# Patient Record
Sex: FEMALE | Race: WHITE | Employment: FULL TIME
[De-identification: names, ages, dates, MRNs, and addresses within clinical notes are randomized per-mention and may not be internally consistent; named-entity substitution may affect disease eponyms.]

---

## 2017-01-03 RX ORDER — VENLAFAXINE 75 MG/1
TABLET ORAL
Qty: 60 TABLET | Refills: 2 | Status: SHIPPED | OUTPATIENT
Start: 2017-01-03 | End: 2017-02-16 | Stop reason: SDUPTHER

## 2017-01-03 RX ORDER — OMEPRAZOLE 20 MG/1
CAPSULE, DELAYED RELEASE ORAL
Qty: 30 CAPSULE | Refills: 2 | Status: SHIPPED | OUTPATIENT
Start: 2017-01-03 | End: 2017-04-02 | Stop reason: SDUPTHER

## 2017-01-03 RX ORDER — PRAVASTATIN SODIUM 40 MG
TABLET ORAL
Qty: 30 TABLET | Refills: 2 | Status: SHIPPED | OUTPATIENT
Start: 2017-01-03 | End: 2017-04-02 | Stop reason: SDUPTHER

## 2017-01-30 RX ORDER — RANITIDINE 150 MG/1
TABLET ORAL
Qty: 60 TABLET | Refills: 2 | Status: SHIPPED | OUTPATIENT
Start: 2017-01-30 | End: 2017-01-30 | Stop reason: SDUPTHER

## 2017-02-01 RX ORDER — RANITIDINE 150 MG/1
TABLET ORAL
Qty: 60 TABLET | Refills: 2 | Status: SHIPPED | OUTPATIENT
Start: 2017-02-01 | End: 2017-07-23 | Stop reason: SDUPTHER

## 2017-02-16 ENCOUNTER — OFFICE VISIT (OUTPATIENT)
Dept: FAMILY MEDICINE CLINIC | Facility: CLINIC | Age: 57
End: 2017-02-16

## 2017-02-16 VITALS
WEIGHT: 162.2 LBS | DIASTOLIC BLOOD PRESSURE: 74 MMHG | BODY MASS INDEX: 29.85 KG/M2 | SYSTOLIC BLOOD PRESSURE: 118 MMHG | OXYGEN SATURATION: 98 % | HEIGHT: 62 IN | HEART RATE: 92 BPM

## 2017-02-16 DIAGNOSIS — M51.36 DEGENERATION, INTERVERTEBRAL DISC, LUMBAR: Primary | ICD-10-CM

## 2017-02-16 DIAGNOSIS — F41.9 ANXIETY: ICD-10-CM

## 2017-02-16 PROCEDURE — 99214 OFFICE O/P EST MOD 30 MIN: CPT | Performed by: FAMILY MEDICINE

## 2017-02-16 RX ORDER — TRAMADOL HYDROCHLORIDE 50 MG/1
TABLET ORAL PRN
COMMUNITY
Start: 2016-12-15 | End: 2017-02-20 | Stop reason: SDUPTHER

## 2017-02-16 RX ORDER — BUSPIRONE HYDROCHLORIDE 15 MG/1
15 TABLET ORAL 3 TIMES DAILY
Qty: 90 TABLET | Refills: 3 | Status: SHIPPED | OUTPATIENT
Start: 2017-02-16 | End: 2017-03-03 | Stop reason: SDUPTHER

## 2017-02-16 RX ORDER — BETAMETHASONE DIPROPIONATE 0.5 MG/G
CREAM TOPICAL
Qty: 45 G | Refills: 2 | Status: SHIPPED | OUTPATIENT
Start: 2017-02-16 | End: 2018-04-05 | Stop reason: SDUPTHER

## 2017-02-16 RX ORDER — VENLAFAXINE 75 MG/1
75 TABLET ORAL 2 TIMES DAILY
Qty: 60 TABLET | Refills: 3 | Status: SHIPPED | OUTPATIENT
Start: 2017-02-16 | End: 2017-02-22 | Stop reason: SDUPTHER

## 2017-02-16 RX ORDER — BACLOFEN 10 MG/1
10 TABLET ORAL 3 TIMES DAILY
Qty: 90 TABLET | Refills: 0 | Status: SHIPPED | OUTPATIENT
Start: 2017-02-16 | End: 2017-03-16 | Stop reason: SDUPTHER

## 2017-02-16 RX ORDER — MELOXICAM 15 MG/1
15 TABLET ORAL DAILY
Qty: 30 TABLET | Refills: 3 | Status: SHIPPED | OUTPATIENT
Start: 2017-02-16 | End: 2017-06-26 | Stop reason: SDUPTHER

## 2017-02-20 DIAGNOSIS — R87.810 ASCUS WITH POSITIVE HIGH RISK HPV CERVICAL: ICD-10-CM

## 2017-02-20 DIAGNOSIS — N81.10 VAGINAL PROLAPSE: ICD-10-CM

## 2017-02-20 DIAGNOSIS — R87.610 ASCUS WITH POSITIVE HIGH RISK HPV CERVICAL: ICD-10-CM

## 2017-02-20 DIAGNOSIS — N95.9 POST MENOPAUSAL PROBLEMS: ICD-10-CM

## 2017-02-22 ENCOUNTER — TELEPHONE (OUTPATIENT)
Dept: FAMILY MEDICINE CLINIC | Age: 57
End: 2017-02-22

## 2017-02-22 RX ORDER — TRAMADOL HYDROCHLORIDE 50 MG/1
TABLET ORAL
Qty: 15 TABLET | Refills: 0 | Status: SHIPPED | OUTPATIENT
Start: 2017-02-22 | End: 2017-03-04 | Stop reason: SDUPTHER

## 2017-02-22 RX ORDER — VENLAFAXINE 75 MG/1
75 TABLET ORAL 3 TIMES DAILY
Qty: 90 TABLET | Refills: 3 | Status: SHIPPED | OUTPATIENT
Start: 2017-02-22 | End: 2017-06-25 | Stop reason: SDUPTHER

## 2017-02-22 RX ORDER — CONJUGATED ESTROGENS 0.62 MG/G
CREAM VAGINAL
Qty: 30 G | Refills: 3 | Status: SHIPPED | OUTPATIENT
Start: 2017-02-22 | End: 2017-06-26 | Stop reason: SDUPTHER

## 2017-03-03 DIAGNOSIS — M25.549 ARTHRALGIA OF HAND: ICD-10-CM

## 2017-03-03 DIAGNOSIS — F41.9 ANXIETY: ICD-10-CM

## 2017-03-03 RX ORDER — BUSPIRONE HYDROCHLORIDE 15 MG/1
TABLET ORAL
Qty: 60 TABLET | Refills: 2 | Status: SHIPPED | OUTPATIENT
Start: 2017-03-03 | End: 2017-06-14 | Stop reason: SDUPTHER

## 2017-03-03 RX ORDER — DULOXETIN HYDROCHLORIDE 20 MG/1
CAPSULE, DELAYED RELEASE ORAL
Qty: 30 CAPSULE | Refills: 2 | Status: SHIPPED | OUTPATIENT
Start: 2017-03-03 | End: 2017-06-05 | Stop reason: SDUPTHER

## 2017-03-08 DIAGNOSIS — R93.7 ABNORMAL MRI, LUMBAR SPINE: Primary | ICD-10-CM

## 2017-03-15 ENCOUNTER — HOSPITAL ENCOUNTER (EMERGENCY)
Age: 57
Discharge: HOME | End: 2017-03-15
Attending: EMERGENCY MEDICINE
Payer: COMMERCIAL

## 2017-03-15 VITALS
SYSTOLIC BLOOD PRESSURE: 134 MMHG | HEIGHT: 66 IN | OXYGEN SATURATION: 100 % | TEMPERATURE: 97.3 F | WEIGHT: 160 LBS | DIASTOLIC BLOOD PRESSURE: 73 MMHG | BODY MASS INDEX: 25.71 KG/M2 | HEART RATE: 73 BPM | RESPIRATION RATE: 15 BRPM

## 2017-03-15 DIAGNOSIS — M54.5 MIDLINE LOW BACK PAIN, UNSPECIFIED CHRONICITY, WITH SCIATICA PRESENCE UNSPECIFIED: Primary | ICD-10-CM

## 2017-03-15 LAB
-: ABNORMAL
AMORPHOUS: ABNORMAL
BACTERIA: ABNORMAL
BILIRUBIN URINE: NEGATIVE
CASTS UA: ABNORMAL /LPF (ref 0–8)
COLOR: YELLOW
CRYSTALS, UA: ABNORMAL /HPF
EPITHELIAL CELLS UA: ABNORMAL /HPF (ref 0–5)
GLUCOSE URINE: NEGATIVE
KETONES, URINE: NEGATIVE
LEUKOCYTE ESTERASE, URINE: NEGATIVE
MUCUS: ABNORMAL
NITRITE, URINE: NEGATIVE
OTHER OBSERVATIONS UA: ABNORMAL
PH UA: 6 (ref 5–8)
PROTEIN UA: NEGATIVE
RBC UA: ABNORMAL /HPF (ref 0–4)
RENAL EPITHELIAL, UA: ABNORMAL /HPF
SPECIFIC GRAVITY UA: 1.02 (ref 1–1.03)
TRICHOMONAS: ABNORMAL
TURBIDITY: CLEAR
URINE HGB: NEGATIVE
UROBILINOGEN, URINE: NORMAL
WBC UA: ABNORMAL /HPF (ref 0–5)
YEAST: ABNORMAL

## 2017-03-15 PROCEDURE — 99284 EMERGENCY DEPT VISIT MOD MDM: CPT

## 2017-03-15 PROCEDURE — 81001 URINALYSIS AUTO W/SCOPE: CPT

## 2017-03-15 PROCEDURE — 6370000000 HC RX 637 (ALT 250 FOR IP): Performed by: EMERGENCY MEDICINE

## 2017-03-15 RX ORDER — OXYCODONE HYDROCHLORIDE AND ACETAMINOPHEN 5; 325 MG/1; MG/1
1 TABLET ORAL ONCE
Status: COMPLETED | OUTPATIENT
Start: 2017-03-15 | End: 2017-03-15

## 2017-03-15 RX ORDER — OXYCODONE HYDROCHLORIDE AND ACETAMINOPHEN 5; 325 MG/1; MG/1
1 TABLET ORAL EVERY 4 HOURS PRN
Qty: 10 TABLET | Refills: 0 | Status: SHIPPED | OUTPATIENT
Start: 2017-03-15 | End: 2017-03-22

## 2017-03-15 RX ADMIN — OXYCODONE HYDROCHLORIDE AND ACETAMINOPHEN 1 TABLET: 5; 325 TABLET ORAL at 17:22

## 2017-03-15 ASSESSMENT — ENCOUNTER SYMPTOMS
NAUSEA: 0
BACK PAIN: 1
VOMITING: 0
ABDOMINAL PAIN: 0
CONSTIPATION: 0
CHEST TIGHTNESS: 0
SHORTNESS OF BREATH: 0
DIARRHEA: 0
COUGH: 0
PHOTOPHOBIA: 0

## 2017-03-15 ASSESSMENT — PAIN DESCRIPTION - PAIN TYPE: TYPE: ACUTE PAIN

## 2017-03-15 ASSESSMENT — PAIN DESCRIPTION - ORIENTATION: ORIENTATION: LOWER

## 2017-03-15 ASSESSMENT — PAIN SCALES - GENERAL
PAINLEVEL_OUTOF10: 10
PAINLEVEL_OUTOF10: 10

## 2017-03-15 ASSESSMENT — PAIN DESCRIPTION - LOCATION: LOCATION: BACK

## 2017-03-16 DIAGNOSIS — M51.36 DEGENERATION, INTERVERTEBRAL DISC, LUMBAR: ICD-10-CM

## 2017-03-17 RX ORDER — BACLOFEN 10 MG/1
TABLET ORAL
Qty: 90 TABLET | Refills: 0 | Status: SHIPPED | OUTPATIENT
Start: 2017-03-17 | End: 2017-04-16 | Stop reason: SDUPTHER

## 2017-03-21 ENCOUNTER — INITIAL CONSULT (OUTPATIENT)
Dept: NEUROSURGERY | Age: 57
End: 2017-03-21
Payer: COMMERCIAL

## 2017-03-21 VITALS
HEIGHT: 61 IN | BODY MASS INDEX: 30.78 KG/M2 | HEART RATE: 96 BPM | WEIGHT: 163 LBS | DIASTOLIC BLOOD PRESSURE: 77 MMHG | SYSTOLIC BLOOD PRESSURE: 122 MMHG

## 2017-03-21 DIAGNOSIS — M51.9 LUMBAR DISC DISEASE: ICD-10-CM

## 2017-03-21 DIAGNOSIS — G89.29 CHRONIC LEFT-SIDED LOW BACK PAIN WITHOUT SCIATICA: Primary | ICD-10-CM

## 2017-03-21 DIAGNOSIS — M54.50 CHRONIC LEFT-SIDED LOW BACK PAIN WITHOUT SCIATICA: Primary | ICD-10-CM

## 2017-03-21 PROCEDURE — 99243 OFF/OP CNSLTJ NEW/EST LOW 30: CPT | Performed by: NEUROLOGICAL SURGERY

## 2017-03-27 ENCOUNTER — HOSPITAL ENCOUNTER (OUTPATIENT)
Dept: PHYSICAL THERAPY | Age: 57
Setting detail: THERAPIES SERIES
Discharge: HOME | End: 2017-03-27
Payer: COMMERCIAL

## 2017-03-28 RX ORDER — TRAMADOL HYDROCHLORIDE 50 MG/1
TABLET ORAL
Qty: 15 TABLET | Refills: 0 | Status: SHIPPED | OUTPATIENT
Start: 2017-03-28 | End: 2017-04-09 | Stop reason: SDUPTHER

## 2017-03-29 ENCOUNTER — HOSPITAL ENCOUNTER (OUTPATIENT)
Dept: PAIN MANAGEMENT | Age: 57
Discharge: HOME | End: 2017-03-29
Payer: COMMERCIAL

## 2017-03-29 ENCOUNTER — HOSPITAL ENCOUNTER (OUTPATIENT)
Dept: PHYSICAL THERAPY | Age: 57
Setting detail: THERAPIES SERIES
Discharge: HOME | End: 2017-03-29
Payer: COMMERCIAL

## 2017-03-29 VITALS
DIASTOLIC BLOOD PRESSURE: 60 MMHG | HEART RATE: 58 BPM | SYSTOLIC BLOOD PRESSURE: 121 MMHG | HEIGHT: 61 IN | TEMPERATURE: 98.2 F | RESPIRATION RATE: 12 BRPM | BODY MASS INDEX: 30.21 KG/M2 | WEIGHT: 160 LBS

## 2017-03-29 DIAGNOSIS — M51.26 DISPLACEMENT OF LUMBAR INTERVERTEBRAL DISC WITHOUT MYELOPATHY: ICD-10-CM

## 2017-03-29 PROCEDURE — 97162 PT EVAL MOD COMPLEX 30 MIN: CPT

## 2017-03-29 PROCEDURE — G0463 HOSPITAL OUTPT CLINIC VISIT: HCPCS

## 2017-03-29 ASSESSMENT — PAIN SCALES - GENERAL: PAINLEVEL_OUTOF10: 8

## 2017-03-29 ASSESSMENT — PAIN DESCRIPTION - ORIENTATION
ORIENTATION: LEFT
ORIENTATION: LEFT;RIGHT

## 2017-03-29 ASSESSMENT — PAIN DESCRIPTION - PROGRESSION: CLINICAL_PROGRESSION: GRADUALLY WORSENING

## 2017-03-29 ASSESSMENT — PAIN DESCRIPTION - PAIN TYPE: TYPE: CHRONIC PAIN

## 2017-03-29 ASSESSMENT — PAIN DESCRIPTION - DESCRIPTORS: DESCRIPTORS: CRAMPING;DULL;ACHING

## 2017-03-29 ASSESSMENT — PAIN DESCRIPTION - LOCATION
LOCATION: BACK;LEG;HIP
LOCATION: BACK

## 2017-03-29 ASSESSMENT — PAIN DESCRIPTION - FREQUENCY: FREQUENCY: INTERMITTENT

## 2017-04-03 ENCOUNTER — HOSPITAL ENCOUNTER (OUTPATIENT)
Dept: PHYSICAL THERAPY | Age: 57
Setting detail: THERAPIES SERIES
Discharge: HOME | End: 2017-04-03
Payer: COMMERCIAL

## 2017-04-03 PROCEDURE — 97113 AQUATIC THERAPY/EXERCISES: CPT

## 2017-04-03 ASSESSMENT — PAIN DESCRIPTION - ORIENTATION: ORIENTATION: LOWER

## 2017-04-03 ASSESSMENT — PAIN SCALES - GENERAL: PAINLEVEL_OUTOF10: 8

## 2017-04-03 ASSESSMENT — PAIN DESCRIPTION - PAIN TYPE: TYPE: CHRONIC PAIN

## 2017-04-03 ASSESSMENT — PAIN DESCRIPTION - LOCATION: LOCATION: BACK

## 2017-04-05 ENCOUNTER — HOSPITAL ENCOUNTER (OUTPATIENT)
Dept: PHYSICAL THERAPY | Age: 57
Setting detail: THERAPIES SERIES
Discharge: HOME | End: 2017-04-05
Payer: COMMERCIAL

## 2017-04-05 PROCEDURE — 97113 AQUATIC THERAPY/EXERCISES: CPT

## 2017-04-05 ASSESSMENT — PAIN SCALES - GENERAL: PAINLEVEL_OUTOF10: 3

## 2017-04-05 ASSESSMENT — PAIN DESCRIPTION - PROGRESSION: CLINICAL_PROGRESSION: NOT CHANGED

## 2017-04-05 ASSESSMENT — PAIN DESCRIPTION - LOCATION: LOCATION: BACK

## 2017-04-05 ASSESSMENT — PAIN DESCRIPTION - ORIENTATION: ORIENTATION: LOWER

## 2017-04-05 ASSESSMENT — PAIN DESCRIPTION - PAIN TYPE: TYPE: CHRONIC PAIN

## 2017-04-11 RX ORDER — TRAMADOL HYDROCHLORIDE 50 MG/1
TABLET ORAL
Qty: 15 TABLET | Refills: 0 | Status: SHIPPED | OUTPATIENT
Start: 2017-04-11 | End: 2017-04-30 | Stop reason: SDUPTHER

## 2017-04-12 ENCOUNTER — HOSPITAL ENCOUNTER (OUTPATIENT)
Dept: PHYSICAL THERAPY | Age: 57
Setting detail: THERAPIES SERIES
Discharge: HOME | End: 2017-04-12
Payer: COMMERCIAL

## 2017-04-12 PROCEDURE — 97113 AQUATIC THERAPY/EXERCISES: CPT

## 2017-04-12 ASSESSMENT — PAIN SCALES - GENERAL: PAINLEVEL_OUTOF10: 8

## 2017-04-12 ASSESSMENT — PAIN DESCRIPTION - PAIN TYPE: TYPE: CHRONIC PAIN

## 2017-04-12 ASSESSMENT — PAIN DESCRIPTION - LOCATION: LOCATION: BACK

## 2017-04-12 ASSESSMENT — PAIN DESCRIPTION - ORIENTATION: ORIENTATION: LOWER

## 2017-04-16 DIAGNOSIS — M51.36 DEGENERATION, INTERVERTEBRAL DISC, LUMBAR: ICD-10-CM

## 2017-04-17 ENCOUNTER — HOSPITAL ENCOUNTER (OUTPATIENT)
Dept: PHYSICAL THERAPY | Age: 57
Setting detail: THERAPIES SERIES
Discharge: HOME | End: 2017-04-17
Payer: COMMERCIAL

## 2017-04-17 PROCEDURE — 97113 AQUATIC THERAPY/EXERCISES: CPT

## 2017-04-17 RX ORDER — BACLOFEN 10 MG/1
TABLET ORAL
Qty: 90 TABLET | Refills: 0 | Status: SHIPPED | OUTPATIENT
Start: 2017-04-17 | End: 2017-05-15 | Stop reason: SDUPTHER

## 2017-04-17 ASSESSMENT — PAIN SCALES - GENERAL: PAINLEVEL_OUTOF10: 6

## 2017-04-17 ASSESSMENT — PAIN DESCRIPTION - ORIENTATION: ORIENTATION: LOWER

## 2017-04-17 ASSESSMENT — PAIN DESCRIPTION - PAIN TYPE: TYPE: CHRONIC PAIN

## 2017-04-17 ASSESSMENT — PAIN DESCRIPTION - LOCATION: LOCATION: BACK

## 2017-04-19 ENCOUNTER — APPOINTMENT (OUTPATIENT)
Dept: MRI IMAGING | Age: 57
End: 2017-04-19
Payer: COMMERCIAL

## 2017-04-19 ENCOUNTER — APPOINTMENT (OUTPATIENT)
Dept: CT IMAGING | Age: 57
End: 2017-04-19
Payer: COMMERCIAL

## 2017-04-19 ENCOUNTER — HOSPITAL ENCOUNTER (EMERGENCY)
Age: 57
Discharge: HOME | End: 2017-04-19
Attending: EMERGENCY MEDICINE
Payer: COMMERCIAL

## 2017-04-19 VITALS
BODY MASS INDEX: 30.21 KG/M2 | HEART RATE: 103 BPM | TEMPERATURE: 97.9 F | HEIGHT: 61 IN | OXYGEN SATURATION: 100 % | DIASTOLIC BLOOD PRESSURE: 71 MMHG | SYSTOLIC BLOOD PRESSURE: 126 MMHG | RESPIRATION RATE: 16 BRPM | WEIGHT: 160 LBS

## 2017-04-19 DIAGNOSIS — M51.26 LUMBAR DISC HERNIATION: Primary | ICD-10-CM

## 2017-04-19 PROCEDURE — 72148 MRI LUMBAR SPINE W/O DYE: CPT

## 2017-04-19 PROCEDURE — 6360000002 HC RX W HCPCS: Performed by: EMERGENCY MEDICINE

## 2017-04-19 PROCEDURE — 99283 EMERGENCY DEPT VISIT LOW MDM: CPT

## 2017-04-19 PROCEDURE — 96372 THER/PROPH/DIAG INJ SC/IM: CPT

## 2017-04-19 RX ORDER — KETOROLAC TROMETHAMINE 10 MG/1
10 TABLET, FILM COATED ORAL EVERY 6 HOURS PRN
Qty: 20 TABLET | Refills: 0 | Status: SHIPPED | OUTPATIENT
Start: 2017-04-19 | End: 2017-07-07 | Stop reason: ALTCHOICE

## 2017-04-19 RX ORDER — CYCLOBENZAPRINE HCL 10 MG
10 TABLET ORAL 3 TIMES DAILY PRN
Qty: 20 TABLET | Refills: 0 | Status: SHIPPED | OUTPATIENT
Start: 2017-04-19 | End: 2017-04-29

## 2017-04-19 RX ORDER — KETOROLAC TROMETHAMINE 30 MG/ML
30 INJECTION, SOLUTION INTRAMUSCULAR; INTRAVENOUS ONCE
Status: COMPLETED | OUTPATIENT
Start: 2017-04-19 | End: 2017-04-19

## 2017-04-19 RX ADMIN — KETOROLAC TROMETHAMINE 30 MG: 30 INJECTION, SOLUTION INTRAMUSCULAR; INTRAVENOUS at 13:01

## 2017-04-19 ASSESSMENT — ENCOUNTER SYMPTOMS
RESPIRATORY NEGATIVE: 1
BACK PAIN: 1
GASTROINTESTINAL NEGATIVE: 1
ALLERGIC/IMMUNOLOGIC NEGATIVE: 1
EYES NEGATIVE: 1

## 2017-04-19 ASSESSMENT — PAIN SCALES - GENERAL
PAINLEVEL_OUTOF10: 10
PAINLEVEL_OUTOF10: 9

## 2017-04-19 ASSESSMENT — PAIN DESCRIPTION - FREQUENCY: FREQUENCY: CONTINUOUS

## 2017-04-19 ASSESSMENT — PAIN DESCRIPTION - LOCATION: LOCATION: BACK

## 2017-04-19 ASSESSMENT — PAIN DESCRIPTION - DESCRIPTORS: DESCRIPTORS: ACHING

## 2017-04-19 ASSESSMENT — PAIN DESCRIPTION - ORIENTATION: ORIENTATION: LEFT;RIGHT;LOWER

## 2017-04-19 ASSESSMENT — PAIN DESCRIPTION - PAIN TYPE: TYPE: ACUTE PAIN

## 2017-05-15 DIAGNOSIS — M51.36 DEGENERATION, INTERVERTEBRAL DISC, LUMBAR: ICD-10-CM

## 2017-05-16 RX ORDER — BACLOFEN 10 MG/1
TABLET ORAL
Qty: 90 TABLET | Refills: 0 | Status: SHIPPED | OUTPATIENT
Start: 2017-05-16 | End: 2017-06-14 | Stop reason: SDUPTHER

## 2017-05-17 ENCOUNTER — HOSPITAL ENCOUNTER (OUTPATIENT)
Dept: PHYSICAL THERAPY | Age: 57
Setting detail: THERAPIES SERIES
Discharge: HOME | End: 2017-05-17
Payer: COMMERCIAL

## 2017-05-18 ASSESSMENT — PAIN DESCRIPTION - LOCATION: LOCATION: BACK

## 2017-05-18 ASSESSMENT — PAIN DESCRIPTION - PROGRESSION: CLINICAL_PROGRESSION: NOT CHANGED

## 2017-05-18 ASSESSMENT — PAIN DESCRIPTION - ORIENTATION: ORIENTATION: LOWER

## 2017-05-18 ASSESSMENT — PAIN DESCRIPTION - PAIN TYPE: TYPE: CHRONIC PAIN

## 2017-05-18 ASSESSMENT — PAIN SCALES - GENERAL: PAINLEVEL_OUTOF10: 6

## 2017-05-22 ENCOUNTER — HOSPITAL ENCOUNTER (OUTPATIENT)
Dept: PHYSICAL THERAPY | Age: 57
Setting detail: THERAPIES SERIES
Discharge: HOME | End: 2017-05-22
Payer: COMMERCIAL

## 2017-05-22 PROCEDURE — 97113 AQUATIC THERAPY/EXERCISES: CPT

## 2017-05-22 ASSESSMENT — PAIN DESCRIPTION - ORIENTATION: ORIENTATION: LOWER

## 2017-05-22 ASSESSMENT — PAIN SCALES - GENERAL: PAINLEVEL_OUTOF10: 8

## 2017-05-22 ASSESSMENT — PAIN DESCRIPTION - LOCATION: LOCATION: BACK

## 2017-05-22 ASSESSMENT — PAIN DESCRIPTION - PAIN TYPE: TYPE: CHRONIC PAIN

## 2017-05-31 ENCOUNTER — HOSPITAL ENCOUNTER (OUTPATIENT)
Dept: PHYSICAL THERAPY | Age: 57
Setting detail: THERAPIES SERIES
Discharge: HOME | End: 2017-05-31
Payer: COMMERCIAL

## 2017-05-31 PROCEDURE — 97113 AQUATIC THERAPY/EXERCISES: CPT

## 2017-05-31 ASSESSMENT — PAIN DESCRIPTION - LOCATION: LOCATION: BACK

## 2017-05-31 ASSESSMENT — PAIN SCALES - GENERAL: PAINLEVEL_OUTOF10: 7

## 2017-05-31 ASSESSMENT — PAIN DESCRIPTION - PAIN TYPE: TYPE: CHRONIC PAIN

## 2017-05-31 ASSESSMENT — PAIN DESCRIPTION - ORIENTATION: ORIENTATION: LOWER

## 2017-06-01 ENCOUNTER — HOSPITAL ENCOUNTER (OUTPATIENT)
Dept: PHYSICAL THERAPY | Age: 57
Setting detail: THERAPIES SERIES
Discharge: HOME | End: 2017-06-01
Payer: COMMERCIAL

## 2017-06-01 ENCOUNTER — OFFICE VISIT (OUTPATIENT)
Dept: FAMILY MEDICINE CLINIC | Age: 57
End: 2017-06-01
Payer: COMMERCIAL

## 2017-06-01 VITALS
SYSTOLIC BLOOD PRESSURE: 125 MMHG | WEIGHT: 161.6 LBS | BODY MASS INDEX: 30.51 KG/M2 | HEIGHT: 61 IN | RESPIRATION RATE: 12 BRPM | DIASTOLIC BLOOD PRESSURE: 73 MMHG | OXYGEN SATURATION: 99 % | HEART RATE: 89 BPM

## 2017-06-01 DIAGNOSIS — M76.892 TENDINITIS OF LEFT KNEE: Primary | ICD-10-CM

## 2017-06-01 PROCEDURE — 96160 PT-FOCUSED HLTH RISK ASSMT: CPT | Performed by: FAMILY MEDICINE

## 2017-06-01 PROCEDURE — 97113 AQUATIC THERAPY/EXERCISES: CPT

## 2017-06-01 PROCEDURE — 99214 OFFICE O/P EST MOD 30 MIN: CPT | Performed by: FAMILY MEDICINE

## 2017-06-01 RX ORDER — SULFAMETHOXAZOLE AND TRIMETHOPRIM 800; 160 MG/1; MG/1
TABLET ORAL
COMMUNITY
Start: 2017-05-09 | End: 2017-06-01 | Stop reason: ALTCHOICE

## 2017-06-01 ASSESSMENT — PATIENT HEALTH QUESTIONNAIRE - PHQ9
9. THOUGHTS THAT YOU WOULD BE BETTER OFF DEAD, OR OF HURTING YOURSELF: 0
3. TROUBLE FALLING OR STAYING ASLEEP: 2
1. LITTLE INTEREST OR PLEASURE IN DOING THINGS: 2
SUM OF ALL RESPONSES TO PHQ9 QUESTIONS 1 & 2: 5
SUM OF ALL RESPONSES TO PHQ QUESTIONS 1-9: 18
10. IF YOU CHECKED OFF ANY PROBLEMS, HOW DIFFICULT HAVE THESE PROBLEMS MADE IT FOR YOU TO DO YOUR WORK, TAKE CARE OF THINGS AT HOME, OR GET ALONG WITH OTHER PEOPLE: 3
6. FEELING BAD ABOUT YOURSELF - OR THAT YOU ARE A FAILURE OR HAVE LET YOURSELF OR YOUR FAMILY DOWN: 1
4. FEELING TIRED OR HAVING LITTLE ENERGY: 3
5. POOR APPETITE OR OVEREATING: 3
7. TROUBLE CONCENTRATING ON THINGS, SUCH AS READING THE NEWSPAPER OR WATCHING TELEVISION: 2
8. MOVING OR SPEAKING SO SLOWLY THAT OTHER PEOPLE COULD HAVE NOTICED. OR THE OPPOSITE, BEING SO FIGETY OR RESTLESS THAT YOU HAVE BEEN MOVING AROUND A LOT MORE THAN USUAL: 2
2. FEELING DOWN, DEPRESSED OR HOPELESS: 3

## 2017-06-01 ASSESSMENT — PAIN DESCRIPTION - PAIN TYPE: TYPE: CHRONIC PAIN

## 2017-06-01 ASSESSMENT — PAIN SCALES - GENERAL: PAINLEVEL_OUTOF10: 8

## 2017-06-01 ASSESSMENT — PAIN DESCRIPTION - LOCATION: LOCATION: BACK

## 2017-06-01 ASSESSMENT — PAIN DESCRIPTION - ORIENTATION: ORIENTATION: LOWER

## 2017-06-05 ENCOUNTER — HOSPITAL ENCOUNTER (OUTPATIENT)
Dept: PHYSICAL THERAPY | Age: 57
Setting detail: THERAPIES SERIES
Discharge: HOME | End: 2017-06-05
Payer: COMMERCIAL

## 2017-06-05 DIAGNOSIS — M25.549 ARTHRALGIA OF HAND: ICD-10-CM

## 2017-06-05 PROCEDURE — 97113 AQUATIC THERAPY/EXERCISES: CPT

## 2017-06-05 RX ORDER — DULOXETIN HYDROCHLORIDE 20 MG/1
CAPSULE, DELAYED RELEASE ORAL
Qty: 30 CAPSULE | Refills: 1 | Status: SHIPPED | OUTPATIENT
Start: 2017-06-05 | End: 2017-08-02 | Stop reason: SDUPTHER

## 2017-06-05 ASSESSMENT — PAIN DESCRIPTION - PAIN TYPE: TYPE: CHRONIC PAIN

## 2017-06-05 ASSESSMENT — PAIN DESCRIPTION - ORIENTATION: ORIENTATION: LOWER

## 2017-06-05 ASSESSMENT — PAIN SCALES - GENERAL: PAINLEVEL_OUTOF10: 7

## 2017-06-05 ASSESSMENT — PAIN DESCRIPTION - LOCATION: LOCATION: BACK

## 2017-06-06 RX ORDER — TRAMADOL HYDROCHLORIDE 50 MG/1
TABLET ORAL
Qty: 30 TABLET | Refills: 0 | Status: SHIPPED | OUTPATIENT
Start: 2017-06-06 | End: 2017-07-03 | Stop reason: SDUPTHER

## 2017-06-07 ENCOUNTER — HOSPITAL ENCOUNTER (OUTPATIENT)
Dept: PHYSICAL THERAPY | Age: 57
Setting detail: THERAPIES SERIES
Discharge: HOME | End: 2017-06-07
Payer: COMMERCIAL

## 2017-06-07 PROCEDURE — 97113 AQUATIC THERAPY/EXERCISES: CPT

## 2017-06-07 ASSESSMENT — PAIN DESCRIPTION - ORIENTATION: ORIENTATION: LOWER

## 2017-06-07 ASSESSMENT — PAIN DESCRIPTION - LOCATION: LOCATION: BACK

## 2017-06-07 ASSESSMENT — PAIN DESCRIPTION - PAIN TYPE: TYPE: CHRONIC PAIN

## 2017-06-07 ASSESSMENT — PAIN SCALES - GENERAL: PAINLEVEL_OUTOF10: 7

## 2017-06-08 ASSESSMENT — PAIN DESCRIPTION - LOCATION: LOCATION: BACK

## 2017-06-08 ASSESSMENT — PAIN DESCRIPTION - PROGRESSION: CLINICAL_PROGRESSION: NOT CHANGED

## 2017-06-08 ASSESSMENT — PAIN DESCRIPTION - PAIN TYPE: TYPE: CHRONIC PAIN

## 2017-06-08 ASSESSMENT — PAIN SCALES - GENERAL: PAINLEVEL_OUTOF10: 6

## 2017-06-12 ENCOUNTER — HOSPITAL ENCOUNTER (OUTPATIENT)
Dept: PHYSICAL THERAPY | Age: 57
Setting detail: THERAPIES SERIES
Discharge: HOME | End: 2017-06-12
Payer: COMMERCIAL

## 2017-06-12 PROCEDURE — 97113 AQUATIC THERAPY/EXERCISES: CPT

## 2017-06-12 ASSESSMENT — PAIN DESCRIPTION - PAIN TYPE: TYPE: CHRONIC PAIN

## 2017-06-12 ASSESSMENT — PAIN DESCRIPTION - ORIENTATION: ORIENTATION: LOWER

## 2017-06-12 ASSESSMENT — PAIN DESCRIPTION - DIRECTION: RADIATING_TOWARDS: DENIES

## 2017-06-12 ASSESSMENT — PAIN DESCRIPTION - LOCATION: LOCATION: BACK

## 2017-06-12 ASSESSMENT — PAIN SCALES - GENERAL: PAINLEVEL_OUTOF10: 7

## 2017-06-14 ENCOUNTER — HOSPITAL ENCOUNTER (OUTPATIENT)
Dept: PHYSICAL THERAPY | Age: 57
Setting detail: THERAPIES SERIES
Discharge: HOME | End: 2017-06-14
Payer: COMMERCIAL

## 2017-06-14 DIAGNOSIS — F41.9 ANXIETY: ICD-10-CM

## 2017-06-14 DIAGNOSIS — M51.36 DEGENERATION, INTERVERTEBRAL DISC, LUMBAR: ICD-10-CM

## 2017-06-14 PROCEDURE — 97113 AQUATIC THERAPY/EXERCISES: CPT

## 2017-06-14 RX ORDER — BACLOFEN 10 MG/1
TABLET ORAL
Qty: 90 TABLET | Refills: 0 | Status: SHIPPED | OUTPATIENT
Start: 2017-06-14 | End: 2017-07-17 | Stop reason: SDUPTHER

## 2017-06-14 RX ORDER — BUSPIRONE HYDROCHLORIDE 15 MG/1
TABLET ORAL
Qty: 60 TABLET | Refills: 1 | Status: SHIPPED | OUTPATIENT
Start: 2017-06-14 | End: 2017-08-16 | Stop reason: SDUPTHER

## 2017-06-14 ASSESSMENT — PAIN DESCRIPTION - PAIN TYPE: TYPE: CHRONIC PAIN

## 2017-06-14 ASSESSMENT — PAIN DESCRIPTION - LOCATION: LOCATION: BACK

## 2017-06-14 ASSESSMENT — PAIN SCALES - GENERAL: PAINLEVEL_OUTOF10: 10

## 2017-06-14 ASSESSMENT — PAIN DESCRIPTION - ORIENTATION: ORIENTATION: LOWER;LEFT

## 2017-06-15 ENCOUNTER — HOSPITAL ENCOUNTER (EMERGENCY)
Age: 57
Discharge: HOME | End: 2017-06-15
Attending: EMERGENCY MEDICINE
Payer: COMMERCIAL

## 2017-06-15 VITALS
HEIGHT: 61 IN | HEART RATE: 86 BPM | OXYGEN SATURATION: 96 % | RESPIRATION RATE: 18 BRPM | SYSTOLIC BLOOD PRESSURE: 115 MMHG | BODY MASS INDEX: 30.21 KG/M2 | DIASTOLIC BLOOD PRESSURE: 75 MMHG | TEMPERATURE: 97.9 F | WEIGHT: 160 LBS

## 2017-06-15 DIAGNOSIS — M54.5 CHRONIC MIDLINE LOW BACK PAIN, WITH SCIATICA PRESENCE UNSPECIFIED: Primary | ICD-10-CM

## 2017-06-15 DIAGNOSIS — G89.29 CHRONIC MIDLINE LOW BACK PAIN, WITH SCIATICA PRESENCE UNSPECIFIED: Primary | ICD-10-CM

## 2017-06-15 PROCEDURE — 99283 EMERGENCY DEPT VISIT LOW MDM: CPT

## 2017-06-15 PROCEDURE — 96372 THER/PROPH/DIAG INJ SC/IM: CPT

## 2017-06-15 PROCEDURE — 6360000002 HC RX W HCPCS: Performed by: EMERGENCY MEDICINE

## 2017-06-15 RX ORDER — KETOROLAC TROMETHAMINE 30 MG/ML
30 INJECTION, SOLUTION INTRAMUSCULAR; INTRAVENOUS ONCE
Status: COMPLETED | OUTPATIENT
Start: 2017-06-15 | End: 2017-06-15

## 2017-06-15 RX ORDER — DIAZEPAM 2 MG/1
2 TABLET ORAL EVERY 8 HOURS PRN
Qty: 10 TABLET | Refills: 0 | Status: SHIPPED | OUTPATIENT
Start: 2017-06-15 | End: 2017-06-25

## 2017-06-15 RX ADMIN — KETOROLAC TROMETHAMINE 30 MG: 30 INJECTION, SOLUTION INTRAMUSCULAR; INTRAVENOUS at 07:11

## 2017-06-15 ASSESSMENT — PAIN DESCRIPTION - FREQUENCY: FREQUENCY: CONTINUOUS

## 2017-06-15 ASSESSMENT — ENCOUNTER SYMPTOMS
EYES NEGATIVE: 1
BACK PAIN: 1
RESPIRATORY NEGATIVE: 1
GASTROINTESTINAL NEGATIVE: 1
ALLERGIC/IMMUNOLOGIC NEGATIVE: 1

## 2017-06-15 ASSESSMENT — PAIN DESCRIPTION - DESCRIPTORS: DESCRIPTORS: ACHING

## 2017-06-15 ASSESSMENT — PAIN SCALES - GENERAL
PAINLEVEL_OUTOF10: 9
PAINLEVEL_OUTOF10: 10

## 2017-06-15 ASSESSMENT — PAIN DESCRIPTION - LOCATION: LOCATION: BACK

## 2017-06-19 ENCOUNTER — HOSPITAL ENCOUNTER (OUTPATIENT)
Dept: PHYSICAL THERAPY | Age: 57
Setting detail: THERAPIES SERIES
Discharge: HOME | End: 2017-06-19
Payer: COMMERCIAL

## 2017-06-19 PROCEDURE — 97113 AQUATIC THERAPY/EXERCISES: CPT

## 2017-06-19 ASSESSMENT — PAIN DESCRIPTION - LOCATION: LOCATION: BACK

## 2017-06-19 ASSESSMENT — PAIN SCALES - GENERAL: PAINLEVEL_OUTOF10: 6

## 2017-06-19 ASSESSMENT — PAIN DESCRIPTION - PAIN TYPE: TYPE: CHRONIC PAIN

## 2017-06-19 ASSESSMENT — PAIN DESCRIPTION - ORIENTATION: ORIENTATION: LOWER

## 2017-06-21 ENCOUNTER — HOSPITAL ENCOUNTER (OUTPATIENT)
Dept: PHYSICAL THERAPY | Age: 57
Setting detail: THERAPIES SERIES
Discharge: HOME | End: 2017-06-21
Payer: COMMERCIAL

## 2017-06-21 PROCEDURE — 97113 AQUATIC THERAPY/EXERCISES: CPT

## 2017-06-21 ASSESSMENT — PAIN DESCRIPTION - ORIENTATION: ORIENTATION: LOWER

## 2017-06-21 ASSESSMENT — PAIN SCALES - GENERAL: PAINLEVEL_OUTOF10: 8

## 2017-06-21 ASSESSMENT — PAIN DESCRIPTION - LOCATION: LOCATION: BACK

## 2017-06-21 ASSESSMENT — PAIN DESCRIPTION - PAIN TYPE: TYPE: CHRONIC PAIN

## 2017-06-26 DIAGNOSIS — N81.10 VAGINAL PROLAPSE: ICD-10-CM

## 2017-06-26 DIAGNOSIS — M51.36 DEGENERATION, INTERVERTEBRAL DISC, LUMBAR: ICD-10-CM

## 2017-06-26 DIAGNOSIS — R87.810 ASCUS WITH POSITIVE HIGH RISK HPV CERVICAL: ICD-10-CM

## 2017-06-26 DIAGNOSIS — N95.9 POST MENOPAUSAL PROBLEMS: ICD-10-CM

## 2017-06-26 DIAGNOSIS — R87.610 ASCUS WITH POSITIVE HIGH RISK HPV CERVICAL: ICD-10-CM

## 2017-06-26 RX ORDER — VENLAFAXINE 75 MG/1
TABLET ORAL
Qty: 90 TABLET | Refills: 2 | Status: SHIPPED | OUTPATIENT
Start: 2017-06-26 | End: 2017-09-21 | Stop reason: SDUPTHER

## 2017-06-26 RX ORDER — CONJUGATED ESTROGENS 0.62 MG/G
CREAM VAGINAL
Qty: 30 TUBE | Refills: 2 | Status: SHIPPED | OUTPATIENT
Start: 2017-06-26 | End: 2017-09-21 | Stop reason: SDUPTHER

## 2017-06-26 RX ORDER — MELOXICAM 15 MG/1
TABLET ORAL
Qty: 30 TABLET | Refills: 2 | Status: SHIPPED | OUTPATIENT
Start: 2017-06-26 | End: 2017-09-21 | Stop reason: SDUPTHER

## 2017-06-29 ENCOUNTER — HOSPITAL ENCOUNTER (OUTPATIENT)
Dept: PHYSICAL THERAPY | Age: 57
Setting detail: THERAPIES SERIES
Discharge: HOME | End: 2017-06-29
Payer: COMMERCIAL

## 2017-06-29 PROCEDURE — 97113 AQUATIC THERAPY/EXERCISES: CPT

## 2017-06-29 ASSESSMENT — PAIN SCALES - GENERAL: PAINLEVEL_OUTOF10: 8

## 2017-06-29 ASSESSMENT — PAIN DESCRIPTION - LOCATION: LOCATION: BACK

## 2017-06-29 ASSESSMENT — PAIN DESCRIPTION - ORIENTATION: ORIENTATION: LOWER

## 2017-06-29 ASSESSMENT — PAIN DESCRIPTION - PAIN TYPE: TYPE: CHRONIC PAIN

## 2017-07-03 ENCOUNTER — APPOINTMENT (OUTPATIENT)
Dept: PHYSICAL THERAPY | Age: 57
End: 2017-07-03
Payer: COMMERCIAL

## 2017-07-05 RX ORDER — TRAMADOL HYDROCHLORIDE 50 MG/1
TABLET ORAL
Qty: 30 TABLET | Refills: 0 | Status: SHIPPED | OUTPATIENT
Start: 2017-07-05 | End: 2017-08-01 | Stop reason: SDUPTHER

## 2017-07-06 ENCOUNTER — HOSPITAL ENCOUNTER (OUTPATIENT)
Dept: PHYSICAL THERAPY | Age: 57
Setting detail: THERAPIES SERIES
Discharge: HOME | End: 2017-07-06
Payer: COMMERCIAL

## 2017-07-06 ENCOUNTER — OFFICE VISIT (OUTPATIENT)
Dept: FAMILY MEDICINE CLINIC | Age: 57
End: 2017-07-06
Payer: COMMERCIAL

## 2017-07-06 ENCOUNTER — HOSPITAL ENCOUNTER (OUTPATIENT)
Age: 57
Setting detail: SPECIMEN
Discharge: HOME | End: 2017-07-06
Payer: COMMERCIAL

## 2017-07-06 VITALS
HEIGHT: 61 IN | DIASTOLIC BLOOD PRESSURE: 61 MMHG | OXYGEN SATURATION: 100 % | SYSTOLIC BLOOD PRESSURE: 116 MMHG | HEART RATE: 90 BPM | RESPIRATION RATE: 14 BRPM | BODY MASS INDEX: 30.89 KG/M2 | WEIGHT: 163.6 LBS

## 2017-07-06 DIAGNOSIS — Z20.2 POSSIBLE EXPOSURE TO STD: ICD-10-CM

## 2017-07-06 DIAGNOSIS — N89.8 VAGINAL DISCHARGE: ICD-10-CM

## 2017-07-06 DIAGNOSIS — N89.8 VAGINAL DISCHARGE: Primary | ICD-10-CM

## 2017-07-06 LAB
DIRECT EXAM: ABNORMAL
Lab: ABNORMAL
SPECIMEN DESCRIPTION: ABNORMAL
STATUS: ABNORMAL

## 2017-07-06 PROCEDURE — 97113 AQUATIC THERAPY/EXERCISES: CPT

## 2017-07-06 PROCEDURE — 99213 OFFICE O/P EST LOW 20 MIN: CPT | Performed by: FAMILY MEDICINE

## 2017-07-06 ASSESSMENT — PAIN SCALES - GENERAL: PAINLEVEL_OUTOF10: 5

## 2017-07-06 ASSESSMENT — PAIN DESCRIPTION - PAIN TYPE: TYPE: CHRONIC PAIN

## 2017-07-06 ASSESSMENT — PAIN DESCRIPTION - ORIENTATION: ORIENTATION: LOWER

## 2017-07-06 ASSESSMENT — PAIN DESCRIPTION - LOCATION: LOCATION: BACK

## 2017-07-07 ENCOUNTER — HOSPITAL ENCOUNTER (OUTPATIENT)
Dept: PAIN MANAGEMENT | Age: 57
Discharge: HOME | End: 2017-07-07
Payer: COMMERCIAL

## 2017-07-07 VITALS
HEART RATE: 88 BPM | HEIGHT: 61 IN | WEIGHT: 163 LBS | TEMPERATURE: 98.2 F | RESPIRATION RATE: 16 BRPM | SYSTOLIC BLOOD PRESSURE: 140 MMHG | BODY MASS INDEX: 30.78 KG/M2 | DIASTOLIC BLOOD PRESSURE: 67 MMHG

## 2017-07-07 LAB
C TRACH DNA GENITAL QL NAA+PROBE: NEGATIVE
N. GONORRHOEAE DNA: NEGATIVE

## 2017-07-07 PROCEDURE — 99213 OFFICE O/P EST LOW 20 MIN: CPT

## 2017-07-07 RX ORDER — METRONIDAZOLE 500 MG/1
500 TABLET ORAL 2 TIMES DAILY
Qty: 14 TABLET | Refills: 0 | Status: SHIPPED | OUTPATIENT
Start: 2017-07-07 | End: 2017-07-14

## 2017-07-07 ASSESSMENT — PAIN DESCRIPTION - LOCATION: LOCATION: BACK;BUTTOCKS;LEG

## 2017-07-07 ASSESSMENT — PAIN DESCRIPTION - PROGRESSION: CLINICAL_PROGRESSION: GRADUALLY WORSENING

## 2017-07-07 ASSESSMENT — PAIN DESCRIPTION - PAIN TYPE: TYPE: CHRONIC PAIN

## 2017-07-07 ASSESSMENT — PAIN DESCRIPTION - ORIENTATION: ORIENTATION: LEFT;LOWER;RIGHT

## 2017-07-07 ASSESSMENT — PAIN SCALES - GENERAL: PAINLEVEL_OUTOF10: 10

## 2017-07-07 ASSESSMENT — PAIN DESCRIPTION - FREQUENCY: FREQUENCY: CONTINUOUS

## 2017-07-08 LAB
CULTURE: ABNORMAL
Lab: ABNORMAL
SPECIMEN DESCRIPTION: ABNORMAL
STATUS: ABNORMAL

## 2017-07-10 ENCOUNTER — TELEPHONE (OUTPATIENT)
Dept: FAMILY MEDICINE CLINIC | Age: 57
End: 2017-07-10

## 2017-07-10 RX ORDER — CEFACLOR 500 MG
500 CAPSULE ORAL 3 TIMES DAILY
Qty: 30 CAPSULE | Refills: 0 | Status: SHIPPED | OUTPATIENT
Start: 2017-07-10 | End: 2017-07-20

## 2017-07-12 ENCOUNTER — HOSPITAL ENCOUNTER (OUTPATIENT)
Dept: PHYSICAL THERAPY | Age: 57
Setting detail: THERAPIES SERIES
Discharge: HOME | End: 2017-07-12
Payer: COMMERCIAL

## 2017-07-12 PROCEDURE — 97113 AQUATIC THERAPY/EXERCISES: CPT

## 2017-07-12 ASSESSMENT — PAIN DESCRIPTION - ORIENTATION: ORIENTATION: LOWER

## 2017-07-12 ASSESSMENT — PAIN DESCRIPTION - LOCATION: LOCATION: BACK

## 2017-07-12 ASSESSMENT — PAIN SCALES - GENERAL: PAINLEVEL_OUTOF10: 10

## 2017-07-12 ASSESSMENT — PAIN DESCRIPTION - PAIN TYPE: TYPE: CHRONIC PAIN

## 2017-07-13 ENCOUNTER — PATIENT MESSAGE (OUTPATIENT)
Dept: FAMILY MEDICINE CLINIC | Age: 57
End: 2017-07-13

## 2017-07-13 ASSESSMENT — PAIN DESCRIPTION - ORIENTATION: ORIENTATION: LOWER

## 2017-07-13 ASSESSMENT — PAIN DESCRIPTION - LOCATION: LOCATION: BACK

## 2017-07-13 ASSESSMENT — PAIN SCALES - GENERAL: PAINLEVEL_OUTOF10: 10

## 2017-07-17 DIAGNOSIS — M51.36 DEGENERATION, INTERVERTEBRAL DISC, LUMBAR: ICD-10-CM

## 2017-07-17 RX ORDER — BACLOFEN 10 MG/1
TABLET ORAL
Qty: 90 TABLET | Refills: 0 | Status: SHIPPED | OUTPATIENT
Start: 2017-07-17 | End: 2017-08-16 | Stop reason: SDUPTHER

## 2017-07-28 ENCOUNTER — HOSPITAL ENCOUNTER (OUTPATIENT)
Dept: PAIN MANAGEMENT | Age: 57
Discharge: HOME | End: 2017-07-28
Payer: COMMERCIAL

## 2017-07-28 VITALS
BODY MASS INDEX: 30.21 KG/M2 | OXYGEN SATURATION: 98 % | RESPIRATION RATE: 16 BRPM | SYSTOLIC BLOOD PRESSURE: 126 MMHG | HEART RATE: 88 BPM | TEMPERATURE: 97.9 F | DIASTOLIC BLOOD PRESSURE: 67 MMHG | WEIGHT: 160 LBS | HEIGHT: 61 IN

## 2017-07-28 DIAGNOSIS — M54.9 BACKACHE, UNSPECIFIED: ICD-10-CM

## 2017-07-28 PROCEDURE — 2580000003 HC RX 258: Performed by: PAIN MEDICINE

## 2017-07-28 PROCEDURE — 62323 NJX INTERLAMINAR LMBR/SAC: CPT

## 2017-07-28 PROCEDURE — 6360000002 HC RX W HCPCS: Performed by: PAIN MEDICINE

## 2017-07-28 RX ORDER — MIDAZOLAM HYDROCHLORIDE 1 MG/ML
2 INJECTION INTRAMUSCULAR; INTRAVENOUS ONCE
Status: COMPLETED | OUTPATIENT
Start: 2017-07-28 | End: 2017-07-28

## 2017-07-28 RX ORDER — SODIUM CHLORIDE, SODIUM LACTATE, POTASSIUM CHLORIDE, CALCIUM CHLORIDE 600; 310; 30; 20 MG/100ML; MG/100ML; MG/100ML; MG/100ML
INJECTION, SOLUTION INTRAVENOUS CONTINUOUS
Status: DISCONTINUED | OUTPATIENT
Start: 2017-07-28 | End: 2017-07-29 | Stop reason: HOSPADM

## 2017-07-28 RX ORDER — FENTANYL CITRATE 50 UG/ML
100 INJECTION, SOLUTION INTRAMUSCULAR; INTRAVENOUS ONCE
Status: DISCONTINUED | OUTPATIENT
Start: 2017-07-28 | End: 2017-07-29 | Stop reason: HOSPADM

## 2017-07-28 RX ADMIN — MIDAZOLAM HYDROCHLORIDE 1 MG: 1 INJECTION, SOLUTION INTRAMUSCULAR; INTRAVENOUS at 07:30

## 2017-07-28 RX ADMIN — SODIUM CHLORIDE, POTASSIUM CHLORIDE, SODIUM LACTATE AND CALCIUM CHLORIDE: 600; 310; 30; 20 INJECTION, SOLUTION INTRAVENOUS at 07:22

## 2017-07-28 ASSESSMENT — PAIN DESCRIPTION - DESCRIPTORS: DESCRIPTORS: CONSTANT;ACHING;BURNING

## 2017-07-28 ASSESSMENT — PAIN - FUNCTIONAL ASSESSMENT: PAIN_FUNCTIONAL_ASSESSMENT: 0-10

## 2017-08-03 ENCOUNTER — HOSPITAL ENCOUNTER (OUTPATIENT)
Dept: PAIN MANAGEMENT | Age: 57
Discharge: HOME | End: 2017-08-03
Payer: COMMERCIAL

## 2017-08-03 VITALS
SYSTOLIC BLOOD PRESSURE: 114 MMHG | HEART RATE: 94 BPM | RESPIRATION RATE: 18 BRPM | DIASTOLIC BLOOD PRESSURE: 57 MMHG | TEMPERATURE: 98.2 F

## 2017-08-03 PROCEDURE — 99214 OFFICE O/P EST MOD 30 MIN: CPT

## 2017-08-03 ASSESSMENT — ENCOUNTER SYMPTOMS
UNUSUAL HAIR DISTRIBUTION: 0
BLURRED VISION: 0
BACK PAIN: 1
SUSPICIOUS LESIONS: 0
WHEEZING: 0
EYE DISCHARGE: 0
HEMOPTYSIS: 0

## 2017-08-04 RX ORDER — TRAMADOL HYDROCHLORIDE 50 MG/1
TABLET ORAL
Qty: 30 TABLET | Refills: 0 | Status: ON HOLD | OUTPATIENT
Start: 2017-08-04 | End: 2019-01-30

## 2017-08-11 ENCOUNTER — HOSPITAL ENCOUNTER (EMERGENCY)
Age: 57
Discharge: HOME | End: 2017-08-11
Attending: EMERGENCY MEDICINE
Payer: COMMERCIAL

## 2017-08-11 ENCOUNTER — APPOINTMENT (OUTPATIENT)
Dept: GENERAL RADIOLOGY | Age: 57
End: 2017-08-11
Payer: COMMERCIAL

## 2017-08-11 VITALS
DIASTOLIC BLOOD PRESSURE: 64 MMHG | HEART RATE: 84 BPM | SYSTOLIC BLOOD PRESSURE: 117 MMHG | RESPIRATION RATE: 16 BRPM | TEMPERATURE: 97.5 F | OXYGEN SATURATION: 99 %

## 2017-08-11 DIAGNOSIS — M25.562 ACUTE PAIN OF LEFT KNEE: ICD-10-CM

## 2017-08-11 DIAGNOSIS — M25.531 WRIST PAIN, RIGHT: Primary | ICD-10-CM

## 2017-08-11 PROCEDURE — 73110 X-RAY EXAM OF WRIST: CPT

## 2017-08-11 PROCEDURE — 6370000000 HC RX 637 (ALT 250 FOR IP): Performed by: EMERGENCY MEDICINE

## 2017-08-11 PROCEDURE — 99283 EMERGENCY DEPT VISIT LOW MDM: CPT

## 2017-08-11 PROCEDURE — 73562 X-RAY EXAM OF KNEE 3: CPT

## 2017-08-11 RX ORDER — IBUPROFEN 800 MG/1
800 TABLET ORAL EVERY 6 HOURS PRN
Qty: 60 TABLET | Refills: 0 | Status: SHIPPED | OUTPATIENT
Start: 2017-08-11 | End: 2017-09-07 | Stop reason: ALTCHOICE

## 2017-08-11 RX ORDER — IBUPROFEN 800 MG/1
800 TABLET ORAL ONCE
Status: COMPLETED | OUTPATIENT
Start: 2017-08-11 | End: 2017-08-11

## 2017-08-11 RX ADMIN — IBUPROFEN 800 MG: 800 TABLET, FILM COATED ORAL at 08:24

## 2017-08-11 ASSESSMENT — PAIN SCALES - GENERAL
PAINLEVEL_OUTOF10: 6
PAINLEVEL_OUTOF10: 6

## 2017-08-11 ASSESSMENT — ENCOUNTER SYMPTOMS: COLOR CHANGE: 0

## 2017-09-06 RX ORDER — TRAMADOL HYDROCHLORIDE 50 MG/1
TABLET ORAL
Qty: 30 TABLET | Refills: 0 | OUTPATIENT
Start: 2017-09-06

## 2017-09-07 ENCOUNTER — OFFICE VISIT (OUTPATIENT)
Dept: FAMILY MEDICINE CLINIC | Age: 57
End: 2017-09-07
Payer: COMMERCIAL

## 2017-09-07 VITALS
SYSTOLIC BLOOD PRESSURE: 125 MMHG | HEART RATE: 88 BPM | BODY MASS INDEX: 30.78 KG/M2 | WEIGHT: 163 LBS | HEIGHT: 61 IN | DIASTOLIC BLOOD PRESSURE: 71 MMHG

## 2017-09-07 DIAGNOSIS — L08.9 TOE INFECTION: Primary | ICD-10-CM

## 2017-09-07 PROCEDURE — 99213 OFFICE O/P EST LOW 20 MIN: CPT | Performed by: FAMILY MEDICINE

## 2017-09-08 ENCOUNTER — HOSPITAL ENCOUNTER (OUTPATIENT)
Dept: PAIN MANAGEMENT | Age: 57
Discharge: HOME | End: 2017-09-08
Payer: COMMERCIAL

## 2017-09-08 VITALS
SYSTOLIC BLOOD PRESSURE: 132 MMHG | RESPIRATION RATE: 16 BRPM | HEIGHT: 61 IN | TEMPERATURE: 98.2 F | BODY MASS INDEX: 30.78 KG/M2 | HEART RATE: 79 BPM | DIASTOLIC BLOOD PRESSURE: 72 MMHG | OXYGEN SATURATION: 98 % | WEIGHT: 163 LBS

## 2017-09-08 DIAGNOSIS — M54.5 LOW BACK PAIN, UNSPECIFIED BACK PAIN LATERALITY, UNSPECIFIED CHRONICITY, WITH SCIATICA PRESENCE UNSPECIFIED: ICD-10-CM

## 2017-09-08 PROCEDURE — 99211 OFF/OP EST MAY X REQ PHY/QHP: CPT

## 2017-09-08 PROCEDURE — 99214 OFFICE O/P EST MOD 30 MIN: CPT

## 2017-09-08 ASSESSMENT — PAIN DESCRIPTION - DESCRIPTORS: DESCRIPTORS: CONSTANT;ACHING

## 2017-09-08 ASSESSMENT — PAIN - FUNCTIONAL ASSESSMENT: PAIN_FUNCTIONAL_ASSESSMENT: 0-10

## 2017-09-11 ENCOUNTER — OFFICE VISIT (OUTPATIENT)
Dept: PODIATRY | Age: 57
End: 2017-09-11
Payer: COMMERCIAL

## 2017-09-11 VITALS
DIASTOLIC BLOOD PRESSURE: 80 MMHG | BODY MASS INDEX: 30.62 KG/M2 | SYSTOLIC BLOOD PRESSURE: 127 MMHG | HEIGHT: 61 IN | HEART RATE: 90 BPM | WEIGHT: 162.2 LBS

## 2017-09-11 DIAGNOSIS — R23.4 FISSURE IN SKIN: ICD-10-CM

## 2017-09-11 DIAGNOSIS — L84 FOOT CALLUS: ICD-10-CM

## 2017-09-11 DIAGNOSIS — M79.674 TOE PAIN, RIGHT: Primary | ICD-10-CM

## 2017-09-11 PROCEDURE — 99203 OFFICE O/P NEW LOW 30 MIN: CPT | Performed by: PODIATRIST

## 2017-09-11 PROCEDURE — 11055 PARING/CUTG B9 HYPRKER LES 1: CPT | Performed by: PODIATRIST

## 2017-09-13 ENCOUNTER — HOSPITAL ENCOUNTER (OUTPATIENT)
Age: 57
Discharge: HOME | End: 2017-09-13
Payer: COMMERCIAL

## 2017-09-13 ENCOUNTER — HOSPITAL ENCOUNTER (OUTPATIENT)
Dept: GENERAL RADIOLOGY | Age: 57
Discharge: HOME | End: 2017-09-13
Payer: COMMERCIAL

## 2017-09-13 DIAGNOSIS — M79.674 TOE PAIN, RIGHT: ICD-10-CM

## 2017-09-13 PROCEDURE — 73630 X-RAY EXAM OF FOOT: CPT

## 2017-09-21 DIAGNOSIS — M51.36 DEGENERATION, INTERVERTEBRAL DISC, LUMBAR: ICD-10-CM

## 2017-09-21 DIAGNOSIS — R87.810 ASCUS WITH POSITIVE HIGH RISK HPV CERVICAL: ICD-10-CM

## 2017-09-21 DIAGNOSIS — N81.10 VAGINAL PROLAPSE: ICD-10-CM

## 2017-09-21 DIAGNOSIS — R87.610 ASCUS WITH POSITIVE HIGH RISK HPV CERVICAL: ICD-10-CM

## 2017-09-21 DIAGNOSIS — N95.9 POST MENOPAUSAL PROBLEMS: ICD-10-CM

## 2017-09-21 RX ORDER — VENLAFAXINE 75 MG/1
TABLET ORAL
Qty: 90 TABLET | Refills: 1 | Status: SHIPPED | OUTPATIENT
Start: 2017-09-21 | End: 2017-11-28 | Stop reason: SDUPTHER

## 2017-09-21 RX ORDER — MELOXICAM 15 MG/1
TABLET ORAL
Qty: 30 TABLET | Refills: 1 | Status: SHIPPED | OUTPATIENT
Start: 2017-09-21 | End: 2017-11-28 | Stop reason: SDUPTHER

## 2017-09-21 RX ORDER — CONJUGATED ESTROGENS 0.62 MG/G
CREAM VAGINAL
Qty: 30 G | Refills: 1 | Status: SHIPPED | OUTPATIENT
Start: 2017-09-21 | End: 2022-10-21

## 2017-09-22 ENCOUNTER — HOSPITAL ENCOUNTER (OUTPATIENT)
Dept: PAIN MANAGEMENT | Age: 57
Discharge: HOME | End: 2017-09-22
Payer: COMMERCIAL

## 2017-09-22 VITALS
SYSTOLIC BLOOD PRESSURE: 119 MMHG | HEIGHT: 61 IN | WEIGHT: 160 LBS | HEART RATE: 92 BPM | TEMPERATURE: 98.1 F | DIASTOLIC BLOOD PRESSURE: 84 MMHG | OXYGEN SATURATION: 95 % | RESPIRATION RATE: 18 BRPM | BODY MASS INDEX: 30.21 KG/M2

## 2017-09-22 DIAGNOSIS — G89.29 CHRONIC BACK PAIN, UNSPECIFIED BACK LOCATION, UNSPECIFIED BACK PAIN LATERALITY: ICD-10-CM

## 2017-09-22 DIAGNOSIS — M54.9 CHRONIC BACK PAIN, UNSPECIFIED BACK LOCATION, UNSPECIFIED BACK PAIN LATERALITY: ICD-10-CM

## 2017-09-22 PROCEDURE — 2580000003 HC RX 258: Performed by: PAIN MEDICINE

## 2017-09-22 PROCEDURE — 62323 NJX INTERLAMINAR LMBR/SAC: CPT | Performed by: PAIN MEDICINE

## 2017-09-22 PROCEDURE — 6360000002 HC RX W HCPCS: Performed by: PAIN MEDICINE

## 2017-09-22 PROCEDURE — 62323 NJX INTERLAMINAR LMBR/SAC: CPT

## 2017-09-22 RX ORDER — FENTANYL CITRATE 50 UG/ML
100 INJECTION, SOLUTION INTRAMUSCULAR; INTRAVENOUS ONCE
Status: DISCONTINUED | OUTPATIENT
Start: 2017-09-22 | End: 2017-09-23 | Stop reason: HOSPADM

## 2017-09-22 RX ORDER — SODIUM CHLORIDE, SODIUM LACTATE, POTASSIUM CHLORIDE, CALCIUM CHLORIDE 600; 310; 30; 20 MG/100ML; MG/100ML; MG/100ML; MG/100ML
INJECTION, SOLUTION INTRAVENOUS CONTINUOUS
Status: DISCONTINUED | OUTPATIENT
Start: 2017-09-22 | End: 2017-09-23 | Stop reason: HOSPADM

## 2017-09-22 RX ORDER — MIDAZOLAM HYDROCHLORIDE 1 MG/ML
2 INJECTION INTRAMUSCULAR; INTRAVENOUS ONCE
Status: COMPLETED | OUTPATIENT
Start: 2017-09-22 | End: 2017-09-22

## 2017-09-22 RX ADMIN — MIDAZOLAM HYDROCHLORIDE 1 MG: 1 INJECTION, SOLUTION INTRAMUSCULAR; INTRAVENOUS at 10:33

## 2017-09-22 RX ADMIN — SODIUM CHLORIDE, POTASSIUM CHLORIDE, SODIUM LACTATE AND CALCIUM CHLORIDE 500 ML: 600; 310; 30; 20 INJECTION, SOLUTION INTRAVENOUS at 10:24

## 2017-09-22 ASSESSMENT — PAIN DESCRIPTION - DESCRIPTORS: DESCRIPTORS: CONSTANT;ACHING;BURNING;THROBBING

## 2017-09-22 ASSESSMENT — PAIN - FUNCTIONAL ASSESSMENT
PAIN_FUNCTIONAL_ASSESSMENT: 0-10
PAIN_FUNCTIONAL_ASSESSMENT: 0-10

## 2017-10-16 ENCOUNTER — OFFICE VISIT (OUTPATIENT)
Dept: PODIATRY | Age: 57
End: 2017-10-16
Payer: COMMERCIAL

## 2017-10-16 VITALS
SYSTOLIC BLOOD PRESSURE: 125 MMHG | WEIGHT: 170 LBS | TEMPERATURE: 98 F | DIASTOLIC BLOOD PRESSURE: 69 MMHG | BODY MASS INDEX: 32.1 KG/M2 | HEIGHT: 61 IN | HEART RATE: 95 BPM

## 2017-10-16 DIAGNOSIS — M79.674 TOE PAIN, RIGHT: Primary | ICD-10-CM

## 2017-10-16 DIAGNOSIS — L60.0 INGROWING TOENAIL OF RIGHT FOOT: ICD-10-CM

## 2017-10-16 PROCEDURE — 99213 OFFICE O/P EST LOW 20 MIN: CPT | Performed by: STUDENT IN AN ORGANIZED HEALTH CARE EDUCATION/TRAINING PROGRAM

## 2017-10-16 RX ORDER — LIDOCAINE HYDROCHLORIDE 10 MG/ML
5 INJECTION, SOLUTION INFILTRATION; PERINEURAL ONCE
Status: DISCONTINUED | OUTPATIENT
Start: 2017-10-16 | End: 2017-10-16

## 2017-10-16 RX ORDER — LIDOCAINE HYDROCHLORIDE 10 MG/ML
3 INJECTION, SOLUTION INFILTRATION; PERINEURAL ONCE
Status: COMPLETED | OUTPATIENT
Start: 2017-10-16 | End: 2017-10-16

## 2017-10-16 RX ADMIN — LIDOCAINE HYDROCHLORIDE 3 ML: 10 INJECTION, SOLUTION INFILTRATION; PERINEURAL at 15:47

## 2017-10-16 NOTE — PROGRESS NOTES
[facility] Patient     Zoya Delgado is a 65 y.o. female who presents to clinic for followup of right 5th toe pain. She was seen about 1 month ago for wound that was draining pus from her right 5th toe. She was put on oral antibotics which she finished and xrays were negative. Admits wound has healed for last two weeks. She denies any pus, purulent drainage, redness, swelling to the 5th toe. She does admit to pain in the right 5th toenail. Currently denies F/C/N/V. Past Medical History:   Diagnosis Date    ASCUS with positive high risk HPV cervical 3/22/16    Asthma     Depression     Diverticulitis     GERD (gastroesophageal reflux disease)     Hyperlipidemia     MRSA (methicillin resistant staph aureus) culture positive 4/18/2016    lip    Rectocele        Past Surgical History:   Procedure Laterality Date    CYSTOSCOPY  2/25/2015    uro    HYSTERECTOMY  1996    RYAN, BSO performed at [facility] by Dr. William Maxwell, Also had some type of bladder lift at this time    KNEE SURGERY Left     #1 - lateral release, #2 - arthroscopy with muscle alignment    NERVE BLOCK  07/28/2017    caudal #1  decadron 10mg    NERVE BLOCK  09/22/2017    cadal # 2, decadron 10 mg    NERVE BLOCK  09/22/2017    caudal epidural steroid block #2 decadron 10 mg       Prior to Admission medications    Medication Sig Start Date End Date Taking?  Authorizing Provider   VENTOLIN  (90 Base) MCG/ACT inhaler INHALE TWO PUFFS BY MOUTH EVERY 6 HOURS AS NEEDED FOR WHEEZING 10/2/17  Yes Santiago De Jesus MD   venlafaxine (EFFEXOR) 75 MG tablet TAKE ONE TABLET BY MOUTH THREE TIMES A DAY 9/21/17  Yes Santiago De Jesus MD   PREMARIN 0.625 MG/GM vaginal cream PLACE 2 GRAMS VAGINALLY TWICE A WEEK FOR 12 DOSES 9/21/17  Yes Santiago De Jesus MD   meloxicam (MOBIC) 15 MG tablet TAKE ONE TABLET BY MOUTH DAILY 9/21/17  Yes Santiago De Jesus MD   AEROSPAN 80 MCG/ACT AERS inhaler INHALE ONE TO TWO PUFFS BY MOUTH TWICE A DAY 9/6/17  Yes Bert Modi Adrian Tucker MD   busPIRone (BUSPAR) 15 MG tablet TAKE ONE TABLET BY MOUTH TWICE A DAY 8/17/17  Yes Marietta Monk MD   baclofen (LIORESAL) 10 MG tablet TAKE ONE TABLET BY MOUTH THREE TIMES A DAY 8/17/17  Yes Marietta Monk MD   traMADol (ULTRAM) 50 MG tablet TAKE ONE TABLET BY MOUTH DAILY 8/4/17  Yes Marietta Monk MD   omeprazole (PRILOSEC) 20 MG delayed release capsule TAKE ONE CAPSULE BY MOUTH DAILY 8/2/17  Yes Marietta Monk MD   pravastatin (PRAVACHOL) 40 MG tablet TAKE ONE TABLET BY MOUTH DAILY 8/2/17  Yes Marietta Monk MD   DULoxetine (CYMBALTA) 20 MG extended release capsule TAKE ONE CAPSULE BY MOUTH DAILY 8/2/17  Yes Marietta Monk MD   ranitidine (ZANTAC) 150 MG tablet TAKE ONE TABLET BY MOUTH TWICE A DAY 7/24/17  Yes Marietta Monk MD   betamethasone dipropionate (DIPROLENE) 0.05 % cream Apply topically 2 times daily. 2/16/17  Yes Marietta Monk MD   permethrin (ELIMITE) 5 % cream Apply topically as directed 11/18/16  Yes Marietta Monk MD   fluticasone (FLONASE) 50 MCG/ACT nasal spray 2 sprays by Nasal route daily 4/19/16  Yes Marietta Monk MD   oxybutynin (DITROPAN) 5 MG tablet Take 5 mg by mouth 2 times daily. Yes Historical Provider, MD   Misc. Devices MISC 1 each by Does not apply route once for 1 dose 6/1/17 6/1/17  Marietta Monk MD     Scheduled Meds:  Continuous Infusions:  PRN Meds:    Allergies   Allergen Reactions    Shrimp (Diagnostic) Shortness Of Breath    Famotidine Other (See Comments)     Headaches    Gabapentin Nausea Only     Mood swings, nausea. Family History   Problem Relation Age of Onset    High Blood Pressure Father        Social History   Substance Use Topics    Smoking status: Former Smoker    Smokeless tobacco: Never Used    Alcohol use 0.0 oz/week      Comment: social       Review of Systems: All 12 systems reviewed and pertinent positives noted above.     Lower Extremity Physical Examination:   Vitals:   Vitals:    10/16/17 1442   BP:

## 2017-10-19 ENCOUNTER — HOSPITAL ENCOUNTER (OUTPATIENT)
Dept: PAIN MANAGEMENT | Age: 57
Discharge: HOME | End: 2017-10-19
Payer: COMMERCIAL

## 2017-10-19 ENCOUNTER — APPOINTMENT (OUTPATIENT)
Dept: GENERAL RADIOLOGY | Age: 57
End: 2017-10-19
Payer: COMMERCIAL

## 2017-10-19 ENCOUNTER — HOSPITAL ENCOUNTER (OUTPATIENT)
Age: 57
Setting detail: OBSERVATION
Discharge: HOME | End: 2017-10-20
Attending: EMERGENCY MEDICINE | Admitting: EMERGENCY MEDICINE
Payer: COMMERCIAL

## 2017-10-19 VITALS
RESPIRATION RATE: 20 BRPM | BODY MASS INDEX: 32.1 KG/M2 | WEIGHT: 170 LBS | SYSTOLIC BLOOD PRESSURE: 136 MMHG | HEART RATE: 94 BPM | HEIGHT: 61 IN | TEMPERATURE: 97.8 F | DIASTOLIC BLOOD PRESSURE: 66 MMHG

## 2017-10-19 DIAGNOSIS — R07.9 CHEST PAIN, UNSPECIFIED TYPE: Primary | ICD-10-CM

## 2017-10-19 LAB
ABSOLUTE EOS #: 0.3 K/UL (ref 0–0.4)
ABSOLUTE IMMATURE GRANULOCYTE: NORMAL K/UL (ref 0–0.3)
ABSOLUTE LYMPH #: 1.8 K/UL (ref 1–4.8)
ABSOLUTE MONO #: 0.4 K/UL (ref 0.1–1.2)
ANION GAP SERPL CALCULATED.3IONS-SCNC: 14 MMOL/L (ref 9–17)
BASOPHILS # BLD: 1 %
BASOPHILS ABSOLUTE: 0 K/UL (ref 0–0.2)
BUN BLDV-MCNC: 11 MG/DL (ref 6–20)
BUN/CREAT BLD: NORMAL (ref 9–20)
CALCIUM SERPL-MCNC: 9.6 MG/DL (ref 8.6–10.4)
CHLORIDE BLD-SCNC: 103 MMOL/L (ref 98–107)
CO2: 27 MMOL/L (ref 20–31)
CREAT SERPL-MCNC: 0.68 MG/DL (ref 0.5–0.9)
DIFFERENTIAL TYPE: NORMAL
EOSINOPHILS RELATIVE PERCENT: 4 %
GFR AFRICAN AMERICAN: >60 ML/MIN
GFR NON-AFRICAN AMERICAN: >60 ML/MIN
GFR SERPL CREATININE-BSD FRML MDRD: NORMAL ML/MIN/{1.73_M2}
GFR SERPL CREATININE-BSD FRML MDRD: NORMAL ML/MIN/{1.73_M2}
GLUCOSE BLD-MCNC: 88 MG/DL (ref 70–99)
HCT VFR BLD CALC: 42 % (ref 36–46)
HEMOGLOBIN: 14.3 G/DL (ref 12–16)
IMMATURE GRANULOCYTES: NORMAL %
LYMPHOCYTES # BLD: 24 %
MCH RBC QN AUTO: 29.3 PG (ref 26–34)
MCHC RBC AUTO-ENTMCNC: 33.9 G/DL (ref 31–37)
MCV RBC AUTO: 86.3 FL (ref 80–100)
MONOCYTES # BLD: 6 %
PDW BLD-RTO: 13.7 % (ref 12.5–15.4)
PLATELET # BLD: 268 K/UL (ref 140–450)
PLATELET ESTIMATE: NORMAL
PMV BLD AUTO: 11 FL (ref 6–12)
POC TROPONIN I: 0 NG/ML (ref 0–0.1)
POC TROPONIN I: 0 NG/ML (ref 0–0.1)
POC TROPONIN INTERP: NORMAL
POC TROPONIN INTERP: NORMAL
POTASSIUM SERPL-SCNC: 4.6 MMOL/L (ref 3.7–5.3)
RBC # BLD: 4.87 M/UL (ref 4–5.2)
RBC # BLD: NORMAL 10*6/UL
SEG NEUTROPHILS: 65 %
SEGMENTED NEUTROPHILS ABSOLUTE COUNT: 5 K/UL (ref 1.8–7.7)
SODIUM BLD-SCNC: 144 MMOL/L (ref 135–144)
WBC # BLD: 7.6 K/UL (ref 3.5–11)
WBC # BLD: NORMAL 10*3/UL

## 2017-10-19 PROCEDURE — 6360000002 HC RX W HCPCS: Performed by: EMERGENCY MEDICINE

## 2017-10-19 PROCEDURE — 6370000000 HC RX 637 (ALT 250 FOR IP): Performed by: EMERGENCY MEDICINE

## 2017-10-19 PROCEDURE — 71020 XR CHEST STANDARD TWO VW: CPT

## 2017-10-19 PROCEDURE — 84484 ASSAY OF TROPONIN QUANT: CPT

## 2017-10-19 PROCEDURE — 99285 EMERGENCY DEPT VISIT HI MDM: CPT

## 2017-10-19 PROCEDURE — G0378 HOSPITAL OBSERVATION PER HR: HCPCS

## 2017-10-19 PROCEDURE — 96374 THER/PROPH/DIAG INJ IV PUSH: CPT

## 2017-10-19 PROCEDURE — 99214 OFFICE O/P EST MOD 30 MIN: CPT

## 2017-10-19 PROCEDURE — 85025 COMPLETE CBC W/AUTO DIFF WBC: CPT

## 2017-10-19 PROCEDURE — 93005 ELECTROCARDIOGRAM TRACING: CPT

## 2017-10-19 PROCEDURE — 99214 OFFICE O/P EST MOD 30 MIN: CPT | Performed by: PAIN MEDICINE

## 2017-10-19 PROCEDURE — 2580000003 HC RX 258: Performed by: EMERGENCY MEDICINE

## 2017-10-19 PROCEDURE — 80048 BASIC METABOLIC PNL TOTAL CA: CPT

## 2017-10-19 RX ORDER — NITROGLYCERIN 0.4 MG/1
0.4 TABLET SUBLINGUAL EVERY 5 MIN PRN
Status: DISCONTINUED | OUTPATIENT
Start: 2017-10-19 | End: 2017-10-19

## 2017-10-19 RX ORDER — DULOXETIN HYDROCHLORIDE 20 MG/1
20 CAPSULE, DELAYED RELEASE ORAL DAILY
Status: DISCONTINUED | OUTPATIENT
Start: 2017-10-20 | End: 2017-10-20 | Stop reason: HOSPADM

## 2017-10-19 RX ORDER — AMINOPHYLLINE DIHYDRATE 25 MG/ML
100 INJECTION, SOLUTION INTRAVENOUS
Status: DISCONTINUED | OUTPATIENT
Start: 2017-10-19 | End: 2017-10-19

## 2017-10-19 RX ORDER — METOPROLOL TARTRATE 5 MG/5ML
2.5 INJECTION INTRAVENOUS PRN
Status: DISCONTINUED | OUTPATIENT
Start: 2017-10-19 | End: 2017-10-19

## 2017-10-19 RX ORDER — ONDANSETRON 2 MG/ML
4 INJECTION INTRAMUSCULAR; INTRAVENOUS ONCE
Status: COMPLETED | OUTPATIENT
Start: 2017-10-19 | End: 2017-10-19

## 2017-10-19 RX ORDER — ASPIRIN 81 MG/1
324 TABLET, CHEWABLE ORAL ONCE
Status: COMPLETED | OUTPATIENT
Start: 2017-10-19 | End: 2017-10-19

## 2017-10-19 RX ORDER — MELOXICAM 7.5 MG/1
15 TABLET ORAL DAILY
Status: DISCONTINUED | OUTPATIENT
Start: 2017-10-20 | End: 2017-10-20 | Stop reason: HOSPADM

## 2017-10-19 RX ORDER — BUSPIRONE HYDROCHLORIDE 15 MG/1
15 TABLET ORAL 3 TIMES DAILY
Status: DISCONTINUED | OUTPATIENT
Start: 2017-10-19 | End: 2017-10-20 | Stop reason: HOSPADM

## 2017-10-19 RX ORDER — MORPHINE SULFATE 2 MG/ML
4 INJECTION, SOLUTION INTRAMUSCULAR; INTRAVENOUS ONCE
Status: DISCONTINUED | OUTPATIENT
Start: 2017-10-19 | End: 2017-10-19

## 2017-10-19 RX ORDER — SODIUM CHLORIDE 0.9 % (FLUSH) 0.9 %
10 SYRINGE (ML) INJECTION PRN
Status: DISCONTINUED | OUTPATIENT
Start: 2017-10-19 | End: 2017-10-19

## 2017-10-19 RX ORDER — PRAVASTATIN SODIUM 20 MG
40 TABLET ORAL NIGHTLY
Status: DISCONTINUED | OUTPATIENT
Start: 2017-10-19 | End: 2017-10-20 | Stop reason: HOSPADM

## 2017-10-19 RX ORDER — DULOXETIN HYDROCHLORIDE 20 MG/1
20 CAPSULE, DELAYED RELEASE ORAL DAILY
COMMUNITY
End: 2017-12-09 | Stop reason: SDUPTHER

## 2017-10-19 RX ORDER — VENLAFAXINE 75 MG/1
75 TABLET ORAL
Status: DISCONTINUED | OUTPATIENT
Start: 2017-10-20 | End: 2017-10-20 | Stop reason: HOSPADM

## 2017-10-19 RX ORDER — TRAMADOL HYDROCHLORIDE 50 MG/1
50 TABLET ORAL
Status: COMPLETED | OUTPATIENT
Start: 2017-10-19 | End: 2017-10-19

## 2017-10-19 RX ORDER — SODIUM CHLORIDE 0.9 % (FLUSH) 0.9 %
10 SYRINGE (ML) INJECTION PRN
Status: DISCONTINUED | OUTPATIENT
Start: 2017-10-19 | End: 2017-10-20 | Stop reason: HOSPADM

## 2017-10-19 RX ORDER — ACETAMINOPHEN 325 MG/1
650 TABLET ORAL EVERY 4 HOURS PRN
Status: DISCONTINUED | OUTPATIENT
Start: 2017-10-19 | End: 2017-10-20 | Stop reason: HOSPADM

## 2017-10-19 RX ORDER — SODIUM CHLORIDE 0.9 % (FLUSH) 0.9 %
10 SYRINGE (ML) INJECTION EVERY 12 HOURS SCHEDULED
Status: DISCONTINUED | OUTPATIENT
Start: 2017-10-19 | End: 2017-10-20 | Stop reason: HOSPADM

## 2017-10-19 RX ORDER — SODIUM CHLORIDE 9 MG/ML
INJECTION, SOLUTION INTRAVENOUS ONCE
Status: DISCONTINUED | OUTPATIENT
Start: 2017-10-19 | End: 2017-10-19

## 2017-10-19 RX ADMIN — BUSPIRONE HYDROCHLORIDE 15 MG: 15 TABLET ORAL at 23:31

## 2017-10-19 RX ADMIN — PRAVASTATIN SODIUM 40 MG: 20 TABLET ORAL at 23:31

## 2017-10-19 RX ADMIN — TRAMADOL HYDROCHLORIDE 50 MG: 50 TABLET, FILM COATED ORAL at 23:31

## 2017-10-19 RX ADMIN — ASPIRIN 324 MG: 81 TABLET ORAL at 20:40

## 2017-10-19 RX ADMIN — ONDANSETRON 4 MG: 2 INJECTION INTRAMUSCULAR; INTRAVENOUS at 20:41

## 2017-10-19 RX ADMIN — Medication 10 ML: at 23:32

## 2017-10-19 ASSESSMENT — ENCOUNTER SYMPTOMS
SUSPICIOUS LESIONS: 0
UNUSUAL HAIR DISTRIBUTION: 0
HEMOPTYSIS: 0
EYE DISCHARGE: 0
SHORTNESS OF BREATH: 0
BACK PAIN: 1
BLURRED VISION: 0
ABDOMINAL PAIN: 0
NAUSEA: 1
WHEEZING: 0
VOMITING: 0
SORE THROAT: 0

## 2017-10-19 ASSESSMENT — PAIN DESCRIPTION - ORIENTATION
ORIENTATION: MID
ORIENTATION: MID

## 2017-10-19 ASSESSMENT — PAIN DESCRIPTION - PAIN TYPE
TYPE: ACUTE PAIN
TYPE: ACUTE PAIN

## 2017-10-19 ASSESSMENT — PAIN SCALES - GENERAL
PAINLEVEL_OUTOF10: 5
PAINLEVEL_OUTOF10: 5
PAINLEVEL_OUTOF10: 7

## 2017-10-19 ASSESSMENT — HEART SCORE: ECG: 1

## 2017-10-19 ASSESSMENT — PAIN DESCRIPTION - LOCATION
LOCATION: CHEST
LOCATION: CHEST

## 2017-10-20 ENCOUNTER — APPOINTMENT (OUTPATIENT)
Dept: NUCLEAR MEDICINE | Age: 57
End: 2017-10-20
Payer: COMMERCIAL

## 2017-10-20 VITALS
TEMPERATURE: 97.9 F | BODY MASS INDEX: 32.12 KG/M2 | OXYGEN SATURATION: 95 % | WEIGHT: 170 LBS | HEART RATE: 82 BPM | SYSTOLIC BLOOD PRESSURE: 108 MMHG | DIASTOLIC BLOOD PRESSURE: 61 MMHG | RESPIRATION RATE: 15 BRPM

## 2017-10-20 LAB
EKG ATRIAL RATE: 100 BPM
EKG ATRIAL RATE: 84 BPM
EKG ATRIAL RATE: 90 BPM
EKG P AXIS: 63 DEGREES
EKG P AXIS: 66 DEGREES
EKG P AXIS: 69 DEGREES
EKG P-R INTERVAL: 124 MS
EKG P-R INTERVAL: 126 MS
EKG P-R INTERVAL: 132 MS
EKG Q-T INTERVAL: 358 MS
EKG Q-T INTERVAL: 368 MS
EKG Q-T INTERVAL: 390 MS
EKG QRS DURATION: 72 MS
EKG QTC CALCULATION (BAZETT): 450 MS
EKG QTC CALCULATION (BAZETT): 460 MS
EKG QTC CALCULATION (BAZETT): 461 MS
EKG R AXIS: 16 DEGREES
EKG R AXIS: 18 DEGREES
EKG R AXIS: 36 DEGREES
EKG T AXIS: 59 DEGREES
EKG T AXIS: 60 DEGREES
EKG T AXIS: 62 DEGREES
EKG VENTRICULAR RATE: 100 BPM
EKG VENTRICULAR RATE: 84 BPM
EKG VENTRICULAR RATE: 90 BPM
LV EF: 62 %
LVEF MODALITY: NORMAL

## 2017-10-20 PROCEDURE — 6370000000 HC RX 637 (ALT 250 FOR IP): Performed by: STUDENT IN AN ORGANIZED HEALTH CARE EDUCATION/TRAINING PROGRAM

## 2017-10-20 PROCEDURE — 93017 CV STRESS TEST TRACING ONLY: CPT | Performed by: NURSE PRACTITIONER

## 2017-10-20 PROCEDURE — G0378 HOSPITAL OBSERVATION PER HR: HCPCS

## 2017-10-20 PROCEDURE — 2580000003 HC RX 258: Performed by: EMERGENCY MEDICINE

## 2017-10-20 PROCEDURE — 6370000000 HC RX 637 (ALT 250 FOR IP): Performed by: EMERGENCY MEDICINE

## 2017-10-20 PROCEDURE — 6360000002 HC RX W HCPCS: Performed by: EMERGENCY MEDICINE

## 2017-10-20 PROCEDURE — 3430000000 HC RX DIAGNOSTIC RADIOPHARMACEUTICAL: Performed by: EMERGENCY MEDICINE

## 2017-10-20 PROCEDURE — 93005 ELECTROCARDIOGRAM TRACING: CPT

## 2017-10-20 PROCEDURE — A9500 TC99M SESTAMIBI: HCPCS | Performed by: EMERGENCY MEDICINE

## 2017-10-20 PROCEDURE — 78452 HT MUSCLE IMAGE SPECT MULT: CPT

## 2017-10-20 RX ORDER — METOPROLOL TARTRATE 5 MG/5ML
2.5 INJECTION INTRAVENOUS PRN
Status: DISCONTINUED | OUTPATIENT
Start: 2017-10-20 | End: 2017-10-20

## 2017-10-20 RX ORDER — SODIUM CHLORIDE 0.9 % (FLUSH) 0.9 %
10 SYRINGE (ML) INJECTION PRN
Status: DISCONTINUED | OUTPATIENT
Start: 2017-10-20 | End: 2017-10-20 | Stop reason: HOSPADM

## 2017-10-20 RX ORDER — AMINOPHYLLINE DIHYDRATE 25 MG/ML
100 INJECTION, SOLUTION INTRAVENOUS
Status: COMPLETED | OUTPATIENT
Start: 2017-10-20 | End: 2017-10-20

## 2017-10-20 RX ORDER — SODIUM CHLORIDE 9 MG/ML
INJECTION, SOLUTION INTRAVENOUS ONCE
Status: COMPLETED | OUTPATIENT
Start: 2017-10-20 | End: 2017-10-20

## 2017-10-20 RX ORDER — BACITRACIN, NEOMYCIN, POLYMYXIN B 400; 3.5; 5 [USP'U]/G; MG/G; [USP'U]/G
OINTMENT TOPICAL 2 TIMES DAILY
Status: DISCONTINUED | OUTPATIENT
Start: 2017-10-20 | End: 2017-10-20 | Stop reason: HOSPADM

## 2017-10-20 RX ORDER — NITROGLYCERIN 0.4 MG/1
0.4 TABLET SUBLINGUAL EVERY 5 MIN PRN
Status: DISCONTINUED | OUTPATIENT
Start: 2017-10-20 | End: 2017-10-20

## 2017-10-20 RX ORDER — SODIUM CHLORIDE 0.9 % (FLUSH) 0.9 %
10 SYRINGE (ML) INJECTION PRN
Status: DISCONTINUED | OUTPATIENT
Start: 2017-10-20 | End: 2017-10-20

## 2017-10-20 RX ADMIN — BUSPIRONE HYDROCHLORIDE 15 MG: 15 TABLET ORAL at 08:39

## 2017-10-20 RX ADMIN — TETRAKIS(2-METHOXYISOBUTYLISOCYANIDE)COPPER(I) TETRAFLUOROBORATE 15.8 MILLICURIE: 1 INJECTION, POWDER, LYOPHILIZED, FOR SOLUTION INTRAVENOUS at 08:05

## 2017-10-20 RX ADMIN — BUSPIRONE HYDROCHLORIDE 15 MG: 15 TABLET ORAL at 13:53

## 2017-10-20 RX ADMIN — TETRAKIS(2-METHOXYISOBUTYLISOCYANIDE)COPPER(I) TETRAFLUOROBORATE 41 MILLICURIE: 1 INJECTION, POWDER, LYOPHILIZED, FOR SOLUTION INTRAVENOUS at 10:47

## 2017-10-20 RX ADMIN — Medication 10 ML: at 13:08

## 2017-10-20 RX ADMIN — VENLAFAXINE 75 MG: 75 TABLET ORAL at 08:43

## 2017-10-20 RX ADMIN — REGADENOSON 0.4 MG: 0.08 INJECTION, SOLUTION INTRAVENOUS at 10:47

## 2017-10-20 RX ADMIN — DULOXETINE 20 MG: 20 CAPSULE, DELAYED RELEASE ORAL at 13:08

## 2017-10-20 RX ADMIN — Medication 10 ML: at 10:15

## 2017-10-20 RX ADMIN — MELOXICAM 15 MG: 7.5 TABLET ORAL at 08:39

## 2017-10-20 RX ADMIN — SODIUM CHLORIDE: 9 INJECTION, SOLUTION INTRAVENOUS at 10:19

## 2017-10-20 RX ADMIN — AMINOPHYLLINE 100 MG: 25 INJECTION, SOLUTION INTRAVENOUS at 10:49

## 2017-10-20 RX ADMIN — POLYMYXIN B SULFATE, BACITRACIN ZINC, NEOMYCIN SULFATE: 5000; 3.5; 4 OINTMENT TOPICAL at 13:09

## 2017-10-20 RX ADMIN — VENLAFAXINE 75 MG: 75 TABLET ORAL at 13:07

## 2017-10-20 ASSESSMENT — PAIN DESCRIPTION - PROGRESSION
CLINICAL_PROGRESSION: NOT CHANGED

## 2017-10-20 ASSESSMENT — PAIN DESCRIPTION - FREQUENCY: FREQUENCY: CONTINUOUS

## 2017-10-20 ASSESSMENT — PAIN DESCRIPTION - LOCATION: LOCATION: BACK

## 2017-10-20 ASSESSMENT — PAIN SCALES - GENERAL
PAINLEVEL_OUTOF10: 7
PAINLEVEL_OUTOF10: 7

## 2017-10-20 ASSESSMENT — PAIN DESCRIPTION - ORIENTATION: ORIENTATION: LOWER

## 2017-10-20 ASSESSMENT — PAIN DESCRIPTION - ONSET: ONSET: ON-GOING

## 2017-10-20 ASSESSMENT — PAIN DESCRIPTION - PAIN TYPE: TYPE: CHRONIC PAIN

## 2017-10-20 NOTE — PROGRESS NOTES
[address] [facility]  CDU / OBSERVATION eNCOUnter  Attending NOte       I performed a history and physical examination of the patient and discussed management with the resident. I reviewed the residents note and agree with the documented findings and plan of care. Any areas of disagreement are noted on the chart. I was personally present for the key portions of any procedures. I have documented in the chart those procedures where I was not present during the key portions. I have reviewed the nurses notes. I agree with the chief complaint, past medical history, past surgical history, allergies, medications, social and family history as documented unless otherwise noted below. The Family history, social history, and ROS are effectively unchanged since admission unless noted elsewhere in the chart. Patient has a history of anxiety per her. Patient states that she got her volume of the family members. Patient states that she has had symptoms similar to this previously which have resulted in stress testing. Patient is not has stress test and number of years. Given concerns that this may be angina as opposed to anxiety patient is seen during stress testing. We'll await results prior to disposition. Discussed with patient need for follow-up.     Jamie Nguyen MD  Attending Emergency  Physician

## 2017-10-20 NOTE — ED PROVIDER NOTES
[facility] [address] [facility]     Emergency Department     Faculty Attestation        I performed a history and physical examination of the patient and discussed management with the resident. I reviewed the residents note and agree with the documented findings and plan of care. Any areas of disagreement are noted on the chart. I was personally present for the key portions of any procedures. I have documented in the chart those procedures where I was not present during the key portions. I have reviewed the emergency nurses triage note. I agree with the chief complaint, past medical history, past surgical history, allergies, medications, social and family history as documented unless otherwise noted below. For mid-level providers such as nurse practitioners as well as physicians assistants:    I have personally seen and evaluated the patient. I find the patient's history and physical exam are consistent with NP/PA documentation. I agree with the care provided, treatment rendered, disposition, & follow-up plan. Additional findings are as noted. Vital Signs: /78   Pulse 106   Temp 98.2 °F (36.8 °C) (Oral)   Resp 20   Wt 170 lb (77.1 kg)   SpO2 97%   BMI 32.12 kg/m²   PCP:  Lorraine Davis MD    Pertinent Comments:     Intermittent left-sided chest pressure. Patient denies fevers or chills. There is no cough, sputum production or shortness of breath. We'll obtain CBC, BMP, EKG, troponin, chest x-ray, probable admission. Critical Care  None         Note, if the patient's blood pressure was elevated, and they have no history of hypertension, they were informed of the following: The patient may have Pre-hypertension/Hypertension: The patient has been informed that they may have pre-hypertension or Hypertension based on a blood pressure reading in the emergency department.  I recommend that the patient call the primary care provider listed on their

## 2017-10-20 NOTE — PROCEDURES
[address] [facility]                             CARDIAC STRESS TEST    PATIENT NAME: Amando Green                     :             1960  MED REC NO:   [id number]                              ROOM:            [id number]  ACCOUNT NO:   [de-identified]                            ADMISSION DATE:  10/19/2017  PROVIDER:     Janie Jerry    DATE OF STUDY:  10/20/2017    LEXISCAN STRESS STUDY:  [facility] AND CONSENT SIGNED. MEDICATIONS: 0.4 MG LEXISCAN, 100 MG AMINOPHYLLINE  RESTING HEART RATE: 89  MAX HEART RATE ACHEIVED: 134  RESTING BLOOD PRESSURE : 125/86  PEAK BLOOD PRESSURE: 156/81  RESTING ECG: NORMAL  STRESS HEART RESPONSE: NORMAL  STRESS BLOOD PRESSURE: APPROPRIATE  STRESS ECG: NORMAL  CHEST PAIN: 8/10 DURING STRESS  ISCHEMIC ECG CHANGES: NONE    IMPRESSION: ELECTROCARDIOGRAPHICALLY NEGATIVE STRESS STUDY. RADIO-ISOTOPE STUDY TO FOLLOW FROM THE NUCLEAR MEDICINE DEPARTMENT.   Tyrone De Jesus    D: 10/20/2017 13:38:33       T: 10/20/2017 13:39:05     SELENA

## 2017-10-20 NOTE — ED PROVIDER NOTES
tablet TAKE ONE TABLET BY MOUTH TWICE A DAY 7/24/17   Rudi Castro MD   Misc. Devices MISC 1 each by Does not apply route once for 1 dose 6/1/17 6/1/17  Rudi Castro MD   betamethasone dipropionate (DIPROLENE) 0.05 % cream Apply topically 2 times daily. 2/16/17   Rudi Castro MD   permethrin (ELIMITE) 5 % cream Apply topically as directed 11/18/16   Rudi Castro MD   fluticasone (FLONASE) 50 MCG/ACT nasal spray 2 sprays by Nasal route daily 4/19/16   Rudi Castro MD   oxybutynin (DITROPAN) 5 MG tablet Take 5 mg by mouth 2 times daily. Historical Provider, MD       REVIEW OF SYSTEMS    (2-9 systems for level 4, 10 or more for level 5)      Review of Systems   Constitutional: Negative for chills and fever. HENT: Negative for sore throat. Eyes: Negative for visual disturbance. Respiratory: Negative for shortness of breath. Cardiovascular: Positive for chest pain. Gastrointestinal: Positive for nausea. Negative for abdominal pain and vomiting. Genitourinary: Negative for difficulty urinating. Musculoskeletal: Negative for arthralgias and myalgias. Skin: Negative for wound. Neurological: Positive for light-headedness. Negative for weakness, numbness and headaches. Psychiatric/Behavioral: Negative for behavioral problems. PHYSICAL EXAM   (up to 7 for level 4, 8 or more for level 5)      INITIAL VITALS:   /81   Pulse 88   Temp 97.9 °F (36.6 °C) (Oral)   Resp 16   Wt 170 lb (77.1 kg)   SpO2 97%   BMI 32.12 kg/m²     Physical Exam   Constitutional: She is oriented to person, place, and time. She appears well-developed and well-nourished. No distress. HENT:   Head: Normocephalic and atraumatic. Mouth/Throat: Oropharynx is clear and moist.   Eyes: EOM are normal. Pupils are equal, round, and reactive to light. Neck: Normal range of motion. Cardiovascular: Normal rate and regular rhythm. Pulmonary/Chest: Effort normal. She has no wheezes.  She has no EMERGENCY DEPARTMENT COURSE / MDM     LABS:  Results for orders placed or performed during the hospital encounter of 10/19/17   CBC Auto Differential   Result Value Ref Range    WBC 7.6 3.5 - 11.0 k/uL    RBC 4.87 4.0 - 5.2 m/uL    Hemoglobin 14.3 12.0 - 16.0 g/dL    Hematocrit 42.0 36 - 46 %    MCV 86.3 80 - 100 fL    MCH 29.3 26 - 34 pg    MCHC 33.9 31 - 37 g/dL    RDW 13.7 12.5 - 15.4 %    Platelets 195 405 - 206 k/uL    MPV 11.0 6.0 - 12.0 fL    Differential Type NOT REPORTED     Seg Neutrophils 65 %    Lymphocytes 24 %    Monocytes 6 %    Eosinophils % 4 %    Basophils 1 %    Immature Granulocytes NOT REPORTED 0 %    Segs Absolute 5.00 1.8 - 7.7 k/uL    Absolute Lymph # 1.80 1.0 - 4.8 k/uL    Absolute Mono # 0.40 0.1 - 1.2 k/uL    Absolute Eos # 0.30 0.0 - 0.4 k/uL    Basophils # 0.00 0.0 - 0.2 k/uL    Absolute Immature Granulocyte NOT REPORTED 0.00 - 0.30 k/uL    WBC Morphology NOT REPORTED     RBC Morphology NOT REPORTED     Platelet Estimate NOT REPORTED    Basic Metabolic Panel   Result Value Ref Range    Glucose 88 70 - 99 mg/dL    BUN 11 6 - 20 mg/dL    CREATININE 0.68 0.50 - 0.90 mg/dL    Bun/Cre Ratio NOT REPORTED 9 - 20    Calcium 9.6 8.6 - 10.4 mg/dL    Sodium 144 135 - 144 mmol/L    Potassium 4.6 3.7 - 5.3 mmol/L    Chloride 103 98 - 107 mmol/L    CO2 27 20 - 31 mmol/L    Anion Gap 14 9 - 17 mmol/L    GFR Non-African American >60 >60 mL/min    GFR African American >60 >60 mL/min    GFR Comment          GFR Staging NOT REPORTED    POCT troponin   Result Value Ref Range    POC Troponin I 0.00 0.00 - 0.10 ng/mL    POC Troponin Interp       The Troponin-I (POC) results cannot be compared to the Troponin-T results. POCT troponin   Result Value Ref Range    POC Troponin I 0.00 0.00 - 0.10 ng/mL    POC Troponin Interp       The Troponin-I (POC) results cannot be compared to the Troponin-T results. IMPRESSION: Patient presents with chest pain concerning for ACS.   Low suspicion for PE, pneumothorax, aortic dissection or cardiac tamponade. Pain is exertional, and described as a pressure-like sensation. She has a heart score of 4. On exam, she is afebrile and hemodynamically stable. She is healthy and nontoxic in appearance. No abnormal heart sounds heard, lungs are clear auscultation, abdominal exam is benign. Given her presentation, we'll give her a dose of aspirin, Zofran, and perform a cardiac workup including an EKG, chest x-ray, troponins ×2, CBC and BMP. Anticipate admission to the ETU for stress test.    RADIOLOGY:  Xr Chest Standard (2 Vw)    Result Date: 10/19/2017  EXAMINATION: TWO VIEWS OF THE CHEST 10/19/2017 8:59 pm COMPARISON: Chest x-ray dated 03/16/2015 HISTORY: ORDERING SYSTEM PROVIDED HISTORY: chest pain TECHNOLOGIST PROVIDED HISTORY: Reason for exam:->chest pain Acuity: Unknown Type of Exam: Unknown FINDINGS: Cardiomediastinal silhouette and pulmonary vasculature are within normal limits. No focal airspace consolidation, pneumothorax, or pleural effusion. No free air beneath the diaphragm. No acute osseous abnormality. No acute intrathoracic process. Fluoro For Surgical Procedures    Result Date: 9/26/2017  Radiology exam is complete. No Radiologist dictation. Please follow up with ordering provider. EKG  Normal sinus rhythm with a ventricular rate of 100. Axis is normal. Intervals are within normal limits. No acute ST segment changes, there is T-wave flattening in aVL and V1. Impression: Nonspecific EKG. All EKG's are interpreted by the Emergency Department Physician who either signs or Co-signs this chart in the absence of a cardiologist.    EMERGENCY DEPARTMENT COURSE:  Patient was updated on lab results and imaging studies. Discussed that given her age and risk factors, we would like to have her admitted for a stress test.  Patient was agreeable with the plan. Patient was hemodynamically stable throughout her ED course.   Currently awaiting transfer to floor.    PROCEDURES:  None    CONSULTS:  None    CRITICAL CARE:  None    FINAL IMPRESSION      1.  Chest pain, unspecified type          DISPOSITION / PLAN     DISPOSITION     PATIENT REFERRED TO:  Willow Freeman MD  [address] [place] [address] Drive, [address] [facility]. [facility] [phone]            DISCHARGE MEDICATIONS:  Current Discharge Medication List          Brianna Metcalf MD  Emergency Medicine Resident    (Please note that portions of this note were completed with a voice recognition program.  Efforts were made to edit the dictations but occasionally words are mis-transcribed.)       Brianna Metcalf MD  Resident  10/19/17 2468
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
## 2017-10-20 NOTE — DISCHARGE SUMMARY
31 mmol/L    Anion Gap 14 9 - 17 mmol/L    GFR Non-African American >60 >60 mL/min    GFR African American >60 >60 mL/min    GFR Comment          GFR Staging NOT REPORTED    POCT troponin   Result Value Ref Range    POC Troponin I 0.00 0.00 - 0.10 ng/mL    POC Troponin Interp       The Troponin-I (POC) results cannot be compared to the Troponin-T results. POCT troponin   Result Value Ref Range    POC Troponin I 0.00 0.00 - 0.10 ng/mL    POC Troponin Interp       The Troponin-I (POC) results cannot be compared to the Troponin-T results. EKG 12 Lead   Result Value Ref Range    Ventricular Rate 100 BPM    Atrial Rate 100 BPM    P-R Interval 124 ms    QRS Duration 72 ms    Q-T Interval 358 ms    QTc Calculation (Bazett) 461 ms    P Axis 69 degrees    R Axis 16 degrees    T Axis 60 degrees   EKG 12 Lead   Result Value Ref Range    Ventricular Rate 84 BPM    Atrial Rate 84 BPM    P-R Interval 132 ms    QRS Duration 72 ms    Q-T Interval 390 ms    QTc Calculation (Bazett) 460 ms    P Axis 63 degrees    R Axis 18 degrees    T Axis 59 degrees   EKG 12 Lead   Result Value Ref Range    Ventricular Rate 90 BPM    Atrial Rate 90 BPM    P-R Interval 126 ms    QRS Duration 72 ms    Q-T Interval 368 ms    QTc Calculation (Bazett) 450 ms    P Axis 66 degrees    R Axis 36 degrees    T Axis 62 degrees     Xr Chest Standard (2 Vw)    Result Date: 10/19/2017  EXAMINATION: TWO VIEWS OF THE CHEST 10/19/2017 8:59 pm COMPARISON: Chest x-ray dated 03/16/2015 HISTORY: ORDERING SYSTEM PROVIDED HISTORY: chest pain TECHNOLOGIST PROVIDED HISTORY: Reason for exam:->chest pain Acuity: Unknown Type of Exam: Unknown FINDINGS: Cardiomediastinal silhouette and pulmonary vasculature are within normal limits. No focal airspace consolidation, pneumothorax, or pleural effusion. No free air beneath the diaphragm. No acute osseous abnormality. No acute intrathoracic process.      Nm Myocardial Spect Rest Exercise Or Rx    Result Date: 10/20/2017  EXAMINATION: MYOCARDIAL PERFUSION IMAGING 10/20/2017 12:23 pm TECHNIQUE: Rest dose:  15.8 mCi Tc-99m sestamibi intravenously Stress dose:  41  mCi Tc-99m sestamibi intravenously Under cardiology supervision, 0.4 mg Lexiscan was infused intravenously prior to injection of the stress dose. SPECT imaging was acquired following injection of the sestamibi. ECG gating was obtained following the stress acquisition. COMPARISON: None Available. HISTORY: ORDERING SYSTEM PROVIDED HISTORY: SUBSTERNAL CHEST PAIN RELIEVED BY REST OR NITROGLYCERIN TECHNOLOGIST PROVIDED HISTORY: Ordering Physician Provided Reason for Exam: substernal chest pain relieved by rest or nitroglycerin, numbness in arms, lightheaded, asthma, high cholesterol. short of breath, Additional signs and symptoms: has a past medical history of ASCUS with positive high risk HPV cervical; Asthma; Depression; Diverticulitis; GERD (gastroesophageal reflux disease); Hyperlipidemia; MRSA (methicillin resistant staph aureus) culture positive; and Rectocele. 67-year-old female with chest pain, jaw, back, or arm pain, numbness, shortness of breath, lightheadedness/dizziness, underlying high cholesterol with a family history of heart disease and sudden death FINDINGS: The patient achieved a maximum heart rate of 134 beats per minute, 81 % of the maximum age predicted heart rate of 164 beats per minute. Perfusion: There is no scintigraphic evidence for a reversible or fixed perfusion defect to suggest reversible ischemia or infarct. Function: The gated SPECT data demonstrates left ventricular size and normal wall motion. Left ventricular ejection fraction:  62% TID score:  0.98  (Threshold value of 1.39 is used for Lexiscan stress with Tc-99m). There is no stress-induced cavitary dilatation to suggest compensated triple vessel disease. End diastolic volume:  03MB Scores are visually adjusted to account for potential artifact.  Summed stress score:  0 Summed rest score:  0 Summed reversibility score:  0     1. No definitive scintigraphic evidence for reversible ischemia or infarct. 2. Left ventricular ejection fraction of 62%. 3.  Please see report for EKG portion of the examination which will be performed separately by physician from cardiology. Risk stratification:  Low risk Note:  Risk stratification incorporates both clinical history and test results. Final risk determination is the responsibility of the ordering provider as history and other test results may increase or decrease the risk stratification reported for this examination. Risk stratification criteria are adapted from \"Noninvasive Risk Stratification\" criteria from Tati Ventura. Al, ACC/AATS/AHA/ASE/ASNC/SCAI/SCCT/STS 2017 Appropriate Use Criteria For Coronary Revascularization in Patients With Stable Ischemic Heart Disease [facility] Volume 69, Issue 17, May 2017 High risk (>3% annual death or MI) 1. Severe resting LV dysfunction (LVEF >35%) not readily explained by non coronary causes 2. Resting perfusion abnormalities greater than 10% of the myocardium in patients without prior history or evidence of MI 3. Stress-induced perfusion abnormalities encumbering greater than or equal to 10% myocardium or stress segmental scores indicating multiple vascular territories with abnormalities 4. Stress-induced LV dilatation (TID ratio greater than 1.19 for exercise and greater than 1.39 for regadenoson) Intermediate risk (1% to 3% annual death or MI) 1. Mild/moderate resting LV dysfunction (LVEF 35% to 49%) not readily explained by non coronary causes. 2.  Resting perfusion abnormalities in 5%-9.9% of the myocardium in patients without a history or prior evidence of MI 3. Stress-induced perfusion abnormality encumbering 5%-9.9% of the myocardium or stress segmental scores indicating 1 vascular territory with abnormalities but without LV dilation 4.   Small wall motion abnormality involving 1-2 segments and only 1 coronary bed. Low Risk (Less than 1% annual death or MI) 1. Normal or small myocardial perfusion defect at rest or with stress encumbering less than 5% of the myocardium. This examination was read in conjunction with the cardiology fellow, Dr. Kera Augustin           Physical Exam:    General appearance - NAD, AOx 3   Lungs -CTAB, no R/R/R  Heart - RRR, no M/R/G  Abdomen - Soft, NT/ND  Neurological:  MAEx4, No focal motor deficit, sensory loss  Extremities - Cap refil <2 sec in all ext., no edema  Skin -warm, dry      Hospital Course:  Clinical course has improved, labs and imaging reviewed. Yue Magaña originally presented to the hospital on 10/19/2017  8:02 PM with substernal chest pain. At that time it was determined that She required further observation and rule out of ACS. She got stress test that was negative for both both electrical and radiographic evidence of reversible ischemia. Patient says that her chest pain is worse with stress, says her son-in-law has been obtained for her. Social work was consulted to further evaluate life stresses, and potential interventions. Labs and imaging were followed daily. Imaging results as above. She is medically stable to be discharged. Disposition: Home    Patient stated that they will not drive themselves home from the hospital if they have gotten pain killers/ narcotics earlier that day and that they will arrange for transportation on their own or work with the  for a ride. Patient counseled NOT to drive while under the influence of narcotics/ pain killers. Condition: Good    Patient stable and ready for discharge home. I have discussed plan of care with patient and they are in understanding. They were instructed to read discharge paperwork. All of their questions and concerns were addressed.      Time Spent: 0 day      --  Peewee Funk MD  Emergency Medicine Resident Physician    This dictation was generated by voice recognition computer software. Although all attempts are made to edit the dictation for accuracy, there may be errors in the transcription that are not intended.

## 2017-10-23 ENCOUNTER — OFFICE VISIT (OUTPATIENT)
Dept: PODIATRY | Age: 57
End: 2017-10-23
Payer: COMMERCIAL

## 2017-10-23 VITALS
WEIGHT: 172 LBS | SYSTOLIC BLOOD PRESSURE: 121 MMHG | HEIGHT: 61 IN | BODY MASS INDEX: 32.47 KG/M2 | HEART RATE: 95 BPM | DIASTOLIC BLOOD PRESSURE: 73 MMHG

## 2017-10-23 DIAGNOSIS — M72.2 PLANTAR FASCIITIS, BILATERAL: Primary | ICD-10-CM

## 2017-10-23 PROCEDURE — 3014F SCREEN MAMMO DOC REV: CPT | Performed by: PODIATRIST

## 2017-10-23 PROCEDURE — L3020 FOOT LONGITUD/METATARSAL SUP: HCPCS | Performed by: PODIATRIST

## 2017-10-23 PROCEDURE — G8484 FLU IMMUNIZE NO ADMIN: HCPCS | Performed by: PODIATRIST

## 2017-10-23 PROCEDURE — 1036F TOBACCO NON-USER: CPT | Performed by: PODIATRIST

## 2017-10-23 PROCEDURE — G8427 DOCREV CUR MEDS BY ELIG CLIN: HCPCS | Performed by: PODIATRIST

## 2017-10-23 PROCEDURE — 99213 OFFICE O/P EST LOW 20 MIN: CPT | Performed by: PODIATRIST

## 2017-10-23 PROCEDURE — G8417 CALC BMI ABV UP PARAM F/U: HCPCS | Performed by: PODIATRIST

## 2017-10-23 PROCEDURE — 3017F COLORECTAL CA SCREEN DOC REV: CPT | Performed by: PODIATRIST

## 2017-10-23 RX ORDER — ESOMEPRAZOLE MAGNESIUM 40 MG/1
CAPSULE, DELAYED RELEASE ORAL
COMMUNITY
Start: 2014-01-31 | End: 2018-11-29

## 2017-10-23 NOTE — PROGRESS NOTES
[facility] Patient     Corina Aleman is a 65 y.o. female who presents to clinic for followup of right 5th toenail temporary avulsion. She has been applying antibiotic ointment and band-aid daily. Denies redness, swelling, pain or pus. Complains of plantar fasciitis pain. States she buys OTC inserts but they wear out frequently. She has not been performing regular stretching. Currently denies F/C/N/V. Past Medical History:   Diagnosis Date    ASCUS with positive high risk HPV cervical 3/22/16    Asthma     Depression     Diverticulitis     GERD (gastroesophageal reflux disease)     Hyperlipidemia     MRSA (methicillin resistant staph aureus) culture positive 4/18/2016    lip    Rectocele        Past Surgical History:   Procedure Laterality Date    CYSTOSCOPY  2/25/2015    uro    HYSTERECTOMY  1996    RYAN, BSO performed at [facility] by Dr. Fanny Calderon, Also had some type of bladder lift at this time    KNEE SURGERY Left     #1 - lateral release, #2 - arthroscopy with muscle alignment    NERVE BLOCK  07/28/2017    caudal #1  decadron 10mg    NERVE BLOCK  09/22/2017    cadal # 2, decadron 10 mg    NERVE BLOCK  09/22/2017    caudal epidural steroid block #2 decadron 10 mg       Prior to Admission medications    Medication Sig Start Date End Date Taking?  Authorizing Provider   esomeprazole (NEXIUM) 40 MG delayed release capsule NexIUM 40 MG Oral Capsule Delayed Release  1 Every Day   Quantity: 90;  Refills: 1       Jewel Henriquez;  Started 31-Jan-2014  Active 1/31/14  Yes Historical Provider, MD   DULoxetine (CYMBALTA) 20 MG extended release capsule Take 20 mg by mouth daily   Yes Historical Provider, MD   VENTOLIN  (90 Base) MCG/ACT inhaler INHALE TWO PUFFS BY MOUTH EVERY 6 HOURS AS NEEDED FOR WHEEZING 10/2/17  Yes Ceferino Villanueva MD   venlafaxine (EFFEXOR) 75 MG tablet TAKE ONE TABLET BY MOUTH THREE TIMES A DAY 9/21/17  Yes Ceferino Villanueva MD   PREMARIN 0.625 MG/GM vaginal cream PLACE 2 GRAMS VAGINALLY TWICE A WEEK FOR 12 DOSES 9/21/17  Yes Elder García MD   meloxicam (MOBIC) 15 MG tablet TAKE ONE TABLET BY MOUTH DAILY 9/21/17  Yes Elder García MD   AEROSPAN 80 MCG/ACT AERS inhaler INHALE ONE TO TWO PUFFS BY MOUTH TWICE A DAY 9/6/17  Yes Elder García MD   busPIRone (BUSPAR) 15 MG tablet TAKE ONE TABLET BY MOUTH TWICE A DAY  Patient taking differently: TAKE ONE TABLET BY MOUTH THREE TIMES A DAY 8/17/17  Yes Elder García MD   baclofen (LIORESAL) 10 MG tablet TAKE ONE TABLET BY MOUTH THREE TIMES A DAY 8/17/17  Yes Elder García MD   traMADol (ULTRAM) 50 MG tablet TAKE ONE TABLET BY MOUTH DAILY 8/4/17  Yes Elder García MD   omeprazole (PRILOSEC) 20 MG delayed release capsule TAKE ONE CAPSULE BY MOUTH DAILY 8/2/17  Yes Elder García MD   pravastatin (PRAVACHOL) 40 MG tablet TAKE ONE TABLET BY MOUTH DAILY 8/2/17  Yes Elder García MD   ranitidine (ZANTAC) 150 MG tablet TAKE ONE TABLET BY MOUTH TWICE A DAY 7/24/17  Yes Elder García MD   betamethasone dipropionate (DIPROLENE) 0.05 % cream Apply topically 2 times daily. 2/16/17  Yes Elder García MD   permethrin (ELIMITE) 5 % cream Apply topically as directed 11/18/16  Yes Elder García MD   fluticasone (FLONASE) 50 MCG/ACT nasal spray 2 sprays by Nasal route daily 4/19/16  Yes Elder García MD   oxybutynin (DITROPAN) 5 MG tablet Take 5 mg by mouth 2 times daily. Yes Historical Provider, MD   Misc. Devices MISC 1 each by Does not apply route once for 1 dose 6/1/17 6/1/17  Elder García MD     Scheduled Meds:  Continuous Infusions:  PRN Meds:    Allergies   Allergen Reactions    Other Shortness Of Breath    Shrimp (Diagnostic) Shortness Of Breath    Famotidine Other (See Comments)     Headaches  Headaches    Gabapentin Nausea Only     Mood swings, nausea. Mood swings, nausea.         Family History   Problem Relation Age of Onset    High Blood Pressure Father        Social History   Substance Use Topics    Smoking status: Former Smoker    Smokeless tobacco: Never Used    Alcohol use 0.0 oz/week      Comment: social       Review of Systems: All 12 systems reviewed and pertinent positives noted above. Lower Extremity Physical Examination:   Vitals:   Vitals:    10/23/17 1526   BP: 121/73   Pulse: 95     General: AAO x 3 in NAD. Vascular: DP and PT pulses palpable 2/4, Bilateral.  CFT <3 seconds, Bilateral.  Hair growth absent to the level of the digits, Bilateral.  Edema, erythema and varicosities absent, Bilateral.      Neurological: Sensation intact to light touch to level of digits, Bilateral.  Protective sensation intact 10/10 sites via 5.07/10g [place] Monofilament, Bilateral.      Musculoskeletal: Muscle strength 5/5, Bilateral.  Pain present upon palpation of Right 5th digit. Medial longitudinal arch within normal limits, Bilateral.  Ankle ROM decreased, Bilateral.  1st MPJ ROM decreased, Bilateral.  Dorsally contracted digits present 2-5, Bilateral.     Integument: Warm, dry, supple, Bilateral.  S/p Right 5th total nail avulsion. Nail bed intact without periungual rubor, edema or drainage. Open lesion absent, Bilateral. Interdigital maceration absent to web spaces 1-4, Bilateral.         Asessment: Patient is a 65 y.o. female with:   1. Plantar fasciitis, bilateral  [place] FOOT LONGITUD/METATARSAL SUP       Plan:   Patient examined and evaluated. Advised patient to discontinue bacitracin and DSD. Leave Right 5th digit open to air and wear clean socks. Obtained box molds for custom orthotics from [employer]. Reviewed plantar fasciitis stretching with patient. Clinic to schedule patient for f/u when orthotics arrive. Discussed with Dr. Rena Hargrove. Electronically signed by John Kaufman DPM on 10/23/2017 at 3:31 PM   I performed a history and physical examination of the patient and discussed management with the resident.  I reviewed the residents note and agree with the documented findings and plan of care. Any areas of disagreement are noted on the chart. I was personally present for the key portions of any procedures. I have documented in the chart those procedures where I was not present during the key portions. I have reviewed the Podiatry Resident progress note. I agree with the chief complaint, past medical history, past surgical history, allergies, medications, social and family history as documented unless otherwise noted below. Documentation of the HPI, Physical Exam and Medical Decision Making performed by medical students or scribes is based on my personal performance of the HPI, PE and MDM. I have personally evaluated this patient and have completed at least one if not all key elements of the E/M (history, physical exam, and MDM). Additional findings are as noted. Jacob Manzanares D.P.M.

## 2017-11-10 ENCOUNTER — HOSPITAL ENCOUNTER (OUTPATIENT)
Dept: PAIN MANAGEMENT | Age: 57
Discharge: HOME | End: 2017-11-10
Payer: COMMERCIAL

## 2017-11-10 VITALS
HEART RATE: 85 BPM | BODY MASS INDEX: 32.47 KG/M2 | HEIGHT: 61 IN | WEIGHT: 172 LBS | SYSTOLIC BLOOD PRESSURE: 140 MMHG | TEMPERATURE: 98.2 F | RESPIRATION RATE: 14 BRPM | OXYGEN SATURATION: 99 % | DIASTOLIC BLOOD PRESSURE: 60 MMHG

## 2017-11-10 DIAGNOSIS — M54.9 CHRONIC BACK PAIN, UNSPECIFIED BACK LOCATION, UNSPECIFIED BACK PAIN LATERALITY: ICD-10-CM

## 2017-11-10 DIAGNOSIS — G89.29 CHRONIC BACK PAIN, UNSPECIFIED BACK LOCATION, UNSPECIFIED BACK PAIN LATERALITY: ICD-10-CM

## 2017-11-10 PROCEDURE — 6360000004 HC RX CONTRAST MEDICATION

## 2017-11-10 PROCEDURE — 62323 NJX INTERLAMINAR LMBR/SAC: CPT | Performed by: PAIN MEDICINE

## 2017-11-10 PROCEDURE — 62323 NJX INTERLAMINAR LMBR/SAC: CPT

## 2017-11-10 PROCEDURE — 6360000002 HC RX W HCPCS

## 2017-11-10 PROCEDURE — 6360000002 HC RX W HCPCS: Performed by: PAIN MEDICINE

## 2017-11-10 RX ORDER — MIDAZOLAM HYDROCHLORIDE 1 MG/ML
2 INJECTION INTRAMUSCULAR; INTRAVENOUS ONCE
Status: COMPLETED | OUTPATIENT
Start: 2017-11-10 | End: 2017-11-10

## 2017-11-10 RX ORDER — SODIUM CHLORIDE, SODIUM LACTATE, POTASSIUM CHLORIDE, CALCIUM CHLORIDE 600; 310; 30; 20 MG/100ML; MG/100ML; MG/100ML; MG/100ML
INJECTION, SOLUTION INTRAVENOUS CONTINUOUS
Status: DISCONTINUED | OUTPATIENT
Start: 2017-11-10 | End: 2017-11-11 | Stop reason: HOSPADM

## 2017-11-10 RX ORDER — FENTANYL CITRATE 50 UG/ML
100 INJECTION, SOLUTION INTRAMUSCULAR; INTRAVENOUS ONCE
Status: COMPLETED | OUTPATIENT
Start: 2017-11-10 | End: 2017-11-10

## 2017-11-10 RX ADMIN — FENTANYL CITRATE 50 MCG: 50 INJECTION INTRAMUSCULAR; INTRAVENOUS at 10:02

## 2017-11-10 RX ADMIN — MIDAZOLAM HYDROCHLORIDE 1 MG: 1 INJECTION, SOLUTION INTRAMUSCULAR; INTRAVENOUS at 10:02

## 2017-11-10 ASSESSMENT — PAIN - FUNCTIONAL ASSESSMENT
PAIN_FUNCTIONAL_ASSESSMENT: 0-10

## 2017-11-10 ASSESSMENT — PAIN DESCRIPTION - DESCRIPTORS: DESCRIPTORS: CRAMPING;ACHING;BURNING

## 2017-11-10 NOTE — OP NOTE
Lumbar Epidural Steroid Injection:  SURGEON: Taurus Boyce     PRE-OP DIAGNOSIS: M54.17 (lumbosacral neuritis), M54.5 (low back pain)    POST-OP DIAGNOSIS: Same. PROCEDURE PERFORMED: Midline L4/5 Lumbar Epidural Steroid Injection. ASA: 2                        Mallampati: 2    Pain rated as moderate    Physician confirmed and marked the surgical site. CONSENT: Patient has undergone the educational process with this procedure, is aware and fully understands the risks involved: potential damage to any and all body organs including possible bleeding, infection, and nerve injury, allergic reaction and headache. Patient also understands that the procedure will be undertaken in a safe, controlled and monitored setting. patient recognizes that the benefits include relief from pain and reduction in the oral use of medications. Patient agreed to proceed. PREP: The patient was prepped with chloroprep and draped sterilely. 5ml of 0.5% lidocaine was used to anesthetize the skin and subcutaneous tissue. PROCEDURE NOTE: A 20 gauge 3.5 inch Tuohy needle was then advanced to the midline lumbar epidural space at L4/5 under fluroscopic guidance using a loss of resistance technique with a plastic syringe and air. Aspiration was negative for blood, CSF and producing pain. Contrast Medium: 1 ml of omnipaque contrast was then injected and spread along the epidural space. 80mg Depomedrol  mixed with 5ml of 0.5% lidocaine was then injected into the lumbar epidural space. The needle was withdrawn by the physician and the nurse applied a sterile dressing. The patient tolerated the procedure well. No complications occured. Patient transferred to the recovery room in satisfatory condition. Appropriate written discharge instructions given to the patient.       [address] [place] [address]

## 2017-11-24 NOTE — ADDENDUM NOTE
Encounter addended by: Lexy Garner on: 11/24/2017  9:25 AM<BR>    Actions taken: Letter status changed

## 2017-11-28 DIAGNOSIS — M51.36 DEGENERATION, INTERVERTEBRAL DISC, LUMBAR: ICD-10-CM

## 2017-11-28 RX ORDER — VENLAFAXINE 75 MG/1
TABLET ORAL
Qty: 90 TABLET | Refills: 0 | Status: SHIPPED | OUTPATIENT
Start: 2017-11-28 | End: 2017-12-30 | Stop reason: SDUPTHER

## 2017-11-28 RX ORDER — RANITIDINE 150 MG/1
TABLET ORAL
Qty: 60 TABLET | Refills: 0 | Status: SHIPPED | OUTPATIENT
Start: 2017-11-28 | End: 2017-12-30 | Stop reason: SDUPTHER

## 2017-11-28 RX ORDER — MELOXICAM 15 MG/1
TABLET ORAL
Qty: 30 TABLET | Refills: 0 | Status: SHIPPED | OUTPATIENT
Start: 2017-11-28 | End: 2017-12-30 | Stop reason: SDUPTHER

## 2018-01-29 ENCOUNTER — OFFICE VISIT (OUTPATIENT)
Dept: PODIATRY | Age: 58
End: 2018-01-29
Payer: COMMERCIAL

## 2018-01-29 VITALS
DIASTOLIC BLOOD PRESSURE: 70 MMHG | HEART RATE: 97 BPM | WEIGHT: 176.6 LBS | HEIGHT: 61 IN | BODY MASS INDEX: 33.34 KG/M2 | SYSTOLIC BLOOD PRESSURE: 115 MMHG

## 2018-01-29 DIAGNOSIS — M72.2 PLANTAR FASCIITIS, BILATERAL: Primary | ICD-10-CM

## 2018-01-29 DIAGNOSIS — M79.672 FOOT PAIN, BILATERAL: ICD-10-CM

## 2018-01-29 DIAGNOSIS — M79.671 FOOT PAIN, BILATERAL: ICD-10-CM

## 2018-01-29 PROCEDURE — 99213 OFFICE O/P EST LOW 20 MIN: CPT | Performed by: PODIATRIST

## 2018-01-29 NOTE — PROGRESS NOTES
[facility] Patient     Veronica Shirley is a 65 y.o. female who presents to clinic for orthotic pick-up. She states her right 5th toenail has healed uneventfully. Admits to not performing much plantar fasciitis stretching. Currently denies F/C/N/V. Past Medical History:   Diagnosis Date    ASCUS with positive high risk HPV cervical 3/22/16    Asthma     Depression     Diverticulitis     GERD (gastroesophageal reflux disease)     Hyperlipidemia     MRSA (methicillin resistant staph aureus) culture positive 4/18/2016    lip    Rectocele        Past Surgical History:   Procedure Laterality Date    CYSTOSCOPY  2/25/2015    uro    HYSTERECTOMY  1996    RYAN, BSO performed at [facility] by Dr. Carson Moreno, Also had some type of bladder lift at this time    KNEE SURGERY Left     #1 - lateral release, #2 - arthroscopy with muscle alignment    NERVE BLOCK  07/28/2017    caudal #1  decadron 10mg    NERVE BLOCK  09/22/2017    cadal # 2, decadron 10 mg    NERVE BLOCK  09/22/2017    caudal epidural steroid block #2 decadron 10 mg    NERVE BLOCK  11/10/2017    lumbar L4-5 epidural; depomedrol 80mg       Prior to Admission medications    Medication Sig Start Date End Date Taking?  Authorizing Provider   busPIRone (BUSPAR) 15 MG tablet TAKE ONE TABLET BY MOUTH TWICE A DAY 1/2/18  Yes Yary Jaime MD   baclofen (LIORESAL) 10 MG tablet TAKE ONE TABLET BY MOUTH THREE TIMES A DAY 1/2/18  Yes Yary Jaime MD   meloxicam (MOBIC) 15 MG tablet TAKE ONE TABLET BY MOUTH DAILY **MUST CALL MD FOR APPOINTMENT** 1/2/18  Yes Yary Jaime MD   venlafaxine (EFFEXOR) 75 MG tablet TAKE ONE TABLET BY MOUTH THREE TIMES A DAY **MUST CALL MD FOR APPOINTMENT** 1/2/18  Yes Yary Jaime MD   ranitidine (ZANTAC) 150 MG tablet TAKE ONE TABLET BY MOUTH TWICE A DAY **MUST CALL MD FOR APPOINTMENT** 1/2/18  Yes Yary Jaime MD   omeprazole (PRILOSEC) 20 MG delayed release capsule TAKE ONE CAPSULE BY MOUTH DAILY 12/12/17  Yes Halley Benz MD   pravastatin (PRAVACHOL) 40 MG tablet TAKE ONE TABLET BY MOUTH DAILY 12/12/17  Yes Halley Benz MD   DULoxetine (CYMBALTA) 20 MG extended release capsule TAKE ONE CAPSULE BY MOUTH DAILY 12/12/17  Yes Halley Benz MD   AEROSPAN 80 MCG/ACT AERS inhaler INHALE ONE TO TWO PUFFS BY MOUTH TWICE A DAY 12/12/17  Yes Halley Benz MD   VENTOLIN  (90 Base) MCG/ACT inhaler INHALE TWO PUFFS BY MOUTH EVERY 6 HOURS AS NEEDED FOR WHEEZING 11/24/17  Yes Halley Benz MD   esomeprazole ([address]) 40 MG delayed release capsule NexIUM 40 MG Oral Capsule Delayed Release  1 Every Day   Quantity: 90;  Refills: 1       Silva Deshpande.;  Started 31-Jan-2014  Active 1/31/14  Yes Historical Provider, MD   PREMARIN 0.625 MG/GM vaginal cream PLACE 2 GRAMS VAGINALLY TWICE A WEEK FOR 12 DOSES 9/21/17  Yes Halley Benz MD   traMADol (ULTRAM) 50 MG tablet TAKE ONE TABLET BY MOUTH DAILY 8/4/17  Yes Halley Benz MD   betamethasone dipropionate (DIPROLENE) 0.05 % cream Apply topically 2 times daily. 2/16/17  Yes Halley Benz MD   permethrin (ELIMITE) 5 % cream Apply topically as directed 11/18/16  Yes Halley Benz MD   fluticasone (FLONASE) 50 MCG/ACT nasal spray 2 sprays by Nasal route daily 4/19/16  Yes Halley Benz MD   oxybutynin (DITROPAN) 5 MG tablet Take 5 mg by mouth 2 times daily. Yes Historical Provider, MD   Misc. Devices MISC 1 each by Does not apply route once for 1 dose 6/1/17 6/1/17  Halley Benz MD     Scheduled Meds:  Continuous Infusions:  PRN Meds:    Allergies   Allergen Reactions    Other Shortness Of Breath    Shrimp (Diagnostic) Shortness Of Breath    Famotidine Other (See Comments)     Headaches  Headaches    Gabapentin Nausea Only     Mood swings, nausea. Mood swings, nausea.         Family History   Problem Relation Age of Onset    High Blood Pressure Father        Social History   Substance Use Topics    Smoking status: Former Smoker    Smokeless

## 2018-01-29 NOTE — PROGRESS NOTES
Patient instructed to remove shoes and socks, instructed to sit in exam chair. Current PCP name is Lia Srivastava and date of last visit 10/17. Do you have a follow up visit scheduled?   no

## 2018-02-05 DIAGNOSIS — M51.36 DEGENERATION, INTERVERTEBRAL DISC, LUMBAR: ICD-10-CM

## 2018-02-06 RX ORDER — MELOXICAM 15 MG/1
TABLET ORAL
Qty: 30 TABLET | Refills: 0 | Status: SHIPPED | OUTPATIENT
Start: 2018-02-06 | End: 2018-03-18 | Stop reason: SDUPTHER

## 2018-02-06 RX ORDER — RANITIDINE 150 MG/1
TABLET ORAL
Qty: 60 TABLET | Refills: 0 | Status: SHIPPED | OUTPATIENT
Start: 2018-02-06 | End: 2018-04-05 | Stop reason: SDUPTHER

## 2018-02-06 RX ORDER — VENLAFAXINE 75 MG/1
TABLET ORAL
Qty: 90 TABLET | Refills: 0 | Status: SHIPPED | OUTPATIENT
Start: 2018-02-06 | End: 2018-04-05 | Stop reason: SDUPTHER

## 2018-03-14 ENCOUNTER — OFFICE VISIT (OUTPATIENT)
Dept: FAMILY MEDICINE CLINIC | Age: 58
End: 2018-03-14
Payer: COMMERCIAL

## 2018-03-14 VITALS
BODY MASS INDEX: 33.59 KG/M2 | WEIGHT: 177.9 LBS | SYSTOLIC BLOOD PRESSURE: 125 MMHG | HEART RATE: 83 BPM | HEIGHT: 61 IN | DIASTOLIC BLOOD PRESSURE: 78 MMHG

## 2018-03-14 DIAGNOSIS — K59.04 CHRONIC IDIOPATHIC CONSTIPATION: ICD-10-CM

## 2018-03-14 DIAGNOSIS — R10.32 LEFT LOWER QUADRANT PAIN: Primary | ICD-10-CM

## 2018-03-14 PROCEDURE — 3014F SCREEN MAMMO DOC REV: CPT | Performed by: FAMILY MEDICINE

## 2018-03-14 PROCEDURE — 1036F TOBACCO NON-USER: CPT | Performed by: FAMILY MEDICINE

## 2018-03-14 PROCEDURE — G8484 FLU IMMUNIZE NO ADMIN: HCPCS | Performed by: FAMILY MEDICINE

## 2018-03-14 PROCEDURE — G8417 CALC BMI ABV UP PARAM F/U: HCPCS | Performed by: FAMILY MEDICINE

## 2018-03-14 PROCEDURE — 99213 OFFICE O/P EST LOW 20 MIN: CPT | Performed by: FAMILY MEDICINE

## 2018-03-14 PROCEDURE — G8427 DOCREV CUR MEDS BY ELIG CLIN: HCPCS | Performed by: FAMILY MEDICINE

## 2018-03-14 PROCEDURE — 3017F COLORECTAL CA SCREEN DOC REV: CPT | Performed by: FAMILY MEDICINE

## 2018-03-14 RX ORDER — PSYLLIUM HUSK/CALCIUM CARB 1 G-60 MG
1 CAPSULE ORAL DAILY
Qty: 90 CAPSULE | Refills: 1 | Status: SHIPPED | OUTPATIENT
Start: 2018-03-14 | End: 2018-11-29

## 2018-03-14 NOTE — PROGRESS NOTES
swings, nausea. Social History   Substance Use Topics    Smoking status: Former Smoker    Smokeless tobacco: Never Used    Alcohol use 0.0 oz/week      Comment: social      Body mass index is 33.61 kg/m². /78 (Site: Right Arm, Position: Sitting, Cuff Size: Large Adult)   Pulse 83   Ht 5' 1\" (1.549 m)   Wt 177 lb 14.4 oz (80.7 kg)   BMI 33.61 kg/m²     Subjective:      HPI  63-year-old female is concerned that she had a flareup of \"diverticulitis \". The patient did not have a colonoscopy done and I don't know why she thinks she has diverticulitis. Abdominal Pain  Patient complains of abdominal pain. The pain is described as cramping and double her over, and is 10/10 in intensity. The patient is experiencing LLQ pain without radiation. Onset was several days ago. Stopped after 1.5 days. Symptoms have been completely resolved. Aggravating factors: ??.  Alleviating factors: diet changes, no pop. Associated symptoms: constipation and nausea. The patient denies belching, chills, diarrhea, fever and flatus. Patient only has a bowel movement every 2 weeks. She denies any cramping in abdomen. Denies any blood in stool. Denies any loose stools. Denies any fever, nausea. 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 Review of Systems   Constitutional: Negative for fever and unexpected weight change. Respiratory: Negative for cough and shortness of breath. Cardiovascular: Negative for chest pain and leg swelling. Gastrointestinal: Negative for diarrhea, constipation and blood in stool. positive for constipation. Positive for left lower quadrant pain without radiation at times. Musculoskeletal: Negative for back pain and gait problem. Skin: Negative for color change and rash. Objective:   Physical Exam  Constitutional: VS (see above). General appearance: normal development, habitus and attention, no deformities.   Eyes: normal conjunctiva and

## 2018-03-18 DIAGNOSIS — M51.36 DEGENERATION, INTERVERTEBRAL DISC, LUMBAR: ICD-10-CM

## 2018-03-19 RX ORDER — MELOXICAM 15 MG/1
15 TABLET ORAL DAILY
Qty: 30 TABLET | Refills: 0 | Status: SHIPPED | OUTPATIENT
Start: 2018-03-19 | End: 2018-04-23 | Stop reason: SDUPTHER

## 2018-04-11 ENCOUNTER — HOSPITAL ENCOUNTER (EMERGENCY)
Age: 58
Discharge: HOME | End: 2018-04-11
Attending: EMERGENCY MEDICINE
Payer: COMMERCIAL

## 2018-04-11 ENCOUNTER — APPOINTMENT (OUTPATIENT)
Dept: GENERAL RADIOLOGY | Age: 58
End: 2018-04-11
Payer: COMMERCIAL

## 2018-04-11 VITALS
HEART RATE: 93 BPM | SYSTOLIC BLOOD PRESSURE: 115 MMHG | DIASTOLIC BLOOD PRESSURE: 46 MMHG | RESPIRATION RATE: 16 BRPM | TEMPERATURE: 97.9 F | OXYGEN SATURATION: 97 %

## 2018-04-11 DIAGNOSIS — R20.2 PARESTHESIA: ICD-10-CM

## 2018-04-11 DIAGNOSIS — M79.601 RIGHT ARM PAIN: Primary | ICD-10-CM

## 2018-04-11 LAB
ABSOLUTE EOS #: 0.23 K/UL (ref 0–0.44)
ABSOLUTE IMMATURE GRANULOCYTE: <0.03 K/UL (ref 0–0.3)
ABSOLUTE LYMPH #: 1.38 K/UL (ref 1.1–3.7)
ABSOLUTE MONO #: 0.37 K/UL (ref 0.1–1.2)
ANION GAP SERPL CALCULATED.3IONS-SCNC: 11 MMOL/L (ref 9–17)
BASOPHILS # BLD: 1 % (ref 0–2)
BASOPHILS ABSOLUTE: 0.05 K/UL (ref 0–0.2)
BUN BLDV-MCNC: 18 MG/DL (ref 6–20)
BUN/CREAT BLD: ABNORMAL (ref 9–20)
CALCIUM SERPL-MCNC: 9.3 MG/DL (ref 8.6–10.4)
CHLORIDE BLD-SCNC: 101 MMOL/L (ref 98–107)
CO2: 26 MMOL/L (ref 20–31)
CREAT SERPL-MCNC: 0.65 MG/DL (ref 0.5–0.9)
DIFFERENTIAL TYPE: NORMAL
EKG ATRIAL RATE: 80 BPM
EKG P AXIS: 67 DEGREES
EKG P-R INTERVAL: 124 MS
EKG Q-T INTERVAL: 380 MS
EKG QRS DURATION: 80 MS
EKG QTC CALCULATION (BAZETT): 438 MS
EKG R AXIS: 29 DEGREES
EKG T AXIS: 54 DEGREES
EKG VENTRICULAR RATE: 80 BPM
EOSINOPHILS RELATIVE PERCENT: 4 % (ref 1–4)
GFR AFRICAN AMERICAN: >60 ML/MIN
GFR NON-AFRICAN AMERICAN: >60 ML/MIN
GFR SERPL CREATININE-BSD FRML MDRD: ABNORMAL ML/MIN/{1.73_M2}
GFR SERPL CREATININE-BSD FRML MDRD: ABNORMAL ML/MIN/{1.73_M2}
GLUCOSE BLD-MCNC: 128 MG/DL (ref 70–99)
HCT VFR BLD CALC: 37.3 % (ref 36.3–47.1)
HEMOGLOBIN: 12.2 G/DL (ref 11.9–15.1)
IMMATURE GRANULOCYTES: 0 %
LYMPHOCYTES # BLD: 26 % (ref 24–43)
MCH RBC QN AUTO: 29.2 PG (ref 25.2–33.5)
MCHC RBC AUTO-ENTMCNC: 32.7 G/DL (ref 28.4–34.8)
MCV RBC AUTO: 89.2 FL (ref 82.6–102.9)
MONOCYTES # BLD: 7 % (ref 3–12)
NRBC AUTOMATED: 0 PER 100 WBC
PDW BLD-RTO: 12.4 % (ref 11.8–14.4)
PLATELET # BLD: 209 K/UL (ref 138–453)
PLATELET ESTIMATE: NORMAL
PMV BLD AUTO: 11.8 FL (ref 8.1–13.5)
POC TROPONIN I: 0 NG/ML (ref 0–0.1)
POC TROPONIN I: 0 NG/ML (ref 0–0.1)
POC TROPONIN INTERP: NORMAL
POC TROPONIN INTERP: NORMAL
POTASSIUM SERPL-SCNC: 3.8 MMOL/L (ref 3.7–5.3)
RBC # BLD: 4.18 M/UL (ref 3.95–5.11)
RBC # BLD: NORMAL 10*6/UL
SEG NEUTROPHILS: 62 % (ref 36–65)
SEGMENTED NEUTROPHILS ABSOLUTE COUNT: 3.27 K/UL (ref 1.5–8.1)
SODIUM BLD-SCNC: 138 MMOL/L (ref 135–144)
WBC # BLD: 5.3 K/UL (ref 3.5–11.3)
WBC # BLD: NORMAL 10*3/UL

## 2018-04-11 PROCEDURE — 99284 EMERGENCY DEPT VISIT MOD MDM: CPT

## 2018-04-11 PROCEDURE — 85025 COMPLETE CBC W/AUTO DIFF WBC: CPT

## 2018-04-11 PROCEDURE — 6370000000 HC RX 637 (ALT 250 FOR IP): Performed by: EMERGENCY MEDICINE

## 2018-04-11 PROCEDURE — 84484 ASSAY OF TROPONIN QUANT: CPT

## 2018-04-11 PROCEDURE — 80048 BASIC METABOLIC PNL TOTAL CA: CPT

## 2018-04-11 PROCEDURE — 93005 ELECTROCARDIOGRAM TRACING: CPT

## 2018-04-11 PROCEDURE — 71046 X-RAY EXAM CHEST 2 VIEWS: CPT

## 2018-04-11 PROCEDURE — 73030 X-RAY EXAM OF SHOULDER: CPT

## 2018-04-11 RX ORDER — ONDANSETRON 4 MG/1
4 TABLET, ORALLY DISINTEGRATING ORAL ONCE
Status: DISCONTINUED | OUTPATIENT
Start: 2018-04-11 | End: 2018-04-11 | Stop reason: HOSPADM

## 2018-04-11 RX ORDER — CYCLOBENZAPRINE HCL 10 MG
10 TABLET ORAL ONCE
Status: COMPLETED | OUTPATIENT
Start: 2018-04-11 | End: 2018-04-11

## 2018-04-11 RX ADMIN — CYCLOBENZAPRINE HYDROCHLORIDE 10 MG: 10 TABLET, FILM COATED ORAL at 05:49

## 2018-04-11 ASSESSMENT — PAIN DESCRIPTION - ORIENTATION: ORIENTATION: RIGHT

## 2018-04-11 ASSESSMENT — PAIN DESCRIPTION - ONSET: ONSET: SUDDEN

## 2018-04-11 ASSESSMENT — PAIN DESCRIPTION - DESCRIPTORS: DESCRIPTORS: ACHING

## 2018-04-11 ASSESSMENT — ENCOUNTER SYMPTOMS
SHORTNESS OF BREATH: 0
WHEEZING: 0
COLOR CHANGE: 0
ABDOMINAL PAIN: 0
NAUSEA: 1
VOMITING: 0

## 2018-04-11 ASSESSMENT — PAIN DESCRIPTION - PAIN TYPE: TYPE: ACUTE PAIN

## 2018-04-11 ASSESSMENT — PAIN DESCRIPTION - FREQUENCY: FREQUENCY: CONTINUOUS

## 2018-04-11 ASSESSMENT — PAIN DESCRIPTION - LOCATION: LOCATION: ARM

## 2018-04-11 ASSESSMENT — PAIN SCALES - GENERAL: PAINLEVEL_OUTOF10: 8

## 2018-04-19 ENCOUNTER — OFFICE VISIT (OUTPATIENT)
Dept: FAMILY MEDICINE CLINIC | Age: 58
End: 2018-04-19
Payer: COMMERCIAL

## 2018-04-19 VITALS
WEIGHT: 174.6 LBS | SYSTOLIC BLOOD PRESSURE: 124 MMHG | BODY MASS INDEX: 32.99 KG/M2 | DIASTOLIC BLOOD PRESSURE: 72 MMHG | RESPIRATION RATE: 16 BRPM | HEART RATE: 100 BPM | OXYGEN SATURATION: 96 %

## 2018-04-19 DIAGNOSIS — R53.83 FATIGUE, UNSPECIFIED TYPE: Primary | ICD-10-CM

## 2018-04-19 PROCEDURE — G8427 DOCREV CUR MEDS BY ELIG CLIN: HCPCS | Performed by: FAMILY MEDICINE

## 2018-04-19 PROCEDURE — 99213 OFFICE O/P EST LOW 20 MIN: CPT | Performed by: FAMILY MEDICINE

## 2018-04-19 PROCEDURE — 3014F SCREEN MAMMO DOC REV: CPT | Performed by: FAMILY MEDICINE

## 2018-04-19 PROCEDURE — 1036F TOBACCO NON-USER: CPT | Performed by: FAMILY MEDICINE

## 2018-04-19 PROCEDURE — 3017F COLORECTAL CA SCREEN DOC REV: CPT | Performed by: FAMILY MEDICINE

## 2018-04-19 PROCEDURE — G8417 CALC BMI ABV UP PARAM F/U: HCPCS | Performed by: FAMILY MEDICINE

## 2018-04-22 ENCOUNTER — HOSPITAL ENCOUNTER (EMERGENCY)
Age: 58
Discharge: HOME | End: 2018-04-22
Attending: EMERGENCY MEDICINE
Payer: COMMERCIAL

## 2018-04-22 ENCOUNTER — APPOINTMENT (OUTPATIENT)
Dept: CT IMAGING | Age: 58
End: 2018-04-22
Payer: COMMERCIAL

## 2018-04-22 VITALS
OXYGEN SATURATION: 98 % | RESPIRATION RATE: 18 BRPM | SYSTOLIC BLOOD PRESSURE: 109 MMHG | TEMPERATURE: 97.5 F | HEART RATE: 74 BPM | DIASTOLIC BLOOD PRESSURE: 69 MMHG

## 2018-04-22 DIAGNOSIS — K57.32 DIVERTICULITIS OF COLON: Primary | ICD-10-CM

## 2018-04-22 LAB
-: ABNORMAL
ABSOLUTE EOS #: 0.25 K/UL (ref 0–0.44)
ABSOLUTE IMMATURE GRANULOCYTE: 0.05 K/UL (ref 0–0.3)
ABSOLUTE LYMPH #: 1.49 K/UL (ref 1.1–3.7)
ABSOLUTE MONO #: 0.81 K/UL (ref 0.1–1.2)
ALBUMIN SERPL-MCNC: 3.9 G/DL (ref 3.5–5.2)
ALBUMIN/GLOBULIN RATIO: 1.5 (ref 1–2.5)
ALP BLD-CCNC: 85 U/L (ref 35–104)
ALT SERPL-CCNC: 16 U/L (ref 5–33)
AMORPHOUS: ABNORMAL
ANION GAP SERPL CALCULATED.3IONS-SCNC: 13 MMOL/L (ref 9–17)
AST SERPL-CCNC: 18 U/L
BACTERIA: ABNORMAL
BASOPHILS # BLD: 1 % (ref 0–2)
BASOPHILS ABSOLUTE: 0.06 K/UL (ref 0–0.2)
BILIRUB SERPL-MCNC: 0.38 MG/DL (ref 0.3–1.2)
BILIRUBIN URINE: NEGATIVE
BUN BLDV-MCNC: 23 MG/DL (ref 6–20)
BUN/CREAT BLD: ABNORMAL (ref 9–20)
CALCIUM SERPL-MCNC: 8.9 MG/DL (ref 8.6–10.4)
CASTS UA: ABNORMAL /LPF (ref 0–8)
CHLORIDE BLD-SCNC: 106 MMOL/L (ref 98–107)
CO2: 25 MMOL/L (ref 20–31)
COLOR: YELLOW
COMMENT UA: ABNORMAL
CREAT SERPL-MCNC: 0.98 MG/DL (ref 0.5–0.9)
CRYSTALS, UA: ABNORMAL /HPF
DIFFERENTIAL TYPE: ABNORMAL
EOSINOPHILS RELATIVE PERCENT: 2 % (ref 1–4)
EPITHELIAL CELLS UA: ABNORMAL /HPF (ref 0–5)
GFR AFRICAN AMERICAN: >60 ML/MIN
GFR NON-AFRICAN AMERICAN: 59 ML/MIN
GFR SERPL CREATININE-BSD FRML MDRD: ABNORMAL ML/MIN/{1.73_M2}
GFR SERPL CREATININE-BSD FRML MDRD: ABNORMAL ML/MIN/{1.73_M2}
GLUCOSE BLD-MCNC: 122 MG/DL (ref 70–99)
GLUCOSE URINE: NEGATIVE
HCT VFR BLD CALC: 37.4 % (ref 36.3–47.1)
HEMOGLOBIN: 11.9 G/DL (ref 11.9–15.1)
IMMATURE GRANULOCYTES: 1 %
KETONES, URINE: ABNORMAL
LEUKOCYTE ESTERASE, URINE: ABNORMAL
LIPASE: 29 U/L (ref 13–60)
LYMPHOCYTES # BLD: 15 % (ref 24–43)
MCH RBC QN AUTO: 28.1 PG (ref 25.2–33.5)
MCHC RBC AUTO-ENTMCNC: 31.8 G/DL (ref 28.4–34.8)
MCV RBC AUTO: 88.2 FL (ref 82.6–102.9)
MONOCYTES # BLD: 8 % (ref 3–12)
MUCUS: ABNORMAL
NITRITE, URINE: NEGATIVE
NRBC AUTOMATED: 0 PER 100 WBC
OTHER OBSERVATIONS UA: ABNORMAL
PDW BLD-RTO: 12.6 % (ref 11.8–14.4)
PH UA: 5 (ref 5–8)
PLATELET # BLD: 211 K/UL (ref 138–453)
PLATELET ESTIMATE: ABNORMAL
PMV BLD AUTO: 11.7 FL (ref 8.1–13.5)
POTASSIUM SERPL-SCNC: 3.8 MMOL/L (ref 3.7–5.3)
PROTEIN UA: NEGATIVE
RBC # BLD: 4.24 M/UL (ref 3.95–5.11)
RBC # BLD: ABNORMAL 10*6/UL
RBC UA: ABNORMAL /HPF (ref 0–4)
RENAL EPITHELIAL, UA: ABNORMAL /HPF
SEG NEUTROPHILS: 73 % (ref 36–65)
SEGMENTED NEUTROPHILS ABSOLUTE COUNT: 7.61 K/UL (ref 1.5–8.1)
SODIUM BLD-SCNC: 144 MMOL/L (ref 135–144)
SPECIFIC GRAVITY UA: 1.03 (ref 1–1.03)
TOTAL PROTEIN: 6.5 G/DL (ref 6.4–8.3)
TRICHOMONAS: ABNORMAL
TSH SERPL DL<=0.05 MIU/L-ACNC: 2.02 MIU/L (ref 0.3–5)
TURBIDITY: ABNORMAL
URINE HGB: NEGATIVE
UROBILINOGEN, URINE: NORMAL
WBC # BLD: 10.3 K/UL (ref 3.5–11.3)
WBC # BLD: ABNORMAL 10*3/UL
WBC UA: ABNORMAL /HPF (ref 0–5)
YEAST: ABNORMAL

## 2018-04-22 PROCEDURE — 6360000004 HC RX CONTRAST MEDICATION: Performed by: EMERGENCY MEDICINE

## 2018-04-22 PROCEDURE — 74177 CT ABD & PELVIS W/CONTRAST: CPT

## 2018-04-22 PROCEDURE — 84443 ASSAY THYROID STIM HORMONE: CPT

## 2018-04-22 PROCEDURE — G0383 LEV 4 HOSP TYPE B ED VISIT: HCPCS

## 2018-04-22 PROCEDURE — 80053 COMPREHEN METABOLIC PANEL: CPT

## 2018-04-22 PROCEDURE — 81001 URINALYSIS AUTO W/SCOPE: CPT

## 2018-04-22 PROCEDURE — 83690 ASSAY OF LIPASE: CPT

## 2018-04-22 PROCEDURE — 85025 COMPLETE CBC W/AUTO DIFF WBC: CPT

## 2018-04-22 RX ORDER — METRONIDAZOLE 500 MG/1
500 TABLET ORAL 3 TIMES DAILY
Qty: 30 TABLET | Refills: 0 | Status: SHIPPED | OUTPATIENT
Start: 2018-04-22 | End: 2018-05-02

## 2018-04-22 RX ORDER — ONDANSETRON 4 MG/1
4 TABLET, ORALLY DISINTEGRATING ORAL EVERY 8 HOURS PRN
Qty: 20 TABLET | Refills: 0 | Status: SHIPPED | OUTPATIENT
Start: 2018-04-22 | End: 2019-03-03

## 2018-04-22 RX ORDER — CIPROFLOXACIN 500 MG/1
500 TABLET, FILM COATED ORAL 2 TIMES DAILY
Qty: 20 TABLET | Refills: 0 | Status: SHIPPED | OUTPATIENT
Start: 2018-04-22 | End: 2018-05-02

## 2018-04-22 RX ADMIN — IOPAMIDOL 75 ML: 755 INJECTION, SOLUTION INTRAVENOUS at 10:40

## 2018-04-22 ASSESSMENT — ENCOUNTER SYMPTOMS
ABDOMINAL PAIN: 1
RHINORRHEA: 0
STRIDOR: 0
CONSTIPATION: 0
SINUS PAIN: 0
COUGH: 0
WHEEZING: 0
SHORTNESS OF BREATH: 0
NAUSEA: 1
DIARRHEA: 0
SINUS PRESSURE: 0
VOMITING: 0
SORE THROAT: 0

## 2018-04-22 ASSESSMENT — PAIN SCALES - GENERAL: PAINLEVEL_OUTOF10: 7

## 2018-04-26 ENCOUNTER — NURSE ONLY (OUTPATIENT)
Dept: FAMILY MEDICINE CLINIC | Age: 58
End: 2018-04-26
Payer: COMMERCIAL

## 2018-04-26 DIAGNOSIS — Z12.11 ENCOUNTER FOR FIT (FECAL IMMUNOCHEMICAL TEST) SCREENING: Primary | ICD-10-CM

## 2018-04-26 DIAGNOSIS — R53.83 FATIGUE, UNSPECIFIED TYPE: ICD-10-CM

## 2018-04-26 LAB
CONTROL: PRESENT
HEMOCCULT STL QL: POSITIVE

## 2018-04-26 PROCEDURE — 82274 ASSAY TEST FOR BLOOD FECAL: CPT | Performed by: FAMILY MEDICINE

## 2018-05-02 ENCOUNTER — OFFICE VISIT (OUTPATIENT)
Dept: SURGERY | Age: 58
End: 2018-05-02
Payer: COMMERCIAL

## 2018-05-02 VITALS
HEIGHT: 61 IN | HEART RATE: 92 BPM | TEMPERATURE: 96.8 F | DIASTOLIC BLOOD PRESSURE: 79 MMHG | SYSTOLIC BLOOD PRESSURE: 129 MMHG | WEIGHT: 176.6 LBS | BODY MASS INDEX: 33.34 KG/M2

## 2018-05-02 DIAGNOSIS — K59.00 CONSTIPATION, UNSPECIFIED CONSTIPATION TYPE: ICD-10-CM

## 2018-05-02 DIAGNOSIS — Z80.0 FAMILY HISTORY OF COLON CANCER IN MOTHER: ICD-10-CM

## 2018-05-02 DIAGNOSIS — K57.32 DIVERTICULITIS OF LARGE INTESTINE WITHOUT PERFORATION OR ABSCESS WITHOUT BLEEDING: Primary | ICD-10-CM

## 2018-05-02 PROCEDURE — 1036F TOBACCO NON-USER: CPT | Performed by: SURGERY

## 2018-05-02 PROCEDURE — G8417 CALC BMI ABV UP PARAM F/U: HCPCS | Performed by: SURGERY

## 2018-05-02 PROCEDURE — G8428 CUR MEDS NOT DOCUMENT: HCPCS | Performed by: SURGERY

## 2018-05-02 PROCEDURE — 3017F COLORECTAL CA SCREEN DOC REV: CPT | Performed by: SURGERY

## 2018-05-02 PROCEDURE — 99204 OFFICE O/P NEW MOD 45 MIN: CPT | Performed by: SURGERY

## 2018-06-02 ENCOUNTER — APPOINTMENT (OUTPATIENT)
Dept: GENERAL RADIOLOGY | Age: 58
End: 2018-06-02
Payer: COMMERCIAL

## 2018-06-02 ENCOUNTER — HOSPITAL ENCOUNTER (OUTPATIENT)
Age: 58
Setting detail: OBSERVATION
Discharge: HOME | End: 2018-06-02
Attending: EMERGENCY MEDICINE | Admitting: EMERGENCY MEDICINE
Payer: COMMERCIAL

## 2018-06-02 VITALS
HEART RATE: 70 BPM | RESPIRATION RATE: 16 BRPM | OXYGEN SATURATION: 100 % | WEIGHT: 174.5 LBS | BODY MASS INDEX: 32.94 KG/M2 | SYSTOLIC BLOOD PRESSURE: 103 MMHG | HEIGHT: 61 IN | DIASTOLIC BLOOD PRESSURE: 57 MMHG | TEMPERATURE: 97.7 F

## 2018-06-02 DIAGNOSIS — R07.9 CHEST PAIN, UNSPECIFIED TYPE: Primary | ICD-10-CM

## 2018-06-02 DIAGNOSIS — N17.9 AKI (ACUTE KIDNEY INJURY) (HCC): ICD-10-CM

## 2018-06-02 LAB
ANION GAP SERPL CALCULATED.3IONS-SCNC: 12 MMOL/L (ref 9–17)
BNP INTERPRETATION: NORMAL
BUN BLDV-MCNC: 21 MG/DL (ref 6–20)
BUN/CREAT BLD: ABNORMAL (ref 9–20)
CALCIUM SERPL-MCNC: 8.9 MG/DL (ref 8.6–10.4)
CHLORIDE BLD-SCNC: 103 MMOL/L (ref 98–107)
CO2: 24 MMOL/L (ref 20–31)
CREAT SERPL-MCNC: 1.67 MG/DL (ref 0.5–0.9)
EKG ATRIAL RATE: 86 BPM
EKG P AXIS: 69 DEGREES
EKG P-R INTERVAL: 126 MS
EKG Q-T INTERVAL: 374 MS
EKG QRS DURATION: 80 MS
EKG QTC CALCULATION (BAZETT): 447 MS
EKG R AXIS: 24 DEGREES
EKG T AXIS: 66 DEGREES
EKG VENTRICULAR RATE: 86 BPM
GFR AFRICAN AMERICAN: 38 ML/MIN
GFR NON-AFRICAN AMERICAN: 32 ML/MIN
GFR SERPL CREATININE-BSD FRML MDRD: ABNORMAL ML/MIN/{1.73_M2}
GFR SERPL CREATININE-BSD FRML MDRD: ABNORMAL ML/MIN/{1.73_M2}
GLUCOSE BLD-MCNC: 98 MG/DL (ref 70–99)
HCT VFR BLD CALC: 40.5 % (ref 36.3–47.1)
HEMOGLOBIN: 13 G/DL (ref 11.9–15.1)
LV EF: 55 %
LVEF MODALITY: NORMAL
MCH RBC QN AUTO: 28.4 PG (ref 25.2–33.5)
MCHC RBC AUTO-ENTMCNC: 32.1 G/DL (ref 28.4–34.8)
MCV RBC AUTO: 88.6 FL (ref 82.6–102.9)
NRBC AUTOMATED: 0 PER 100 WBC
PDW BLD-RTO: 12.9 % (ref 11.8–14.4)
PLATELET # BLD: 308 K/UL (ref 138–453)
PMV BLD AUTO: 11.3 FL (ref 8.1–13.5)
POC TROPONIN I: 0 NG/ML (ref 0–0.1)
POC TROPONIN I: 0 NG/ML (ref 0–0.1)
POC TROPONIN INTERP: NORMAL
POC TROPONIN INTERP: NORMAL
POTASSIUM SERPL-SCNC: 3.7 MMOL/L (ref 3.7–5.3)
PRO-BNP: 120 PG/ML
RBC # BLD: 4.57 M/UL (ref 3.95–5.11)
SODIUM BLD-SCNC: 139 MMOL/L (ref 135–144)
TROPONIN INTERP: NORMAL
TROPONIN T: <0.03 NG/ML
WBC # BLD: 8.7 K/UL (ref 3.5–11.3)

## 2018-06-02 PROCEDURE — G0378 HOSPITAL OBSERVATION PER HR: HCPCS

## 2018-06-02 PROCEDURE — 2580000003 HC RX 258: Performed by: EMERGENCY MEDICINE

## 2018-06-02 PROCEDURE — 84484 ASSAY OF TROPONIN QUANT: CPT

## 2018-06-02 PROCEDURE — 93306 TTE W/DOPPLER COMPLETE: CPT

## 2018-06-02 PROCEDURE — 36415 COLL VENOUS BLD VENIPUNCTURE: CPT

## 2018-06-02 PROCEDURE — 94640 AIRWAY INHALATION TREATMENT: CPT

## 2018-06-02 PROCEDURE — 6370000000 HC RX 637 (ALT 250 FOR IP): Performed by: EMERGENCY MEDICINE

## 2018-06-02 PROCEDURE — 85027 COMPLETE CBC AUTOMATED: CPT

## 2018-06-02 PROCEDURE — 93005 ELECTROCARDIOGRAM TRACING: CPT

## 2018-06-02 PROCEDURE — 99285 EMERGENCY DEPT VISIT HI MDM: CPT

## 2018-06-02 PROCEDURE — 80048 BASIC METABOLIC PNL TOTAL CA: CPT

## 2018-06-02 PROCEDURE — 71046 X-RAY EXAM CHEST 2 VIEWS: CPT

## 2018-06-02 PROCEDURE — 94762 N-INVAS EAR/PLS OXIMTRY CONT: CPT

## 2018-06-02 PROCEDURE — 6370000000 HC RX 637 (ALT 250 FOR IP): Performed by: INTERNAL MEDICINE

## 2018-06-02 PROCEDURE — 83880 ASSAY OF NATRIURETIC PEPTIDE: CPT

## 2018-06-02 RX ORDER — MELOXICAM 7.5 MG/1
15 TABLET ORAL DAILY
Status: DISCONTINUED | OUTPATIENT
Start: 2018-06-02 | End: 2018-06-02 | Stop reason: HOSPADM

## 2018-06-02 RX ORDER — VENLAFAXINE 75 MG/1
75 TABLET ORAL
Status: DISCONTINUED | OUTPATIENT
Start: 2018-06-02 | End: 2018-06-02 | Stop reason: HOSPADM

## 2018-06-02 RX ORDER — SODIUM CHLORIDE 0.9 % (FLUSH) 0.9 %
10 SYRINGE (ML) INJECTION EVERY 12 HOURS SCHEDULED
Status: DISCONTINUED | OUTPATIENT
Start: 2018-06-02 | End: 2018-06-02 | Stop reason: HOSPADM

## 2018-06-02 RX ORDER — ALBUTEROL SULFATE 90 UG/1
2 AEROSOL, METERED RESPIRATORY (INHALATION) EVERY 4 HOURS PRN
Status: DISCONTINUED | OUTPATIENT
Start: 2018-06-02 | End: 2018-06-02 | Stop reason: HOSPADM

## 2018-06-02 RX ORDER — BUSPIRONE HYDROCHLORIDE 15 MG/1
15 TABLET ORAL 2 TIMES DAILY
Status: DISCONTINUED | OUTPATIENT
Start: 2018-06-02 | End: 2018-06-02 | Stop reason: HOSPADM

## 2018-06-02 RX ORDER — BACLOFEN 10 MG/1
10 TABLET ORAL 3 TIMES DAILY
Status: DISCONTINUED | OUTPATIENT
Start: 2018-06-02 | End: 2018-06-02 | Stop reason: HOSPADM

## 2018-06-02 RX ORDER — FLUTICASONE PROPIONATE 50 MCG
2 SPRAY, SUSPENSION (ML) NASAL DAILY
Status: DISCONTINUED | OUTPATIENT
Start: 2018-06-02 | End: 2018-06-02 | Stop reason: HOSPADM

## 2018-06-02 RX ORDER — OXYBUTYNIN CHLORIDE 5 MG/1
5 TABLET ORAL 2 TIMES DAILY
Status: DISCONTINUED | OUTPATIENT
Start: 2018-06-02 | End: 2018-06-02 | Stop reason: HOSPADM

## 2018-06-02 RX ORDER — PANTOPRAZOLE SODIUM 40 MG/1
40 TABLET, DELAYED RELEASE ORAL
Status: DISCONTINUED | OUTPATIENT
Start: 2018-06-03 | End: 2018-06-02 | Stop reason: HOSPADM

## 2018-06-02 RX ORDER — ACETAMINOPHEN 325 MG/1
650 TABLET ORAL EVERY 4 HOURS PRN
Status: DISCONTINUED | OUTPATIENT
Start: 2018-06-02 | End: 2018-06-02 | Stop reason: HOSPADM

## 2018-06-02 RX ORDER — DULOXETIN HYDROCHLORIDE 20 MG/1
20 CAPSULE, DELAYED RELEASE ORAL DAILY
Status: DISCONTINUED | OUTPATIENT
Start: 2018-06-02 | End: 2018-06-02 | Stop reason: HOSPADM

## 2018-06-02 RX ORDER — 0.9 % SODIUM CHLORIDE 0.9 %
1000 INTRAVENOUS SOLUTION INTRAVENOUS ONCE
Status: COMPLETED | OUTPATIENT
Start: 2018-06-02 | End: 2018-06-02

## 2018-06-02 RX ORDER — SODIUM CHLORIDE 9 MG/ML
INJECTION, SOLUTION INTRAVENOUS CONTINUOUS
Status: DISCONTINUED | OUTPATIENT
Start: 2018-06-02 | End: 2018-06-02 | Stop reason: HOSPADM

## 2018-06-02 RX ORDER — PRAVASTATIN SODIUM 20 MG
40 TABLET ORAL NIGHTLY
Status: DISCONTINUED | OUTPATIENT
Start: 2018-06-02 | End: 2018-06-02 | Stop reason: HOSPADM

## 2018-06-02 RX ORDER — SODIUM CHLORIDE 0.9 % (FLUSH) 0.9 %
10 SYRINGE (ML) INJECTION PRN
Status: DISCONTINUED | OUTPATIENT
Start: 2018-06-02 | End: 2018-06-02 | Stop reason: HOSPADM

## 2018-06-02 RX ADMIN — VENLAFAXINE 75 MG: 75 TABLET ORAL at 12:43

## 2018-06-02 RX ADMIN — DULOXETINE 20 MG: 20 CAPSULE, DELAYED RELEASE ORAL at 12:43

## 2018-06-02 RX ADMIN — BUSPIRONE HYDROCHLORIDE 15 MG: 15 TABLET ORAL at 12:43

## 2018-06-02 RX ADMIN — BACLOFEN 10 MG: 10 TABLET ORAL at 16:17

## 2018-06-02 RX ADMIN — BECLOMETHASONE DIPROPIONATE 1 PUFF: 40 AEROSOL, METERED RESPIRATORY (INHALATION) at 11:50

## 2018-06-02 RX ADMIN — FLUTICASONE PROPIONATE 2 SPRAY: 50 SPRAY, METERED NASAL at 12:44

## 2018-06-02 RX ADMIN — SODIUM CHLORIDE: 9 INJECTION, SOLUTION INTRAVENOUS at 12:46

## 2018-06-02 RX ADMIN — SODIUM CHLORIDE 1000 ML: 9 INJECTION, SOLUTION INTRAVENOUS at 09:19

## 2018-06-02 RX ADMIN — MELOXICAM 15 MG: 7.5 TABLET ORAL at 12:43

## 2018-06-02 RX ADMIN — PSYLLIUM HUSK 1 PACKET: 3.4 POWDER ORAL at 12:44

## 2018-06-02 ASSESSMENT — PAIN DESCRIPTION - DESCRIPTORS
DESCRIPTORS: CONSTANT
DESCRIPTORS: PRESSURE

## 2018-06-02 ASSESSMENT — ENCOUNTER SYMPTOMS
VOMITING: 0
NAUSEA: 0
BACK PAIN: 0
ABDOMINAL PAIN: 0
SHORTNESS OF BREATH: 1

## 2018-06-02 ASSESSMENT — PAIN SCALES - GENERAL
PAINLEVEL_OUTOF10: 7
PAINLEVEL_OUTOF10: 3
PAINLEVEL_OUTOF10: 6

## 2018-06-02 ASSESSMENT — PAIN DESCRIPTION - ONSET: ONSET: ON-GOING

## 2018-06-02 ASSESSMENT — PAIN DESCRIPTION - FREQUENCY: FREQUENCY: CONTINUOUS

## 2018-06-02 ASSESSMENT — PAIN DESCRIPTION - ORIENTATION
ORIENTATION: MID
ORIENTATION: MID

## 2018-06-02 ASSESSMENT — PAIN DESCRIPTION - LOCATION
LOCATION: CHEST
LOCATION: CHEST

## 2018-06-02 ASSESSMENT — PAIN DESCRIPTION - PAIN TYPE
TYPE: ACUTE PAIN
TYPE: ACUTE PAIN

## 2018-06-02 ASSESSMENT — PAIN DESCRIPTION - PROGRESSION: CLINICAL_PROGRESSION: NOT CHANGED

## 2018-06-04 DIAGNOSIS — M51.36 DEGENERATION, INTERVERTEBRAL DISC, LUMBAR: ICD-10-CM

## 2018-06-04 RX ORDER — MELOXICAM 15 MG/1
TABLET ORAL
Qty: 30 TABLET | Refills: 0 | OUTPATIENT
Start: 2018-06-04

## 2018-06-14 ENCOUNTER — HOSPITAL ENCOUNTER (OUTPATIENT)
Age: 58
Setting detail: SPECIMEN
Discharge: HOME | End: 2018-06-14
Payer: COMMERCIAL

## 2018-06-14 ENCOUNTER — OFFICE VISIT (OUTPATIENT)
Dept: FAMILY MEDICINE CLINIC | Age: 58
End: 2018-06-14
Payer: COMMERCIAL

## 2018-06-14 VITALS
BODY MASS INDEX: 31.93 KG/M2 | OXYGEN SATURATION: 97 % | SYSTOLIC BLOOD PRESSURE: 127 MMHG | HEART RATE: 111 BPM | RESPIRATION RATE: 16 BRPM | WEIGHT: 169 LBS | DIASTOLIC BLOOD PRESSURE: 79 MMHG

## 2018-06-14 DIAGNOSIS — M51.36 DEGENERATION, INTERVERTEBRAL DISC, LUMBAR: ICD-10-CM

## 2018-06-14 DIAGNOSIS — J21.9 ACUTE BRONCHIOLITIS DUE TO UNSPECIFIED ORGANISM: Primary | ICD-10-CM

## 2018-06-14 DIAGNOSIS — N28.9 ACUTE RENAL INSUFFICIENCY: ICD-10-CM

## 2018-06-14 LAB
ANION GAP SERPL CALCULATED.3IONS-SCNC: 14 MMOL/L (ref 9–17)
BUN BLDV-MCNC: 10 MG/DL (ref 6–20)
BUN/CREAT BLD: ABNORMAL (ref 9–20)
CALCIUM SERPL-MCNC: 9.4 MG/DL (ref 8.6–10.4)
CHLORIDE BLD-SCNC: 103 MMOL/L (ref 98–107)
CO2: 26 MMOL/L (ref 20–31)
CREAT SERPL-MCNC: 0.58 MG/DL (ref 0.5–0.9)
GFR AFRICAN AMERICAN: >60 ML/MIN
GFR NON-AFRICAN AMERICAN: >60 ML/MIN
GFR SERPL CREATININE-BSD FRML MDRD: ABNORMAL ML/MIN/{1.73_M2}
GFR SERPL CREATININE-BSD FRML MDRD: ABNORMAL ML/MIN/{1.73_M2}
GLUCOSE BLD-MCNC: 126 MG/DL (ref 70–99)
POTASSIUM SERPL-SCNC: 4 MMOL/L (ref 3.7–5.3)
SODIUM BLD-SCNC: 143 MMOL/L (ref 135–144)

## 2018-06-14 PROCEDURE — 99214 OFFICE O/P EST MOD 30 MIN: CPT | Performed by: FAMILY MEDICINE

## 2018-06-14 PROCEDURE — 3017F COLORECTAL CA SCREEN DOC REV: CPT | Performed by: FAMILY MEDICINE

## 2018-06-14 PROCEDURE — G8427 DOCREV CUR MEDS BY ELIG CLIN: HCPCS | Performed by: FAMILY MEDICINE

## 2018-06-14 PROCEDURE — G8417 CALC BMI ABV UP PARAM F/U: HCPCS | Performed by: FAMILY MEDICINE

## 2018-06-14 PROCEDURE — 1036F TOBACCO NON-USER: CPT | Performed by: FAMILY MEDICINE

## 2018-06-14 RX ORDER — PREDNISONE 20 MG/1
40 TABLET ORAL DAILY
Qty: 6 TABLET | Refills: 0 | Status: SHIPPED | OUTPATIENT
Start: 2018-06-14 | End: 2018-06-17

## 2018-06-14 RX ORDER — BENZONATATE 100 MG/1
100 CAPSULE ORAL 3 TIMES DAILY PRN
Qty: 21 CAPSULE | Refills: 1 | Status: SHIPPED | OUTPATIENT
Start: 2018-06-14 | End: 2018-06-21

## 2018-06-14 RX ORDER — AZITHROMYCIN 250 MG/1
TABLET, FILM COATED ORAL
Qty: 6 TABLET | Refills: 0 | Status: SHIPPED | OUTPATIENT
Start: 2018-06-14 | End: 2018-06-24

## 2018-06-14 RX ORDER — GUAIFENESIN 600 MG/1
600 TABLET, EXTENDED RELEASE ORAL 2 TIMES DAILY
Qty: 20 TABLET | Refills: 1 | Status: SHIPPED | OUTPATIENT
Start: 2018-06-14 | End: 2018-11-29

## 2018-06-14 ASSESSMENT — PATIENT HEALTH QUESTIONNAIRE - PHQ9
2. FEELING DOWN, DEPRESSED OR HOPELESS: 0
SUM OF ALL RESPONSES TO PHQ QUESTIONS 1-9: 0
1. LITTLE INTEREST OR PLEASURE IN DOING THINGS: 0
SUM OF ALL RESPONSES TO PHQ9 QUESTIONS 1 & 2: 0

## 2018-06-18 RX ORDER — MELOXICAM 15 MG/1
TABLET ORAL
Qty: 30 TABLET | Refills: 0 | Status: SHIPPED | OUTPATIENT
Start: 2018-06-18 | End: 2018-07-16 | Stop reason: SDUPTHER

## 2018-06-20 ENCOUNTER — OFFICE VISIT (OUTPATIENT)
Dept: FAMILY MEDICINE CLINIC | Age: 58
End: 2018-06-20
Payer: COMMERCIAL

## 2018-06-20 VITALS
HEART RATE: 93 BPM | BODY MASS INDEX: 32.74 KG/M2 | WEIGHT: 173.4 LBS | OXYGEN SATURATION: 97 % | SYSTOLIC BLOOD PRESSURE: 117 MMHG | DIASTOLIC BLOOD PRESSURE: 71 MMHG | HEIGHT: 61 IN | TEMPERATURE: 97.7 F

## 2018-06-20 DIAGNOSIS — G47.00 INSOMNIA, UNSPECIFIED TYPE: Primary | ICD-10-CM

## 2018-06-20 PROCEDURE — 99213 OFFICE O/P EST LOW 20 MIN: CPT | Performed by: FAMILY MEDICINE

## 2018-06-20 PROCEDURE — G8417 CALC BMI ABV UP PARAM F/U: HCPCS | Performed by: FAMILY MEDICINE

## 2018-06-20 PROCEDURE — G8427 DOCREV CUR MEDS BY ELIG CLIN: HCPCS | Performed by: FAMILY MEDICINE

## 2018-06-20 PROCEDURE — 3017F COLORECTAL CA SCREEN DOC REV: CPT | Performed by: FAMILY MEDICINE

## 2018-06-20 PROCEDURE — 1036F TOBACCO NON-USER: CPT | Performed by: FAMILY MEDICINE

## 2018-06-22 DIAGNOSIS — M51.36 DEGENERATION, INTERVERTEBRAL DISC, LUMBAR: ICD-10-CM

## 2018-06-22 RX ORDER — PRAVASTATIN SODIUM 40 MG
TABLET ORAL
Qty: 30 TABLET | Refills: 0 | Status: SHIPPED | OUTPATIENT
Start: 2018-06-22 | End: 2018-08-04 | Stop reason: SDUPTHER

## 2018-06-22 RX ORDER — BACLOFEN 10 MG/1
TABLET ORAL
Qty: 90 TABLET | Refills: 0 | Status: SHIPPED | OUTPATIENT
Start: 2018-06-22 | End: 2018-07-16 | Stop reason: SDUPTHER

## 2018-06-27 ENCOUNTER — OFFICE VISIT (OUTPATIENT)
Dept: SURGERY | Age: 58
End: 2018-06-27
Payer: COMMERCIAL

## 2018-06-27 VITALS
SYSTOLIC BLOOD PRESSURE: 120 MMHG | HEART RATE: 88 BPM | WEIGHT: 173.2 LBS | TEMPERATURE: 98.3 F | HEIGHT: 61 IN | DIASTOLIC BLOOD PRESSURE: 88 MMHG | BODY MASS INDEX: 32.7 KG/M2

## 2018-06-27 DIAGNOSIS — K57.32 DIVERTICULITIS OF LARGE INTESTINE WITHOUT PERFORATION OR ABSCESS WITHOUT BLEEDING: Primary | ICD-10-CM

## 2018-06-27 PROCEDURE — 99212 OFFICE O/P EST SF 10 MIN: CPT | Performed by: STUDENT IN AN ORGANIZED HEALTH CARE EDUCATION/TRAINING PROGRAM

## 2018-06-27 PROCEDURE — G8417 CALC BMI ABV UP PARAM F/U: HCPCS | Performed by: STUDENT IN AN ORGANIZED HEALTH CARE EDUCATION/TRAINING PROGRAM

## 2018-06-27 PROCEDURE — 3017F COLORECTAL CA SCREEN DOC REV: CPT | Performed by: STUDENT IN AN ORGANIZED HEALTH CARE EDUCATION/TRAINING PROGRAM

## 2018-06-27 PROCEDURE — G8427 DOCREV CUR MEDS BY ELIG CLIN: HCPCS | Performed by: STUDENT IN AN ORGANIZED HEALTH CARE EDUCATION/TRAINING PROGRAM

## 2018-06-27 PROCEDURE — 1036F TOBACCO NON-USER: CPT | Performed by: STUDENT IN AN ORGANIZED HEALTH CARE EDUCATION/TRAINING PROGRAM

## 2018-06-27 RX ORDER — AMOXICILLIN 250 MG
2 CAPSULE ORAL DAILY PRN
Qty: 4 TABLET | Refills: 0 | Status: SHIPPED | OUTPATIENT
Start: 2018-06-27 | End: 2019-03-03

## 2018-06-27 RX ORDER — POLYETHYLENE GLYCOL 3350 17 G/17G
POWDER, FOR SOLUTION ORAL
Qty: 250 G | Refills: 0 | Status: SHIPPED | OUTPATIENT
Start: 2018-06-27 | End: 2018-06-27 | Stop reason: SDUPTHER

## 2018-06-27 RX ORDER — POLYETHYLENE GLYCOL 3350 17 G/17G
POWDER, FOR SOLUTION ORAL
Qty: 250 G | Refills: 0 | Status: SHIPPED | OUTPATIENT
Start: 2018-06-27 | End: 2019-05-07 | Stop reason: ALTCHOICE

## 2018-06-27 NOTE — PROGRESS NOTES
[facility]    Patient's Name: Esthela Garcia   YOB: 1960 (63 y.o.)    CC: Hx od diverticulitis     HPI: Esthela Garcia is a 63 y.o. female that presents to the [facility] as a consult for a colonoscopy. Pt has hx of diverticulitis and chronic constipation presenting for follow up of diverticulitis. She presented to [facility] ED on 4/22 with LLQ abdominal pain and nausea. CT abdomen showed diverticulitis. She was started on Ciprofloxacin and Flagyl and is on day 7/10 of these medications. Additionally she saw her PCP after this episode and was found to have heme positive stool. Patient reports LLQ abdominal pain and nausea is still present. She denies fever/chills, vomiting, diarrhea, change in color of stool, or blood in stool. She has a history of chronic constipation and states she has a bowel movement every 2 weeks.     She has a history of diverticulitis,  initially diagnosed 12 years ago. She has had one other flare since then 4-5 years ago and was treated with outpatient antibiotics. She had a colonoscopy 12 years ago which was positive for diverticulosis. Patient also admits to a family history of rectal cancer in her mother, diagnosed at age 61. The patient did not have a screening colonoscopy at age 49 due to inability to complete the high volume prep.          Allergies   Allergen Reactions    Other Shortness Of Breath    Shrimp (Diagnostic) Shortness Of Breath    Famotidine Other (See Comments)     Headaches  Headaches    Gabapentin Nausea Only     Mood swings, nausea. Mood swings, nausea.         Current Outpatient Prescriptions on File Prior to Visit   Medication Sig Dispense Refill    pravastatin (PRAVACHOL) 40 MG tablet TAKE ONE TABLET BY MOUTH DAILY 30 tablet 0    baclofen (LIORESAL) 10 MG tablet TAKE ONE TABLET BY MOUTH THREE TIMES A DAY 90 tablet 0    meloxicam (MOBIC) 15 MG tablet TAKE ONE TABLET BY MOUTH DAILY 30 tablet 0    guaiFENesin (MUCINEX) age.  HEENT: PERRLA, EOMI  NECK: trachea midline  HEART: Regular rate and rhythm, no murmurs noted. LUNGS: symmetrical chest rise bilaterally. Clear to auscultation bilaterally. ABDOMEN: soft, NT, ND, BS present, no rebound, no guarding   EXT: no cyanosis, clubbing or edema present    NEURO: no focal deficits, gross motor intact. SKIN: No rashes or nodules noted. ASSESSMENT/ PLAN:  Miguelangel Aldana is a 63 y.o. female that presents to the [facility] for colonoscopy given Hx of multiple flairs of diverticulitis. We recommend that this patient undergo a colonoscopy at the earliest convenient date. The colon preparation was discussed in detail with patient, allowing for questions. All questions were answered in the office. 2 day prep discussed and detailed instructions provided. Pt also given Sen[place]ot to take day prior. The risks, benefits and alternatives, of the procedure were explained in detail. All questions were thoroughly answered. Patient understood and agreed to proceed with the procedure. A signed informed consent was obtained for the procedure in the office. A date will be forthcoming and we will follow up with Miguelangel Aldana as needed at the [facility]. Plan discussed with Dr. Miguelangel Aldana  6/28/2018     I have reviewed the resident's note and I either performed the key elements of the medical history and physical exam or was present with the resident when the key elements of the medical history and physical exam were performed. I have discussed the findings, established the care plan and recommendations with Resident.     Electronically signed by Tin Hernandez IV, DO on 7/11/18 at 8:59 AM

## 2018-06-27 NOTE — PATIENT INSTRUCTIONS
Thank you letting us take care of you today. We hope that all your questions were addressed. If a question was overlooked or something else comes to mind after you return home, please call our office at [phone]. If you need to cancel or change an appointment, surgery or procedure, please contact the office at [phone].

## 2018-07-16 DIAGNOSIS — M51.36 DEGENERATION, INTERVERTEBRAL DISC, LUMBAR: ICD-10-CM

## 2018-07-16 RX ORDER — PANTOPRAZOLE SODIUM 40 MG/1
40 TABLET, DELAYED RELEASE ORAL DAILY
Qty: 30 TABLET | Refills: 3 | Status: ON HOLD | OUTPATIENT
Start: 2018-07-16 | End: 2018-07-19

## 2018-07-16 RX ORDER — BACLOFEN 10 MG/1
10 TABLET ORAL 3 TIMES DAILY
Qty: 90 TABLET | Refills: 0 | Status: SHIPPED | OUTPATIENT
Start: 2018-07-16 | End: 2018-07-19 | Stop reason: SDUPTHER

## 2018-07-16 RX ORDER — MELOXICAM 15 MG/1
15 TABLET ORAL DAILY
Qty: 30 TABLET | Refills: 0 | Status: SHIPPED | OUTPATIENT
Start: 2018-07-16 | End: 2018-07-19 | Stop reason: SDUPTHER

## 2018-07-18 ENCOUNTER — ANESTHESIA EVENT (OUTPATIENT)
Dept: OPERATING ROOM | Age: 58
End: 2018-07-18
Payer: COMMERCIAL

## 2018-07-19 ENCOUNTER — ANESTHESIA (OUTPATIENT)
Dept: OPERATING ROOM | Age: 58
End: 2018-07-19
Payer: COMMERCIAL

## 2018-07-19 ENCOUNTER — HOSPITAL ENCOUNTER (OUTPATIENT)
Age: 58
Setting detail: OUTPATIENT SURGERY
Discharge: HOME | End: 2018-07-19
Attending: SURGERY | Admitting: SURGERY
Payer: COMMERCIAL

## 2018-07-19 VITALS
WEIGHT: 170 LBS | RESPIRATION RATE: 18 BRPM | HEART RATE: 73 BPM | TEMPERATURE: 97.2 F | OXYGEN SATURATION: 99 % | DIASTOLIC BLOOD PRESSURE: 67 MMHG | HEIGHT: 61 IN | SYSTOLIC BLOOD PRESSURE: 85 MMHG | BODY MASS INDEX: 32.1 KG/M2

## 2018-07-19 VITALS — DIASTOLIC BLOOD PRESSURE: 50 MMHG | SYSTOLIC BLOOD PRESSURE: 94 MMHG | OXYGEN SATURATION: 100 %

## 2018-07-19 DIAGNOSIS — K57.92 DIVERTICULITIS OF INTESTINE WITHOUT PERFORATION OR ABSCESS WITHOUT BLEEDING, UNSPECIFIED PART OF INTESTINAL TRACT: Primary | ICD-10-CM

## 2018-07-19 PROCEDURE — 3700000001 HC ADD 15 MINUTES (ANESTHESIA): Performed by: SURGERY

## 2018-07-19 PROCEDURE — 3700000000 HC ANESTHESIA ATTENDED CARE: Performed by: SURGERY

## 2018-07-19 PROCEDURE — 2580000003 HC RX 258: Performed by: ANESTHESIOLOGY

## 2018-07-19 PROCEDURE — 2709999900 HC NON-CHARGEABLE SUPPLY: Performed by: SURGERY

## 2018-07-19 PROCEDURE — 2500000003 HC RX 250 WO HCPCS: Performed by: NURSE ANESTHETIST, CERTIFIED REGISTERED

## 2018-07-19 PROCEDURE — 7100000041 HC SPAR PHASE II RECOVERY - ADDTL 15 MIN: Performed by: SURGERY

## 2018-07-19 PROCEDURE — 7100000040 HC SPAR PHASE II RECOVERY - FIRST 15 MIN: Performed by: SURGERY

## 2018-07-19 PROCEDURE — 3609027000 HC COLONOSCOPY: Performed by: SURGERY

## 2018-07-19 PROCEDURE — 6360000002 HC RX W HCPCS: Performed by: NURSE ANESTHETIST, CERTIFIED REGISTERED

## 2018-07-19 RX ORDER — BENZONATATE 100 MG/1
100 CAPSULE ORAL 3 TIMES DAILY PRN
COMMUNITY
End: 2018-11-29

## 2018-07-19 RX ORDER — MIDAZOLAM HYDROCHLORIDE 1 MG/ML
INJECTION INTRAMUSCULAR; INTRAVENOUS PRN
Status: DISCONTINUED | OUTPATIENT
Start: 2018-07-19 | End: 2018-07-19 | Stop reason: SDUPTHER

## 2018-07-19 RX ORDER — SODIUM CHLORIDE, SODIUM LACTATE, POTASSIUM CHLORIDE, CALCIUM CHLORIDE 600; 310; 30; 20 MG/100ML; MG/100ML; MG/100ML; MG/100ML
INJECTION, SOLUTION INTRAVENOUS CONTINUOUS
Status: DISCONTINUED | OUTPATIENT
Start: 2018-07-19 | End: 2018-07-19 | Stop reason: HOSPADM

## 2018-07-19 RX ORDER — PROPOFOL 10 MG/ML
INJECTION, EMULSION INTRAVENOUS CONTINUOUS PRN
Status: DISCONTINUED | OUTPATIENT
Start: 2018-07-19 | End: 2018-07-19 | Stop reason: SDUPTHER

## 2018-07-19 RX ORDER — LIDOCAINE HYDROCHLORIDE 10 MG/ML
1 INJECTION, SOLUTION EPIDURAL; INFILTRATION; INTRACAUDAL; PERINEURAL
Status: DISCONTINUED | OUTPATIENT
Start: 2018-07-19 | End: 2018-07-19 | Stop reason: HOSPADM

## 2018-07-19 RX ORDER — KETOTIFEN FUMARATE 0.35 MG/ML
1 SOLUTION/ DROPS OPHTHALMIC 2 TIMES DAILY
COMMUNITY
End: 2019-04-24 | Stop reason: SDUPTHER

## 2018-07-19 RX ORDER — PROPOFOL 10 MG/ML
INJECTION, EMULSION INTRAVENOUS PRN
Status: DISCONTINUED | OUTPATIENT
Start: 2018-07-19 | End: 2018-07-19 | Stop reason: SDUPTHER

## 2018-07-19 RX ORDER — LIDOCAINE HYDROCHLORIDE 10 MG/ML
INJECTION, SOLUTION EPIDURAL; INFILTRATION; INTRACAUDAL; PERINEURAL PRN
Status: DISCONTINUED | OUTPATIENT
Start: 2018-07-19 | End: 2018-07-19 | Stop reason: SDUPTHER

## 2018-07-19 RX ORDER — METOPROLOL SUCCINATE 25 MG/1
25 TABLET, EXTENDED RELEASE ORAL DAILY
COMMUNITY

## 2018-07-19 RX ADMIN — PROPOFOL 30 MG: 10 INJECTION, EMULSION INTRAVENOUS at 14:12

## 2018-07-19 RX ADMIN — PROPOFOL 100 MCG/KG/MIN: 10 INJECTION, EMULSION INTRAVENOUS at 14:11

## 2018-07-19 RX ADMIN — MIDAZOLAM HYDROCHLORIDE 2 MG: 1 INJECTION, SOLUTION INTRAMUSCULAR; INTRAVENOUS at 14:09

## 2018-07-19 RX ADMIN — LIDOCAINE HYDROCHLORIDE 50 MG: 10 INJECTION, SOLUTION EPIDURAL; INFILTRATION; INTRACAUDAL at 14:09

## 2018-07-19 RX ADMIN — PROPOFOL 20 MG: 10 INJECTION, EMULSION INTRAVENOUS at 14:23

## 2018-07-19 RX ADMIN — SODIUM CHLORIDE, POTASSIUM CHLORIDE, SODIUM LACTATE AND CALCIUM CHLORIDE: 600; 310; 30; 20 INJECTION, SOLUTION INTRAVENOUS at 14:00

## 2018-07-19 ASSESSMENT — PAIN SCALES - GENERAL
PAINLEVEL_OUTOF10: 0

## 2018-07-19 ASSESSMENT — PULMONARY FUNCTION TESTS
PIF_VALUE: 0

## 2018-07-19 ASSESSMENT — LIFESTYLE VARIABLES: SMOKING_STATUS: 0

## 2018-07-19 ASSESSMENT — PAIN - FUNCTIONAL ASSESSMENT: PAIN_FUNCTIONAL_ASSESSMENT: 0-10

## 2018-07-19 NOTE — ANESTHESIA PRE PROCEDURE
Department of Anesthesiology  Preprocedure Note       Name:  Elijah Yepez   Age:  63 y.o.  :  1960                                          MRN:  [id number]         Date:  2018      Surgeon: Mili Fofana):  Cristine  David TOLENTINO,     Procedure: Procedure(s):  COLONOSCOPY WITH POSSIBLE BIOPSY, POSSIBLE POLYPECTOMY - GI SCHEDULED    Medications prior to admission:   Prior to Admission medications    Medication Sig Start Date End Date Taking?  Authorizing Provider   metoprolol succinate (TOPROL XL) 25 MG extended release tablet Take 25 mg by mouth daily   Yes Historical Provider, MD   benzonatate (TESSALON) 100 MG capsule Take 100 mg by mouth 3 times daily as needed for Cough   Yes Historical Provider, MD   ketotifen (ZADITOR) 0.025 % ophthalmic solution Place 1 drop into both eyes 2 times daily   Yes Historical Provider, MD   baclofen (LIORESAL) 10 MG tablet Take 1 tablet by mouth 3 times daily 18  Yes Sonny Barajas MD   senna-docusate (PERICOLACE) 8.6-50 MG per tablet Take 2 tablets by mouth daily as needed for Constipation 18  Yes Antionette Huertas DO   polyethylene glycol [facility]) powder Take as written on colonoscopy directions, pt will do a two day prep, mix 1/2 bottle of 250g with 32oz of clear liquid 18  Yes Antionette Huertas DO   pravastatin (PRAVACHOL) 40 MG tablet TAKE ONE TABLET BY MOUTH DAILY 18  Yes Sonny Barajas MD   omeprazole (PRILOSEC) 20 MG delayed release capsule TAKE ONE CAPSULE BY MOUTH DAILY 5/10/18  Yes Sonny Barajas MD   ondansetron (ZOFRAN ODT) 4 MG disintegrating tablet Take 1 tablet by mouth every 8 hours as needed for Nausea 18  Yes Gwendolyn Parnell MD   venlafaxine (EFFEXOR) 75 MG tablet TAKE ONE TABLET BY MOUTH THREE TIMES A DAY **MUST CALL MD FOR APPOINTMENT** 18  Yes Sonny Barajas MD   AEROSPAN 80 MCG/ACT AERS inhaler INHALE ONE TO TWO PUFFS BY MOUTH TWICE A DAY 17  Yes Sonny Barajas MD   VENTOLIN  (90 Base) MCG/ACT inhaler Gabapentin Nausea Only     Mood swings, nausea. Mood swings, nausea. Problem List:    Patient Active Problem List   Diagnosis Code    GERD (gastroesophageal reflux disease) K21.9    Major depression F32.9    Rotator cuff disorder M67.919    Hyperlipidemia E78.5    Incontinence in female R32    Rectocele N81.6    Diverticulitis K57.92    Asthma J45.909    Depression F32.9    Hypokalemia E87.6    Cellulitis, face - left side, failed outpatient therapy L03. 211    Hyperglycemia R73.9    Mild intermittent asthma without complication N49.54    Gastroesophageal reflux disease without esophagitis K21.9    Displacement of lumbar intervertebral disc without myelopathy M51.26    Chest pain R07.9       Past Medical History:        Diagnosis Date    ASCUS with positive high risk HPV cervical 3/22/16    Asthma     Depression     Diverticulitis     GERD (gastroesophageal reflux disease)     Hyperlipidemia     MRSA (methicillin resistant staph aureus) culture positive 4/18/2016    lip    Rectocele        Past Surgical History:        Procedure Laterality Date    CYSTOSCOPY  2/25/2015    uro    HYSTERECTOMY  1996    RYAN, BSO performed at [facility] by Dr. Fiordaliza Holliday, Also had some type of bladder lift at this time    KNEE SURGERY Left     #1 - lateral release, #2 - arthroscopy with muscle alignment    NERVE BLOCK  07/28/2017    caudal #1  decadron 10mg    NERVE BLOCK  09/22/2017    cadal # 2, decadron 10 mg    NERVE BLOCK  09/22/2017    caudal epidural steroid block #2 decadron 10 mg    NERVE BLOCK  11/10/2017    lumbar L4-5 epidural; depomedrol 80mg       Social History:    Social History   Substance Use Topics    Smoking status: Former Smoker     Packs/day: 2.00     Years: 0.50     Types: Cigarettes     Quit date: 1995    Smokeless tobacco: Never Used    Alcohol use 0.0 oz/week      Comment: social                                Counseling given: Not Answered      Vital Signs (Current):   Vitals: exam  breath sounds clear to auscultation  (+) asthma: seasonal asthma,     (-) COPD and not a current smoker                           Cardiovascular:  Exercise tolerance: good (>4 METS),       (-) valvular problems/murmurs, past MI, CAD, CABG/stent and dysrhythmias      Rhythm: regular  Rate: normal                    Neuro/Psych:      (-) seizures, TIA and CVA           GI/Hepatic/Renal:   (+) GERD: well controlled,           Endo/Other: Negative Endo/Other ROS       (-) diabetes mellitus, hypothyroidism, hyperthyroidism               Abdominal:           Vascular: negative vascular ROS. - PVD, DVT and PE. Anesthesia Plan      MAC     ASA 2       Induction: intravenous. Anesthetic plan and risks discussed with patient. Plan discussed with CRNA.                   Nuzhat Hartman MD   7/19/2018

## 2018-07-20 NOTE — ANESTHESIA POSTPROCEDURE EVALUATION
Department of Anesthesiology  Postprocedure Note    Patient: Amelie Heck  MRN: [id number]  YOB: 1960  Date of evaluation: 7/20/2018  Time:  6:50 AM     Procedure Summary     Date:  07/19/18 Room / Location:  [facility] OR [address] [place] [address] OR    Anesthesia Start:  1408 Anesthesia Stop:  3095    Procedure:  COLONOSCOPY - GI SCHEDULED (N/A ) Diagnosis:  (DIVERTICULITIS, HISTORY OF DIVERTICULITIS)    Surgeon:  Rajni Christie DO Responsible Provider:  Ravi Henderson MD    Anesthesia Type:  MAC ASA Status:  2          Anesthesia Type: MAC    Dane Phase I:      Dane Phase II: Dane Score: 8    Last vitals: Reviewed and per EMR flowsheets.        Anesthesia Post Evaluation    Patient location during evaluation: PACU  Patient participation: complete - patient participated  Level of consciousness: awake  Pain score: 1  Airway patency: patent  Nausea & Vomiting: no nausea and no vomiting  Complications: no  Cardiovascular status: blood pressure returned to baseline and hemodynamically stable  Respiratory status: acceptable  Hydration status: euvolemic

## 2018-07-30 ENCOUNTER — APPOINTMENT (OUTPATIENT)
Dept: GENERAL RADIOLOGY | Age: 58
End: 2018-07-30
Payer: COMMERCIAL

## 2018-07-30 ENCOUNTER — HOSPITAL ENCOUNTER (EMERGENCY)
Age: 58
Discharge: HOME | End: 2018-07-30
Attending: EMERGENCY MEDICINE
Payer: COMMERCIAL

## 2018-07-30 VITALS
HEART RATE: 66 BPM | WEIGHT: 170 LBS | BODY MASS INDEX: 32.12 KG/M2 | SYSTOLIC BLOOD PRESSURE: 139 MMHG | RESPIRATION RATE: 14 BRPM | OXYGEN SATURATION: 97 % | DIASTOLIC BLOOD PRESSURE: 77 MMHG | TEMPERATURE: 98.2 F

## 2018-07-30 DIAGNOSIS — R60.0 BILATERAL LOWER EXTREMITY EDEMA: Primary | ICD-10-CM

## 2018-07-30 LAB
ABSOLUTE EOS #: 0.24 K/UL (ref 0–0.44)
ABSOLUTE IMMATURE GRANULOCYTE: <0.03 K/UL (ref 0–0.3)
ABSOLUTE LYMPH #: 1.48 K/UL (ref 1.1–3.7)
ABSOLUTE MONO #: 0.37 K/UL (ref 0.1–1.2)
ANION GAP SERPL CALCULATED.3IONS-SCNC: 7 MMOL/L (ref 9–17)
BASOPHILS # BLD: 1 % (ref 0–2)
BASOPHILS ABSOLUTE: 0.06 K/UL (ref 0–0.2)
BNP INTERPRETATION: NORMAL
BUN BLDV-MCNC: 14 MG/DL (ref 6–20)
BUN/CREAT BLD: ABNORMAL (ref 9–20)
CALCIUM SERPL-MCNC: 8.7 MG/DL (ref 8.6–10.4)
CHLORIDE BLD-SCNC: 105 MMOL/L (ref 98–107)
CO2: 29 MMOL/L (ref 20–31)
CREAT SERPL-MCNC: 0.56 MG/DL (ref 0.5–0.9)
D-DIMER QUANTITATIVE: 0.32 MG/L FEU
DIFFERENTIAL TYPE: ABNORMAL
EKG ATRIAL RATE: 75 BPM
EKG P AXIS: 56 DEGREES
EKG P-R INTERVAL: 142 MS
EKG Q-T INTERVAL: 384 MS
EKG QRS DURATION: 74 MS
EKG QTC CALCULATION (BAZETT): 428 MS
EKG R AXIS: 38 DEGREES
EKG T AXIS: 46 DEGREES
EKG VENTRICULAR RATE: 75 BPM
EOSINOPHILS RELATIVE PERCENT: 4 % (ref 1–4)
GFR AFRICAN AMERICAN: >60 ML/MIN
GFR NON-AFRICAN AMERICAN: >60 ML/MIN
GFR SERPL CREATININE-BSD FRML MDRD: ABNORMAL ML/MIN/{1.73_M2}
GFR SERPL CREATININE-BSD FRML MDRD: ABNORMAL ML/MIN/{1.73_M2}
GLUCOSE BLD-MCNC: 108 MG/DL (ref 70–99)
HCT VFR BLD CALC: 36.2 % (ref 36.3–47.1)
HEMOGLOBIN: 11.5 G/DL (ref 11.9–15.1)
IMMATURE GRANULOCYTES: 0 %
LYMPHOCYTES # BLD: 27 % (ref 24–43)
MAGNESIUM: 2 MG/DL (ref 1.6–2.6)
MCH RBC QN AUTO: 28.3 PG (ref 25.2–33.5)
MCHC RBC AUTO-ENTMCNC: 31.8 G/DL (ref 28.4–34.8)
MCV RBC AUTO: 88.9 FL (ref 82.6–102.9)
MONOCYTES # BLD: 7 % (ref 3–12)
NRBC AUTOMATED: 0 PER 100 WBC
PDW BLD-RTO: 13.8 % (ref 11.8–14.4)
PLATELET # BLD: 199 K/UL (ref 138–453)
PLATELET ESTIMATE: ABNORMAL
PMV BLD AUTO: 12.1 FL (ref 8.1–13.5)
POC TROPONIN I: 0 NG/ML (ref 0–0.1)
POC TROPONIN I: 0.01 NG/ML (ref 0–0.1)
POC TROPONIN INTERP: NORMAL
POC TROPONIN INTERP: NORMAL
POTASSIUM SERPL-SCNC: 3.8 MMOL/L (ref 3.7–5.3)
PRO-BNP: 194 PG/ML
RBC # BLD: 4.07 M/UL (ref 3.95–5.11)
RBC # BLD: ABNORMAL 10*6/UL
SEG NEUTROPHILS: 61 % (ref 36–65)
SEGMENTED NEUTROPHILS ABSOLUTE COUNT: 3.26 K/UL (ref 1.5–8.1)
SODIUM BLD-SCNC: 141 MMOL/L (ref 135–144)
TSH SERPL DL<=0.05 MIU/L-ACNC: 1.98 MIU/L (ref 0.3–5)
WBC # BLD: 5.4 K/UL (ref 3.5–11.3)
WBC # BLD: ABNORMAL 10*3/UL

## 2018-07-30 PROCEDURE — 83880 ASSAY OF NATRIURETIC PEPTIDE: CPT

## 2018-07-30 PROCEDURE — 99285 EMERGENCY DEPT VISIT HI MDM: CPT

## 2018-07-30 PROCEDURE — 84484 ASSAY OF TROPONIN QUANT: CPT

## 2018-07-30 PROCEDURE — 85379 FIBRIN DEGRADATION QUANT: CPT

## 2018-07-30 PROCEDURE — 71046 X-RAY EXAM CHEST 2 VIEWS: CPT

## 2018-07-30 PROCEDURE — 80048 BASIC METABOLIC PNL TOTAL CA: CPT

## 2018-07-30 PROCEDURE — 83735 ASSAY OF MAGNESIUM: CPT

## 2018-07-30 PROCEDURE — 85025 COMPLETE CBC W/AUTO DIFF WBC: CPT

## 2018-07-30 PROCEDURE — 93005 ELECTROCARDIOGRAM TRACING: CPT

## 2018-07-30 PROCEDURE — 84443 ASSAY THYROID STIM HORMONE: CPT

## 2018-07-30 RX ORDER — ACETAMINOPHEN 500 MG
1000 TABLET ORAL ONCE
Status: DISCONTINUED | OUTPATIENT
Start: 2018-07-30 | End: 2018-07-30 | Stop reason: HOSPADM

## 2018-07-30 ASSESSMENT — ENCOUNTER SYMPTOMS
EYE DISCHARGE: 0
NAUSEA: 1
COLOR CHANGE: 0
SHORTNESS OF BREATH: 1
EYE REDNESS: 0
ABDOMINAL PAIN: 0
CHEST TIGHTNESS: 0
VOMITING: 0

## 2018-07-30 ASSESSMENT — HEART SCORE: ECG: 0

## 2018-07-30 NOTE — ED PROVIDER NOTES
Final Result   No acute findings. LABS:  Labs Reviewed   BASIC METABOLIC PANEL - Abnormal; Notable for the following:        Result Value    Glucose 108 (*)     Anion Gap 7 (*)     All other components within normal limits   CBC WITH AUTO DIFFERENTIAL - Abnormal; Notable for the following:     Hemoglobin 11.5 (*)     Hematocrit 36.2 (*)     All other components within normal limits   BRAIN NATRIURETIC PEPTIDE   D-DIMER, QUANTITATIVE   MAGNESIUM   TSH WITH REFLEX   POCT TROPONIN   POCT TROPONIN   POCT TROPONIN   POCT TROPONIN         EMERGENCY DEPARTMENT COURSE:   Vitals:    Vitals:    07/30/18 0221 07/30/18 0501 07/30/18 0502   BP: 136/83 139/77    Pulse: 77  66   Resp: 14     Temp: 98.2 °F (36.8 °C)     TempSrc: Oral     SpO2: 98% 97%    Weight: 170 lb (77.1 kg)         ED Course as of Jul 30 0718 Mon Jul 30, 2018   0215 Discussed with patient the results of her laboratory testing which has been negative so far. She states she has received some pain relief from the Tylenol for headache. She states she feels relieved with the results of the testing so far. [MS]      ED Course User Index  [MS] Anita Alvarado DO       Heart Score    Heart Score for chest pain patients  History: Slightly Suspicious  ECG: Normal  Patient Age: > 45 and < 65 years  Risk Factors: No risk factors known  Troponin: < 1X normal limit  Heart Score Total: 1    Score 0 - 3 = 2.5%  MACE over next 6 wks = Discharge home  Score 4 - 6 = 20.3%  MACE over next 6 wks = Obs admit  Score 7 - 10 = 72.7%  MACE over next 6 wks = Early invasive Rx    Wells Criteria PE    1. Symptoms of DVT (3pts): 3  2. No alternative diagnosis better explains illness (3pts):  3  3. Tachycardia >100 (1.5pts): 0  4. Immobilization >= 3 days or surgery in last 4 weeks (1.5pts): 1.5  5. Hemoptysis (1.5pts): 0  6.  Malignancy (1.5pts): 0      >6 pts High Probability  2 to <6 pts Moderate Probability  <2 pts Low Probability    This is a 71-year-old female

## 2018-07-30 NOTE — ED NOTES
Pt sleeping, vitals stable, lights down.  RR even and non labored     Nohemy Pulling, [facility]  07/30/18 9828

## 2018-08-06 RX ORDER — PRAVASTATIN SODIUM 40 MG
TABLET ORAL
Qty: 30 TABLET | Refills: 3 | Status: SHIPPED | OUTPATIENT
Start: 2018-08-06 | End: 2018-11-20 | Stop reason: SDUPTHER

## 2018-09-11 ENCOUNTER — HOSPITAL ENCOUNTER (OUTPATIENT)
Age: 58
Setting detail: OBSERVATION
Discharge: HOME | End: 2018-09-11
Attending: EMERGENCY MEDICINE | Admitting: EMERGENCY MEDICINE
Payer: COMMERCIAL

## 2018-09-11 ENCOUNTER — APPOINTMENT (OUTPATIENT)
Dept: GENERAL RADIOLOGY | Age: 58
End: 2018-09-11
Payer: COMMERCIAL

## 2018-09-11 VITALS
WEIGHT: 170 LBS | DIASTOLIC BLOOD PRESSURE: 58 MMHG | TEMPERATURE: 98 F | OXYGEN SATURATION: 98 % | SYSTOLIC BLOOD PRESSURE: 90 MMHG | HEIGHT: 61 IN | HEART RATE: 74 BPM | RESPIRATION RATE: 16 BRPM | BODY MASS INDEX: 32.1 KG/M2

## 2018-09-11 DIAGNOSIS — R07.9 CHEST PAIN, UNSPECIFIED TYPE: Primary | ICD-10-CM

## 2018-09-11 LAB
ABSOLUTE EOS #: 0.16 K/UL (ref 0–0.44)
ABSOLUTE IMMATURE GRANULOCYTE: <0.03 K/UL (ref 0–0.3)
ABSOLUTE LYMPH #: 1.59 K/UL (ref 1.1–3.7)
ABSOLUTE MONO #: 0.29 K/UL (ref 0.1–1.2)
ANION GAP SERPL CALCULATED.3IONS-SCNC: 10 MMOL/L (ref 9–17)
BASOPHILS # BLD: 1 % (ref 0–2)
BASOPHILS ABSOLUTE: 0.06 K/UL (ref 0–0.2)
BUN BLDV-MCNC: 17 MG/DL (ref 6–20)
BUN/CREAT BLD: ABNORMAL (ref 9–20)
CALCIUM SERPL-MCNC: 9 MG/DL (ref 8.6–10.4)
CHLORIDE BLD-SCNC: 107 MMOL/L (ref 98–107)
CO2: 25 MMOL/L (ref 20–31)
CREAT SERPL-MCNC: 0.68 MG/DL (ref 0.5–0.9)
D-DIMER QUANTITATIVE: 0.48 MG/L FEU
DIFFERENTIAL TYPE: NORMAL
EKG ATRIAL RATE: 74 BPM
EKG P AXIS: 62 DEGREES
EKG P-R INTERVAL: 132 MS
EKG Q-T INTERVAL: 386 MS
EKG QRS DURATION: 80 MS
EKG QTC CALCULATION (BAZETT): 428 MS
EKG R AXIS: 14 DEGREES
EKG T AXIS: 36 DEGREES
EKG VENTRICULAR RATE: 74 BPM
EOSINOPHILS RELATIVE PERCENT: 3 % (ref 1–4)
GFR AFRICAN AMERICAN: >60 ML/MIN
GFR NON-AFRICAN AMERICAN: >60 ML/MIN
GFR SERPL CREATININE-BSD FRML MDRD: ABNORMAL ML/MIN/{1.73_M2}
GFR SERPL CREATININE-BSD FRML MDRD: ABNORMAL ML/MIN/{1.73_M2}
GLUCOSE BLD-MCNC: 90 MG/DL (ref 70–99)
HCT VFR BLD CALC: 37.4 % (ref 36.3–47.1)
HEMOGLOBIN: 12.2 G/DL (ref 11.9–15.1)
IMMATURE GRANULOCYTES: 0 %
LYMPHOCYTES # BLD: 32 % (ref 24–43)
MCH RBC QN AUTO: 29.1 PG (ref 25.2–33.5)
MCHC RBC AUTO-ENTMCNC: 32.6 G/DL (ref 28.4–34.8)
MCV RBC AUTO: 89.3 FL (ref 82.6–102.9)
MONOCYTES # BLD: 6 % (ref 3–12)
NRBC AUTOMATED: 0 PER 100 WBC
PDW BLD-RTO: 13.2 % (ref 11.8–14.4)
PLATELET # BLD: 248 K/UL (ref 138–453)
PLATELET ESTIMATE: NORMAL
PMV BLD AUTO: 11.9 FL (ref 8.1–13.5)
POC TROPONIN I: 0.01 NG/ML (ref 0–0.1)
POC TROPONIN I: 0.01 NG/ML (ref 0–0.1)
POC TROPONIN INTERP: NORMAL
POC TROPONIN INTERP: NORMAL
POTASSIUM SERPL-SCNC: 3.5 MMOL/L (ref 3.7–5.3)
RBC # BLD: 4.19 M/UL (ref 3.95–5.11)
RBC # BLD: NORMAL 10*6/UL
SEG NEUTROPHILS: 58 % (ref 36–65)
SEGMENTED NEUTROPHILS ABSOLUTE COUNT: 2.95 K/UL (ref 1.5–8.1)
SODIUM BLD-SCNC: 142 MMOL/L (ref 135–144)
WBC # BLD: 5.1 K/UL (ref 3.5–11.3)
WBC # BLD: NORMAL 10*3/UL

## 2018-09-11 PROCEDURE — 94640 AIRWAY INHALATION TREATMENT: CPT

## 2018-09-11 PROCEDURE — 2580000003 HC RX 258: Performed by: EMERGENCY MEDICINE

## 2018-09-11 PROCEDURE — 71046 X-RAY EXAM CHEST 2 VIEWS: CPT

## 2018-09-11 PROCEDURE — G0378 HOSPITAL OBSERVATION PER HR: HCPCS

## 2018-09-11 PROCEDURE — 85025 COMPLETE CBC W/AUTO DIFF WBC: CPT

## 2018-09-11 PROCEDURE — 84484 ASSAY OF TROPONIN QUANT: CPT

## 2018-09-11 PROCEDURE — 6370000000 HC RX 637 (ALT 250 FOR IP): Performed by: STUDENT IN AN ORGANIZED HEALTH CARE EDUCATION/TRAINING PROGRAM

## 2018-09-11 PROCEDURE — 80048 BASIC METABOLIC PNL TOTAL CA: CPT

## 2018-09-11 PROCEDURE — 6370000000 HC RX 637 (ALT 250 FOR IP): Performed by: EMERGENCY MEDICINE

## 2018-09-11 PROCEDURE — 93005 ELECTROCARDIOGRAM TRACING: CPT

## 2018-09-11 PROCEDURE — 85379 FIBRIN DEGRADATION QUANT: CPT

## 2018-09-11 PROCEDURE — 99285 EMERGENCY DEPT VISIT HI MDM: CPT

## 2018-09-11 RX ORDER — DULOXETIN HYDROCHLORIDE 20 MG/1
20 CAPSULE, DELAYED RELEASE ORAL DAILY
Status: DISCONTINUED | OUTPATIENT
Start: 2018-09-11 | End: 2018-09-11 | Stop reason: HOSPADM

## 2018-09-11 RX ORDER — VENLAFAXINE 75 MG/1
75 TABLET ORAL 3 TIMES DAILY
Status: DISCONTINUED | OUTPATIENT
Start: 2018-09-11 | End: 2018-09-11 | Stop reason: HOSPADM

## 2018-09-11 RX ORDER — SODIUM CHLORIDE 0.9 % (FLUSH) 0.9 %
10 SYRINGE (ML) INJECTION PRN
Status: DISCONTINUED | OUTPATIENT
Start: 2018-09-11 | End: 2018-09-11 | Stop reason: HOSPADM

## 2018-09-11 RX ORDER — METOPROLOL SUCCINATE 25 MG/1
25 TABLET, EXTENDED RELEASE ORAL DAILY
Status: DISCONTINUED | OUTPATIENT
Start: 2018-09-11 | End: 2018-09-11 | Stop reason: HOSPADM

## 2018-09-11 RX ORDER — ALBUTEROL SULFATE 90 UG/1
1 AEROSOL, METERED RESPIRATORY (INHALATION) EVERY 4 HOURS PRN
Status: DISCONTINUED | OUTPATIENT
Start: 2018-09-11 | End: 2018-09-11 | Stop reason: HOSPADM

## 2018-09-11 RX ORDER — BETAMETHASONE DIPROPIONATE 0.05 %
OINTMENT (GRAM) TOPICAL 2 TIMES DAILY
Status: DISCONTINUED | OUTPATIENT
Start: 2018-09-11 | End: 2018-09-11 | Stop reason: HOSPADM

## 2018-09-11 RX ORDER — ASPIRIN 81 MG/1
324 TABLET, CHEWABLE ORAL ONCE
Status: DISCONTINUED | OUTPATIENT
Start: 2018-09-11 | End: 2018-09-11

## 2018-09-11 RX ORDER — SODIUM CHLORIDE 9 MG/ML
INJECTION, SOLUTION INTRAVENOUS CONTINUOUS
Status: DISCONTINUED | OUTPATIENT
Start: 2018-09-11 | End: 2018-09-11 | Stop reason: HOSPADM

## 2018-09-11 RX ORDER — ONDANSETRON 4 MG/1
4 TABLET, ORALLY DISINTEGRATING ORAL EVERY 8 HOURS PRN
Status: DISCONTINUED | OUTPATIENT
Start: 2018-09-11 | End: 2018-09-11 | Stop reason: HOSPADM

## 2018-09-11 RX ORDER — FLUTICASONE PROPIONATE 50 MCG
2 SPRAY, SUSPENSION (ML) NASAL DAILY
Status: DISCONTINUED | OUTPATIENT
Start: 2018-09-11 | End: 2018-09-11 | Stop reason: HOSPADM

## 2018-09-11 RX ORDER — SENNA AND DOCUSATE SODIUM 50; 8.6 MG/1; MG/1
2 TABLET, FILM COATED ORAL NIGHTLY
Status: DISCONTINUED | OUTPATIENT
Start: 2018-09-11 | End: 2018-09-11 | Stop reason: HOSPADM

## 2018-09-11 RX ORDER — BENZONATATE 100 MG/1
100 CAPSULE ORAL 3 TIMES DAILY PRN
Status: DISCONTINUED | OUTPATIENT
Start: 2018-09-11 | End: 2018-09-11 | Stop reason: HOSPADM

## 2018-09-11 RX ORDER — MELOXICAM 7.5 MG/1
15 TABLET ORAL DAILY
Status: DISCONTINUED | OUTPATIENT
Start: 2018-09-11 | End: 2018-09-11 | Stop reason: HOSPADM

## 2018-09-11 RX ORDER — RANITIDINE 150 MG/1
1 TABLET ORAL 2 TIMES DAILY
Status: DISCONTINUED | OUTPATIENT
Start: 2018-09-11 | End: 2018-09-11 | Stop reason: CLARIF

## 2018-09-11 RX ORDER — SODIUM CHLORIDE 0.9 % (FLUSH) 0.9 %
10 SYRINGE (ML) INJECTION EVERY 12 HOURS SCHEDULED
Status: DISCONTINUED | OUTPATIENT
Start: 2018-09-11 | End: 2018-09-11 | Stop reason: HOSPADM

## 2018-09-11 RX ORDER — GUAIFENESIN 600 MG/1
600 TABLET, EXTENDED RELEASE ORAL 2 TIMES DAILY
Status: DISCONTINUED | OUTPATIENT
Start: 2018-09-11 | End: 2018-09-11 | Stop reason: HOSPADM

## 2018-09-11 RX ORDER — FAMOTIDINE 20 MG/1
20 TABLET, FILM COATED ORAL 2 TIMES DAILY
Status: DISCONTINUED | OUTPATIENT
Start: 2018-09-11 | End: 2018-09-11 | Stop reason: HOSPADM

## 2018-09-11 RX ORDER — PANTOPRAZOLE SODIUM 40 MG/1
40 TABLET, DELAYED RELEASE ORAL
Status: DISCONTINUED | OUTPATIENT
Start: 2018-09-12 | End: 2018-09-11 | Stop reason: HOSPADM

## 2018-09-11 RX ORDER — CYCLOBENZAPRINE HCL 10 MG
10 TABLET ORAL 3 TIMES DAILY PRN
Qty: 30 TABLET | Refills: 0 | Status: SHIPPED | OUTPATIENT
Start: 2018-09-11 | End: 2018-09-21

## 2018-09-11 RX ORDER — PRAVASTATIN SODIUM 20 MG
40 TABLET ORAL DAILY
Status: DISCONTINUED | OUTPATIENT
Start: 2018-09-11 | End: 2018-09-11 | Stop reason: HOSPADM

## 2018-09-11 RX ORDER — ACETAMINOPHEN 325 MG/1
650 TABLET ORAL EVERY 4 HOURS PRN
Status: DISCONTINUED | OUTPATIENT
Start: 2018-09-11 | End: 2018-09-11 | Stop reason: HOSPADM

## 2018-09-11 RX ORDER — KETOTIFEN FUMARATE 0.35 MG/ML
1 SOLUTION/ DROPS OPHTHALMIC 2 TIMES DAILY
Status: DISCONTINUED | OUTPATIENT
Start: 2018-09-11 | End: 2018-09-11 | Stop reason: HOSPADM

## 2018-09-11 RX ORDER — BUSPIRONE HYDROCHLORIDE 15 MG/1
15 TABLET ORAL 2 TIMES DAILY
Status: DISCONTINUED | OUTPATIENT
Start: 2018-09-11 | End: 2018-09-11 | Stop reason: HOSPADM

## 2018-09-11 RX ORDER — BACLOFEN 10 MG/1
10 TABLET ORAL 2 TIMES DAILY
Status: DISCONTINUED | OUTPATIENT
Start: 2018-09-11 | End: 2018-09-11 | Stop reason: HOSPADM

## 2018-09-11 RX ORDER — BETAMETHASONE DIPROPIONATE 0.5 MG/G
CREAM TOPICAL 2 TIMES DAILY
Status: DISCONTINUED | OUTPATIENT
Start: 2018-09-11 | End: 2018-09-11 | Stop reason: CLARIF

## 2018-09-11 RX ORDER — IBUPROFEN 800 MG/1
800 TABLET ORAL EVERY 8 HOURS PRN
Qty: 30 TABLET | Refills: 0 | Status: SHIPPED | OUTPATIENT
Start: 2018-09-11 | End: 2018-09-11 | Stop reason: HOSPADM

## 2018-09-11 RX ORDER — TRAMADOL HYDROCHLORIDE 50 MG/1
50 TABLET ORAL DAILY
Status: DISCONTINUED | OUTPATIENT
Start: 2018-09-11 | End: 2018-09-11 | Stop reason: HOSPADM

## 2018-09-11 RX ADMIN — BECLOMETHASONE DIPROPIONATE 1 PUFF: 40 AEROSOL, METERED RESPIRATORY (INHALATION) at 12:22

## 2018-09-11 RX ADMIN — VENLAFAXINE 75 MG: 75 TABLET ORAL at 12:37

## 2018-09-11 RX ADMIN — TRAMADOL HYDROCHLORIDE 50 MG: 50 TABLET, FILM COATED ORAL at 12:39

## 2018-09-11 RX ADMIN — BACLOFEN 10 MG: 10 TABLET ORAL at 12:36

## 2018-09-11 RX ADMIN — FAMOTIDINE 20 MG: 20 TABLET, FILM COATED ORAL at 12:36

## 2018-09-11 RX ADMIN — BUSPIRONE HYDROCHLORIDE 15 MG: 15 TABLET ORAL at 12:38

## 2018-09-11 RX ADMIN — BETAMETHASONE DIPROPIONATE: 0.5 OINTMENT TOPICAL at 12:40

## 2018-09-11 RX ADMIN — SODIUM CHLORIDE: 9 INJECTION, SOLUTION INTRAVENOUS at 09:51

## 2018-09-11 ASSESSMENT — PAIN DESCRIPTION - DESCRIPTORS: DESCRIPTORS: PENETRATING

## 2018-09-11 ASSESSMENT — PAIN DESCRIPTION - PAIN TYPE: TYPE: ACUTE PAIN

## 2018-09-11 ASSESSMENT — ENCOUNTER SYMPTOMS
VOMITING: 0
SHORTNESS OF BREATH: 1
EYES NEGATIVE: 1
RHINORRHEA: 0
ABDOMINAL PAIN: 0
SHORTNESS OF BREATH: 0
SORE THROAT: 0
NAUSEA: 1
ALLERGIC/IMMUNOLOGIC NEGATIVE: 1
COUGH: 0

## 2018-09-11 ASSESSMENT — PAIN SCALES - GENERAL
PAINLEVEL_OUTOF10: 4
PAINLEVEL_OUTOF10: 4

## 2018-09-11 ASSESSMENT — HEART SCORE: ECG: 0

## 2018-09-11 ASSESSMENT — PAIN DESCRIPTION - ORIENTATION: ORIENTATION: MID

## 2018-09-11 ASSESSMENT — PAIN DESCRIPTION - LOCATION: LOCATION: CHEST

## 2018-09-11 NOTE — PROGRESS NOTES
[facility]  CDU / OBSERVATION eNCOUnter  Attending NOte       I performed a history and physical examination of the patient and discussed management with the resident. I reviewed the residents note and agree with the documented findings and plan of care. Any areas of disagreement are noted on the chart. I was personally present for the key portions of any procedures. I have documented in the chart those procedures where I was not present during the key portions. I have reviewed the nurses notes. I agree with the chief complaint, past medical history, past surgical history, allergies, medications, social and family history as documented unless otherwise noted below. The Family history, social history, and ROS are effectively unchanged since admission unless noted elsewhere in the chart. Patient with history of total valve prolapse. Patient with complaints of chest pain now. Patient arrived up in ED as cardiology was on the floor. Patient known to cardiology service. Seen by cardiology. Echo ordered.   Will follow specialists recommendations    Thuan Preciado MD  Attending Emergency  Physician

## 2018-09-11 NOTE — CONSULTS
09/22/2017    cadal # 2, decadron 10 mg    NERVE BLOCK  09/22/2017    caudal epidural steroid block #2 decadron 10 mg    NERVE BLOCK  11/10/2017    lumbar L4-5 epidural; depomedrol 80mg     Home Medications:  Prior to Admission medications    Medication Sig Start Date End Date Taking?  Authorizing Provider   pravastatin (PRAVACHOL) 40 MG tablet TAKE 1 TABLET BY MOUTH DAILY 8/6/18   Ramona rBagg MD   baclofen (LIORESAL) 10 MG tablet TAKE 1 TABLET BY MOUTH THREE TIMES DAILY 7/20/18   Ramona Bragg MD   venlafaxine (EFFEXOR) 75 MG tablet Take 1 tablet by mouth 3 times daily 7/20/18   Ramona Bragg MD   ranitidine (ZANTAC) 150 MG tablet TAKE 1 TABLET BY MOUTH TWICE DAILY 7/20/18   Ramona Bragg MD   meloxicam (MOBIC) 15 MG tablet TAKE 1 TABLET BY MOUTH DAILY 7/20/18   Ramona Bragg MD   metoprolol succinate (TOPROL XL) 25 MG extended release tablet Take 25 mg by mouth daily    Historical Provider, MD   benzonatate (TESSALON) 100 MG capsule Take 100 mg by mouth 3 times daily as needed for Cough    Historical Provider, MD   ketotifen (ZADITOR) 0.025 % ophthalmic solution Place 1 drop into both eyes 2 times daily    Historical Provider, MD   senna-docusate (PERICOLACE) 8.6-50 MG per tablet Take 2 tablets by mouth daily as needed for Constipation 6/27/18   Zaida Duggan DO   polyethylene glycol [facility]) powder Take as written on colonoscopy directions, pt will do a two day prep, mix 1/2 bottle of 250g with 32oz of clear liquid 6/27/18   Zaida Duggan DO   guaiFENesin (MUCINEX) 600 MG extended release tablet Take 1 tablet by mouth 2 times daily 6/14/18   Ramona Bragg MD   omeprazole (PRILOSEC) 20 MG delayed release capsule TAKE ONE CAPSULE BY MOUTH DAILY 5/10/18   Ramona Bragg MD   DULoxetine (CYMBALTA) 20 MG extended release capsule TAKE ONE CAPSULE BY MOUTH DAILY 5/10/18   Ramona Bragg MD   busPIRone (BUSPAR) 15 MG tablet TAKE ONE TABLET BY MOUTH TWICE A DAY 4/23/18   Ramona Bragg MD ondansetron (ZOFRAN ODT) 4 MG disintegrating tablet Take 1 tablet by mouth every 8 hours as needed for Nausea 4/22/18   Jewel Doyle MD   augmented betamethasone dipropionate (DIPROLENE-AF) 0.05 % cream APPLY TO AFFECTED AREA(S) TWO TIMES A DAY 4/5/18   Sumaya Chavez MD   Psyllium-Calcium (METAMUCIL PLUS CALCIUM) CAPS Take 1 capsule by mouth daily Take with 8 oz water. 3/14/18   Sumaya Chavez MD   AEROSPAN 80 MCG/ACT AERS inhaler INHALE ONE TO TWO PUFFS BY MOUTH TWICE A DAY 12/12/17   Sumaya Chavez MD   VENTOLIN  (90 Base) MCG/ACT inhaler INHALE TWO PUFFS BY MOUTH EVERY 6 HOURS AS NEEDED FOR WHEEZING 11/24/17   Sumaya Chavez MD   esomeprazole (NEXIUM) 40 MG delayed release capsule NexIUM 40 MG Oral Capsule Delayed Release  1 Every Day   Quantity: 90;  Refills: 1       Bo Ellington;  Started 31-Jan-2014  Active 1/31/14   Cam Jenkins MD   PREMARIN 0.625 MG/GM vaginal cream PLACE 2 GRAMS VAGINALLY TWICE A WEEK FOR 12 DOSES 9/21/17   Sumaya Chavez MD   traMADol (ULTRAM) 50 MG tablet TAKE ONE TABLET BY MOUTH DAILY 8/4/17   Sumaya Chavez MD   permethrin (ELIMITE) 5 % cream Apply topically as directed 11/18/16   Sumaya Chavez MD   fluticasone [facility]el[facility]) 50 MCG/ACT nasal spray 2 sprays by Nasal route daily 4/19/16   Sumaya Chavez MD     Current Inpatient Medications: Allergies: Other; Shrimp (diagnostic); Famotidine; and Gabapentin  Social History:    Social History     Social History    Marital status:      Spouse name: N/A    Number of children: N/A    Years of education: N/A     Occupational History    Not on file.      Social History Main Topics    Smoking status: Former Smoker     Packs/day: 2.00     Years: 0.50     Types: Cigarettes     Quit date: 1995    Smokeless tobacco: Never Used    Alcohol use 0.0 oz/week      Comment: social    Drug use: No    Sexual activity: Not on file     Other Topics Concern    Not on file     Social History Narrative  No narrative on file     Family History:   Family History   Problem Relation Age of Onset    High Blood Pressure Father        REVIEW OF SYSTEMS:   CONSTITUTIONAL:  chills and sweats  HEENT:  negative  RESPIRATORY:  negative  CARDIOVASCULAR:  positive for  chest pain  GASTROINTESTINAL:  negative  GENITOURINARY:  negative  INTEGUMENT/BREAST:  negative  ALLERGIC/IMMUNOLOGIC:  negative  MUSCULOSKELETAL:  negative  NEUROLOGICAL:  negative    PHYSICAL EXAM:    VITALS:  /64   Pulse 77   Temp 98.7 °F (37.1 °C) (Oral)   Resp 14   Ht 5' 1\" (1.549 m)   Wt 170 lb (77.1 kg)   SpO2 98%   BMI 32.12 kg/m²   CONSTITUTIONAL:awake, alert, cooperative, no apparent distress, and appears stated age  HEENT:pupils equal, round, sclera clear, conjunctiva normal.   Supple neck. NO THYROMEGALY  LUNGS:  No increased work of breathing, auscultation: lung sounds clear b/l no rales/rhonchi heard   CARDIOVASCULAR:  Normal apical impulse, regular rate and rhythm, mild systolic murmur  JVP: none   Carotids:NL  ABDOMEN:  No scars, normal bowel sounds, soft, non-distended, non-tender, no masses palpated, no hepatosplenomegally  MUSCULOSKELETAL:no redness, warmth, or swelling of the joints  NEUROLOGIC:  Awake, alert, oriented to name, place and time.    SKIN:  no bruising or bleeding, normal skin color, texture, turgor  LE: trace edema noted    DATA:   ECG:  Normal sinus rhythm  Normal ECG  When compared with ECG of 02-JUN-2018 08:02,  No significant change was found      Lab:   Lab Results   Component Value Date     09/11/2018    K 3.5 09/11/2018     09/11/2018    CO2 25 09/11/2018    BUN 17 09/11/2018    CREATININE 0.68 09/11/2018    GFRAA >60 09/11/2018    LABGLOM >60 09/11/2018    GLUCOSE 90 09/11/2018    PROT 6.5 04/22/2018    LABALBU 3.9 04/22/2018    CALCIUM 9.0 09/11/2018    BILITOT 0.38 04/22/2018    ALKPHOS 85 04/22/2018    AST 18 04/22/2018    ALT 16 04/22/2018     Magnesium:    Lab Results   Component Value Date    MG 2.0 07/30/2018     Warfarin PT/INR:  No results found for: PROTIME, INR, WARFARIN  TSH:    Lab Results   Component Value Date    TSH 1.98 07/30/2018     BMP:    Lab Results   Component Value Date     09/11/2018    K 3.5 09/11/2018     09/11/2018    CO2 25 09/11/2018    BUN 17 09/11/2018    LABALBU 3.9 04/22/2018    CREATININE 0.68 09/11/2018    CALCIUM 9.0 09/11/2018    GFRAA >60 09/11/2018    LABGLOM >60 09/11/2018    GLUCOSE 90 09/11/2018     FLP:    Lab Results   Component Value Date    CHOL 153 02/19/2015    TRIG 118 02/19/2015    HDL 50 02/19/2015    LDLCHOLESTEROL 79 02/19/2015       IMPRESSION:  Patient Active Problem List   Diagnosis    GERD (gastroesophageal reflux disease)    Major depression    Rotator cuff disorder    Hyperlipidemia    Incontinence in female    Rectocele    Diverticulitis    Asthma    Depression    Hypokalemia    Cellulitis, face - left side, failed outpatient therapy    Hyperglycemia    Mild intermittent asthma without complication    Gastroesophageal reflux disease without esophagitis    Displacement of lumbar intervertebral disc without myelopathy    Chest pain         ASSESSMENT:     Chest pain, atypical, substernal chest pain, reproducible, normal EKG, normal POCT troponin, recent ECHO and stress test within last year show no ischemia. PLAN  Advise muscle relaxant to chest pain, can follow up outpatient  Advised to return if chest pain returns or becomes persistent   Cardiology will sign off, follow up one month outpatient. Korina Lowry M.D. Attending Physician Statement  I have discussed the care of Mishel Benz, including pertinent history and exam findings,  with the cardiology fellow/resident. I have seen and examined the patient and the key elements of all parts of the encounter have been performed by me.   I agree with the assessment, plan and orders as documented by the resident with additional recommendations as below:

## 2018-09-11 NOTE — ED NOTES
file.     Social History Main Topics    Smoking status: Former Smoker     Packs/day: 2.00     Years: 0.50     Types: Cigarettes     Quit date: 1995    Smokeless tobacco: Never Used    Alcohol use 0.0 oz/week      Comment: social    Drug use: No    Sexual activity: Not on file     Other Topics Concern    Not on file     Social History Narrative    No narrative on file       Family History   Problem Relation Age of Onset    High Blood Pressure Father        Allergies: Other; Shrimp (diagnostic); Famotidine; and Gabapentin    Home Medications:  Prior to Admission medications    Medication Sig Start Date End Date Taking?  Authorizing Provider   pravastatin (PRAVACHOL) 40 MG tablet TAKE 1 TABLET BY MOUTH DAILY 8/6/18   Bernabe Anna MD   baclofen (LIORESAL) 10 MG tablet TAKE 1 TABLET BY MOUTH THREE TIMES DAILY 7/20/18   Bernabe Anna MD   venlafaxine (EFFEXOR) 75 MG tablet Take 1 tablet by mouth 3 times daily 7/20/18   Bernabe Anna MD   ranitidine (ZANTAC) 150 MG tablet TAKE 1 TABLET BY MOUTH TWICE DAILY 7/20/18   Bernabe Anna MD   meloxicam (MOBIC) 15 MG tablet TAKE 1 TABLET BY MOUTH DAILY 7/20/18   Bernabe Anna MD   metoprolol succinate (TOPROL XL) 25 MG extended release tablet Take 25 mg by mouth daily    Historical Provider, MD   benzonatate (TESSALON) 100 MG capsule Take 100 mg by mouth 3 times daily as needed for Cough    Historical Provider, MD   ketotifen (ZADITOR) 0.025 % ophthalmic solution Place 1 drop into both eyes 2 times daily    Historical Provider, MD   senna-docusate (PERICOLACE) 8.6-50 MG per tablet Take 2 tablets by mouth daily as needed for Constipation 6/27/18   Noe Coles DO   polyethylene glycol [facility]) powder Take as written on colonoscopy directions, pt will do a two day prep, mix 1/2 bottle of 250g with 32oz of clear liquid 6/27/18   Noe Coles DO   guaiFENesin (MUCINEX) 600 MG extended release tablet Take 1 tablet by mouth 2 times daily 6/14/18   [facility] [place] Shameka Mora MD   omeprazole (PRILOSEC) 20 MG delayed release capsule TAKE ONE CAPSULE BY MOUTH DAILY 5/10/18   Amelia Fair MD   DULoxetine (CYMBALTA) 20 MG extended release capsule TAKE ONE CAPSULE BY MOUTH DAILY 5/10/18   Amelia Fair MD   busPIRone (BUSPAR) 15 MG tablet TAKE ONE TABLET BY MOUTH TWICE A DAY 4/23/18   Amelia Fair MD   ondansetron (ZOFRAN ODT) 4 MG disintegrating tablet Take 1 tablet by mouth every 8 hours as needed for Nausea 4/22/18   Marston Hammans, MD   augmented betamethasone dipropionate (DIPROLENE-AF) 0.05 % cream APPLY TO AFFECTED AREA(S) TWO TIMES A DAY 4/5/18   Amelia Fair MD   Psyllium-Calcium (METAMUCIL PLUS CALCIUM) CAPS Take 1 capsule by mouth daily Take with 8 oz water. 3/14/18   Amelia Fair MD   AEROSPAN 80 MCG/ACT AERS inhaler INHALE ONE TO TWO PUFFS BY MOUTH TWICE A DAY 12/12/17   Amelia Fair MD   VENTOLIN  (90 Base) MCG/ACT inhaler INHALE TWO PUFFS BY MOUTH EVERY 6 HOURS AS NEEDED FOR WHEEZING 11/24/17   Amelia Fair MD   esomeprazole (NEXIUM) 40 MG delayed release capsule NexIUM 40 MG Oral Capsule Delayed Release  1 Every Day   Quantity: 90;  Refills: 1       Chandrika Mcclellan;  Started 31-Jan-2014  Active 1/31/14   Historical Provider, MD   PREMARIN 0.625 MG/GM vaginal cream PLACE 2 GRAMS VAGINALLY TWICE A WEEK FOR 12 DOSES 9/21/17   Amelia Fair MD   traMADol (ULTRAM) 50 MG tablet TAKE ONE TABLET BY MOUTH DAILY 8/4/17   Amelia Fair MD   permethrin (ELIMITE) 5 % cream Apply topically as directed 11/18/16   Amelia Fair MD   fluticasone (FLONASE) 50 MCG/ACT nasal spray 2 sprays by Nasal route daily 4/19/16   Amelia Fair MD       REVIEW OF SYSTEMS    (2-9 systems for level 4, 10 or more for level 5)      Review of Systems   Constitutional: Positive for diaphoresis. Negative for chills and fever. HENT: Negative for rhinorrhea and sore throat. Respiratory: Negative for cough and shortness of breath.     Cardiovascular:

## 2018-09-11 NOTE — CARE COORDINATION
Case Management Initial Discharge Plan  Jevon Hope,         Readmission Risk              Risk of Unplanned Readmission:        0               Met with:patient to discuss discharge plans. Information verified: address, contacts, phone number, , insurance Yes  PCP: Efren Bassett MD  Date of last visit: 4 months ago    Insurance Provider: Gael    Discharge Planning    Living Arrangements:  [place]:  Family Members, [id number] has 1 stories  2 stairs to climb to get into front door, na stairs to climb to reach second floor  Location of bedroom/bathroom in home first floor     Patient able to perform ADL's:Independent    Current Services (outpatient & in home) none   DME equipment: none   DME provider: none     Pharmacy: Family pharmacy   Potential Assistance Purchasing Medications:  No  Does patient want to participate in local refill/ meds to beds program?  No    Potential Assistance Needed:  N/A    Patient agreeable to home care: No  [place] of choice provided:  n/a    Prior SNF/Rehab Placement and Facility: no  Agreeable to SNF/Rehab: No  [place] of choice provided: n/a   Evaluation: no    Expected Discharge date:     Patient expects to be discharged to:      Follow Up Appointment: Best Day/ Time:      Transportation provider: Family   Transportation arrangements needed for discharge: No    Discharge Plan: Home independently         Electronically signed by Keena Dominguez RN on 18 at 8:03 AM

## 2018-09-11 NOTE — ED PROVIDER NOTES
[address] [facility]     Emergency Department     Faculty Attestation    I performed a history and physical examination of the patient and discussed management with the resident. I reviewed the residents note and agree with the documented findings and plan of care. Any areas of disagreement are noted on the chart. I was personally present for the key portions of any procedures. I have documented in the chart those procedures where I was not present during the key portions. I have reviewed the emergency nurses triage note. I agree with the chief complaint, past medical history, past surgical history, allergies, medications, social and family history as documented unless otherwise noted below. Documentation of the HPI, Physical Exam and Medical Decision Making performed by medical students or scribes is based on my personal performance of the HPI, PE and MDM. For Physician Assistant/ Nurse Practitioner cases/documentation I have personally evaluated this patient and have completed at least one if not all key elements of the E/M (history, physical exam, and MDM). Additional findings are as noted.     Vital signs:   Vitals:    09/11/18 0625   BP: 119/64   Pulse:    Resp:    Temp:    SpO2:       EKG Interpretation    Interpreted by me    Rhythm: normal sinus   Rate: normal  Axis: normal  Ectopy: none  Conduction: normal  ST Segments: no acute change  T Waves: no acute change  Q Waves: none    Clinical Impression: no acute changes and normal EKG            Cassie Luong M.D,  Attending Emergency  Physician          Jamila Glasgow MD  09/11/18 7746

## 2018-09-11 NOTE — H&P
[facility]  CDU / OBSERVATION eNCOUnter  Resident Note     Pt Name: Iram Landa  MRN: [id number]  [place] 1960  Date of evaluation: 9/11/18  Patient's PCP is :  Kiah Guerrero MD    CHIEF COMPLAINT       Chief Complaint   Patient presents with    Chest Pain    Shortness of Breath         HISTORY OF PRESENT ILLNESS    Iram Landa is a 63 y.o. female who presents acute substernal chest pain lasting 1 week. Pain began while moping floor w/o radiation. Pt also complains of SOB, diaphoresis, nausea, and right calf pain. Denies syncope, cough, fever, change in bowel or bladder. Recent - stress dec 2017. Echo 3 months prior normal with  Unchanged MVP. Location/Symptom: substernal chest pain/SOB  Timing/Onset: 1 week   Provocation: nothing  Quality: sharp  Radiation: none  Severity: moderate   Timing/Duration: intermitent  Modifying Factors: none    The pt is admitted to observation for cardiology consult. REVIEW OF SYSTEMS       General ROS - No fevers, No malaise   Ophthalmic ROS - No discharge, No changes in vision  ENT ROS -  No sore throat, No rhinorrhea,   Respiratory ROS - no shortness of breath, no cough, no  wheezing  Cardiovascular ROS - positive chest pain, positive dyspnea on exertion  Gastrointestinal ROS - No abdominal pain, no nausea or vomiting, no change in bowel habits, no black or bloody stools  Genito-Urinary ROS - No dysuria, trouble voiding, or hematuria  Musculoskeletal ROS - No myalgias, No arthalgias  Neurological ROS - No headache, no dizziness/lightheadedness, No focal weakness, no loss of sensation  Dermatological ROS - No lesions, No rash     (PQRS) Advance directives on face sheet per hospital policy. No change unless specifically mentioned in chart    PAST MEDICAL HISTORY    has a past medical history of ASCUS with positive high risk HPV cervical; Asthma; Depression; Diverticulitis; GERD (gastroesophageal reflux disease);  Hyperlipidemia; MRSA unremarkable  · Pt SOB, increased dyspnea on exertion, orthopnea, increased pedal swelling  · Pt has hx of MVP, followed by [facility]  · Cards will see for MVP, will order echo  · Continue home medications (PO)  · Monitor vitals, labs, and imaging  · DISPO: pending consults and clinical improvement    CONSULTS:    IP CONSULT TO CARDIOLOGY    PROCEDURES:  Not indicated       PATIENT REFERRED TO:    Charity Luevano MD  [address] [place] [address],  [place] [id number]  [address] [place] [address],    Emergency Medicine Resident     This dictation was generated by voice recognition computer software. Although all attempts are made to edit the dictation for accuracy, there may be errors in the transcription that are not intended.

## 2018-09-11 NOTE — DISCHARGE SUMMARY
narcotics/ pain killers. Condition: Good    Patient stable and ready for discharge home. I have discussed plan of care with patient and they are in understanding. They were instructed to read discharge paperwork. All of their questions and concerns were addressed. Time Spent: 0 day      --  Jhonathan Tamez,   Emergency Medicine Resident Physician    This dictation was generated by voice recognition computer software. Although all attempts are made to edit the dictation for accuracy, there may be errors in the transcription that are not intended.

## 2018-09-11 NOTE — PROGRESS NOTES
Cardiac Testing     TTE 6/02/18: EF>55%. MVP w/ Mild MR.      STRESS 10/20/17: No ischemia or infarct. EF 62%.      1. Hyperlipidemia. 2. Asthma. 3. History of UTI. 4. Gastroesophageal reflux disease. 5. Former smoker. 10. Strong family history of coronary artery disease, with mother passing away because of a massive heart attack, as well as a brother who is 39years old, having myocardial infarction. Quadrants Reporting?: 0 Mastoid Interpolation Flap Text: A decision was made to reconstruct the defect utilizing an interpolation axial flap and a staged reconstruction.  A telfa template was made of the defect.  This telfa template was then used to outline the mastoid interpolation flap.  The donor area for the pedicle flap was then injected with anesthesia.  The flap was excised through the skin and subcutaneous tissue down to the layer of the underlying musculature.  The pedicle flap was carefully excised within this deep plane to maintain its blood supply.  The edges of the donor site were undermined.   The donor site was closed in a primary fashion.  The pedicle was then rotated into position and sutured.  Once the tube was sutured into place, adequate blood supply was confirmed with blanching and refill.  The pedicle was then wrapped with xeroform gauze and dressed appropriately with a telfa and gauze bandage to ensure continued blood supply and protect the attached pedicle. Consent (Temporal Branch)/Introductory Paragraph: The rationale for Mohs was explained to the patient and consent was obtained. The risks, benefits and alternatives to therapy were discussed in detail. Specifically, the risks of damage to the temporal branch of the facial nerve, infection, scarring, bleeding, prolonged wound healing, incomplete removal, allergy to anesthesia, and recurrence were addressed. Prior to the procedure, the treatment site was clearly identified and confirmed by the patient. All components of Universal Protocol/PAUSE Rule completed. Inflammation Suggestive Of Cancer Camouflage Histology Text: There was a dense lymphocytic infiltrate which prevented adequate histologic evaluation of adjacent structures. Quadrant Reporting?: no X Size Of Lesion In Cm (Optional): 1.1 Show Special Stain Variables In The Stage Tabs: Yes Consent (Nose)/Introductory Paragraph: The rationale for Mohs was explained to the patient and consent was obtained. The risks, benefits and alternatives to therapy were discussed in detail. Specifically, the risks of nasal deformity, changes in the flow of air through the nose, infection, scarring, bleeding, prolonged wound healing, incomplete removal, allergy to anesthesia, nerve injury and recurrence were addressed. Prior to the procedure, the treatment site was clearly identified and confirmed by the patient. All components of Universal Protocol/PAUSE Rule completed. Xenograft Text: The defect edges were debeveled with a #15 scalpel blade.  Given the location of the defect, shape of the defect and the proximity to free margins a xenograft was deemed most appropriate.  The graft was then trimmed to fit the size of the defect.  The graft was then placed in the primary defect and oriented appropriately. Closure 2 Information: This tab is for additional flaps and grafts, including complex repair and grafts and complex repair and flaps. You can also specify a different location for the additional defect, if the location is the same you do not need to select a new one. We will insert the automated text for the repair you select below just as we do for solitary flaps and grafts. Please note that at this time if you select a location with a different insurance zone you will need to override the ICD10 and CPT if appropriate. Bcc Histology Text: There were numerous aggregates of basaloid cells. O-T Advancement Flap Text: The defect edges were debeveled with a #15 scalpel blade.  Given the location of the defect, shape of the defect and the proximity to free margins an O-T advancement flap was deemed most appropriate.  Using a sterile surgical marker, an appropriate advancement flap was drawn incorporating the defect and placing the expected incisions within the relaxed skin tension lines where possible.    The area thus outlined was incised deep to adipose tissue with a #15 scalpel blade.  The skin margins were undermined to an appropriate distance in all directions utilizing iris scissors. Stage 3: Additional Anesthesia Type: 1% lidocaine with 1:100,000 epinephrine and a 1:10 solution of 8.4% sodium bicarbonate Surgeon/Pathologist Verbiage (Will Incorporate Name Of Surgeon From Intro If Not Blank): operated in two distinct and integrated capacities as the surgeon and pathologist. Stage 13: Additional Anesthesia Type: 1% lidocaine with epinephrine Detail Level: Detailed Same Histology In Subsequent Stages Text: The pattern and morphology of the tumor is as described in the first stage. Unna Boot Text: An Unna boot was placed to help immobilize the limb and facilitate more rapid healing. Hatchet Flap Text: The defect edges were debeveled with a #15 scalpel blade.  Given the location of the defect, shape of the defect and the proximity to free margins a hatchet flap was deemed most appropriate.  Using a sterile surgical marker, an appropriate hatchet flap was drawn incorporating the defect and placing the expected incisions within the relaxed skin tension lines where possible.    The area thus outlined was incised deep to adipose tissue with a #15 scalpel blade.  The skin margins were undermined to an appropriate distance in all directions utilizing iris scissors. Rhombic Flap Text: The defect edges were debeveled with a #15 scalpel blade.  Given the location of the defect and the proximity to free margins a rhombic flap was deemed most appropriate.  Using a sterile surgical marker, an appropriate rhombic flap was drawn incorporating the defect.    The area thus outlined was incised deep to adipose tissue with a #15 scalpel blade.  The skin margins were undermined to an appropriate distance in all directions utilizing iris scissors. Scc Histology Text: Hyperkeratosis and parakeratosis overlying a squamous proliferation with invasion of the reticular dermis with abnormal keratinization, mitoses, and dysmaturation. Composite Graft Text: The defect edges were debeveled with a #15 scalpel blade.  Given the location of the defect, shape of the defect, the proximity to free margins and the fact the defect was full thickness a composite graft was deemed most appropriate.  The defect was outline and then transferred to the donor site.  A full thickness graft was then excised from the donor site. The graft was then placed in the primary defect, oriented appropriately and then sutured into place.  The secondary defect was then repaired using a primary closure. Cheek-To-Nose Interpolation Flap Text: A decision was made to reconstruct the defect utilizing an interpolation axial flap and a staged reconstruction.  A telfa template was made of the defect.  This telfa template was then used to outline the Cheek-To-Nose Interpolation flap.  The donor area for the pedicle flap was then injected with anesthesia.  The flap was excised through the skin and subcutaneous tissue down to the layer of the underlying musculature.  The interpolation flap was carefully excised within this deep plane to maintain its blood supply.  The edges of the donor site were undermined.   The donor site was closed in a primary fashion.  The pedicle was then rotated into position and sutured.  Once the tube was sutured into place, adequate blood supply was confirmed with blanching and refill.  The pedicle was then wrapped with xeroform gauze and dressed appropriately with a telfa and gauze bandage to ensure continued blood supply and protect the attached pedicle. Consent 2/Introductory Paragraph: Mohs surgery was explained to the patient and consent was obtained. The risks, benefits and alternatives to therapy were discussed in detail. Specifically, the risks of infection, scarring, bleeding, prolonged wound healing, incomplete removal, allergy to anesthesia, nerve injury and recurrence were addressed. Prior to the procedure, the treatment site was clearly identified and confirmed by the patient. All components of Universal Protocol/PAUSE Rule completed. Deep Sutures: 4-0 Monocryl Island Pedicle Flap Text: The defect edges were debeveled with a #15 scalpel blade.  Given the location of the defect and the proximity to free margins a bilobe flap was deemed most appropriate.  Using a sterile surgical marker, an appropriate bilobe flap drawn around the defect.    The area thus outlined was incised deep to adipose tissue with a #15 scalpel blade.  The skin margins were undermined to an appropriate distance in all directions utilizing iris scissors. Dermal Autograft Text: The defect edges were debeveled with a #15 scalpel blade.  Given the location of the defect, shape of the defect and the proximity to free margins a dermal autograft was deemed most appropriate.  Using a sterile surgical marker, the primary defect shape was transferred to the donor site. The area thus outlined was incised deep to adipose tissue with a #15 scalpel blade.  The harvested graft was then trimmed of adipose and epidermal tissue until only dermis was left.  The skin graft was then placed in the primary defect and oriented appropriately. Epidermal Sutures: 5-0 Prolene V-Y Plasty Text: The defect edges were debeveled with a #15 scalpel blade.  Given the location of the defect, shape of the defect and the proximity to free margins an V-Y advancement flap was deemed most appropriate.  Using a sterile surgical marker, an appropriate advancement flap was drawn incorporating the defect and placing the expected incisions within the relaxed skin tension lines where possible.    The area thus outlined was incised deep to adipose tissue with a #15 scalpel blade.  The skin margins were undermined to an appropriate distance in all directions utilizing iris scissors. Dressing: pressure dressing with telfa Partial Purse String (Intermediate) Text: Given the location of the defect and the characteristics of the surrounding skin an intermediate purse string closure was deemed most appropriate.  Undermining was performed circumfirentially around the surgical defect.  A purse string suture was then placed and tightened. Wound tension only allowed a partial closure of the circular defect. Consent (Near Eyelid Margin)/Introductory Paragraph: The rationale for Mohs was explained to the patient and consent was obtained. The risks, benefits and alternatives to therapy were discussed in detail. Specifically, the risks of ectropion or eyelid deformity, infection, scarring, bleeding, prolonged wound healing, incomplete removal, allergy to anesthesia, nerve injury and recurrence were addressed. Prior to the procedure, the treatment site was clearly identified and confirmed by the patient. All components of Universal Protocol/PAUSE Rule completed. S Plasty Text: Given the location and shape of the defect, and the orientation of relaxed skin tension lines, an S-plasty was deemed most appropriate for repair.  Using a sterile surgical marker, the appropriate outline of the S-plasty was drawn, incorporating the defect and placing the expected incisions within the relaxed skin tension lines where possible.  The area thus outlined was incised deep to adipose tissue with a #15 scalpel blade.  The skin margins were undermined to an appropriate distance in all directions utilizing iris scissors. The skin flaps were advanced over the defect.  The opposing margins were then approximated with interrupted buried subcutaneous sutures. W Plasty Text: The lesion was extirpated to the level of the fat with a #15 scalpel blade.  Given the location of the defect, shape of the defect and the proximity to free margins a W-plasty was deemed most appropriate for repair.  Using a sterile surgical marker, the appropriate transposition arms of the W-plasty were drawn incorporating the defect and placing the expected incisions within the relaxed skin tension lines where possible.    The area thus outlined was incised deep to adipose tissue with a #15 scalpel blade.  The skin margins were undermined to an appropriate distance in all directions utilizing iris scissors.  The opposing transposition arms were then transposed into place in opposite direction and anchored with interrupted buried subcutaneous sutures. Surgeon Performing Repair (Optional): Nader James MD Alternatives Discussed Intro (Do Not Add Period): I discussed alternative treatments to Mohs surgery and specifically discussed the risks and benefits of radiation, topical chemotherapy, and excision without using the Mohs techniques Alar Island Pedicle Flap Text: The defect edges were debeveled with a #15 scalpel blade.  Given the location of the defect, shape of the defect and the proximity to the alar rim an island pedicle advancement flap was deemed most appropriate.  Using a sterile surgical marker, an appropriate advancement flap was drawn incorporating the defect, outlining the appropriate donor tissue and placing the expected incisions within the nasal ala running parallel to the alar rim. The area thus outlined was incised with a #15 scalpel blade.  The skin margins were undermined minimally to an appropriate distance in all directions around the primary defect and laterally outward around the island pedicle utilizing iris scissors.  There was minimal undermining beneath the pedicle flap. Eye Protection Verbiage: Before proceeding with the stage, a plastic scleral shield was inserted. The globe was anesthetized with a few drops of 1% lidocaine with 1:100,000 epinephrine. Then, an appropriate sized scleral shield was chosen and coated with lacrilube ointment. The shield was gently inserted and left in place for the duration of the procedure. After the procedure was completed, the shield was gently removed. Muscle Hinge Flap Text: The defect edges were debeveled with a #15 scalpel blade.  Given the size, depth and location of the defect and the proximity to free margins a muscle hinge flap was deemed most appropriate.  Using a sterile surgical marker, an appropriate hinge flap was drawn incorporating the defect. The area thus outlined was incised with a #15 scalpel blade.  The skin margins were undermined to an appropriate distance in all directions utilizing iris scissors. Graft Donor Site Bandage (Optional-Leave Blank If You Don't Want In Note): A pressure bandage was applied to the donor site. Island Pedicle Flap-Requiring Vessel Identification Text: The defect edges were debeveled with a #15 scalpel blade.  Given the location of the defect, shape of the defect and the proximity to free margins an island pedicle advancement flap was deemed most appropriate.  Using a sterile surgical marker, an appropriate advancement flap was drawn, based on the axial vessel mentioned above, incorporating the defect, outlining the appropriate donor tissue and placing the expected incisions within the relaxed skin tension lines where possible.    The area thus outlined was incised deep to adipose tissue with a #15 scalpel blade.  The skin margins were undermined to an appropriate distance in all directions around the primary defect and laterally outward around the island pedicle utilizing iris scissors.  There was minimal undermining beneath the pedicle flap. Area H Indication Text: Tumors in this location are included in Area H (eyelids, eyebrows, nose, lips, chin, ear, pre-auricular, post-auricular, temple, genitalia, hands, feet, ankles and areola).  Tissue conservation is critical in these anatomic locations. Tarsorrhaphy Text: A tarsorrhaphy was performed using Frost sutures. Mohs Case Number:  Bilateral Helical Rim Advancement Flap Text: The defect edges were debeveled with a #15 blade scalpel.  Given the location of the defect and the proximity to free margins (helical rim) a bilateral helical rim advancement flap was deemed most appropriate.  Using a sterile surgical marker, the appropriate advancement flaps were drawn incorporating the defect and placing the expected incisions between the helical rim and antihelix where possible.  The area thus outlined was incised through and through with a #15 scalpel blade.  With a skin hook and iris scissors, the flaps were gently and sharply undermined and freed up. Mohs Rapid Report Verbiage: The area of clinically evident tumor was marked and appropriately hatched.  The initial incision was made following the Mohs approach through the skin.  The specimen was taken to the lab, divided into the necessary number of pieces, chromacoded and processed according to the Mohs protocol.  In accordance with the Mohs technique, this procedure enabled the maximum amount of normal tissue to be preserved while achieving the highest cure rate for cutaneous malignancy. This was repeated in successive stages until a tumor free defect was achieved. At each surgical stage, the patient was prepped, the proposed excision outlined on the skin, and the area was reanesthetized as needed. Hemostasis: Electrocautery Simple / Intermediate / Complex Repair - Final Wound Length In Cm: 4.3 Skin Substitute Text: The defect edges were debeveled with a #15 scalpel blade.  Given the location of the defect, shape of the defect and the proximity to free margins a skin substitute graft was deemed most appropriate.  The graft material was trimmed to fit the size of the defect. The graft was then placed in the primary defect and oriented appropriately. Epidermal Closure: simple interrupted Cheek Interpolation Flap Text: A decision was made to reconstruct the defect utilizing an interpolation axial flap and a staged reconstruction.  A telfa template was made of the defect.  This telfa template was then used to outline the Cheek Interpolation flap.  The donor area for the pedicle flap was then injected with anesthesia.  The flap was excised through the skin and subcutaneous tissue down to the layer of the underlying musculature.  The interpolation flap was carefully excised within this deep plane to maintain its blood supply.  The edges of the donor site were undermined.   The donor site was closed in a primary fashion.  The pedicle was then rotated into position and sutured.  Once the tube was sutured into place, adequate blood supply was confirmed with blanching and refill.  The pedicle was then wrapped with xeroform gauze and dressed appropriately with a telfa and gauze bandage to ensure continued blood supply and protect the attached pedicle. Split-Thickness Skin Graft Text: The defect edges were debeveled with a #15 scalpel blade.  Given the location of the defect, shape of the defect and the proximity to free margins a split thickness skin graft was deemed most appropriate.  Using a sterile surgical marker, the primary defect shape was transferred to the donor site. The split thickness graft was then harvested.  The skin graft was then placed in the primary defect and oriented appropriately. Bilobed Transposition Flap Text: The defect edges were debeveled with a #15 scalpel blade.  Given the location of the defect and the proximity to free margins a bilobed transposition flap was deemed most appropriate.  Using a sterile surgical marker, an appropriate bilobe flap drawn around the defect.    The area thus outlined was incised deep to adipose tissue with a #15 scalpel blade.  The skin margins were undermined to an appropriate distance in all directions utilizing iris scissors. Consent (Lip)/Introductory Paragraph: The rationale for Mohs was explained to the patient and consent was obtained. The risks, benefits and alternatives to therapy were discussed in detail. Specifically, the risks of lip deformity, changes in the oral aperture, infection, scarring, bleeding, prolonged wound healing, incomplete removal, allergy to anesthesia, nerve injury and recurrence were addressed. Prior to the procedure, the treatment site was clearly identified and confirmed by the patient. All components of Universal Protocol/PAUSE Rule completed. Spiral Flap Text: The defect edges were debeveled with a #15 scalpel blade.  Given the location of the defect, shape of the defect and the proximity to free margins a spiral flap was deemed most appropriate.  Using a sterile surgical marker, an appropriate rotation flap was drawn incorporating the defect and placing the expected incisions within the relaxed skin tension lines where possible. The area thus outlined was incised deep to adipose tissue with a #15 scalpel blade.  The skin margins were undermined to an appropriate distance in all directions utilizing iris scissors. Anesthesia Volume In Cc: 2 Bcc Infiltrative Histology Text: There were numerous aggregates of basaloid cells demonstrating an infiltrative pattern. Previous Accession (Optional): [id number] Manual Repair Warning Statement: We plan on removing the manually selected variable below in favor of our much easier automatic structured text blocks found in the previous tab. We decided to do this to help make the flow better and give you the full power of structured data. Manual selection is never going to be ideal in our platform and I would encourage you to avoid using manual selection from this point on, especially since I will be sunsetting this feature. It is important that you do one of two things with the customized text below. First, you can save all of the text in a word file so you can have it for future reference. Second, transfer the text to the appropriate area in the Library tab. Lastly, if there is a flap or graft type which we do not have you need to let us know right away so I can add it in before the variable is hidden. No need to panic, we plan to give you roughly 6 months to make the change. Burow's Advancement Flap Text: The defect edges were debeveled with a #15 scalpel blade.  Given the location of the defect and the proximity to free margins a Burow's advancement flap was deemed most appropriate.  Using a sterile surgical marker, the appropriate advancement flap was drawn incorporating the defect and placing the expected incisions within the relaxed skin tension lines where possible.    The area thus outlined was incised deep to adipose tissue with a #15 scalpel blade.  The skin margins were undermined to an appropriate distance in all directions utilizing iris scissors. Advancement Flap (Single) Text: The defect edges were debeveled with a #15 scalpel blade.  Given the location of the defect and the proximity to free margins a single advancement flap was deemed most appropriate.  Using a sterile surgical marker, an appropriate advancement flap was drawn incorporating the defect and placing the expected incisions within the relaxed skin tension lines where possible.    The area thus outlined was incised deep to adipose tissue with a #15 scalpel blade.  The skin margins were undermined to an appropriate distance in all directions utilizing iris scissors. O-T Plasty Text: The defect edges were debeveled with a #15 scalpel blade.  Given the location of the defect, shape of the defect and the proximity to free margins an O-T plasty was deemed most appropriate.  Using a sterile surgical marker, an appropriate O-T plasty was drawn incorporating the defect and placing the expected incisions within the relaxed skin tension lines where possible.    The area thus outlined was incised deep to adipose tissue with a #15 scalpel blade.  The skin margins were undermined to an appropriate distance in all directions utilizing iris scissors. Primary Defect Width In Cm (Final Defect Size - Required For Flaps/Grafts): 1.3 Tumor Debulked?: curette Ftsg Text: The defect edges were debeveled with a #15 scalpel blade.  Given the location of the defect, shape of the defect and the proximity to free margins a full thickness skin graft was deemed most appropriate.  Using a sterile surgical marker, the primary defect shape was transferred to the donor site. The area thus outlined was incised deep to adipose tissue with a #15 scalpel blade.  The harvested graft was then trimmed of adipose tissue until only dermis and epidermis was left.  The skin margins of the secondary defect were undermined to an appropriate distance in all directions utilizing iris scissors.  The secondary defect was closed with interrupted buried subcutaneous sutures.  The skin edges were then re-apposed with interrupted sutures.  The skin graft was then placed in the primary defect and oriented appropriately. The graft was sewn in place using simple interrupted sutures. Purse String (Simple) Text: A pursestring with guiding sutures was selected to achieve optimal aesthetic result, restore normal anatomic appearance and avoid distortion of normal anatomy and expedite and facilitate wound healing. The patient was prepped, and draped in the usual manner.  A buried pursestring suture was placed circumferentially around the defect.  The suture was secured and tied resulting in peripheral tissue advancement, with a reduction in the size of the surgical defect.  The remaining central defect will be allowed to heal by secondary intention.  The wound was dressed with a sterile pressure dressing.  The patient tolerated the procedure well and will return for follow up in 2 to 3 weeks. Mucosal Advancement Flap Text: Given the location of the defect, shape of the defect and the proximity to free margins a mucosal advancement flap was deemed most appropriate. Incisions were made with a 15 blade scalpel in the appropriate fashion along the cutaneous vermilion border and the mucosal lip. The remaining actinically damaged mucosal tissue was excised.  The mucosal advancement flap was then elevated to the gingival sulcus with care taken to preserve the neurovascular structures and advanced into the primary defect. Care was taken to ensure that precise realignment of the vermilion border was achieved. No Residual Tumor Seen Histology Text: There were no malignant cells seen in the sections examined. Partial Purse String (Simple) Text: Given the location of the defect and the characteristics of the surrounding skin a simple purse string closure was deemed most appropriate.  Undermining was performed circumfirentially around the surgical defect.  A purse string suture was then placed and tightened. Wound tension only allowed a partial closure of the circular defect. Complex Repair Preamble Text (Leave Blank If You Do Not Want): Burow's triangles were removed to repair standing cone deformities. These defects were repaired with the same technique and sutured.\\nThe wound was partially closed with extensive undermining. There was not enough tissue laxity to allow for complete closure. Consent 3/Introductory Paragraph: I gave the patient a chance to ask questions they had about the procedure.  Following this I explained the Mohs procedure and consent was obtained. The risks, benefits and alternatives to therapy were discussed in detail. Specifically, the risks of infection, scarring, bleeding, prolonged wound healing, incomplete removal, allergy to anesthesia, nerve injury and recurrence were addressed. Prior to the procedure, the treatment site was clearly identified and confirmed by the patient. All components of Universal Protocol/PAUSE Rule completed. Home Suture Removal Text: Patient was provided instructions on removing sutures and will remove their sutures at home.  If they have any questions or difficulties they will call the office. Subsequent Stages Histo Method Verbiage: Using a similar technique to that described above, a thin layer of tissue was removed from all areas where tumor was visible on the previous stage.  The tissue was again oriented, mapped, dyed, and processed as above. Star Wedge Flap Text: The defect edges were debeveled with a #15 scalpel blade.  Given the location of the defect, shape of the defect and the proximity to free margins a star wedge flap was deemed most appropriate.  Using a sterile surgical marker, an appropriate rotation flap was drawn incorporating the defect and placing the expected incisions within the relaxed skin tension lines where possible. The area thus outlined was incised deep to adipose tissue with a #15 scalpel blade.  The skin margins were undermined to an appropriate distance in all directions utilizing iris scissors. Consent (Ear)/Introductory Paragraph: The rationale for Mohs was explained to the patient and consent was obtained. The risks, benefits and alternatives to therapy were discussed in detail. Specifically, the risks of ear deformity, infection, scarring, bleeding, prolonged wound healing, incomplete removal, allergy to anesthesia, nerve injury and recurrence were addressed. Prior to the procedure, the treatment site was clearly identified and confirmed by the patient. All components of Universal Protocol/PAUSE Rule completed. A-T Advancement Flap Text: The defect edges were debeveled with a #15 scalpel blade.  Given the location of the defect, shape of the defect and the proximity to free margins an A-T advancement flap was deemed most appropriate.  Using a sterile surgical marker, an appropriate advancement flap was drawn incorporating the defect and placing the expected incisions within the relaxed skin tension lines where possible.    The area thus outlined was incised deep to adipose tissue with a #15 scalpel blade.  The skin margins were undermined to an appropriate distance in all directions utilizing iris scissors. Suture Removal: 7 days Epidermal Autograft Text: The defect edges were debeveled with a #15 scalpel blade.  Given the location of the defect, shape of the defect and the proximity to free margins an epidermal autograft was deemed most appropriate.  Using a sterile surgical marker, the primary defect shape was transferred to the donor site. The epidermal graft was then harvested.  The skin graft was then placed in the primary defect and oriented appropriately. Melolabial Interpolation Flap Text: A decision was made to reconstruct the defect utilizing an interpolation axial flap and a staged reconstruction.  A telfa template was made of the defect.  This telfa template was then used to outline the melolabial interpolation flap.  The donor area for the pedicle flap was then injected with anesthesia.  The flap was excised through the skin and subcutaneous tissue down to the layer of the underlying musculature.  The pedicle flap was carefully excised within this deep plane to maintain its blood supply.  The edges of the donor site were undermined.   The donor site was closed in a primary fashion.  The pedicle was then rotated into position and sutured.  Once the tube was sutured into place, adequate blood supply was confirmed with blanching and refill.  The pedicle was then wrapped with xeroform gauze and dressed appropriately with a telfa and gauze bandage to ensure continued blood supply and protect the attached pedicle. Referred To Plastics For Closure Text (Leave Blank If You Do Not Want): After obtaining clear surgical margins the patient was sent to Stan Canchola M.D. for surgical repair. H Plasty Text: Given the location of the defect, shape of the defect and the proximity to free margins a H-plasty was deemed most appropriate for repair.  Using a sterile surgical marker, the appropriate advancement arms of the H-plasty were drawn incorporating the defect and placing the expected incisions within the relaxed skin tension lines where possible. The area thus outlined was incised deep to adipose tissue with a #15 scalpel blade. The skin margins were undermined to an appropriate distance in all directions utilizing iris scissors.  The opposing advancement arms were then advanced into place in opposite direction and anchored with interrupted buried subcutaneous sutures. Consent (Marginal Mandibular)/Introductory Paragraph: The rationale for Mohs was explained to the patient and consent was obtained. The risks, benefits and alternatives to therapy were discussed in detail. Specifically, the risks of damage to the marginal mandibular branch of the facial nerve, infection, scarring, bleeding, prolonged wound healing, incomplete removal, allergy to anesthesia, and recurrence were addressed. Prior to the procedure, the treatment site was clearly identified and confirmed by the patient. All components of Universal Protocol/PAUSE Rule completed. Post-Care Instructions: I reviewed with the patient in detail post-care instructions. Patient is not to engage in any heavy lifting, exercise, or swimming for the next 14 days. Should the patient develop any fevers, chills, bleeding, severe pain patient will contact the office immediately. Full Thickness Lip Wedge Repair (Flap) Text: Given the location of the defect and the proximity to free margins a full thickness wedge repair was deemed most appropriate.  Using a sterile surgical marker, the appropriate repair was drawn incorporating the defect and placing the expected incisions perpendicular to the vermilion border.  The vermilion border was also meticulously outlined to ensure appropriate reapproximation during the repair.  The area thus outlined was incised through and through with a #15 scalpel blade.  The muscularis and dermis were reaproximated with deep sutures following hemostasis. Care was taken to realign the vermilion border before proceeding with the superficial closure.  Once the vermilion was realigned the superfical and mucosal closure was finished. Closure 4 Information: This tab is for additional flaps and grafts above and beyond our usual structured repairs.  Please note if you enter information here it will not currently bill and you will need to add the billing information manually. Complex Repair And Graft Additional Text (Will Appearing After The Standard Complex Repair Text): The complex repair was not sufficient to completely close the primary defect. The remaining additional defect was repaired with the graft mentioned below. Area L Indication Text: Tumors in this location are included in Area L (trunk and extremities).  Mohs surgery is indicated for larger tumors, 2 cm or larger, in these anatomic locations. Number Of Stages: 1 Bi-Rhombic Flap Text: The defect edges were debeveled with a #15 scalpel blade.  Given the location of the defect and the proximity to free margins a bi-rhombic flap was deemed most appropriate.  Using a sterile surgical marker, an appropriate rhombic flap was drawn incorporating the defect. The area thus outlined was incised deep to adipose tissue with a #15 scalpel blade.  The skin margins were undermined to an appropriate distance in all directions utilizing iris scissors. Repair Anesthesia Method: local infiltration Modified Advancement Flap Text: The defect edges were debeveled with a #15 scalpel blade.  Given the location of the defect, shape of the defect and the proximity to free margins a modified advancement flap was deemed most appropriate.  Using a sterile surgical marker, an appropriate advancement flap was drawn incorporating the defect and placing the expected incisions within the relaxed skin tension lines where possible.    The area thus outlined was incised deep to adipose tissue with a #15 scalpel blade.  The skin margins were undermined to an appropriate distance in all directions utilizing iris scissors. Postop Diagnosis: same Referred To Mid-Level For Closure Text (Leave Blank If You Do Not Want): After obtaining clear surgical margins the patient was sent to a mid-level provider for surgical repair.  The patient understands they will receive post-surgical care and follow-up from the mid-level provider. Dorsal Nasal Flap Text: The defect edges were debeveled with a #15 scalpel blade.  Given the location of the defect and the proximity to free margins a dorsal nasal flap was deemed most appropriate.  Using a sterile surgical marker, an appropriate dorsal nasal flap was drawn around the defect.    The area thus outlined was incised deep to adipose tissue with a #15 scalpel blade.  The skin margins were undermined to an appropriate distance in all directions utilizing iris scissors. Consent (Spinal Accessory)/Introductory Paragraph: The rationale for Mohs was explained to the patient and consent was obtained. The risks, benefits and alternatives to therapy were discussed in detail. Specifically, the risks of damage to the spinal accessory nerve, infection, scarring, bleeding, prolonged wound healing, incomplete removal, allergy to anesthesia, and recurrence were addressed. Prior to the procedure, the treatment site was clearly identified and confirmed by the patient. All components of Universal Protocol/PAUSE Rule completed. Estimated Blood Loss (Cc): minimal Repair Performed By Another Provider Text (Leave Blank If You Do Not Want): After obtaining clear surgical margins the defect was repaired by another provider. Cartilage Graft Text: The defect edges were debeveled with a #15 scalpel blade.  Given the location of the defect, shape of the defect, the fact the defect involved a full thickness cartilage defect a cartilage graft was deemed most appropriate.  An appropriate donor site was identified, cleansed, and anesthetized. The cartilage graft was then harvested and transferred to the recipient site, oriented appropriately and then sutured into place.  The secondary defect was then repaired using a primary closure. Crescentic Advancement Flap Text: The defect edges were debeveled with a #15 scalpel blade.  Given the location of the defect and the proximity to free margins a crescentic advancement flap was deemed most appropriate.  Using a sterile surgical marker, the appropriate advancement flap was drawn incorporating the defect and placing the expected incisions within the relaxed skin tension lines where possible.    The area thus outlined was incised deep to adipose tissue with a #15 scalpel blade.  The skin margins were undermined to an appropriate distance in all directions utilizing iris scissors. Mauc Instructions: By selecting yes to the question below the MAUC number will be added into the note.  This will be calculated automatically based on the diagnosis chosen, the size entered, the body zone selected (H,M,L) and the specific indications you chose. You will also have the option to override the Mohs AUC if you disagree with the automatically calculated number and this option is found in the Case Summary tab. Complex Repair And Flap Additional Text (Will Appearing After The Standard Complex Repair Text): The complex repair was not sufficient to completely close the primary defect. The remaining additional defect was repaired with the flap mentioned below. Wound Care: Vaseline Rotation Flap Text: The defect edges were debeveled with a #15 scalpel blade.  Given the location of the defect, shape of the defect and the proximity to free margins a rotation flap was deemed most appropriate.  Using a sterile surgical marker, an appropriate rotation flap was drawn incorporating the defect and placing the expected incisions within the relaxed skin tension lines where possible.    The area thus outlined was incised deep to adipose tissue with a #15 scalpel blade.  The skin margins were undermined to an appropriate distance in all directions utilizing iris scissors. Z Plasty Text: The lesion was extirpated to the level of the fat with a #15 scalpel blade.  Given the location of the defect, shape of the defect and the proximity to free margins a Z-plasty was deemed most appropriate for repair.  Using a sterile surgical marker, the appropriate transposition arms of the Z-plasty were drawn incorporating the defect and placing the expected incisions within the relaxed skin tension lines where possible.    The area thus outlined was incised deep to adipose tissue with a #15 scalpel blade.  The skin margins were undermined to an appropriate distance in all directions utilizing iris scissors.  The opposing transposition arms were then transposed into place in opposite direction and anchored with interrupted buried subcutaneous sutures. Primary Defect Length In Cm (Final Defect Size - Required For Flaps/Grafts): 2.3 Double Island Pedicle Flap Text: The defect edges were debeveled with a #15 scalpel blade.  Given the location of the defect, shape of the defect and the proximity to free margins a double island pedicle advancement flap was deemed most appropriate.  Using a sterile surgical marker, an appropriate advancement flap was drawn incorporating the defect, outlining the appropriate donor tissue and placing the expected incisions within the relaxed skin tension lines where possible.    The area thus outlined was incised deep to adipose tissue with a #15 scalpel blade.  The skin margins were undermined to an appropriate distance in all directions around the primary defect and laterally outward around the island pedicle utilizing iris scissors.  There was minimal undermining beneath the pedicle flap. Medical Necessity Statement: Based on my medical judgement, Mohs surgery is the most appropriate treatment for this cancer compared to other treatments. Ear Star Wedge Flap Text: The defect edges were debeveled with a #15 blade scalpel.  Given the location of the defect and the proximity to free margins (helical rim) an ear star wedge flap was deemed most appropriate.  Using a sterile surgical marker, the appropriate flap was drawn incorporating the defect and placing the expected incisions between the helical rim and antihelix where possible.  The area thus outlined was incised through and through with a #15 scalpel blade. O-Z Plasty Text: The defect edges were debeveled with a #15 scalpel blade.  Given the location of the defect, shape of the defect and the proximity to free margins an O-Z plasty (double transposition flap) was deemed most appropriate.  Using a sterile surgical marker, the appropriate transposition flaps were drawn incorporating the defect and placing the expected incisions within the relaxed skin tension lines where possible.    The area thus outlined was incised deep to adipose tissue with a #15 scalpel blade.  The skin margins were undermined to an appropriate distance in all directions utilizing iris scissors.  Hemostasis was achieved with electrocautery.  The flaps were then transposed into place, one clockwise and the other counterclockwise, and anchored with interrupted buried subcutaneous sutures. Paramedian Forehead Flap Text: A decision was made to reconstruct the defect utilizing an interpolation axial flap and a staged reconstruction.  A telfa template was made of the defect.  This telfa template was then used to outline the paramedian forehead pedicle flap.  The donor area for the pedicle flap was then injected with anesthesia.  The flap was excised through the skin and subcutaneous tissue down to the layer of the underlying musculature.  The pedicle flap was carefully excised within this deep plane to maintain its blood supply.  The edges of the donor site were undermined.   The donor site was closed in a primary fashion.  The pedicle was then rotated into position and sutured.  Once the tube was sutured into place, adequate blood supply was confirmed with blanching and refill.  The pedicle was then wrapped with xeroform gauze and dressed appropriately with a telfa and gauze bandage to ensure continued blood supply and protect the attached pedicle. Advancement Flap (Double) Text: The defect edges were debeveled with a #15 scalpel blade.  Given the location of the defect and the proximity to free margins a double advancement flap was deemed most appropriate.  Using a sterile surgical marker, the appropriate advancement flaps were drawn incorporating the defect and placing the expected incisions within the relaxed skin tension lines where possible.    The area thus outlined was incised deep to adipose tissue with a #15 scalpel blade.  The skin margins were undermined to an appropriate distance in all directions utilizing iris scissors. Tissue Cultured Epidermal Autograft Text: The defect edges were debeveled with a #15 scalpel blade.  Given the location of the defect, shape of the defect and the proximity to free margins a tissue cultured epidermal autograft was deemed most appropriate.  The graft was then trimmed to fit the size of the defect.  The graft was then placed in the primary defect and oriented appropriately. Repair Type: Complex Repair Graft Donor Site Dermal Sutures (Optional): 4-0 Vicryl Consent (Scalp)/Introductory Paragraph: The rationale for Mohs was explained to the patient and consent was obtained. The risks, benefits and alternatives to therapy were discussed in detail. Specifically, the risks of changes in hair growth pattern secondary to repair, infection, scarring, bleeding, prolonged wound healing, incomplete removal, allergy to anesthesia, nerve injury and recurrence were addressed. Prior to the procedure, the treatment site was clearly identified and confirmed by the patient. All components of Universal Protocol/PAUSE Rule completed. Anesthesia Volume In Cc: 5 Anesthesia Type: 1% lidocaine with epinephrine and a 1:10 solution of 8.4% sodium bicarbonate Island Pedicle Flap With Canthal Suspension Text: The defect edges were debeveled with a #15 scalpel blade.  Given the location of the defect, shape of the defect and the proximity to free margins an island pedicle advancement flap was deemed most appropriate.  Using a sterile surgical marker, an appropriate advancement flap was drawn incorporating the defect, outlining the appropriate donor tissue and placing the expected incisions within the relaxed skin tension lines where possible. The area thus outlined was incised deep to adipose tissue with a #15 scalpel blade.  The skin margins were undermined to an appropriate distance in all directions around the primary defect and laterally outward around the island pedicle utilizing iris scissors.  There was minimal undermining beneath the pedicle flap. A suspension suture was placed in the canthal tendon to prevent tension and prevent ectropion. Keystone Flap Text: The defect edges were debeveled with a #15 scalpel blade.  Given the location of the defect, shape of the defect a keystone flap was deemed most appropriate.  Using a sterile surgical marker, an appropriate keystone flap was drawn incorporating the defect, outlining the appropriate donor tissue and placing the expected incisions within the relaxed skin tension lines where possible. The area thus outlined was incised deep to adipose tissue with a #15 scalpel blade.  The skin margins were undermined to an appropriate distance in all directions around the primary defect and laterally outward around the flap utilizing iris scissors. Secondary Intention Text (Leave Blank If You Do Not Want): The defect will heal with secondary intention. Area M Indication Text: Tumors in this location are included in Area M (cheek, forehead, scalp, neck, jawline and pretibial skin).  Mohs surgery is indicated for tumors 1 cm or larger in these anatomic locations. Location Indication Override (Is Already Calculated Based On Selected Body Location): Area H Transposition Flap Text: The defect edges were debeveled with a #15 scalpel blade.  Given the location of the defect and the proximity to free margins a transposition flap was deemed most appropriate.  Using a sterile surgical marker, an appropriate transposition flap was drawn incorporating the defect.    The area thus outlined was incised deep to adipose tissue with a #15 scalpel blade.  The skin margins were undermined to an appropriate distance in all directions utilizing iris scissors. Helical Rim Advancement Flap Text: The defect edges were debeveled with a #15 blade scalpel.  Given the location of the defect and the proximity to free margins (helical rim) a double helical rim advancement flap was deemed most appropriate.  Using a sterile surgical marker, the appropriate advancement flaps were drawn incorporating the defect and placing the expected incisions between the helical rim and antihelix where possible.  The area thus outlined was incised through and through with a #15 scalpel blade.  With a skin hook and iris scissors, the flaps were gently and sharply undermined and freed up. Consent Type: Consent 1 (Standard)

## 2018-09-11 NOTE — ED PROVIDER NOTES
Hysterectomy (1996); knee surgery (Left); Cystocopy (2/25/2015); Nerve Block (07/28/2017); Nerve Block (09/22/2017); Nerve Block (09/22/2017); Nerve Block (11/10/2017); Colonoscopy (07/19/2018); Colonoscopy (07/19/2018); and Colonoscopy (N/A, 7/19/2018). Social History     Social History    Marital status:      Spouse name: N/A    Number of children: N/A    Years of education: N/A     Occupational History    Not on file. Social History Main Topics    Smoking status: Former Smoker     Packs/day: 2.00     Years: 0.50     Types: Cigarettes     Quit date: 1995    Smokeless tobacco: Never Used    Alcohol use 0.0 oz/week      Comment: social    Drug use: No    Sexual activity: Not on file     Other Topics Concern    Not on file     Social History Narrative    No narrative on file       Family History   Problem Relation Age of Onset    High Blood Pressure Father        Allergies: Other; Shrimp (diagnostic); Famotidine; and Gabapentin    Home Medications:  Prior to Admission medications    Medication Sig Start Date End Date Taking?  Authorizing Provider   pravastatin (PRAVACHOL) 40 MG tablet TAKE 1 TABLET BY MOUTH DAILY 8/6/18   Micaela Cervantes MD   baclofen (LIORESAL) 10 MG tablet TAKE 1 TABLET BY MOUTH THREE TIMES DAILY 7/20/18   Micaela Cervantes MD   venlafaxine (EFFEXOR) 75 MG tablet Take 1 tablet by mouth 3 times daily 7/20/18   Micaela Cervantes MD   ranitidine (ZANTAC) 150 MG tablet TAKE 1 TABLET BY MOUTH TWICE DAILY 7/20/18   Micaela Cervantes MD   meloxicam (MOBIC) 15 MG tablet TAKE 1 TABLET BY MOUTH DAILY 7/20/18   Micaela Cervantes MD   metoprolol succinate (TOPROL XL) 25 MG extended release tablet Take 25 mg by mouth daily    Historical Provider, MD   benzonatate (TESSALON) 100 MG capsule Take 100 mg by mouth 3 times daily as needed for Cough    Historical Provider, MD   ketotifen (ZADITOR) 0.025 % ophthalmic solution Place 1 drop into both eyes 2 times daily    Historical Provider, MD Leopoldo Malay, MD   fluticasone [facility]) 50 MCG/ACT nasal spray 2 sprays by Nasal route daily 4/19/16   Leopoldo Malay, MD       REVIEW OF SYSTEMS    (2-9 systems for level 4, 10 or more for level 5)      Review of Systems   Constitutional: Positive for diaphoresis. HENT: Negative. Eyes: Negative. Respiratory: Positive for shortness of breath. Cardiovascular: Positive for chest pain. Gastrointestinal: Positive for nausea. Endocrine: Negative. Genitourinary: Negative. Musculoskeletal: Negative. Skin: Negative. Allergic/Immunologic: Negative. Neurological: Positive for dizziness and light-headedness. Hematological: Negative. Psychiatric/Behavioral: Negative. PHYSICAL EXAM   (up to 7 for level 4, 8 or more for level 5)      INITIAL VITALS:   /64   Pulse 77   Temp 98.7 °F (37.1 °C) (Oral)   Resp 14   Ht 5' 1\" (1.549 m)   Wt 170 lb (77.1 kg)   SpO2 98%   BMI 32.12 kg/m²     Physical Exam   Constitutional: She is oriented to person, place, and time. HENT:   Head: Normocephalic and atraumatic. Right Ear: External ear normal.   Left Ear: External ear normal.   Eyes: Pupils are equal, round, and reactive to light. EOM are normal. Right eye exhibits no discharge. Left eye exhibits no discharge. Neck: Neck supple. No JVD present. No tracheal deviation present. Cardiovascular: Normal rate, regular rhythm and normal heart sounds. Exam reveals no gallop and no friction rub. No murmur heard. Pulmonary/Chest: No respiratory distress. She has no wheezes. She exhibits no tenderness. Abdominal: Soft. Bowel sounds are normal. She exhibits no distension. There is no tenderness. There is no guarding. Musculoskeletal: Normal range of motion. She exhibits no edema or deformity. Right lower leg: She exhibits tenderness. She exhibits no bony tenderness. Lymphadenopathy:     She has no cervical adenopathy.    Neurological: She is alert and oriented to person, 20 mg/dL    CREATININE 0.68 0.50 - 0.90 mg/dL    Bun/Cre Ratio NOT REPORTED 9 - 20    Calcium 9.0 8.6 - 10.4 mg/dL    Sodium 142 135 - 144 mmol/L    Potassium 3.5 (L) 3.7 - 5.3 mmol/L    Chloride 107 98 - 107 mmol/L    CO2 25 20 - 31 mmol/L    Anion Gap 10 9 - 17 mmol/L    GFR Non-African American >60 >60 mL/min    GFR African American >60 >60 mL/min    GFR Comment          GFR Staging NOT REPORTED    D-Dimer, Quantitative   Result Value Ref Range    D-Dimer, Quant 0.48 mg/L FEU   POCT troponin   Result Value Ref Range    POC Troponin I 0.01 0.00 - 0.10 ng/mL    POC Troponin Interp       The Troponin-I (POC) results cannot be compared to the Troponin-T results. IMPRESSION: Patients present with complaints of chest pain with associated Nausea, diaphoresis, shortness of breath, lightheadedness, dizziness, right lower extremity calf pain. On presentation, blood pressure is 119/64, heart rate is 77, respiratory rate is 14, patient looks comfortable, is in no acute distress at this time. Patient received aspirin per EMS. Physical exam is remarkable for right-sided calf discomfort on palpation, otherwise normal heart sounds, normal lung sounds, abdomen is soft, nontender, nondistended, equally strong bilateral radial and DP pulses. EKG is negative for any acute ST segment changes and initial troponins are negative. We have a obtained an EKG, we will proceed with a chest xray, CBC, BMP, a second troponin  and then re- assess. RADIOLOGY:  Xr Chest Standard (2 Vw)    Result Date: 9/11/2018  EXAMINATION: TWO VIEWS OF THE CHEST 9/11/2018 7:26 am COMPARISON: 07/30/2018 HISTORY: ORDERING SYSTEM PROVIDED HISTORY: chest pain TECHNOLOGIST PROVIDED HISTORY: chest pain 52-year-old female with acute chest pain FINDINGS: Cardiac monitor leads overlie the chest. No pneumothorax. No free air. No acute focal airspace consolidation or pleural effusions. Cardiac and mediastinal contours unchanged and within normal limits. Atherosclerotic calcification of the aortic knob. Mild diffuse degenerative changes throughout the spine. No mediastinal shift. No acute focal airspace consolidation. EKG  EKG Interpretation    Rhythm: normal sinus   Rate: normal  Axis: normal  Conduction: normal  ST Segments: normal  T Waves: normal  Q Waves: normal    Clinical Impression: Normal EKG. Celso Malone MD      All EKG's are interpreted by the Emergency Department Physician who either signs or Co-signs this chart in the absence of a cardiologist.    Heart Score    Heart Score for chest pain patients  History: Highly Suspicious  ECG: Normal  Patient Age: > 39 and < 65 years  *Risk factors for Atherosclerotic disease: Hypercholesterolemia, Positive family History  Risk Factors: 1 or 2 risk factors  Troponin: < 1X normal limit  Heart Score Total: 4    Score 0 - 3 = 2.5%  MACE over next 6 wks = Discharge home  Score 4 - 6 = 20.3%  MACE over next 6 wks = Obs admit  Score 7 - 10 = 72.7%  MACE over next 6 wks = Early invasive Rx      PROCEDURES:  None    CONSULTS:  IP CONSULT TO CARDIOLOGY    CRITICAL CARE:  None    FINAL IMPRESSION      1.  Chest pain, unspecified type          DISPOSITION / PLAN     DISPOSITION Admitted 09/11/2018 07:29:09 AM      PATIENT REFERRED TO:  Natalee Mcneal MD  [address] [place] [address]  [phone]            DISCHARGE MEDICATIONS:  Current Discharge Medication List          Celso Malone MD  Emergency Medicine Resident    (Please note that portions of this note were completed with a voice recognition program.  Efforts were made to edit the dictations but occasionally words are mis-transcribed.)       Celso Malone MD  Resident  09/11/18 [id number]

## 2018-09-20 ENCOUNTER — OFFICE VISIT (OUTPATIENT)
Dept: FAMILY MEDICINE CLINIC | Age: 58
End: 2018-09-20
Payer: COMMERCIAL

## 2018-09-20 VITALS
OXYGEN SATURATION: 99 % | DIASTOLIC BLOOD PRESSURE: 83 MMHG | WEIGHT: 177 LBS | RESPIRATION RATE: 16 BRPM | BODY MASS INDEX: 33.44 KG/M2 | SYSTOLIC BLOOD PRESSURE: 130 MMHG | HEART RATE: 82 BPM

## 2018-09-20 DIAGNOSIS — F41.9 ANXIETY: Primary | ICD-10-CM

## 2018-09-20 PROCEDURE — 1036F TOBACCO NON-USER: CPT | Performed by: FAMILY MEDICINE

## 2018-09-20 PROCEDURE — 99213 OFFICE O/P EST LOW 20 MIN: CPT | Performed by: FAMILY MEDICINE

## 2018-09-20 PROCEDURE — G8417 CALC BMI ABV UP PARAM F/U: HCPCS | Performed by: FAMILY MEDICINE

## 2018-09-20 PROCEDURE — G8427 DOCREV CUR MEDS BY ELIG CLIN: HCPCS | Performed by: FAMILY MEDICINE

## 2018-09-20 PROCEDURE — 3017F COLORECTAL CA SCREEN DOC REV: CPT | Performed by: FAMILY MEDICINE

## 2018-09-20 RX ORDER — VENLAFAXINE HYDROCHLORIDE 75 MG/1
75 CAPSULE, EXTENDED RELEASE ORAL DAILY
Qty: 30 CAPSULE | Refills: 3 | Status: SHIPPED | OUTPATIENT
Start: 2018-09-20 | End: 2019-01-09 | Stop reason: SDUPTHER

## 2018-09-20 NOTE — PROGRESS NOTES
(90 Base) MCG/ACT inhaler INHALE TWO PUFFS BY MOUTH EVERY 6 HOURS AS NEEDED FOR WHEEZING 18 g 3    esomeprazole (NEXIUM) 40 MG delayed release capsule NexIUM 40 MG Oral Capsule Delayed Release  1 Every Day   Quantity: 90;  Refills: 1       Nat Cochran.;  Started 31-Jan-2014  Active      PREMARIN 0.625 MG/GM vaginal cream PLACE 2 GRAMS VAGINALLY TWICE A WEEK FOR 12 DOSES 30 g 1    traMADol (ULTRAM) 50 MG tablet TAKE ONE TABLET BY MOUTH DAILY 30 tablet 0    permethrin (ELIMITE) 5 % cream Apply topically as directed 60 g 1    fluticasone (FLONASE) 50 MCG/ACT nasal spray 2 sprays by Nasal route daily 16 g 3     No current facility-administered medications for this visit. ALLERGIES:    Allergies   Allergen Reactions    Other Shortness Of Breath    Shrimp (Diagnostic) Shortness Of Breath    Famotidine Other (See Comments)     Headaches  Headaches    Gabapentin Nausea Only     Mood swings, nausea. Mood swings, nausea. Social History   Substance Use Topics    Smoking status: Former Smoker     Packs/day: 2.00     Years: 0.50     Types: Cigarettes     Quit date: 1995    Smokeless tobacco: Never Used    Alcohol use 0.0 oz/week      Comment: social      Body mass index is 33.44 kg/m². /83   Pulse 82   Resp 16   Wt 177 lb (80.3 kg)   SpO2 99%   BMI 33.44 kg/m²     Subjective:      HPI    62 yo female coming in today because she was seen multiple times on the ER because of chest discomfort was a chest pressure. The workup in ER was normal limits. She was referred to cardiology which she did not follow up with yet. Patient states this could be also anxiety, but then again she wants to see cardiologist.  Ember James me that she takes the Effexor one twice a day instead of 3 times a day. She tells me that her sleeping schedule is \"screwed up. \"  She at times she is not able to sleep because she does not feel tired.   She tells me that she works at [employer] and hopefully can change her job there to a diff shift. Review of Systems   Constitutional: Negative for fever and unexpected weight change. Respiratory: Negative for cough and shortness of breath. Cardiovascular: Negative for chest pain and leg swelling. + chest discomfort. Gastrointestinal: Negative for diarrhea, constipation and blood in stool. Endocrine: Negative for polydipsia and polyuria. Psychiatric/Behavioral: Negative for confusion and agitation. + anxiety. Objective:   Physical Exam  Constitutional: VS (see above). General appearance: normal development, habitus and attention, no deformities. No distress. Eyes: normal conjunctiva and lids. CAV: RRR, no RMG. No edema lower extremities. Pulmo: CTA bilateral, no CWR. Skin: no rashes, lesions or ulcers. Musculoskeletal: normal gait. Nails: no clubbing or cyanosis. Psychiatric: alert and oriented to place, time and person. Normal mood and affect. Assessment:       Diagnosis Orders   1. Anxiety  venlafaxine (EFFEXOR XR) 75 MG extended release capsule       Plan:   The patient needs to follow-up with cardiologist, but she also will start in the Effexor 75 mg to extended release daily. She will call if this does not help. Call or return to clinic prn if these symptoms worsen or fail to improve as anticipated. I have reviewed the instructions with the patient, answering all questions to her satisfaction. Return in about 3 months (around 12/20/2018), or if symptoms worsen or fail to improve. No orders of the defined types were placed in this encounter.     Orders Placed This Encounter   Medications    venlafaxine (EFFEXOR XR) 75 MG extended release capsule     Sig: Take 1 capsule by mouth daily     Dispense:  30 capsule     Refill:  3       Electronically signed by Natalee Mcneal MD on 9/20/2018 at 4:55 PM       (Please note that portions of this note were completed with a voice recognition program. Efforts were made to edit the dictations but occasionally words are mis-transcribed.)

## 2018-09-24 DIAGNOSIS — M51.36 DEGENERATION, INTERVERTEBRAL DISC, LUMBAR: ICD-10-CM

## 2018-09-24 DIAGNOSIS — F41.9 ANXIETY: ICD-10-CM

## 2018-09-24 RX ORDER — OMEPRAZOLE 20 MG/1
CAPSULE, DELAYED RELEASE ORAL
Qty: 28 CAPSULE | Refills: 3 | Status: SHIPPED | OUTPATIENT
Start: 2018-09-24 | End: 2019-01-09 | Stop reason: SDUPTHER

## 2018-09-24 RX ORDER — BUSPIRONE HYDROCHLORIDE 15 MG/1
TABLET ORAL
Qty: 56 TABLET | Refills: 3 | Status: SHIPPED | OUTPATIENT
Start: 2018-09-24 | End: 2019-01-09 | Stop reason: SDUPTHER

## 2018-09-24 RX ORDER — DULOXETIN HYDROCHLORIDE 20 MG/1
CAPSULE, DELAYED RELEASE ORAL
Qty: 28 CAPSULE | Refills: 3 | Status: SHIPPED | OUTPATIENT
Start: 2018-09-24 | End: 2019-01-09 | Stop reason: SDUPTHER

## 2018-09-24 RX ORDER — MELOXICAM 15 MG/1
15 TABLET ORAL DAILY
Qty: 28 TABLET | Refills: 3 | Status: SHIPPED | OUTPATIENT
Start: 2018-09-24 | End: 2018-12-27

## 2018-10-17 RX ORDER — RANITIDINE 150 MG/1
TABLET ORAL
Qty: 56 TABLET | Refills: 3 | Status: SHIPPED | OUTPATIENT
Start: 2018-10-17 | End: 2019-02-06 | Stop reason: SDUPTHER

## 2018-10-23 DIAGNOSIS — M51.36 DEGENERATION, INTERVERTEBRAL DISC, LUMBAR: ICD-10-CM

## 2018-10-25 RX ORDER — BACLOFEN 10 MG/1
TABLET ORAL
Qty: 84 TABLET | Refills: 2 | Status: SHIPPED | OUTPATIENT
Start: 2018-10-25 | End: 2019-05-07 | Stop reason: ALTCHOICE

## 2018-11-13 RX ORDER — PANTOPRAZOLE SODIUM 40 MG/1
40 TABLET, DELAYED RELEASE ORAL DAILY
Qty: 28 TABLET | Refills: 3 | Status: SHIPPED | OUTPATIENT
Start: 2018-11-13 | End: 2019-02-13

## 2018-11-21 RX ORDER — PRAVASTATIN SODIUM 40 MG
TABLET ORAL
Qty: 28 TABLET | Refills: 3 | Status: SHIPPED | OUTPATIENT
Start: 2018-11-21 | End: 2019-04-03 | Stop reason: SDUPTHER

## 2018-11-25 ENCOUNTER — HOSPITAL ENCOUNTER (OUTPATIENT)
Age: 58
Setting detail: OBSERVATION
Discharge: HOME | End: 2018-11-26
Attending: EMERGENCY MEDICINE | Admitting: EMERGENCY MEDICINE
Payer: COMMERCIAL

## 2018-11-25 ENCOUNTER — APPOINTMENT (OUTPATIENT)
Dept: GENERAL RADIOLOGY | Age: 58
End: 2018-11-25
Payer: COMMERCIAL

## 2018-11-25 DIAGNOSIS — R07.9 CHEST PAIN, UNSPECIFIED TYPE: Primary | ICD-10-CM

## 2018-11-25 LAB
ABSOLUTE EOS #: 0.24 K/UL (ref 0–0.44)
ABSOLUTE IMMATURE GRANULOCYTE: <0.03 K/UL (ref 0–0.3)
ABSOLUTE LYMPH #: 2.09 K/UL (ref 1.1–3.7)
ABSOLUTE MONO #: 0.49 K/UL (ref 0.1–1.2)
BASOPHILS # BLD: 1 % (ref 0–2)
BASOPHILS ABSOLUTE: 0.06 K/UL (ref 0–0.2)
DIFFERENTIAL TYPE: NORMAL
EOSINOPHILS RELATIVE PERCENT: 4 % (ref 1–4)
HCT VFR BLD CALC: 37.7 % (ref 36.3–47.1)
HEMOGLOBIN: 12.2 G/DL (ref 11.9–15.1)
IMMATURE GRANULOCYTES: 0 %
LYMPHOCYTES # BLD: 32 % (ref 24–43)
MCH RBC QN AUTO: 28.7 PG (ref 25.2–33.5)
MCHC RBC AUTO-ENTMCNC: 32.4 G/DL (ref 28.4–34.8)
MCV RBC AUTO: 88.7 FL (ref 82.6–102.9)
MONOCYTES # BLD: 7 % (ref 3–12)
NRBC AUTOMATED: 0 PER 100 WBC
PDW BLD-RTO: 13.1 % (ref 11.8–14.4)
PLATELET # BLD: 238 K/UL (ref 138–453)
PLATELET ESTIMATE: NORMAL
PMV BLD AUTO: 11.9 FL (ref 8.1–13.5)
POC TROPONIN I: 0 NG/ML (ref 0–0.1)
POC TROPONIN INTERP: NORMAL
RBC # BLD: 4.25 M/UL (ref 3.95–5.11)
RBC # BLD: NORMAL 10*6/UL
SEG NEUTROPHILS: 56 % (ref 36–65)
SEGMENTED NEUTROPHILS ABSOLUTE COUNT: 3.69 K/UL (ref 1.5–8.1)
WBC # BLD: 6.6 K/UL (ref 3.5–11.3)
WBC # BLD: NORMAL 10*3/UL

## 2018-11-25 PROCEDURE — 84484 ASSAY OF TROPONIN QUANT: CPT

## 2018-11-25 PROCEDURE — 80048 BASIC METABOLIC PNL TOTAL CA: CPT

## 2018-11-25 PROCEDURE — 71046 X-RAY EXAM CHEST 2 VIEWS: CPT

## 2018-11-25 PROCEDURE — 99285 EMERGENCY DEPT VISIT HI MDM: CPT

## 2018-11-25 PROCEDURE — 85025 COMPLETE CBC W/AUTO DIFF WBC: CPT

## 2018-11-25 PROCEDURE — 93005 ELECTROCARDIOGRAM TRACING: CPT

## 2018-11-25 RX ORDER — ASPIRIN 81 MG/1
324 TABLET, CHEWABLE ORAL ONCE
Status: COMPLETED | OUTPATIENT
Start: 2018-11-26 | End: 2018-11-26

## 2018-11-25 ASSESSMENT — PAIN DESCRIPTION - DESCRIPTORS: DESCRIPTORS: ACHING

## 2018-11-25 ASSESSMENT — PAIN DESCRIPTION - PAIN TYPE: TYPE: ACUTE PAIN

## 2018-11-25 ASSESSMENT — PAIN DESCRIPTION - LOCATION: LOCATION: CHEST

## 2018-11-25 ASSESSMENT — PAIN DESCRIPTION - FREQUENCY: FREQUENCY: CONTINUOUS

## 2018-11-25 ASSESSMENT — PAIN SCALES - GENERAL: PAINLEVEL_OUTOF10: 7

## 2018-11-25 ASSESSMENT — PAIN DESCRIPTION - ONSET: ONSET: SUDDEN

## 2018-11-25 ASSESSMENT — PAIN DESCRIPTION - ORIENTATION: ORIENTATION: RIGHT;UPPER

## 2018-11-26 ENCOUNTER — APPOINTMENT (OUTPATIENT)
Dept: NUCLEAR MEDICINE | Age: 58
End: 2018-11-26
Payer: COMMERCIAL

## 2018-11-26 VITALS
BODY MASS INDEX: 35.14 KG/M2 | TEMPERATURE: 97.3 F | OXYGEN SATURATION: 97 % | HEART RATE: 76 BPM | WEIGHT: 179 LBS | DIASTOLIC BLOOD PRESSURE: 76 MMHG | HEIGHT: 60 IN | RESPIRATION RATE: 16 BRPM | SYSTOLIC BLOOD PRESSURE: 120 MMHG

## 2018-11-26 LAB
ANION GAP SERPL CALCULATED.3IONS-SCNC: 9 MMOL/L (ref 9–17)
BUN BLDV-MCNC: 19 MG/DL (ref 6–20)
BUN/CREAT BLD: NORMAL (ref 9–20)
CALCIUM SERPL-MCNC: 9.2 MG/DL (ref 8.6–10.4)
CHLORIDE BLD-SCNC: 105 MMOL/L (ref 98–107)
CO2: 28 MMOL/L (ref 20–31)
CREAT SERPL-MCNC: 0.86 MG/DL (ref 0.5–0.9)
EKG ATRIAL RATE: 75 BPM
EKG P AXIS: 43 DEGREES
EKG P-R INTERVAL: 140 MS
EKG Q-T INTERVAL: 392 MS
EKG QRS DURATION: 76 MS
EKG QTC CALCULATION (BAZETT): 437 MS
EKG R AXIS: 17 DEGREES
EKG T AXIS: 33 DEGREES
EKG VENTRICULAR RATE: 75 BPM
GFR AFRICAN AMERICAN: >60 ML/MIN
GFR NON-AFRICAN AMERICAN: >60 ML/MIN
GFR SERPL CREATININE-BSD FRML MDRD: NORMAL ML/MIN/{1.73_M2}
GFR SERPL CREATININE-BSD FRML MDRD: NORMAL ML/MIN/{1.73_M2}
GLUCOSE BLD-MCNC: 96 MG/DL (ref 70–99)
LV EF: 67 %
LVEF MODALITY: NORMAL
POC TROPONIN I: 0 NG/ML (ref 0–0.1)
POC TROPONIN INTERP: NORMAL
POTASSIUM SERPL-SCNC: 4.1 MMOL/L (ref 3.7–5.3)
SODIUM BLD-SCNC: 142 MMOL/L (ref 135–144)

## 2018-11-26 PROCEDURE — 93005 ELECTROCARDIOGRAM TRACING: CPT

## 2018-11-26 PROCEDURE — 94640 AIRWAY INHALATION TREATMENT: CPT

## 2018-11-26 PROCEDURE — 3430000000 HC RX DIAGNOSTIC RADIOPHARMACEUTICAL: Performed by: INTERNAL MEDICINE

## 2018-11-26 PROCEDURE — 6370000000 HC RX 637 (ALT 250 FOR IP): Performed by: EMERGENCY MEDICINE

## 2018-11-26 PROCEDURE — 2580000003 HC RX 258: Performed by: INTERNAL MEDICINE

## 2018-11-26 PROCEDURE — 6370000000 HC RX 637 (ALT 250 FOR IP): Performed by: STUDENT IN AN ORGANIZED HEALTH CARE EDUCATION/TRAINING PROGRAM

## 2018-11-26 PROCEDURE — A9500 TC99M SESTAMIBI: HCPCS | Performed by: INTERNAL MEDICINE

## 2018-11-26 PROCEDURE — 78452 HT MUSCLE IMAGE SPECT MULT: CPT

## 2018-11-26 PROCEDURE — 93017 CV STRESS TEST TRACING ONLY: CPT | Performed by: NURSE PRACTITIONER

## 2018-11-26 PROCEDURE — G0378 HOSPITAL OBSERVATION PER HR: HCPCS

## 2018-11-26 PROCEDURE — 84484 ASSAY OF TROPONIN QUANT: CPT

## 2018-11-26 RX ORDER — SODIUM CHLORIDE 0.9 % (FLUSH) 0.9 %
10 SYRINGE (ML) INJECTION PRN
Status: DISCONTINUED | OUTPATIENT
Start: 2018-11-26 | End: 2018-11-26 | Stop reason: HOSPADM

## 2018-11-26 RX ORDER — SODIUM CHLORIDE 0.9 % (FLUSH) 0.9 %
10 SYRINGE (ML) INJECTION EVERY 12 HOURS SCHEDULED
Status: DISCONTINUED | OUTPATIENT
Start: 2018-11-26 | End: 2018-11-26 | Stop reason: HOSPADM

## 2018-11-26 RX ORDER — KETOTIFEN FUMARATE 0.35 MG/ML
1 SOLUTION/ DROPS OPHTHALMIC 2 TIMES DAILY
Status: DISCONTINUED | OUTPATIENT
Start: 2018-11-26 | End: 2018-11-26 | Stop reason: HOSPADM

## 2018-11-26 RX ORDER — NITROGLYCERIN 0.4 MG/1
0.4 TABLET SUBLINGUAL EVERY 5 MIN PRN
Status: DISCONTINUED | OUTPATIENT
Start: 2018-11-26 | End: 2018-11-26 | Stop reason: HOSPADM

## 2018-11-26 RX ORDER — VENLAFAXINE HYDROCHLORIDE 75 MG/1
75 CAPSULE, EXTENDED RELEASE ORAL DAILY
Status: DISCONTINUED | OUTPATIENT
Start: 2018-11-26 | End: 2018-11-26 | Stop reason: HOSPADM

## 2018-11-26 RX ORDER — PANTOPRAZOLE SODIUM 40 MG/1
40 TABLET, DELAYED RELEASE ORAL
Status: DISCONTINUED | OUTPATIENT
Start: 2018-11-27 | End: 2018-11-26 | Stop reason: HOSPADM

## 2018-11-26 RX ORDER — BUSPIRONE HYDROCHLORIDE 15 MG/1
15 TABLET ORAL 2 TIMES DAILY
Status: DISCONTINUED | OUTPATIENT
Start: 2018-11-26 | End: 2018-11-26 | Stop reason: HOSPADM

## 2018-11-26 RX ORDER — SODIUM CHLORIDE 9 MG/ML
INJECTION, SOLUTION INTRAVENOUS ONCE
Status: COMPLETED | OUTPATIENT
Start: 2018-11-26 | End: 2018-11-26

## 2018-11-26 RX ORDER — DULOXETIN HYDROCHLORIDE 20 MG/1
20 CAPSULE, DELAYED RELEASE ORAL DAILY
Status: DISCONTINUED | OUTPATIENT
Start: 2018-11-26 | End: 2018-11-26 | Stop reason: CLARIF

## 2018-11-26 RX ORDER — METOPROLOL SUCCINATE 25 MG/1
25 TABLET, EXTENDED RELEASE ORAL DAILY
Status: DISCONTINUED | OUTPATIENT
Start: 2018-11-26 | End: 2018-11-26 | Stop reason: DRUGHIGH

## 2018-11-26 RX ORDER — SODIUM CHLORIDE 0.9 % (FLUSH) 0.9 %
10 SYRINGE (ML) INJECTION PRN
Status: DISCONTINUED | OUTPATIENT
Start: 2018-11-26 | End: 2018-11-26

## 2018-11-26 RX ORDER — GUAIFENESIN 600 MG/1
600 TABLET, EXTENDED RELEASE ORAL 2 TIMES DAILY
Status: DISCONTINUED | OUTPATIENT
Start: 2018-11-26 | End: 2018-11-26 | Stop reason: HOSPADM

## 2018-11-26 RX ORDER — MELOXICAM 15 MG/1
1 TABLET ORAL DAILY
Status: DISCONTINUED | OUTPATIENT
Start: 2018-11-26 | End: 2018-11-26

## 2018-11-26 RX ORDER — FLUTICASONE PROPIONATE 110 UG/1
1 AEROSOL, METERED RESPIRATORY (INHALATION) 2 TIMES DAILY
Status: DISCONTINUED | OUTPATIENT
Start: 2018-11-26 | End: 2018-11-26 | Stop reason: HOSPADM

## 2018-11-26 RX ORDER — BENZONATATE 100 MG/1
100 CAPSULE ORAL 3 TIMES DAILY PRN
Status: DISCONTINUED | OUTPATIENT
Start: 2018-11-26 | End: 2018-11-26 | Stop reason: HOSPADM

## 2018-11-26 RX ORDER — PANTOPRAZOLE SODIUM 40 MG/1
40 TABLET, DELAYED RELEASE ORAL
Status: DISCONTINUED | OUTPATIENT
Start: 2018-11-27 | End: 2018-11-26 | Stop reason: SDUPTHER

## 2018-11-26 RX ORDER — METOPROLOL SUCCINATE 25 MG/1
12.5 TABLET, EXTENDED RELEASE ORAL DAILY
Status: DISCONTINUED | OUTPATIENT
Start: 2018-11-26 | End: 2018-11-26 | Stop reason: HOSPADM

## 2018-11-26 RX ORDER — ONDANSETRON 4 MG/1
4 TABLET, ORALLY DISINTEGRATING ORAL EVERY 8 HOURS PRN
Status: DISCONTINUED | OUTPATIENT
Start: 2018-11-26 | End: 2018-11-26 | Stop reason: HOSPADM

## 2018-11-26 RX ORDER — METOPROLOL TARTRATE 5 MG/5ML
2.5 INJECTION INTRAVENOUS PRN
Status: DISCONTINUED | OUTPATIENT
Start: 2018-11-26 | End: 2018-11-26

## 2018-11-26 RX ORDER — ACETAMINOPHEN 325 MG/1
650 TABLET ORAL EVERY 4 HOURS PRN
Status: DISCONTINUED | OUTPATIENT
Start: 2018-11-26 | End: 2018-11-26 | Stop reason: HOSPADM

## 2018-11-26 RX ORDER — NITROGLYCERIN 0.4 MG/1
0.4 TABLET SUBLINGUAL EVERY 5 MIN PRN
Status: DISCONTINUED | OUTPATIENT
Start: 2018-11-26 | End: 2018-11-26

## 2018-11-26 RX ORDER — PRAVASTATIN SODIUM 20 MG
40 TABLET ORAL DAILY
Status: DISCONTINUED | OUTPATIENT
Start: 2018-11-26 | End: 2018-11-26

## 2018-11-26 RX ORDER — FLUTICASONE PROPIONATE 50 MCG
2 SPRAY, SUSPENSION (ML) NASAL DAILY
Status: DISCONTINUED | OUTPATIENT
Start: 2018-11-26 | End: 2018-11-26 | Stop reason: HOSPADM

## 2018-11-26 RX ORDER — DULOXETIN HYDROCHLORIDE 20 MG/1
20 CAPSULE, DELAYED RELEASE ORAL DAILY
Status: DISCONTINUED | OUTPATIENT
Start: 2018-11-26 | End: 2018-11-26 | Stop reason: HOSPADM

## 2018-11-26 RX ADMIN — Medication 10 ML: at 10:56

## 2018-11-26 RX ADMIN — TETRAKIS(2-METHOXYISOBUTYLISOCYANIDE)COPPER(I) TETRAFLUOROBORATE 48.5 MILLICURIE: 1 INJECTION, POWDER, LYOPHILIZED, FOR SOLUTION INTRAVENOUS at 12:35

## 2018-11-26 RX ADMIN — FLUTICASONE PROPIONATE 1 PUFF: 110 AEROSOL, METERED RESPIRATORY (INHALATION) at 09:23

## 2018-11-26 RX ADMIN — ASPIRIN 81 MG 324 MG: 81 TABLET ORAL at 00:14

## 2018-11-26 RX ADMIN — SODIUM CHLORIDE: 9 INJECTION, SOLUTION INTRAVENOUS at 10:58

## 2018-11-26 RX ADMIN — TETRAKIS(2-METHOXYISOBUTYLISOCYANIDE)COPPER(I) TETRAFLUOROBORATE 14.5 MILLICURIE: 1 INJECTION, POWDER, LYOPHILIZED, FOR SOLUTION INTRAVENOUS at 11:05

## 2018-11-26 ASSESSMENT — ENCOUNTER SYMPTOMS
ABDOMINAL PAIN: 0
RHINORRHEA: 0
COLOR CHANGE: 0
SORE THROAT: 0
NAUSEA: 0
VOMITING: 0
EYE PAIN: 0
SHORTNESS OF BREATH: 0
COUGH: 0

## 2018-11-26 ASSESSMENT — HEART SCORE: ECG: 0

## 2018-11-26 ASSESSMENT — PAIN DESCRIPTION - LOCATION: LOCATION: CHEST

## 2018-11-26 ASSESSMENT — PAIN DESCRIPTION - ONSET: ONSET: SUDDEN

## 2018-11-26 ASSESSMENT — PAIN DESCRIPTION - ORIENTATION: ORIENTATION: RIGHT;UPPER

## 2018-11-26 ASSESSMENT — PAIN SCALES - GENERAL: PAINLEVEL_OUTOF10: 7

## 2018-11-26 ASSESSMENT — PAIN DESCRIPTION - DESCRIPTORS: DESCRIPTORS: ACHING

## 2018-11-26 ASSESSMENT — PAIN DESCRIPTION - PAIN TYPE: TYPE: ACUTE PAIN

## 2018-11-26 ASSESSMENT — PAIN DESCRIPTION - FREQUENCY: FREQUENCY: CONTINUOUS

## 2018-11-26 NOTE — CONSULTS
[facility]  In Patient Cardiology Consult             Cardiology   Consult Note          History Obtained From:  patient    HISTORY OF PRESENT ILLNESS:      The patient is a 63 y.o. female seen for chest pain. She had mid sternal chest pain all night, nonexertional, w/o radiation or associated sx. Past Medical History:    Past Medical History:   Diagnosis Date    ASCUS with positive high risk HPV cervical 3/22/16    Asthma     Depression     Diverticulitis     GERD (gastroesophageal reflux disease)     Hyperlipidemia     MRSA (methicillin resistant staph aureus) culture positive 4/18/2016    lip    Rectocele      Past Surgical History:    Past Surgical History:   Procedure Laterality Date    COLONOSCOPY  07/19/2018    COLONOSCOPY  07/19/2018    COLONOSCOPY N/A 7/19/2018    COLONOSCOPY performed by Thomas Borja DO at [facility]  2/25/2015    uro   [address] DAVIS, BSO performed at [facility] by Dr. Alma Ortez, Also had some type of bladder lift at this time    KNEE SURGERY Left     #1 - lateral release, #2 - arthroscopy with muscle alignment    NERVE BLOCK  07/28/2017    caudal #1  decadron 10mg    NERVE BLOCK  09/22/2017    cadal # 2, decadron 10 mg    NERVE BLOCK  09/22/2017    caudal epidural steroid block #2 decadron 10 mg    NERVE BLOCK  11/10/2017    lumbar L4-5 epidural; depomedrol 80mg     Home Medications:  Prior to Admission medications    Medication Sig Start Date End Date Taking?  Authorizing Provider   pravastatin (PRAVACHOL) 40 MG tablet TAKE 1 TABLET BY MOUTH DAILY 11/21/18  Yes Sarah Ogden MD   pantoprazole (PROTONIX) 40 MG tablet TAKE 1 TABLET BY MOUTH DAILY 11/13/18  Yes Sarah Ogden MD   baclofen (LIORESAL) 10 MG tablet TAKE 1 TABLET BY MOUTH THREE TIMES DAILY 10/25/18  Yes Sarah Ogden MD   ranitidine (ZANTAC) 150 MG tablet TAKE 1 TABLET BY MOUTH TWICE DAILY 10/17/18  Yes Sarah Ogden MD   omeprazole (PRILOSEC) 20 MG Blood Pressure Father        REVIEW OF SYSTEMS:   CONSTITUTIONAL:  neg  HEENT:  negative  RESPIRATORY:  neg  CARDIOVASCULAR:  positive for  chest pain  GASTROINTESTINAL:  positive for reflux  GENITOURINARY:  negative  INTEGUMENT/BREAST:  negative  ALLERGIC/IMMUNOLOGIC:  negative  MUSCULOSKELETAL:  negative  NEUROLOGICAL:  negative    PHYSICAL EXAM:    VITALS:  /76   Pulse 76   Temp 97.3 °F (36.3 °C) (Oral)   Resp 16   Ht 5' (1.524 m)   Wt 179 lb (81.2 kg)   SpO2 97%   BMI 34.96 kg/m²   CONSTITUTIONAL:awake, alert, cooperative, no apparent distress, and appears stated age  HEENT:pupils equal, round, sclera clear, conjunctiva normal.   Supple neck. NO THYROMEGALY  LUNGS:  No increased work of breathing, auscultation:CTA   CARDIOVASCULAR:  Normal apical impulse,   regular rate and rhythm, NO S3/RUB  JVP:NL   Carotids:NL  ABDOMEN:  No scars, normal bowel sounds, soft, non-distended, non-tender, no masses palpated, no hepatosplenomegally  MUSCULOSKELETAL:no redness, warmth, or swelling of the joints  NEUROLOGIC:  Awake, alert, oriented to name, place and time. SKIN:  no bruising or bleeding, normal skin color, texture, turgor  LE:NO EDEMA    DATA:   ECG:NSR, WNL    ECHO 6/2/18: EF 55%, MVP with mild MR, no valvular abnormalities.    STRESS 10/217: No ischemia. EF 62%.    Lab:   Lab Results   Component Value Date     11/25/2018    K 4.1 11/25/2018     11/25/2018    CO2 28 11/25/2018    BUN 19 11/25/2018    CREATININE 0.86 11/25/2018    GFRAA >60 11/25/2018    LABGLOM >60 11/25/2018    GLUCOSE 96 11/25/2018    PROT 6.5 04/22/2018    LABALBU 3.9 04/22/2018    CALCIUM 9.2 11/25/2018    BILITOT 0.38 04/22/2018    ALKPHOS 85 04/22/2018    AST 18 04/22/2018    ALT 16 04/22/2018     Magnesium:    Lab Results   Component Value Date    MG 2.0 07/30/2018     Warfarin PT/INR:  No results found for: PROTIME, INR, WARFARIN  TSH:    Lab Results   Component Value Date    TSH 1.98 07/30/2018     BMP:    Lab

## 2018-11-26 NOTE — H&P
[facility]  CDU / OBSERVATION eNCOUnter  Resident Note     Pt Name: Shira Birmingham  MRN: [id number]  [place] 1960  Date of evaluation: 11/26/18  Patient's PCP is :  Odin Nevarez MD    CHIEF COMPLAINT       Chief Complaint   Patient presents with    Chest Pain         HISTORY OF PRESENT ILLNESS    Shira Birmingham is a 63 y.o. female, PMH significant for hypertension and hyperlipidemia, who presents acute midsternal chest pain. The patient reports that the chest pain started 2 days ago. The patient describes the pain as pressure-like in nature. The pain is nonradiating and it is associated with shortness of breath and diaphoresis. The episodes of chest pain lasts less than a minute. The patient reports that the chest pain occurs while at rest or during exertion. The patient had a stress test 10/2017, which showed low risk stratification. Location/Symptom: Midsternal chest pain  Timing/Onset: 3 days ago  Provocation: None  Quality: Pressure  Radiation: None  Timing/Duration: Less than a minute  Modifying Factors: None    REVIEW OF SYSTEMS       General ROS - No fevers, No malaise   Ophthalmic ROS - No discharge, No changes in vision  ENT ROS -  No sore throat, No rhinorrhea,   Respiratory ROS - + shortness of breath, no cough, no  wheezing  Cardiovascular ROS - +chest pain, no dyspnea on exertion  Gastrointestinal ROS - No abdominal pain, no nausea or vomiting, no change in bowel habits, no black or bloody stools  Genito-Urinary ROS - No dysuria, trouble voiding, or hematuria  Musculoskeletal ROS - No myalgias, No arthalgias  Neurological ROS - No headache, no dizziness/lightheadedness, No focal weakness, no loss of sensation  Dermatological ROS - No lesions, No rash     (PQRS) Advance directives on face sheet per hospital policy.  No change unless specifically mentioned in chart    PAST MEDICAL HISTORY    has a past medical history of ASCUS with positive high risk HPV cervical;

## 2018-11-26 NOTE — PROCEDURES
[address]                              CARDIAC STRESS TEST    PATIENT NAME: Lance Wayne                    :        1960  MED REC NO:   [id number]                             ROOM:       0552  ACCOUNT NO:   [de-identified]                           ADMIT DATE: 2018  PROVIDER:     Diane Burch      CARDIOLITE TREADMILL STRESS STUDY    DATE OF STUDY:  2018  ORDERING PROVIDER:  Kati Gallegos  PRIMARY CARE PROVIDER:  Jasmyn Leonard  INDICATION: Chest pain  CONSENT:  The test was explained and consent was signed. PROTOCOL: Judd, 4.6 METS, duration of 3:52 minutes. RESTING EKG:  Normal.  RESTING HR:  81 bpm, maximum HR, 146 bpm which is 89% of the  age-predicted HR. HR response to exercise was blunted. RESTING BP:  133/99 mmHg, peak BP, 133/99 mmHg. BP response to exercise  was appropriate. CHEST PAIN:  No chest discomfort with exercise nor in recovery. EXERCISE EKG:  Normal.  ISCHEMIC EKG CHANGES: None. IMPRESSION:  Electrocardiographically negative Cardiolite treadmill stress study.   **Cardiolite report issued from the department of Nuclear Medicine**      Daniel Fox    D: 2018 12:49:49       T: 2018 12:50:44     AK/PGAYTAN  Job#: [id number]     Doc#: Unknown

## 2018-11-26 NOTE — ED NOTES
PT resting in bed. NAD noted. RR even and unlabored. PT updated to test results  PT updated to POC.        Ramo Pabon RN  11/26/18 0153

## 2018-11-26 NOTE — PROGRESS NOTES
Spoke with Bj Dukes RN from [facility] and states patient is okay to discharge from their standpoint.

## 2018-11-26 NOTE — ED PROVIDER NOTES
Provider, MD   senna-docusate (PERICOLACE) 8.6-50 MG per tablet Take 2 tablets by mouth daily as needed for Constipation 6/27/18   [place] Left, DO   polyethylene glycol [place] [facility]) powder Take as written on colonoscopy directions, pt will do a two day prep, mix 1/2 bottle of 250g with 32oz of clear liquid 6/27/18   [place] Left, DO   guaiFENesin (MUCINEX) 600 MG extended release tablet Take 1 tablet by mouth 2 times daily 6/14/18   Caren Forte MD   ondansetron (ZOFRAN ODT) 4 MG disintegrating tablet Take 1 tablet by mouth every 8 hours as needed for Nausea 4/22/18   Jessica Viramontes MD   augmented betamethasone dipropionate (DIPROLENE-AF) 0.05 % cream APPLY TO AFFECTED AREA(S) TWO TIMES A DAY 4/5/18   Caren Forte MD   Psyllium-Calcium (METAMUCIL PLUS CALCIUM) CAPS Take 1 capsule by mouth daily Take with 8 oz water. 3/14/18   Caren Forte MD   AEROSPAN 80 MCG/ACT AERS inhaler INHALE ONE TO TWO PUFFS BY MOUTH TWICE A DAY 12/12/17   Caren Forte MD   VENTOLIN  (90 Base) MCG/ACT inhaler INHALE TWO PUFFS BY MOUTH EVERY 6 HOURS AS NEEDED FOR WHEEZING 11/24/17   Caren Forte MD   esomeprazole (NEXIUM) 40 MG delayed release capsule NexIUM 40 MG Oral Capsule Delayed Release  1 Every Day   Quantity: 90;  Refills: 1       Alverto Kowalski;  Started 31-Jan-2014  Active 1/31/14   Historical Provider, MD   PREMARIN 0.625 MG/GM vaginal cream PLACE 2 GRAMS VAGINALLY TWICE A WEEK FOR 12 DOSES 9/21/17   Caren Forte MD   traMADol (ULTRAM) 50 MG tablet TAKE ONE TABLET BY MOUTH DAILY 8/4/17   Caren Forte MD   permethrin (ELIMITE) 5 % cream Apply topically as directed 11/18/16   Caren Forte MD   fluticasone (FLONASE) 50 MCG/ACT nasal spray 2 sprays by Nasal route daily 4/19/16   Caren Forte MD       REVIEW OF SYSTEMS    (2-9 systems for level 4, 10 or more for level 5)      Review of Systems   Constitutional: Positive for diaphoresis. Negative for chills and fever.    HENT: Negative  POCT troponin    EKG 12 Lead    Insert peripheral IV    PATIENT STATUS (FROM ED OR OR/PROCEDURAL) Observation       MEDICATIONS ORDERED:  Orders Placed This Encounter   Medications    aspirin chewable tablet 324 mg    nitroGLYCERIN (NITROSTAT) SL tablet 0.4 mg       DIAGNOSTIC RESULTS / EMERGENCY DEPARTMENT COURSE / MDM     LABS:  Results for orders placed or performed during the hospital encounter of 11/25/18   CBC Auto Differential   Result Value Ref Range    WBC 6.6 3.5 - 11.3 k/uL    RBC 4.25 3.95 - 5.11 m/uL    Hemoglobin 12.2 11.9 - 15.1 g/dL    Hematocrit 37.7 36.3 - 47.1 %    MCV 88.7 82.6 - 102.9 fL    MCH 28.7 25.2 - 33.5 pg    MCHC 32.4 28.4 - 34.8 g/dL    RDW 13.1 11.8 - 14.4 %    Platelets 677 807 - 853 k/uL    MPV 11.9 8.1 - 13.5 fL    NRBC Automated 0.0 0.0 per 100 WBC    Differential Type NOT REPORTED     Seg Neutrophils 56 36 - 65 %    Lymphocytes 32 24 - 43 %    Monocytes 7 3 - 12 %    Eosinophils % 4 1 - 4 %    Basophils 1 0 - 2 %    Immature Granulocytes 0 0 %    Segs Absolute 3.69 1.50 - 8.10 k/uL    Absolute Lymph # 2.09 1.10 - 3.70 k/uL    Absolute Mono # 0.49 0.10 - 1.20 k/uL    Absolute Eos # 0.24 0.00 - 0.44 k/uL    Basophils # 0.06 0.00 - 0.20 k/uL    Absolute Immature Granulocyte <0.03 0.00 - 0.30 k/uL    WBC Morphology NOT REPORTED     RBC Morphology NOT REPORTED     Platelet Estimate NOT REPORTED    Basic Metabolic Panel   Result Value Ref Range    Glucose 96 70 - 99 mg/dL    BUN 19 6 - 20 mg/dL    CREATININE 0.86 0.50 - 0.90 mg/dL    Bun/Cre Ratio NOT REPORTED 9 - 20    Calcium 9.2 8.6 - 10.4 mg/dL    Sodium 142 135 - 144 mmol/L    Potassium 4.1 3.7 - 5.3 mmol/L    Chloride 105 98 - 107 mmol/L    CO2 28 20 - 31 mmol/L    Anion Gap 9 9 - 17 mmol/L    GFR Non-African American >60 >60 mL/min    GFR African American >60 >60 mL/min    GFR Comment          GFR Staging NOT REPORTED    POCT troponin   Result Value Ref Range    POC Troponin I 0.00 0.00 - 0.10 ng/mL    POC Troponin Manjeet Gonzales, [place] [address]  [phone]            DISCHARGE MEDICATIONS:  New Prescriptions    No medications on file       Aiden Klein MD  Emergency Medicine Resident    (Please note that portions of thisnote were completed with a voice recognition program.  Efforts were made to edit the dictations but occasionally words are mis-transcribed.)       Aiden Klein MD  Resident  11/26/18 [id number]

## 2018-11-26 NOTE — ED NOTES
PT resting in bed. NAD noted. RR even and unlabored. PT updated to POC.        Quentin Duggan RN  11/26/18 [id number]

## 2018-11-27 ENCOUNTER — TELEPHONE (OUTPATIENT)
Dept: FAMILY MEDICINE CLINIC | Age: 58
End: 2018-11-27

## 2018-11-27 LAB
EKG ATRIAL RATE: 79 BPM
EKG P AXIS: 51 DEGREES
EKG P-R INTERVAL: 128 MS
EKG Q-T INTERVAL: 378 MS
EKG QRS DURATION: 80 MS
EKG QTC CALCULATION (BAZETT): 433 MS
EKG R AXIS: 20 DEGREES
EKG T AXIS: 47 DEGREES
EKG VENTRICULAR RATE: 79 BPM

## 2018-11-28 NOTE — DISCHARGE SUMMARY
CDU Discharge Summary        Patient:  Floyd Galloway  YOB: 1960    MRN: [id number]   Acct: [de-identified]    Primary Care Physician: Cindi Silver MD    Admit date:  11/25/2018 11:11 PM  Discharge date: 11/26/2018  4:36 PM     Discharge Diagnoses:     Acute midsternal chest pain secondary to possible ACS, evaluated by cardiology     Follow-up:  Call today/tomorrow for a follow up appointment with Cindi Silver MD , or return to the Emergency Room with worsening symptoms    Stressed to patient the importance of following up with primary care doctor for further workup/management of symptoms. Pt verbalizes understanding and agrees with plan.     Discharge Medications:  Changes to medications          [facility] Medication Instructions NUV:[id number]    Printed on:11/28/18 6729   Medication Information                      AEROSPAN 80 MCG/ACT AERS inhaler  INHALE ONE TO TWO PUFFS BY MOUTH TWICE A DAY             augmented betamethasone dipropionate (DIPROLENE-AF) 0.05 % cream  APPLY TO AFFECTED AREA(S) TWO TIMES A DAY             baclofen (LIORESAL) 10 MG tablet  TAKE 1 TABLET BY MOUTH THREE TIMES DAILY             benzonatate (TESSALON) 100 MG capsule  Take 100 mg by mouth 3 times daily as needed for Cough             busPIRone (BUSPAR) 15 MG tablet  TAKE 1 TABLET BY MOUTH TWICE DAILY             DULoxetine (CYMBALTA) 20 MG extended release capsule  TAKE 1 CAPSULE BY MOUTH DAILY             esomeprazole (NEXIUM) 40 MG delayed release capsule  NexIUM 40 MG Oral Capsule Delayed Release  1 Every Day   Quantity: 90;  Refills: 1       [employer].;  Started 31-Jan-2014  Active             fluticasone (FLONASE) 50 MCG/ACT nasal spray  2 sprays by Nasal route daily             guaiFENesin (MUCINEX) 600 MG extended release tablet  Take 1 tablet by mouth 2 times daily             ketotifen (ZADITOR) 0.025 % ophthalmic solution  Place 1 drop into both eyes 2 times daily

## 2018-11-29 ENCOUNTER — OFFICE VISIT (OUTPATIENT)
Dept: FAMILY MEDICINE CLINIC | Age: 58
End: 2018-11-29
Payer: COMMERCIAL

## 2018-11-29 VITALS
SYSTOLIC BLOOD PRESSURE: 122 MMHG | TEMPERATURE: 97.5 F | WEIGHT: 175 LBS | DIASTOLIC BLOOD PRESSURE: 71 MMHG | HEART RATE: 94 BPM | OXYGEN SATURATION: 97 % | BODY MASS INDEX: 34.18 KG/M2

## 2018-11-29 DIAGNOSIS — R07.89 CHEST DISCOMFORT: Primary | ICD-10-CM

## 2018-11-29 DIAGNOSIS — K21.9 GASTROESOPHAGEAL REFLUX DISEASE, ESOPHAGITIS PRESENCE NOT SPECIFIED: ICD-10-CM

## 2018-11-29 PROCEDURE — 1111F DSCHRG MED/CURRENT MED MERGE: CPT | Performed by: FAMILY MEDICINE

## 2018-11-29 PROCEDURE — 99496 TRANSJ CARE MGMT HIGH F2F 7D: CPT | Performed by: FAMILY MEDICINE

## 2018-11-29 RX ORDER — PYRIDOXINE HCL (VITAMIN B6) 100 MG
500 TABLET ORAL 2 TIMES DAILY
Qty: 60 CAPSULE | Refills: 3 | Status: SHIPPED | OUTPATIENT
Start: 2018-11-29 | End: 2019-05-02 | Stop reason: SDUPTHER

## 2018-11-29 NOTE — PROGRESS NOTES
Visit Information    Have you changed or started any medications since your last visit including any over-the-counter medicines, vitamins, or herbal medicines? no   Are you having any side effects from any of your medications? -  no  Have you stopped taking any of your medications? Is so, why? -  no    Have you seen any other physician or provider since your last visit? No  Have you had any other diagnostic tests since your last visit? No  Have you been seen in the emergency room and/or had an admission to a hospital since we last saw you? Yes - Records Obtained  Have you had your routine dental cleaning in the past 6 months? no    Have you activated your [employer] account? If not, what are your barriers?  Yes     Patient Care Team:  Nick Jurado MD as PCP - General (Family Medicine)    Medical History Review  Past Medical, Family, and Social History reviewed and does not contribute to the patient presenting condition    Health Maintenance   Topic Date Due    Pneumococcal med risk (1 of 1 - PPSV23) 03/05/1980    Breast cancer screen  03/05/2011    Shingles Vaccine (1 of 2 - 2 Dose Series) 03/05/2011    Flu vaccine (1) 09/01/2018    Lipid screen  02/19/2020    Cervical cancer screen  07/19/2021    DTaP/Tdap/Td vaccine (2 - Td) 06/07/2025    Colon cancer screen colonoscopy  07/19/2028    Hepatitis C screen  Completed    HIV screen  Completed

## 2018-11-29 NOTE — PROGRESS NOTES
Post-Discharge Transitional Care Management Services or Hospital Follow Up      Reinaldo Bazan   YOB: 1960    Date of Office Visit:  11/29/2018  Date of Hospital Admission: 11/25/18  Date of Hospital Discharge: 11/26/18  Risk of hospital readmission (high >=14%. Medium >=10%) :Readmission Risk Score: 0    Care management risk score Rising risk (score 2-5) and Complex Care (Scores >=6): 5     Non face to face  following discharge, date last encounter closed (first attempt may have been earlier): 11/27/2018  1:04 PM    Call initiated 2 business days of discharge: Yes    Patient Active Problem List   Diagnosis    GERD (gastroesophageal reflux disease)    Major depression    Rotator cuff disorder    Hyperlipidemia    Incontinence in female    Rectocele    Diverticulitis    Asthma    Depression    Hypokalemia    Cellulitis, face - left side, failed outpatient therapy    Hyperglycemia    Mild intermittent asthma without complication    Gastroesophageal reflux disease without esophagitis    Displacement of lumbar intervertebral disc without myelopathy    Chest pain       Allergies   Allergen Reactions    Other Shortness Of Breath    Shrimp (Diagnostic) Shortness Of Breath    Famotidine Other (See Comments)     Headaches  Headaches    Gabapentin Nausea Only     Mood swings, nausea. Mood swings, nausea.         Medications listed as ordered at the time of discharge from [facility], [address] Medication Instructions CABRERA:    Printed on:11/29/18 0937   Medication Information                      AEROSPAN 80 MCG/ACT AERS inhaler  INHALE ONE TO TWO PUFFS BY MOUTH TWICE A DAY             augmented betamethasone dipropionate (DIPROLENE-AF) 0.05 % cream  APPLY TO AFFECTED AREA(S) TWO TIMES A DAY             baclofen (LIORESAL) 10 MG tablet  TAKE 1 TABLET BY MOUTH THREE TIMES DAILY             busPIRone (BUSPAR) 15 MG tablet  TAKE 1 TABLET BY MOUTH TWICE DAILY             Cranberry patient taking as of now reconciled against medications ordered at time of hospital discharge: Yes    Chief Complaint   Patient presents with   [address] from Hospital       History of Present illness - Follow up of Hospital diagnosis(es):     62 yo female coming today for follow-up after she was seen in the hospital for an 1125 to 11/26/2018 because often progressing chest pain/chest discomfort. Had the CD for some time. Pain in her mid chest \"sits there\" 5 secs at the most but then lingers at times. Since last weekend every day. Pain there today but very light. Patient tells me that she is on 3 acid reflux medications. She feels acid reflux is well controlled. Chest and take Mobic daily for swelling in her hands and feet. Also for somewhat pain, but she states the pain does not go away. She also has been taking a baby aspirin every day. Inpatient course: Discharge summary reviewed- see chart. Interval history/Current status: Again, patient tells me that the mid chest discomfort is still present in today very light. She denies any belching, any burning. Any epigastric discomfort. She did not see a GI in the past.    A comprehensive review of systems was negative except for what was noted in the HPI. Vitals:    11/29/18 0851   BP: 122/71   Pulse: 94   Temp: 97.5 °F (36.4 °C)   TempSrc: Oral   SpO2: 97%   Weight: 175 lb (79.4 kg)     Body mass index is 34.18 kg/m². Wt Readings from Last 3 Encounters:   11/29/18 175 lb (79.4 kg)   11/26/18 179 lb (81.2 kg)   09/20/18 177 lb (80.3 kg)     BP Readings from Last 3 Encounters:   11/29/18 122/71   11/26/18 120/76   09/20/18 130/83        Physical Exam:  HENT:   /71   Pulse 94   Temp 97.5 °F (36.4 °C) (Oral)   Wt 175 lb (79.4 kg)   SpO2 97%   BMI 34.18 kg/m²   Constitutional: Alert and oriented. Well-nourished. Head: Normocephalic and atraumatic. Eyes: Pupils are equal, round, and reactive to light. Right eye exhibits no discharge.

## 2018-12-27 ENCOUNTER — HOSPITAL ENCOUNTER (EMERGENCY)
Age: 58
Discharge: HOME | End: 2018-12-28
Attending: EMERGENCY MEDICINE
Payer: COMMERCIAL

## 2018-12-27 VITALS
BODY MASS INDEX: 33.99 KG/M2 | SYSTOLIC BLOOD PRESSURE: 117 MMHG | HEART RATE: 86 BPM | OXYGEN SATURATION: 99 % | TEMPERATURE: 97.3 F | WEIGHT: 180 LBS | HEIGHT: 61 IN | DIASTOLIC BLOOD PRESSURE: 65 MMHG | RESPIRATION RATE: 14 BRPM

## 2018-12-27 DIAGNOSIS — N30.01 ACUTE CYSTITIS WITH HEMATURIA: Primary | ICD-10-CM

## 2018-12-27 PROCEDURE — 99283 EMERGENCY DEPT VISIT LOW MDM: CPT

## 2018-12-27 ASSESSMENT — PAIN DESCRIPTION - ORIENTATION: ORIENTATION: LEFT;LOWER

## 2018-12-27 ASSESSMENT — PAIN SCALES - GENERAL: PAINLEVEL_OUTOF10: 6

## 2018-12-27 ASSESSMENT — PAIN DESCRIPTION - LOCATION: LOCATION: ABDOMEN

## 2018-12-27 ASSESSMENT — PAIN DESCRIPTION - PAIN TYPE: TYPE: ACUTE PAIN

## 2018-12-28 LAB
-: ABNORMAL
AMORPHOUS: ABNORMAL
BACTERIA: ABNORMAL
BILIRUBIN URINE: NEGATIVE
CASTS UA: ABNORMAL /LPF (ref 0–2)
COLOR: ABNORMAL
COMMENT UA: ABNORMAL
CRYSTALS, UA: ABNORMAL /HPF
EPITHELIAL CELLS UA: ABNORMAL /HPF (ref 0–5)
GLUCOSE URINE: NEGATIVE
KETONES, URINE: NEGATIVE
LEUKOCYTE ESTERASE, URINE: ABNORMAL
MUCUS: ABNORMAL
NITRITE, URINE: NEGATIVE
OTHER OBSERVATIONS UA: ABNORMAL
PH UA: 5.5 (ref 5–8)
PROTEIN UA: ABNORMAL
RBC UA: ABNORMAL /HPF (ref 0–2)
RENAL EPITHELIAL, UA: ABNORMAL /HPF
SPECIFIC GRAVITY UA: 1.03 (ref 1–1.03)
TRICHOMONAS: ABNORMAL
TURBIDITY: ABNORMAL
URINE HGB: ABNORMAL
UROBILINOGEN, URINE: NORMAL
WBC UA: ABNORMAL /HPF (ref 0–5)
YEAST: ABNORMAL

## 2018-12-28 PROCEDURE — 87086 URINE CULTURE/COLONY COUNT: CPT

## 2018-12-28 PROCEDURE — 6370000000 HC RX 637 (ALT 250 FOR IP): Performed by: EMERGENCY MEDICINE

## 2018-12-28 PROCEDURE — 87186 SC STD MICRODIL/AGAR DIL: CPT

## 2018-12-28 PROCEDURE — 81001 URINALYSIS AUTO W/SCOPE: CPT

## 2018-12-28 PROCEDURE — 87088 URINE BACTERIA CULTURE: CPT

## 2018-12-28 RX ORDER — CEPHALEXIN 500 MG/1
500 CAPSULE ORAL 4 TIMES DAILY
Qty: 28 CAPSULE | Refills: 0 | Status: SHIPPED | OUTPATIENT
Start: 2018-12-28 | End: 2019-01-04

## 2018-12-28 RX ORDER — CEPHALEXIN 250 MG/1
500 CAPSULE ORAL ONCE
Status: COMPLETED | OUTPATIENT
Start: 2018-12-28 | End: 2018-12-28

## 2018-12-28 RX ADMIN — CEPHALEXIN 500 MG: 250 CAPSULE ORAL at 01:08

## 2018-12-28 ASSESSMENT — ENCOUNTER SYMPTOMS
WHEEZING: 0
ABDOMINAL PAIN: 0
NAUSEA: 0
SHORTNESS OF BREATH: 0
VOMITING: 0
BACK PAIN: 0
COLOR CHANGE: 0

## 2018-12-29 LAB
CULTURE: ABNORMAL
Lab: ABNORMAL
ORGANISM: ABNORMAL
SPECIMEN DESCRIPTION: ABNORMAL
STATUS: ABNORMAL

## 2019-01-02 ENCOUNTER — OFFICE VISIT (OUTPATIENT)
Dept: GASTROENTEROLOGY | Age: 59
End: 2019-01-02
Payer: COMMERCIAL

## 2019-01-02 VITALS
SYSTOLIC BLOOD PRESSURE: 115 MMHG | DIASTOLIC BLOOD PRESSURE: 74 MMHG | BODY MASS INDEX: 33.63 KG/M2 | HEART RATE: 87 BPM | WEIGHT: 178 LBS

## 2019-01-02 DIAGNOSIS — K21.9 GASTROESOPHAGEAL REFLUX DISEASE, ESOPHAGITIS PRESENCE NOT SPECIFIED: Primary | ICD-10-CM

## 2019-01-02 PROCEDURE — 99204 OFFICE O/P NEW MOD 45 MIN: CPT | Performed by: INTERNAL MEDICINE

## 2019-01-02 PROCEDURE — 1036F TOBACCO NON-USER: CPT | Performed by: INTERNAL MEDICINE

## 2019-01-02 PROCEDURE — G8427 DOCREV CUR MEDS BY ELIG CLIN: HCPCS | Performed by: INTERNAL MEDICINE

## 2019-01-02 PROCEDURE — 3017F COLORECTAL CA SCREEN DOC REV: CPT | Performed by: INTERNAL MEDICINE

## 2019-01-02 PROCEDURE — G8417 CALC BMI ABV UP PARAM F/U: HCPCS | Performed by: INTERNAL MEDICINE

## 2019-01-02 PROCEDURE — G8484 FLU IMMUNIZE NO ADMIN: HCPCS | Performed by: INTERNAL MEDICINE

## 2019-01-02 ASSESSMENT — ENCOUNTER SYMPTOMS
EYES NEGATIVE: 1
BLOOD IN STOOL: 0
ALLERGIC/IMMUNOLOGIC NEGATIVE: 1
VOMITING: 0
CONSTIPATION: 1
RESPIRATORY NEGATIVE: 1
NAUSEA: 1
ABDOMINAL DISTENTION: 1
DIARRHEA: 0
ABDOMINAL PAIN: 1
ANAL BLEEDING: 1
RECTAL PAIN: 1

## 2019-01-08 ENCOUNTER — OFFICE VISIT (OUTPATIENT)
Dept: FAMILY MEDICINE CLINIC | Age: 59
End: 2019-01-08
Payer: COMMERCIAL

## 2019-01-08 ENCOUNTER — TELEPHONE (OUTPATIENT)
Dept: FAMILY MEDICINE CLINIC | Age: 59
End: 2019-01-08

## 2019-01-08 VITALS
HEART RATE: 90 BPM | DIASTOLIC BLOOD PRESSURE: 62 MMHG | BODY MASS INDEX: 32.69 KG/M2 | SYSTOLIC BLOOD PRESSURE: 101 MMHG | OXYGEN SATURATION: 97 % | WEIGHT: 173 LBS | TEMPERATURE: 97.2 F

## 2019-01-08 DIAGNOSIS — M51.26 DISPLACEMENT OF LUMBAR INTERVERTEBRAL DISC WITHOUT MYELOPATHY: Primary | ICD-10-CM

## 2019-01-08 PROCEDURE — G8417 CALC BMI ABV UP PARAM F/U: HCPCS | Performed by: FAMILY MEDICINE

## 2019-01-08 PROCEDURE — G8484 FLU IMMUNIZE NO ADMIN: HCPCS | Performed by: FAMILY MEDICINE

## 2019-01-08 PROCEDURE — G8427 DOCREV CUR MEDS BY ELIG CLIN: HCPCS | Performed by: FAMILY MEDICINE

## 2019-01-08 PROCEDURE — 99213 OFFICE O/P EST LOW 20 MIN: CPT | Performed by: FAMILY MEDICINE

## 2019-01-08 PROCEDURE — 3017F COLORECTAL CA SCREEN DOC REV: CPT | Performed by: FAMILY MEDICINE

## 2019-01-08 PROCEDURE — 1036F TOBACCO NON-USER: CPT | Performed by: FAMILY MEDICINE

## 2019-01-08 RX ORDER — DICLOFENAC SODIUM AND MISOPROSTOL 50; 200 MG/1; UG/1
1 TABLET, DELAYED RELEASE ORAL 2 TIMES DAILY
Qty: 60 TABLET | Refills: 3 | Status: SHIPPED | OUTPATIENT
Start: 2019-01-08 | End: 2019-04-24 | Stop reason: SDUPTHER

## 2019-01-08 RX ORDER — FLUCONAZOLE 150 MG/1
150 TABLET ORAL ONCE
Qty: 1 TABLET | Refills: 1 | Status: SHIPPED | OUTPATIENT
Start: 2019-01-08 | End: 2019-01-08

## 2019-01-09 DIAGNOSIS — F41.9 ANXIETY: ICD-10-CM

## 2019-01-09 DIAGNOSIS — M51.36 DEGENERATION, INTERVERTEBRAL DISC, LUMBAR: ICD-10-CM

## 2019-01-09 RX ORDER — VENLAFAXINE HYDROCHLORIDE 75 MG/1
75 CAPSULE, EXTENDED RELEASE ORAL DAILY
Qty: 28 CAPSULE | Refills: 3 | Status: SHIPPED | OUTPATIENT
Start: 2019-01-09 | End: 2019-03-28 | Stop reason: SDUPTHER

## 2019-01-09 RX ORDER — BACLOFEN 10 MG/1
TABLET ORAL
Qty: 84 TABLET | OUTPATIENT
Start: 2019-01-09

## 2019-01-09 RX ORDER — DULOXETIN HYDROCHLORIDE 20 MG/1
CAPSULE, DELAYED RELEASE ORAL
Qty: 28 CAPSULE | Refills: 3 | Status: SHIPPED | OUTPATIENT
Start: 2019-01-09 | End: 2019-05-07 | Stop reason: ALTCHOICE

## 2019-01-09 RX ORDER — BUSPIRONE HYDROCHLORIDE 15 MG/1
TABLET ORAL
Qty: 56 TABLET | Refills: 3 | Status: SHIPPED | OUTPATIENT
Start: 2019-01-09 | End: 2019-05-02 | Stop reason: SDUPTHER

## 2019-01-09 RX ORDER — CHLORZOXAZONE 250 MG/1
250 TABLET ORAL 3 TIMES DAILY PRN
Qty: 30 TABLET | Refills: 2 | Status: SHIPPED | OUTPATIENT
Start: 2019-01-09 | End: 2019-01-10 | Stop reason: SDUPTHER

## 2019-01-09 RX ORDER — OMEPRAZOLE 20 MG/1
CAPSULE, DELAYED RELEASE ORAL
Qty: 28 CAPSULE | Refills: 3 | Status: SHIPPED | OUTPATIENT
Start: 2019-01-09 | End: 2019-10-09

## 2019-01-10 RX ORDER — CHLORZOXAZONE 250 MG/1
250 TABLET ORAL 3 TIMES DAILY PRN
Qty: 90 TABLET | Refills: 2
Start: 2019-01-10 | End: 2019-01-20

## 2019-01-25 ENCOUNTER — TELEPHONE (OUTPATIENT)
Dept: GASTROENTEROLOGY | Age: 59
End: 2019-01-25

## 2019-01-29 ENCOUNTER — ANESTHESIA EVENT (OUTPATIENT)
Dept: OPERATING ROOM | Age: 59
End: 2019-01-29
Payer: COMMERCIAL

## 2019-01-30 ENCOUNTER — HOSPITAL ENCOUNTER (OUTPATIENT)
Age: 59
Setting detail: OUTPATIENT SURGERY
Discharge: HOME | End: 2019-01-30
Attending: INTERNAL MEDICINE | Admitting: INTERNAL MEDICINE
Payer: COMMERCIAL

## 2019-01-30 ENCOUNTER — ANESTHESIA (OUTPATIENT)
Dept: OPERATING ROOM | Age: 59
End: 2019-01-30
Payer: COMMERCIAL

## 2019-01-30 VITALS
OXYGEN SATURATION: 97 % | BODY MASS INDEX: 33.36 KG/M2 | HEIGHT: 61 IN | SYSTOLIC BLOOD PRESSURE: 104 MMHG | DIASTOLIC BLOOD PRESSURE: 33 MMHG | HEART RATE: 78 BPM | TEMPERATURE: 97 F | WEIGHT: 176.69 LBS | RESPIRATION RATE: 14 BRPM

## 2019-01-30 VITALS
OXYGEN SATURATION: 97 % | SYSTOLIC BLOOD PRESSURE: 148 MMHG | RESPIRATION RATE: 14 BRPM | DIASTOLIC BLOOD PRESSURE: 62 MMHG

## 2019-01-30 PROCEDURE — 3700000000 HC ANESTHESIA ATTENDED CARE: Performed by: INTERNAL MEDICINE

## 2019-01-30 PROCEDURE — 88305 TISSUE EXAM BY PATHOLOGIST: CPT

## 2019-01-30 PROCEDURE — 2709999900 HC NON-CHARGEABLE SUPPLY: Performed by: INTERNAL MEDICINE

## 2019-01-30 PROCEDURE — 3609012400 HC EGD TRANSORAL BIOPSY SINGLE/MULTIPLE: Performed by: INTERNAL MEDICINE

## 2019-01-30 PROCEDURE — 3700000001 HC ADD 15 MINUTES (ANESTHESIA): Performed by: INTERNAL MEDICINE

## 2019-01-30 PROCEDURE — 43239 EGD BIOPSY SINGLE/MULTIPLE: CPT | Performed by: INTERNAL MEDICINE

## 2019-01-30 PROCEDURE — 88342 IMHCHEM/IMCYTCHM 1ST ANTB: CPT

## 2019-01-30 PROCEDURE — 2580000003 HC RX 258: Performed by: ANESTHESIOLOGY

## 2019-01-30 PROCEDURE — 6360000002 HC RX W HCPCS: Performed by: NURSE ANESTHETIST, CERTIFIED REGISTERED

## 2019-01-30 PROCEDURE — 7100000011 HC PHASE II RECOVERY - ADDTL 15 MIN: Performed by: INTERNAL MEDICINE

## 2019-01-30 PROCEDURE — 2500000003 HC RX 250 WO HCPCS: Performed by: NURSE ANESTHETIST, CERTIFIED REGISTERED

## 2019-01-30 PROCEDURE — 7100000010 HC PHASE II RECOVERY - FIRST 15 MIN: Performed by: INTERNAL MEDICINE

## 2019-01-30 RX ORDER — LIDOCAINE HYDROCHLORIDE 20 MG/ML
INJECTION, SOLUTION EPIDURAL; INFILTRATION; INTRACAUDAL; PERINEURAL PRN
Status: DISCONTINUED | OUTPATIENT
Start: 2019-01-30 | End: 2019-01-30 | Stop reason: SDUPTHER

## 2019-01-30 RX ORDER — PROPOFOL 10 MG/ML
INJECTION, EMULSION INTRAVENOUS PRN
Status: DISCONTINUED | OUTPATIENT
Start: 2019-01-30 | End: 2019-01-30 | Stop reason: SDUPTHER

## 2019-01-30 RX ORDER — LIDOCAINE HYDROCHLORIDE 10 MG/ML
1 INJECTION, SOLUTION EPIDURAL; INFILTRATION; INTRACAUDAL; PERINEURAL
Status: DISCONTINUED | OUTPATIENT
Start: 2019-01-31 | End: 2019-01-30 | Stop reason: HOSPADM

## 2019-01-30 RX ORDER — SODIUM CHLORIDE, SODIUM LACTATE, POTASSIUM CHLORIDE, CALCIUM CHLORIDE 600; 310; 30; 20 MG/100ML; MG/100ML; MG/100ML; MG/100ML
INJECTION, SOLUTION INTRAVENOUS CONTINUOUS
Status: DISCONTINUED | OUTPATIENT
Start: 2019-01-31 | End: 2019-01-30 | Stop reason: HOSPADM

## 2019-01-30 RX ADMIN — PROPOFOL 50 MG: 10 INJECTION, EMULSION INTRAVENOUS at 13:03

## 2019-01-30 RX ADMIN — SODIUM CHLORIDE, POTASSIUM CHLORIDE, SODIUM LACTATE AND CALCIUM CHLORIDE: 600; 310; 30; 20 INJECTION, SOLUTION INTRAVENOUS at 12:23

## 2019-01-30 RX ADMIN — LIDOCAINE HYDROCHLORIDE 100 MG: 20 INJECTION, SOLUTION EPIDURAL; INFILTRATION; INTRACAUDAL; PERINEURAL at 13:00

## 2019-01-30 RX ADMIN — PROPOFOL 50 MG: 10 INJECTION, EMULSION INTRAVENOUS at 13:05

## 2019-01-30 RX ADMIN — PROPOFOL 100 MG: 10 INJECTION, EMULSION INTRAVENOUS at 13:00

## 2019-01-30 RX ADMIN — PROPOFOL 50 MG: 10 INJECTION, EMULSION INTRAVENOUS at 13:02

## 2019-01-30 ASSESSMENT — PULMONARY FUNCTION TESTS
PIF_VALUE: 1
PIF_VALUE: 0
PIF_VALUE: 1

## 2019-01-30 ASSESSMENT — PAIN DESCRIPTION - PAIN TYPE: TYPE: ACUTE PAIN

## 2019-01-30 ASSESSMENT — PAIN DESCRIPTION - ONSET: ONSET: ON-GOING

## 2019-01-30 ASSESSMENT — PAIN SCALES - GENERAL
PAINLEVEL_OUTOF10: 0
PAINLEVEL_OUTOF10: 0
PAINLEVEL_OUTOF10: 3
PAINLEVEL_OUTOF10: 5

## 2019-01-30 ASSESSMENT — PAIN - FUNCTIONAL ASSESSMENT: PAIN_FUNCTIONAL_ASSESSMENT: 0-10

## 2019-01-30 ASSESSMENT — PAIN DESCRIPTION - DESCRIPTORS: DESCRIPTORS: OTHER (COMMENT)

## 2019-01-30 ASSESSMENT — PAIN DESCRIPTION - LOCATION: LOCATION: THROAT

## 2019-02-01 LAB — SURGICAL PATHOLOGY REPORT: NORMAL

## 2019-02-03 ENCOUNTER — APPOINTMENT (OUTPATIENT)
Dept: GENERAL RADIOLOGY | Age: 59
End: 2019-02-03
Payer: COMMERCIAL

## 2019-02-03 ENCOUNTER — HOSPITAL ENCOUNTER (EMERGENCY)
Age: 59
Discharge: HOME | End: 2019-02-03
Attending: EMERGENCY MEDICINE
Payer: COMMERCIAL

## 2019-02-03 ENCOUNTER — APPOINTMENT (OUTPATIENT)
Dept: CT IMAGING | Age: 59
End: 2019-02-03
Payer: COMMERCIAL

## 2019-02-03 VITALS
RESPIRATION RATE: 18 BRPM | TEMPERATURE: 97.9 F | DIASTOLIC BLOOD PRESSURE: 61 MMHG | HEIGHT: 61 IN | OXYGEN SATURATION: 97 % | BODY MASS INDEX: 33.23 KG/M2 | HEART RATE: 78 BPM | SYSTOLIC BLOOD PRESSURE: 118 MMHG | WEIGHT: 176 LBS

## 2019-02-03 DIAGNOSIS — K29.50 CHRONIC GASTRITIS WITHOUT BLEEDING, UNSPECIFIED GASTRITIS TYPE: ICD-10-CM

## 2019-02-03 DIAGNOSIS — N39.0 URINARY TRACT INFECTION WITHOUT HEMATURIA, SITE UNSPECIFIED: Primary | ICD-10-CM

## 2019-02-03 LAB
-: ABNORMAL
ABSOLUTE EOS #: 0.25 K/UL (ref 0–0.44)
ABSOLUTE IMMATURE GRANULOCYTE: <0.03 K/UL (ref 0–0.3)
ABSOLUTE LYMPH #: 1.55 K/UL (ref 1.1–3.7)
ABSOLUTE MONO #: 0.36 K/UL (ref 0.1–1.2)
ALBUMIN SERPL-MCNC: 4.1 G/DL (ref 3.5–5.2)
ALBUMIN/GLOBULIN RATIO: 2 (ref 1–2.5)
ALP BLD-CCNC: 77 U/L (ref 35–104)
ALT SERPL-CCNC: 21 U/L (ref 5–33)
AMORPHOUS: ABNORMAL
ANION GAP SERPL CALCULATED.3IONS-SCNC: 11 MMOL/L (ref 9–17)
AST SERPL-CCNC: 16 U/L
BACTERIA: ABNORMAL
BASOPHILS # BLD: 2 % (ref 0–2)
BASOPHILS ABSOLUTE: 0.07 K/UL (ref 0–0.2)
BILIRUB SERPL-MCNC: 0.23 MG/DL (ref 0.3–1.2)
BILIRUBIN URINE: NEGATIVE
BUN BLDV-MCNC: 17 MG/DL (ref 6–20)
BUN/CREAT BLD: ABNORMAL (ref 9–20)
CALCIUM SERPL-MCNC: 9.1 MG/DL (ref 8.6–10.4)
CASTS UA: ABNORMAL /LPF (ref 0–8)
CHLORIDE BLD-SCNC: 109 MMOL/L (ref 98–107)
CO2: 24 MMOL/L (ref 20–31)
COLOR: YELLOW
COMMENT UA: ABNORMAL
CREAT SERPL-MCNC: 0.64 MG/DL (ref 0.5–0.9)
CRYSTALS, UA: ABNORMAL /HPF
DIFFERENTIAL TYPE: ABNORMAL
EOSINOPHILS RELATIVE PERCENT: 5 % (ref 1–4)
EPITHELIAL CELLS UA: ABNORMAL /HPF (ref 0–5)
GFR AFRICAN AMERICAN: >60 ML/MIN
GFR NON-AFRICAN AMERICAN: >60 ML/MIN
GFR SERPL CREATININE-BSD FRML MDRD: ABNORMAL ML/MIN/{1.73_M2}
GFR SERPL CREATININE-BSD FRML MDRD: ABNORMAL ML/MIN/{1.73_M2}
GLUCOSE BLD-MCNC: 108 MG/DL (ref 70–99)
GLUCOSE URINE: NEGATIVE
HCT VFR BLD CALC: 38.6 % (ref 36.3–47.1)
HEMOGLOBIN: 12.9 G/DL (ref 11.9–15.1)
IMMATURE GRANULOCYTES: 0 %
KETONES, URINE: ABNORMAL
LEUKOCYTE ESTERASE, URINE: NEGATIVE
LIPASE: 39 U/L (ref 13–60)
LYMPHOCYTES # BLD: 32 % (ref 24–43)
MCH RBC QN AUTO: 30.4 PG (ref 25.2–33.5)
MCHC RBC AUTO-ENTMCNC: 33.4 G/DL (ref 28.4–34.8)
MCV RBC AUTO: 91 FL (ref 82.6–102.9)
MONOCYTES # BLD: 8 % (ref 3–12)
MUCUS: ABNORMAL
NITRITE, URINE: POSITIVE
NRBC AUTOMATED: 0 PER 100 WBC
OTHER OBSERVATIONS UA: ABNORMAL
PDW BLD-RTO: 12.8 % (ref 11.8–14.4)
PH UA: 5.5 (ref 5–8)
PLATELET # BLD: 225 K/UL (ref 138–453)
PLATELET ESTIMATE: ABNORMAL
PMV BLD AUTO: 11.6 FL (ref 8.1–13.5)
POTASSIUM SERPL-SCNC: 3.7 MMOL/L (ref 3.7–5.3)
PROTEIN UA: NEGATIVE
RBC # BLD: 4.24 M/UL (ref 3.95–5.11)
RBC # BLD: ABNORMAL 10*6/UL
RBC UA: ABNORMAL /HPF (ref 0–4)
RENAL EPITHELIAL, UA: ABNORMAL /HPF
SEG NEUTROPHILS: 53 % (ref 36–65)
SEGMENTED NEUTROPHILS ABSOLUTE COUNT: 2.56 K/UL (ref 1.5–8.1)
SODIUM BLD-SCNC: 144 MMOL/L (ref 135–144)
SPECIFIC GRAVITY UA: 1.02 (ref 1–1.03)
TOTAL PROTEIN: 6.2 G/DL (ref 6.4–8.3)
TRICHOMONAS: ABNORMAL
TURBIDITY: CLEAR
URINE HGB: NEGATIVE
UROBILINOGEN, URINE: NORMAL
WBC # BLD: 4.8 K/UL (ref 3.5–11.3)
WBC # BLD: ABNORMAL 10*3/UL
WBC UA: ABNORMAL /HPF (ref 0–5)
YEAST: ABNORMAL

## 2019-02-03 PROCEDURE — 87088 URINE BACTERIA CULTURE: CPT

## 2019-02-03 PROCEDURE — 80053 COMPREHEN METABOLIC PANEL: CPT

## 2019-02-03 PROCEDURE — 74177 CT ABD & PELVIS W/CONTRAST: CPT

## 2019-02-03 PROCEDURE — 99284 EMERGENCY DEPT VISIT MOD MDM: CPT

## 2019-02-03 PROCEDURE — 85025 COMPLETE CBC W/AUTO DIFF WBC: CPT

## 2019-02-03 PROCEDURE — 83690 ASSAY OF LIPASE: CPT

## 2019-02-03 PROCEDURE — 74022 RADEX COMPL AQT ABD SERIES: CPT

## 2019-02-03 PROCEDURE — C9113 INJ PANTOPRAZOLE SODIUM, VIA: HCPCS | Performed by: STUDENT IN AN ORGANIZED HEALTH CARE EDUCATION/TRAINING PROGRAM

## 2019-02-03 PROCEDURE — 96374 THER/PROPH/DIAG INJ IV PUSH: CPT

## 2019-02-03 PROCEDURE — 6360000004 HC RX CONTRAST MEDICATION: Performed by: STUDENT IN AN ORGANIZED HEALTH CARE EDUCATION/TRAINING PROGRAM

## 2019-02-03 PROCEDURE — 81001 URINALYSIS AUTO W/SCOPE: CPT

## 2019-02-03 PROCEDURE — 6370000000 HC RX 637 (ALT 250 FOR IP): Performed by: STUDENT IN AN ORGANIZED HEALTH CARE EDUCATION/TRAINING PROGRAM

## 2019-02-03 PROCEDURE — 87086 URINE CULTURE/COLONY COUNT: CPT

## 2019-02-03 PROCEDURE — 87186 SC STD MICRODIL/AGAR DIL: CPT

## 2019-02-03 PROCEDURE — 6360000002 HC RX W HCPCS: Performed by: STUDENT IN AN ORGANIZED HEALTH CARE EDUCATION/TRAINING PROGRAM

## 2019-02-03 PROCEDURE — 96375 TX/PRO/DX INJ NEW DRUG ADDON: CPT

## 2019-02-03 RX ORDER — SULFAMETHOXAZOLE AND TRIMETHOPRIM 800; 160 MG/1; MG/1
1 TABLET ORAL 2 TIMES DAILY
Qty: 14 TABLET | Refills: 0 | Status: SHIPPED | OUTPATIENT
Start: 2019-02-03 | End: 2019-02-10

## 2019-02-03 RX ORDER — FENTANYL CITRATE 50 UG/ML
25 INJECTION, SOLUTION INTRAMUSCULAR; INTRAVENOUS ONCE
Status: COMPLETED | OUTPATIENT
Start: 2019-02-03 | End: 2019-02-03

## 2019-02-03 RX ORDER — PANTOPRAZOLE SODIUM 40 MG/10ML
40 INJECTION, POWDER, LYOPHILIZED, FOR SOLUTION INTRAVENOUS ONCE
Status: COMPLETED | OUTPATIENT
Start: 2019-02-03 | End: 2019-02-03

## 2019-02-03 RX ORDER — SUCRALFATE ORAL 1 G/10ML
1 SUSPENSION ORAL 4 TIMES DAILY
Qty: 1200 ML | Refills: 3 | Status: SHIPPED | OUTPATIENT
Start: 2019-02-03 | End: 2019-12-04

## 2019-02-03 RX ORDER — SULFAMETHOXAZOLE AND TRIMETHOPRIM 800; 160 MG/1; MG/1
1 TABLET ORAL ONCE
Status: COMPLETED | OUTPATIENT
Start: 2019-02-03 | End: 2019-02-03

## 2019-02-03 RX ADMIN — IOPAMIDOL 75 ML: 755 INJECTION, SOLUTION INTRAVENOUS at 20:49

## 2019-02-03 RX ADMIN — SULFAMETHOXAZOLE AND TRIMETHOPRIM 1 TABLET: 800; 160 TABLET ORAL at 21:35

## 2019-02-03 RX ADMIN — PANTOPRAZOLE SODIUM 40 MG: 40 INJECTION, POWDER, FOR SOLUTION INTRAVENOUS at 19:27

## 2019-02-03 RX ADMIN — FENTANYL CITRATE 25 MCG: 50 INJECTION, SOLUTION INTRAMUSCULAR; INTRAVENOUS at 19:27

## 2019-02-03 ASSESSMENT — PAIN DESCRIPTION - PAIN TYPE: TYPE: ACUTE PAIN

## 2019-02-03 ASSESSMENT — ENCOUNTER SYMPTOMS
CONSTIPATION: 0
SHORTNESS OF BREATH: 0
COLOR CHANGE: 0
ABDOMINAL PAIN: 1
SORE THROAT: 0
TROUBLE SWALLOWING: 0
DIARRHEA: 1
PHOTOPHOBIA: 0
COUGH: 0
VOMITING: 0
NAUSEA: 0
BACK PAIN: 0
STRIDOR: 0
ABDOMINAL DISTENTION: 0
WHEEZING: 0

## 2019-02-03 ASSESSMENT — PAIN DESCRIPTION - LOCATION
LOCATION: ABDOMEN
LOCATION: ABDOMEN

## 2019-02-03 ASSESSMENT — PAIN SCALES - GENERAL
PAINLEVEL_OUTOF10: 2
PAINLEVEL_OUTOF10: 6
PAINLEVEL_OUTOF10: 6

## 2019-02-05 ENCOUNTER — TELEPHONE (OUTPATIENT)
Dept: FAMILY MEDICINE CLINIC | Age: 59
End: 2019-02-05

## 2019-02-05 LAB
CULTURE: ABNORMAL
Lab: ABNORMAL
ORGANISM: ABNORMAL
SPECIMEN DESCRIPTION: ABNORMAL
STATUS: ABNORMAL

## 2019-02-06 RX ORDER — RANITIDINE 150 MG/1
TABLET ORAL
Qty: 56 TABLET | Refills: 3 | Status: SHIPPED | OUTPATIENT
Start: 2019-02-06 | End: 2019-05-07 | Stop reason: ALTCHOICE

## 2019-02-13 ENCOUNTER — OFFICE VISIT (OUTPATIENT)
Dept: GASTROENTEROLOGY | Age: 59
End: 2019-02-13
Payer: COMMERCIAL

## 2019-02-13 VITALS
HEART RATE: 77 BPM | BODY MASS INDEX: 32.84 KG/M2 | SYSTOLIC BLOOD PRESSURE: 115 MMHG | DIASTOLIC BLOOD PRESSURE: 88 MMHG | WEIGHT: 173.8 LBS

## 2019-02-13 DIAGNOSIS — R10.13 DYSPEPSIA: Primary | ICD-10-CM

## 2019-02-13 PROCEDURE — G8417 CALC BMI ABV UP PARAM F/U: HCPCS | Performed by: INTERNAL MEDICINE

## 2019-02-13 PROCEDURE — 1036F TOBACCO NON-USER: CPT | Performed by: INTERNAL MEDICINE

## 2019-02-13 PROCEDURE — 99213 OFFICE O/P EST LOW 20 MIN: CPT | Performed by: INTERNAL MEDICINE

## 2019-02-13 PROCEDURE — 3017F COLORECTAL CA SCREEN DOC REV: CPT | Performed by: INTERNAL MEDICINE

## 2019-02-13 PROCEDURE — G8427 DOCREV CUR MEDS BY ELIG CLIN: HCPCS | Performed by: INTERNAL MEDICINE

## 2019-02-13 PROCEDURE — G8484 FLU IMMUNIZE NO ADMIN: HCPCS | Performed by: INTERNAL MEDICINE

## 2019-02-13 RX ORDER — SIMETHICONE 80 MG
80 TABLET,CHEWABLE ORAL 4 TIMES DAILY PRN
Qty: 180 TABLET | Refills: 3 | Status: SHIPPED | OUTPATIENT
Start: 2019-02-13 | End: 2019-07-24 | Stop reason: SDUPTHER

## 2019-02-22 ENCOUNTER — TELEPHONE (OUTPATIENT)
Dept: FAMILY MEDICINE CLINIC | Age: 59
End: 2019-02-22

## 2019-03-03 ENCOUNTER — HOSPITAL ENCOUNTER (EMERGENCY)
Age: 59
Discharge: HOME | End: 2019-03-03
Attending: EMERGENCY MEDICINE
Payer: COMMERCIAL

## 2019-03-03 VITALS
BODY MASS INDEX: 33.44 KG/M2 | RESPIRATION RATE: 16 BRPM | HEART RATE: 90 BPM | TEMPERATURE: 97.9 F | DIASTOLIC BLOOD PRESSURE: 78 MMHG | SYSTOLIC BLOOD PRESSURE: 126 MMHG | WEIGHT: 177 LBS | OXYGEN SATURATION: 98 %

## 2019-03-03 DIAGNOSIS — N12 PYELONEPHRITIS: Primary | ICD-10-CM

## 2019-03-03 LAB
-: ABNORMAL
ABSOLUTE EOS #: 0.19 K/UL (ref 0–0.44)
ABSOLUTE IMMATURE GRANULOCYTE: 0.03 K/UL (ref 0–0.3)
ABSOLUTE LYMPH #: 1.61 K/UL (ref 1.1–3.7)
ABSOLUTE MONO #: 0.34 K/UL (ref 0.1–1.2)
AMORPHOUS: ABNORMAL
ANION GAP SERPL CALCULATED.3IONS-SCNC: 9 MMOL/L (ref 9–17)
BACTERIA: ABNORMAL
BASOPHILS # BLD: 1 % (ref 0–2)
BASOPHILS ABSOLUTE: 0.05 K/UL (ref 0–0.2)
BILIRUBIN URINE: NEGATIVE
BUN BLDV-MCNC: 23 MG/DL (ref 6–20)
BUN/CREAT BLD: ABNORMAL (ref 9–20)
CALCIUM SERPL-MCNC: 8.8 MG/DL (ref 8.6–10.4)
CASTS UA: ABNORMAL /LPF (ref 0–8)
CHLORIDE BLD-SCNC: 105 MMOL/L (ref 98–107)
CO2: 25 MMOL/L (ref 20–31)
COLOR: YELLOW
CREAT SERPL-MCNC: 0.73 MG/DL (ref 0.5–0.9)
CRYSTALS, UA: ABNORMAL /HPF
DIFFERENTIAL TYPE: ABNORMAL
DIRECT EXAM: ABNORMAL
EOSINOPHILS RELATIVE PERCENT: 4 % (ref 1–4)
EPITHELIAL CELLS UA: ABNORMAL /HPF (ref 0–5)
GFR AFRICAN AMERICAN: >60 ML/MIN
GFR NON-AFRICAN AMERICAN: >60 ML/MIN
GFR SERPL CREATININE-BSD FRML MDRD: ABNORMAL ML/MIN/{1.73_M2}
GFR SERPL CREATININE-BSD FRML MDRD: ABNORMAL ML/MIN/{1.73_M2}
GLUCOSE BLD-MCNC: 99 MG/DL (ref 70–99)
GLUCOSE URINE: NEGATIVE
HCT VFR BLD CALC: 37.7 % (ref 36.3–47.1)
HEMOGLOBIN: 12.2 G/DL (ref 11.9–15.1)
IMMATURE GRANULOCYTES: 1 %
KETONES, URINE: ABNORMAL
LEUKOCYTE ESTERASE, URINE: NEGATIVE
LYMPHOCYTES # BLD: 32 % (ref 24–43)
Lab: ABNORMAL
MCH RBC QN AUTO: 29.4 PG (ref 25.2–33.5)
MCHC RBC AUTO-ENTMCNC: 32.4 G/DL (ref 28.4–34.8)
MCV RBC AUTO: 90.8 FL (ref 82.6–102.9)
MONOCYTES # BLD: 7 % (ref 3–12)
MUCUS: ABNORMAL
NITRITE, URINE: NEGATIVE
NRBC AUTOMATED: 0 PER 100 WBC
OTHER OBSERVATIONS UA: ABNORMAL
PDW BLD-RTO: 12.3 % (ref 11.8–14.4)
PH UA: 5.5 (ref 5–8)
PLATELET # BLD: 275 K/UL (ref 138–453)
PLATELET ESTIMATE: ABNORMAL
PMV BLD AUTO: 11.2 FL (ref 8.1–13.5)
POTASSIUM SERPL-SCNC: 4.1 MMOL/L (ref 3.7–5.3)
PROTEIN UA: NEGATIVE
RBC # BLD: 4.15 M/UL (ref 3.95–5.11)
RBC # BLD: ABNORMAL 10*6/UL
RBC UA: ABNORMAL /HPF (ref 0–4)
RENAL EPITHELIAL, UA: ABNORMAL /HPF
SEG NEUTROPHILS: 55 % (ref 36–65)
SEGMENTED NEUTROPHILS ABSOLUTE COUNT: 2.76 K/UL (ref 1.5–8.1)
SODIUM BLD-SCNC: 139 MMOL/L (ref 135–144)
SPECIFIC GRAVITY UA: 1.03 (ref 1–1.03)
SPECIMEN DESCRIPTION: ABNORMAL
TRICHOMONAS: ABNORMAL
TURBIDITY: ABNORMAL
URINE HGB: NEGATIVE
UROBILINOGEN, URINE: NORMAL
WBC # BLD: 5 K/UL (ref 3.5–11.3)
WBC # BLD: ABNORMAL 10*3/UL
WBC UA: ABNORMAL /HPF (ref 0–5)
YEAST: ABNORMAL

## 2019-03-03 PROCEDURE — 6370000000 HC RX 637 (ALT 250 FOR IP): Performed by: STUDENT IN AN ORGANIZED HEALTH CARE EDUCATION/TRAINING PROGRAM

## 2019-03-03 PROCEDURE — 87660 TRICHOMONAS VAGIN DIR PROBE: CPT

## 2019-03-03 PROCEDURE — 87510 GARDNER VAG DNA DIR PROBE: CPT

## 2019-03-03 PROCEDURE — 87086 URINE CULTURE/COLONY COUNT: CPT

## 2019-03-03 PROCEDURE — 87591 N.GONORRHOEAE DNA AMP PROB: CPT

## 2019-03-03 PROCEDURE — 81001 URINALYSIS AUTO W/SCOPE: CPT

## 2019-03-03 PROCEDURE — 85025 COMPLETE CBC W/AUTO DIFF WBC: CPT

## 2019-03-03 PROCEDURE — 6360000002 HC RX W HCPCS: Performed by: STUDENT IN AN ORGANIZED HEALTH CARE EDUCATION/TRAINING PROGRAM

## 2019-03-03 PROCEDURE — 80048 BASIC METABOLIC PNL TOTAL CA: CPT

## 2019-03-03 PROCEDURE — 99284 EMERGENCY DEPT VISIT MOD MDM: CPT

## 2019-03-03 PROCEDURE — 87491 CHLMYD TRACH DNA AMP PROBE: CPT

## 2019-03-03 PROCEDURE — 96372 THER/PROPH/DIAG INJ SC/IM: CPT

## 2019-03-03 PROCEDURE — 87480 CANDIDA DNA DIR PROBE: CPT

## 2019-03-03 RX ORDER — METRONIDAZOLE 500 MG/1
500 TABLET ORAL 2 TIMES DAILY
Qty: 13 TABLET | Refills: 0 | Status: SHIPPED | OUTPATIENT
Start: 2019-03-03 | End: 2019-03-10

## 2019-03-03 RX ORDER — KETOROLAC TROMETHAMINE 30 MG/ML
30 INJECTION, SOLUTION INTRAMUSCULAR; INTRAVENOUS ONCE
Status: COMPLETED | OUTPATIENT
Start: 2019-03-03 | End: 2019-03-03

## 2019-03-03 RX ORDER — ONDANSETRON 4 MG/1
4 TABLET, FILM COATED ORAL EVERY 8 HOURS PRN
Qty: 6 TABLET | Refills: 0 | Status: SHIPPED | OUTPATIENT
Start: 2019-03-03 | End: 2019-03-05

## 2019-03-03 RX ORDER — NITROFURANTOIN 25; 75 MG/1; MG/1
100 CAPSULE ORAL 2 TIMES DAILY
Qty: 13 CAPSULE | Refills: 0 | Status: SHIPPED | OUTPATIENT
Start: 2019-03-03 | End: 2019-03-10

## 2019-03-03 RX ORDER — METRONIDAZOLE 500 MG/1
500 TABLET ORAL ONCE
Status: COMPLETED | OUTPATIENT
Start: 2019-03-03 | End: 2019-03-03

## 2019-03-03 RX ORDER — ONDANSETRON 4 MG/1
4 TABLET, ORALLY DISINTEGRATING ORAL ONCE
Status: COMPLETED | OUTPATIENT
Start: 2019-03-03 | End: 2019-03-03

## 2019-03-03 RX ORDER — CEFTRIAXONE SODIUM 250 MG/1
250 INJECTION, POWDER, FOR SOLUTION INTRAMUSCULAR; INTRAVENOUS ONCE
Status: COMPLETED | OUTPATIENT
Start: 2019-03-03 | End: 2019-03-03

## 2019-03-03 RX ORDER — AZITHROMYCIN 250 MG/1
1000 TABLET, FILM COATED ORAL ONCE
Status: COMPLETED | OUTPATIENT
Start: 2019-03-03 | End: 2019-03-03

## 2019-03-03 RX ORDER — NITROFURANTOIN 25; 75 MG/1; MG/1
100 CAPSULE ORAL ONCE
Status: COMPLETED | OUTPATIENT
Start: 2019-03-03 | End: 2019-03-03

## 2019-03-03 RX ADMIN — AZITHROMYCIN 1000 MG: 250 TABLET, FILM COATED ORAL at 19:03

## 2019-03-03 RX ADMIN — METRONIDAZOLE 500 MG: 500 TABLET ORAL at 19:40

## 2019-03-03 RX ADMIN — CEFTRIAXONE SODIUM 250 MG: 250 INJECTION, POWDER, FOR SOLUTION INTRAMUSCULAR; INTRAVENOUS at 19:03

## 2019-03-03 RX ADMIN — ONDANSETRON 4 MG: 4 TABLET, ORALLY DISINTEGRATING ORAL at 18:08

## 2019-03-03 RX ADMIN — KETOROLAC TROMETHAMINE 30 MG: 30 INJECTION, SOLUTION INTRAMUSCULAR at 19:04

## 2019-03-03 RX ADMIN — NITROFURANTOIN (MONOHYDRATE/MACROCRYSTALS) 100 MG: 75; 25 CAPSULE ORAL at 19:40

## 2019-03-03 ASSESSMENT — PAIN DESCRIPTION - LOCATION: LOCATION: FLANK

## 2019-03-03 ASSESSMENT — ENCOUNTER SYMPTOMS
CONSTIPATION: 0
NAUSEA: 1
COUGH: 0
DIARRHEA: 0
BACK PAIN: 0
VOMITING: 1
SORE THROAT: 0
SHORTNESS OF BREATH: 0
ABDOMINAL PAIN: 1

## 2019-03-03 ASSESSMENT — PAIN SCALES - GENERAL: PAINLEVEL_OUTOF10: 6

## 2019-03-03 ASSESSMENT — PAIN DESCRIPTION - ORIENTATION: ORIENTATION: LEFT

## 2019-03-04 LAB
CULTURE: NORMAL
Lab: NORMAL
SPECIMEN DESCRIPTION: NORMAL

## 2019-03-05 LAB
C TRACH DNA GENITAL QL NAA+PROBE: NEGATIVE
N. GONORRHOEAE DNA: NEGATIVE
SPECIMEN DESCRIPTION: NORMAL

## 2019-03-07 ENCOUNTER — OFFICE VISIT (OUTPATIENT)
Dept: FAMILY MEDICINE CLINIC | Age: 59
End: 2019-03-07
Payer: COMMERCIAL

## 2019-03-07 VITALS
SYSTOLIC BLOOD PRESSURE: 119 MMHG | DIASTOLIC BLOOD PRESSURE: 75 MMHG | BODY MASS INDEX: 33.07 KG/M2 | WEIGHT: 175 LBS | HEART RATE: 90 BPM | OXYGEN SATURATION: 96 %

## 2019-03-07 DIAGNOSIS — B96.89 BACTERIAL VAGINITIS: Primary | ICD-10-CM

## 2019-03-07 DIAGNOSIS — N76.0 BACTERIAL VAGINITIS: Primary | ICD-10-CM

## 2019-03-07 DIAGNOSIS — R53.83 FATIGUE, UNSPECIFIED TYPE: ICD-10-CM

## 2019-03-07 PROCEDURE — 1036F TOBACCO NON-USER: CPT | Performed by: FAMILY MEDICINE

## 2019-03-07 PROCEDURE — G8417 CALC BMI ABV UP PARAM F/U: HCPCS | Performed by: FAMILY MEDICINE

## 2019-03-07 PROCEDURE — 99213 OFFICE O/P EST LOW 20 MIN: CPT | Performed by: FAMILY MEDICINE

## 2019-03-07 PROCEDURE — 3017F COLORECTAL CA SCREEN DOC REV: CPT | Performed by: FAMILY MEDICINE

## 2019-03-07 PROCEDURE — G8484 FLU IMMUNIZE NO ADMIN: HCPCS | Performed by: FAMILY MEDICINE

## 2019-03-07 PROCEDURE — G8427 DOCREV CUR MEDS BY ELIG CLIN: HCPCS | Performed by: FAMILY MEDICINE

## 2019-03-07 RX ORDER — FLUCONAZOLE 150 MG/1
150 TABLET ORAL ONCE
Qty: 1 TABLET | Refills: 0 | Status: SHIPPED | OUTPATIENT
Start: 2019-03-07 | End: 2019-04-03 | Stop reason: SDUPTHER

## 2019-03-26 ENCOUNTER — OFFICE VISIT (OUTPATIENT)
Dept: FAMILY MEDICINE CLINIC | Age: 59
End: 2019-03-26
Payer: COMMERCIAL

## 2019-03-26 ENCOUNTER — HOSPITAL ENCOUNTER (OUTPATIENT)
Age: 59
Setting detail: SPECIMEN
Discharge: HOME | End: 2019-03-26
Payer: COMMERCIAL

## 2019-03-26 VITALS
SYSTOLIC BLOOD PRESSURE: 116 MMHG | HEART RATE: 77 BPM | TEMPERATURE: 97.4 F | BODY MASS INDEX: 32.88 KG/M2 | OXYGEN SATURATION: 98 % | DIASTOLIC BLOOD PRESSURE: 70 MMHG | WEIGHT: 174 LBS

## 2019-03-26 DIAGNOSIS — J45.909 ASTHMATIC BRONCHITIS WITHOUT COMPLICATION, UNSPECIFIED ASTHMA SEVERITY, UNSPECIFIED WHETHER PERSISTENT: ICD-10-CM

## 2019-03-26 DIAGNOSIS — J45.20 MILD INTERMITTENT ASTHMA WITHOUT COMPLICATION: ICD-10-CM

## 2019-03-26 DIAGNOSIS — R53.83 FATIGUE, UNSPECIFIED TYPE: ICD-10-CM

## 2019-03-26 DIAGNOSIS — M25.562: Primary | ICD-10-CM

## 2019-03-26 DIAGNOSIS — M76.52 PATELLAR TENDINITIS OF LEFT KNEE: ICD-10-CM

## 2019-03-26 LAB
TSH SERPL DL<=0.05 MIU/L-ACNC: 0.98 MIU/L (ref 0.3–5)
VITAMIN B-12: 352 PG/ML (ref 232–1245)
VITAMIN D 25-HYDROXY: 21 NG/ML (ref 30–100)

## 2019-03-26 PROCEDURE — 99213 OFFICE O/P EST LOW 20 MIN: CPT | Performed by: FAMILY MEDICINE

## 2019-03-26 PROCEDURE — G8427 DOCREV CUR MEDS BY ELIG CLIN: HCPCS | Performed by: FAMILY MEDICINE

## 2019-03-26 PROCEDURE — G8417 CALC BMI ABV UP PARAM F/U: HCPCS | Performed by: FAMILY MEDICINE

## 2019-03-26 PROCEDURE — 1036F TOBACCO NON-USER: CPT | Performed by: FAMILY MEDICINE

## 2019-03-26 PROCEDURE — 3017F COLORECTAL CA SCREEN DOC REV: CPT | Performed by: FAMILY MEDICINE

## 2019-03-26 PROCEDURE — G8484 FLU IMMUNIZE NO ADMIN: HCPCS | Performed by: FAMILY MEDICINE

## 2019-03-27 DIAGNOSIS — E55.9 VITAMIN D DEFICIENCY: Primary | ICD-10-CM

## 2019-03-27 DIAGNOSIS — M25.562: ICD-10-CM

## 2019-03-27 RX ORDER — ERGOCALCIFEROL (VITAMIN D2) 1250 MCG
50000 CAPSULE ORAL WEEKLY
Qty: 4 CAPSULE | Refills: 3 | Status: SHIPPED | OUTPATIENT
Start: 2019-03-27 | End: 2019-06-27 | Stop reason: SDUPTHER

## 2019-03-28 DIAGNOSIS — F41.9 ANXIETY: ICD-10-CM

## 2019-03-28 RX ORDER — VENLAFAXINE HYDROCHLORIDE 75 MG/1
75 CAPSULE, EXTENDED RELEASE ORAL DAILY
Qty: 30 CAPSULE | Refills: 3 | Status: SHIPPED | OUTPATIENT
Start: 2019-03-28 | End: 2019-06-26 | Stop reason: SDUPTHER

## 2019-03-29 DIAGNOSIS — M25.562 LEFT KNEE PAIN, UNSPECIFIED CHRONICITY: Primary | ICD-10-CM

## 2019-04-02 ENCOUNTER — CARE COORDINATION (OUTPATIENT)
Dept: CARE COORDINATION | Age: 59
End: 2019-04-02

## 2019-04-03 ENCOUNTER — OFFICE VISIT (OUTPATIENT)
Dept: ORTHOPEDIC SURGERY | Age: 59
End: 2019-04-03
Payer: COMMERCIAL

## 2019-04-03 VITALS
SYSTOLIC BLOOD PRESSURE: 112 MMHG | WEIGHT: 175 LBS | DIASTOLIC BLOOD PRESSURE: 70 MMHG | RESPIRATION RATE: 20 BRPM | HEIGHT: 61 IN | BODY MASS INDEX: 33.04 KG/M2 | HEART RATE: 94 BPM

## 2019-04-03 DIAGNOSIS — M17.12 PRIMARY OSTEOARTHRITIS OF LEFT KNEE: Primary | ICD-10-CM

## 2019-04-03 PROCEDURE — 20610 DRAIN/INJ JOINT/BURSA W/O US: CPT | Performed by: ORTHOPAEDIC SURGERY

## 2019-04-03 PROCEDURE — 1036F TOBACCO NON-USER: CPT | Performed by: ORTHOPAEDIC SURGERY

## 2019-04-03 PROCEDURE — G8417 CALC BMI ABV UP PARAM F/U: HCPCS | Performed by: ORTHOPAEDIC SURGERY

## 2019-04-03 PROCEDURE — 3017F COLORECTAL CA SCREEN DOC REV: CPT | Performed by: ORTHOPAEDIC SURGERY

## 2019-04-03 PROCEDURE — G8427 DOCREV CUR MEDS BY ELIG CLIN: HCPCS | Performed by: ORTHOPAEDIC SURGERY

## 2019-04-03 PROCEDURE — 99203 OFFICE O/P NEW LOW 30 MIN: CPT | Performed by: ORTHOPAEDIC SURGERY

## 2019-04-03 RX ORDER — HYDROCODONE BITARTRATE AND ACETAMINOPHEN 5; 325 MG/1; MG/1
1 TABLET ORAL EVERY 6 HOURS PRN
Status: ON HOLD | COMMUNITY
End: 2019-05-13 | Stop reason: HOSPADM

## 2019-04-03 RX ORDER — METHYLPREDNISOLONE ACETATE 80 MG/ML
80 INJECTION, SUSPENSION INTRA-ARTICULAR; INTRALESIONAL; INTRAMUSCULAR; SOFT TISSUE ONCE
Status: COMPLETED | OUTPATIENT
Start: 2019-04-03 | End: 2019-04-03

## 2019-04-03 RX ORDER — BUPIVACAINE HYDROCHLORIDE 5 MG/ML
0.5 INJECTION, SOLUTION PERINEURAL ONCE
Status: COMPLETED | OUTPATIENT
Start: 2019-04-03 | End: 2019-04-03

## 2019-04-03 RX ADMIN — BUPIVACAINE HYDROCHLORIDE 2.5 MG: 5 INJECTION, SOLUTION PERINEURAL at 15:39

## 2019-04-03 RX ADMIN — METHYLPREDNISOLONE ACETATE 80 MG: 80 INJECTION, SUSPENSION INTRA-ARTICULAR; INTRALESIONAL; INTRAMUSCULAR; SOFT TISSUE at 15:38

## 2019-04-03 NOTE — PROGRESS NOTES
Subjective:      Patient ID: Pacheco Cuadra is a 63 y.o. female. HPI  Patient presents today for evaluation of her left knee. No specific injury but she did get a decrease in activities about a month or so ago when she lost her job. At that time complaining of pain over the anterior aspect of the knee. Significant swelling. She said the swelling worsened this past Friday. Again no injury. Diffuse pain more anterior. Difficulty with weightbearing. She did have prior surgery ×2 the first sounds like it was a lateral release the second sounds like it was a patellar realignment. Current Outpatient Medications   Medication Sig Dispense Refill    HYDROcodone-acetaminophen (NORCO) 5-325 MG per tablet Take 1 tablet by mouth every 6 hours as needed for Pain.       venlafaxine (EFFEXOR XR) 75 MG extended release capsule Take 1 capsule by mouth daily 30 capsule 3    ergocalciferol (ERGOCALCIFEROL) 35135 units capsule Take 1 capsule by mouth once a week 4 capsule 3    simethicone (MYLICON) 80 MG chewable tablet Take 1 tablet by mouth 4 times daily as needed for Flatulence 180 tablet 3    ranitidine (ZANTAC) 150 MG tablet TAKE 1 TABLET BY MOUTH TWICE DAILY 56 tablet 3    sucralfate (CARAFATE) 1 GM/10ML suspension Take 10 mLs by mouth 4 times daily 1200 mL 3    DULoxetine (CYMBALTA) 20 MG extended release capsule TAKE 1 CAPSULE BY MOUTH DAILY 28 capsule 3    busPIRone (BUSPAR) 15 MG tablet TAKE 1 TABLET BY MOUTH TWICE DAILY 56 tablet 3    omeprazole (PRILOSEC) 20 MG delayed release capsule TAKE 1 CAPSULE BY MOUTH DAILY 28 capsule 3    diclofenac-Misoprostol (ARTHROTEC 50) 50-0.2 MG per tablet Take 1 tablet by mouth 2 times daily 60 tablet 3    Cranberry 500 MG CAPS Take 1 capsule by mouth 2 times daily 60 capsule 3    pravastatin (PRAVACHOL) 40 MG tablet TAKE 1 TABLET BY MOUTH DAILY 28 tablet 3    baclofen (LIORESAL) 10 MG tablet TAKE 1 TABLET BY MOUTH THREE TIMES DAILY 84 tablet 2    metoprolol succinate (TOPROL XL) 25 MG extended release tablet Take 25 mg by mouth daily      ketotifen (ZADITOR) 0.025 % ophthalmic solution Place 1 drop into both eyes 2 times daily      polyethylene glycol (MIRALAX) powder Take as written on colonoscopy directions, pt will do a two day prep, mix 1/2 bottle of 250g with 32oz of clear liquid 250 g 0    AEROSPAN 80 MCG/ACT AERS inhaler INHALE ONE TO TWO PUFFS BY MOUTH TWICE A DAY 8.9 g 3    VENTOLIN  (90 Base) MCG/ACT inhaler INHALE TWO PUFFS BY MOUTH EVERY 6 HOURS AS NEEDED FOR WHEEZING 18 g 3    PREMARIN 0.625 MG/GM vaginal cream PLACE 2 GRAMS VAGINALLY TWICE A WEEK FOR 12 DOSES 30 g 1    fluticasone (FLONASE) 50 MCG/ACT nasal spray 2 sprays by Nasal route daily 16 g 3     No current facility-administered medications for this visit. Review of Systems   Constitutional: Negative. Musculoskeletal: Positive for arthralgias, gait problem, joint swelling and myalgias. Past Medical History:   Diagnosis Date    ASCUS with positive high risk HPV cervical 3/22/16    Asthma     Depression     Diverticulitis     ESBL (extended spectrum beta-lactamase) producing bacteria infection 02/03/2019    E.  Coli urine    GERD (gastroesophageal reflux disease)     Hyperlipidemia     MRSA (methicillin resistant staph aureus) culture positive 4/18/2016    lip    Rectocele      Past Surgical History:   Procedure Laterality Date    COLONOSCOPY  07/19/2018    COLONOSCOPY  07/19/2018    COLONOSCOPY N/A 7/19/2018    COLONOSCOPY performed by Patricia Edwards DO at [address] [place] [address]  2/25/2015    uro   [address] DAVIS, BSO performed at [facility] by Dr. Renetta Laboy, Also had some type of bladder lift at this time    KNEE SURGERY Left     #1 - lateral release, #2 - arthroscopy with muscle alignment    NERVE BLOCK  07/28/2017    caudal #1  decadron 10mg    NERVE BLOCK  09/22/2017    cadal # 2, decadron 10 mg    NERVE BLOCK  09/22/2017    caudal epidural in a week to see how she's responded to the injection     Electronically signed by Shannon Hernandez MD on 4/3/2019 at 9:25 AM

## 2019-04-04 RX ORDER — FLUCONAZOLE 150 MG/1
TABLET ORAL
Qty: 1 TABLET | Refills: 0 | Status: SHIPPED | OUTPATIENT
Start: 2019-04-04 | End: 2019-05-07 | Stop reason: ALTCHOICE

## 2019-04-04 RX ORDER — PRAVASTATIN SODIUM 40 MG
TABLET ORAL
Qty: 28 TABLET | Refills: 3 | Status: SHIPPED | OUTPATIENT
Start: 2019-04-04 | End: 2019-07-24 | Stop reason: SDUPTHER

## 2019-04-09 ENCOUNTER — CARE COORDINATION (OUTPATIENT)
Dept: CARE COORDINATION | Age: 59
End: 2019-04-09

## 2019-04-09 RX ORDER — CHLORZOXAZONE 250 MG/1
TABLET ORAL
Qty: 84 TABLET | Refills: 3 | Status: SHIPPED | OUTPATIENT
Start: 2019-04-09 | End: 2019-07-26 | Stop reason: SDUPTHER

## 2019-04-09 NOTE — CARE COORDINATION
Ambulatory Care Coordination Note  4/9/2019  CM Risk Score: 5  Stef Mortality Risk Score:      ACC: Carmelita Byers RN    Summary Note: spoke with pt who said her knee is a better. She will see ortho tomorrow. She did have the knee xray ordered by Dr Saran Israel at [facility] radiology. Did go over to [facility] and get a copy of this will have it scanned in her chart for review by ortho tomorrow. Reed Brunner said her UTI symptoms are better. She does try to get to pcp office before going to the ER. Advised her of the walk in clinics that are available from 8 am to 8pm  And 10 - 4 on weekends she verbalizes understanding. She currently does not walk with a cane she does have crutches she was using when her knee needed aspiration. She is not using anything right now. She will consider getting a cane to carry with her to help steady her gait with painful knees. She will see tomorrow what ortho has to offer for treatment plan  1) fu with ortho plan   2) fu with fall risk education. 3) fu with cane         Care Coordination Interventions    Program Enrollment:  Rising Risk  Referral from Primary Care Provider:  No  Suggested Interventions and Community Resources  Fall Risk Prevention: In Process         Goals Addressed                 This Visit's Progress     Reduce Falls         I will reduce my risk of falls by the following: Remove rugs or use non slip rugs  Install grab bars in bathroom  Use walking aids like cane or walker    Barriers: overwhelmed by complexity of regimen and lack of education  Plan for overcoming my barriers: care coordination   Confidence: 7/10  Anticipated Goal Completion Date: 7/9/19            Prior to Admission medications    Medication Sig Start Date End Date Taking?  Authorizing Provider   chlorzoxazone (PARAFON FORTE) 250 MG tablet TAKE 1 TABLET BY MOUTH THREE TIMES DAILY AS NEEDED FOR MUSCLE SPASMS 4/9/19   Hannah Gonzales MD   fluconazole (DIFLUCAN) 150 MG tablet TAKE ONE TABLET BY MOUTH ONCE DAILY FOR ONE DOSE 4/4/19   Paulina Brice MD   pravastatin (PRAVACHOL) 40 MG tablet TAKE 1 TABLET BY MOUTH DAILY 4/4/19   Paulina Brice MD   HYDROcodone-acetaminophen (NORCO) 5-325 MG per tablet Take 1 tablet by mouth every 6 hours as needed for Pain.     Historical Provider, MD   venlafaxine (EFFEXOR XR) 75 MG extended release capsule Take 1 capsule by mouth daily 3/28/19 4/27/19  Paulina Brice MD   ergocalciferol (ERGOCALCIFEROL) 45779 units capsule Take 1 capsule by mouth once a week 3/27/19   Paulina Brice MD   simethicone (MYLICON) 80 MG chewable tablet Take 1 tablet by mouth 4 times daily as needed for Flatulence 2/13/19   Osmar Gibbons MD   ranitidine (ZANTAC) 150 MG tablet TAKE 1 TABLET BY MOUTH TWICE DAILY 2/6/19   Paulina Brice MD   sucralfate (CARAFATE) 1 GM/10ML suspension Take 10 mLs by mouth 4 times daily 2/3/19   Darling Vides DO   DULoxetine (CYMBALTA) 20 MG extended release capsule TAKE 1 CAPSULE BY MOUTH DAILY 1/9/19   Paulina Brice MD   busPIRone (BUSPAR) 15 MG tablet TAKE 1 TABLET BY MOUTH TWICE DAILY 1/9/19   Paulina Brice MD   omeprazole (PRILOSEC) 20 MG delayed release capsule TAKE 1 CAPSULE BY MOUTH DAILY 1/9/19   Paulina Brice MD   diclofenac-Misoprostol (ARTHROTEC 50) 50-0.2 MG per tablet Take 1 tablet by mouth 2 times daily 1/8/19   Paulina Brice MD   Cranberry 500 MG CAPS Take 1 capsule by mouth 2 times daily 11/29/18   Paulina Brice MD   baclofen (LIORESAL) 10 MG tablet TAKE 1 TABLET BY MOUTH THREE TIMES DAILY 10/25/18   Paulina Brice MD   metoprolol succinate (TOPROL XL) 25 MG extended release tablet Take 25 mg by mouth daily    Historical Provider, MD   ketotifen (ZADITOR) 0.025 % ophthalmic solution Place 1 drop into both eyes 2 times daily    Historical Provider, MD   polyethylene glycol (MIRALAX) powder Take as written on colonoscopy directions, pt will do a two day prep, mix 1/2 bottle of 250g with 32oz of clear liquid 6/27/18   Kayla Speaks, DO AEROSPAN 80 MCG/ACT AERS inhaler INHALE ONE TO TWO PUFFS BY MOUTH TWICE A DAY 12/12/17   Ellis Ge MD   VENTOLIN  (90 Base) MCG/ACT inhaler INHALE TWO PUFFS BY MOUTH EVERY 6 HOURS AS NEEDED FOR WHEEZING 11/24/17   Ellis Ge MD   PREMARIN 0.625 MG/GM vaginal cream PLACE 2 GRAMS VAGINALLY TWICE A WEEK FOR 12 DOSES 9/21/17   Ellis Ge MD   fluticasone Karyn Can) 50 MCG/ACT nasal spray 2 sprays by Nasal route daily 4/19/16   Ellis Ge MD       Future Appointments   Date Time Provider [facility]   4/10/2019  9:10 AM Deandre Bose MD Sports Med [address] [facility]   4/23/2019  1:45 PM Lorena Alas MD grtlk exc [id number]      and   General Assessment    Do you have any symptoms that are causing concern?:  No

## 2019-04-10 ENCOUNTER — OFFICE VISIT (OUTPATIENT)
Dept: ORTHOPEDIC SURGERY | Age: 59
End: 2019-04-10
Payer: COMMERCIAL

## 2019-04-10 VITALS — HEIGHT: 61 IN | BODY MASS INDEX: 32.85 KG/M2 | WEIGHT: 174 LBS

## 2019-04-10 DIAGNOSIS — S83.242A ACUTE MEDIAL MENISCUS TEAR OF LEFT KNEE, INITIAL ENCOUNTER: Primary | ICD-10-CM

## 2019-04-10 PROCEDURE — 1036F TOBACCO NON-USER: CPT | Performed by: ORTHOPAEDIC SURGERY

## 2019-04-10 PROCEDURE — G8417 CALC BMI ABV UP PARAM F/U: HCPCS | Performed by: ORTHOPAEDIC SURGERY

## 2019-04-10 PROCEDURE — 3017F COLORECTAL CA SCREEN DOC REV: CPT | Performed by: ORTHOPAEDIC SURGERY

## 2019-04-10 PROCEDURE — G8427 DOCREV CUR MEDS BY ELIG CLIN: HCPCS | Performed by: ORTHOPAEDIC SURGERY

## 2019-04-10 PROCEDURE — 99213 OFFICE O/P EST LOW 20 MIN: CPT | Performed by: ORTHOPAEDIC SURGERY

## 2019-04-10 NOTE — PROGRESS NOTES
succinate (TOPROL XL) 25 MG extended release tablet Take 25 mg by mouth daily      ketotifen (ZADITOR) 0.025 % ophthalmic solution Place 1 drop into both eyes 2 times daily      polyethylene glycol (MIRALAX) powder Take as written on colonoscopy directions, pt will do a two day prep, mix 1/2 bottle of 250g with 32oz of clear liquid 250 g 0    AEROSPAN 80 MCG/ACT AERS inhaler INHALE ONE TO TWO PUFFS BY MOUTH TWICE A DAY 8.9 g 3    VENTOLIN  (90 Base) MCG/ACT inhaler INHALE TWO PUFFS BY MOUTH EVERY 6 HOURS AS NEEDED FOR WHEEZING 18 g 3    PREMARIN 0.625 MG/GM vaginal cream PLACE 2 GRAMS VAGINALLY TWICE A WEEK FOR 12 DOSES 30 g 1    fluticasone (FLONASE) 50 MCG/ACT nasal spray 2 sprays by Nasal route daily 16 g 3     No current facility-administered medications for this visit. Review of Systems   Musculoskeletal: Positive for arthralgias, gait problem, joint swelling and myalgias. Past Medical History:   Diagnosis Date    ASCUS with positive high risk HPV cervical 3/22/16    Asthma     Depression     Diverticulitis     ESBL (extended spectrum beta-lactamase) producing bacteria infection 02/03/2019    E.  Coli urine    GERD (gastroesophageal reflux disease)     Hyperlipidemia     MRSA (methicillin resistant staph aureus) culture positive 4/18/2016    lip    Rectocele      Past Surgical History:   Procedure Laterality Date    COLONOSCOPY  07/19/2018    COLONOSCOPY  07/19/2018    COLONOSCOPY N/A 7/19/2018    COLONOSCOPY performed by Emily Byrne DO at [address] [place] [address] Drive  2/25/2015    uro   [address] [place], BSO performed at [facility] by Dr. Sumaya Huggins, Also had some type of bladder lift at this time    KNEE SURGERY Left     #1 - lateral release, #2 - arthroscopy with muscle alignment    NERVE BLOCK  07/28/2017    caudal #1  decadron 10mg    NERVE BLOCK  09/22/2017    cadal # 2, decadron 10 mg    NERVE BLOCK  09/22/2017    caudal epidural steroid block #2 decadron 10

## 2019-04-16 ENCOUNTER — TELEPHONE (OUTPATIENT)
Dept: GASTROENTEROLOGY | Age: 59
End: 2019-04-16

## 2019-04-22 ENCOUNTER — HOSPITAL ENCOUNTER (OUTPATIENT)
Dept: MRI IMAGING | Age: 59
Discharge: HOME | End: 2019-04-24
Payer: COMMERCIAL

## 2019-04-22 DIAGNOSIS — S83.242A ACUTE MEDIAL MENISCUS TEAR OF LEFT KNEE, INITIAL ENCOUNTER: ICD-10-CM

## 2019-04-22 PROCEDURE — 73721 MRI JNT OF LWR EXTRE W/O DYE: CPT

## 2019-04-23 ENCOUNTER — CARE COORDINATION (OUTPATIENT)
Dept: CARE COORDINATION | Age: 59
End: 2019-04-23

## 2019-04-23 ENCOUNTER — OFFICE VISIT (OUTPATIENT)
Dept: GASTROENTEROLOGY | Age: 59
End: 2019-04-23
Payer: COMMERCIAL

## 2019-04-23 VITALS
SYSTOLIC BLOOD PRESSURE: 122 MMHG | DIASTOLIC BLOOD PRESSURE: 74 MMHG | WEIGHT: 176.6 LBS | BODY MASS INDEX: 33.37 KG/M2 | HEART RATE: 79 BPM

## 2019-04-23 DIAGNOSIS — R10.13 DYSPEPSIA: Primary | ICD-10-CM

## 2019-04-23 PROCEDURE — G8428 CUR MEDS NOT DOCUMENT: HCPCS | Performed by: INTERNAL MEDICINE

## 2019-04-23 PROCEDURE — 1036F TOBACCO NON-USER: CPT | Performed by: INTERNAL MEDICINE

## 2019-04-23 PROCEDURE — 3017F COLORECTAL CA SCREEN DOC REV: CPT | Performed by: INTERNAL MEDICINE

## 2019-04-23 PROCEDURE — 99213 OFFICE O/P EST LOW 20 MIN: CPT | Performed by: INTERNAL MEDICINE

## 2019-04-23 PROCEDURE — G8417 CALC BMI ABV UP PARAM F/U: HCPCS | Performed by: INTERNAL MEDICINE

## 2019-04-23 RX ORDER — OMEPRAZOLE 20 MG/1
20 CAPSULE, DELAYED RELEASE ORAL DAILY
Qty: 30 CAPSULE | Refills: 3 | Status: SHIPPED | OUTPATIENT
Start: 2019-04-23 | End: 2019-05-07 | Stop reason: SDUPTHER

## 2019-04-23 ASSESSMENT — ENCOUNTER SYMPTOMS
TROUBLE SWALLOWING: 0
RECTAL PAIN: 0
COUGH: 0
WHEEZING: 0
NAUSEA: 0
VOMITING: 0
ABDOMINAL PAIN: 0
SORE THROAT: 0
DIARRHEA: 0
BACK PAIN: 1
BLOOD IN STOOL: 0
VOICE CHANGE: 0
ANAL BLEEDING: 0
CONSTIPATION: 1
RESPIRATORY NEGATIVE: 1
SINUS PRESSURE: 0
CHOKING: 0
ABDOMINAL DISTENTION: 1

## 2019-04-23 NOTE — PROGRESS NOTES
GI CLINIC FOLLOW UP    INTERVAL HISTORY:     Dyspepsia and bloating type     Chief Complaint   Patient presents with    Gastroesophageal Reflux     Patient is here today for a follow up from acid reflux. Patient states that she still gets an acidic feeling. Patient is taking carafate still and says that she does feel that it helps a bit. Patient states that she noticed that eating fried foods and spicy foods make the acid worse.  GI Problem     Patient states that she notices that when she starts to get hungry her stomach feels gassy. HISTORY OF PRESENT ILLNESS: Jose Luis Srtausss a 63 y. o. female with a pasthistory remarkable for asthma, Depression, Diverticulitis, HL, chronic GERD and dyspepsia, referred for evaluation of of GERD and dyspepsia. Underwent recent EGD with negative findings. Had post-procedure bloating that resolved, here for follow up     +Heart burn, unrelated to PO intake     No weight loss  No N/V/D, intermittent constipation  No GI bleeding  FH+ Colon CA, dx at 64  Failed colonoscopy in July 2018, sigmoid stricture from chronic diverticulitis. Pending CT colonoscopy. No smoking, social drinking. Past Medical,Family, and Social History reviewed and does contribute to the patient presentingcondition. Patient's PMH/PSH,SH,PSYCH Hx, MEDs, ALLERGIES, and ROS were all reviewed and updated in the appropriate sections. PAST MEDICAL HISTORY:  Past Medical History:   Diagnosis Date    ASCUS with positive high risk HPV cervical 3/22/16    Asthma     Depression     Diverticulitis     ESBL (extended spectrum beta-lactamase) producing bacteria infection 02/03/2019    E.  Coli urine    GERD (gastroesophageal reflux disease)     Hyperlipidemia     MRSA (methicillin resistant staph aureus) culture positive 4/18/2016    lip    Rectocele        Past Surgical History:   Procedure Laterality Date    COLONOSCOPY  07/19/2018    COLONOSCOPY  07/19/2018    COLONOSCOPY N/A 7/19/2018    COLONOSCOPY performed by Seda Almazan DO at [address]  2/25/2015    T Evan DAVIS, BSO performed at [facility] by Dr. Shahana Fontaine, Also had some type of bladder lift at this time    KNEE SURGERY Left     #1 - lateral release, #2 - arthroscopy with muscle alignment    NERVE BLOCK  07/28/2017    caudal #1  decadron 10mg    NERVE BLOCK  09/22/2017    cadal # 2, decadron 10 mg    NERVE BLOCK  09/22/2017    caudal epidural steroid block #2 decadron 10 mg    NERVE BLOCK  11/10/2017    lumbar L4-5 epidural; depomedrol 80mg    UPPER GASTROINTESTINAL ENDOSCOPY  01/30/2019    with biopsy    UPPER GASTROINTESTINAL ENDOSCOPY  1/30/2019    EGD BIOPSY performed by Hector Talamantes MD at [address] [place] [address]:    Current Outpatient Medications:     chlorzoxazone (PARAFON FORTE) 250 MG tablet, TAKE 1 TABLET BY MOUTH THREE TIMES DAILY AS NEEDED FOR MUSCLE SPASMS, Disp: 84 tablet, Rfl: 3    fluconazole (DIFLUCAN) 150 MG tablet, TAKE ONE TABLET BY MOUTH ONCE DAILY FOR ONE DOSE, Disp: 1 tablet, Rfl: 0    pravastatin (PRAVACHOL) 40 MG tablet, TAKE 1 TABLET BY MOUTH DAILY, Disp: 28 tablet, Rfl: 3    venlafaxine (EFFEXOR XR) 75 MG extended release capsule, Take 1 capsule by mouth daily, Disp: 30 capsule, Rfl: 3    ergocalciferol (ERGOCALCIFEROL) 01495 units capsule, Take 1 capsule by mouth once a week, Disp: 4 capsule, Rfl: 3    simethicone (MYLICON) 80 MG chewable tablet, Take 1 tablet by mouth 4 times daily as needed for Flatulence, Disp: 180 tablet, Rfl: 3    ranitidine (ZANTAC) 150 MG tablet, TAKE 1 TABLET BY MOUTH TWICE DAILY, Disp: 56 tablet, Rfl: 3    sucralfate (CARAFATE) 1 GM/10ML suspension, Take 10 mLs by mouth 4 times daily, Disp: 1200 mL, Rfl: 3    busPIRone (BUSPAR) 15 MG tablet, TAKE 1 TABLET BY MOUTH TWICE DAILY, Disp: 56 tablet, Rfl: 3    omeprazole (PRILOSEC) 20 MG delayed release capsule, TAKE 1 CAPSULE BY MOUTH DAILY, Disp: 28 capsule, Rfl: 3    diclofenac-Misoprostol (ARTHROTEC 50) 50-0.2 MG per tablet, Take 1 tablet by mouth 2 times daily, Disp: 60 tablet, Rfl: 3    Cranberry 500 MG CAPS, Take 1 capsule by mouth 2 times daily, Disp: 60 capsule, Rfl: 3    metoprolol succinate (TOPROL XL) 25 MG extended release tablet, Take 25 mg by mouth daily, Disp: , Rfl:     ketotifen (ZADITOR) 0.025 % ophthalmic solution, Place 1 drop into both eyes 2 times daily, Disp: , Rfl:     AEROSPAN 80 MCG/ACT AERS inhaler, INHALE ONE TO TWO PUFFS BY MOUTH TWICE A DAY, Disp: 8.9 g, Rfl: 3    PREMARIN 0.625 MG/GM vaginal cream, PLACE 2 GRAMS VAGINALLY TWICE A WEEK FOR 12 DOSES, Disp: 30 g, Rfl: 1    HYDROcodone-acetaminophen (NORCO) 5-325 MG per tablet, Take 1 tablet by mouth every 6 hours as needed for Pain., Disp: , Rfl:     DULoxetine (CYMBALTA) 20 MG extended release capsule, TAKE 1 CAPSULE BY MOUTH DAILY, Disp: 28 capsule, Rfl: 3    baclofen (LIORESAL) 10 MG tablet, TAKE 1 TABLET BY MOUTH THREE TIMES DAILY, Disp: 84 tablet, Rfl: 2    polyethylene glycol (MIRALAX) powder, Take as written on colonoscopy directions, pt will do a two day prep, mix 1/2 bottle of 250g with 32oz of clear liquid, Disp: 250 g, Rfl: 0    VENTOLIN  (90 Base) MCG/ACT inhaler, INHALE TWO PUFFS BY MOUTH EVERY 6 HOURS AS NEEDED FOR WHEEZING, Disp: 18 g, Rfl: 3    fluticasone (FLONASE) 50 MCG/ACT nasal spray, 2 sprays by Nasal route daily, Disp: 16 g, Rfl: 3    ALLERGIES:   Allergies   Allergen Reactions    Other Shortness Of Breath    Shrimp (Diagnostic) Shortness Of Breath    Famotidine Other (See Comments)     Headaches  Headaches  Headaches  Headaches  Headaches    Gabapentin Nausea Only     Mood swings, nausea. Mood swings, nausea. Mood swings, nausea. Mood swings, nausea. Mood swings, nausea.      Shellfish Allergy        FAMILY HISTORY:       Problem Relation Age of Onset    High Blood Pressure Father          SOCIAL HISTORY:   Social History     Socioeconomic History    Marital status:      Spouse name: Not on file    Number of children: Not on file    Years of education: Not on file    Highest education level: Not on file   Occupational History    Not on file   Social Needs    Financial resource strain: Not on file    Food insecurity:     Worry: Not on file     Inability: Not on file    Transportation needs:     Medical: Not on file     Non-medical: Not on file   Tobacco Use    Smoking status: Former Smoker     Packs/day: 2.00     Years: 0.50     Pack years: 1.00     Types: Cigarettes     Last attempt to quit:      Years since quittin.3    Smokeless tobacco: Never Used   Substance and Sexual Activity    Alcohol use: Yes     Alcohol/week: 0.0 oz     Comment: social    Drug use: No    Sexual activity: Not on file   Lifestyle    Physical activity:     Days per week: Not on file     Minutes per session: Not on file    Stress: Not on file   Relationships    Social connections:     Talks on phone: Not on file     Gets together: Not on file     Attends Mosque service: Not on file     Active member of club or organization: Not on file     Attends meetings of clubs or organizations: Not on file     Relationship status: Not on file    Intimate partner violence:     Fear of current or ex partner: Not on file     Emotionally abused: Not on file     Physically abused: Not on file     Forced sexual activity: Not on file   Other Topics Concern    Not on file   Social History Narrative    Not on file       REVIEW OF SYSTEMS: A 12-point review of systemswas obtained and pertinent positives and negatives were enumerated above in the history of present illness. All other reviewed systems / symptoms were negative. Review of Systems    PHYSICAL EXAMINATION: Vital signs reviewed per the nursing documentation. /74   Pulse 79   Wt 176 lb 9.6 oz (80.1 kg)   BMI 33.37 kg/m²   Body mass index is 33.37 kg/m².    Physical Exam   Constitutional: SYSTEM PROVIDED HISTORY: Acute medial meniscus tear of left knee, initial encounter TECHNOLOGIST PROVIDED HISTORY: Ordering Physician Provided Reason for Exam: knee pain for one month Acuity: Chronic Type of Exam: Subsequent/Follow-up Additional signs and symptoms: swelling and pain without injury Relevant Medical/Surgical History: previous left knee surgery 30+ years ago 58-year-old female who complains of left knee pain for 1 month; evaluate for meniscal tear FINDINGS: MENISCI: Horizontally an obliquely oriented increased proton density signal extending through the substance of the medial meniscus involving both articular surfaces and the free edge with volume loss and partial outward extrusion of the medial meniscus body on image 13, series 6. Increased proton density signal extending through the substance of the lateral meniscus without clear extension to an articular surface on more than 1 MR image favored to represent degeneration. No discrete lateral meniscus tear evident. CRUCIATE LIGAMENTS: Anterior and posterior cruciate ligaments appear intact. EXTENSOR MECHANISM: Distal quadriceps tendon, patellar tendon, and patellar retinacula appear grossly intact. LATERAL COLLATERAL LIGAMENT COMPLEX: Popliteus muscle/tendon, iliotibial band, lateral collateral ligament, and biceps femoris appear intact. MEDIAL COLLATERAL LIGAMENT COMPLEX: Medial collateral ligament complex appears intact. KNEE JOINT: Small joint effusion. Osseous alignment is normal. No acute fracture or gross dislocation. Mild tricompartmental osteophyte spurring. Diffuse grade II chondromalacia of the lateral compartment articular cartilage. Diffuse grade II-III chondromalacia of the medial compartment articular cartilage. Diffuse grade III-IV patellofemoral chondromalacia primarily in the patellar apex and lateral patellofemoral compartment with underlying subchondral reactive marrow changes in the patella.  BONE MARROW: Susceptibility artifact along the anterior tibial tubercle consistent with known orthopedic hardware in this location on the plain radiographs. Bone marrow signal intensity within the remaining visualized osseous structures otherwise grossly unremarkable. SOFT TISSUES: Small Baker's cyst. Susceptibility artifact in the subcutaneous fat along the anterior knee. Mild edema in the subcutaneous fat along the anterior knee. Visualized popliteal neurovascular bundle grossly unremarkable. 1. Complex multidirectional horizontally and obliquely oriented tearing involving the entirety of the medial meniscus with partial outward extrusion and volume loss of the medial meniscus body. 2. Degeneration in the lateral meniscus. 3. Tricompartmental osteoarthrosis. Moderate to severe chondromalacia involving the central and lateral patellofemoral compartments with underlying subchondral reactive marrow changes in the patella. 4. Mild to moderate medial compartment chondromalacia. Mild lateral compartment chondromalacia. 5. Susceptibility artifact in the subcutaneous fat along the anterior knee and anterior tibial tubercle consistent with prior surgery and orthopedic hardware. 6. Small Baker's cyst.  Small joint effusion. IMPRESSION: Ms. Stubbs is a 63 y. o. female with GERD and dyspepsia, long standing, unresponsive to to high dose PPI therapy. Patient was educated on dietary modifications. EGD negative which suggest non-ulcer dyspepsia (NUD). She was negative for celiac disease. There may be functional component to her chronic symptoms, predominantly bloating currently. Modest improvement. Recommendation:  -Continue with dietary modifications and reduce gas forming foods, will continue patient on simethicone 80mg prn  -Continue with Prilosec 20mg in AM once daily.   -Will provide FODMAP list and provide trial. Increase water intake.   -Avoid high fructose corn syrup and affiliated beverages  -RTC in 2 months.      Thank you for allowing me to participate in the care of Ms. Ian Mcmanus. For any further questions please do not hesitate to contact me. I have reviewed and agree with the ROS entered by the MA/MADAI.          Suzette Amaro MD, MPH   [facility]  Office #: [phone]

## 2019-04-23 NOTE — CARE COORDINATION
Ambulatory Care Coordination Note  4/23/2019  CM Risk Score: 5  Stef Mortality Risk Score:      ACC: Anastacio Almonte, RN    Summary Note: spoke with pt who said she is doing ok at home. She is going to see her GI doctor today for follow up. She did have her MRI done and will see ortho on Wednesday. She has had one fall in the last few weeks but only because she tripped over her 3 yr old granddaughter. 1) fu with ortho plan   2) fu with fall risk education. 3) fu with cane         Care Coordination Interventions    Program Enrollment:  Rising Risk  Referral from Primary Care Provider:  No  Suggested Interventions and Community Resources  Fall Risk Prevention: In Process         Goals Addressed                 This Visit's Progress     Reduce Falls    On track     I will reduce my risk of falls by the following: Remove rugs or use non slip rugs  Install grab bars in bathroom  Use walking aids like cane or walker    Barriers: overwhelmed by complexity of regimen and lack of education  Plan for overcoming my barriers: care coordination   Confidence: 7/10  Anticipated Goal Completion Date: 7/9/19            Prior to Admission medications    Medication Sig Start Date End Date Taking? Authorizing Provider   chlorzoxazone (PARAFON FORTE) 250 MG tablet TAKE 1 TABLET BY MOUTH THREE TIMES DAILY AS NEEDED FOR MUSCLE SPASMS 4/9/19   Garrick Lopez MD   fluconazole (DIFLUCAN) 150 MG tablet TAKE ONE TABLET BY MOUTH ONCE DAILY FOR ONE DOSE 4/4/19   Garrick Lopez MD   pravastatin (PRAVACHOL) 40 MG tablet TAKE 1 TABLET BY MOUTH DAILY 4/4/19   Garrick Lopez MD   HYDROcodone-acetaminophen (NORCO) 5-325 MG per tablet Take 1 tablet by mouth every 6 hours as needed for Pain.     Historical Provider, MD   venlafaxine (EFFEXOR XR) 75 MG extended release capsule Take 1 capsule by mouth daily 3/28/19 4/27/19  Garrick Lopez MD   ergocalciferol (ERGOCALCIFEROL) 55079 units capsule Take 1 capsule by mouth once a week 3/27/19 Florinda Duval MD   simethicone (MYLICON) 80 MG chewable tablet Take 1 tablet by mouth 4 times daily as needed for Flatulence 2/13/19   Nayely Malik MD   ranitidine (ZANTAC) 150 MG tablet TAKE 1 TABLET BY MOUTH TWICE DAILY 2/6/19   Florinda Duval MD   sucralfate (CARAFATE) 1 GM/10ML suspension Take 10 mLs by mouth 4 times daily 2/3/19   Analia Ohara DO   DULoxetine (CYMBALTA) 20 MG extended release capsule TAKE 1 CAPSULE BY MOUTH DAILY 1/9/19   Florinda Duval MD   busPIRone (BUSPAR) 15 MG tablet TAKE 1 TABLET BY MOUTH TWICE DAILY 1/9/19   Florinda Duval MD   omeprazole (PRILOSEC) 20 MG delayed release capsule TAKE 1 CAPSULE BY MOUTH DAILY 1/9/19   Florinda Duval MD   diclofenac-Misoprostol (ARTHROTEC 50) 50-0.2 MG per tablet Take 1 tablet by mouth 2 times daily 1/8/19   Florinda Duval MD   Cranberry 500 MG CAPS Take 1 capsule by mouth 2 times daily 11/29/18   Florinda Duval MD   baclofen (LIORESAL) 10 MG tablet TAKE 1 TABLET BY MOUTH THREE TIMES DAILY 10/25/18   Florinda Duval MD   metoprolol succinate (TOPROL XL) 25 MG extended release tablet Take 25 mg by mouth daily    Historical Provider, MD   ketotifen (ZADITOR) 0.025 % ophthalmic solution Place 1 drop into both eyes 2 times daily    Historical Provider, MD   polyethylene glycol (MIRALAX) powder Take as written on colonoscopy directions, pt will do a two day prep, mix 1/2 bottle of 250g with 32oz of clear liquid 6/27/18   Doraine Friendship, DO   AEROSPAN 80 MCG/ACT AERS inhaler INHALE ONE TO TWO PUFFS BY MOUTH TWICE A DAY 12/12/17   Florinda Duval MD   VENTOLIN  (90 Base) MCG/ACT inhaler INHALE TWO PUFFS BY MOUTH EVERY 6 HOURS AS NEEDED FOR WHEEZING 11/24/17   Florinda Duval MD   PREMARIN 0.625 MG/GM vaginal cream PLACE 2 GRAMS VAGINALLY TWICE A WEEK FOR 12 DOSES 9/21/17   Florinda Duval MD   fluticasone Jael Mustard) 50 MCG/ACT nasal spray 2 sprays by Nasal route daily 4/19/16   Florinda Duval MD       Future Appointments   Date Time Provider Elaine Slateri   4/23/2019  1:45 PM Marisel Jackson MD grtlk exc [id number]   4/24/2019  9:30 AM Scar Ayala MD Sports [facility]   4/24/2019 10:30 AM Casimiro Cunningham MD [address] [facility]

## 2019-04-23 NOTE — PROGRESS NOTES
Subjective:      Patient ID: Mary Persaud is a 63 y.o. female. HPI    Review of Systems   Constitutional: Negative. Negative for appetite change, fatigue and unexpected weight change. HENT: Negative. Negative for dental problem, postnasal drip, sinus pressure, sore throat, trouble swallowing and voice change. Eyes: Positive for visual disturbance (glasses). Respiratory: Negative. Negative for cough, choking and wheezing. Cardiovascular: Negative. Negative for chest pain, palpitations and leg swelling. Gastrointestinal: Positive for abdominal distention and constipation. Negative for abdominal pain, anal bleeding, blood in stool, diarrhea, nausea, rectal pain and vomiting. Genitourinary: Negative. Negative for difficulty urinating. Musculoskeletal: Positive for back pain. Negative for arthralgias, gait problem and myalgias. Allergic/Immunologic: Positive for food allergies (shrimp). Negative for environmental allergies. Neurological: Positive for numbness and headaches. Negative for dizziness, weakness and light-headedness. Hematological: Bruises/bleeds easily (bruises easily). Psychiatric/Behavioral: Positive for sleep disturbance. The patient is nervous/anxious.         Objective:   Physical Exam    Assessment:            Plan:

## 2019-04-24 ENCOUNTER — OFFICE VISIT (OUTPATIENT)
Dept: ORTHOPEDIC SURGERY | Age: 59
End: 2019-04-24
Payer: COMMERCIAL

## 2019-04-24 ENCOUNTER — OFFICE VISIT (OUTPATIENT)
Dept: FAMILY MEDICINE CLINIC | Age: 59
End: 2019-04-24
Payer: COMMERCIAL

## 2019-04-24 VITALS
BODY MASS INDEX: 33.25 KG/M2 | TEMPERATURE: 97.8 F | SYSTOLIC BLOOD PRESSURE: 116 MMHG | DIASTOLIC BLOOD PRESSURE: 69 MMHG | HEART RATE: 103 BPM | WEIGHT: 176 LBS | OXYGEN SATURATION: 99 %

## 2019-04-24 VITALS — HEIGHT: 61 IN | BODY MASS INDEX: 33.23 KG/M2 | WEIGHT: 176 LBS

## 2019-04-24 DIAGNOSIS — S83.242A ACUTE MEDIAL MENISCUS TEAR OF LEFT KNEE, INITIAL ENCOUNTER: Primary | ICD-10-CM

## 2019-04-24 DIAGNOSIS — Z12.39 BREAST CANCER SCREENING: ICD-10-CM

## 2019-04-24 DIAGNOSIS — J30.2 SEASONAL ALLERGIES: Primary | ICD-10-CM

## 2019-04-24 DIAGNOSIS — M51.26 DISPLACEMENT OF LUMBAR INTERVERTEBRAL DISC WITHOUT MYELOPATHY: ICD-10-CM

## 2019-04-24 PROCEDURE — 3017F COLORECTAL CA SCREEN DOC REV: CPT | Performed by: ORTHOPAEDIC SURGERY

## 2019-04-24 PROCEDURE — 99213 OFFICE O/P EST LOW 20 MIN: CPT | Performed by: FAMILY MEDICINE

## 2019-04-24 PROCEDURE — 3017F COLORECTAL CA SCREEN DOC REV: CPT | Performed by: FAMILY MEDICINE

## 2019-04-24 PROCEDURE — G8417 CALC BMI ABV UP PARAM F/U: HCPCS | Performed by: ORTHOPAEDIC SURGERY

## 2019-04-24 PROCEDURE — G8427 DOCREV CUR MEDS BY ELIG CLIN: HCPCS | Performed by: FAMILY MEDICINE

## 2019-04-24 PROCEDURE — 1036F TOBACCO NON-USER: CPT | Performed by: ORTHOPAEDIC SURGERY

## 2019-04-24 PROCEDURE — 1036F TOBACCO NON-USER: CPT | Performed by: FAMILY MEDICINE

## 2019-04-24 PROCEDURE — 99213 OFFICE O/P EST LOW 20 MIN: CPT | Performed by: ORTHOPAEDIC SURGERY

## 2019-04-24 PROCEDURE — G8417 CALC BMI ABV UP PARAM F/U: HCPCS | Performed by: FAMILY MEDICINE

## 2019-04-24 PROCEDURE — G8428 CUR MEDS NOT DOCUMENT: HCPCS | Performed by: ORTHOPAEDIC SURGERY

## 2019-04-24 RX ORDER — ALBUTEROL SULFATE 90 UG/1
AEROSOL, METERED RESPIRATORY (INHALATION)
Qty: 18 G | Refills: 3 | Status: SHIPPED | OUTPATIENT
Start: 2019-04-24 | End: 2019-08-21 | Stop reason: SDUPTHER

## 2019-04-24 RX ORDER — FLUTICASONE PROPIONATE 50 MCG
2 SPRAY, SUSPENSION (ML) NASAL DAILY
Qty: 16 G | Refills: 3 | Status: SHIPPED | OUTPATIENT
Start: 2019-04-24 | End: 2019-08-21 | Stop reason: SDUPTHER

## 2019-04-24 RX ORDER — KETOTIFEN FUMARATE 0.35 MG/ML
1 SOLUTION/ DROPS OPHTHALMIC 2 TIMES DAILY
Qty: 10 ML | Refills: 1 | Status: SHIPPED | OUTPATIENT
Start: 2019-04-24 | End: 2021-01-19

## 2019-04-24 NOTE — PROGRESS NOTES
Visit Information    Have you changed or started any medications since your last visit including any over-the-counter medicines, vitamins, or herbal medicines? no   Are you having any side effects from any of your medications? -  no  Have you stopped taking any of your medications? Is so, why? -  no    Have you seen any other physician or provider since your last visit? No  Have you had any other diagnostic tests since your last visit? No  Have you been seen in the emergency room and/or had an admission to a hospital since we last saw you? No  Have you had your routine dental cleaning in the past 6 months? no    Have you activated your [employer] account? If not, what are your barriers?  Yes     Patient Care Team:  Naila Lewis MD as PCP - General (Family Medicine)  Jose Alberto Ty MD as Consulting Physician (Gastroenterology)  Sheila Avelar RN as Care Coordinator    Medical History Review  Past Medical, Family, and Social History reviewed and does not contribute to the patient presenting condition    Health Maintenance   Topic Date Due    Pneumococcal 0-64 years Vaccine (1 of 1 - PPSV23) 03/05/1966    Breast cancer screen  03/05/2011    Shingles Vaccine (1 of 2) 03/05/2011    Flu vaccine (Season Ended) 11/29/2019 (Originally 9/1/2019)    Lipid screen  02/19/2020    Cervical cancer screen  07/19/2021    DTaP/Tdap/Td vaccine (2 - Td) 06/07/2025    Colon cancer screen colonoscopy  07/19/2028    Hepatitis C screen  Completed    HIV screen  Completed

## 2019-04-24 NOTE — PROGRESS NOTES
General FM note    Dionisio Jackson is a 63 y.o. female who presents today for follow up on her  medical conditions as noted below. Dionisio Jackson is c/o of   Chief Complaint   Patient presents with    Allergies    Gastroesophageal Reflux       Patient Active Problem List:     GERD (gastroesophageal reflux disease)     Major depression     Rotator cuff disorder     Hyperlipidemia     Incontinence in female     Rectocele     Diverticulitis     Asthma     Depression     Hypokalemia     Cellulitis, face - left side, failed outpatient therapy     Hyperglycemia     Mild intermittent asthma without complication     Gastroesophageal reflux disease without esophagitis     Displacement of lumbar intervertebral disc without myelopathy     Chest pain     Dyspepsia     Past Medical History:   Diagnosis Date    ASCUS with positive high risk HPV cervical 3/22/16    Asthma     Depression     Diverticulitis     ESBL (extended spectrum beta-lactamase) producing bacteria infection 02/03/2019    E.  Coli urine    GERD (gastroesophageal reflux disease)     Hyperlipidemia     MRSA (methicillin resistant staph aureus) culture positive 4/18/2016    lip    Rectocele       Past Surgical History:   Procedure Laterality Date    COLONOSCOPY  07/19/2018    COLONOSCOPY  07/19/2018    COLONOSCOPY N/A 7/19/2018    COLONOSCOPY performed by Prashant Quintero DO at [address] [place] [address]  2/25/2015    uro   [address] [place], BSO performed at [facility] by Dr. Herber Kang, Also had some type of bladder lift at this time    KNEE SURGERY Left     #1 - lateral release, #2 - arthroscopy with muscle alignment    NERVE BLOCK  07/28/2017    caudal #1  decadron 10mg    NERVE BLOCK  09/22/2017    cadal # 2, decadron 10 mg    NERVE BLOCK  09/22/2017    caudal epidural steroid block #2 decadron 10 mg    NERVE BLOCK  11/10/2017    lumbar L4-5 epidural; depomedrol 80mg    UPPER GASTROINTESTINAL ENDOSCOPY  01/30/2019    with biopsy  UPPER GASTROINTESTINAL ENDOSCOPY  1/30/2019    EGD BIOPSY performed by Karina Hernandez MD at [address] [place] [address] History   Problem Relation Age of Onset    High Blood Pressure Father      Current Outpatient Medications   Medication Sig Dispense Refill    albuterol sulfate HFA (VENTOLIN HFA) 108 (90 Base) MCG/ACT inhaler INHALE TWO PUFFS BY MOUTH EVERY 6 HOURS AS NEEDED FOR WHEEZING 18 g 3    fluticasone (FLONASE) 50 MCG/ACT nasal spray 2 sprays by Nasal route daily 16 g 3    ketotifen (ZADITOR) 0.025 % ophthalmic solution Place 1 drop into both eyes 2 times daily 10 mL 1    omeprazole (PRILOSEC) 20 MG delayed release capsule Take 1 capsule by mouth daily 30 capsule 3    chlorzoxazone (PARAFON FORTE) 250 MG tablet TAKE 1 TABLET BY MOUTH THREE TIMES DAILY AS NEEDED FOR MUSCLE SPASMS 84 tablet 3    fluconazole (DIFLUCAN) 150 MG tablet TAKE ONE TABLET BY MOUTH ONCE DAILY FOR ONE DOSE 1 tablet 0    pravastatin (PRAVACHOL) 40 MG tablet TAKE 1 TABLET BY MOUTH DAILY 28 tablet 3    HYDROcodone-acetaminophen (NORCO) 5-325 MG per tablet Take 1 tablet by mouth every 6 hours as needed for Pain.       venlafaxine (EFFEXOR XR) 75 MG extended release capsule Take 1 capsule by mouth daily 30 capsule 3    ergocalciferol (ERGOCALCIFEROL) 39395 units capsule Take 1 capsule by mouth once a week 4 capsule 3    simethicone (MYLICON) 80 MG chewable tablet Take 1 tablet by mouth 4 times daily as needed for Flatulence 180 tablet 3    ranitidine (ZANTAC) 150 MG tablet TAKE 1 TABLET BY MOUTH TWICE DAILY 56 tablet 3    sucralfate (CARAFATE) 1 GM/10ML suspension Take 10 mLs by mouth 4 times daily 1200 mL 3    DULoxetine (CYMBALTA) 20 MG extended release capsule TAKE 1 CAPSULE BY MOUTH DAILY 28 capsule 3    busPIRone (BUSPAR) 15 MG tablet TAKE 1 TABLET BY MOUTH TWICE DAILY 56 tablet 3    omeprazole (PRILOSEC) 20 MG delayed release capsule TAKE 1 CAPSULE BY MOUTH DAILY 28 capsule 3    diclofenac-Misoprostol (ARTHROTEC 50) 50-0.2 MG per tablet Take 1 tablet by mouth 2 times daily 60 tablet 3    Cranberry 500 MG CAPS Take 1 capsule by mouth 2 times daily 60 capsule 3    baclofen (LIORESAL) 10 MG tablet TAKE 1 TABLET BY MOUTH THREE TIMES DAILY 84 tablet 2    metoprolol succinate (TOPROL XL) 25 MG extended release tablet Take 25 mg by mouth daily      polyethylene glycol (MIRALAX) powder Take as written on colonoscopy directions, pt will do a two day prep, mix 1/2 bottle of 250g with 32oz of clear liquid 250 g 0    AEROSPAN 80 MCG/ACT AERS inhaler INHALE ONE TO TWO PUFFS BY MOUTH TWICE A DAY 8.9 g 3    PREMARIN 0.625 MG/GM vaginal cream PLACE 2 GRAMS VAGINALLY TWICE A WEEK FOR 12 DOSES 30 g 1     No current facility-administered medications for this visit. ALLERGIES:    Allergies   Allergen Reactions    Other Shortness Of Breath    Shrimp (Diagnostic) Shortness Of Breath    Famotidine Other (See Comments)     Headaches  Headaches  Headaches  Headaches  Headaches    Gabapentin Nausea Only     Mood swings, nausea. Mood swings, nausea. Mood swings, nausea. Mood swings, nausea. Mood swings, nausea.  Shellfish Allergy        Social History     Tobacco Use    Smoking status: Former Smoker     Packs/day: 2.00     Years: 0.50     Pack years: 1.00     Types: Cigarettes     Last attempt to quit:      Years since quittin.3    Smokeless tobacco: Never Used   Substance Use Topics    Alcohol use: Yes     Alcohol/week: 0.0 oz     Comment: social      Body mass index is 33.25 kg/m². /69   Pulse 103   Temp 97.8 °F (36.6 °C)   Wt 176 lb (79.8 kg)   SpO2 99%   BMI 33.25 kg/m²     Subjective:      HPI    62 yo female here for med refills. Was seen by ortho - will have L knee scope. Wants to have a mammogram order. Review of Systems   Constitutional: Negative for fever and unexpected weight change. Respiratory: Negative for cough and shortness of breath.     Cardiovascular: Negative for chest pain and leg swelling. Genitourinary: Negative for dysuria and hematuria. Musculoskeletal: Negative for back pain and gait problem. + L knee pain. Skin: Negative for color change and rash. Objective:   Physical Exam  Constitutional: VS (see above). General appearance: normal development, habitus and attention, no deformities. No distress. Eyes: normal conjunctiva and lids. CAV: RRR, no RMG. No edema lower extremities. Pulmo: CTA bilateral, no CWR. Skin: no rashes, lesions or ulcers. Psychiatric: alert and oriented to place, time and person. Normal mood and affect. Assessment:       Diagnosis Orders   1. Seasonal allergies  albuterol sulfate HFA (VENTOLIN HFA) 108 (90 Base) MCG/ACT inhaler   2. Breast cancer screening  SESAR DIGITAL SCREEN W OR WO CAD BILATERAL       Plan:   Call or return to clinic prn if these symptoms worsen or fail to improve as anticipated. I have reviewed the instructions with the patient, answering all questions to her satisfaction. Return in about 6 months (around 10/24/2019), or if symptoms worsen or fail to improve, for [facility]. Orders Placed This Encounter   Procedures    SESAR DIGITAL SCREEN W OR WO CAD BILATERAL     Standing Status:   Future     Standing Expiration Date:   2019     Scheduling Instructions:      May perform additional studies at the discretion of the radiologist: Breast US, breast MRI, imaging guided biopsy, cyst aspiration or MBI.      Orders Placed This Encounter   Medications    albuterol sulfate HFA (VENTOLIN HFA) 108 (90 Base) MCG/ACT inhaler     Sig: INHALE TWO PUFFS BY MOUTH EVERY 6 HOURS AS NEEDED FOR WHEEZING     Dispense:  18 g     Refill:  3    fluticasone (FLONASE) 50 MCG/ACT nasal spray     Si sprays by Nasal route daily     Dispense:  16 g     Refill:  3    ketotifen (ZADITOR) 0.025 % ophthalmic solution     Sig: Place 1 drop into both eyes 2 times daily     Dispense:  10 mL     Refill:  1       Priscila received counseling on the following healthy behaviors: nutrition and exercise  Reviewed prior labs and health maintenance  Continue current medications, diet and exercise. Discussed use, benefit, and side effects of prescribed medications. Barriers to medication compliance addressed. Patient given educational materials - see patient instructions  Was a self-tracking handout given in paper form or via [employer]? No: .    Requested Prescriptions     Signed Prescriptions Disp Refills    albuterol sulfate HFA (VENTOLIN HFA) 108 (90 Base) MCG/ACT inhaler 18 g 3     Sig: INHALE TWO PUFFS BY MOUTH EVERY 6 HOURS AS NEEDED FOR WHEEZING    fluticasone (FLONASE) 50 MCG/ACT nasal spray 16 g 3     Si sprays by Nasal route daily    ketotifen (ZADITOR) 0.025 % ophthalmic solution 10 mL 1     Sig: Place 1 drop into both eyes 2 times daily       All patient questions answered. Patient voiced understanding. Quality Measures    Body mass index is 33.25 kg/m². Elevated. Weight control planned discussed Healthy diet and regular exercise. BP: 116/69 Blood pressure is normal. Treatment plan consists of No treatment change needed.     Lab Results   Component Value Date    LDLCHOLESTEROL 79 2015    (goal LDL reduction with dx if diabetes is 50% LDL reduction)      PHQ Scores 2018   PHQ2 Score 0 5 0   PHQ9 Score 0 18 0     Interpretation of Total Score Depression Severity: 1-4 = Minimal depression, 5-9 = Mild depression, 10-14 = Moderate depression, 15-19 = Moderately severe depression, 20-27 = Severe depression     Electronically signed by Crissy Renteria MD on 2019 at 12:37 PM       (Please note that portions of this note were completed with a voice recognition program. Efforts were made to edit the dictations but occasionally words are mis-transcribed.)

## 2019-04-24 NOTE — PROGRESS NOTES
Subjective:      Patient ID: Michael Monk is a 63 y.o. female. HPI  The patient returns for recheck of her left knee. Continues to have overall achiness in the knee with occasional sharp pain. It's medial.  Worse with weightbearing and deep flexion. Current Outpatient Medications   Medication Sig Dispense Refill    omeprazole (PRILOSEC) 20 MG delayed release capsule Take 1 capsule by mouth daily 30 capsule 3    chlorzoxazone (PARAFON FORTE) 250 MG tablet TAKE 1 TABLET BY MOUTH THREE TIMES DAILY AS NEEDED FOR MUSCLE SPASMS 84 tablet 3    fluconazole (DIFLUCAN) 150 MG tablet TAKE ONE TABLET BY MOUTH ONCE DAILY FOR ONE DOSE 1 tablet 0    pravastatin (PRAVACHOL) 40 MG tablet TAKE 1 TABLET BY MOUTH DAILY 28 tablet 3    HYDROcodone-acetaminophen (NORCO) 5-325 MG per tablet Take 1 tablet by mouth every 6 hours as needed for Pain.       venlafaxine (EFFEXOR XR) 75 MG extended release capsule Take 1 capsule by mouth daily 30 capsule 3    ergocalciferol (ERGOCALCIFEROL) 21312 units capsule Take 1 capsule by mouth once a week 4 capsule 3    simethicone (MYLICON) 80 MG chewable tablet Take 1 tablet by mouth 4 times daily as needed for Flatulence 180 tablet 3    ranitidine (ZANTAC) 150 MG tablet TAKE 1 TABLET BY MOUTH TWICE DAILY 56 tablet 3    sucralfate (CARAFATE) 1 GM/10ML suspension Take 10 mLs by mouth 4 times daily 1200 mL 3    DULoxetine (CYMBALTA) 20 MG extended release capsule TAKE 1 CAPSULE BY MOUTH DAILY 28 capsule 3    busPIRone (BUSPAR) 15 MG tablet TAKE 1 TABLET BY MOUTH TWICE DAILY 56 tablet 3    omeprazole (PRILOSEC) 20 MG delayed release capsule TAKE 1 CAPSULE BY MOUTH DAILY 28 capsule 3    diclofenac-Misoprostol (ARTHROTEC 50) 50-0.2 MG per tablet Take 1 tablet by mouth 2 times daily 60 tablet 3    Cranberry 500 MG CAPS Take 1 capsule by mouth 2 times daily 60 capsule 3    baclofen (LIORESAL) 10 MG tablet TAKE 1 TABLET BY MOUTH THREE TIMES DAILY 84 tablet 2    metoprolol succinate (TOPROL XL) 25 MG extended release tablet Take 25 mg by mouth daily      ketotifen (ZADITOR) 0.025 % ophthalmic solution Place 1 drop into both eyes 2 times daily      polyethylene glycol (MIRALAX) powder Take as written on colonoscopy directions, pt will do a two day prep, mix 1/2 bottle of 250g with 32oz of clear liquid 250 g 0    AEROSPAN 80 MCG/ACT AERS inhaler INHALE ONE TO TWO PUFFS BY MOUTH TWICE A DAY 8.9 g 3    VENTOLIN  (90 Base) MCG/ACT inhaler INHALE TWO PUFFS BY MOUTH EVERY 6 HOURS AS NEEDED FOR WHEEZING 18 g 3    PREMARIN 0.625 MG/GM vaginal cream PLACE 2 GRAMS VAGINALLY TWICE A WEEK FOR 12 DOSES 30 g 1    fluticasone (FLONASE) 50 MCG/ACT nasal spray 2 sprays by Nasal route daily 16 g 3     No current facility-administered medications for this visit. Review of Systems   Musculoskeletal: Positive for arthralgias, gait problem and joint swelling. Past Medical History:   Diagnosis Date    ASCUS with positive high risk HPV cervical 3/22/16    Asthma     Depression     Diverticulitis     ESBL (extended spectrum beta-lactamase) producing bacteria infection 02/03/2019    E.  Coli urine    GERD (gastroesophageal reflux disease)     Hyperlipidemia     MRSA (methicillin resistant staph aureus) culture positive 4/18/2016    lip    Rectocele      Past Surgical History:   Procedure Laterality Date    COLONOSCOPY  07/19/2018    COLONOSCOPY  07/19/2018    COLONOSCOPY N/A 7/19/2018    COLONOSCOPY performed by Ashish Wu DO at [address] [place] [address]  2/25/2015    uro   [address] [place], BSO performed at [facility] by Dr. Zack Schwab, Also had some type of bladder lift at this time    KNEE SURGERY Left     #1 - lateral release, #2 - arthroscopy with muscle alignment    NERVE BLOCK  07/28/2017    caudal #1  decadron 10mg    NERVE BLOCK  09/22/2017    cadal # 2, decadron 10 mg    NERVE BLOCK  09/22/2017    caudal epidural steroid block #2 decadron 10 mg    NERVE BLOCK

## 2019-04-25 RX ORDER — DICLOFENAC SODIUM AND MISOPROSTOL 50; 200 MG/1; UG/1
TABLET, DELAYED RELEASE ORAL
Qty: 60 TABLET | Refills: 3 | Status: SHIPPED | OUTPATIENT
Start: 2019-04-25 | End: 2019-08-21 | Stop reason: SDUPTHER

## 2019-04-30 ENCOUNTER — HOSPITAL ENCOUNTER (OUTPATIENT)
Dept: MAMMOGRAPHY | Age: 59
Discharge: HOME | End: 2019-05-02
Payer: COMMERCIAL

## 2019-04-30 DIAGNOSIS — Z12.39 BREAST CANCER SCREENING: ICD-10-CM

## 2019-04-30 PROCEDURE — 77067 SCR MAMMO BI INCL CAD: CPT

## 2019-05-02 DIAGNOSIS — F41.9 ANXIETY: ICD-10-CM

## 2019-05-02 RX ORDER — EVENING PRIMROSE OIL 500 MG
CAPSULE ORAL
Qty: 56 CAPSULE | Refills: 3 | Status: SHIPPED | OUTPATIENT
Start: 2019-05-02 | End: 2019-08-21 | Stop reason: SDUPTHER

## 2019-05-02 RX ORDER — BUSPIRONE HYDROCHLORIDE 15 MG/1
TABLET ORAL
Qty: 56 TABLET | Refills: 3 | Status: SHIPPED | OUTPATIENT
Start: 2019-05-02 | End: 2019-08-21 | Stop reason: SDUPTHER

## 2019-05-07 ENCOUNTER — HOSPITAL ENCOUNTER (OUTPATIENT)
Dept: PREADMISSION TESTING | Age: 59
Discharge: HOME | End: 2019-05-11
Payer: COMMERCIAL

## 2019-05-07 ENCOUNTER — CARE COORDINATION (OUTPATIENT)
Dept: CARE COORDINATION | Age: 59
End: 2019-05-07

## 2019-05-07 VITALS
OXYGEN SATURATION: 98 % | HEIGHT: 61 IN | TEMPERATURE: 98.1 F | DIASTOLIC BLOOD PRESSURE: 76 MMHG | RESPIRATION RATE: 16 BRPM | SYSTOLIC BLOOD PRESSURE: 118 MMHG | BODY MASS INDEX: 33.23 KG/M2 | WEIGHT: 176 LBS | HEART RATE: 85 BPM

## 2019-05-07 LAB
ANION GAP SERPL CALCULATED.3IONS-SCNC: 12 MMOL/L (ref 9–17)
BUN BLDV-MCNC: 12 MG/DL (ref 6–20)
CHLORIDE BLD-SCNC: 107 MMOL/L (ref 98–107)
CO2: 26 MMOL/L (ref 20–31)
CREAT SERPL-MCNC: 0.6 MG/DL (ref 0.5–0.9)
GFR AFRICAN AMERICAN: >60 ML/MIN
GFR NON-AFRICAN AMERICAN: >60 ML/MIN
GFR SERPL CREATININE-BSD FRML MDRD: NORMAL ML/MIN/{1.73_M2}
GFR SERPL CREATININE-BSD FRML MDRD: NORMAL ML/MIN/{1.73_M2}
GLUCOSE BLD-MCNC: 124 MG/DL (ref 70–99)
POTASSIUM SERPL-SCNC: 4.7 MMOL/L (ref 3.7–5.3)
SODIUM BLD-SCNC: 145 MMOL/L (ref 135–144)

## 2019-05-07 PROCEDURE — 82565 ASSAY OF CREATININE: CPT

## 2019-05-07 PROCEDURE — 80051 ELECTROLYTE PANEL: CPT

## 2019-05-07 PROCEDURE — 36415 COLL VENOUS BLD VENIPUNCTURE: CPT

## 2019-05-07 PROCEDURE — 84520 ASSAY OF UREA NITROGEN: CPT

## 2019-05-07 PROCEDURE — 82947 ASSAY GLUCOSE BLOOD QUANT: CPT

## 2019-05-07 RX ORDER — SODIUM CHLORIDE, SODIUM LACTATE, POTASSIUM CHLORIDE, CALCIUM CHLORIDE 600; 310; 30; 20 MG/100ML; MG/100ML; MG/100ML; MG/100ML
1000 INJECTION, SOLUTION INTRAVENOUS CONTINUOUS
Status: CANCELLED | OUTPATIENT
Start: 2019-05-07

## 2019-05-07 ASSESSMENT — PAIN DESCRIPTION - ORIENTATION: ORIENTATION: LEFT

## 2019-05-07 ASSESSMENT — PAIN DESCRIPTION - LOCATION: LOCATION: KNEE

## 2019-05-07 ASSESSMENT — PAIN DESCRIPTION - PAIN TYPE: TYPE: CHRONIC PAIN

## 2019-05-07 NOTE — H&P
500 MG CAPS TAKE 1 CAPSULE BY MOUTH TWICE DAILY 56 capsule 3    diclofenac-Misoprostol (ARTHROTEC 50) 50-0.2 MG per tablet TAKE 1 TABLET BY MOUTH TWICE DAILY 60 tablet 3    albuterol sulfate HFA (VENTOLIN HFA) 108 (90 Base) MCG/ACT inhaler INHALE TWO PUFFS BY MOUTH EVERY 6 HOURS AS NEEDED FOR WHEEZING 18 g 3    fluticasone (FLONASE) 50 MCG/ACT nasal spray 2 sprays by Nasal route daily 16 g 3    ketotifen (ZADITOR) 0.025 % ophthalmic solution Place 1 drop into both eyes 2 times daily 10 mL 1    chlorzoxazone (PARAFON FORTE) 250 MG tablet TAKE 1 TABLET BY MOUTH THREE TIMES DAILY AS NEEDED FOR MUSCLE SPASMS 84 tablet 3    pravastatin (PRAVACHOL) 40 MG tablet TAKE 1 TABLET BY MOUTH DAILY 28 tablet 3    HYDROcodone-acetaminophen (NORCO) 5-325 MG per tablet Take 1 tablet by mouth every 6 hours as needed for Pain.  venlafaxine (EFFEXOR XR) 75 MG extended release capsule Take 1 capsule by mouth daily 30 capsule 3    ergocalciferol (ERGOCALCIFEROL) 92622 units capsule Take 1 capsule by mouth once a week 4 capsule 3    simethicone (MYLICON) 80 MG chewable tablet Take 1 tablet by mouth 4 times daily as needed for Flatulence 180 tablet 3    sucralfate (CARAFATE) 1 GM/10ML suspension Take 10 mLs by mouth 4 times daily 1200 mL 3    omeprazole (PRILOSEC) 20 MG delayed release capsule TAKE 1 CAPSULE BY MOUTH DAILY 28 capsule 3    metoprolol succinate (TOPROL XL) 25 MG extended release tablet Take 25 mg by mouth daily       PREMARIN 0.625 MG/GM vaginal cream PLACE 2 GRAMS VAGINALLY TWICE A WEEK FOR 12 DOSES 30 g 1     Continuous Infusions:  PRN Meds:. Allergies  is allergic to shellfish allergy; shrimp (diagnostic); famotidine; and gabapentin. Family History    family history includes Colon Cancer in her mother; Heart Attack in her brother; Heart Disease in her brother; High Blood Pressure in her father.     Family Status   Relation Name Status    Mother      Father  Alive    [place]      [facility]     PGM      PGF      Brother  Alive         Social History  Social History     Socioeconomic History    Marital status:      Spouse name: Not on file    Number of children: Not on file    Years of education: Not on file    Highest education level: Not on file   Occupational History    Not on file   Social Needs    Financial resource strain: Not on file    Food insecurity:     Worry: Not on file     Inability: Not on file    Transportation needs:     Medical: Not on file     Non-medical: Not on file   Tobacco Use    Smoking status: Former Smoker     Packs/day: 2.00     Years: 0.50     Pack years: 1.00     Types: Cigarettes     Last attempt to quit:      Years since quittin.3    Smokeless tobacco: Never Used   Substance and Sexual Activity    Alcohol use: Yes     Alcohol/week: 0.0 oz     Comment: social    Drug use: No    Sexual activity: Not on file   Lifestyle    Physical activity:     Days per week: Not on file     Minutes per session: Not on file    Stress: Not on file   Relationships    Social connections:     Talks on phone: Not on file     Gets together: Not on file     Attends Taoism service: Not on file     Active member of club or organization: Not on file     Attends meetings of clubs or organizations: Not on file     Relationship status: Not on file    Intimate partner violence:     Fear of current or ex partner: Not on file     Emotionally abused: Not on file     Physically abused: Not on file     Forced sexual activity: Not on file   Other Topics Concern    Not on file   Social History Narrative    Not on file                 Hobbies:  Grandmother , brain games     OBJECTIVE:   VITALS:  height is 5' 1\" (1.549 m) and weight is 176 lb (79.8 kg). Her oral temperature is 98.1 °F (36.7 °C). Her blood pressure is 118/76 and her pulse is 85. Her respiration is 16 and oxygen saturation is 98%.      CONSTITUTIONAL: Alert and oriented times 3, no

## 2019-05-13 ENCOUNTER — HOSPITAL ENCOUNTER (OUTPATIENT)
Age: 59
Setting detail: OUTPATIENT SURGERY
Discharge: HOME | End: 2019-05-13
Attending: ORTHOPAEDIC SURGERY | Admitting: ORTHOPAEDIC SURGERY
Payer: COMMERCIAL

## 2019-05-13 ENCOUNTER — ANESTHESIA (OUTPATIENT)
Dept: OPERATING ROOM | Age: 59
End: 2019-05-13
Payer: COMMERCIAL

## 2019-05-13 ENCOUNTER — ANESTHESIA EVENT (OUTPATIENT)
Dept: OPERATING ROOM | Age: 59
End: 2019-05-13
Payer: COMMERCIAL

## 2019-05-13 VITALS
WEIGHT: 176 LBS | HEART RATE: 87 BPM | HEIGHT: 61 IN | SYSTOLIC BLOOD PRESSURE: 116 MMHG | OXYGEN SATURATION: 96 % | BODY MASS INDEX: 33.23 KG/M2 | DIASTOLIC BLOOD PRESSURE: 71 MMHG | RESPIRATION RATE: 16 BRPM | TEMPERATURE: 97.3 F

## 2019-05-13 VITALS
RESPIRATION RATE: 15 BRPM | TEMPERATURE: 97 F | OXYGEN SATURATION: 100 % | DIASTOLIC BLOOD PRESSURE: 57 MMHG | SYSTOLIC BLOOD PRESSURE: 115 MMHG

## 2019-05-13 DIAGNOSIS — Z98.890 S/P LEFT KNEE ARTHROSCOPY: Primary | ICD-10-CM

## 2019-05-13 PROCEDURE — 3700000001 HC ADD 15 MINUTES (ANESTHESIA): Performed by: ORTHOPAEDIC SURGERY

## 2019-05-13 PROCEDURE — 7100000010 HC PHASE II RECOVERY - FIRST 15 MIN: Performed by: ORTHOPAEDIC SURGERY

## 2019-05-13 PROCEDURE — 2500000003 HC RX 250 WO HCPCS: Performed by: ORTHOPAEDIC SURGERY

## 2019-05-13 PROCEDURE — 2709999900 HC NON-CHARGEABLE SUPPLY: Performed by: ORTHOPAEDIC SURGERY

## 2019-05-13 PROCEDURE — 6360000002 HC RX W HCPCS: Performed by: SPECIALIST

## 2019-05-13 PROCEDURE — 3600000014 HC SURGERY LEVEL 4 ADDTL 15MIN: Performed by: ORTHOPAEDIC SURGERY

## 2019-05-13 PROCEDURE — 6360000002 HC RX W HCPCS: Performed by: ANESTHESIOLOGY

## 2019-05-13 PROCEDURE — 7100000011 HC PHASE II RECOVERY - ADDTL 15 MIN: Performed by: ORTHOPAEDIC SURGERY

## 2019-05-13 PROCEDURE — 2580000003 HC RX 258: Performed by: ORTHOPAEDIC SURGERY

## 2019-05-13 PROCEDURE — 29881 ARTHRS KNE SRG MNISECTMY M/L: CPT | Performed by: ORTHOPAEDIC SURGERY

## 2019-05-13 PROCEDURE — 7100000001 HC PACU RECOVERY - ADDTL 15 MIN: Performed by: ORTHOPAEDIC SURGERY

## 2019-05-13 PROCEDURE — 7100000000 HC PACU RECOVERY - FIRST 15 MIN: Performed by: ORTHOPAEDIC SURGERY

## 2019-05-13 PROCEDURE — 3700000000 HC ANESTHESIA ATTENDED CARE: Performed by: ORTHOPAEDIC SURGERY

## 2019-05-13 PROCEDURE — 2500000003 HC RX 250 WO HCPCS: Performed by: SPECIALIST

## 2019-05-13 PROCEDURE — 3600000004 HC SURGERY LEVEL 4 BASE: Performed by: ORTHOPAEDIC SURGERY

## 2019-05-13 PROCEDURE — 2580000003 HC RX 258: Performed by: SPECIALIST

## 2019-05-13 PROCEDURE — 6360000002 HC RX W HCPCS: Performed by: ORTHOPAEDIC SURGERY

## 2019-05-13 RX ORDER — DOCUSATE SODIUM 100 MG/1
100 CAPSULE, LIQUID FILLED ORAL 2 TIMES DAILY PRN
Qty: 60 CAPSULE | Refills: 0 | Status: SHIPPED | OUTPATIENT
Start: 2019-05-13 | End: 2019-05-15 | Stop reason: SDUPTHER

## 2019-05-13 RX ORDER — SODIUM CHLORIDE 0.9 % (FLUSH) 0.9 %
10 SYRINGE (ML) INJECTION PRN
Status: CANCELLED | OUTPATIENT
Start: 2019-05-13

## 2019-05-13 RX ORDER — MIDAZOLAM HYDROCHLORIDE 1 MG/ML
2 INJECTION INTRAMUSCULAR; INTRAVENOUS
Status: CANCELLED | OUTPATIENT
Start: 2019-05-13 | End: 2019-05-13

## 2019-05-13 RX ORDER — ONDANSETRON 4 MG/1
4 TABLET, FILM COATED ORAL 3 TIMES DAILY PRN
Qty: 10 TABLET | Refills: 0 | Status: SHIPPED | OUTPATIENT
Start: 2019-05-13 | End: 2019-05-15 | Stop reason: SDUPTHER

## 2019-05-13 RX ORDER — FENTANYL CITRATE 50 UG/ML
INJECTION, SOLUTION INTRAMUSCULAR; INTRAVENOUS PRN
Status: DISCONTINUED | OUTPATIENT
Start: 2019-05-13 | End: 2019-05-13 | Stop reason: SDUPTHER

## 2019-05-13 RX ORDER — MAGNESIUM HYDROXIDE 1200 MG/15ML
LIQUID ORAL CONTINUOUS PRN
Status: COMPLETED | OUTPATIENT
Start: 2019-05-13 | End: 2019-05-13

## 2019-05-13 RX ORDER — BUPIVACAINE HYDROCHLORIDE AND EPINEPHRINE 5; 5 MG/ML; UG/ML
INJECTION, SOLUTION EPIDURAL; INTRACAUDAL; PERINEURAL PRN
Status: DISCONTINUED | OUTPATIENT
Start: 2019-05-13 | End: 2019-05-13 | Stop reason: ALTCHOICE

## 2019-05-13 RX ORDER — ONDANSETRON 2 MG/ML
4 INJECTION INTRAMUSCULAR; INTRAVENOUS ONCE
Status: CANCELLED | OUTPATIENT
Start: 2019-05-13 | End: 2019-05-13

## 2019-05-13 RX ORDER — LIDOCAINE HYDROCHLORIDE 10 MG/ML
INJECTION, SOLUTION EPIDURAL; INFILTRATION; INTRACAUDAL; PERINEURAL PRN
Status: DISCONTINUED | OUTPATIENT
Start: 2019-05-13 | End: 2019-05-13 | Stop reason: SDUPTHER

## 2019-05-13 RX ORDER — ONDANSETRON 2 MG/ML
4 INJECTION INTRAMUSCULAR; INTRAVENOUS
Status: DISCONTINUED | OUTPATIENT
Start: 2019-05-13 | End: 2019-05-13 | Stop reason: HOSPADM

## 2019-05-13 RX ORDER — ONDANSETRON 2 MG/ML
INJECTION INTRAMUSCULAR; INTRAVENOUS PRN
Status: DISCONTINUED | OUTPATIENT
Start: 2019-05-13 | End: 2019-05-13 | Stop reason: SDUPTHER

## 2019-05-13 RX ORDER — SODIUM CHLORIDE 0.9 % (FLUSH) 0.9 %
10 SYRINGE (ML) INJECTION EVERY 12 HOURS SCHEDULED
Status: CANCELLED | OUTPATIENT
Start: 2019-05-13

## 2019-05-13 RX ORDER — OXYCODONE HYDROCHLORIDE AND ACETAMINOPHEN 5; 325 MG/1; MG/1
1 TABLET ORAL EVERY 6 HOURS PRN
Qty: 28 TABLET | Refills: 0 | Status: SHIPPED | OUTPATIENT
Start: 2019-05-13 | End: 2019-05-20

## 2019-05-13 RX ORDER — MIDAZOLAM HYDROCHLORIDE 1 MG/ML
INJECTION INTRAMUSCULAR; INTRAVENOUS PRN
Status: DISCONTINUED | OUTPATIENT
Start: 2019-05-13 | End: 2019-05-13 | Stop reason: SDUPTHER

## 2019-05-13 RX ORDER — FENTANYL CITRATE 50 UG/ML
25 INJECTION, SOLUTION INTRAMUSCULAR; INTRAVENOUS EVERY 5 MIN PRN
Status: COMPLETED | OUTPATIENT
Start: 2019-05-13 | End: 2019-05-13

## 2019-05-13 RX ORDER — SODIUM CHLORIDE, SODIUM LACTATE, POTASSIUM CHLORIDE, CALCIUM CHLORIDE 600; 310; 30; 20 MG/100ML; MG/100ML; MG/100ML; MG/100ML
INJECTION, SOLUTION INTRAVENOUS CONTINUOUS PRN
Status: DISCONTINUED | OUTPATIENT
Start: 2019-05-13 | End: 2019-05-13 | Stop reason: SDUPTHER

## 2019-05-13 RX ORDER — PROPOFOL 10 MG/ML
INJECTION, EMULSION INTRAVENOUS PRN
Status: DISCONTINUED | OUTPATIENT
Start: 2019-05-13 | End: 2019-05-13 | Stop reason: SDUPTHER

## 2019-05-13 RX ADMIN — PROPOFOL 200 MG: 10 INJECTION, EMULSION INTRAVENOUS at 08:40

## 2019-05-13 RX ADMIN — FENTANYL CITRATE 25 MCG: 50 INJECTION INTRAMUSCULAR; INTRAVENOUS at 11:10

## 2019-05-13 RX ADMIN — MIDAZOLAM HYDROCHLORIDE 2 MG: 1 INJECTION, SOLUTION INTRAMUSCULAR; INTRAVENOUS at 08:39

## 2019-05-13 RX ADMIN — FENTANYL CITRATE 50 MCG: 50 INJECTION INTRAMUSCULAR; INTRAVENOUS at 08:58

## 2019-05-13 RX ADMIN — FENTANYL CITRATE 50 MCG: 50 INJECTION INTRAMUSCULAR; INTRAVENOUS at 08:40

## 2019-05-13 RX ADMIN — FENTANYL CITRATE 25 MCG: 50 INJECTION INTRAMUSCULAR; INTRAVENOUS at 10:42

## 2019-05-13 RX ADMIN — SODIUM CHLORIDE, POTASSIUM CHLORIDE, SODIUM LACTATE AND CALCIUM CHLORIDE: 600; 310; 30; 20 INJECTION, SOLUTION INTRAVENOUS at 08:37

## 2019-05-13 RX ADMIN — Medication 2 G: at 08:45

## 2019-05-13 RX ADMIN — LIDOCAINE HYDROCHLORIDE 50 MG: 10 INJECTION, SOLUTION EPIDURAL; INFILTRATION; INTRACAUDAL; PERINEURAL at 08:40

## 2019-05-13 RX ADMIN — ONDANSETRON 4 MG: 2 INJECTION INTRAMUSCULAR; INTRAVENOUS at 09:08

## 2019-05-13 RX ADMIN — FENTANYL CITRATE 25 MCG: 50 INJECTION INTRAMUSCULAR; INTRAVENOUS at 10:10

## 2019-05-13 RX ADMIN — FENTANYL CITRATE 50 MCG: 50 INJECTION INTRAMUSCULAR; INTRAVENOUS at 09:19

## 2019-05-13 ASSESSMENT — PULMONARY FUNCTION TESTS
PIF_VALUE: 21
PIF_VALUE: 2
PIF_VALUE: 4
PIF_VALUE: 2
PIF_VALUE: 1
PIF_VALUE: 19
PIF_VALUE: 0
PIF_VALUE: 22
PIF_VALUE: 2
PIF_VALUE: 19
PIF_VALUE: 19
PIF_VALUE: 22
PIF_VALUE: 8
PIF_VALUE: 22
PIF_VALUE: 0
PIF_VALUE: 18
PIF_VALUE: 20
PIF_VALUE: 20
PIF_VALUE: 2
PIF_VALUE: 4
PIF_VALUE: 2
PIF_VALUE: 3
PIF_VALUE: 0
PIF_VALUE: 2
PIF_VALUE: 26
PIF_VALUE: 19
PIF_VALUE: 1
PIF_VALUE: 2
PIF_VALUE: 2
PIF_VALUE: 20
PIF_VALUE: 2
PIF_VALUE: 1
PIF_VALUE: 4
PIF_VALUE: 2
PIF_VALUE: 25
PIF_VALUE: 2
PIF_VALUE: 19
PIF_VALUE: 2
PIF_VALUE: 18
PIF_VALUE: 19
PIF_VALUE: 3
PIF_VALUE: 2
PIF_VALUE: 17
PIF_VALUE: 30

## 2019-05-13 ASSESSMENT — PAIN SCALES - GENERAL
PAINLEVEL_OUTOF10: 8
PAINLEVEL_OUTOF10: 7
PAINLEVEL_OUTOF10: 0
PAINLEVEL_OUTOF10: 10
PAINLEVEL_OUTOF10: 5
PAINLEVEL_OUTOF10: 7
PAINLEVEL_OUTOF10: 7

## 2019-05-13 ASSESSMENT — PAIN DESCRIPTION - PAIN TYPE: TYPE: SURGICAL PAIN

## 2019-05-13 ASSESSMENT — PAIN DESCRIPTION - DESCRIPTORS
DESCRIPTORS: ACHING
DESCRIPTORS: ACHING;CONSTANT;OTHER (COMMENT)

## 2019-05-13 ASSESSMENT — PAIN DESCRIPTION - ORIENTATION: ORIENTATION: LEFT

## 2019-05-13 ASSESSMENT — PAIN - FUNCTIONAL ASSESSMENT
PAIN_FUNCTIONAL_ASSESSMENT: PREVENTS OR INTERFERES SOME ACTIVE ACTIVITIES AND ADLS
PAIN_FUNCTIONAL_ASSESSMENT: 0-10

## 2019-05-13 ASSESSMENT — PAIN DESCRIPTION - LOCATION: LOCATION: KNEE

## 2019-05-13 ASSESSMENT — PAIN DESCRIPTION - FREQUENCY: FREQUENCY: CONTINUOUS

## 2019-05-13 NOTE — ANESTHESIA PRE PROCEDURE
Department of Anesthesiology  Preprocedure Note       Name:  Thuan Suazo   Age:  63 y.o.  :  1960                                          MRN:  [id number]         Date:  2019      Surgeon: Cam Saucedo):  Karl Dunaway MD    Procedure: KNEE ARTHROSCOPY (Left )  Medications prior to admission:   Prior to Admission medications    Medication Sig Start Date End Date Taking? Authorizing Provider   busPIRone (BUSPAR) 15 MG tablet TAKE 1 TABLET BY MOUTH TWICE DAILY 19   Florinda Duval MD   CRANBERRY CONCENTRATE 500 MG CAPS TAKE 1 CAPSULE BY MOUTH TWICE DAILY 19   Florinda Duval MD   diclofenac-Misoprostol (ARTHROTEC 50) 50-0.2 MG per tablet TAKE 1 TABLET BY MOUTH TWICE DAILY 19   Florinda Duval MD   albuterol sulfate HFA (VENTOLIN HFA) 108 (90 Base) MCG/ACT inhaler INHALE TWO PUFFS BY MOUTH EVERY 6 HOURS AS NEEDED FOR WHEEZING 19   Florinda Duval MD   fluticasone (FLONASE) 50 MCG/ACT nasal spray 2 sprays by Nasal route daily 19   Florinda Duval MD   ketotifen (ZADITOR) 0.025 % ophthalmic solution Place 1 drop into both eyes 2 times daily 19   lForinda Duval MD   chlorzoxazone (PARAFON FORTE) 250 MG tablet TAKE 1 TABLET BY MOUTH THREE TIMES DAILY AS NEEDED FOR MUSCLE SPASMS 19   Florinda Duval MD   pravastatin (PRAVACHOL) 40 MG tablet TAKE 1 TABLET BY MOUTH DAILY 19   Florinda Duval MD   HYDROcodone-acetaminophen (NORCO) 5-325 MG per tablet Take 1 tablet by mouth every 6 hours as needed for Pain.     Historical Provider, MD   venlafaxine (EFFEXOR XR) 75 MG extended release capsule Take 1 capsule by mouth daily 3/28/19 5/7/19  Florinda Duval MD   ergocalciferol (ERGOCALCIFEROL) 84257 units capsule Take 1 capsule by mouth once a week 3/27/19   Florinda Duval MD   simethicone (MYLICON) 80 MG chewable tablet Take 1 tablet by mouth 4 times daily as needed for Flatulence 19   Nayely Malik MD   sucralfate (CARAFATE) 1 GM/10ML suspension Take 10 mLs by mouth 4 times daily 2/3/19   Baldo Resides, DO   omeprazole (PRILOSEC) 20 MG delayed release capsule TAKE 1 CAPSULE BY MOUTH DAILY 19   Barbara Patterson MD   metoprolol succinate (TOPROL XL) 25 MG extended release tablet Take 25 mg by mouth daily     Historical Provider, MD   PREMARIN 0.625 MG/GM vaginal cream PLACE 2 GRAMS VAGINALLY TWICE A WEEK FOR 12 DOSES 17   Barbara Patterson MD       Current medications:    No current facility-administered medications for this visit. No current outpatient medications on file. Facility-Administered Medications Ordered in Other Visits   Medication Dose Route Frequency Provider Last Rate Last Dose    ceFAZolin (ANCEF) 2 g in dextrose 5 % 50 mL IVPB  2 g Intravenous Once Tiago Thompson MD           Allergies: Allergies   Allergen Reactions    Shellfish Allergy     Shrimp (Diagnostic) Shortness Of Breath    Famotidine Other (See Comments)     Headaches  Headaches  Headaches  Headaches  Headaches    Gabapentin Nausea Only     Mood swings, nausea. Mood swings, nausea. Mood swings, nausea. Mood swings, nausea. Mood swings, nausea. Problem List:    Patient Active Problem List   Diagnosis Code    GERD (gastroesophageal reflux disease) K21.9    Major depression F32.9    Rotator cuff disorder M67.919    Hyperlipidemia E78.5    Incontinence in female R32    Rectocele N81.6    Diverticulitis K57.92    Asthma J45.909    Depression F32.9    Hypokalemia E87.6    Cellulitis, face - left side, failed outpatient therapy L03. 211    Hyperglycemia R73.9    Mild intermittent asthma without complication I15.27    Gastroesophageal reflux disease without esophagitis K21.9    Displacement of lumbar intervertebral disc without myelopathy M51.26    Chest pain R07.9    Dyspepsia R10.13       Past Medical History:        Diagnosis Date    ASCUS with positive high risk HPV cervical 3/22/16    Asthma     Depression     Diverticulitis     ESBL (extended spectrum beta-lactamase) producing bacteria infection 2019    E. Coli urine    GERD (gastroesophageal reflux disease)     Hyperlipidemia     MRSA (methicillin resistant staph aureus) culture positive 2016    lip    Rectocele     Tachycardia     takes Metoprolol       Past Surgical History:        Procedure Laterality Date    COLONOSCOPY N/A 2018    COLONOSCOPY performed by Mick Rosenthal DO at [address] [place]  2015    uro   [address] [place], BSO performed at [facility] by Dr. Mendy Nicholas, Also had some type of bladder lift at this time    KNEE SURGERY Left     #1 - lateral release, #2 - arthroscopy with muscle alignment    NERVE BLOCK  2017    caudal #1  decadron 10mg    NERVE BLOCK  2017    cadal # 2, decadron 10 mg    NERVE BLOCK  2017    caudal epidural steroid block #2 decadron 10 mg    NERVE BLOCK  11/10/2017    lumbar L4-5 epidural; depomedrol 80mg    UPPER GASTROINTESTINAL ENDOSCOPY  2019    EGD BIOPSY performed by Suzette Amaro MD at [place] [address] History:    Social History     Tobacco Use    Smoking status: Former Smoker     Packs/day: 2.00     Years: 0.50     Pack years: 1.00     Types: Cigarettes     Last attempt to quit:      Years since quittin.3    Smokeless tobacco: Never Used   Substance Use Topics    Alcohol use: Yes     Alcohol/week: 0.0 oz     Comment: social                                Counseling given: Not Answered      Vital Signs (Current): There were no vitals filed for this visit.                                            BP Readings from Last 3 Encounters:   19 123/67   19 118/76   19 116/69       NPO Status:                                                                                 BMI:   Wt Readings from Last 3 Encounters:   19 176 lb (79.8 kg)   19 176 lb (79.8 kg)   19 176 lb (79.8 kg)     There is no height or weight on file to calculate agrees with [address] Kirk MD   5/13/2019

## 2019-05-13 NOTE — BRIEF OP NOTE
Brief Postoperative Note  ______________________________________________________________    Patient: Eugenia Valencia  YOB: 1960  MRN: [id number]  Date of Procedure: 5/13/2019    Pre-Op Diagnosis: LEFT MEDIAL MENISCUS TEAR     Post-Op Diagnosis:  1. GRADE IV CHONDROMALACIA PATELLOFEMORAL COMPARTMENT  2. GRADE IV CHONDROMALACIA MEDIAL COMPARTMENT  3. GRADE III CHONDROMALACIA LATERAL COMPARTMENT  4. COMPLEX DEGENERATIVE MEDIAL MENISCUS TEAR       Procedure(s):  1. LEFT KNEE ARTHROSCOPY  2. LEFT PARTIAL MEDIAL MENISCECTOMY    Anesthesia: General    Surgeon(s):  Scar Ayala MD    Assistant:   Lizzy Jose DO PGY-4  Talisha Sawant DO PGY-1    Estimated Blood Loss (mL): 2cc    IV Fluids: 500cc crystalloid    Tourniquet time: 37 minutes     Complications: None    Findings: grade IV chondromalacia medial and patellofemoral compartments. Grade III lateral compartment chondromalacia. Complex medial mensicus tear. See op note for details.      Talisha Sawant DO  Date: 5/13/2019  Time: 9:33 AM

## 2019-05-13 NOTE — PROGRESS NOTES
Pt received from OR S/P left knee. Surgical site wrapped ace bandage. Site is clean . Dry and intact. No bleeding, no drainage noted. Pedal pulses palpable. Pt alert and oriented x4, VSS. O2 3 LNC. Pt rated pain as a 10/10. Fentanyl 25mcg  X 3 given per order. Pt stabilized and transferred to Phase 2 room 63. Report given at bedside to TAURUS Mcbride.

## 2019-05-13 NOTE — ANESTHESIA POSTPROCEDURE EVALUATION
Department of Anesthesiology  Postprocedure Note    Patient: Jessica Holbrook  MRN: [id number]  YOB: 1960  Date of evaluation: 5/13/2019  Time:  11:07 AM     Procedure Summary     Date:  05/13/19 Room / Location:  [address] OR    Anesthesia Start:  3589 Anesthesia Stop:  0940    Procedure:  KNEE ARTHROSCOPY (Left ) Diagnosis:  (TORN MENISCUS LEFT KNEE)    Surgeon:  Devante Deleon MD Responsible Provider:      Anesthesia Type:  general ASA Status:  3          Anesthesia Type: general    Dane Phase I: Dane Score: 9    Dane Phase II:      Last vitals: Reviewed and per EMR flowsheets.        Anesthesia Post Evaluation    Patient location during evaluation: PACU  Patient participation: complete - patient participated  Level of consciousness: awake  Pain score: 1  Airway patency: patent  Nausea & Vomiting: no vomiting and no nausea  Complications: no  Cardiovascular status: blood pressure returned to baseline  Respiratory status: acceptable  Hydration status: euvolemic

## 2019-05-13 NOTE — OP NOTE
[facility]                                OPERATIVE REPORT    PATIENT NAME: Robson Perez                  :        1960  MED REC NO:   [id number]                             ROOM:  ACCOUNT NO:   [de-identified]                           ADMIT DATE: 2019  PROVIDER:     Myles Osorio    DATE OF PROCEDURE:  2019    PREOPERATIVE DIAGNOSIS:  Medial meniscal tear, left knee. POSTOPERATIVE DIAGNOSES:  Medial meniscal tear, left knee, with grade-4  chondromalacia patella, some areas of grade-4 chondromalacia, medial  compartment. PROCEDURES:  Arthroscopic evaluation of the left knee with partial  medial meniscectomy. SURGEON:  Myles Seo. MD Jo    ESTIMATED BLOOD LOSS:  0.    BRIEF CLINICAL HISTORY:  The patient is a 67-year-old female. She has  had prior surgeries related to the left knee including patellar  realignment, presenting with locking and catching of the knee. Did have  an MRI that indicated tearing of the medial meniscus. The patient  failed conservative treatment and presented at this time for  arthroscopic evaluation of the left knee. The patient understands the  risks of the procedure including infection requiring operative  irrigation and debridement. She understands the risks of continued  pain, stiffness, loss of motion, loss of strength. She understands that  she does have known arthritis changes in the knee and therefore we would  expect the potential for continued symptoms. She understands these  risks as well as the risk of anesthesia. OPERATIVE NOTE:  The patient was taken to the operating room on  2019, placed supine on the OR table, and anesthesia induced via  general endotracheal intubation. She was given 2 gm Ancef IV for  prophylaxis. Well-padded tourniquet was applied to the left upper  thigh.   Left leg was then placed in a leg archibald and the right leg well  padded with a pillow and both arms well padded with foam.  The left leg  and foot were then prepped and draped in the usual sterile fashion,  exsanguinated with an Esmarch bandage. The tourniquet was inflated to  250 mmHg. Standard anterolateral portal was established, scope was  carefully inserted, the knee was systematically visualized with the  following findings:  Suprapatellar region showed no loose bodies. Cartilage on the patella showed severe chondromalacia across the  trochlear groove with complete loss of cartilage down to bone. Medial  and lateral gutters were both free of loose bodies. The medial  compartment was then entered. Anteromedial port was established after  localizing with a spinal needle. The patient was noted to have  degenerative tearing of the medial meniscus, extending all the way from  the posterior aspect, all the way around in the anterior corner. This  was debrided back to a smooth contour and stable base using a  combination of basket forceps and Aggressive meniscal shaver. Remaining  meniscus was probed, found to be stable for re-tears. There was an area  on the tibial plateau as well as a corresponding lesion on the femoral  condyle with grade-4 changes down to bone. In the notch, ACL was very  attenuated. PCL appeared intact. Laterally, the meniscus was found to  be calcified with overall fraying, but no tearing to either direct  visualization or probing. Cartilage laterally looked good. A second  and then third evaluation of the knee was undertaken with no further  pathology noted. All instruments were removed intact. Portal sites  were closed with 4-0 nylon, and a sterile compressive dressing was  applied. The patient was allowed to emerge from general anesthesia,  extubated while in the operating room, and taken to the recovery room in  stable and good condition.         Juliet Gunderson    D: 05/13/2019 9:40:11       T: 05/13/2019 10:36:46     [id number]  Job#: [id number]     Doc#: [id number]    CC:

## 2019-05-13 NOTE — H&P
knee surgery (Left); Cystocopy (2/25/2015); Nerve Block (07/28/2017); Nerve Block (09/22/2017); Nerve Block (09/22/2017); Nerve Block (11/10/2017); Colonoscopy (N/A, 7/19/2018); and Upper gastrointestinal endoscopy (1/30/2019).          Medications   Scheduled Meds:  Current Outpatient Rx          Current Outpatient Rx   Medication Sig Dispense Refill    busPIRone (BUSPAR) 15 MG tablet TAKE 1 TABLET BY MOUTH TWICE DAILY 56 tablet 3    CRANBERRY CONCENTRATE 500 MG CAPS TAKE 1 CAPSULE BY MOUTH TWICE DAILY 56 capsule 3    diclofenac-Misoprostol (ARTHROTEC 50) 50-0.2 MG per tablet TAKE 1 TABLET BY MOUTH TWICE DAILY 60 tablet 3    albuterol sulfate HFA (VENTOLIN HFA) 108 (90 Base) MCG/ACT inhaler INHALE TWO PUFFS BY MOUTH EVERY 6 HOURS AS NEEDED FOR WHEEZING 18 g 3    fluticasone (FLONASE) 50 MCG/ACT nasal spray 2 sprays by Nasal route daily 16 g 3    ketotifen (ZADITOR) 0.025 % ophthalmic solution Place 1 drop into both eyes 2 times daily 10 mL 1    chlorzoxazone (PARAFON FORTE) 250 MG tablet TAKE 1 TABLET BY MOUTH THREE TIMES DAILY AS NEEDED FOR MUSCLE SPASMS 84 tablet 3    pravastatin (PRAVACHOL) 40 MG tablet TAKE 1 TABLET BY MOUTH DAILY 28 tablet 3    HYDROcodone-acetaminophen (NORCO) 5-325 MG per tablet Take 1 tablet by mouth every 6 hours as needed for Pain.        venlafaxine (EFFEXOR XR) 75 MG extended release capsule Take 1 capsule by mouth daily 30 capsule 3    ergocalciferol (ERGOCALCIFEROL) 37438 units capsule Take 1 capsule by mouth once a week 4 capsule 3    simethicone (MYLICON) 80 MG chewable tablet Take 1 tablet by mouth 4 times daily as needed for Flatulence 180 tablet 3    sucralfate (CARAFATE) 1 GM/10ML suspension Take 10 mLs by mouth 4 times daily 1200 mL 3    omeprazole (PRILOSEC) 20 MG delayed release capsule TAKE 1 CAPSULE BY MOUTH DAILY 28 capsule 3    metoprolol succinate (TOPROL XL) 25 MG extended release tablet Take 25 mg by mouth daily         PREMARIN 0.625 MG/GM vaginal cream PLACE 2 GRAMS VAGINALLY TWICE A WEEK FOR 12 DOSES 30 g 1         Continuous Infusions:  PRN Meds:. Allergies  is allergic to shellfish allergy; shrimp (diagnostic); famotidine; and gabapentin.     Family History     family history includes Colon Cancer in her mother; Heart Attack in her brother; Heart Disease in her brother; High Blood Pressure in her father.           Family Status   Relation Name Status    Mother       Father   Alive    MGM       MGF       PGM       PGF       Brother   Alive            Social History  Social History               Socioeconomic History    Marital status:        Spouse name: Not on file    Number of children: Not on file    Years of education: Not on file    Highest education level: Not on file   Occupational History    Not on file   Social Needs    Financial resource strain: Not on file    Food insecurity:       Worry: Not on file       Inability: Not on file    Transportation needs:       Medical: Not on file       Non-medical: Not on file   Tobacco Use    Smoking status: Former Smoker       Packs/day: 2.00       Years: 0.50       Pack years: 1.00       Types: Cigarettes       Last attempt to quit:        Years since quittin.3    Smokeless tobacco: Never Used   Substance and Sexual Activity    Alcohol use:  Yes       Alcohol/week: 0.0 oz       Comment: social    Drug use: No    Sexual activity: Not on file   Lifestyle    Physical activity:       Days per week: Not on file       Minutes per session: Not on file    Stress: Not on file   Relationships    Social connections:       Talks on phone: Not on file       Gets together: Not on file       Attends Anabaptism service: Not on file       Active member of club or organization: Not on file       Attends meetings of clubs or organizations: Not on file       Relationship status: Not on file    Intimate partner violence:       Fear of current or ex partner: Not on file       Emotionally abused: Not on file       Physically abused: Not on file       Forced sexual activity: Not on file   Other Topics Concern    Not on file   Social History Narrative    Not on file                        Hobbies:  Grandmother , brain games      OBJECTIVE:   VITALS:  height is 5' 1\" (1.549 m) and weight is 176 lb (79.8 kg). Her oral temperature is 98.1 °F (36.7 °C). Her blood pressure is 118/76 and her pulse is 85. Her respiration is 16 and oxygen saturation is 98%.      CONSTITUTIONAL: Alert and oriented times 3, no acute distress and cooperative to examination. friendly and pleasant      SKIN: rash No     HEENT: Head is normocephalic, atraumatic. EOMI, PERRLA     Oral air way :slightly narrow Yes     NECK: neck supple, no lymphadenopathy noted, trachea midline and straight       2+ carotid, no bruit     LUNGS: Chest expands equally bilaterally upon respiration, no accessory muscle used. Ausculation reveals no adventitious breath sounds.     CARDIOVASCULAR: \"Heart sounds are normal.  Regular rate and rhythm without murmur,     ABDOMEN: Bowel sounds are present in all four quadrants  , obese     GENATALIA:Deferred.     NEUROLOGIC: \"CN II-XII are grossly intact.         EXTREMITIES: Pitting edema:  No,  Varicose veins: No      Dorsal pedal/posterior tibial pulses palpable: Yes            Strength:  Normal             Patient Active Problem List   Diagnosis    GERD (gastroesophageal reflux disease)    Major depression    Rotator cuff disorder    Hyperlipidemia    Incontinence in female    Rectocele    Diverticulitis    Asthma    Depression    Hypokalemia    Cellulitis, face - left side, failed outpatient therapy    Hyperglycemia    Mild intermittent asthma without complication    Gastroesophageal reflux disease without esophagitis    Displacement of lumbar intervertebral disc without myelopathy    Chest pain    Dyspepsia                   IMPRESSIONS:   1.    Left knee pain   2. does not have any pertinent problems on file.     [address] PAC  Electronically signed 5/7/2019 at 2:56 PM        Scheduled for:   Left knee scope

## 2019-05-16 RX ORDER — DOCUSATE SODIUM 100 MG/1
CAPSULE, LIQUID FILLED ORAL
Qty: 60 CAPSULE | Refills: 0 | Status: SHIPPED | OUTPATIENT
Start: 2019-05-16 | End: 2019-10-09

## 2019-05-16 RX ORDER — ONDANSETRON 4 MG/1
TABLET, FILM COATED ORAL
Qty: 10 TABLET | Refills: 0 | Status: SHIPPED | OUTPATIENT
Start: 2019-05-16 | End: 2019-10-09

## 2019-05-21 ENCOUNTER — CARE COORDINATION (OUTPATIENT)
Dept: CARE COORDINATION | Age: 59
End: 2019-05-21

## 2019-05-21 NOTE — CARE COORDINATION
Ambulatory Care Coordination Note  5/21/2019  CM Risk Score: 9  Stef Mortality Risk Score:      ACC: Jace , RN    Summary Note: spoke with pt who said she is doing ok after her knee surgery. She said she feels this surgery was more involved then she thought it would be. She was discharged with crutches she is doing ok with them she does not have rugs at home not using crutches on stairs she said her daughter and her granddaughter are coming over to help her. She will follow up with ortho on Tuesday next week. He did tell her she will need PT she is not sure if he will order that on Tuesday. She denies any needs from her pcp at this time. 1) fu with ortho plan surgery may 13- done fu with ortho on 5/28  2) fu with fall risk education. 3) fu with cane- using crutches post op    4) fu on diet (FODMAP)         Care Coordination Interventions    Program Enrollment:  Rising Risk  Referral from Primary Care Provider:  No  Suggested Interventions and Community Resources  Fall Risk Prevention: In Process         Goals Addressed                 This Visit's Progress     Reduce Falls    On track     I will reduce my risk of falls by the following: Remove rugs or use non slip rugs  Install grab bars in bathroom  Use walking aids like cane or walker    Barriers: overwhelmed by complexity of regimen and lack of education  Plan for overcoming my barriers: care coordination   Confidence: 7/10  Anticipated Goal Completion Date: 7/9/19            Prior to Admission medications    Medication Sig Start Date End Date Taking?  Authorizing Provider    MG capsule TAKE 1 CAPSULE BY MOUTH TWICE DAILY AS NEEDED FOR CONSTIPATION 5/16/19   Davin Saenz DO   ondansetron (ZOFRAN) 4 MG tablet TAKE 1 TABLET BY MOUTH THREE TIMES DAILY AS NEEDED FOR NAUSEA OR VOMITING 5/16/19   Crystal Ross DO   busPIRone (BUSPAR) 15 MG tablet TAKE 1 TABLET BY MOUTH TWICE DAILY 5/2/19   Michelle Mendoaz MD   CRANBERRY CONCENTRATE 500 MG CAPS

## 2019-05-28 ENCOUNTER — OFFICE VISIT (OUTPATIENT)
Dept: ORTHOPEDIC SURGERY | Age: 59
End: 2019-05-28

## 2019-05-28 VITALS — BODY MASS INDEX: 32.85 KG/M2 | HEIGHT: 61 IN | WEIGHT: 174 LBS

## 2019-05-28 DIAGNOSIS — S83.242A ACUTE MEDIAL MENISCUS TEAR OF LEFT KNEE, INITIAL ENCOUNTER: Primary | ICD-10-CM

## 2019-05-28 PROCEDURE — 99024 POSTOP FOLLOW-UP VISIT: CPT | Performed by: ORTHOPAEDIC SURGERY

## 2019-05-28 NOTE — PROGRESS NOTES
Subjective:      Patient ID: Ada Loredo is a 63 y.o. female. HPI  Patient is status post arthroscopy left knee. She's doing well.    Current Outpatient Medications   Medication Sig Dispense Refill     MG capsule TAKE 1 CAPSULE BY MOUTH TWICE DAILY AS NEEDED FOR CONSTIPATION 60 capsule 0    ondansetron (ZOFRAN) 4 MG tablet TAKE 1 TABLET BY MOUTH THREE TIMES DAILY AS NEEDED FOR NAUSEA OR VOMITING 10 tablet 0    busPIRone (BUSPAR) 15 MG tablet TAKE 1 TABLET BY MOUTH TWICE DAILY 56 tablet 3    CRANBERRY CONCENTRATE 500 MG CAPS TAKE 1 CAPSULE BY MOUTH TWICE DAILY 56 capsule 3    diclofenac-Misoprostol (ARTHROTEC 50) 50-0.2 MG per tablet TAKE 1 TABLET BY MOUTH TWICE DAILY 60 tablet 3    albuterol sulfate HFA (VENTOLIN HFA) 108 (90 Base) MCG/ACT inhaler INHALE TWO PUFFS BY MOUTH EVERY 6 HOURS AS NEEDED FOR WHEEZING 18 g 3    fluticasone (FLONASE) 50 MCG/ACT nasal spray 2 sprays by Nasal route daily 16 g 3    ketotifen (ZADITOR) 0.025 % ophthalmic solution Place 1 drop into both eyes 2 times daily 10 mL 1    chlorzoxazone (PARAFON FORTE) 250 MG tablet TAKE 1 TABLET BY MOUTH THREE TIMES DAILY AS NEEDED FOR MUSCLE SPASMS 84 tablet 3    pravastatin (PRAVACHOL) 40 MG tablet TAKE 1 TABLET BY MOUTH DAILY 28 tablet 3    venlafaxine (EFFEXOR XR) 75 MG extended release capsule Take 1 capsule by mouth daily 30 capsule 3    ergocalciferol (ERGOCALCIFEROL) 98778 units capsule Take 1 capsule by mouth once a week 4 capsule 3    simethicone (MYLICON) 80 MG chewable tablet Take 1 tablet by mouth 4 times daily as needed for Flatulence 180 tablet 3    sucralfate (CARAFATE) 1 GM/10ML suspension Take 10 mLs by mouth 4 times daily 1200 mL 3    omeprazole (PRILOSEC) 20 MG delayed release capsule TAKE 1 CAPSULE BY MOUTH DAILY 28 capsule 3    metoprolol succinate (TOPROL XL) 25 MG extended release tablet Take 25 mg by mouth daily       PREMARIN 0.625 MG/GM vaginal cream PLACE 2 GRAMS VAGINALLY TWICE A WEEK FOR 12 1314   Weight: 174 lb (78.9 kg)   Height: 5' 1\" (1.549 m)     Physical Exam  On exam portals are well-healed. Current range of motion 0-100°. Some patellofemoral crepitus through range of motion    Radiology:            Impression:        Assessment:          Plan:     Patient's sutures are removed. We discussed the possibility of formal therapy but she wanted to continue on her own.   She'll let us know if she is having any problems     Please be aware that portions of this chart note were created using voice recognition software and that unforseen errors may have occured   Electronically signed by Nickolas Marvin MD on 5/28/2019 at 1:23 PM

## 2019-06-11 ENCOUNTER — CARE COORDINATION (OUTPATIENT)
Dept: CARE COORDINATION | Age: 59
End: 2019-06-11

## 2019-06-18 ENCOUNTER — CARE COORDINATION (OUTPATIENT)
Dept: CARE COORDINATION | Age: 59
End: 2019-06-18

## 2019-06-18 NOTE — CARE COORDINATION
Ambulatory Care Coordination Note  6/18/2019  CM Risk Score: 9  Stef Mortality Risk Score:      ACC: Vijay Lewis, RN    Summary Note: spoke with pt who said she is doing ok she has been having diarrhea the last few days she does have an apt to GI tomorrow. She said she his trying to follow the prescribed diet as much as she can but doesn't always. Her knee if feeling better she did not feel she needed PT. She is getting around good. Will graduate if she continues to do well   1) fu with ortho plan surgery may 13- done fu with ortho on 5/28 done   2) fu with fall risk education. 3) fu with cane- using crutches post op  doing better   4) fu on diet (FODMAP) working on it      Care Coordination Interventions    Program Enrollment:  Rising Risk  Referral from Primary Care Provider:  No  Suggested Interventions and Community Resources  Fall Risk Prevention:  Completed         Goals Addressed                 This Visit's Progress     Reduce Falls    On track     I will reduce my risk of falls by the following: Remove rugs or use non slip rugs  Install grab bars in bathroom  Use walking aids like cane or walker    Barriers: overwhelmed by complexity of regimen and lack of education  Plan for overcoming my barriers: care coordination   Confidence: 7/10  Anticipated Goal Completion Date: 7/9/19            Prior to Admission medications    Medication Sig Start Date End Date Taking?  Authorizing Provider    MG capsule TAKE 1 CAPSULE BY MOUTH TWICE DAILY AS NEEDED FOR CONSTIPATION 5/16/19   Davin Saenz DO   ondansetron (ZOFRAN) 4 MG tablet TAKE 1 TABLET BY MOUTH THREE TIMES DAILY AS NEEDED FOR NAUSEA OR VOMITING 5/16/19   Mike Simon,    busPIRone (BUSPAR) 15 MG tablet TAKE 1 TABLET BY MOUTH TWICE DAILY 5/2/19   Jose Guadalupe Greer MD   CRANBERRY CONCENTRATE 500 MG CAPS TAKE 1 CAPSULE BY MOUTH TWICE DAILY 5/2/19   Jose Guadalupe Greer MD   diclofenac-Misoprostol (ARTHROTEC 50) 50-0.2 MG per tablet TAKE 1 TABLET BY MOUTH TWICE DAILY 4/25/19   Nehemiah West MD   albuterol sulfate HFA (VENTOLIN HFA) 108 (90 Base) MCG/ACT inhaler INHALE TWO PUFFS BY MOUTH EVERY 6 HOURS AS NEEDED FOR WHEEZING 4/24/19   Nehemiah West MD   fluticasone (FLONASE) 50 MCG/ACT nasal spray 2 sprays by Nasal route daily 4/24/19   Nehemiah West MD   ketotifen (ZADITOR) 0.025 % ophthalmic solution Place 1 drop into both eyes 2 times daily 4/24/19   Nehemiah West MD   chlorzoxazone (PARAFON FORTE) 250 MG tablet TAKE 1 TABLET BY MOUTH THREE TIMES DAILY AS NEEDED FOR MUSCLE SPASMS 4/9/19   Nehemiah West MD   pravastatin (PRAVACHOL) 40 MG tablet TAKE 1 TABLET BY MOUTH DAILY 4/4/19   Nehemiah West MD   venlafaxine (EFFEXOR XR) 75 MG extended release capsule Take 1 capsule by mouth daily 3/28/19 5/7/19  Nehemiah West MD   ergocalciferol (ERGOCALCIFEROL) 49253 units capsule Take 1 capsule by mouth once a week 3/27/19   Nehemiah West MD   simethicone (MYLICON) 80 MG chewable tablet Take 1 tablet by mouth 4 times daily as needed for Flatulence 2/13/19   Vivek Del Toro MD   sucralfate (CARAFATE) 1 GM/10ML suspension Take 10 mLs by mouth 4 times daily 2/3/19   Dereck Grey DO   omeprazole (PRILOSEC) 20 MG delayed release capsule TAKE 1 CAPSULE BY MOUTH DAILY 1/9/19   Nehemiah West MD   metoprolol succinate (TOPROL XL) 25 MG extended release tablet Take 25 mg by mouth daily     Historical Provider, MD   PREMARIN 0.625 MG/GM vaginal cream PLACE 2 GRAMS VAGINALLY TWICE A WEEK FOR 12 DOSES 9/21/17   Nehemiah West MD       Future Appointments   Date Time Provider Elaine Stuart   6/19/2019 11:00 AM Vivek Del Toro MD [facility]

## 2019-06-19 ENCOUNTER — OFFICE VISIT (OUTPATIENT)
Dept: GASTROENTEROLOGY | Age: 59
End: 2019-06-19
Payer: COMMERCIAL

## 2019-06-19 VITALS
BODY MASS INDEX: 33.82 KG/M2 | WEIGHT: 179 LBS | SYSTOLIC BLOOD PRESSURE: 120 MMHG | HEART RATE: 78 BPM | DIASTOLIC BLOOD PRESSURE: 75 MMHG

## 2019-06-19 DIAGNOSIS — K21.9 GASTROESOPHAGEAL REFLUX DISEASE, ESOPHAGITIS PRESENCE NOT SPECIFIED: Primary | ICD-10-CM

## 2019-06-19 PROCEDURE — 1036F TOBACCO NON-USER: CPT | Performed by: INTERNAL MEDICINE

## 2019-06-19 PROCEDURE — 99214 OFFICE O/P EST MOD 30 MIN: CPT | Performed by: INTERNAL MEDICINE

## 2019-06-19 PROCEDURE — 3017F COLORECTAL CA SCREEN DOC REV: CPT | Performed by: INTERNAL MEDICINE

## 2019-06-19 PROCEDURE — G8427 DOCREV CUR MEDS BY ELIG CLIN: HCPCS | Performed by: INTERNAL MEDICINE

## 2019-06-19 PROCEDURE — G8417 CALC BMI ABV UP PARAM F/U: HCPCS | Performed by: INTERNAL MEDICINE

## 2019-06-19 RX ORDER — WHEAT DEXTRIN 3 G/3.8 G
4 POWDER (GRAM) ORAL
Qty: 30 CAN | Refills: 0 | Status: SHIPPED | OUTPATIENT
Start: 2019-06-19 | End: 2019-10-09

## 2019-06-19 RX ORDER — OMEPRAZOLE 40 MG/1
40 CAPSULE, DELAYED RELEASE ORAL
Qty: 90 CAPSULE | Refills: 1 | Status: SHIPPED | OUTPATIENT
Start: 2019-06-19 | End: 2019-11-15 | Stop reason: SDUPTHER

## 2019-06-19 NOTE — PROGRESS NOTES
GI CLINIC FOLLOW UP    INTERVAL HISTORY:     Dyspepsia and GERD  Intermittent diarrhea    Chief Complaint   Patient presents with    Gastroesophageal Reflux     Prilosec 20 and simethicone 80 mg She states reshma reflux not as often but her stomach still bloating and a change in her bowel movements     Diarrhea     Liquid stools at least 1-2 times a night in the middle of the night. It just comes out and sometimes in bed. HISTORY OF PRESENT ILLNESS:  Anne Nazario a 63 y. o. female with a pasthistory remarkable for asthma, Depression, Diverticulitis, HL, chronic GERD and dyspepsia, referred for evaluation of of GERD and dyspepsia. Underwent recent EGD with negative findings. Had post-procedure bloating that resolved, here for follow up     +Heart burn, unrelated to PO intake     No weight loss  No N/V/D, intermittent constipation  No GI bleeding  FH+ Colon CA, dx at 64  Failed colonoscopy in July 2018, sigmoid stricture from chronic diverticulitis. Pending CT colonoscopy. No smoking, social drinking.         Past Medical,Family, and Social History reviewed and does contribute to the patient presentingcondition. Patient's PMH/PSH,SH,PSYCH Hx, MEDs, ALLERGIES, and ROS were all reviewed and updated in the appropriate sections. PAST MEDICAL HISTORY:  Past Medical History:   Diagnosis Date    ASCUS with positive high risk HPV cervical 3/22/16    Asthma     Depression     Diverticulitis     ESBL (extended spectrum beta-lactamase) producing bacteria infection 02/03/2019    E.  Coli urine    GERD (gastroesophageal reflux disease)     Hyperlipidemia     MRSA (methicillin resistant staph aureus) culture positive 4/18/2016    lip    Rectocele     Tachycardia     takes Metoprolol       Past Surgical History:   Procedure Laterality Date    COLONOSCOPY N/A 7/19/2018    COLONOSCOPY performed by Parth Pretty DO at [address]  2/25/2015    CORY Gordon 47 RYAN, BSO performed at [facility] by Dr. Jre Chávez, Also had some type of bladder lift at this time    KNEE ARTHROSCOPY Left 5/13/2019    KNEE ARTHROSCOPY performed by Laci Atwood MD at [facility] Left     #1 - lateral release, #2 - arthroscopy with muscle alignment    NERVE BLOCK  07/28/2017    caudal #1  decadron 10mg    NERVE BLOCK  09/22/2017    cadal # 2, decadron 10 mg    NERVE BLOCK  09/22/2017    caudal epidural steroid block #2 decadron 10 mg    NERVE BLOCK  11/10/2017    lumbar L4-5 epidural; depomedrol 80mg    UPPER GASTROINTESTINAL ENDOSCOPY  1/30/2019    EGD BIOPSY performed by Cooper Yoo MD at [facility]:    Current Outpatient Medications:      MG capsule, TAKE 1 CAPSULE BY MOUTH TWICE DAILY AS NEEDED FOR CONSTIPATION, Disp: 60 capsule, Rfl: 0    ondansetron (ZOFRAN) 4 MG tablet, TAKE 1 TABLET BY MOUTH THREE TIMES DAILY AS NEEDED FOR NAUSEA OR VOMITING, Disp: 10 tablet, Rfl: 0    busPIRone (BUSPAR) 15 MG tablet, TAKE 1 TABLET BY MOUTH TWICE DAILY, Disp: 56 tablet, Rfl: 3    CRANBERRY CONCENTRATE 500 MG CAPS, TAKE 1 CAPSULE BY MOUTH TWICE DAILY, Disp: 56 capsule, Rfl: 3    diclofenac-Misoprostol (ARTHROTEC 50) 50-0.2 MG per tablet, TAKE 1 TABLET BY MOUTH TWICE DAILY, Disp: 60 tablet, Rfl: 3    albuterol sulfate HFA (VENTOLIN HFA) 108 (90 Base) MCG/ACT inhaler, INHALE TWO PUFFS BY MOUTH EVERY 6 HOURS AS NEEDED FOR WHEEZING, Disp: 18 g, Rfl: 3    fluticasone (FLONASE) 50 MCG/ACT nasal spray, 2 sprays by Nasal route daily, Disp: 16 g, Rfl: 3    ketotifen (ZADITOR) 0.025 % ophthalmic solution, Place 1 drop into both eyes 2 times daily, Disp: 10 mL, Rfl: 1    chlorzoxazone (PARAFON FORTE) 250 MG tablet, TAKE 1 TABLET BY MOUTH THREE TIMES DAILY AS NEEDED FOR MUSCLE SPASMS, Disp: 84 tablet, Rfl: 3    pravastatin (PRAVACHOL) 40 MG tablet, TAKE 1 TABLET BY MOUTH DAILY, Disp: 28 tablet, Rfl: 3    venlafaxine (EFFEXOR XR) 75 MG extended release capsule, Take 1 capsule by mouth daily, Disp: 30 capsule, Rfl: 3    ergocalciferol (ERGOCALCIFEROL) 96065 units capsule, Take 1 capsule by mouth once a week, Disp: 4 capsule, Rfl: 3    simethicone (MYLICON) 80 MG chewable tablet, Take 1 tablet by mouth 4 times daily as needed for Flatulence, Disp: 180 tablet, Rfl: 3    sucralfate (CARAFATE) 1 GM/10ML suspension, Take 10 mLs by mouth 4 times daily, Disp: 1200 mL, Rfl: 3    omeprazole (PRILOSEC) 20 MG delayed release capsule, TAKE 1 CAPSULE BY MOUTH DAILY, Disp: 28 capsule, Rfl: 3    metoprolol succinate (TOPROL XL) 25 MG extended release tablet, Take 25 mg by mouth daily , Disp: , Rfl:     PREMARIN 0.625 MG/GM vaginal cream, PLACE 2 GRAMS VAGINALLY TWICE A WEEK FOR 12 DOSES, Disp: 30 g, Rfl: 1    ALLERGIES:   Allergies   Allergen Reactions    Shellfish Allergy     Shrimp (Diagnostic) Shortness Of Breath    Famotidine Other (See Comments)     Headaches  Headaches  Headaches  Headaches  Headaches    Gabapentin Nausea Only     Mood swings, nausea. Mood swings, nausea. Mood swings, nausea. Mood swings, nausea. Mood swings, nausea.         FAMILY HISTORY:       Problem Relation Age of Onset    Colon Cancer Mother     High Blood Pressure Father     Heart Attack Brother     Heart Disease Brother          SOCIAL HISTORY:   Social History     Socioeconomic History    Marital status:      Spouse name: Not on file    Number of children: Not on file    Years of education: Not on file    Highest education level: Not on file   Occupational History    Not on file   Social Needs    Financial resource strain: Not on file    Food insecurity:     Worry: Not on file     Inability: Not on file    Transportation needs:     Medical: Not on file     Non-medical: Not on file   Tobacco Use    Smoking status: Former Smoker     Packs/day: 2.00     Years: 0.50     Pack years: 1.00     Types: Cigarettes     Last attempt to quit: 1995     Years since quitting: 24.4    Smokeless tobacco: Never Used   Substance and Sexual Activity    Alcohol use: Yes     Alcohol/week: 0.0 oz     Comment: social    Drug use: No    Sexual activity: Not on file   Lifestyle    Physical activity:     Days per week: Not on file     Minutes per session: Not on file    Stress: Not on file   Relationships    Social connections:     Talks on phone: Not on file     Gets together: Not on file     Attends [employer] service: Not on file     Active member of club or organization: Not on file     Attends meetings of clubs or organizations: Not on file     Relationship status: Not on file    Intimate partner violence:     Fear of current or ex partner: Not on file     Emotionally abused: Not on file     Physically abused: Not on file     Forced sexual activity: Not on file   Other Topics Concern    Not on file   Social History Narrative    Not on file       REVIEW OF SYSTEMS: A 12-point review of systemswas obtained and pertinent positives and negatives were enumerated above in the history of present illness. All other reviewed systems / symptoms were negative. Review of Systems    PHYSICAL EXAMINATION: Vital signs reviewed per the nursing documentation. /75   Pulse 78   Wt 179 lb (81.2 kg)   BMI 33.82 kg/m²   Body mass index is 33.82 kg/m². Physical Exam   Constitutional: She is oriented to person, place, and time. She appears well-developed and well-nourished. HENT:   Head: Normocephalic and atraumatic. Eyes: Pupils are equal, round, and reactive to light. EOM are normal.   Neck: Normal range of motion. Neck supple. Cardiovascular: Normal rate, regular rhythm, normal heart sounds and intact distal pulses. Exam reveals no gallop and no friction rub. No murmur heard. Pulmonary/Chest: Effort normal and breath sounds normal. No stridor. No respiratory distress. She has no wheezes. She has no rales. She exhibits no tenderness. Abdominal: Soft.  Bowel sounds are normal. She exhibits no distension and no mass. There is no tenderness. There is no rebound and no guarding. No hernia. Musculoskeletal: Normal range of motion. Neurological: She is alert and oriented to person, place, and time. Skin: Skin is warm. Psychiatric: She has a normal mood and affect. Her behavior is normal.         LABORATORY DATA: Reviewed  Lab Results   Component Value Date    WBC 5.0 03/03/2019    HGB 12.2 03/03/2019    HCT 37.7 03/03/2019    MCV 90.8 03/03/2019     03/03/2019     (H) 05/07/2019    K 4.7 05/07/2019     05/07/2019    CO2 26 05/07/2019    BUN 12 05/07/2019    CREATININE 0.60 05/07/2019    LABALBU 4.1 02/03/2019    BILITOT 0.23 (L) 02/03/2019    ALKPHOS 77 02/03/2019    AST 16 02/03/2019    ALT 21 02/03/2019         Lab Results   Component Value Date    RBC 4.15 03/03/2019    HGB 12.2 03/03/2019    MCV 90.8 03/03/2019    MCH 29.4 03/03/2019    MCHC 32.4 03/03/2019    RDW 12.3 03/03/2019    MPV 11.2 03/03/2019    BASOPCT 1 03/03/2019    LYMPHSABS 1.61 03/03/2019    MONOSABS 0.34 03/03/2019    NEUTROABS 2.76 03/03/2019    EOSABS 0.19 03/03/2019    BASOSABS 0.05 03/03/2019         DIAGNOSTIC TESTING:     No results found. IMPRESSION: Ms. Stubbs is a 63 y. o. female with GERD and dyspepsia, long standing, unresponsive to to high dose PPI therapy. Patient was educated on dietary modifications. EGD negative which suggest non-ulcer dyspepsia (NUD). She was negative for celiac disease. There may be functional component to her chronic symptoms, predominantly bloating currently. Modest Improvement in symptoms, continues to have some mild UGI symptoms. Recommendation:  -Continue with simethicone 80 mg prn, she is seeing improvement with this medications  -Patient reports some recurrent relapse of typical heartburn, will increase prilosec to 40mg  -Patient reported some intermittent diarrhea. Will provide fiber/benefiber daily and monitor symptoms. -RTC in 3 months.  Will plan on screening colonoscopy at time. Thank you for allowing me to participate in the care of Ms. Ian Mcmanus. For any further questions please do not hesitate to contact me. I have reviewed and agree with the ROS entered by the MA/MADAI.          Suzette Amaro MD, MPH   [facility]  Office #: [phone]

## 2019-06-19 NOTE — PROGRESS NOTES
Dr. Starr Rayo MD our mutual patient Nikhil Dobbs was seen  for   1. Gastroesophageal reflux disease, esophagitis presence not specified     . Past Medical, Family, and Social History reviewed and does contribute to the patient presenting condition.     patient\"s PMH/PSH,SH,PSYCH hx, MEDs, ALLERGIES, and ROS was all reviewed and updated ion the appropriate sections

## 2019-06-26 DIAGNOSIS — F41.9 ANXIETY: ICD-10-CM

## 2019-06-26 RX ORDER — VENLAFAXINE HYDROCHLORIDE 75 MG/1
75 CAPSULE, EXTENDED RELEASE ORAL DAILY
Qty: 28 CAPSULE | Refills: 3 | Status: SHIPPED | OUTPATIENT
Start: 2019-06-26 | End: 2019-10-16 | Stop reason: SDUPTHER

## 2019-06-27 DIAGNOSIS — E55.9 VITAMIN D DEFICIENCY: ICD-10-CM

## 2019-06-28 RX ORDER — ERGOCALCIFEROL 1.25 MG/1
CAPSULE ORAL
Qty: 4 CAPSULE | Refills: 3 | Status: SHIPPED | OUTPATIENT
Start: 2019-06-28 | End: 2019-10-16 | Stop reason: SDUPTHER

## 2019-07-09 ENCOUNTER — CARE COORDINATION (OUTPATIENT)
Dept: CARE COORDINATION | Age: 59
End: 2019-07-09

## 2019-07-15 ENCOUNTER — CARE COORDINATION (OUTPATIENT)
Dept: CARE COORDINATION | Age: 59
End: 2019-07-15

## 2019-07-22 ENCOUNTER — CARE COORDINATION (OUTPATIENT)
Dept: CARE COORDINATION | Age: 59
End: 2019-07-22

## 2019-07-26 RX ORDER — CHLORZOXAZONE 500 MG/1
TABLET ORAL
Qty: 42 TABLET | Refills: 0 | Status: SHIPPED | OUTPATIENT
Start: 2019-07-26 | End: 2019-08-21 | Stop reason: SDUPTHER

## 2019-07-26 RX ORDER — PRAVASTATIN SODIUM 40 MG
TABLET ORAL
Qty: 28 TABLET | Refills: 3 | Status: SHIPPED | OUTPATIENT
Start: 2019-07-26 | End: 2019-11-15 | Stop reason: SDUPTHER

## 2019-07-30 RX ORDER — SIMETHICONE 80 MG/1
TABLET, CHEWABLE ORAL
Qty: 120 TABLET | Refills: 3 | Status: SHIPPED | OUTPATIENT
Start: 2019-07-30 | End: 2019-11-19 | Stop reason: SDUPTHER

## 2019-08-05 ENCOUNTER — CARE COORDINATION (OUTPATIENT)
Dept: CARE COORDINATION | Age: 59
End: 2019-08-05

## 2019-08-05 NOTE — CARE COORDINATION
meals) 6/19/19   Candelaria Rosales MD    MG capsule TAKE 1 CAPSULE BY MOUTH TWICE DAILY AS NEEDED FOR CONSTIPATION 5/16/19   Davin Saenz, DO   ondansetron (ZOFRAN) 4 MG tablet TAKE 1 TABLET BY MOUTH THREE TIMES DAILY AS NEEDED FOR NAUSEA OR VOMITING 5/16/19   Fredi Palomares, DO   busPIRone (BUSPAR) 15 MG tablet TAKE 1 TABLET BY MOUTH TWICE DAILY 5/2/19   Marissa Eason MD   CRANBERRY CONCENTRATE 500 MG CAPS TAKE 1 CAPSULE BY MOUTH TWICE DAILY 5/2/19   Marissa Eaosn MD   diclofenac-Misoprostol (ARTHROTEC 50) 50-0.2 MG per tablet TAKE 1 TABLET BY MOUTH TWICE DAILY 4/25/19   Marissa Eason MD   albuterol sulfate HFA (VENTOLIN HFA) 108 (90 Base) MCG/ACT inhaler INHALE TWO PUFFS BY MOUTH EVERY 6 HOURS AS NEEDED FOR WHEEZING 4/24/19   Marissa Eason MD   fluticasone (FLONASE) 50 MCG/ACT nasal spray 2 sprays by Nasal route daily 4/24/19   Marissa Eason MD   ketotifen (ZADITOR) 0.025 % ophthalmic solution Place 1 drop into both eyes 2 times daily 4/24/19   Marissa Eason MD   sucralfate (CARAFATE) 1 GM/10ML suspension Take 10 mLs by mouth 4 times daily 2/3/19   Jeannette Quesada DO   omeprazole (PRILOSEC) 20 MG delayed release capsule TAKE 1 CAPSULE BY MOUTH DAILY 1/9/19   Marissa Eason MD   metoprolol succinate (TOPROL XL) 25 MG extended release tablet Take 25 mg by mouth daily     Historical Provider, MD   PREMARIN 0.625 MG/GM vaginal cream PLACE 2 GRAMS VAGINALLY TWICE A WEEK FOR 12 DOSES 9/21/17   Marissa Eason MD       Future Appointments   Date Time Provider Elaine Stuart   9/25/2019 10:00 AM MD bere Reyes

## 2019-08-21 ENCOUNTER — HOSPITAL ENCOUNTER (EMERGENCY)
Age: 59
Discharge: HOME | End: 2019-08-22
Attending: EMERGENCY MEDICINE
Payer: COMMERCIAL

## 2019-08-21 ENCOUNTER — APPOINTMENT (OUTPATIENT)
Dept: GENERAL RADIOLOGY | Age: 59
End: 2019-08-21
Payer: COMMERCIAL

## 2019-08-21 DIAGNOSIS — F41.9 ANXIETY: ICD-10-CM

## 2019-08-21 DIAGNOSIS — R06.00 DYSPNEA, UNSPECIFIED TYPE: Primary | ICD-10-CM

## 2019-08-21 DIAGNOSIS — J30.2 SEASONAL ALLERGIES: ICD-10-CM

## 2019-08-21 DIAGNOSIS — M51.26 DISPLACEMENT OF LUMBAR INTERVERTEBRAL DISC WITHOUT MYELOPATHY: ICD-10-CM

## 2019-08-21 LAB
ABSOLUTE EOS #: 0.22 K/UL (ref 0–0.44)
ABSOLUTE IMMATURE GRANULOCYTE: <0.03 K/UL (ref 0–0.3)
ABSOLUTE LYMPH #: 1.93 K/UL (ref 1.1–3.7)
ABSOLUTE MONO #: 0.49 K/UL (ref 0.1–1.2)
ALBUMIN SERPL-MCNC: 4.7 G/DL (ref 3.5–5.2)
ALBUMIN/GLOBULIN RATIO: 1.7 (ref 1–2.5)
ALP BLD-CCNC: 104 U/L (ref 35–104)
ALT SERPL-CCNC: 28 U/L (ref 5–33)
ANION GAP SERPL CALCULATED.3IONS-SCNC: 15 MMOL/L (ref 9–17)
AST SERPL-CCNC: 18 U/L
BASOPHILS # BLD: 1 % (ref 0–2)
BASOPHILS ABSOLUTE: 0.08 K/UL (ref 0–0.2)
BILIRUB SERPL-MCNC: 0.19 MG/DL (ref 0.3–1.2)
BUN BLDV-MCNC: 17 MG/DL (ref 6–20)
BUN/CREAT BLD: ABNORMAL (ref 9–20)
CALCIUM SERPL-MCNC: 9.6 MG/DL (ref 8.6–10.4)
CHLORIDE BLD-SCNC: 105 MMOL/L (ref 98–107)
CO2: 23 MMOL/L (ref 20–31)
CREAT SERPL-MCNC: 0.85 MG/DL (ref 0.5–0.9)
DIFFERENTIAL TYPE: NORMAL
EOSINOPHILS RELATIVE PERCENT: 3 % (ref 1–4)
GFR AFRICAN AMERICAN: >60 ML/MIN
GFR NON-AFRICAN AMERICAN: >60 ML/MIN
GFR SERPL CREATININE-BSD FRML MDRD: ABNORMAL ML/MIN/{1.73_M2}
GFR SERPL CREATININE-BSD FRML MDRD: ABNORMAL ML/MIN/{1.73_M2}
GLUCOSE BLD-MCNC: 117 MG/DL (ref 70–99)
HCT VFR BLD CALC: 42.9 % (ref 36.3–47.1)
HEMOGLOBIN: 13.6 G/DL (ref 11.9–15.1)
IMMATURE GRANULOCYTES: 0 %
LYMPHOCYTES # BLD: 26 % (ref 24–43)
MCH RBC QN AUTO: 28.6 PG (ref 25.2–33.5)
MCHC RBC AUTO-ENTMCNC: 31.7 G/DL (ref 28.4–34.8)
MCV RBC AUTO: 90.1 FL (ref 82.6–102.9)
MONOCYTES # BLD: 7 % (ref 3–12)
NRBC AUTOMATED: 0 PER 100 WBC
PDW BLD-RTO: 13.1 % (ref 11.8–14.4)
PLATELET # BLD: 275 K/UL (ref 138–453)
PLATELET ESTIMATE: NORMAL
PMV BLD AUTO: 12.3 FL (ref 8.1–13.5)
POTASSIUM SERPL-SCNC: 3.5 MMOL/L (ref 3.7–5.3)
RBC # BLD: 4.76 M/UL (ref 3.95–5.11)
RBC # BLD: NORMAL 10*6/UL
SEG NEUTROPHILS: 63 % (ref 36–65)
SEGMENTED NEUTROPHILS ABSOLUTE COUNT: 4.78 K/UL (ref 1.5–8.1)
SODIUM BLD-SCNC: 143 MMOL/L (ref 135–144)
TOTAL CK: 112 U/L (ref 26–192)
TOTAL PROTEIN: 7.4 G/DL (ref 6.4–8.3)
TROPONIN INTERP: NORMAL
TROPONIN T: NORMAL NG/ML
TROPONIN, HIGH SENSITIVITY: <6 NG/L (ref 0–14)
WBC # BLD: 7.5 K/UL (ref 3.5–11.3)
WBC # BLD: NORMAL 10*3/UL

## 2019-08-21 PROCEDURE — 2580000003 HC RX 258: Performed by: STUDENT IN AN ORGANIZED HEALTH CARE EDUCATION/TRAINING PROGRAM

## 2019-08-21 PROCEDURE — 80053 COMPREHEN METABOLIC PANEL: CPT

## 2019-08-21 PROCEDURE — 85379 FIBRIN DEGRADATION QUANT: CPT

## 2019-08-21 PROCEDURE — 71046 X-RAY EXAM CHEST 2 VIEWS: CPT

## 2019-08-21 PROCEDURE — 99285 EMERGENCY DEPT VISIT HI MDM: CPT

## 2019-08-21 PROCEDURE — 82550 ASSAY OF CK (CPK): CPT

## 2019-08-21 PROCEDURE — 84484 ASSAY OF TROPONIN QUANT: CPT

## 2019-08-21 PROCEDURE — 93005 ELECTROCARDIOGRAM TRACING: CPT | Performed by: STUDENT IN AN ORGANIZED HEALTH CARE EDUCATION/TRAINING PROGRAM

## 2019-08-21 PROCEDURE — 6370000000 HC RX 637 (ALT 250 FOR IP): Performed by: STUDENT IN AN ORGANIZED HEALTH CARE EDUCATION/TRAINING PROGRAM

## 2019-08-21 PROCEDURE — 85025 COMPLETE CBC W/AUTO DIFF WBC: CPT

## 2019-08-21 RX ORDER — 0.9 % SODIUM CHLORIDE 0.9 %
1000 INTRAVENOUS SOLUTION INTRAVENOUS ONCE
Status: COMPLETED | OUTPATIENT
Start: 2019-08-21 | End: 2019-08-21

## 2019-08-21 RX ORDER — BUSPIRONE HYDROCHLORIDE 15 MG/1
TABLET ORAL
Qty: 56 TABLET | Refills: 3 | Status: SHIPPED | OUTPATIENT
Start: 2019-08-21 | End: 2019-11-19 | Stop reason: SDUPTHER

## 2019-08-21 RX ORDER — ALBUTEROL SULFATE 90 UG/1
AEROSOL, METERED RESPIRATORY (INHALATION)
Qty: 18 G | Refills: 3 | Status: SHIPPED | OUTPATIENT
Start: 2019-08-21 | End: 2019-11-19 | Stop reason: SDUPTHER

## 2019-08-21 RX ORDER — EVENING PRIMROSE OIL 500 MG
CAPSULE ORAL
Qty: 56 CAPSULE | Refills: 3 | Status: SHIPPED | OUTPATIENT
Start: 2019-08-21

## 2019-08-21 RX ORDER — HYDROXYZINE HYDROCHLORIDE 10 MG/1
10 TABLET, FILM COATED ORAL ONCE
Status: COMPLETED | OUTPATIENT
Start: 2019-08-21 | End: 2019-08-21

## 2019-08-21 RX ORDER — FLUTICASONE PROPIONATE 50 MCG
SPRAY, SUSPENSION (ML) NASAL
Qty: 16 G | Refills: 3 | Status: SHIPPED | OUTPATIENT
Start: 2019-08-21 | End: 2019-11-19 | Stop reason: SDUPTHER

## 2019-08-21 RX ORDER — OXYMETAZOLINE HYDROCHLORIDE 0.05 G/100ML
1 SPRAY NASAL ONCE
Status: COMPLETED | OUTPATIENT
Start: 2019-08-21 | End: 2019-08-21

## 2019-08-21 RX ORDER — CHLORZOXAZONE 500 MG/1
TABLET ORAL
Qty: 42 TABLET | Refills: 0 | Status: SHIPPED | OUTPATIENT
Start: 2019-08-21 | End: 2019-09-18 | Stop reason: SDUPTHER

## 2019-08-21 RX ORDER — DICLOFENAC SODIUM AND MISOPROSTOL 50; 200 MG/1; UG/1
TABLET, DELAYED RELEASE ORAL
Qty: 56 TABLET | Refills: 1 | Status: SHIPPED | OUTPATIENT
Start: 2019-08-21 | End: 2019-10-16 | Stop reason: SDUPTHER

## 2019-08-21 RX ADMIN — HYDROXYZINE HYDROCHLORIDE 10 MG: 10 TABLET ORAL at 21:52

## 2019-08-21 RX ADMIN — Medication 1 SPRAY: at 21:52

## 2019-08-21 RX ADMIN — SODIUM CHLORIDE 1000 ML: 9 INJECTION, SOLUTION INTRAVENOUS at 21:52

## 2019-08-21 ASSESSMENT — PAIN DESCRIPTION - ORIENTATION: ORIENTATION: LEFT

## 2019-08-21 ASSESSMENT — PAIN DESCRIPTION - PAIN TYPE: TYPE: ACUTE PAIN

## 2019-08-21 ASSESSMENT — PAIN DESCRIPTION - FREQUENCY: FREQUENCY: INTERMITTENT

## 2019-08-21 ASSESSMENT — PAIN DESCRIPTION - LOCATION: LOCATION: CHEST

## 2019-08-21 ASSESSMENT — PAIN SCALES - GENERAL: PAINLEVEL_OUTOF10: 7

## 2019-08-21 NOTE — TELEPHONE ENCOUNTER
Sandra Eaton is calling to request a refill on the following medication(s):  Requested Prescriptions     Pending Prescriptions Disp Refills    chlorzoxazone (PARAFON FORTE) 500 MG tablet [Pharmacy Med Name: CHLORZOXAZONE 500MG ORAL TABLET] 42 tablet 0     Sig: TAKE 1/2 TABLET BY MOUTH THREE TIMES DAILY AS NEEDED FOR MUSCLE SPASMS    fluticasone (FLONASE) 50 MCG/ACT nasal spray [Pharmacy Med Name: FLUTICASONE PROPIONATE 50MCG/ACT NASAL SUSPENSION] 16 g 3     Sig: INSTILL 2 SPRAYS INTO EACH NOSTRIL ONCE DAILY    albuterol sulfate  (90 Base) MCG/ACT inhaler [Pharmacy Med Name: ALBUTEROL SULFATE HFA 108MCG/ACT INHALER SPRAY] 18 g 3     Sig: INHALE 2 PUFFS BY MOUTH AND INTO THE LUNGS ONCE EVERY 6 HOURS AS NEEDED FOR WHEEZING    CRANBERRY CONCENTRATE 500 MG CAPS [Pharmacy Med Name: CRANBERRY CONCENTRATE 500MG ORAL CAPSULE] 56 capsule 3     Sig: TAKE 1 CAPSULE BY MOUTH TWICE DAILY    busPIRone (BUSPAR) 15 MG tablet [Pharmacy Med Name: BUSPIRONE HCL 15MG ORAL TABLET] 56 tablet 3     Sig: TAKE 1 TABLET BY MOUTH TWICE DAILY    diclofenac-Misoprostol (ARTHROTEC 50) 50-0.2 MG per tablet [Pharmacy Med Name: DICLOFENAC SO/MISOPROSTOL 50MG/200MCG ENTERIC COATED TABLET] 56 tablet      Sig: TAKE 1 TABLET BY MOUTH TWICE DAILY       Last Visit Date (If Applicable):  6/84/2720    Next Visit Date:    Visit date not found

## 2019-08-22 VITALS
SYSTOLIC BLOOD PRESSURE: 129 MMHG | RESPIRATION RATE: 14 BRPM | TEMPERATURE: 98.5 F | HEART RATE: 78 BPM | DIASTOLIC BLOOD PRESSURE: 71 MMHG | OXYGEN SATURATION: 95 %

## 2019-08-22 LAB
D-DIMER QUANTITATIVE: 0.17 MG/L FEU
EKG ATRIAL RATE: 86 BPM
EKG P-R INTERVAL: 132 MS
EKG Q-T INTERVAL: 366 MS
EKG QRS DURATION: 72 MS
EKG QTC CALCULATION (BAZETT): 437 MS
EKG R AXIS: 47 DEGREES
EKG T AXIS: 121 DEGREES
EKG VENTRICULAR RATE: 86 BPM
TROPONIN INTERP: NORMAL
TROPONIN T: NORMAL NG/ML
TROPONIN, HIGH SENSITIVITY: <6 NG/L (ref 0–14)

## 2019-08-22 PROCEDURE — 84484 ASSAY OF TROPONIN QUANT: CPT

## 2019-08-22 PROCEDURE — 93010 ELECTROCARDIOGRAM REPORT: CPT | Performed by: INTERNAL MEDICINE

## 2019-08-22 NOTE — ED PROVIDER NOTES
Sejal Acosta MD   busPIRone (BUSPAR) 15 MG tablet TAKE 1 TABLET BY MOUTH TWICE DAILY 8/21/19   Renetta Smith MD   diclofenac-Misoprostol (ARTHROTEC 50) 50-0.2 MG per tablet TAKE 1 TABLET BY MOUTH TWICE DAILY 8/21/19   Renetta Smith MD   MI-ACID GAS RELIEF 80 MG chewable tablet TAKE 1 TABLET BY MOUTH FOUR TIMES DAILY AS NEEDED FOR FLATULENCE 7/30/19   Cielo Eric MD   pravastatin (PRAVACHOL) 40 MG tablet TAKE 1 TABLET BY MOUTH DAILY 7/26/19   Renetta Smith MD   vitamin D (ERGOCALCIFEROL) 69803 units CAPS capsule TAKE 1 CAPSULE BY MOUTH ONCE A WEEK ON THURSDAYS 6/28/19   Renetta Smith MD   venlafaxine (EFFEXOR XR) 75 MG extended release capsule TAKE 1 CAPSULE BY MOUTH DAILY 6/26/19 7/26/19  Renetta Smith MD   omeprazole (PRILOSEC) 40 MG delayed release capsule Take 1 capsule by mouth every morning (before breakfast) 6/19/19   Cielo Eric MD   Wheat Dextrin (BENEFIBER) POWD Take 4 g by mouth 3 times daily (with meals) 6/19/19   Cielo Eric MD    MG capsule TAKE 1 CAPSULE BY MOUTH TWICE DAILY AS NEEDED FOR CONSTIPATION 5/16/19   Davin Saenz DO   ondansetron (ZOFRAN) 4 MG tablet TAKE 1 TABLET BY MOUTH THREE TIMES DAILY AS NEEDED FOR NAUSEA OR VOMITING 5/16/19   Marlyn Yuan DO   ketotifen (ZADITOR) 0.025 % ophthalmic solution Place 1 drop into both eyes 2 times daily 4/24/19   Renetta Smith MD   sucralfate (CARAFATE) 1 GM/10ML suspension Take 10 mLs by mouth 4 times daily 2/3/19   Vijaya Valles DO   omeprazole (PRILOSEC) 20 MG delayed release capsule TAKE 1 CAPSULE BY MOUTH DAILY 1/9/19   Renetta Smith MD   metoprolol succinate (TOPROL XL) 25 MG extended release tablet Take 25 mg by mouth daily     Historical MD Alice   PREMARIN 0.625 MG/GM vaginal cream PLACE 2 GRAMS VAGINALLY TWICE A WEEK FOR 12 DOSES 9/21/17   Renetta Smith MD       REVIEW OF SYSTEMS    (2-9 systems for level 4, 10 or more for level 5)      Review of Systems   Constitutional: Negative for chills and fever.

## 2019-08-23 ENCOUNTER — CARE COORDINATION (OUTPATIENT)
Dept: CARE COORDINATION | Age: 59
End: 2019-08-23

## 2019-08-26 ASSESSMENT — ENCOUNTER SYMPTOMS
WHEEZING: 0
SORE THROAT: 0
BACK PAIN: 0
COUGH: 0
VOMITING: 0
CHEST TIGHTNESS: 0
TROUBLE SWALLOWING: 0
SHORTNESS OF BREATH: 1
ABDOMINAL PAIN: 0
NAUSEA: 0
PHOTOPHOBIA: 0

## 2019-09-18 PROBLEM — H04.123 INSUFFICIENCY OF TEAR FILM OF BOTH EYES: Status: ACTIVE | Noted: 2017-12-21

## 2019-09-18 PROBLEM — H04.123 DRY EYES, BILATERAL: Status: ACTIVE | Noted: 2017-12-21

## 2019-09-18 RX ORDER — CHLORZOXAZONE 500 MG/1
TABLET ORAL
Qty: 42 TABLET | Refills: 0 | Status: SHIPPED | OUTPATIENT
Start: 2019-09-18 | End: 2020-05-01

## 2019-09-24 ENCOUNTER — TELEPHONE (OUTPATIENT)
Dept: GASTROENTEROLOGY | Age: 59
End: 2019-09-24

## 2019-09-25 ENCOUNTER — TELEPHONE (OUTPATIENT)
Dept: GASTROENTEROLOGY | Age: 59
End: 2019-09-25

## 2019-10-09 ENCOUNTER — HOSPITAL ENCOUNTER (EMERGENCY)
Age: 59
Discharge: HOME | End: 2019-10-09
Attending: EMERGENCY MEDICINE
Payer: COMMERCIAL

## 2019-10-09 VITALS
BODY MASS INDEX: 33.99 KG/M2 | RESPIRATION RATE: 16 BRPM | HEART RATE: 84 BPM | WEIGHT: 180 LBS | TEMPERATURE: 98.4 F | HEIGHT: 61 IN | DIASTOLIC BLOOD PRESSURE: 79 MMHG | OXYGEN SATURATION: 97 % | SYSTOLIC BLOOD PRESSURE: 117 MMHG

## 2019-10-09 DIAGNOSIS — K11.5 SIALOLITHIASIS: Primary | ICD-10-CM

## 2019-10-09 LAB
ABSOLUTE EOS #: 0.16 K/UL (ref 0–0.44)
ABSOLUTE IMMATURE GRANULOCYTE: 0.01 K/UL (ref 0–0.3)
ABSOLUTE LYMPH #: 1.24 K/UL (ref 1.1–3.7)
ABSOLUTE MONO #: 0.22 K/UL (ref 0.1–1.2)
AMYLASE: 633 U/L (ref 28–100)
ANION GAP SERPL CALCULATED.3IONS-SCNC: 10 MMOL/L (ref 9–17)
BASOPHILS # BLD: 1 % (ref 0–2)
BASOPHILS ABSOLUTE: 0.03 K/UL (ref 0–0.2)
BUN BLDV-MCNC: 11 MG/DL (ref 6–20)
BUN/CREAT BLD: 17 (ref 9–20)
CALCIUM SERPL-MCNC: 8.7 MG/DL (ref 8.6–10.4)
CHLORIDE BLD-SCNC: 107 MMOL/L (ref 98–107)
CO2: 25 MMOL/L (ref 20–31)
CREAT SERPL-MCNC: 0.66 MG/DL (ref 0.5–0.9)
DIFFERENTIAL TYPE: NORMAL
EOSINOPHILS RELATIVE PERCENT: 4 % (ref 1–4)
GFR AFRICAN AMERICAN: >60 ML/MIN
GFR NON-AFRICAN AMERICAN: >60 ML/MIN
GFR SERPL CREATININE-BSD FRML MDRD: ABNORMAL ML/MIN/{1.73_M2}
GFR SERPL CREATININE-BSD FRML MDRD: ABNORMAL ML/MIN/{1.73_M2}
GLUCOSE BLD-MCNC: 148 MG/DL (ref 70–99)
HCT VFR BLD CALC: 38.6 % (ref 36.3–47.1)
HEMOGLOBIN: 12.6 G/DL (ref 11.9–15.1)
IMMATURE GRANULOCYTES: 0 %
LYMPHOCYTES # BLD: 33 % (ref 24–43)
MCH RBC QN AUTO: 28.8 PG (ref 25.2–33.5)
MCHC RBC AUTO-ENTMCNC: 32.6 G/DL (ref 28.4–34.8)
MCV RBC AUTO: 88.3 FL (ref 82.6–102.9)
MONOCYTES # BLD: 6 % (ref 3–12)
NRBC AUTOMATED: 0 PER 100 WBC
PDW BLD-RTO: 13 % (ref 11.8–14.4)
PLATELET # BLD: 196 K/UL (ref 138–453)
PLATELET ESTIMATE: NORMAL
PMV BLD AUTO: 11.5 FL (ref 8.1–13.5)
POTASSIUM SERPL-SCNC: 3.6 MMOL/L (ref 3.7–5.3)
RBC # BLD: 4.37 M/UL (ref 3.95–5.11)
RBC # BLD: NORMAL 10*6/UL
SEG NEUTROPHILS: 56 % (ref 36–65)
SEGMENTED NEUTROPHILS ABSOLUTE COUNT: 2.05 K/UL (ref 1.5–8.1)
SODIUM BLD-SCNC: 142 MMOL/L (ref 135–144)
WBC # BLD: 3.7 K/UL (ref 3.5–11.3)
WBC # BLD: NORMAL 10*3/UL

## 2019-10-09 PROCEDURE — 99283 EMERGENCY DEPT VISIT LOW MDM: CPT

## 2019-10-09 PROCEDURE — 80048 BASIC METABOLIC PNL TOTAL CA: CPT

## 2019-10-09 PROCEDURE — 85025 COMPLETE CBC W/AUTO DIFF WBC: CPT

## 2019-10-09 PROCEDURE — 82150 ASSAY OF AMYLASE: CPT

## 2019-10-09 RX ORDER — PREDNISONE 20 MG/1
40 TABLET ORAL DAILY
Qty: 10 TABLET | Refills: 0 | Status: SHIPPED | OUTPATIENT
Start: 2019-10-09 | End: 2019-10-14

## 2019-10-09 ASSESSMENT — ENCOUNTER SYMPTOMS
COLOR CHANGE: 0
VOICE CHANGE: 0
VOMITING: 0
SORE THROAT: 0
NAUSEA: 0
TROUBLE SWALLOWING: 0
FACIAL SWELLING: 1

## 2019-10-09 ASSESSMENT — PAIN SCALES - GENERAL: PAINLEVEL_OUTOF10: 5

## 2019-10-13 ENCOUNTER — HOSPITAL ENCOUNTER (EMERGENCY)
Age: 59
Discharge: HOME | End: 2019-10-13
Attending: EMERGENCY MEDICINE
Payer: COMMERCIAL

## 2019-10-13 VITALS
HEIGHT: 61 IN | WEIGHT: 175.13 LBS | BODY MASS INDEX: 33.07 KG/M2 | RESPIRATION RATE: 14 BRPM | OXYGEN SATURATION: 98 % | DIASTOLIC BLOOD PRESSURE: 72 MMHG | HEART RATE: 71 BPM | TEMPERATURE: 98.1 F | SYSTOLIC BLOOD PRESSURE: 131 MMHG

## 2019-10-13 DIAGNOSIS — J02.9 ACUTE PHARYNGITIS, UNSPECIFIED ETIOLOGY: Primary | ICD-10-CM

## 2019-10-13 PROCEDURE — 99282 EMERGENCY DEPT VISIT SF MDM: CPT

## 2019-10-13 RX ORDER — AMOXICILLIN 875 MG/1
875 TABLET, COATED ORAL 2 TIMES DAILY
Qty: 20 TABLET | Refills: 0 | Status: SHIPPED | OUTPATIENT
Start: 2019-10-13 | End: 2019-10-23

## 2019-10-13 ASSESSMENT — ENCOUNTER SYMPTOMS
SHORTNESS OF BREATH: 0
SINUS PRESSURE: 0
SORE THROAT: 1
EYE REDNESS: 0
FACIAL SWELLING: 0
COUGH: 0
VOICE CHANGE: 0
BACK PAIN: 0
RHINORRHEA: 0
NAUSEA: 0
TROUBLE SWALLOWING: 0
WHEEZING: 0
VOMITING: 0
SINUS PAIN: 0
EYE ITCHING: 0
EYE DISCHARGE: 0

## 2019-10-13 ASSESSMENT — PAIN DESCRIPTION - PAIN TYPE: TYPE: ACUTE PAIN

## 2019-10-13 ASSESSMENT — PAIN SCALES - GENERAL: PAINLEVEL_OUTOF10: 5

## 2019-10-16 DIAGNOSIS — F41.9 ANXIETY: ICD-10-CM

## 2019-10-16 DIAGNOSIS — M51.26 DISPLACEMENT OF LUMBAR INTERVERTEBRAL DISC WITHOUT MYELOPATHY: ICD-10-CM

## 2019-10-16 DIAGNOSIS — E55.9 VITAMIN D DEFICIENCY: ICD-10-CM

## 2019-10-17 RX ORDER — VENLAFAXINE HYDROCHLORIDE 75 MG/1
75 CAPSULE, EXTENDED RELEASE ORAL DAILY
Qty: 28 CAPSULE | Refills: 3 | Status: SHIPPED | OUTPATIENT
Start: 2019-10-17 | End: 2020-02-05

## 2019-10-17 RX ORDER — ERGOCALCIFEROL 1.25 MG/1
CAPSULE ORAL
Qty: 4 CAPSULE | Refills: 3 | Status: SHIPPED | OUTPATIENT
Start: 2019-10-17 | End: 2020-02-05

## 2019-10-17 RX ORDER — DICLOFENAC SODIUM AND MISOPROSTOL 50; 200 MG/1; UG/1
TABLET, DELAYED RELEASE ORAL
Qty: 56 TABLET | Refills: 1 | Status: SHIPPED | OUTPATIENT
Start: 2019-10-17 | End: 2019-11-19 | Stop reason: SDUPTHER

## 2019-10-23 ENCOUNTER — OFFICE VISIT (OUTPATIENT)
Dept: GASTROENTEROLOGY | Age: 59
End: 2019-10-23
Payer: COMMERCIAL

## 2019-10-23 VITALS
SYSTOLIC BLOOD PRESSURE: 116 MMHG | WEIGHT: 174.6 LBS | DIASTOLIC BLOOD PRESSURE: 68 MMHG | BODY MASS INDEX: 32.99 KG/M2 | HEART RATE: 83 BPM

## 2019-10-23 DIAGNOSIS — K21.9 GASTROESOPHAGEAL REFLUX DISEASE, ESOPHAGITIS PRESENCE NOT SPECIFIED: Primary | ICD-10-CM

## 2019-10-23 PROCEDURE — G8427 DOCREV CUR MEDS BY ELIG CLIN: HCPCS | Performed by: INTERNAL MEDICINE

## 2019-10-23 PROCEDURE — G8417 CALC BMI ABV UP PARAM F/U: HCPCS | Performed by: INTERNAL MEDICINE

## 2019-10-23 PROCEDURE — 1036F TOBACCO NON-USER: CPT | Performed by: INTERNAL MEDICINE

## 2019-10-23 PROCEDURE — G8484 FLU IMMUNIZE NO ADMIN: HCPCS | Performed by: INTERNAL MEDICINE

## 2019-10-23 PROCEDURE — 99214 OFFICE O/P EST MOD 30 MIN: CPT | Performed by: INTERNAL MEDICINE

## 2019-10-23 PROCEDURE — 3017F COLORECTAL CA SCREEN DOC REV: CPT | Performed by: INTERNAL MEDICINE

## 2019-10-23 RX ORDER — CHOLECALCIFEROL (VITAMIN D3) 125 MCG
1 CAPSULE ORAL
Qty: 90 TABLET | Refills: 3 | Status: SHIPPED | OUTPATIENT
Start: 2019-10-23 | End: 2019-12-04

## 2019-10-23 ASSESSMENT — ENCOUNTER SYMPTOMS
NAUSEA: 1
TROUBLE SWALLOWING: 0
SHORTNESS OF BREATH: 1
WHEEZING: 0
VOICE CHANGE: 0
SINUS PRESSURE: 0
BLOOD IN STOOL: 0
VOMITING: 0
COUGH: 0
BACK PAIN: 1
CHOKING: 0
SORE THROAT: 0
DIARRHEA: 1
ABDOMINAL DISTENTION: 1
RECTAL PAIN: 0
ABDOMINAL PAIN: 1
ANAL BLEEDING: 0
CONSTIPATION: 1

## 2019-11-01 ENCOUNTER — TELEPHONE (OUTPATIENT)
Dept: FAMILY MEDICINE CLINIC | Age: 59
End: 2019-11-01

## 2019-11-01 RX ORDER — FLUCONAZOLE 150 MG/1
150 TABLET ORAL
Qty: 2 TABLET | Refills: 0 | Status: SHIPPED | OUTPATIENT
Start: 2019-11-01 | End: 2019-11-07

## 2019-11-18 RX ORDER — PRAVASTATIN SODIUM 40 MG
TABLET ORAL
Qty: 28 TABLET | Refills: 3 | Status: SHIPPED | OUTPATIENT
Start: 2019-11-18 | End: 2020-04-08

## 2019-11-18 RX ORDER — OMEPRAZOLE 40 MG/1
CAPSULE, DELAYED RELEASE ORAL
Qty: 30 CAPSULE | Refills: 3 | Status: SHIPPED | OUTPATIENT
Start: 2019-11-18 | End: 2020-02-06 | Stop reason: SDUPTHER

## 2019-11-19 DIAGNOSIS — F41.9 ANXIETY: ICD-10-CM

## 2019-11-19 DIAGNOSIS — J30.2 SEASONAL ALLERGIES: ICD-10-CM

## 2019-11-19 DIAGNOSIS — M51.26 DISPLACEMENT OF LUMBAR INTERVERTEBRAL DISC WITHOUT MYELOPATHY: ICD-10-CM

## 2019-11-19 RX ORDER — ALBUTEROL SULFATE 90 UG/1
AEROSOL, METERED RESPIRATORY (INHALATION)
Qty: 18 G | Refills: 3 | Status: SHIPPED | OUTPATIENT
Start: 2019-11-19 | End: 2021-06-14 | Stop reason: SDUPTHER

## 2019-11-19 RX ORDER — FLUTICASONE PROPIONATE 50 MCG
SPRAY, SUSPENSION (ML) NASAL
Qty: 16 G | Refills: 3 | Status: SHIPPED | OUTPATIENT
Start: 2019-11-19 | End: 2020-04-08

## 2019-11-19 RX ORDER — BUSPIRONE HYDROCHLORIDE 15 MG/1
TABLET ORAL
Qty: 56 TABLET | Refills: 3 | Status: SHIPPED | OUTPATIENT
Start: 2019-11-19 | End: 2020-04-08

## 2019-11-19 RX ORDER — DICLOFENAC SODIUM AND MISOPROSTOL 50; 200 MG/1; UG/1
TABLET, DELAYED RELEASE ORAL
Qty: 56 TABLET | Refills: 1 | Status: SHIPPED | OUTPATIENT
Start: 2019-11-19 | End: 2020-01-30

## 2019-12-04 ENCOUNTER — HOSPITAL ENCOUNTER (OUTPATIENT)
Age: 59
Setting detail: SPECIMEN
Discharge: HOME | End: 2019-12-04
Payer: COMMERCIAL

## 2019-12-04 ENCOUNTER — OFFICE VISIT (OUTPATIENT)
Dept: FAMILY MEDICINE CLINIC | Age: 59
End: 2019-12-04
Payer: COMMERCIAL

## 2019-12-04 VITALS
HEART RATE: 82 BPM | TEMPERATURE: 97.6 F | OXYGEN SATURATION: 96 % | DIASTOLIC BLOOD PRESSURE: 82 MMHG | BODY MASS INDEX: 31.86 KG/M2 | SYSTOLIC BLOOD PRESSURE: 125 MMHG | WEIGHT: 168.6 LBS

## 2019-12-04 DIAGNOSIS — N81.11 CYSTOCELE, MIDLINE: ICD-10-CM

## 2019-12-04 DIAGNOSIS — Z20.2 POSSIBLE EXPOSURE TO STD: ICD-10-CM

## 2019-12-04 DIAGNOSIS — Z12.4 ENCOUNTER FOR PAPANICOLAOU SMEAR FOR CERVICAL CANCER SCREENING: Primary | ICD-10-CM

## 2019-12-04 DIAGNOSIS — N81.6 RECTOCELE: ICD-10-CM

## 2019-12-04 DIAGNOSIS — Z11.51 SCREENING FOR HPV (HUMAN PAPILLOMAVIRUS): ICD-10-CM

## 2019-12-04 PROCEDURE — 99213 OFFICE O/P EST LOW 20 MIN: CPT | Performed by: FAMILY MEDICINE

## 2019-12-04 PROCEDURE — G8484 FLU IMMUNIZE NO ADMIN: HCPCS | Performed by: FAMILY MEDICINE

## 2019-12-04 PROCEDURE — 1036F TOBACCO NON-USER: CPT | Performed by: FAMILY MEDICINE

## 2019-12-04 PROCEDURE — G8417 CALC BMI ABV UP PARAM F/U: HCPCS | Performed by: FAMILY MEDICINE

## 2019-12-04 PROCEDURE — 3017F COLORECTAL CA SCREEN DOC REV: CPT | Performed by: FAMILY MEDICINE

## 2019-12-04 PROCEDURE — G8427 DOCREV CUR MEDS BY ELIG CLIN: HCPCS | Performed by: FAMILY MEDICINE

## 2019-12-05 LAB
C TRACH DNA GENITAL QL NAA+PROBE: NEGATIVE
DIRECT EXAM: ABNORMAL
Lab: ABNORMAL
N. GONORRHOEAE DNA: NEGATIVE
SPECIMEN DESCRIPTION: ABNORMAL
SPECIMEN DESCRIPTION: NORMAL

## 2019-12-07 LAB
CULTURE: NORMAL
Lab: NORMAL
SPECIMEN DESCRIPTION: NORMAL

## 2019-12-09 ENCOUNTER — TELEPHONE (OUTPATIENT)
Dept: FAMILY MEDICINE CLINIC | Age: 59
End: 2019-12-09

## 2019-12-09 LAB — CYTOLOGY REPORT: NORMAL

## 2019-12-09 RX ORDER — METRONIDAZOLE 500 MG/1
500 TABLET ORAL 2 TIMES DAILY
Qty: 14 TABLET | Refills: 0 | Status: SHIPPED | OUTPATIENT
Start: 2019-12-09 | End: 2019-12-16

## 2019-12-18 ENCOUNTER — HOSPITAL ENCOUNTER (OUTPATIENT)
Age: 59
Discharge: HOME | End: 2019-12-18
Payer: COMMERCIAL

## 2019-12-18 DIAGNOSIS — Z20.2 POSSIBLE EXPOSURE TO STD: ICD-10-CM

## 2019-12-18 LAB
HEPATITIS C ANTIBODY: NONREACTIVE
HIV AG/AB: NONREACTIVE

## 2019-12-18 PROCEDURE — 87389 HIV-1 AG W/HIV-1&-2 AB AG IA: CPT

## 2019-12-18 PROCEDURE — 86803 HEPATITIS C AB TEST: CPT

## 2019-12-18 PROCEDURE — 36415 COLL VENOUS BLD VENIPUNCTURE: CPT

## 2020-01-05 ENCOUNTER — HOSPITAL ENCOUNTER (EMERGENCY)
Age: 60
Discharge: HOME | End: 2020-01-06
Attending: EMERGENCY MEDICINE
Payer: COMMERCIAL

## 2020-01-05 VITALS
BODY MASS INDEX: 31.95 KG/M2 | DIASTOLIC BLOOD PRESSURE: 56 MMHG | TEMPERATURE: 97.6 F | OXYGEN SATURATION: 98 % | WEIGHT: 169.25 LBS | RESPIRATION RATE: 16 BRPM | HEART RATE: 76 BPM | SYSTOLIC BLOOD PRESSURE: 128 MMHG | HEIGHT: 61 IN

## 2020-01-05 PROCEDURE — 99284 EMERGENCY DEPT VISIT MOD MDM: CPT

## 2020-01-05 ASSESSMENT — PAIN SCALES - GENERAL: PAINLEVEL_OUTOF10: 7

## 2020-01-06 ENCOUNTER — APPOINTMENT (OUTPATIENT)
Dept: CT IMAGING | Age: 60
End: 2020-01-06
Payer: COMMERCIAL

## 2020-01-06 LAB
ABSOLUTE EOS #: 0.23 K/UL (ref 0–0.44)
ABSOLUTE IMMATURE GRANULOCYTE: 0.02 K/UL (ref 0–0.3)
ABSOLUTE LYMPH #: 2.17 K/UL (ref 1.1–3.7)
ABSOLUTE MONO #: 0.48 K/UL (ref 0.1–1.2)
ANION GAP SERPL CALCULATED.3IONS-SCNC: 11 MMOL/L (ref 9–17)
BASOPHILS # BLD: 1 % (ref 0–2)
BASOPHILS ABSOLUTE: 0.08 K/UL (ref 0–0.2)
BILIRUBIN, POC: NEGATIVE
BLOOD URINE, POC: NORMAL
BUN BLDV-MCNC: 18 MG/DL (ref 6–20)
BUN/CREAT BLD: 25 (ref 9–20)
CALCIUM SERPL-MCNC: 9.2 MG/DL (ref 8.6–10.4)
CHLORIDE BLD-SCNC: 103 MMOL/L (ref 98–107)
CHP ED QC CHECK: NORMAL
CLARITY, POC: CLEAR
CO2: 27 MMOL/L (ref 20–31)
COLOR, POC: YELLOW
CREAT SERPL-MCNC: 0.72 MG/DL (ref 0.5–0.9)
DIFFERENTIAL TYPE: NORMAL
EOSINOPHILS RELATIVE PERCENT: 4 % (ref 1–4)
GFR AFRICAN AMERICAN: >60 ML/MIN
GFR NON-AFRICAN AMERICAN: >60 ML/MIN
GFR SERPL CREATININE-BSD FRML MDRD: ABNORMAL ML/MIN/{1.73_M2}
GFR SERPL CREATININE-BSD FRML MDRD: ABNORMAL ML/MIN/{1.73_M2}
GLUCOSE BLD-MCNC: 96 MG/DL (ref 70–99)
GLUCOSE URINE, POC: NEGATIVE
HCT VFR BLD CALC: 39.9 % (ref 36.3–47.1)
HEMOGLOBIN: 12.8 G/DL (ref 11.9–15.1)
IMMATURE GRANULOCYTES: 0 %
KETONES, POC: NEGATIVE
LEUKOCYTE EST, POC: NEGATIVE
LYMPHOCYTES # BLD: 33 % (ref 24–43)
MCH RBC QN AUTO: 28.8 PG (ref 25.2–33.5)
MCHC RBC AUTO-ENTMCNC: 32.1 G/DL (ref 28.4–34.8)
MCV RBC AUTO: 89.7 FL (ref 82.6–102.9)
MONOCYTES # BLD: 7 % (ref 3–12)
NITRITE, POC: NEGATIVE
NRBC AUTOMATED: 0 PER 100 WBC
PDW BLD-RTO: 13.2 % (ref 11.8–14.4)
PH, POC: 6
PLATELET # BLD: 279 K/UL (ref 138–453)
PLATELET ESTIMATE: NORMAL
PMV BLD AUTO: 11.4 FL (ref 8.1–13.5)
POTASSIUM SERPL-SCNC: 3.7 MMOL/L (ref 3.7–5.3)
PROTEIN, POC: NEGATIVE
RBC # BLD: 4.45 M/UL (ref 3.95–5.11)
RBC # BLD: NORMAL 10*6/UL
SEG NEUTROPHILS: 55 % (ref 36–65)
SEGMENTED NEUTROPHILS ABSOLUTE COUNT: 3.61 K/UL (ref 1.5–8.1)
SODIUM BLD-SCNC: 141 MMOL/L (ref 135–144)
SPECIFIC GRAVITY, POC: 1.02
UROBILINOGEN, POC: 0.2
WBC # BLD: 6.6 K/UL (ref 3.5–11.3)
WBC # BLD: NORMAL 10*3/UL

## 2020-01-06 PROCEDURE — 96365 THER/PROPH/DIAG IV INF INIT: CPT

## 2020-01-06 PROCEDURE — 81003 URINALYSIS AUTO W/O SCOPE: CPT

## 2020-01-06 PROCEDURE — 74176 CT ABD & PELVIS W/O CONTRAST: CPT

## 2020-01-06 PROCEDURE — 80048 BASIC METABOLIC PNL TOTAL CA: CPT

## 2020-01-06 PROCEDURE — 2500000003 HC RX 250 WO HCPCS: Performed by: EMERGENCY MEDICINE

## 2020-01-06 PROCEDURE — 85025 COMPLETE CBC W/AUTO DIFF WBC: CPT

## 2020-01-06 PROCEDURE — 96368 THER/DIAG CONCURRENT INF: CPT

## 2020-01-06 PROCEDURE — 6360000002 HC RX W HCPCS: Performed by: EMERGENCY MEDICINE

## 2020-01-06 RX ORDER — ONDANSETRON 4 MG/1
4 TABLET, ORALLY DISINTEGRATING ORAL EVERY 6 HOURS PRN
Qty: 12 TABLET | Refills: 0 | Status: ON HOLD | OUTPATIENT
Start: 2020-01-06 | End: 2021-03-04 | Stop reason: HOSPADM

## 2020-01-06 RX ORDER — ACETAMINOPHEN AND CODEINE PHOSPHATE 300; 30 MG/1; MG/1
1 TABLET ORAL EVERY 6 HOURS PRN
Qty: 20 TABLET | Refills: 0 | Status: SHIPPED | OUTPATIENT
Start: 2020-01-06 | End: 2020-01-11

## 2020-01-06 RX ORDER — METRONIDAZOLE 500 MG/1
500 TABLET ORAL 3 TIMES DAILY
Qty: 30 TABLET | Refills: 0 | Status: SHIPPED | OUTPATIENT
Start: 2020-01-06 | End: 2020-02-26 | Stop reason: ALTCHOICE

## 2020-01-06 RX ORDER — CIPROFLOXACIN 500 MG/1
500 TABLET, FILM COATED ORAL 2 TIMES DAILY
Qty: 20 TABLET | Refills: 0 | Status: SHIPPED | OUTPATIENT
Start: 2020-01-06 | End: 2020-01-16

## 2020-01-06 RX ORDER — CIPROFLOXACIN 2 MG/ML
400 INJECTION, SOLUTION INTRAVENOUS ONCE
Status: COMPLETED | OUTPATIENT
Start: 2020-01-06 | End: 2020-01-06

## 2020-01-06 RX ADMIN — METRONIDAZOLE 500 MG: 500 INJECTION, SOLUTION INTRAVENOUS at 01:31

## 2020-01-06 RX ADMIN — CIPROFLOXACIN 400 MG: 2 INJECTION, SOLUTION INTRAVENOUS at 01:17

## 2020-01-06 ASSESSMENT — ENCOUNTER SYMPTOMS
COLOR CHANGE: 0
CONSTIPATION: 0
EYE REDNESS: 0
FACIAL SWELLING: 0
EYE DISCHARGE: 0
DIARRHEA: 1
COUGH: 0
SHORTNESS OF BREATH: 0
ABDOMINAL PAIN: 1
VOMITING: 0

## 2020-01-06 NOTE — ED NOTES
Patient presents to ED via private auto for c/o LLQ abdominal pain. Pain onset 2 days ago. Patient with hx of diverticulitis. Patient reports some loose stools at home but denies bloody bowel movements. Patient is AOx3 and appears to be in no acute distress. Respirations even and non-labored. Abdomen soft with tenderness in the LLQ. Will continue to monitor.       Adriane Herrera RN  01/06/20 [id number]

## 2020-01-06 NOTE — ED PROVIDER NOTES
[address] [place] [address] ED  EMERGENCY DEPARTMENT ENCOUNTER      Pt Name: Kahlil Wheat  MRN: [id number]  [place] 1960  Date of evaluation: 1/5/2020  Provider: Vicky Aldana MD    CHIEF COMPLAINT       Chief Complaint   Patient presents with    Abdominal Pain         HISTORY OF PRESENT ILLNESS  (Location/Symptom, Timing/Onset, Context/Setting, Quality, Duration, Modifying Factors, Severity.)   Kahlil Wheat is a 63 y.o. female who presents to the emergency department for abdominal pain. He is complaining of left lower quadrant pain in the abdomen that started about 2:30 in the afternoon on January 4. No injury or fever. She thinks it might be diverticulitis which she has had previously. She has had some loose stools but no noted blood. She rated the pain as a 7 and its continuous. Nursing Notes were reviewed. ALLERGIES     Shellfish allergy; Shrimp (diagnostic);  Famotidine; and Gabapentin    CURRENT MEDICATIONS       Previous Medications    ALBUTEROL SULFATE  (90 BASE) MCG/ACT INHALER    INHALE 2 PUFFS BY MOUTH INTO THE LUNGS ONCE EVERY 6 HOURS AS NEEDED FOR WHEEZING    BUSPIRONE (BUSPAR) 15 MG TABLET    TAKE 1 TABLET BY MOUTH TWICE DAILY    CHLORZOXAZONE (PARAFON FORTE) 500 MG TABLET    TAKE 1/2 TABLET BY MOUTH THREE TIMES DAILY AS NEEDED FOR MUSCLE SPASMS    CRANBERRY CONCENTRATE 500 MG CAPS    TAKE 1 CAPSULE BY MOUTH TWICE DAILY    DICLOFENAC-MISOPROSTOL (ARTHROTEC 50) 50-0.2 MG PER TABLET    TAKE 1 TABLET BY MOUTH TWICE DAILY    FLUTICASONE (FLONASE) 50 MCG/ACT NASAL SPRAY    INSTILL 2 SPRASYS INTO EACH NOSTRIL ONCE DAILY    KETOTIFEN (ZADITOR) 0.025 % OPHTHALMIC SOLUTION    Place 1 drop into both eyes 2 times daily    METOPROLOL SUCCINATE (TOPROL XL) 25 MG EXTENDED RELEASE TABLET    Take 25 mg by mouth daily     MI-ACID GAS RELIEF 80 MG CHEWABLE TABLET    CHEW 1 TABLET BY MOUTH FOUR TIMES DAILY AS NEEDED FOR FLATULENCE    OMEPRAZOLE (PRILOSEC) 40 MG DELAYED RELEASE CAPSULE    TAKE 1 CAPSULE BY MOUTH DAILY EVERY MORNING FOR BREAKFAST    PRAVASTATIN (PRAVACHOL) 40 MG TABLET    TAKE 1 TABLET BY MOUTH DAILY    PREMARIN 0.625 MG/GM VAGINAL CREAM    PLACE 2 GRAMS VAGINALLY TWICE A WEEK FOR 12 DOSES    VENLAFAXINE (EFFEXOR XR) 75 MG EXTENDED RELEASE CAPSULE    TAKE 1 CAPSULE BY MOUTH DAILY    VITAMIN D (ERGOCALCIFEROL) 92676 UNITS CAPS CAPSULE    TAKE 1 CAPSULE BY MOUTH EVERY WEEK ON THURSDAYS       PAST MEDICAL HISTORY         Diagnosis Date    ASCUS with positive high risk HPV cervical 3/22/16    Asthma     Depression     Diverticulitis     ESBL (extended spectrum beta-lactamase) producing bacteria infection 02/03/2019    E.  Coli urine    GERD (gastroesophageal reflux disease)     Hyperlipidemia     MRSA (methicillin resistant staph aureus) culture positive 4/18/2016    lip    Rectocele     Tachycardia     takes Metoprolol       SURGICAL HISTORY           Procedure Laterality Date    COLONOSCOPY N/A 7/19/2018    COLONOSCOPY performed by Dari Ashton DO at [facility]  2/25/2015    uro   [address] [place], BSO performed at [facility] by Dr. Vaibhav Kincaid, Also had some type of bladder lift at this time    KNEE ARTHROSCOPY Left 5/13/2019    KNEE ARTHROSCOPY performed by Ayaka Qureshi MD at [place] [address] Left     #1 - lateral release, #2 - arthroscopy with muscle alignment    NERVE BLOCK  07/28/2017    caudal #1  decadron 10mg    NERVE BLOCK  09/22/2017    cadal # 2, decadron 10 mg    NERVE BLOCK  09/22/2017    caudal epidural steroid block #2 decadron 10 mg    NERVE BLOCK  11/10/2017    lumbar L4-5 epidural; depomedrol 80mg    UPPER GASTROINTESTINAL ENDOSCOPY  1/30/2019    EGD BIOPSY performed by Herminio Dorsey MD at [place] [address]           Problem Relation Age of Onset    Colon Cancer Mother     High Blood Pressure Father     Heart Attack Brother     Heart Disease Brother     Crohn's Disease Niece      Family Status Relation Name Status    Mother      Father  Alive    MGM      MGF      PGM      PGF      Brother  Alive    Niece  Alive        crohns        SOCIAL HISTORY      reports that she quit smoking about 25 years ago. Her smoking use included cigarettes. She has a 1.00 pack-year smoking history. She has never used smokeless tobacco. She reports current alcohol use. She reports that she does not use drugs. REVIEW OF SYSTEMS    (2-9 systems for level 4, 10 or more for level 5)     Review of Systems   Constitutional: Negative for chills, fatigue and fever. HENT: Negative for congestion, ear discharge and facial swelling. Eyes: Negative for discharge and redness. Respiratory: Negative for cough and shortness of breath. Cardiovascular: Negative for chest pain. Gastrointestinal: Positive for abdominal pain and diarrhea. Negative for constipation and vomiting. Genitourinary: Negative for dysuria and hematuria. Musculoskeletal: Negative for arthralgias. Skin: Negative for color change and rash. Neurological: Negative for syncope, numbness and headaches. Hematological: Negative for adenopathy. Psychiatric/Behavioral: Negative for confusion. The patient is not nervous/anxious. Except as noted above the remainder of the review of systems was reviewed and negative. PHYSICAL EXAM    (up to 7 for level 4, 8 or more for level 5)     Vitals:    20 2344   BP: (!) 128/56   Pulse: 76   Resp: 16   Temp: 97.6 °F (36.4 °C)   SpO2: 98%   Weight: 169 lb 4 oz (76.8 kg)   Height: 5' 1\" (1.549 m)       Physical Exam  Vitals signs reviewed. Constitutional:       General: She is not in acute distress. Appearance: She is well-developed. She is not diaphoretic. HENT:      Head: Normocephalic and atraumatic. Eyes:      General: No scleral icterus. Right eye: No discharge. Left eye: No discharge.    Neck:      Musculoskeletal: Neck supple. Cardiovascular:      Rate and Rhythm: Normal rate and regular rhythm. Pulmonary:      Effort: Pulmonary effort is normal. No respiratory distress. Breath sounds: Normal breath sounds. No stridor. No wheezing or rales. Abdominal:      General: There is no distension. Palpations: Abdomen is soft. Tenderness: There is tenderness. Comments: Mild tenderness in the left lower quadrant without mass or distention. Musculoskeletal: Normal range of motion. Lymphadenopathy:      Cervical: No cervical adenopathy. Skin:     General: Skin is warm and dry. Findings: No erythema or rash. Neurological:      Mental Status: She is alert and oriented to person, place, and time. Psychiatric:         Behavior: Behavior normal.             DIAGNOSTIC RESULTS     EKG: All EKG's are interpreted by the Emergency Department Physician who either signs or Co-signs this chart in the absence of a cardiologist.    RADIOLOGY:   Non-plain film images such as CT, Ultrasound and MRI are read by the radiologist. Alecia Cane radiographic images are visualized and preliminarily interpreted by the emergency physician with the below findings:    Interpretation per the Radiologist below, if available at the time of this note:    Ct Abdomen Pelvis Wo Contrast    Result Date: 1/6/2020  EXAMINATION: CT OF THE ABDOMEN AND PELVIS WITHOUT CONTRAST 1/6/2020 12:32 am TECHNIQUE: CT of the abdomen and pelvis was performed without the administration of intravenous contrast. Multiplanar reformatted images are provided for review. Dose modulation, iterative reconstruction, and/or weight based adjustment of the mA/kV was utilized to reduce the radiation dose to as low as reasonably achievable. COMPARISON: CT abdomen and pelvis with contrast February 3, 2019.  HISTORY: ORDERING SYSTEM PROVIDED HISTORY: Abdominal Pain left lower quadrant TECHNOLOGIST PROVIDED HISTORY: Abdominal Pain left lower quadrant FINDINGS: Lower Chest: The URINALYSIS DIPSTICK - Normal   CBC WITH AUTO DIFFERENTIAL       All other labs were within normal range or not returned as of this dictation. EMERGENCY DEPARTMENT COURSE and DIFFERENTIAL DIAGNOSIS/MDM:   Vitals:    Vitals:    01/05/20 2344   BP: (!) 128/56   Pulse: 76   Resp: 16   Temp: 97.6 °F (36.4 °C)   SpO2: 98%   Weight: 169 lb 4 oz (76.8 kg)   Height: 5' 1\" (1.549 m)       Orders Placed This Encounter   Medications    metronidazole (FLAGYL) 500 mg in NaCl 100 mL IVPB premix    ciprofloxacin (CIPRO) IVPB 400 mg    metroNIDAZOLE (FLAGYL) 500 MG tablet     Sig: Take 1 tablet by mouth 3 times daily     Dispense:  30 tablet     Refill:  0    ciprofloxacin (CIPRO) 500 MG tablet     Sig: Take 1 tablet by mouth 2 times daily for 10 days     Dispense:  20 tablet     Refill:  0    ondansetron (ZOFRAN ODT) 4 MG disintegrating tablet     Sig: Take 1 tablet by mouth every 6 hours as needed for Nausea or Vomiting     Dispense:  12 tablet     Refill:  0    acetaminophen-codeine (TYLENOL/CODEINE #3) 300-30 MG per tablet     Sig: Take 1 tablet by mouth every 6 hours as needed for Pain for up to 5 days. Dispense:  20 tablet     Refill:  0       Medical Decision Making: The patient is found to have diverticulitis. She was given IV Cipro and Flagyl here. She will not require admission the hospital.  There is no evidence of perforation or abscess and her pain is well controlled. She did not require any pain medication here. She has a follow-up appointment with her gastroenterologist in 2 days. She will keep that appointment. Treatment diagnosis and follow-up were discussed with the patient. CONSULTS:  None    PROCEDURES:  None    FINAL IMPRESSION      1.  Diverticulitis of colon          DISPOSITION/PLAN   DISPOSITION Decision To Discharge 01/06/2020 01:12:58 AM      PATIENT REFERRED TO:   Nik Jama MD  [address] [place] [address],  [place] [address] [place] [address]      As needed    [facility]  [address] [facility]  [phone]    If symptoms worsen    Roderick Siu MD  [address]  [address] [place] [address]  [phone]      As scheduled      DISCHARGE MEDICATIONS:     New Prescriptions    ACETAMINOPHEN-CODEINE (TYLENOL/CODEINE #3) 300-30 MG PER TABLET    Take 1 tablet by mouth every 6 hours as needed for Pain for up to 5 days.     CIPROFLOXACIN (CIPRO) 500 MG TABLET    Take 1 tablet by mouth 2 times daily for 10 days    METRONIDAZOLE (FLAGYL) 500 MG TABLET    Take 1 tablet by mouth 3 times daily    ONDANSETRON (ZOFRAN ODT) 4 MG DISINTEGRATING TABLET    Take 1 tablet by mouth every 6 hours as needed for Nausea or Vomiting         (Please note that portions of this note were completed with a voice recognition program.  Efforts were made to edit the dictations but occasionally words are mis-transcribed.)    Antonio Valdez MD  Attending Emergency Physician           Antonio Valdez MD  01/06/20 [id number]

## 2020-01-08 ENCOUNTER — OFFICE VISIT (OUTPATIENT)
Dept: GASTROENTEROLOGY | Age: 60
End: 2020-01-08
Payer: COMMERCIAL

## 2020-01-08 VITALS
HEART RATE: 85 BPM | SYSTOLIC BLOOD PRESSURE: 117 MMHG | WEIGHT: 166.8 LBS | BODY MASS INDEX: 31.52 KG/M2 | DIASTOLIC BLOOD PRESSURE: 79 MMHG

## 2020-01-08 PROCEDURE — G8427 DOCREV CUR MEDS BY ELIG CLIN: HCPCS | Performed by: INTERNAL MEDICINE

## 2020-01-08 PROCEDURE — 99214 OFFICE O/P EST MOD 30 MIN: CPT | Performed by: INTERNAL MEDICINE

## 2020-01-08 PROCEDURE — 3017F COLORECTAL CA SCREEN DOC REV: CPT | Performed by: INTERNAL MEDICINE

## 2020-01-08 PROCEDURE — 1036F TOBACCO NON-USER: CPT | Performed by: INTERNAL MEDICINE

## 2020-01-08 PROCEDURE — G8417 CALC BMI ABV UP PARAM F/U: HCPCS | Performed by: INTERNAL MEDICINE

## 2020-01-08 PROCEDURE — G8484 FLU IMMUNIZE NO ADMIN: HCPCS | Performed by: INTERNAL MEDICINE

## 2020-01-08 ASSESSMENT — ENCOUNTER SYMPTOMS
VOICE CHANGE: 0
SHORTNESS OF BREATH: 1
ANAL BLEEDING: 0
CHOKING: 0
WHEEZING: 0
TROUBLE SWALLOWING: 0
DIARRHEA: 1
NAUSEA: 1
CONSTIPATION: 0
RECTAL PAIN: 0
SORE THROAT: 1
SINUS PRESSURE: 0
BLOOD IN STOOL: 0
COUGH: 0
VOMITING: 0
BACK PAIN: 1
ABDOMINAL DISTENTION: 1
ABDOMINAL PAIN: 1

## 2020-01-08 NOTE — PROGRESS NOTES
(PRAVACHOL) 40 MG tablet, TAKE 1 TABLET BY MOUTH DAILY, Disp: 28 tablet, Rfl: 3    vitamin D (ERGOCALCIFEROL) 21700 units CAPS capsule, TAKE 1 CAPSULE BY MOUTH EVERY WEEK ON THURSDAYS, Disp: 4 capsule, Rfl: 3    chlorzoxazone (PARAFON FORTE) 500 MG tablet, TAKE 1/2 TABLET BY MOUTH THREE TIMES DAILY AS NEEDED FOR MUSCLE SPASMS (Patient taking differently: Take 250 mg by mouth 2 times daily ), Disp: 42 tablet, Rfl: 0    CRANBERRY CONCENTRATE 500 MG CAPS, TAKE 1 CAPSULE BY MOUTH TWICE DAILY, Disp: 56 capsule, Rfl: 3    ketotifen (ZADITOR) 0.025 % ophthalmic solution, Place 1 drop into both eyes 2 times daily, Disp: 10 mL, Rfl: 1    metoprolol succinate (TOPROL XL) 25 MG extended release tablet, Take 25 mg by mouth daily , Disp: , Rfl:     PREMARIN 0.625 MG/GM vaginal cream, PLACE 2 GRAMS VAGINALLY TWICE A WEEK FOR 12 DOSES, Disp: 30 g, Rfl: 1    venlafaxine (EFFEXOR XR) 75 MG extended release capsule, TAKE 1 CAPSULE BY MOUTH DAILY, Disp: 28 capsule, Rfl: 3    ALLERGIES:   Allergies   Allergen Reactions    Shellfish Allergy     Shrimp (Diagnostic) Shortness Of Breath    Famotidine Other (See Comments)     Headaches  Headaches  Headaches  Headaches  Headaches    Gabapentin Nausea Only     Mood swings, nausea. Mood swings, nausea. Mood swings, nausea. Mood swings, nausea. Mood swings, nausea.         FAMILY HISTORY:       Problem Relation Age of Onset    Colon Cancer Mother     High Blood Pressure Father     Heart Attack Brother     Heart Disease Brother     Crohn's Disease Niece          SOCIAL HISTORY:   Social History     Socioeconomic History    Marital status:      Spouse name: Not on file    Number of children: Not on file    Years of education: Not on file    Highest education level: Not on file   Occupational History    Not on file   Social Needs    Financial resource strain: Not on file    Food insecurity:     Worry: Not on file     Inability: Not on file   [employer] needs: Medical: Not on file     Non-medical: Not on file   Tobacco Use    Smoking status: Former Smoker     Packs/day: 2.00     Years: 0.50     Pack years: 1.00     Types: Cigarettes     Last attempt to quit:      Years since quittin.0    Smokeless tobacco: Never Used   Substance and Sexual Activity    Alcohol use: Yes     Alcohol/week: 0.0 standard drinks     Comment: social    Drug use: No    Sexual activity: Not on file   Lifestyle    Physical activity:     Days per week: Not on file     Minutes per session: Not on file    Stress: Not on file   Relationships    Social connections:     Talks on phone: Not on file     Gets together: Not on file     Attends Jainism service: Not on file     Active member of club or organization: Not on file     Attends meetings of clubs or organizations: Not on file     Relationship status: Not on file    Intimate partner violence:     Fear of current or ex partner: Not on file     Emotionally abused: Not on file     Physically abused: Not on file     Forced sexual activity: Not on file   Other Topics Concern    Not on file   Social History Narrative    Not on file       REVIEW OF SYSTEMS: A 12-point review of systemswas obtained and pertinent positives and negatives were enumerated above in the history of present illness. All other reviewed systems / symptoms were negative. Review of Systems    PHYSICAL EXAMINATION: Vital signs reviewed per the nursing documentation. /79   Pulse 85   Wt 166 lb 12.8 oz (75.7 kg)   BMI 31.52 kg/m²   Body mass index is 31.52 kg/m². Physical Exam    Physical Exam   Constitutional: Patient is oriented to person, place, and time. Patient appears well-developed and well-nourished. HENT:   Head: Normocephalic and atraumatic. Eyes: Pupils are equal, round, and reactive to light. EOM are normal.   Neck: Normal range of motion. Neck supple. No JVD present. No tracheal deviation present. No thyromegaly present. Anterior right hepatic lobe small simple cyst. 4. Hysterectomy. IMPRESSION: Ms. Stubbs is a 63 y. o. female with GERD and dyspepsia, long standing, unresponsive to to high dose PPI therapy. Patient was educated on dietary modifications. EGD negative which suggest non-ulcer dyspepsia (NUD). She was negative for celiac disease. There may be functional component to her chronic symptoms, predominantly bloating currently. Some response observed with simethicone, will continue with this. Recent admission for diverticulitis, placed on antibiotics. Advised to take probiotics during this time.      Plan:  Diverticulitis, on antibx for 10 days. RTC in 3 weeks, will plan on dx colonoscopy in 8 weeks. Amylase was high in Oct, will repeat pancreatic enzymes. Continue with simethicone, re-evaluate clinically in 3 weeks. Continue with PPI as directed. Thank you for allowing me to participate in the care of Ms. Ashley Alas. For any further questions please do not hesitate to contact me. I have reviewed and agree with the ROS entered by the MA/LPN.          Abiel Tolentino MD, MPH   [facility]  Office #: [phone]

## 2020-01-08 NOTE — PROGRESS NOTES
Subjective:      Patient ID: Collette Haddad is a 63 y.o. female. HPI    Chief Complaint   Patient presents with    Gastroesophageal Reflux     still having GERD symptoms she says    Diarrhea     is complaining of loose stools along wtih abd pain , bloating and nausea         Review of Systems   Constitutional: Positive for appetite change and fatigue. HENT: Positive for sore throat (\"scratchy\"). Negative for dental problem, postnasal drip, sinus pressure, trouble swallowing and voice change. Eyes: Positive for visual disturbance (glasses). Respiratory: Positive for shortness of breath. Negative for cough, choking and wheezing. Cardiovascular: Negative. Negative for chest pain, palpitations and leg swelling. Gastrointestinal: Positive for abdominal distention, abdominal pain, diarrhea and nausea. Negative for anal bleeding, blood in stool, constipation, rectal pain and vomiting. GERD   Endocrine: Negative. Genitourinary: Negative. Negative for difficulty urinating. Musculoskeletal: Positive for back pain and neck pain. Negative for arthralgias, gait problem and myalgias. Skin: Negative. Allergic/Immunologic: Positive for food allergies (shrimp). Negative for environmental allergies. Neurological: Negative for dizziness, weakness and light-headedness. Hematological: Bruises/bleeds easily (bruises easily). Psychiatric/Behavioral: Positive for sleep disturbance. The patient is nervous/anxious.         Objective:   Physical Exam    Assessment:            Plan:

## 2020-01-09 ENCOUNTER — HOSPITAL ENCOUNTER (OUTPATIENT)
Age: 60
Discharge: HOME | End: 2020-01-09
Payer: COMMERCIAL

## 2020-01-09 LAB — LIPASE: 85 U/L (ref 13–60)

## 2020-01-09 PROCEDURE — 83690 ASSAY OF LIPASE: CPT

## 2020-01-09 PROCEDURE — 86003 ALLG SPEC IGE CRUDE XTRC EA: CPT

## 2020-01-09 PROCEDURE — 82785 ASSAY OF IGE: CPT

## 2020-01-09 PROCEDURE — 36415 COLL VENOUS BLD VENIPUNCTURE: CPT

## 2020-01-10 LAB
ALLERGEN BARLEY IGE: <0.34 KU/L (ref 0–0.34)
ALLERGEN BEEF: <0.34 KU/L (ref 0–0.34)
ALLERGEN CABBAGE IGE: <0.34 KU/L (ref 0–0.34)
ALLERGEN CARROT IGE: <0.34 KU/L (ref 0–0.34)
ALLERGEN CHICKEN IGE: <0.34 KU/L (ref 0–0.34)
ALLERGEN CODFISH IGE: <0.34 KU/L (ref 0–0.34)
ALLERGEN CORN IGE: <0.34 KU/L (ref 0–0.34)
ALLERGEN COW MILK IGE: <0.34 KU/L (ref 0–0.34)
ALLERGEN CRAB IGE: <0.34 KU/L (ref 0–0.34)
ALLERGEN EGG WHITE IGE: <0.34 KU/L (ref 0–0.34)
ALLERGEN GRAPE IGE: <0.34 KU/L (ref 0–0.34)
ALLERGEN LETTUCE IGE: <0.34 KU/L (ref 0–0.34)
ALLERGEN NAVY BEAN: <0.34 KU/L (ref 0–0.34)
ALLERGEN OAT: <0.34 KU/L (ref 0–0.34)
ALLERGEN ORANGE IGE: <0.34 KU/L (ref 0–0.34)
ALLERGEN PEANUT (F13) IGE: <0.34 KU/L (ref 0–0.34)
ALLERGEN PEPPER C. ANNUUM IGE: <0.34 KU/L (ref 0–0.34)
ALLERGEN PORK: <0.34 KU/L (ref 0–0.34)
ALLERGEN RICE IGE: <0.34 KU/L (ref 0–0.34)
ALLERGEN RYE IGE: <0.34 KU/L (ref 0–0.34)
ALLERGEN SOYBEAN IGE: <0.34 KU/L (ref 0–0.34)
ALLERGEN TOMATO IGE: <0.34 KU/L (ref 0–0.34)
ALLERGEN TUNA IGE: <0.34 KU/L (ref 0–0.34)
ALLERGEN WHEAT IGE: <0.34 KU/L (ref 0–0.34)
IGE: <1 IU/ML
POTATO, IGE: <0.34 KU/L (ref 0–0.34)
SHRIMP: <0.34 KU/L (ref 0–0.34)

## 2020-01-13 ENCOUNTER — HOSPITAL ENCOUNTER (OUTPATIENT)
Age: 60
Setting detail: SPECIMEN
Discharge: HOME | End: 2020-01-13
Payer: COMMERCIAL

## 2020-01-13 PROCEDURE — 83520 IMMUNOASSAY QUANT NOS NONAB: CPT

## 2020-01-16 LAB — FECAL PANCREATIC ELASTASE-1: >500 UG/G

## 2020-01-29 ENCOUNTER — OFFICE VISIT (OUTPATIENT)
Dept: GASTROENTEROLOGY | Age: 60
End: 2020-01-29
Payer: COMMERCIAL

## 2020-01-29 VITALS
TEMPERATURE: 97.8 F | SYSTOLIC BLOOD PRESSURE: 129 MMHG | HEIGHT: 61 IN | BODY MASS INDEX: 32.25 KG/M2 | WEIGHT: 170.8 LBS | DIASTOLIC BLOOD PRESSURE: 78 MMHG | HEART RATE: 79 BPM

## 2020-01-29 PROCEDURE — G8417 CALC BMI ABV UP PARAM F/U: HCPCS | Performed by: INTERNAL MEDICINE

## 2020-01-29 PROCEDURE — 99214 OFFICE O/P EST MOD 30 MIN: CPT | Performed by: INTERNAL MEDICINE

## 2020-01-29 PROCEDURE — 3017F COLORECTAL CA SCREEN DOC REV: CPT | Performed by: INTERNAL MEDICINE

## 2020-01-29 PROCEDURE — 1036F TOBACCO NON-USER: CPT | Performed by: INTERNAL MEDICINE

## 2020-01-29 PROCEDURE — G8427 DOCREV CUR MEDS BY ELIG CLIN: HCPCS | Performed by: INTERNAL MEDICINE

## 2020-01-29 PROCEDURE — G8484 FLU IMMUNIZE NO ADMIN: HCPCS | Performed by: INTERNAL MEDICINE

## 2020-01-29 RX ORDER — LACTOBACILLUS RHAMNOSUS GG 10B CELL
1 CAPSULE ORAL DAILY
Qty: 30 TABLET | Refills: 2 | Status: SHIPPED | OUTPATIENT
Start: 2020-01-29

## 2020-01-29 ASSESSMENT — ENCOUNTER SYMPTOMS
DIARRHEA: 1
BLOOD IN STOOL: 0
CHEST TIGHTNESS: 0
NAUSEA: 0
SHORTNESS OF BREATH: 0
CONSTIPATION: 1
BACK PAIN: 1
VOMITING: 0
ABDOMINAL PAIN: 1
SINUS PRESSURE: 0

## 2020-01-29 NOTE — PROGRESS NOTES
GI CLINIC FOLLOW UP    INTERVAL HISTORY:   No referring provider defined for this encounter. Chief Complaint   Patient presents with    Follow-up     Following up from bout of diverticulitis and would like to schedule colonoscopy            HISTORY OF PRESENT ILLNESS: Jean Marie Stubbs is a 63 y. o. female with a pasthistory remarkable for asthma, Depression, Diverticulitis, HL, chronic GERD and dyspepsia, referred for evaluation of of GERD and dyspepsia. Underwent recent EGD with negative findings. Had post-procedure bloating that resolved, here for follow up     +Heart burn, unrelated to PO intake     No weight loss  No N/V/D, intermittent constipation  No GI bleeding  FH+ Colon CA, dx at 64  Failed colonoscopy in July 2018, sigmoid stricture from chronic diverticulitis. Pending CT colonoscopy. No smoking, social drinking. .    Past Medical,Family, and Social History reviewed and does contribute to the patient presenting condition. Patient's PMH/PSH,SH,PSYCH Hx, MEDs, ALLERGIES, and ROS were all reviewed and updated in the appropriate sections. PAST MEDICAL HISTORY:  Past Medical History:   Diagnosis Date    ASCUS with positive high risk HPV cervical 3/22/16    Asthma     Depression     Diverticulitis     ESBL (extended spectrum beta-lactamase) producing bacteria infection 02/03/2019    E.  Coli urine    GERD (gastroesophageal reflux disease)     Hyperlipidemia     MRSA (methicillin resistant staph aureus) culture positive 4/18/2016    lip    Rectocele     Tachycardia     takes Metoprolol       Past Surgical History:   Procedure Laterality Date    COLONOSCOPY N/A 7/19/2018    COLONOSCOPY performed by Jenaro Fowler DO at [address]  2/25/2015    uro   [address] DAVIS, BSO performed at [facility] by Dr. Jonn Soares, Also had some type of bladder lift at this time    KNEE ARTHROSCOPY Left 5/13/2019    KNEE ARTHROSCOPY performed by Misael Dickens MD at [facility] Left     #1 - lateral release, #2 - arthroscopy with muscle alignment    NERVE BLOCK  07/28/2017    caudal #1  decadron 10mg    NERVE BLOCK  09/22/2017    cadal # 2, decadron 10 mg    NERVE BLOCK  09/22/2017    caudal epidural steroid block #2 decadron 10 mg    NERVE BLOCK  11/10/2017    lumbar L4-5 epidural; depomedrol 80mg    UPPER GASTROINTESTINAL ENDOSCOPY  1/30/2019    EGD BIOPSY performed by Vale León MD at [facility]:    Current Outpatient Medications:     metroNIDAZOLE (FLAGYL) 500 MG tablet, Take 1 tablet by mouth 3 times daily, Disp: 30 tablet, Rfl: 0    ondansetron (ZOFRAN ODT) 4 MG disintegrating tablet, Take 1 tablet by mouth every 6 hours as needed for Nausea or Vomiting, Disp: 12 tablet, Rfl: 0    diclofenac-Misoprostol (ARTHROTEC 50) 50-0.2 MG per tablet, TAKE 1 TABLET BY MOUTH TWICE DAILY, Disp: 56 tablet, Rfl: 1    fluticasone (FLONASE) 50 MCG/ACT nasal spray, INSTILL 2 SPRASYS INTO EACH NOSTRIL ONCE DAILY, Disp: 16 g, Rfl: 3    albuterol sulfate  (90 Base) MCG/ACT inhaler, INHALE 2 PUFFS BY MOUTH INTO THE LUNGS ONCE EVERY 6 HOURS AS NEEDED FOR WHEEZING, Disp: 18 g, Rfl: 3    MI-ACID GAS RELIEF 80 MG chewable tablet, CHEW 1 TABLET BY MOUTH FOUR TIMES DAILY AS NEEDED FOR FLATULENCE, Disp: 120 tablet, Rfl: 5    busPIRone (BUSPAR) 15 MG tablet, TAKE 1 TABLET BY MOUTH TWICE DAILY, Disp: 56 tablet, Rfl: 3    omeprazole (PRILOSEC) 40 MG delayed release capsule, TAKE 1 CAPSULE BY MOUTH DAILY EVERY MORNING FOR BREAKFAST, Disp: 30 capsule, Rfl: 3    pravastatin (PRAVACHOL) 40 MG tablet, TAKE 1 TABLET BY MOUTH DAILY, Disp: 28 tablet, Rfl: 3    venlafaxine (EFFEXOR XR) 75 MG extended release capsule, TAKE 1 CAPSULE BY MOUTH DAILY, Disp: 28 capsule, Rfl: 3    vitamin D (ERGOCALCIFEROL) 89141 units CAPS capsule, TAKE 1 CAPSULE BY MOUTH EVERY WEEK ON , Disp: 4 capsule, Rfl: 3    chlorzoxazone (PARAFON FORTE) 500 MG tablet, TAKE 1/2 TABLET BY MOUTH THREE TIMES DAILY AS NEEDED FOR MUSCLE SPASMS (Patient taking differently: Take 250 mg by mouth 2 times daily ), Disp: 42 tablet, Rfl: 0    CRANBERRY CONCENTRATE 500 MG CAPS, TAKE 1 CAPSULE BY MOUTH TWICE DAILY, Disp: 56 capsule, Rfl: 3    ketotifen (ZADITOR) 0.025 % ophthalmic solution, Place 1 drop into both eyes 2 times daily, Disp: 10 mL, Rfl: 1    metoprolol succinate (TOPROL XL) 25 MG extended release tablet, Take 25 mg by mouth daily , Disp: , Rfl:     PREMARIN 0.625 MG/GM vaginal cream, PLACE 2 GRAMS VAGINALLY TWICE A WEEK FOR 12 DOSES, Disp: 30 g, Rfl: 1    ALLERGIES:   Allergies   Allergen Reactions    Shellfish Allergy     Shrimp (Diagnostic) Shortness Of Breath    Famotidine Other (See Comments)     Headaches  Headaches  Headaches  Headaches  Headaches    Gabapentin Nausea Only     Mood swings, nausea. Mood swings, nausea. Mood swings, nausea. Mood swings, nausea. Mood swings, nausea.         FAMILY HISTORY:       Problem Relation Age of Onset    Colon Cancer Mother     High Blood Pressure Father     Heart Attack Brother     Heart Disease Brother     Crohn's Disease Niece          SOCIAL HISTORY:   Social History     Socioeconomic History    Marital status:      Spouse name: Not on file    Number of children: Not on file    Years of education: Not on file    Highest education level: Not on file   Occupational History    Not on file   Social Needs    Financial resource strain: Not on file    Food insecurity:     Worry: Not on file     Inability: Not on file    Transportation needs:     Medical: Not on file     Non-medical: Not on file   Tobacco Use    Smoking status: Former Smoker     Packs/day: 2.00     Years: 0.50     Pack years: 1.00     Types: Cigarettes     Last attempt to quit:      Years since quittin.0    Smokeless tobacco: Never ALKPHOS 104 08/21/2019    AST 18 08/21/2019    ALT 28 08/21/2019         Lab Results   Component Value Date    RBC 4.45 01/06/2020    HGB 12.8 01/06/2020    MCV 89.7 01/06/2020    MCH 28.8 01/06/2020    MCHC 32.1 01/06/2020    RDW 13.2 01/06/2020    MPV 11.4 01/06/2020    BASOPCT 1 01/06/2020    LYMPHSABS 2.17 01/06/2020    MONOSABS 0.48 01/06/2020    NEUTROABS 3.61 01/06/2020    EOSABS 0.23 01/06/2020    BASOSABS 0.08 01/06/2020         DIAGNOSTIC TESTING:     Ct Abdomen Pelvis Wo Contrast    Result Date: 1/6/2020  EXAMINATION: CT OF THE ABDOMEN AND PELVIS WITHOUT CONTRAST 1/6/2020 12:32 am TECHNIQUE: CT of the abdomen and pelvis was performed without the administration of intravenous contrast. Multiplanar reformatted images are provided for review. Dose modulation, iterative reconstruction, and/or weight based adjustment of the mA/kV was utilized to reduce the radiation dose to as low as reasonably achievable. COMPARISON: CT abdomen and pelvis with contrast February 3, 2019. HISTORY: ORDERING SYSTEM PROVIDED HISTORY: Abdominal Pain left lower quadrant TECHNOLOGIST PROVIDED HISTORY: Abdominal Pain left lower quadrant FINDINGS: Lower Chest: The lung bases are well aerated. Pleural surfaces are unremarkable and no evidence of pleural effusion is identified. Organs: Chest tiny punctate sub mm calculus is noted within the inferior left renal collecting system without associated urinary obstruction. Anterior right hepatic lobe small simple cyst is visualized which measures up to 8 mm in diameter. The liver, gallbladder, spleen, pancreas, adrenal glands, kidneys, are otherwise unremarkable in appearance. GI/Bowel: The appendix is visualized and is normal.  Multifocal numerous diverticular seen involving the descending and sigmoid colon without evidence of inflammatory change adjacent to the mid sigmoid colon consistent with presence of diverticulitis.   No evidence of diverticular perforation or complication of mural

## 2020-01-30 ENCOUNTER — TELEPHONE (OUTPATIENT)
Dept: FAMILY MEDICINE CLINIC | Age: 60
End: 2020-01-30

## 2020-01-30 RX ORDER — MELOXICAM 7.5 MG/1
7.5 TABLET ORAL DAILY
Qty: 30 TABLET | Refills: 0 | Status: SHIPPED | OUTPATIENT
Start: 2020-01-30 | End: 2020-12-09 | Stop reason: ALTCHOICE

## 2020-01-30 NOTE — TELEPHONE ENCOUNTER
She informed us that the diclofenac will no longer be covered. She should not be using the Mobic with this, should start after the diclofenac runs out.

## 2020-01-30 NOTE — TELEPHONE ENCOUNTER
Pharmacist states mobic is the same drug class as diclofenac misoprostol which the pt is currently taking. Med will be put on hold until they here back from office.  Please advise

## 2020-02-05 RX ORDER — VENLAFAXINE HYDROCHLORIDE 75 MG/1
75 CAPSULE, EXTENDED RELEASE ORAL DAILY
Qty: 28 CAPSULE | Refills: 3 | Status: SHIPPED | OUTPATIENT
Start: 2020-02-05 | End: 2020-05-04

## 2020-02-05 RX ORDER — DICLOFENAC SODIUM AND MISOPROSTOL 50; 200 MG/1; UG/1
TABLET, DELAYED RELEASE ORAL
Qty: 56 TABLET | Refills: 1 | Status: SHIPPED | OUTPATIENT
Start: 2020-02-05 | End: 2020-05-04

## 2020-02-05 RX ORDER — ERGOCALCIFEROL 1.25 MG/1
CAPSULE ORAL
Qty: 4 CAPSULE | Refills: 3 | Status: SHIPPED | OUTPATIENT
Start: 2020-02-05 | End: 2020-05-04

## 2020-02-06 RX ORDER — OMEPRAZOLE 40 MG/1
CAPSULE, DELAYED RELEASE ORAL
Qty: 90 CAPSULE | Refills: 2 | Status: SHIPPED | OUTPATIENT
Start: 2020-02-06 | End: 2020-11-17 | Stop reason: SDUPTHER

## 2020-02-06 NOTE — TELEPHONE ENCOUNTER
[place] family states Diclofenac is covered with no copay and wants to know which med needs to filled. I spoke to pt who states she got a letter stating med will no longer be covered. Pt states she will take the diclofenac for this refill and will need the mobic refilled after this. Pharmacy informed.

## 2020-02-25 ENCOUNTER — TELEPHONE (OUTPATIENT)
Dept: GASTROENTEROLOGY | Age: 60
End: 2020-02-25

## 2020-02-26 ENCOUNTER — OFFICE VISIT (OUTPATIENT)
Dept: GASTROENTEROLOGY | Age: 60
End: 2020-02-26
Payer: COMMERCIAL

## 2020-02-26 VITALS — BODY MASS INDEX: 32.5 KG/M2 | WEIGHT: 172 LBS

## 2020-02-26 PROCEDURE — G8417 CALC BMI ABV UP PARAM F/U: HCPCS | Performed by: INTERNAL MEDICINE

## 2020-02-26 PROCEDURE — 99214 OFFICE O/P EST MOD 30 MIN: CPT | Performed by: INTERNAL MEDICINE

## 2020-02-26 PROCEDURE — 3017F COLORECTAL CA SCREEN DOC REV: CPT | Performed by: INTERNAL MEDICINE

## 2020-02-26 PROCEDURE — G8427 DOCREV CUR MEDS BY ELIG CLIN: HCPCS | Performed by: INTERNAL MEDICINE

## 2020-02-26 PROCEDURE — G8484 FLU IMMUNIZE NO ADMIN: HCPCS | Performed by: INTERNAL MEDICINE

## 2020-02-26 PROCEDURE — 1036F TOBACCO NON-USER: CPT | Performed by: INTERNAL MEDICINE

## 2020-02-26 RX ORDER — AMOXICILLIN 250 MG
1 CAPSULE ORAL DAILY PRN
Qty: 30 TABLET | Refills: 1 | Status: SHIPPED | OUTPATIENT
Start: 2020-02-26

## 2020-02-26 ASSESSMENT — ENCOUNTER SYMPTOMS
CONSTIPATION: 1
DIARRHEA: 0
COUGH: 0
ABDOMINAL DISTENTION: 1
NAUSEA: 1
ANAL BLEEDING: 0
VOMITING: 0
TROUBLE SWALLOWING: 0
ABDOMINAL PAIN: 1
CHOKING: 0
BLOOD IN STOOL: 0
WHEEZING: 0
RECTAL PAIN: 0

## 2020-02-26 NOTE — PROGRESS NOTES
busPIRone (BUSPAR) 15 MG tablet, TAKE 1 TABLET BY MOUTH TWICE DAILY, Disp: 56 tablet, Rfl: 3    pravastatin (PRAVACHOL) 40 MG tablet, TAKE 1 TABLET BY MOUTH DAILY, Disp: 28 tablet, Rfl: 3    chlorzoxazone (PARAFON FORTE) 500 MG tablet, TAKE 1/2 TABLET BY MOUTH THREE TIMES DAILY AS NEEDED FOR MUSCLE SPASMS (Patient taking differently: Take 250 mg by mouth 2 times daily ), Disp: 42 tablet, Rfl: 0    CRANBERRY CONCENTRATE 500 MG CAPS, TAKE 1 CAPSULE BY MOUTH TWICE DAILY, Disp: 56 capsule, Rfl: 3    ketotifen (ZADITOR) 0.025 % ophthalmic solution, Place 1 drop into both eyes 2 times daily, Disp: 10 mL, Rfl: 1    metoprolol succinate (TOPROL XL) 25 MG extended release tablet, Take 25 mg by mouth daily , Disp: , Rfl:     PREMARIN 0.625 MG/GM vaginal cream, PLACE 2 GRAMS VAGINALLY TWICE A WEEK FOR 12 DOSES, Disp: 30 g, Rfl: 1    meloxicam (MOBIC) 7.5 MG tablet, Take 1 tablet by mouth daily (Patient not taking: Reported on 2/26/2020), Disp: 30 tablet, Rfl: 0    ALLERGIES:   Allergies   Allergen Reactions    Shellfish Allergy     Shrimp (Diagnostic) Shortness Of Breath    Famotidine Other (See Comments)     Headaches  Headaches  Headaches  Headaches  Headaches    Gabapentin Nausea Only     Mood swings, nausea. Mood swings, nausea. Mood swings, nausea. Mood swings, nausea. Mood swings, nausea.         FAMILY HISTORY:       Problem Relation Age of Onset    Colon Cancer Mother     High Blood Pressure Father     Heart Attack Brother     Heart Disease Brother     Crohn's Disease Niece          SOCIAL HISTORY:   Social History     Socioeconomic History    Marital status:      Spouse name: Not on file    Number of children: Not on file    Years of education: Not on file    Highest education level: Not on file   Occupational History    Not on file   Social Needs    Financial resource strain: Not on file    Food insecurity:     Worry: Not on file     Inability: Not on file   [employer] participate in the care of Ms. Aston Pérez. For any further questions please do not hesitate to contact me. I have reviewed and agree with the ROS entered by the MA/DONOVANN.          Zain Jones MD, MPH   [facility]  Office #: [phone]

## 2020-02-27 NOTE — TELEPHONE ENCOUNTER
I agree with the [place] Spoke with patient  And she states that she is having some anxiety due to her upcoming surgery and would like to know if Dr. Maicol Resendiz prescribe her something to ease some of her anxiety.  Please call patient  She states that she is not sure if she will use it but would like to have it just in case

## 2020-03-20 ENCOUNTER — TELEPHONE (OUTPATIENT)
Dept: GASTROENTEROLOGY | Age: 60
End: 2020-03-20

## 2020-03-24 ENCOUNTER — VIRTUAL VISIT (OUTPATIENT)
Dept: GASTROENTEROLOGY | Age: 60
End: 2020-03-24
Payer: COMMERCIAL

## 2020-03-24 PROCEDURE — 99214 OFFICE O/P EST MOD 30 MIN: CPT | Performed by: INTERNAL MEDICINE

## 2020-03-24 NOTE — PROGRESS NOTES
VIRTUAL VISIT:  TELEPHONE    Xiomara Felder is a 62 y.o. female evaluated via telephone on 3/24/2020. Consent:  She and/or health care decision maker is aware that that she may receive a bill for this telephone service, depending on her insurance coverage, and has provided verbal consent to proceed: Yes      Documentation:  I communicated with the patient and/or health care decision maker about her abnormal bowel habits. Details of this discussion including any medical advice provided: Followed up on patients irregular bowel habits. Patient continues to have irregular bowel movements. Reports a pattern of constipation followed by soft stool then loose bowel movements. In the context of patients prior diverticulitis and aborted colonoscopy in 2018 due to a left sided stricture (presumably diverticular related), there is concern for a residual stricture. This is a potential favorable diagnosis given the patients recent episode of diverticulitis in Jan. 2020. We recommend at the very least a CT abdomen/pelvis with rectal contrast to evaluate the left side of the colon and residual stricture. Recommend the patient take her stool softeners (pericolace) daily to ensure no further episodes of constipation. Based on the CT findings, will consider either a full colonoscopy vs flexible sigmoidoscopy. Patient was in agreement of the plan and was appreciative of the follow up. I affirm this is a Patient Initiated Episode with an Established Patient who has not had a related appointment within my department in the past 7 days or scheduled within the next 24 hours.     Total Time: minutes: 21-30 minutes        [address] [facility]

## 2020-04-08 RX ORDER — FLUTICASONE PROPIONATE 50 MCG
SPRAY, SUSPENSION (ML) NASAL
Qty: 16 G | Refills: 3 | Status: SHIPPED | OUTPATIENT
Start: 2020-04-08 | End: 2020-06-26

## 2020-04-08 RX ORDER — PRAVASTATIN SODIUM 40 MG
TABLET ORAL
Qty: 28 TABLET | Refills: 3 | Status: SHIPPED | OUTPATIENT
Start: 2020-04-08 | End: 2020-06-26

## 2020-04-08 RX ORDER — BUSPIRONE HYDROCHLORIDE 15 MG/1
TABLET ORAL
Qty: 56 TABLET | Refills: 3 | Status: SHIPPED | OUTPATIENT
Start: 2020-04-08 | End: 2020-06-26

## 2020-05-01 RX ORDER — CHLORZOXAZONE 500 MG/1
250 TABLET ORAL 2 TIMES DAILY
Qty: 60 TABLET | Refills: 0 | Status: SHIPPED | OUTPATIENT
Start: 2020-05-01 | End: 2020-05-04 | Stop reason: SDUPTHER

## 2020-05-04 ENCOUNTER — TELEMEDICINE (OUTPATIENT)
Dept: FAMILY MEDICINE CLINIC | Age: 60
End: 2020-05-04
Payer: COMMERCIAL

## 2020-05-04 PROCEDURE — G8428 CUR MEDS NOT DOCUMENT: HCPCS | Performed by: FAMILY MEDICINE

## 2020-05-04 PROCEDURE — 3017F COLORECTAL CA SCREEN DOC REV: CPT | Performed by: FAMILY MEDICINE

## 2020-05-04 PROCEDURE — 99213 OFFICE O/P EST LOW 20 MIN: CPT | Performed by: FAMILY MEDICINE

## 2020-05-04 RX ORDER — DICLOFENAC SODIUM AND MISOPROSTOL 50; 200 MG/1; UG/1
TABLET, DELAYED RELEASE ORAL
Qty: 56 TABLET | Refills: 1 | Status: SHIPPED | OUTPATIENT
Start: 2020-05-04 | End: 2020-06-26

## 2020-05-04 RX ORDER — VENLAFAXINE HYDROCHLORIDE 75 MG/1
75 CAPSULE, EXTENDED RELEASE ORAL DAILY
Qty: 28 CAPSULE | Refills: 3 | Status: SHIPPED | OUTPATIENT
Start: 2020-05-04 | End: 2020-08-21

## 2020-05-04 RX ORDER — CHLORZOXAZONE 500 MG/1
250 TABLET ORAL 2 TIMES DAILY
Qty: 60 TABLET | Refills: 3 | Status: SHIPPED | OUTPATIENT
Start: 2020-05-04 | End: 2021-01-11

## 2020-05-04 RX ORDER — ERGOCALCIFEROL 1.25 MG/1
CAPSULE ORAL
Qty: 4 CAPSULE | Refills: 3 | Status: SHIPPED | OUTPATIENT
Start: 2020-05-04 | End: 2020-08-21

## 2020-05-04 NOTE — PROGRESS NOTES
General FM note    Renee Whitaker is a 62 y.o. female who presents today for follow up on her  medical conditions as noted below. Renee Whitaker is c/o of No chief complaint on file. Patient Active Problem List:     Major depression     Rotator cuff disorder     Hyperlipidemia     Incontinence in female     Rectocele     Diverticulitis     Asthma     Depression     Hypokalemia     Cellulitis, face - left side, failed outpatient therapy     Hyperglycemia     Mild intermittent asthma without complication     Gastroesophageal reflux disease without esophagitis     Displacement of lumbar intervertebral disc without myelopathy     Chest pain     Dyspepsia     Acute medial meniscus tear of left knee     Dry eyes, bilateral     Insufficiency of tear film of both eyes     Past Medical History:   Diagnosis Date    ASCUS with positive high risk HPV cervical 3/22/16    Asthma     Depression     Diverticulitis     ESBL (extended spectrum beta-lactamase) producing bacteria infection 02/03/2019    E.  Coli urine    GERD (gastroesophageal reflux disease)     Hyperlipidemia     MRSA (methicillin resistant staph aureus) culture positive 4/18/2016    lip    Rectocele     Tachycardia     takes Metoprolol      Past Surgical History:   Procedure Laterality Date    COLONOSCOPY N/A 7/19/2018    COLONOSCOPY performed by Christi Anne DO at [address] [place] [address]  2/25/2015    uro   [address] DAVIS, BSO performed at [facility] by Dr. Eugenio Armendariz, Also had some type of bladder lift at this time    KNEE ARTHROSCOPY Left 5/13/2019    KNEE ARTHROSCOPY performed by Fuentes Brothers MD at [address] [place] [address] Left     #1 - lateral release, #2 - arthroscopy with muscle alignment    NERVE BLOCK  07/28/2017    caudal #1  decadron 10mg    NERVE BLOCK  09/22/2017    cadal # 2, decadron 10 mg    NERVE BLOCK  09/22/2017    caudal epidural steroid block #2 decadron 10 mg    NERVE BLOCK  11/10/2017    lumbar

## 2020-06-24 ENCOUNTER — HOSPITAL ENCOUNTER (OUTPATIENT)
Age: 60
Setting detail: SPECIMEN
Discharge: HOME | End: 2020-06-24
Payer: COMMERCIAL

## 2020-06-24 ENCOUNTER — OFFICE VISIT (OUTPATIENT)
Dept: PRIMARY CARE CLINIC | Age: 60
End: 2020-06-24
Payer: COMMERCIAL

## 2020-06-24 VITALS
WEIGHT: 175 LBS | TEMPERATURE: 98.4 F | HEART RATE: 82 BPM | DIASTOLIC BLOOD PRESSURE: 68 MMHG | SYSTOLIC BLOOD PRESSURE: 125 MMHG | BODY MASS INDEX: 33.07 KG/M2 | OXYGEN SATURATION: 97 %

## 2020-06-24 LAB — S PYO AG THROAT QL: NORMAL

## 2020-06-24 PROCEDURE — 1036F TOBACCO NON-USER: CPT | Performed by: NURSE PRACTITIONER

## 2020-06-24 PROCEDURE — 99213 OFFICE O/P EST LOW 20 MIN: CPT | Performed by: NURSE PRACTITIONER

## 2020-06-24 PROCEDURE — 87880 STREP A ASSAY W/OPTIC: CPT | Performed by: NURSE PRACTITIONER

## 2020-06-24 PROCEDURE — G8427 DOCREV CUR MEDS BY ELIG CLIN: HCPCS | Performed by: NURSE PRACTITIONER

## 2020-06-24 PROCEDURE — 3017F COLORECTAL CA SCREEN DOC REV: CPT | Performed by: NURSE PRACTITIONER

## 2020-06-24 PROCEDURE — G8417 CALC BMI ABV UP PARAM F/U: HCPCS | Performed by: NURSE PRACTITIONER

## 2020-06-24 ASSESSMENT — ENCOUNTER SYMPTOMS
SORE THROAT: 1
VISUAL CHANGE: 0
COUGH: 1
NAUSEA: 0
VOMITING: 0
ABDOMINAL PAIN: 0
SWOLLEN GLANDS: 0
CHANGE IN BOWEL HABIT: 0

## 2020-06-24 NOTE — LETTER
[address] [address]  Phone: [phone]  Fax: SHAUN Kingsley - MALINDA        June 24, 2020     Patient: Bharath Dyson   YOB: 1960   Date of Visit: 6/24/2020       To Whom It May Concern: It is my medical opinion that Raven Rodriguez needs to stay home from work in quarantine until her COVID test result are back. It takes 3-5 days for test to result. If you have any questions or concerns, please don't hesitate to call.     Sincerely,        SHAUN Keane CNP

## 2020-06-24 NOTE — PROGRESS NOTES
[address] [place] [address] [place] [address]  Dept: [phone]  Dept Fax: [phone]    Jovan Lloyd is a 62 y.o. female who presents to the urgent care today for her medical conditions/complaints as notedbelow. Jovan Lloyd is c/o of Covid Testing (pt c/o sore throat, SOB, and headache)      HPI:     64-year-old female patient presents for COVID testing. She complains of sore throat shortness of breath and headache. She has not had any fevers. She has a history of asthma and has not tried use of her inhaler. She has been taking over-the-counter antihistamine. She does complain of postnasal drip and occasional dry nonproductive cough. No body aches no loss of taste or smell. She does have a coworker that is in quarantine now waiting for test results. She was around that coworker. No known strep exposure. Postnasal drip shortness of breath with activity started 2 days ago, sore throat started yesterday. No antipyretics tried no fever here. Denies wheezing    Pharyngitis   This is a new problem. The current episode started yesterday. The problem occurs constantly. The problem has been unchanged. Associated symptoms include coughing, headaches and a sore throat. Pertinent negatives include no abdominal pain, anorexia, arthralgias, change in bowel habit, chest pain, chills, congestion, diaphoresis, fatigue, fever, joint swelling, myalgias, nausea, neck pain, numbness, rash, swollen glands, urinary symptoms, vertigo, visual change, vomiting or weakness. Associated symptoms comments: sob. The symptoms are aggravated by swallowing. She has tried nothing for the symptoms. The treatment provided no relief. Past Medical History:   Diagnosis Date    ASCUS with positive high risk HPV cervical 3/22/16    Asthma     Depression     Diverticulitis     ESBL (extended spectrum beta-lactamase) producing bacteria infection 02/03/2019    E.  Coli urine   

## 2020-06-24 NOTE — PATIENT INSTRUCTIONS
your health, and be sure to contact your doctor if:  · You need to use quick-relief medicine on more than 2 days a week (unless it is just for exercise). · You cough more deeply or more often, especially if you notice more mucus or a change in the color of your mucus. · You are not getting better as expected. Where can you learn more? Go to https://[email or web address]. org and sign in to your [employer] account. Enter G988 in the [employer] box to learn more about \"Asthma Attack: Care Instructions. \"     If you do not have an account, please click on the \"Sign Up Now\" link. Current as of: February 24, 2020               Content Version: 12.5  © 2006-2020 Healthwise, Incorporated. Care instructions adapted under license by [facility] ([facility]). If you have questions about a medical condition or this instruction, always ask your healthcare professional. [address] any warranty or liability for your use of this information.

## 2020-06-26 RX ORDER — DICLOFENAC SODIUM AND MISOPROSTOL 50; 200 MG/1; UG/1
TABLET, DELAYED RELEASE ORAL
Qty: 56 TABLET | Refills: 1 | Status: SHIPPED | OUTPATIENT
Start: 2020-06-26 | End: 2020-12-09 | Stop reason: ALTCHOICE

## 2020-06-26 RX ORDER — BUSPIRONE HYDROCHLORIDE 15 MG/1
TABLET ORAL
Qty: 56 TABLET | Refills: 3 | Status: SHIPPED | OUTPATIENT
Start: 2020-06-26 | End: 2020-10-19

## 2020-06-26 RX ORDER — PRAVASTATIN SODIUM 40 MG
TABLET ORAL
Qty: 28 TABLET | Refills: 3 | Status: SHIPPED | OUTPATIENT
Start: 2020-06-26 | End: 2020-10-19

## 2020-06-26 RX ORDER — FLUTICASONE PROPIONATE 50 MCG
SPRAY, SUSPENSION (ML) NASAL
Qty: 16 G | Refills: 3 | Status: SHIPPED | OUTPATIENT
Start: 2020-06-26 | End: 2021-01-11

## 2020-06-27 ENCOUNTER — TELEPHONE (OUTPATIENT)
Dept: PRIMARY CARE CLINIC | Age: 60
End: 2020-06-27

## 2020-06-27 LAB — SARS-COV-2, NAA: NOT DETECTED

## 2020-06-27 NOTE — TELEPHONE ENCOUNTER
Pt informed of neg result. Your test for COVID-19, also known as novel coronavirus, came back negative. No virus was detected from your sample. Until your symptoms are fully resolved, you may still be contagious. We recommend that you remain isolated for 10 days minimum or 72 hours after your symptoms have completely resolved, whichever is longer. For example, if all symptoms improve after 2 days, you should still remain isolated for days. Continually monitor symptoms. Contact a medical provider if symptoms are worsening. If you came in to be tested with no symptoms, but were exposed to a person positive for coronavirus- even if your test is NEGATIVE- you still need to isolate for 14 days and observe your symptoms.

## 2020-07-14 ENCOUNTER — TELEPHONE (OUTPATIENT)
Dept: GASTROENTEROLOGY | Age: 60
End: 2020-07-14

## 2020-07-14 NOTE — TELEPHONE ENCOUNTER
Patient left a voicemail wanting to r/s. Tried calling patient back to do this and the phone does not even ring. Will await the patient's call.

## 2020-07-21 ENCOUNTER — OFFICE VISIT (OUTPATIENT)
Dept: PRIMARY CARE CLINIC | Age: 60
End: 2020-07-21
Payer: COMMERCIAL

## 2020-07-21 VITALS
BODY MASS INDEX: 33.04 KG/M2 | OXYGEN SATURATION: 98 % | WEIGHT: 175 LBS | HEART RATE: 96 BPM | SYSTOLIC BLOOD PRESSURE: 125 MMHG | HEIGHT: 61 IN | DIASTOLIC BLOOD PRESSURE: 66 MMHG | TEMPERATURE: 98.6 F

## 2020-07-21 LAB — S PYO AG THROAT QL: NORMAL

## 2020-07-21 PROCEDURE — 3017F COLORECTAL CA SCREEN DOC REV: CPT | Performed by: NURSE PRACTITIONER

## 2020-07-21 PROCEDURE — 87880 STREP A ASSAY W/OPTIC: CPT | Performed by: NURSE PRACTITIONER

## 2020-07-21 PROCEDURE — 99213 OFFICE O/P EST LOW 20 MIN: CPT | Performed by: NURSE PRACTITIONER

## 2020-07-21 PROCEDURE — G8417 CALC BMI ABV UP PARAM F/U: HCPCS | Performed by: NURSE PRACTITIONER

## 2020-07-21 PROCEDURE — G8427 DOCREV CUR MEDS BY ELIG CLIN: HCPCS | Performed by: NURSE PRACTITIONER

## 2020-07-21 PROCEDURE — 1036F TOBACCO NON-USER: CPT | Performed by: NURSE PRACTITIONER

## 2020-07-21 ASSESSMENT — ENCOUNTER SYMPTOMS
COUGH: 1
SORE THROAT: 1
RHINORRHEA: 1

## 2020-07-21 NOTE — PROGRESS NOTES
[address] [facility] [place] [id number]  Dept: [phone]  Dept Fax: [phone]    Marilee Laurent is a 62 y.o. female who presents to the urgent care today for her medical conditions/complaints as notedbelow. Marilee Laurent is c/o of Cough (Onset Sunday, non productive ); Fever (Onset yesterday ); Generalized Body Aches; Pharyngitis; and Headache      HPI:     62 yr old female presents for c/o np cough, subj fevers, body aches, st and HA for 2 days. Hx asthma. Seen for similar sx in June and tested neg 6/24/2020. She works at the [employer] and has to be tested to go back to work    Cough   This is a new problem. The current episode started in the past 7 days (x2d). The problem has been unchanged. The cough is non-productive. Associated symptoms include a fever ( started yesterday), headaches, myalgias, rhinorrhea (started today) and a sore throat. Nothing aggravates the symptoms. She has tried a beta-agonist inhaler for the symptoms. The treatment provided mild relief. Her past medical history is significant for asthma, bronchitis and environmental allergies. There is no history of bronchiectasis, COPD, emphysema or pneumonia. Past Medical History:   Diagnosis Date    ASCUS with positive high risk HPV cervical 3/22/16    Asthma     Depression     Diverticulitis     ESBL (extended spectrum beta-lactamase) producing bacteria infection 02/03/2019    E.  Coli urine    GERD (gastroesophageal reflux disease)     Hyperlipidemia     MRSA (methicillin resistant staph aureus) culture positive 4/18/2016    lip    Rectocele     Tachycardia     takes Metoprolol        Current Outpatient Medications   Medication Sig Dispense Refill    fluticasone (FLONASE) 50 MCG/ACT nasal spray INSTILL 2 SPRAYS INTO EACH NOSTRIL ONCE DAILY 16 g 3    pravastatin (PRAVACHOL) 40 MG tablet TAKE 1 TABLET BY MOUTH DAILY 28 tablet 3    busPIRone (BUSPAR) 15 MG tablet TAKE 1 TABLET BY MOUTH TWICE DAILY 56 tablet 3    diclofenac-Misoprostol (ARTHROTEC 50) 50-0.2 MG per tablet TAKE 1 TABLET BY MOUTH TWICE DAILY 56 tablet 1    vitamin D (ERGOCALCIFEROL) 1.25 MG (24306 UT) CAPS capsule TAKE ONE CAPSULE BY MOUTH ONCE WEEKLY ON THURSDAYS 4 capsule 3    venlafaxine (EFFEXOR XR) 75 MG extended release capsule TAKE 1 CAPSULE BY MOUTH DAILY 28 capsule 3    chlorzoxazone (PARAFON FORTE) 500 MG tablet Take 0.5 tablets by mouth 2 times daily 60 tablet 3    senna-docusate (PERICOLACE) 8.6-50 MG per tablet Take 1 tablet by mouth daily as needed for Constipation 30 tablet 1    omeprazole (PRILOSEC) 40 MG delayed release capsule TAKE 1 CAPSULE BY MOUTH DAILY EVERY MORNING FOR BREAKFAST 90 capsule 2    Multiple Vitamins-Minerals (CULTURELLE PROBIOTICS + MULTIV) CHEW Take 1 tablet by mouth daily 30 tablet 2    ondansetron (ZOFRAN ODT) 4 MG disintegrating tablet Take 1 tablet by mouth every 6 hours as needed for Nausea or Vomiting 12 tablet 0    albuterol sulfate  (90 Base) MCG/ACT inhaler INHALE 2 PUFFS BY MOUTH INTO THE LUNGS ONCE EVERY 6 HOURS AS NEEDED FOR WHEEZING 18 g 3    MI-ACID GAS RELIEF 80 MG chewable tablet CHEW 1 TABLET BY MOUTH FOUR TIMES DAILY AS NEEDED FOR FLATULENCE 120 tablet 5    CRANBERRY CONCENTRATE 500 MG CAPS TAKE 1 CAPSULE BY MOUTH TWICE DAILY 56 capsule 3    ketotifen (ZADITOR) 0.025 % ophthalmic solution Place 1 drop into both eyes 2 times daily 10 mL 1    metoprolol succinate (TOPROL XL) 25 MG extended release tablet Take 25 mg by mouth daily       PREMARIN 0.625 MG/GM vaginal cream PLACE 2 GRAMS VAGINALLY TWICE A WEEK FOR 12 DOSES 30 g 1    meloxicam (MOBIC) 7.5 MG tablet Take 1 tablet by mouth daily (Patient not taking: Reported on 2/26/2020) 30 tablet 0     No current facility-administered medications for this visit.       Allergies   Allergen Reactions    Shellfish Allergy     Shrimp (Diagnostic) Shortness Of Breath    Seasonal     Famotidine Other (See Comments)     Headaches  Headaches  Headaches  Headaches  Headaches    Gabapentin Nausea Only     Mood swings, nausea. Mood swings, nausea. Mood swings, nausea. Mood swings, nausea. Mood swings, nausea. Subjective:      Review of Systems   Constitutional: Positive for fever ( started yesterday). HENT: Positive for rhinorrhea (started today) and sore throat. Respiratory: Positive for cough. Musculoskeletal: Positive for myalgias. Allergic/Immunologic: Positive for environmental allergies. Neurological: Positive for headaches. All other systems reviewed and are negative. 14 systems reviewed and negative except as listed in HPI. Objective:     Physical Exam  Vitals signs and nursing note reviewed. Constitutional:       General: She is not in acute distress. Appearance: Normal appearance. She is well-developed. She is not ill-appearing, toxic-appearing or diaphoretic. Comments: nontoxic   HENT:      Head: Normocephalic and atraumatic. Right Ear: External ear normal.      Left Ear: External ear normal.      Nose: Rhinorrhea present. Mouth/Throat:      Mouth: Mucous membranes are moist.      Pharynx: Oropharynx is clear. Posterior oropharyngeal erythema present. No oropharyngeal exudate. Comments: cobblestoning  Eyes:      General: No scleral icterus. Right eye: No discharge. Left eye: No discharge. Conjunctiva/sclera: Conjunctivae normal.      Pupils: Pupils are equal, round, and reactive to light. Neck:      Musculoskeletal: Normal range of motion and neck supple. Cardiovascular:      Rate and Rhythm: Normal rate and regular rhythm. Heart sounds: Normal heart sounds. Pulmonary:      Effort: Pulmonary effort is normal. No respiratory distress. Breath sounds: Normal breath sounds. No stridor. No wheezing, rhonchi or rales. Chest:      Chest wall: No tenderness.    Abdominal:      General: Bowel sounds are normal. There voicedunderstanding.     Electronically signed by SHAUN Phipps CNP on 7/21/2020 at 11:32 AM

## 2020-07-22 ENCOUNTER — HOSPITAL ENCOUNTER (OUTPATIENT)
Age: 60
Setting detail: SPECIMEN
Discharge: HOME | End: 2020-07-22
Payer: COMMERCIAL

## 2020-07-28 LAB — SARS-COV-2, NAA: DETECTED

## 2020-07-29 ENCOUNTER — TELEPHONE (OUTPATIENT)
Dept: ADMINISTRATIVE | Age: 60
End: 2020-07-29

## 2020-07-29 ENCOUNTER — TELEPHONE (OUTPATIENT)
Dept: PRIMARY CARE CLINIC | Age: 60
End: 2020-07-29

## 2020-07-29 NOTE — TELEPHONE ENCOUNTER
Notified of positive Covid.   Pt was concerned regarding hx of bowel problems and having diarrhea, no nausea or vomiting, I advised pt to call PCP for directions

## 2020-07-30 ENCOUNTER — TELEPHONE (OUTPATIENT)
Dept: FAMILY MEDICINE CLINIC | Age: 60
End: 2020-07-30

## 2020-07-30 NOTE — TELEPHONE ENCOUNTER
Patient calling she was covid positive, patient has done her 14 days at home, but patient states she is still having mild symptoms the nurse triage told her to contact her physician to see what she should do.  Please advise

## 2020-07-30 NOTE — TELEPHONE ENCOUNTER
If the patient is improving although overall then she just needs to continue what she is doing. I am not sure what she means of slight symptoms. Maybe you can call and ask. Thank you.

## 2020-08-02 ENCOUNTER — HOSPITAL ENCOUNTER (EMERGENCY)
Age: 60
Discharge: HOME | End: 2020-08-02
Attending: EMERGENCY MEDICINE
Payer: COMMERCIAL

## 2020-08-02 VITALS
TEMPERATURE: 98.3 F | HEIGHT: 61 IN | SYSTOLIC BLOOD PRESSURE: 113 MMHG | RESPIRATION RATE: 18 BRPM | WEIGHT: 175 LBS | OXYGEN SATURATION: 97 % | DIASTOLIC BLOOD PRESSURE: 61 MMHG | HEART RATE: 96 BPM | BODY MASS INDEX: 33.04 KG/M2

## 2020-08-02 PROCEDURE — 99283 EMERGENCY DEPT VISIT LOW MDM: CPT

## 2020-08-02 RX ORDER — ONDANSETRON 4 MG/1
4 TABLET, ORALLY DISINTEGRATING ORAL EVERY 6 HOURS PRN
Qty: 20 TABLET | Refills: 0 | Status: SHIPPED | OUTPATIENT
Start: 2020-08-02 | End: 2020-10-14 | Stop reason: SDUPTHER

## 2020-08-02 RX ORDER — DIPHENOXYLATE HYDROCHLORIDE AND ATROPINE SULFATE 2.5; .025 MG/1; MG/1
1 TABLET ORAL 4 TIMES DAILY PRN
Qty: 20 TABLET | Refills: 0 | Status: SHIPPED | OUTPATIENT
Start: 2020-08-02 | End: 2020-08-09

## 2020-08-03 NOTE — ED PROVIDER NOTES
2 times daily    MELOXICAM (MOBIC) 7.5 MG TABLET    Take 1 tablet by mouth daily    METOPROLOL SUCCINATE (TOPROL XL) 25 MG EXTENDED RELEASE TABLET    Take 25 mg by mouth daily     MI-ACID GAS RELIEF 80 MG CHEWABLE TABLET    CHEW 1 TABLET BY MOUTH FOUR TIMES DAILY AS NEEDED FOR FLATULENCE    MULTIPLE VITAMINS-MINERALS (CULTURELLE PROBIOTICS + MULTIV) CHEW    Take 1 tablet by mouth daily    OMEPRAZOLE (PRILOSEC) 40 MG DELAYED RELEASE CAPSULE    TAKE 1 CAPSULE BY MOUTH DAILY EVERY MORNING FOR BREAKFAST    ONDANSETRON (ZOFRAN ODT) 4 MG DISINTEGRATING TABLET    Take 1 tablet by mouth every 6 hours as needed for Nausea or Vomiting    PRAVASTATIN (PRAVACHOL) 40 MG TABLET    TAKE 1 TABLET BY MOUTH DAILY    PREMARIN 0.625 MG/GM VAGINAL CREAM    PLACE 2 GRAMS VAGINALLY TWICE A WEEK FOR 12 DOSES    SENNA-DOCUSATE (PERICOLACE) 8.6-50 MG PER TABLET    Take 1 tablet by mouth daily as needed for Constipation    VENLAFAXINE (EFFEXOR XR) 75 MG EXTENDED RELEASE CAPSULE    TAKE 1 CAPSULE BY MOUTH DAILY    VITAMIN D (ERGOCALCIFEROL) 1.25 MG (36712 UT) CAPS CAPSULE    TAKE ONE CAPSULE BY MOUTH ONCE WEEKLY ON THURSDAYS       PAST MEDICAL HISTORY         Diagnosis Date    ASCUS with positive high risk HPV cervical 3/22/16    Asthma     Depression     Diverticulitis     ESBL (extended spectrum beta-lactamase) producing bacteria infection 02/03/2019    E.  Coli urine    GERD (gastroesophageal reflux disease)     Hyperlipidemia     MRSA (methicillin resistant staph aureus) culture positive 4/18/2016    lip    Rectocele     Tachycardia     takes Metoprolol       SURGICAL HISTORY           Procedure Laterality Date    COLONOSCOPY N/A 7/19/2018    COLONOSCOPY performed by Aravind Alvarado DO at [address] [place] [address]  2/25/2015    uro   [address] DAVIS, BSO performed at [facility] by Dr. Kelleen Cushing, Also had some type of bladder lift at this time    KNEE ARTHROSCOPY Left 5/13/2019    KNEE ARTHROSCOPY performed by Roxanne Joseph Fercho Ko MD at [address] [facility] Left     #1 - lateral release, #2 - arthroscopy with muscle alignment    NERVE BLOCK  2017    caudal #1  decadron 10mg    NERVE BLOCK  2017    cadal # 2, decadron 10 mg    NERVE BLOCK  2017    caudal epidural steroid block #2 decadron 10 mg    NERVE BLOCK  11/10/2017    lumbar L4-5 epidural; depomedrol 80mg    UPPER GASTROINTESTINAL ENDOSCOPY  2019    EGD BIOPSY performed by Kathy Perez MD at [place] [address]           Problem Relation Age of Onset    Colon Cancer Mother     High Blood Pressure Father     Heart Attack Brother     Heart Disease Brother     Crohn's Disease Niece      Family Status   Relation Name Status    Mother      Father  Alive    MGM      MGF      PGM      PGF      Brother  Alive    Niece  Alive        crohns        SOCIAL HISTORY      reports that she quit smoking about 25 years ago. Her smoking use included cigarettes. She has a 1.00 pack-year smoking history. She has never used smokeless tobacco. She reports current alcohol use. She reports that she does not use drugs. REVIEW OF SYSTEMS    (2-9 systems for level 4, 10 or more for level 5)     Review of Systems   All other systems reviewed and are negative. Except as noted above the remainder of the review of systems was reviewed and negative. PHYSICAL EXAM    (up to 7 for level 4, 8 or more for level 5)     Vitals:    20 2141   BP: 113/61   Pulse: 96   Resp: 18   Temp: 98.3 °F (36.8 °C)   TempSrc: Oral   SpO2: 97%   Weight: 175 lb (79.4 kg)   Height: 5' 1\" (1.549 m)       Physical exam reflects a well-nourished well-hydrated female. She is afebrile with stable vital signs to include pulse ox of 97% on room air. She is not hypoxic. She is alert conversive and appropriate behavior. She is ambulatory without assist without antalgia. She is in no distress.   He is wearing her mask under her nose and continues to demonstrate intermittent cough. She is able to speak in complete sentences. Heart is regular rate and rhythm lungs are clear. No significant abdominal discomfort extremities x4 show no deformities bony point tenderness decreased range of motion or complaint of pain      DIAGNOSTIC RESULTS       EMERGENCY DEPARTMENT COURSE and DIFFERENTIAL DIAGNOSIS/MDM:   Vitals:    Vitals:    08/02/20 2141   BP: 113/61   Pulse: 96   Resp: 18   Temp: 98.3 °F (36.8 °C)   TempSrc: Oral   SpO2: 97%   Weight: 175 lb (79.4 kg)   Height: 5' 1\" (1.549 m)     Patient's records are reviewed. She is known to be Kovic positive still with active symptoms. I did advise patient of contagion issues, particularly with regard to those parameters that I witnessed here today. She is advised to continue her self quarantine and appropriate mask wear pending resolution of all of her appropriate symptoms. She is supplied with work note for a few additional days of work. She is supplied with symptomatic medications with regard to her continued symptoms. She is advised reevaluation with her family physician. She is hemodynamically stable. She is tolerating oral intake. Currently there is no indication for emergent intervention, IV fluids, admission, sustained stay in the emergency department setting    CONSULTS:  None    PROCEDURES:  None    FINAL IMPRESSION      1. Diarrhea of infectious origin    2. Gastroenteritis due to COVID-19 virus          DISPOSITION/PLAN   DISPOSITION Decision To Discharge 08/02/2020 09:44:08 PM      PATIENT REFERRED TO:   Sonja Gary MD  [address] [place] [address]  [phone]    Call in 1 day        DISCHARGE MEDICATIONS:     New Prescriptions    DIPHENOXYLATE-ATROPINE (LOMOTIL) 2.5-0.025 MG PER TABLET    Take 1 tablet by mouth 4 times daily as needed for Diarrhea for up to 7 days.     ONDANSETRON (ZOFRAN ODT) 4 MG DISINTEGRATING TABLET    Take 1 tablet by mouth every 6 hours as needed for Nausea       Controlled Substance Monitoring:    Acute and Chronic Pain Monitoring:   RX Monitoring 8/2/2020   Attestation -   Periodic Controlled Substance Monitoring No signs of potential drug abuse or diversion identified.          (Please note that portions of this note were completed with a voice recognition program.  Efforts were made to edit the dictations but occasionally words are mis-transcribed.)    Murray Kim MD  Attending Emergency Physician          Murray Kim MD  08/02/20 5587

## 2020-08-03 NOTE — ED NOTES
Pt presents to the er c/o a two week history of diarrhea pt states that she was diagnosed with covid 19 two weeks ago     Checo Castaneda RN  08/02/20 2010

## 2020-08-07 ENCOUNTER — HOSPITAL ENCOUNTER (OUTPATIENT)
Age: 60
Setting detail: SPECIMEN
Discharge: HOME | End: 2020-08-07
Payer: COMMERCIAL

## 2020-08-09 LAB — SARS-COV-2, NAA: DETECTED

## 2020-08-10 ENCOUNTER — TELEPHONE (OUTPATIENT)
Dept: ADMINISTRATIVE | Age: 60
End: 2020-08-10

## 2020-08-10 NOTE — RESULT ENCOUNTER NOTE
Patient has a positive corona/covid 19 test.  She needs to stay home self quarantine make sure to wear a mask around the family. Call if there is anything needed. Make an appointment if you need to have a note for work. Thank you.

## 2020-08-17 ENCOUNTER — PATIENT MESSAGE (OUTPATIENT)
Dept: FAMILY MEDICINE CLINIC | Age: 60
End: 2020-08-17

## 2020-08-18 NOTE — TELEPHONE ENCOUNTER
From: Rashmi De La Rosa  To: Ann-Marie Donato MD  Sent: 8/17/2020 8:24 PM EDT  Subject: Non-Urgent Medical Question    I need to know if I need to be retested for covid-19 in order to return to work? My work doesn't require me to be retested. At this time I'm not having any symptoms. Just no energy still. Also would like to know if Dr. Parisa Wilks got any paperwork from [facility]? If so has she filled it out and returned it?      Thank you  Quiana Spain

## 2020-08-21 RX ORDER — VENLAFAXINE HYDROCHLORIDE 75 MG/1
75 CAPSULE, EXTENDED RELEASE ORAL DAILY
Qty: 28 CAPSULE | Refills: 3 | Status: SHIPPED | OUTPATIENT
Start: 2020-08-21 | End: 2021-01-11

## 2020-08-21 RX ORDER — ERGOCALCIFEROL 1.25 MG/1
CAPSULE ORAL
Qty: 4 CAPSULE | Refills: 3 | Status: SHIPPED | OUTPATIENT
Start: 2020-08-21 | End: 2021-01-12

## 2020-08-27 ENCOUNTER — TELEPHONE (OUTPATIENT)
Dept: FAMILY MEDICINE CLINIC | Age: 60
End: 2020-08-27

## 2020-08-27 ENCOUNTER — TELEMEDICINE (OUTPATIENT)
Dept: FAMILY MEDICINE CLINIC | Age: 60
End: 2020-08-27
Payer: COMMERCIAL

## 2020-08-27 PROCEDURE — 99213 OFFICE O/P EST LOW 20 MIN: CPT | Performed by: FAMILY MEDICINE

## 2020-08-27 PROCEDURE — 3017F COLORECTAL CA SCREEN DOC REV: CPT | Performed by: FAMILY MEDICINE

## 2020-08-27 PROCEDURE — G8427 DOCREV CUR MEDS BY ELIG CLIN: HCPCS | Performed by: FAMILY MEDICINE

## 2020-08-27 ASSESSMENT — PATIENT HEALTH QUESTIONNAIRE - PHQ9
1. LITTLE INTEREST OR PLEASURE IN DOING THINGS: 0
2. FEELING DOWN, DEPRESSED OR HOPELESS: 0
SUM OF ALL RESPONSES TO PHQ9 QUESTIONS 1 & 2: 0
SUM OF ALL RESPONSES TO PHQ QUESTIONS 1-9: 0
SUM OF ALL RESPONSES TO PHQ QUESTIONS 1-9: 0

## 2020-08-27 NOTE — PROGRESS NOTES
L4-5 epidural; depomedrol 80mg    UPPER GASTROINTESTINAL ENDOSCOPY  1/30/2019    EGD BIOPSY performed by Marietta Christopher MD at [address] [place] [address] History   Problem Relation Age of Onset    Colon Cancer Mother     High Blood Pressure Father     Heart Attack Brother     Heart Disease Brother     Crohn's Disease Niece      Current Outpatient Medications   Medication Sig Dispense Refill    vitamin D (ERGOCALCIFEROL) 1.25 MG (03730 UT) CAPS capsule TAKE 1 CAPSULE BY MOUTH EVERY WEEK ON THURSDAYS 4 capsule 3    venlafaxine (EFFEXOR XR) 75 MG extended release capsule TAKE 1 CAPSULE BY MOUTH DAILY 28 capsule 3    ondansetron (ZOFRAN ODT) 4 MG disintegrating tablet Take 1 tablet by mouth every 6 hours as needed for Nausea 20 tablet 0    fluticasone (FLONASE) 50 MCG/ACT nasal spray INSTILL 2 SPRAYS INTO EACH NOSTRIL ONCE DAILY 16 g 3    pravastatin (PRAVACHOL) 40 MG tablet TAKE 1 TABLET BY MOUTH DAILY 28 tablet 3    busPIRone (BUSPAR) 15 MG tablet TAKE 1 TABLET BY MOUTH TWICE DAILY 56 tablet 3    diclofenac-Misoprostol (ARTHROTEC 50) 50-0.2 MG per tablet TAKE 1 TABLET BY MOUTH TWICE DAILY 56 tablet 1    chlorzoxazone (PARAFON FORTE) 500 MG tablet Take 0.5 tablets by mouth 2 times daily 60 tablet 3    senna-docusate (PERICOLACE) 8.6-50 MG per tablet Take 1 tablet by mouth daily as needed for Constipation 30 tablet 1    omeprazole (PRILOSEC) 40 MG delayed release capsule TAKE 1 CAPSULE BY MOUTH DAILY EVERY MORNING FOR BREAKFAST 90 capsule 2    meloxicam (MOBIC) 7.5 MG tablet Take 1 tablet by mouth daily (Patient not taking: Reported on 2/26/2020) 30 tablet 0    Multiple Vitamins-Minerals (CULTURELLE PROBIOTICS + MULTIV) CHEW Take 1 tablet by mouth daily 30 tablet 2    ondansetron (ZOFRAN ODT) 4 MG disintegrating tablet Take 1 tablet by mouth every 6 hours as needed for Nausea or Vomiting 12 tablet 0    albuterol sulfate  (90 Base) MCG/ACT inhaler INHALE 2 PUFFS BY MOUTH INTO THE LUNGS ONCE EVERY 6 HOURS AS NEEDED FOR WHEEZING 18 g 3    MI-ACID GAS RELIEF 80 MG chewable tablet CHEW 1 TABLET BY MOUTH FOUR TIMES DAILY AS NEEDED FOR FLATULENCE 120 tablet 5    CRANBERRY CONCENTRATE 500 MG CAPS TAKE 1 CAPSULE BY MOUTH TWICE DAILY 56 capsule 3    ketotifen (ZADITOR) 0.025 % ophthalmic solution Place 1 drop into both eyes 2 times daily 10 mL 1    metoprolol succinate (TOPROL XL) 25 MG extended release tablet Take 25 mg by mouth daily       PREMARIN 0.625 MG/GM vaginal cream PLACE 2 GRAMS VAGINALLY TWICE A WEEK FOR 12 DOSES 30 g 1     No current facility-administered medications for this visit. ALLERGIES:    Allergies   Allergen Reactions    Shellfish Allergy     Shrimp (Diagnostic) Shortness Of Breath    Seasonal     Famotidine Other (See Comments)     Headaches  Headaches  Headaches  Headaches  Headaches    Gabapentin Nausea Only     Mood swings, nausea. Mood swings, nausea. Mood swings, nausea. Mood swings, nausea. Mood swings, nausea. Social History     Tobacco Use    Smoking status: Former Smoker     Packs/day: 2.00     Years: 0.50     Pack years: 1.00     Types: Cigarettes     Last attempt to quit:      Years since quittin.6    Smokeless tobacco: Never Used   Substance Use Topics    Alcohol use: Yes     Alcohol/week: 0.0 standard drinks     Comment: social      There is no height or weight on file to calculate BMI. There were no vitals taken for this visit. Subjective:      HPI    61 yo female reaching out per tele med visit today. 2020 no fevers but she had cough, then she woke up body aches then later as the day went not taste headaches. Then she was tested. Then got the results back were she was + for COVID 19. probably in one week she felt better but the diarrhea continues. 2 weeks of diarrhea. Then again COVID tested - again came +. Needs to have a test done bc she takes care of her grand daughter.   Did have the have a add COVID 19 test done at [facility] yesterday. 07/13/2020 - 08/31/2020 -- FMLA. Review of Systems   Constitutional: Negative for fever and unexpected weight change. Pertinent items are noted in HPI. Objective:   Physical Exam  General appearance: normal development, habitus and attention, no deformities. No distress. Pulmo: normal breathing pattern. Psychiatric: alert and oriented to place, time and person. Normal mood and affect. Assessment:       Diagnosis Orders   1. COVID-19 ruled out  COVID-19 Ambulatory       Plan:   Await result of the recent test.  FMLA 07/13/2020 - 08/31/2020 due to COVID 19 infection. Call or return to clinic prn if these symptoms worsen or fail to improve as anticipated. Sheryl Gardner is a 62 y.o. female being evaluated by a Virtual Visit (video visit) encounter to address concerns as mentioned above. A caregiver was present when appropriate. Due to this being a TeleHealth encounter (During [facility]- public health emergency), evaluation of the following organ systems was limited: Vitals/Constitutional/EENT/Resp/CV/GI//MS/Neuro/Skin/Heme-Lymph-Imm. Pursuant to the emergency declaration under the [address] [place] [address] authority and the [employer] and [employer] Act, this Virtual Visit was conducted with patient's (and/or legal guardian's) consent, to reduce the patient's risk of exposure to COVID-19 and provide necessary medical care. The patient (and/or legal guardian) has also been advised to contact this office for worsening conditions or problems, and seek emergency medical treatment and/or call 911 if deemed necessary. Patient identification was verified at the start of the visit: Yes    Total time spent for this encounter: Not billed by time    Services were provided through a video synchronous discussion virtually to substitute for in-person clinic visit.  Patient and provider were located at their individual homes.    --Kaela Dennis MD on 8/27/2020 at 10:10 AM    An electronic signature was used to authenticate this note. Return if symptoms worsen or fail to improve. Orders Placed This Encounter   Procedures    COVID-19 Ambulatory     Standing Status:   Future     Standing Expiration Date:   12/27/2020     Scheduling Instructions:      Saline media preferred given current shortage of viral transport media but both acceptable     Order Specific Question:   Is this test for diagnosis or screening? Answer:   Screening     Order Specific Question:   Symptomatic for COVID-19 as defined by CDC? Answer:   No     Order Specific Question:   Date of Symptom Onset     Answer:   N/A     Order Specific Question:   Hospitalized for COVID-19? Answer:   No     Order Specific Question:   Admitted to ICU for COVID-19? Answer:   No     Order Specific Question:   Employed in healthcare setting? Answer:   No     Order Specific Question:   Resident in a congregate (group) care setting? Answer:   No     Order Specific Question:   Pregnant? Answer:   No     Order Specific Question:   Previously tested for COVID-19? Answer:   Yes     No orders of the defined types were placed in this encounter.

## 2020-08-31 ENCOUNTER — TELEPHONE (OUTPATIENT)
Dept: FAMILY MEDICINE CLINIC | Age: 60
End: 2020-08-31

## 2020-08-31 NOTE — TELEPHONE ENCOUNTER
Pt requests letter to return to work without restrictions for 9/1/20. Fax letter to: Fax [phone]    Also informed pt that FMLA form was faxed over.

## 2020-09-14 ENCOUNTER — APPOINTMENT (OUTPATIENT)
Dept: GENERAL RADIOLOGY | Age: 60
End: 2020-09-14
Payer: COMMERCIAL

## 2020-09-14 ENCOUNTER — HOSPITAL ENCOUNTER (EMERGENCY)
Age: 60
Discharge: HOME | End: 2020-09-14
Attending: EMERGENCY MEDICINE
Payer: COMMERCIAL

## 2020-09-14 VITALS
TEMPERATURE: 98.2 F | DIASTOLIC BLOOD PRESSURE: 66 MMHG | OXYGEN SATURATION: 97 % | WEIGHT: 175 LBS | SYSTOLIC BLOOD PRESSURE: 129 MMHG | RESPIRATION RATE: 16 BRPM | HEIGHT: 61 IN | BODY MASS INDEX: 33.04 KG/M2 | HEART RATE: 80 BPM

## 2020-09-14 PROCEDURE — 73030 X-RAY EXAM OF SHOULDER: CPT

## 2020-09-14 PROCEDURE — 99283 EMERGENCY DEPT VISIT LOW MDM: CPT

## 2020-09-14 RX ORDER — LIDOCAINE 50 MG/G
1 PATCH TOPICAL DAILY
Qty: 10 PATCH | Refills: 0 | Status: SHIPPED | OUTPATIENT
Start: 2020-09-14 | End: 2020-09-24

## 2020-09-14 ASSESSMENT — ENCOUNTER SYMPTOMS
ABDOMINAL PAIN: 0
APNEA: 0
COLOR CHANGE: 0
COUGH: 0

## 2020-09-14 ASSESSMENT — PAIN DESCRIPTION - DESCRIPTORS: DESCRIPTORS: ACHING

## 2020-09-14 ASSESSMENT — PAIN DESCRIPTION - LOCATION: LOCATION: SHOULDER

## 2020-09-14 ASSESSMENT — PAIN DESCRIPTION - ORIENTATION: ORIENTATION: RIGHT

## 2020-09-14 ASSESSMENT — PAIN DESCRIPTION - PAIN TYPE: TYPE: ACUTE PAIN

## 2020-09-14 ASSESSMENT — PAIN SCALES - GENERAL: PAINLEVEL_OUTOF10: 10

## 2020-09-14 NOTE — ED NOTES
[facility] [address]  09/14/20 5599

## 2020-09-14 NOTE — ED PROVIDER NOTES
[facility] ED  Emergency Department Encounter  EmergencyMedicine Resident     Pt Morgan Pérez  MRN: [id number]  [place] 1960  Date of evaluation: 9/14/20  PCP:  Dante Borja MD    [address] [place] [address]       Chief Complaint   Patient presents with    Shoulder Pain       HISTORY OF PRESENT ILLNESS  (Location/Symptom, Timing/Onset, Context/Setting, Quality, Duration, Modifying Factors, Severity.)      Dany Colón is a 62 y.o. female who presents with shoulder pain, right-sided. Since July. Been using Tylenol and ibuprofen initially helped but now did not help anymore. Patient has difficulty reaching above her head. Patient states she had limited numbness tingling in her right arm. PAST MEDICAL / SURGICAL / SOCIAL / FAMILY HISTORY      has a past medical history of ASCUS with positive high risk HPV cervical, Asthma, Depression, Diverticulitis, ESBL (extended spectrum beta-lactamase) producing bacteria infection, GERD (gastroesophageal reflux disease), Hyperlipidemia, MRSA (methicillin resistant staph aureus) culture positive, Rectocele, and Tachycardia. No pertinent past medical history     has a past surgical history that includes Hysterectomy (1996); knee surgery (Left); Cystocopy (2/25/2015); Nerve Block (07/28/2017); Nerve Block (09/22/2017); Nerve Block (09/22/2017); Nerve Block (11/10/2017); Colonoscopy (N/A, 7/19/2018); Upper gastrointestinal endoscopy (1/30/2019); and Knee arthroscopy (Left, 5/13/2019).   No other pertinent past surgical history    Social History     Socioeconomic History    Marital status:      Spouse name: Not on file    Number of children: Not on file    Years of education: Not on file    Highest education level: Not on file   Occupational History    Not on file   Social Needs    Financial resource strain: Not on file    Food insecurity     Worry: Not on file     Inability: Not on file    Transportation needs     Medical: Not on (ZOFRAN ODT) 4 MG disintegrating tablet Take 1 tablet by mouth every 6 hours as needed for Nausea 8/2/20   Moise Sepulveda MD   fluticasone [facility]) 50 MCG/ACT nasal spray INSTILL 2 SPRAYS INTO EACH NOSTRIL ONCE DAILY 6/26/20   Modesta Lopes MD   pravastatin (PRAVACHOL) 40 MG tablet TAKE 1 TABLET BY MOUTH DAILY 6/26/20   Modesta Lopes MD   busPIRone (BUSPAR) 15 MG tablet TAKE 1 TABLET BY MOUTH TWICE DAILY 6/26/20   Modesta Lopes MD   diclofenac-Misoprostol (ARTHROTEC 50) 50-0.2 MG per tablet TAKE 1 TABLET BY MOUTH TWICE DAILY 6/26/20   Modesta Lopes MD   chlorzoxazone (PARAFON FORTE) 500 MG tablet Take 0.5 tablets by mouth 2 times daily 5/4/20   Modesta Lopes MD   senna-docusate (PERICOLACE) 8.6-50 MG per tablet Take 1 tablet by mouth daily as needed for Constipation 2/26/20   Kathy Perez MD   omeprazole (PRILOSEC) 40 MG delayed release capsule TAKE 1 CAPSULE BY MOUTH DAILY EVERY MORNING FOR BREAKFAST 2/6/20   Kathy Perez MD   meloxicam (MOBIC) 7.5 MG tablet Take 1 tablet by mouth daily  Patient not taking: Reported on 2/26/2020 1/30/20   Christina Moran MD   Multiple Vitamins-Minerals (CULTURELLE PROBIOTICS + MULTIV) CHEW Take 1 tablet by mouth daily 1/29/20   Kathy Perez MD   ondansetron (ZOFRAN ODT) 4 MG disintegrating tablet Take 1 tablet by mouth every 6 hours as needed for Nausea or Vomiting 1/6/20   Kristie Baer MD   albuterol sulfate  (90 Base) MCG/ACT inhaler INHALE 2 PUFFS BY MOUTH INTO THE LUNGS ONCE EVERY 6 HOURS AS NEEDED FOR WHEEZING 11/19/19   Modesta Lopes MD   MI-ACID GAS RELIEF 80 MG chewable tablet CHEW 1 TABLET BY MOUTH FOUR TIMES DAILY AS NEEDED FOR FLATULENCE 11/19/19   Kathy Perez MD   CRANBERRY CONCENTRATE 500 MG CAPS TAKE 1 CAPSULE BY MOUTH TWICE DAILY 8/21/19   Modesta Lopes MD   ketotifen (ZADITOR) 0.025 % ophthalmic solution Place 1 drop into both eyes 2 times daily 4/24/19   Modesta Lopes MD   metoprolol succinate (TOPROL XL) 25 MG extended release tablet Take 25 mg by mouth daily     Historical Provider, MD   PREMARIN 0.625 MG/GM vaginal cream PLACE 2 GRAMS VAGINALLY TWICE A WEEK FOR 12 DOSES 9/21/17   Paulina Machado MD       REVIEW OF SYSTEMS    (2-9 systems for level 4, 10 or more for level 5)      Review of Systems   Constitutional: Negative for chills and fever. Respiratory: Negative for apnea and cough. Cardiovascular: Negative for chest pain. Gastrointestinal: Negative for abdominal pain. Musculoskeletal: Positive for arthralgias. Negative for joint swelling, neck pain and neck stiffness. Skin: Negative for color change. Neurological: Negative for light-headedness and headaches. PHYSICAL EXAM   (up to 7 for level 4, 8 or more for level 5)      INITIAL VITALS:   /66   Pulse 80   Temp 98.2 °F (36.8 °C) (Oral)   Resp 16   Ht 5' 1\" (1.549 m)   Wt 175 lb (79.4 kg)   SpO2 97%   BMI 33.07 kg/m²     Physical Exam  Constitutional:       Appearance: Normal appearance. HENT:      Head: Normocephalic and atraumatic. Mouth/Throat:      Mouth: Mucous membranes are moist.      Pharynx: Oropharynx is clear. Eyes:      Extraocular Movements: Extraocular movements intact. Conjunctiva/sclera: Conjunctivae normal.   Neck:      Musculoskeletal: Normal range of motion. Cardiovascular:      Rate and Rhythm: Normal rate and regular rhythm. Pulmonary:      Effort: Pulmonary effort is normal.      Breath sounds: Normal breath sounds. Abdominal:      General: Abdomen is flat. Palpations: Abdomen is soft. Musculoskeletal:         General: Tenderness and signs of injury present. Right shoulder: She exhibits decreased range of motion, tenderness, pain and decreased strength. She exhibits no swelling, no effusion, no crepitus, no deformity and normal pulse. Arms:    Neurological:      Mental Status: She is alert.          DIFFERENTIAL  DIAGNOSIS     PLAN (LABS / IMAGING / EKG):  Orders Placed This Encounter   Procedures    XR SHOULDER RIGHT (MIN 2 VIEWS)       MEDICATIONS ORDERED:  Orders Placed This Encounter   Medications    lidocaine (LIDODERM) 5 %     Sig: Place 1 patch onto the skin daily for 10 days 12 hours on, 12 hours off. Dispense:  10 patch     Refill:  0       DDX: Rotator cuff injury, frozen shoulder, nerve impingement, musculoskeletal injuries. DIAGNOSTIC RESULTS / EMERGENCY DEPARTMENT COURSE / MDM   LAB RESULTS:  No results found for this visit on 09/14/20. RADIOLOGY:  XR SHOULDER RIGHT (MIN 2 VIEWS)   Final Result   No acute fracture or dislocation in the right shoulder. Mild osteoarthritis of the acromioclavicular joint. Clear visualized right lung. EMERGENCY DEPARTMENT COURSE:      She was seen and examined myself Dr. Liz Mujica. History and physical obtained. Patient received x-ray. Patient was discharged home with follow-up with Ortho. PROCEDURES:  No procedures indicated    CONSULTS:  None    MEDICAL DECISION MAKING:  Patient has supraspinatus weakness. Patient had good distal pulses and strength. Describes some paresthesias. Patient normal x-ray. Most likely rotator cuff injury, supraspinatus. Patient was referred to Ortho for further follow-up. Patient provided lidocaine patch for pain management as ibuprofen and Tylenol have been not managing her pain. Patient discharged home    CRITICAL CARE:  Please see attending note    FINAL IMPRESSION      1.  Strain of right shoulder, initial encounter          DISPOSITION / PLAN     DISPOSITION Decision To Discharge 09/14/2020 05:30:53 PM      PATIENT REFERRED TO:  [address] [place] [address] Tierra Slaughter [id number]  [phone]  Schedule an appointment as soon as possible for a visit         DISCHARGE MEDICATIONS:  Discharge Medication List as of 9/14/2020  5:33 PM      START taking these medications    Details   lidocaine (LIDODERM) 5 % Place 1 patch onto the skin daily for 10 days 12 hours on, 12 hours off., Disp-10 patch,R-0Print             Claudia Mejia,   Emergency Medicine Resident    (Please note that portions of thisnote were completed with a voice recognition program.  Efforts were made to edit the dictations but occasionally words are mis-transcribed.)        Jewell Rey DO  Resident  09/14/20 [id number]

## 2020-09-14 NOTE — ED PROVIDER NOTES
[facility] ED     Emergency Department     Faculty Attestation    I performed a history and physical examination of the patient and discussed management with the resident. I reviewed the residents note and agree with the documented findings and plan of care. Any areas of disagreement are noted on the chart. I was personally present for the key portions of any procedures. I have documented in the chart those procedures where I was not present during the key portions. I have reviewed the emergency nurses triage note. I agree with the chief complaint, past medical history, past surgical history, allergies, medications, social and family history as documented unless otherwise noted below. For Physician Assistant/ Nurse Practitioner cases/documentation I have personally evaluated this patient and have completed at least one if not all key elements of the E/M (history, physical exam, and MDM). Additional findings are as noted. This patient was evaluated in the Emergency Department for symptoms described in the history of present illness. He/she was evaluated in the context of the global COVID-19 pandemic, which necessitated consideration that the patient might be at risk for infection with the SARS-CoV-2 virus that causes COVID-19. Institutional protocols and algorithms that pertain to the evaluation of patients at risk for COVID-19 are in a state of rapid change based on information released by regulatory bodies including the CDC and federal and state organizations. These policies and algorithms were followed during the patient's care in the ED. Patient with right shoulder pain for 10 days. No injury no trauma. Is right-handed. No other joint pain no myalgias. Did have COVID last month is recovering from that only ongoing issue is fatigue but no respiratory complaints no fevers.   On exam full range of motion of the right shoulder but pain with forced abduction against resistance some discomfort with internal and external rotation passively as well distally strong pulses no neuro deficits.   Will image, suspect rotator cuff pathology plan to discharge      Critical Care     none    Carmine Hernandez MD, John Mendoza  Attending Emergency  Physician             Carmine Hernandez MD  09/14/20 1640

## 2020-09-23 RX ORDER — MISOPROSTOL 200 UG/1
200 TABLET ORAL DAILY
COMMUNITY

## 2020-09-24 ENCOUNTER — OFFICE VISIT (OUTPATIENT)
Dept: ORTHOPEDIC SURGERY | Age: 60
End: 2020-09-24
Payer: COMMERCIAL

## 2020-09-24 ENCOUNTER — TELEPHONE (OUTPATIENT)
Dept: FAMILY MEDICINE CLINIC | Age: 60
End: 2020-09-24

## 2020-09-24 PROCEDURE — 99203 OFFICE O/P NEW LOW 30 MIN: CPT | Performed by: ORTHOPAEDIC SURGERY

## 2020-09-24 PROCEDURE — 20610 DRAIN/INJ JOINT/BURSA W/O US: CPT | Performed by: ORTHOPAEDIC SURGERY

## 2020-09-24 RX ORDER — IBUPROFEN 800 MG/1
800 TABLET ORAL EVERY 8 HOURS PRN
Qty: 90 TABLET | Refills: 2 | Status: ON HOLD | OUTPATIENT
Start: 2020-09-24 | End: 2021-03-04 | Stop reason: HOSPADM

## 2020-09-24 NOTE — PROGRESS NOTES
8.6-50 MG per tablet Take 1 tablet by mouth daily as needed for Constipation 30 tablet 1    omeprazole (PRILOSEC) 40 MG delayed release capsule TAKE 1 CAPSULE BY MOUTH DAILY EVERY MORNING FOR BREAKFAST 90 capsule 2    Multiple Vitamins-Minerals (CULTURELLE PROBIOTICS + MULTIV) CHEW Take 1 tablet by mouth daily 30 tablet 2    ondansetron (ZOFRAN ODT) 4 MG disintegrating tablet Take 1 tablet by mouth every 6 hours as needed for Nausea or Vomiting 12 tablet 0    albuterol sulfate  (90 Base) MCG/ACT inhaler INHALE 2 PUFFS BY MOUTH INTO THE LUNGS ONCE EVERY 6 HOURS AS NEEDED FOR WHEEZING 18 g 3    MI-ACID GAS RELIEF 80 MG chewable tablet CHEW 1 TABLET BY MOUTH FOUR TIMES DAILY AS NEEDED FOR FLATULENCE 120 tablet 5    CRANBERRY CONCENTRATE 500 MG CAPS TAKE 1 CAPSULE BY MOUTH TWICE DAILY 56 capsule 3    ketotifen (ZADITOR) 0.025 % ophthalmic solution Place 1 drop into both eyes 2 times daily 10 mL 1    metoprolol succinate (TOPROL XL) 25 MG extended release tablet Take 25 mg by mouth daily       PREMARIN 0.625 MG/GM vaginal cream PLACE 2 GRAMS VAGINALLY TWICE A WEEK FOR 12 DOSES 30 g 1    diclofenac (VOLTAREN) 50 MG EC tablet Take 50 mg by mouth daily      venlafaxine (EFFEXOR XR) 75 MG extended release capsule TAKE 1 CAPSULE BY MOUTH DAILY 28 capsule 3    meloxicam (MOBIC) 7.5 MG tablet Take 1 tablet by mouth daily (Patient not taking: Reported on 2/26/2020) 30 tablet 0     No current facility-administered medications for this visit. Allergies:    Shellfish allergy; Shrimp (diagnostic);  Seasonal; Famotidine; and Gabapentin    Social History:   Social History     Socioeconomic History    Marital status:      Spouse name: Not on file    Number of children: Not on file    Years of education: Not on file    Highest education level: Not on file   Occupational History    Not on file   Social Needs    Financial resource strain: Not on file    Food insecurity     Worry: Not on file Inability: Not on file    Transportation needs     Medical: Not on file     Non-medical: Not on file   Tobacco Use    Smoking status: Former Smoker     Packs/day: 2.00     Years: 0.50     Pack years: 1.00     Types: Cigarettes     Last attempt to quit:      Years since quittin.7    Smokeless tobacco: Never Used   Substance and Sexual Activity    Alcohol use: Yes     Alcohol/week: 0.0 standard drinks     Comment: social    Drug use: No    Sexual activity: Not on file   Lifestyle    Physical activity     Days per week: Not on file     Minutes per session: Not on file    Stress: Not on file   Relationships    Social connections     Talks on phone: Not on file     Gets together: Not on file     Attends [employer] service: Not on file     Active member of club or organization: Not on file     Attends meetings of clubs or organizations: Not on file     Relationship status: Not on file    Intimate partner violence     Fear of current or ex partner: Not on file     Emotionally abused: Not on file     Physically abused: Not on file     Forced sexual activity: Not on file   Other Topics Concern    Not on file   Social History Narrative    Not on file       Family History:  Family History   Problem Relation Age of Onset    Colon Cancer Mother     High Blood Pressure Father     Heart Attack Brother     Heart Disease Brother     Crohn's Disease Niece          REVIEW OF SYSTEMS:  Review of Systems    Gen: no fever, chills, malaise  CV: no chest pain or palpitations  Resp: no cough or shortness of breath  GI: no nausea, vomiting, diarrhea, or constipation  Neuro: no numbness, tingling, or weakness  Msk: Right shoulder myalgia  10 remaining systems reviewed and negative    PHYSICAL EXAM:  There were no vitals taken for this visit. There is no height or weight on file to calculate BMI. Physical Exam  Gen: alert and oriented, no acute distress  Psych: Appropriate affect;  Appropriate knowledge base; Appropriate mood; No hallucinations  Head: normocephalic atraumatic   Chest: symmetric chest excursion  Ortho Exam  MSK: Right shoulder:  Tenderness to palpation about the bicipital groove. Pain with shoulder abduction at mid arc range of motion. Shoulder motion passive/active 110 degrees abduction, 120 degrees flexion, 40 degrees external rotation, internal rotation to beltline. With deep pressure placed over bicipital groove, nonpainful ROM of the shoulder is appreciated. Neer's, Yergason, belly liftoff, test, O'Briens positive. Morelos, empty can test negative.  strength 5/5. AIN/PIN/radial/ulnar/median motor nerve function intact. Radial/ulnar/median sensations intact light touch. Radial pulse 2+. Radiology:   None taken in clinic     ASSESSMENT:  62 y.o. female with right shoulder pain and possible rotator cuff impingement, bicep tendinitis/subluxation. PLAN:     Patient is here today valuation of right shoulder pain. Based upon physical exam, I suspect that the patient has a possible rotator cuff impingement and or biceps tendinitis/subluxation. I discussed in length the natural history and etiology of the aforementioned pathology. I discussed the operative and nonoperative management regarding the associated pathology. In light of these findings, I recommended to the that ibuprofen may be of benefit in relieving her pain. I also recommended that intra-articular corticosteroid injection can serve as a diagnostic and therapeutic purpose. I also prescribed physical therapy to the right shoulder. Patient is opted for the aforementioned treatment plan. Patient may follow-up with us in 2 weeks for reassessment if at which during that time if she has no improvement, we may consider an MRI. She was encouraged to call the office with any questions. Injection procedure note  The alternatives, benefits, and risks were discussed with the patient.  After answering all questions to the patient's satisfaction, the patient agreed to proceed forward with injection and gave verbal consent for the procedure. With the patient's permission, appropriate anatomic landmarks were identified and the right glenohumeral joint was prepped in a sterile fashion using alcohol and/or betadine. A 21 gauge needle was then used to inject 2cc 0.25% marcaine plain and 80mg depo medrol into the joint. The injection was advanced without resistance confirming appropriate position. The patient tolerated the procedure well and the site was dressed with a band-aid. Patient was advised to ice the area for 15-20 minutes to relieve any injection site related pain. Patient was advised to contact nurse if area becomes swollen, hot, erythematous, or painful, or to go to the emergency room after business hours. Return in about 2 weeks (around 10/8/2020).     Orders Placed This Encounter   Medications    ibuprofen (ADVIL;MOTRIN) 800 MG tablet     Sig: Take 1 tablet by mouth every 8 hours as needed for Pain     Dispense:  90 tablet     Refill:  2       Orders Placed This Encounter   Procedures   [address] [facility] Physical [facility]     Referral Priority:   Routine     Referral Type:   Eval and Treat     Referral Reason:   Specialty Services Required     Requested Specialty:   Physical Therapy     Number of Visits Requested:   1        Electronically signed by Oscar Wilson DO on9/24/2020 at 12:25 PM

## 2020-09-28 NOTE — TELEPHONE ENCOUNTER
The patient probably has been taking the diclofenac for pain and the misoprostol for protection of her stomach. As the patient able to take a other protective medication for her stomach like a Prilosec Prevacid?   Thank you

## 2020-09-29 NOTE — TELEPHONE ENCOUNTER
She is on prilosec she will take that so need to call anything is she is all set Dr Ginny Arthur she alreadt has diclofenac

## 2020-09-29 NOTE — TELEPHONE ENCOUNTER
Her Insurance would not cover the [address] so they split the medication and it was covered that way ?

## 2020-09-29 NOTE — TELEPHONE ENCOUNTER
If the patient needs a diclofenac I can call them. Please call in a Prevacid or a Prilosec for the stomach discomfort. Thank you.

## 2020-10-05 ENCOUNTER — HOSPITAL ENCOUNTER (OUTPATIENT)
Dept: PHYSICAL THERAPY | Age: 60
Setting detail: THERAPIES SERIES
Discharge: HOME | End: 2020-10-05
Payer: COMMERCIAL

## 2020-10-05 PROCEDURE — 97110 THERAPEUTIC EXERCISES: CPT

## 2020-10-05 PROCEDURE — 97161 PT EVAL LOW COMPLEX 20 MIN: CPT

## 2020-10-05 RX ORDER — METHYLPREDNISOLONE ACETATE 80 MG/ML
80 INJECTION, SUSPENSION INTRA-ARTICULAR; INTRALESIONAL; INTRAMUSCULAR; SOFT TISSUE ONCE
Status: COMPLETED | OUTPATIENT
Start: 2020-10-05 | End: 2020-10-05

## 2020-10-05 RX ORDER — BUPIVACAINE HYDROCHLORIDE 2.5 MG/ML
2 INJECTION, SOLUTION INFILTRATION; PERINEURAL ONCE
Status: COMPLETED | OUTPATIENT
Start: 2020-10-05 | End: 2020-10-05

## 2020-10-05 RX ADMIN — BUPIVACAINE HYDROCHLORIDE 5 MG: 2.5 INJECTION, SOLUTION INFILTRATION; PERINEURAL at 12:28

## 2020-10-05 RX ADMIN — METHYLPREDNISOLONE ACETATE 80 MG: 80 INJECTION, SUSPENSION INTRA-ARTICULAR; INTRALESIONAL; INTRAMUSCULAR; SOFT TISSUE at 12:28

## 2020-10-05 NOTE — CONSULTS
right shoulder pain    TECHNOLOGIST PROVIDED HISTORY:    right shoulder pain    Reason for Exam: rt shoulder pain x week and a half    Acuity: Acute    Type of Exam: Initial         FINDINGS:    Please see the impression              Impression    No acute fracture or dislocation in the right shoulder.         Mild osteoarthritis of the acromioclavicular joint.         Clear visualized right lung.        [] MRI:  [] Other:    Comorbidities:   [x] Obesity [] Dialysis  [x] Other: Tachycardia   [] Asthma/COPD [] Dementia [] Other:   [] Stroke [] Sleep apnea [] Other:   [] Vascular disease [] Rheumatic disease [] Other:       Medications: [x] Refer to full medical record [] None [] Other:  Allergies:      [x] Refer to full medical record [] None [] Other:    Working: Yes, full time           Job/ADL Description: Cleaning at [employer]     Pain:  [x] Yes  [] No Location: anterior R shoulder Pain Rating: (0-10 scale) 6/10  Pain altered Tx:  [] Yes  [x] No  Action:  Symptoms:  [] Improving [] Worsening [] Same  Better:  Heat, steroid injection  Worse: reaching, any active movement  Sleep: [] OK    [x] Disturbed        Objective:  OBSERVATION No Deficit Deficit Not Tested Comments   Forward Head [] [x] []    Rounded Shoulders [] [x] []    Kyphosis [x] [] []    Scap Height/Position [] [x] [] Abducted, depressed on RUE   Winging [x] [] []    SH Rhythm [] [x] [] Poor scapulohumoral rhythm on RUE   INSPECTION/PALPATION       SC/AC Joint [] [x] [] TTP left AC   Supraspinatus [x] [] []    Biceps tendon/groove [] [x] [] TTP   Posterior shld [] [x] [] TTP   Subscapularis [] [] []    NEUROLOGICAL       Cervical ROM/Quadrant [x] [] []    Reflexes [] [] [x]    Compression/Distraction [x] [] []    Sensation [x] [] [] None this date but does happen per pt            AROM PROM     Left Right Left Right    Shld Abd  165 70*  180 110*   Shld Flexion  165 150* pain with lowering  180 155*   Shld IR  To T7 To mid glute*  80 80   Shld ER To C7 To C2*  80 80   Shld HAB                                   Elbow Flex  WNL WNL       Elbow Ext WNL WNL                   STRENGTH     Left Right   C5 Shld Abd  5 5-*   Shld Flexion  5 5-*   Shld IR  5 5-   Shld ER  5 5-*   Shld HAB       Shld Ext       C6 Elb Flex  5 5-*   C7 Elb Ext  5 5-   C8 EPL       T1 Fing Abd         5 5                   Scapular:        Retraction  Weak, poor recruitment   Depression  \" \"        Special Tests: Positive: Venida Marts, AC crossbody, empty can, resisted ER for pain (some weakness), Yeargason's      Negative: Increased pain with attempting to approximate proximal biceps tendon in groove while attempting AROM      FUNCTION Normal Difficult Unable   Overhead reach [] [x] []   Underarm reach  [] [x] []   Groom/Dress [] [x] []   Bra/Shirt tuck [] [x] []   Lift/Carry [] [x] []    [] [] []     Comments:    Assessment: Tyrone Bran is a 61 yo female who presents with signs and symptoms consistent with R rotator cuff impingement and possible biceps tendinopathy. 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 Problems:    [x] ? Pain:  [x] ? ROM:  [x] ? Strength:  [x] ? Function:  [x] Other: 35/80 on UEFS = 44% fxn per PT    STG: (to be met in 10 treatments)  1. ? Pain: No more than 5/10 max pain in R shoulder with daily activities. 2. ? ROM: R shoulder AROM to the following to indicate progressing joint mobility:  a. Flex/abd: 160/120, respectively  b. ER/IR: To L4/To C6, respectively  3. ? Strength: Improving scapular strength evident by ability to complete prone ITY exercises for 20x ea on R shoulder without excessive upper trap compensation to further improve scapulohumoral rhythm needed for overhead reaching  4. ? Function:   a.  Pt will report improving ability to reach into kitchen Needling   [] Aquatic Therapy   [phone] [x] Cold/hotpack    [] Massage   [id number]      [] Lumbar/Cervical Traction  [id number]     []  Medication allergies reviewed for use of    Dexamethasone Sodium Phosphate 4mg/ml     with iontophoresis treatments. Pt is not allergic.     Frequency: 2 x/week for 16 visits    Todays Treatment:  Modalities:   Precautions:  Exercises:  Exercise Reps/ Time Weight/ Level Comments   Pec stretch on door 3x30\"     Table flex/abd stretch 3x20\" ea     Shoulder flex iso 10x5\"     Bilateral ER w/ scap retraction 10x3\"           Other: Pt education provided re: pathology, PT POC to address impairments - billed with therex    Specific Instructions for next treatment:   - assess for subscap/lat tightness and address with manual as needed  - self stretching with table stretch, pec stretch  - can attempt pulleys for increased abd/flex ROM  - prone scap strengthening (depression, retraction, shld ext w/ retraction, row, T's, etc)  - bilateral ER, horizontal abd - with band when able  - wall slides to work on [employer] of flex/abd  - Bicep strengthening      Evaluation Complexity:  History (Personal factors, comorbidities) [] 0 [x] 1-2 [] 3+   Exam (limitations, restrictions) [] 1-2 [] 3 [x] 4+   Clinical presentation (progression) [x] Stable [] Evolving  [] Unstable   Decision Making [x] Low [] Moderate [] High    [x] Low Complexity [] Moderate Complexity [] High Complexity       Treatment Charges: Mins Units   [x] Evaluation       [x]  Low       []  Moderate       []  High 27 1   []  Modalities     [x]  Ther Exercise 16 1   []  Manual Therapy     []  Ther Activities     []  Aquatics     []  Vasocompression     []  Other       TOTAL TREATMENT TIME: 43 min    Time in: 10:10 am (pt late with pprwk)    Time Out: 10:55 am    Electronically signed by: Zion Berry PT        Physician Signature:________________________________Date:__________________  By signing above or cosigning this note, I have reviewed this plan of care and certify a need for medically necessary rehabilitation services.      *PLEASE SIGN ABOVE AND FAX BACK ALL PAGES*

## 2020-10-12 ENCOUNTER — OFFICE VISIT (OUTPATIENT)
Dept: FAMILY MEDICINE CLINIC | Age: 60
End: 2020-10-12
Payer: COMMERCIAL

## 2020-10-12 ENCOUNTER — HOSPITAL ENCOUNTER (OUTPATIENT)
Age: 60
Setting detail: SPECIMEN
Discharge: HOME | End: 2020-10-12
Payer: COMMERCIAL

## 2020-10-12 VITALS
WEIGHT: 170 LBS | TEMPERATURE: 96.5 F | SYSTOLIC BLOOD PRESSURE: 125 MMHG | BODY MASS INDEX: 32.12 KG/M2 | DIASTOLIC BLOOD PRESSURE: 68 MMHG | HEART RATE: 75 BPM | OXYGEN SATURATION: 98 %

## 2020-10-12 LAB
-: ABNORMAL
ABSOLUTE EOS #: 0.16 K/UL (ref 0–0.44)
ABSOLUTE IMMATURE GRANULOCYTE: <0.03 K/UL (ref 0–0.3)
ABSOLUTE LYMPH #: 1.36 K/UL (ref 1.1–3.7)
ABSOLUTE MONO #: 0.34 K/UL (ref 0.1–1.2)
ALBUMIN SERPL-MCNC: 4.4 G/DL (ref 3.5–5.2)
ALBUMIN/GLOBULIN RATIO: 2 (ref 1–2.5)
ALP BLD-CCNC: 70 U/L (ref 35–104)
ALT SERPL-CCNC: 27 U/L (ref 5–33)
AMORPHOUS: ABNORMAL
ANION GAP SERPL CALCULATED.3IONS-SCNC: 12 MMOL/L (ref 9–17)
AST SERPL-CCNC: 17 U/L
BACTERIA: ABNORMAL
BASOPHILS # BLD: 1 % (ref 0–2)
BASOPHILS ABSOLUTE: 0.05 K/UL (ref 0–0.2)
BILIRUB SERPL-MCNC: 0.35 MG/DL (ref 0.3–1.2)
BILIRUBIN URINE: NEGATIVE
BUN BLDV-MCNC: 23 MG/DL (ref 6–20)
BUN/CREAT BLD: ABNORMAL (ref 9–20)
CALCIUM SERPL-MCNC: 9.4 MG/DL (ref 8.6–10.4)
CASTS UA: ABNORMAL /LPF (ref 0–8)
CHLORIDE BLD-SCNC: 106 MMOL/L (ref 98–107)
CHOLESTEROL/HDL RATIO: 4
CHOLESTEROL: 193 MG/DL
CO2: 26 MMOL/L (ref 20–31)
COLOR: YELLOW
COMMENT UA: ABNORMAL
CREAT SERPL-MCNC: 0.66 MG/DL (ref 0.5–0.9)
CRYSTALS, UA: ABNORMAL /HPF
DIFFERENTIAL TYPE: ABNORMAL
EOSINOPHILS RELATIVE PERCENT: 4 % (ref 1–4)
EPITHELIAL CELLS UA: ABNORMAL /HPF (ref 0–5)
GFR AFRICAN AMERICAN: >60 ML/MIN
GFR NON-AFRICAN AMERICAN: >60 ML/MIN
GFR SERPL CREATININE-BSD FRML MDRD: ABNORMAL ML/MIN/{1.73_M2}
GFR SERPL CREATININE-BSD FRML MDRD: ABNORMAL ML/MIN/{1.73_M2}
GLUCOSE BLD-MCNC: 104 MG/DL (ref 70–99)
GLUCOSE URINE: NEGATIVE
HCT VFR BLD CALC: 41.7 % (ref 36.3–47.1)
HDLC SERPL-MCNC: 48 MG/DL
HEMOGLOBIN: 13.3 G/DL (ref 11.9–15.1)
IMMATURE GRANULOCYTES: 1 %
KETONES, URINE: NEGATIVE
LDL CHOLESTEROL: 112 MG/DL (ref 0–130)
LEUKOCYTE ESTERASE, URINE: ABNORMAL
LYMPHOCYTES # BLD: 34 % (ref 24–43)
MCH RBC QN AUTO: 29.4 PG (ref 25.2–33.5)
MCHC RBC AUTO-ENTMCNC: 31.9 G/DL (ref 28.4–34.8)
MCV RBC AUTO: 92.1 FL (ref 82.6–102.9)
MONOCYTES # BLD: 8 % (ref 3–12)
MUCUS: ABNORMAL
NITRITE, URINE: NEGATIVE
NRBC AUTOMATED: 0 PER 100 WBC
OTHER OBSERVATIONS UA: ABNORMAL
PDW BLD-RTO: 13.2 % (ref 11.8–14.4)
PH UA: 5 (ref 5–8)
PLATELET # BLD: 213 K/UL (ref 138–453)
PLATELET ESTIMATE: ABNORMAL
PMV BLD AUTO: 12 FL (ref 8.1–13.5)
POTASSIUM SERPL-SCNC: 4.4 MMOL/L (ref 3.7–5.3)
PROTEIN UA: NEGATIVE
RBC # BLD: 4.53 M/UL (ref 3.95–5.11)
RBC # BLD: ABNORMAL 10*6/UL
RBC UA: ABNORMAL /HPF (ref 0–4)
RENAL EPITHELIAL, UA: ABNORMAL /HPF
SEG NEUTROPHILS: 52 % (ref 36–65)
SEGMENTED NEUTROPHILS ABSOLUTE COUNT: 2.13 K/UL (ref 1.5–8.1)
SODIUM BLD-SCNC: 144 MMOL/L (ref 135–144)
SPECIFIC GRAVITY UA: 1.02 (ref 1–1.03)
TOTAL PROTEIN: 6.6 G/DL (ref 6.4–8.3)
TRICHOMONAS: ABNORMAL
TRIGL SERPL-MCNC: 167 MG/DL
TSH SERPL DL<=0.05 MIU/L-ACNC: 1.61 MIU/L (ref 0.3–5)
TURBIDITY: ABNORMAL
URINE HGB: NEGATIVE
UROBILINOGEN, URINE: NORMAL
VITAMIN B-12: 607 PG/ML (ref 232–1245)
VITAMIN D 25-HYDROXY: 38.4 NG/ML (ref 30–100)
VLDLC SERPL CALC-MCNC: ABNORMAL MG/DL (ref 1–30)
WBC # BLD: 4.1 K/UL (ref 3.5–11.3)
WBC # BLD: ABNORMAL 10*3/UL
WBC UA: ABNORMAL /HPF (ref 0–5)
YEAST: ABNORMAL

## 2020-10-12 PROCEDURE — G8484 FLU IMMUNIZE NO ADMIN: HCPCS | Performed by: FAMILY MEDICINE

## 2020-10-12 PROCEDURE — G8427 DOCREV CUR MEDS BY ELIG CLIN: HCPCS | Performed by: FAMILY MEDICINE

## 2020-10-12 PROCEDURE — 99214 OFFICE O/P EST MOD 30 MIN: CPT | Performed by: FAMILY MEDICINE

## 2020-10-12 PROCEDURE — G8417 CALC BMI ABV UP PARAM F/U: HCPCS | Performed by: FAMILY MEDICINE

## 2020-10-12 PROCEDURE — 3017F COLORECTAL CA SCREEN DOC REV: CPT | Performed by: FAMILY MEDICINE

## 2020-10-12 PROCEDURE — 1036F TOBACCO NON-USER: CPT | Performed by: FAMILY MEDICINE

## 2020-10-12 ASSESSMENT — PATIENT HEALTH QUESTIONNAIRE - PHQ9
SUM OF ALL RESPONSES TO PHQ QUESTIONS 1-9: 0
SUM OF ALL RESPONSES TO PHQ QUESTIONS 1-9: 0
2. FEELING DOWN, DEPRESSED OR HOPELESS: 0
1. LITTLE INTEREST OR PLEASURE IN DOING THINGS: 0
SUM OF ALL RESPONSES TO PHQ9 QUESTIONS 1 & 2: 0

## 2020-10-12 NOTE — PROGRESS NOTES
 NERVE BLOCK  11/10/2017    lumbar L4-5 epidural; depomedrol 80mg    UPPER GASTROINTESTINAL ENDOSCOPY  1/30/2019    EGD BIOPSY performed by Demetria Trejo MD at [address] [place] [address] History   Problem Relation Age of Onset    Colon Cancer Mother     High Blood Pressure Father     Heart Attack Brother     Heart Disease Brother     Crohn's Disease Niece      Current Outpatient Medications   Medication Sig Dispense Refill    ibuprofen (ADVIL;MOTRIN) 800 MG tablet Take 1 tablet by mouth every 8 hours as needed for Pain 90 tablet 2    diclofenac (VOLTAREN) 50 MG EC tablet Take 50 mg by mouth daily      miSOPROStol (CYTOTEC) 200 MCG tablet Take 200 mcg by mouth daily      vitamin D (ERGOCALCIFEROL) 1.25 MG (81343 UT) CAPS capsule TAKE 1 CAPSULE BY MOUTH EVERY WEEK ON THURSDAYS 4 capsule 3    ondansetron (ZOFRAN ODT) 4 MG disintegrating tablet Take 1 tablet by mouth every 6 hours as needed for Nausea 20 tablet 0    fluticasone (FLONASE) 50 MCG/ACT nasal spray INSTILL 2 SPRAYS INTO EACH NOSTRIL ONCE DAILY 16 g 3    pravastatin (PRAVACHOL) 40 MG tablet TAKE 1 TABLET BY MOUTH DAILY 28 tablet 3    busPIRone (BUSPAR) 15 MG tablet TAKE 1 TABLET BY MOUTH TWICE DAILY 56 tablet 3    diclofenac-Misoprostol (ARTHROTEC 50) 50-0.2 MG per tablet TAKE 1 TABLET BY MOUTH TWICE DAILY 56 tablet 1    chlorzoxazone (PARAFON FORTE) 500 MG tablet Take 0.5 tablets by mouth 2 times daily 60 tablet 3    senna-docusate (PERICOLACE) 8.6-50 MG per tablet Take 1 tablet by mouth daily as needed for Constipation 30 tablet 1    omeprazole (PRILOSEC) 40 MG delayed release capsule TAKE 1 CAPSULE BY MOUTH DAILY EVERY MORNING FOR BREAKFAST 90 capsule 2    Multiple Vitamins-Minerals (CULTURELLE PROBIOTICS + MULTIV) CHEW Take 1 tablet by mouth daily 30 tablet 2    ondansetron (ZOFRAN ODT) 4 MG disintegrating tablet Take 1 tablet by mouth every 6 hours as needed for Nausea or Vomiting 12 tablet 0    albuterol sulfate  (90 Base) MCG/ACT inhaler INHALE 2 PUFFS BY MOUTH INTO THE LUNGS ONCE EVERY 6 HOURS AS NEEDED FOR WHEEZING 18 g 3    MI-ACID GAS RELIEF 80 MG chewable tablet CHEW 1 TABLET BY MOUTH FOUR TIMES DAILY AS NEEDED FOR FLATULENCE 120 tablet 5    CRANBERRY CONCENTRATE 500 MG CAPS TAKE 1 CAPSULE BY MOUTH TWICE DAILY 56 capsule 3    ketotifen (ZADITOR) 0.025 % ophthalmic solution Place 1 drop into both eyes 2 times daily 10 mL 1    metoprolol succinate (TOPROL XL) 25 MG extended release tablet Take 25 mg by mouth daily       PREMARIN 0.625 MG/GM vaginal cream PLACE 2 GRAMS VAGINALLY TWICE A WEEK FOR 12 DOSES 30 g 1    venlafaxine (EFFEXOR XR) 75 MG extended release capsule TAKE 1 CAPSULE BY MOUTH DAILY 28 capsule 3    meloxicam (MOBIC) 7.5 MG tablet Take 1 tablet by mouth daily (Patient not taking: Reported on 2020) 30 tablet 0     No current facility-administered medications for this visit. ALLERGIES:    Allergies   Allergen Reactions    Shellfish Allergy     Shrimp (Diagnostic) Shortness Of Breath    Seasonal     Famotidine Other (See Comments)     Headaches  Headaches  Headaches  Headaches  Headaches    Gabapentin Nausea Only     Mood swings, nausea. Mood swings, nausea. Mood swings, nausea. Mood swings, nausea. Mood swings, nausea. Social History     Tobacco Use    Smoking status: Former Smoker     Packs/day: 2.00     Years: 0.50     Pack years: 1.00     Types: Cigarettes     Last attempt to quit:      Years since quittin.7    Smokeless tobacco: Never Used   Substance Use Topics    Alcohol use: Yes     Alcohol/week: 0.0 standard drinks     Comment: social      Body mass index is 32.12 kg/m². /68   Pulse 75   Temp 96.5 °F (35.8 °C)   Wt 170 lb (77.1 kg)   SpO2 98%   BMI 32.12 kg/m²     Subjective:      HPI    63 yo female coming in today with concerns about a mole in the right axilla area. Patient also states that she has a lot of skin changes on her back.   The patient was answering all questions to her satisfaction. Faustino Mcghee received counseling on the following healthy behaviors: nutrition, exercise and medication adherence  Reviewed prior labs and health maintenance. Continue current medications, diet and exercise. Discussed use, benefit, and side effects of prescribed medications. Barriers to medication compliance addressed. Patient given educational materials - see patient instructions. All patient questions answered. Patient voiced understanding.       Electronically signed by Mandeep Chirinos MD on 10/12/2020 at 10:31 AM       (Please note that portions of this note were completed with a voice recognition program. Efforts were made to edit the dictations but occasionally words are mis-transcribed.)

## 2020-10-13 NOTE — RESULT ENCOUNTER NOTE
Blood work was with normal limits. Fasting glucose level slightly elevated, please order an A1c. Thank you.

## 2020-10-14 ENCOUNTER — OFFICE VISIT (OUTPATIENT)
Dept: DERMATOLOGY | Age: 60
End: 2020-10-14
Payer: COMMERCIAL

## 2020-10-14 ENCOUNTER — HOSPITAL ENCOUNTER (OUTPATIENT)
Dept: PHYSICAL THERAPY | Age: 60
Setting detail: THERAPIES SERIES
Discharge: HOME | End: 2020-10-14
Payer: COMMERCIAL

## 2020-10-14 VITALS
BODY MASS INDEX: 32.66 KG/M2 | OXYGEN SATURATION: 98 % | HEART RATE: 80 BPM | TEMPERATURE: 96.8 F | DIASTOLIC BLOOD PRESSURE: 77 MMHG | SYSTOLIC BLOOD PRESSURE: 113 MMHG | HEIGHT: 61 IN | WEIGHT: 173 LBS

## 2020-10-14 PROCEDURE — 99213 OFFICE O/P EST LOW 20 MIN: CPT | Performed by: PLASTIC SURGERY

## 2020-10-14 PROCEDURE — 97110 THERAPEUTIC EXERCISES: CPT

## 2020-10-14 NOTE — FLOWSHEET NOTE
[] [facility]) - [facility] CENTER &  Therapy  [address]  [phone]  F: [phone] [] [address]  [place] [address]   [address]  P: [phone]  F: [phone] [] [address]  Therapy  [address]  P: [phone]  F: [phone] [] [address]  P: [phone]  F: [phone] [] [address]  [facility]   [address]   [place]: [phone]  F: [phone]      Physical Therapy Daily Treatment Note    Date:  10/14/2020  Patient Name:  Isadora Yeboah    :  1960  MRN: [id number]  Physician: Iliana Aguilar DO                                    Insurance: [facility] (30 vs)  Medical Diagnosis: Rotator cuff impingement syndrome of right shoulder; subluxation of tendon of long head of biceps  Rehab Codes: M25.511, R29.3, R53.1, M25.611, R20.2  Onset Date: 2020                 Next 's appt: 10/8/20  Visit# / total visits: 2/16     Cancels/No Shows: 0/1    Subjective:    Pain:  [x] Yes  [] No Location: R shoulder Pain Rating: (0-10 scale) 4/10  Pain altered Tx:  [x] No  [] Yes  Action:  Comments:  Something from eval triggered something and patient experienced massive headache for 2 days. Headache was in back of head. Shoulder is not better or worse, has been babying it to avoid pain.       Objective:  Modalities:    Precautions:  Exercises:  Exercise Reps/ Time Weight/ Level Comments   UBE 2/2     Pulleys            Wall slides  10x  Flexion/ abd   Wax on / wax off 10x           Thera bands      -Ext 10x     -Rows 10x     -ER 10x     -IR 10x           Posterior shld rolls 10x     Scapular retraction 10x     Scapular depression 10x     Chin tucks with C-flexion 10x           Supine      Chest press 10x     Shoulder flexion 10x Strength: 5/5 R shoulder in all planes without pain for improved carrying/lifting  9. ? Function:   a. Pt will report no difficulty or pain with RUE dressing, overhead reaching, and carrying  b. Pt will be able to lift 10# using bilat UEs from chest to overhead height without difficulty and no more than minimal pain. c. No pain with any work duties while fully integrating RUE into cleaning            Patient goals: \"no pain, work with out pain using R arm\"      Rehab Potential:  [x]? Good  []? Fair  []? Poor    Suggested Professional Referral:  [x]? No  []? Yes:  Barriers to Goal Achievement[de-identified]  [x]? No  []? Yes:  Domestic Concerns:  [x]? No  []? Yes:    Pt. Education:  [x] Yes  [] No  [x] Reviewed Prior HEP/Ed  Method of Education: [x] Verbal  [x] Demo  [x] Written  Comprehension of Education:  [x] Verbalizes understanding. [x] Demonstrates understanding. [] Needs review. [x] Demonstrates/verbalizes HEP/Ed previously given. Access Code: [email or web address]   URL: Lydia/   Date: 10/14/2020   Prepared by: [place]     Exercises   Shoulder Flexion Wall Slide with Towel - 10 reps - 3 sets - 1x daily - 7x weekly   Standing shoulder flexion wall slides - 10 reps - 3 sets - 1x daily - 7x weekly   Shoulder Extension with Resistance - 10 reps - 3 sets - 1x daily - 7x weekly   Standing Bilateral Low Shoulder Row with Anchored Resistance - 10 reps - 3 sets - 1x daily - 7x weekly   Prone Shoulder Flexion - 10 reps - 3 sets - 1x daily - 7x weekly   Shoulder External Rotation with Anchored Resistance - 10 reps - 3 sets - 1x daily - 7x weekly   Shoulder Internal Rotation with Resistance - 10 reps - 3 sets - 1x daily - 7x weekly   Seated Scapular Retraction - 10 reps - 3 sets - 1x daily - 7x weekly   Standing Backward Shoulder Rolls - 10 reps - 3 sets - 1x daily - 7x weekly   Seated Passive Cervical Retraction - 10 reps - 3 sets - 1x daily - 7x weekly   Supine Shoulder Press AAROM in Abduction with Dowel - 10 reps - 3 sets - 1x daily - 7x weekly   Supine Shoulder Flexion Extension AAROM with Dowel - 10 reps - 3 sets - 1x daily - 7x weekly   Supine Shoulder Horizontal Abduction Adduction AAROM with Dowel - 10 reps - 3 sets - 1x daily - 7x weekly       Plan: [x] Continue current frequency toward long and short term goals.     [x] Specific Instructions for subsequent treatments:    - assess for subscap/lat tightness and address with manual as needed   - self stretching with table stretch, pec stretch   - can attempt pulleys for increased abd/flex ROM   - prone scap strengthening (depression, retraction, shld ext w/ retraction, row, T's, etc)   - bilateral ER, horizontal abd - with band when able   - wall slides to work on [employer] of flex/abd  - Bicep strengthening       Time In: 1310            Time Out: Evert    Electronically signed by:  Zara Moran, PTA

## 2020-10-14 NOTE — PROGRESS NOTES
[facility] PHYSICIANS  [facility] DERMATOLOGY  [facility] [address]       History and Physical     Chief Complaint   Patient presents with    New Patient     FBSE-no hx or family hx of skin cancer. One mole under the rt arm she is concerned about        HPI:   Surinder Jolly is a 62 y.o. female who presents with a lesion on the left arm. Patient does not have a personal history of skin cancer. Patient does not have a family history of melanoma. The mole has been present for several years. However it has increased in size. Patient states that it causes her continuous irritation. Patient is here for evaluation treatment. She also would like to have a full body check to determine if there are other lesions that are of concern.     .  Medications:     Current Outpatient Medications   Medication Sig Dispense Refill    ibuprofen (ADVIL;MOTRIN) 800 MG tablet Take 1 tablet by mouth every 8 hours as needed for Pain 90 tablet 2    diclofenac (VOLTAREN) 50 MG EC tablet Take 50 mg by mouth daily      miSOPROStol (CYTOTEC) 200 MCG tablet Take 200 mcg by mouth daily      vitamin D (ERGOCALCIFEROL) 1.25 MG (42877 UT) CAPS capsule TAKE 1 CAPSULE BY MOUTH EVERY WEEK ON THURSDAYS 4 capsule 3    fluticasone (FLONASE) 50 MCG/ACT nasal spray INSTILL 2 SPRAYS INTO EACH NOSTRIL ONCE DAILY 16 g 3    pravastatin (PRAVACHOL) 40 MG tablet TAKE 1 TABLET BY MOUTH DAILY 28 tablet 3    busPIRone (BUSPAR) 15 MG tablet TAKE 1 TABLET BY MOUTH TWICE DAILY 56 tablet 3    diclofenac-Misoprostol (ARTHROTEC 50) 50-0.2 MG per tablet TAKE 1 TABLET BY MOUTH TWICE DAILY 56 tablet 1    chlorzoxazone (PARAFON FORTE) 500 MG tablet Take 0.5 tablets by mouth 2 times daily 60 tablet 3    senna-docusate (PERICOLACE) 8.6-50 MG per tablet Take 1 tablet by mouth daily as needed for Constipation 30 tablet 1    omeprazole (PRILOSEC) 40 MG delayed release capsule TAKE 1 CAPSULE BY MOUTH DAILY EVERY MORNING FOR BREAKFAST 90 capsule 2  meloxicam (MOBIC) 7.5 MG tablet Take 1 tablet by mouth daily 30 tablet 0    Multiple Vitamins-Minerals (CULTURELLE PROBIOTICS + MULTIV) CHEW Take 1 tablet by mouth daily 30 tablet 2    ondansetron (ZOFRAN ODT) 4 MG disintegrating tablet Take 1 tablet by mouth every 6 hours as needed for Nausea or Vomiting 12 tablet 0    albuterol sulfate  (90 Base) MCG/ACT inhaler INHALE 2 PUFFS BY MOUTH INTO THE LUNGS ONCE EVERY 6 HOURS AS NEEDED FOR WHEEZING 18 g 3    MI-ACID GAS RELIEF 80 MG chewable tablet CHEW 1 TABLET BY MOUTH FOUR TIMES DAILY AS NEEDED FOR FLATULENCE 120 tablet 5    CRANBERRY CONCENTRATE 500 MG CAPS TAKE 1 CAPSULE BY MOUTH TWICE DAILY 56 capsule 3    ketotifen (ZADITOR) 0.025 % ophthalmic solution Place 1 drop into both eyes 2 times daily 10 mL 1    metoprolol succinate (TOPROL XL) 25 MG extended release tablet Take 25 mg by mouth daily       PREMARIN 0.625 MG/GM vaginal cream PLACE 2 GRAMS VAGINALLY TWICE A WEEK FOR 12 DOSES 30 g 1    venlafaxine (EFFEXOR XR) 75 MG extended release capsule TAKE 1 CAPSULE BY MOUTH DAILY 28 capsule 3     No current facility-administered medications for this visit. Allergies: Allergies   Allergen Reactions    Shellfish Allergy     Shrimp (Diagnostic) Shortness Of Breath    Seasonal     Famotidine Other (See Comments)     Headaches  Headaches  Headaches  Headaches  Headaches    Gabapentin Nausea Only     Mood swings, nausea. Mood swings, nausea. Mood swings, nausea. Mood swings, nausea. Mood swings, nausea. Review of Systems:   Constitutional: Negative for fever, chills, fatigue and unexpected weight change. HENT: Negative for hearing loss, sore throat and facial swelling. Eyes: Patient has a history of dry eyes. Respiratory: Patient has a history of asthma. .    Cardiovascular: Patient has a history of hyperlipidemia, chest pain and hyperglycemia. Patient has a history of tachycardia.   Gastrointestinal: Patient has a on file     Inability: Not on file    Transportation needs     Medical: Not on file     Non-medical: Not on file   Tobacco Use    Smoking status: Former Smoker     Packs/day: 2.00     Years: 0.50     Pack years: 1.00     Types: Cigarettes     Last attempt to quit:      Years since quittin.8    Smokeless tobacco: Never Used   Substance and Sexual Activity    Alcohol use: Yes     Alcohol/week: 0.0 standard drinks     Comment: social    Drug use: No    Sexual activity: Not on file   Lifestyle    Physical activity     Days per week: Not on file     Minutes per session: Not on file    Stress: Not on file   Relationships    Social connections     Talks on phone: Not on file     Gets together: Not on file     Attends Restorationist service: Not on file     Active member of club or organization: Not on file     Attends meetings of clubs or organizations: Not on file     Relationship status: Not on file    Intimate partner violence     Fear of current or ex partner: Not on file     Emotionally abused: Not on file     Physically abused: Not on file     Forced sexual activity: Not on file   Other Topics Concern    Not on file   Social History Narrative    Not on file     Family History   Problem Relation Age of Onset    Colon Cancer Mother     High Blood Pressure Father     Heart Attack Brother     Heart Disease Brother     Crohn's Disease Niece      Physical Exam:   /77 (Site: Left Upper Arm, Position: Sitting, Cuff Size: Medium Adult)   Pulse 80   Temp 96.8 °F (36 °C)   Ht 5' 1\" (1.549 m)   Wt 173 lb (78.5 kg)   SpO2 98%   BMI 32.69 kg/m²    Body mass index is 32.69 kg/m². Physical Exam   Nursing note and vitals reviewed. Constitutional: Oriented to person, place, and time. Appears well-developed and well-nourished. No distress. Head: Normocephalic and atraumatic. Eyes: Conjunctivae and EOM are normal.   Pulmonary/Chest: Effort normal. No respiratory distress.    Neurological: Alert and following information was discussed with the patient, but this is not an all-inclusive-other issue were also discussed with patient. GENERAL INFORMATION  The surgical removal of skin lesions and tumors is frequently performed by plastic surgeons. Certain skin lesions and skin tumors will not disappear spontaneously, surgical removal is a treatment option. There are many different techniques for removing skin lesions and skin tumors. ALTERNATIVE TREATMENTS  Alternative forms of management consist of not treating the skin lesion/skin tumor condition. Removal of skin lesions and some superficial skin tumors may be accomplished by other treatments including the use of liquid nitrogen (freezing), lasers, topical medications, and electric cautery. Risks and potential complications are associated with alternative forms of treatment. Deeper tumors cannot be treated by this. RISKS OF SKIN LESION/SKIN TUMOR SURGERY    I have explained to the patient that every surgical procedure involves a certain amount of risk and it is important that an understanding of these risks and the possible complications associated with them is understood. In addition, every procedure has limitations. An individual's choice to undergo a surgical procedure is based on the comparison of the risk to potential benefit. Although some of patients do not experience these complications. Bleeding- It is possible, to experience a bleeding episode during or after surgery. Intraoperative blood transfusions may be required. Should post-operative bleeding occur, it may require an emergency treatment to drain the accumulated blood or blood transfusion. Do not take any aspirin or anti-inflammatory medications for ten days before or after surgery, as this may increase the risk of bleeding. Non-prescription herbs and dietary supplements can increase the risk of surgical bleeding. Hematoma can occur at any time following injury.   If blood transfusions are necessary to treat blood loss, there is the risk of blood-related infections such as hepatitis and HIV (AIDS). Heparin medications that are used to prevent blood clots in veins can produce bleeding and decreased blood platelets. Please check with the physician who prescribed that blood thinners such as aspirin Coumadin etc. Prior to stopping them. Infection- Infection can occur after surgery. Should an infection occur, additional treatment including antibiotics, hospitalization, or additional surgery may be necessary. Scarring- All surgery leaves scars, some more visible than others. Although wound healing after a surgical procedure is expected, abnormal scars may occur within the skin and deeper tissues. Scars may be unattractive and of different color than the surrounding skin tone. Scar appearance may also vary within the same scar. There is the possibility of visible marks from sutures used to close the wound after the removal of skin lesions and tumors. There is the possibility that scars may limit motion and function. In some cases, scars may require surgical revision or treatment. Obesity: If you're BMI is greater than 30 you may have a higher chance of complications. This may include but not limited to wound healing and infections. Also, if you have other medical problems such as diabetes and hypertension it may affect your healing as well as your surgical results in bleeding. Damage to Deeper Structures- There is the potential for injury to deeper structures including nerves, blood vessels, muscles, and lungs (pneumothorax) during any surgical procedure. The potential for this to occur varies according to where on the body surgery is being performed. Injury to deeper structures may be temporary or permanent. Cancer- In some situations, a skin lesion or tumor that appears to be benign may be determined to be cancerous after laboratory analysis.   Additional treatments or surgery may be necessary. Recurrence- In some situation, skin lesions and tumors can recur after surgical excision. Additional treatment or secondary surgery may be necessary. Skin Discoloration / Swelling- Some bruising and swelling normally occurs following surgery. The skin in or near the surgical site can appear either lighter or darker than surrounding skin. Although uncommon, swelling and skin discoloration may persist for long periods of time and, in rare situations, may be permanent. Skin Sensitivity- Itching, tenderness, or exaggerated responses to hot or cold temperatures may occur after surgery. Usually this resolves during healing, but in some situations, it may be chronic, permanent. Pain- You will experience pain after your surgery. Pain of varying intensity and duration may occur and persist after surgery. Chronic pain may occur from nerves becoming trapped in scar tissue. Sutures- Some surgical techniques use deep sutures. You may notice these sutures after your surgery. Sutures may spontaneously poke through the skin, become visible or produce irritation that requires removal.  Delayed Healing- Wound disruption or delayed wound healing is possible. Some areas of the skin may not heal normally and may take a long time to heal.  Some areas of skin may die, requiring frequent dressing changes or further surgery to remove the non-healed tissue. Smokers have a greater risk of skin loss and wound healing complications. Skin Contour Irregularities- Contour irregularities and depressions may occur after surgery. Visible and palpable wrinkling of skin can occur. Residual skin irregularities at the ends of the incisions or dog ears are always a possibility and may require additional surgery. This may improve with time, or it can be surgically corrected.   Allergic Reactions- In some cases, local allergies to tape, suture materials and glues, blood products, topical preparations or injected agents have been reported. Serious systemic reactions including shock (anaphylaxis) may occur to drugs used during surgery and prescription medications. Allergic reactions may require additional treatment. Surgical Anesthesia- Both local and general anesthesia involve risk. There is the possibility of complications, injury, and even death from all forms of surgical anesthesia or sedation. Change in Skin Sensation- It is common to experience diminished (or loss) of skin sensation in areas that have had surgery. Diminished (or complete loss of skin sensation) may not totally resolve. Shock- In rare circumstances, your surgical procedure can cause severe trauma, particularly when multiple or extensive procedures are performed. Although serious complications are infrequent, infections or excessive fluid loss can lead to severe illness and even death. If surgical shock occurs, hospitalization and additional treatment would be necessary. Unsatisfactory Result- There is no guarantee or warranty expressed or implied, on the results that may be obtained. There is the possibility of a poor result from the removal of skin lesions and tumors. This would include risks such as unacceptable visible deformities, loss of function, poor healing, wound disruption, skin death and loss of sensation. You may be disappointed with the results of reconstructive surgery. It may be necessary to perform additional surgery to improve your results. Cardiac and Pulmonary Complications- Surgery, especially longer procedures, may be associated with the formation of, or increase in, blood clots in the venous system. Pulmonary complications may occur secondarily to both blood clots (pulmonary emboli), fat deposits (fat emboli) or partial collapse of the lungs after general anesthesia. Pulmonary and fat emboli can be life-threatening or fatal in some circumstances.   Air travel, inactivity and other conditions may increase the incidence of blood clots traveling to the lungs causing a major blood clot that may result in death. It is important to discuss with your physician any past history of blood clots or swollen legs that may contribute to this condition. Cardiac complications are a risk with any surgery and anesthesia, even in patients without symptoms. If you experience shortness of breath, chest pains, or unusual heart beats, seek medical attention immediately. Should any of these complications occur, you may require hospitalization and additional treatment. Smoking, Second-Hand Smoke Exposure, Nicotine Products (Patch, Gum, Nasal Spray)-   Patients who are currently smoking, use tobacco products, or nicotine products (patch, gum, or nasal spray) are at a greater risk for significant surgical complications of skin dying, delayed healing, and additional scarring. Individuals exposed to second-hand smoke are also at potential risk for similar complications attributable to nicotine exposure. Additionally, smoking may have a significant negative effect on anesthesia and recovery from anesthesia, with coughing and possibly increased bleeding. Individuals who are not exposed to tobacco smoke or nicotine-containing products have a significantly lower risk of this type of complication. It is important to refrain from smoking at least 8-week weeks before and after surgery. This may apply to secondhand smoking. Mental Health Disorders and Surgery- It is important that all patients seeking to undergo surgery have realistic expectations that focus on improvement rather than perfection. Complications or less than satisfactory results are sometimes unavoidable, may require additional surgery and often are stressful. Please openly discuss with your surgeon, prior to surgery, any history that you may have of significant emotional depression or mental health disorders.   Although many individuals may benefit psychologically from the results of surgery, effects on mental health cannot be accurately predicted. Medications- There are many adverse reactions that occur as the result of taking over the counter, herbal, and/or prescription medications. Be sure to check with your physician about any drug interactions that may exist with medications which you are already taking. If you have an adverse reaction, stop the drugs immediately and call your plastic surgeon for further instructions. If the reaction is severe, go immediately to the nearest emergency room. When taking the prescribed pain medications after surgery, realize that they can affect your thought process and coordination. Do not drive, do not operate complex equipment, do not make any important decisions, and do not drink any alcohol while taking these medications. Be sure to take your prescribed medication only as directed. ADDITIONAL SURGERY NECESSARY  Should complications occur, additional surgery or other treatments may be necessary. Even though risks and complications occur infrequently, the risks cited are the ones that are particularly associated with skin lesion and skin tumor removal.  Other complications and risks can occur but are even more uncommon. The practice of medicine and surgery is not an exact science. Although good results are expected, there is no guarantee or warranty expressed or implied, on the results that may be obtained. PATIENT COMPLIANCE   Follow all physician instructions carefully; this is essential for the success of your outcome. It is important that the surgical incisions are not subjected to excessive force, swelling, abrasion, or motion during the time of healing. Protective dressings and drains should not be removed unless instructed by your plastic surgeon. Successful post-operative function depends on both surgery and subsequent care.   Physical activity that increases your pulse or heart rate may cause bruising, swelling, fluid accumulation and the need for return to surgery. It is wise to refrain from intimate physical activities after surgery until your physician states it is safe. It is important that you participate in follow-up care, return for aftercare, and promote your recovery after surgery.              Electronically signed by:  Valerie Franco MD 10/14/2020

## 2020-10-14 NOTE — LETTER
[facility]) Dermatology  [address]  [address]  Phone: [phone]  Fax: [phone]    Allen Santana MD        October 14, 2020     Luis Enrique Lenz MD  [address] [place] [address]  [phone]    Patient: Isadora Yeboah  MR Number: [id number]  YOB: 1960  Date of Visit: 10/14/2020    Dear Dr. Luis Enrique Lenz:    Thank you for the request for consultation for [place] Ma to me for the evaluation of skin lesions. Below are the relevant portions of my assessment and plan of care. Plan:  Patient with a nevus on the right axilla. Several options were discussed with the patient. Since the lesion has continued to enlarge and causes constant irritation patient elected to have it excised. The risk of excision was discussed with patient detail. All questions were answered. If you have questions, please do not hesitate to call me. I look forward to following Kristel Shield along with you.     Sincerely,        Allen Santana MD

## 2020-10-16 ENCOUNTER — HOSPITAL ENCOUNTER (OUTPATIENT)
Dept: CT IMAGING | Age: 60
Discharge: HOME | End: 2020-10-18
Payer: COMMERCIAL

## 2020-10-16 ENCOUNTER — HOSPITAL ENCOUNTER (OUTPATIENT)
Dept: PHYSICAL THERAPY | Age: 60
Setting detail: THERAPIES SERIES
Discharge: HOME | End: 2020-10-16
Payer: COMMERCIAL

## 2020-10-16 PROCEDURE — 74177 CT ABD & PELVIS W/CONTRAST: CPT

## 2020-10-16 PROCEDURE — 6360000004 HC RX CONTRAST MEDICATION: Performed by: INTERNAL MEDICINE

## 2020-10-16 PROCEDURE — 97110 THERAPEUTIC EXERCISES: CPT

## 2020-10-16 RX ADMIN — IOHEXOL 50 ML: 240 INJECTION, SOLUTION INTRATHECAL; INTRAVASCULAR; INTRAVENOUS; ORAL at 14:26

## 2020-10-16 RX ADMIN — IOPAMIDOL 75 ML: 755 INJECTION, SOLUTION INTRAVENOUS at 14:26

## 2020-10-16 NOTE — TELEPHONE ENCOUNTER
Alex Buckley is calling to request a refill on the following medication(s):    Last Visit Date (If Applicable):  33/82/4448    Next Visit Date:    Visit date not found    Medication Request:  Requested Prescriptions     Pending Prescriptions Disp Refills    diclofenac (VOLTAREN) 50 MG EC tablet [Pharmacy Med Name: DICLOFENAC SODIUM DR 50MG ENTERIC COATED TABLET] 56 tablet      Sig: TAKE 1 TABLET BY MOUTH TWICE DAILY    busPIRone (BUSPAR) 15 MG tablet [Pharmacy Med Name: BUSPIRONE HCL 15MG ORAL TABLET] 56 tablet 3     Sig: TAKE ONE TABLET BY MOUTH TWICE DAILY    pravastatin (PRAVACHOL) 40 MG tablet [Pharmacy Med Name: PRAVASTATIN SODIUM 40MG ORAL TABLET] 28 tablet 3     Sig: TAKE ONE TABLET BY MOUTH ONCE DAILY

## 2020-10-16 NOTE — FLOWSHEET NOTE
[x] [facility]) [facility] &  Therapy  [address]  [phone]  F: [phone] [] [address]  [place] [address]   [address]  P: [phone]  F: [phone] [] [place]  [address]  P: [phone]  F: [phone] [] [address]  P: [phone]  F: [phone] [] [address]  [facility]   [address]   [place]: [phone]  F: [phone]      Physical Therapy Daily Treatment Note    Date:  10/16/2020  Patient Name:  Melo Sherman    :  1960  MRN: [id number]  Physician: Padmini Carrillo DO                                    Insurance: [facility] (30 vs)  Medical Diagnosis: Rotator cuff impingement syndrome of right shoulder; subluxation of tendon of long head of biceps  Rehab Codes: M25.511, R29.3, R53.1, M25.611, R20.2  Onset Date: 2020                 Next 's appt: 10/8/20  Visit# / total visits: 316     Cancels/No Shows: 0/1    Subjective:    Pain:  [x] Yes  [] No Location: R shoulder Pain Rating: (0-10 scale) 4/10  Pain altered Tx:  [x] No  [] Yes  Action:  Comments:  Patient arrives stating overall not feeling too bad today. States shoulder has \"some\" pain, rates at 4/10.       Objective:  Modalities:    Precautions:  Exercises:  Exercise Reps/ Time Weight/ Level Comments   UBE 2/2 L2 Fwd/bwd   Pulleys 2/2  Flex/abd               Standing      Corner stretch 3x20\"  Added 10/   Reverse wall push ups 2x10  Added 10/16   Wall slides  10x2\"  Flexion/ abd   Wax on / wax off 10x           Thera bands      -Ext 10x Lime    -Rows 10x Lime    -ER 10x Lime Bilateral    -IR 10x Lime    - Triceps 10x Lime Added 10/16         Posterior shld rolls 10x     Scapular retraction 10x     Scapular depression 10x into kitchen cupboards with minimal/no pain  b. Pt will be able to lift 5# using bilat UEs from chest to overhead height without difficulty and no more than minimal pain. c. No more than minimal pain (3/10) when beginning to integrate RUE into work duties  5. Independent with Home Exercise Programs     LTG: (to be met in 16 treatments)  6. ? Pain: No more than 3/10 max pain in R shoulder with daily activities. 7. ? ROM: Full and symmetrical R shoulder AROM all planes when compared to LUE, with no pain reported  8. ? Strength: 5/5 R shoulder in all planes without pain for improved carrying/lifting  9. ? Function:   a. Pt will report no difficulty or pain with RUE dressing, overhead reaching, and carrying  b. Pt will be able to lift 10# using bilat UEs from chest to overhead height without difficulty and no more than minimal pain. c. No pain with any work duties while fully integrating RUE into cleaning            Patient goals: \"no pain, work with out pain using R arm\"      Rehab Potential:  [x]? Good  []? Fair  []? Poor    Suggested Professional Referral:  [x]? No  []? Yes:  Barriers to Goal Achievement[de-identified]  [x]? No  []? Yes:  Domestic Concerns:  [x]? No  []? Yes:    Pt. Education:  [x] Yes  [] No  [x] Reviewed Prior HEP/Ed  Method of Education: [x] Verbal  [x] Demo  [x] Written  Comprehension of Education:  [x] Verbalizes understanding. [x] Demonstrates understanding. [] Needs review. [x] Demonstrates/verbalizes HEP/Ed previously given. Access Code: [email or web address]   URL: [email or web address]   Date: 10/14/2020   Prepared by: Ching Cunningham     Exercises   Shoulder Flexion Wall Slide with Towel - 10 reps - 3 sets - 1x daily - 7x weekly   Standing shoulder flexion wall slides - 10 reps - 3 sets - 1x daily - 7x weekly   Shoulder Extension with Resistance - 10 reps - 3 sets - 1x daily - 7x weekly   Standing Bilateral Low Shoulder Row with Anchored Resistance - 10 reps - 3 sets - 1x daily - 7x weekly   Prone Shoulder Flexion - 10 reps - 3 sets - 1x daily - 7x weekly   Shoulder External Rotation with Anchored Resistance - 10 reps - 3 sets - 1x daily - 7x weekly   Shoulder Internal Rotation with Resistance - 10 reps - 3 sets - 1x daily - 7x weekly   Seated Scapular Retraction - 10 reps - 3 sets - 1x daily - 7x weekly   Standing Backward Shoulder Rolls - 10 reps - 3 sets - 1x daily - 7x weekly   Seated Passive Cervical Retraction - 10 reps - 3 sets - 1x daily - 7x weekly   Supine Shoulder Press AAROM in Abduction with Dowel - 10 reps - 3 sets - 1x daily - 7x weekly   Supine Shoulder Flexion Extension AAROM with Dowel - 10 reps - 3 sets - 1x daily - 7x weekly   Supine Shoulder Horizontal Abduction Adduction AAROM with Dowel - 10 reps - 3 sets - 1x daily - 7x weekly       Plan: [x] Continue current frequency toward long and short term goals.     [x] Specific Instructions for subsequent treatments:    - assess for subscap/lat tightness and address with manual as needed   - self stretching with table stretch, pec stretch   - can attempt pulleys for increased abd/flex ROM   - prone scap strengthening (depression, retraction, shld ext w/ retraction, row, T's, etc)   - bilateral ER, horizontal abd - with band when able   - wall slides to work on [employer] Works of flex/abd  - Bicep strengthening       Time In: 304 pm            Time Out: 355 pm     Electronically signed by:  Manuel Morales PTA

## 2020-10-19 ENCOUNTER — HOSPITAL ENCOUNTER (EMERGENCY)
Age: 60
Discharge: HOME | End: 2020-10-19
Attending: EMERGENCY MEDICINE
Payer: COMMERCIAL

## 2020-10-19 ENCOUNTER — APPOINTMENT (OUTPATIENT)
Dept: CT IMAGING | Age: 60
End: 2020-10-19
Payer: COMMERCIAL

## 2020-10-19 ENCOUNTER — APPOINTMENT (OUTPATIENT)
Dept: GENERAL RADIOLOGY | Age: 60
End: 2020-10-19
Payer: COMMERCIAL

## 2020-10-19 VITALS
SYSTOLIC BLOOD PRESSURE: 124 MMHG | OXYGEN SATURATION: 97 % | WEIGHT: 173 LBS | TEMPERATURE: 98.3 F | BODY MASS INDEX: 32.66 KG/M2 | DIASTOLIC BLOOD PRESSURE: 86 MMHG | HEIGHT: 61 IN | RESPIRATION RATE: 13 BRPM | HEART RATE: 69 BPM

## 2020-10-19 LAB
ABSOLUTE EOS #: 0.09 K/UL (ref 0–0.44)
ABSOLUTE IMMATURE GRANULOCYTE: 0 K/UL (ref 0–0.3)
ABSOLUTE LYMPH #: 1.18 K/UL (ref 1.1–3.7)
ABSOLUTE MONO #: 0.22 K/UL (ref 0.1–1.2)
ANION GAP SERPL CALCULATED.3IONS-SCNC: 8 MMOL/L (ref 9–17)
BASOPHILS # BLD: 1 % (ref 0–2)
BASOPHILS ABSOLUTE: 0.05 K/UL (ref 0–0.2)
BILIRUBIN URINE: NEGATIVE
BUN BLDV-MCNC: 16 MG/DL (ref 6–20)
BUN/CREAT BLD: 28 (ref 9–20)
CALCIUM SERPL-MCNC: 9.1 MG/DL (ref 8.6–10.4)
CHLORIDE BLD-SCNC: 105 MMOL/L (ref 98–107)
CO2: 27 MMOL/L (ref 20–31)
COLOR: YELLOW
COMMENT UA: NORMAL
CREAT SERPL-MCNC: 0.58 MG/DL (ref 0.5–0.9)
DIFFERENTIAL TYPE: NORMAL
EOSINOPHILS RELATIVE PERCENT: 2 % (ref 1–4)
GFR AFRICAN AMERICAN: >60 ML/MIN
GFR NON-AFRICAN AMERICAN: >60 ML/MIN
GFR SERPL CREATININE-BSD FRML MDRD: ABNORMAL ML/MIN/{1.73_M2}
GFR SERPL CREATININE-BSD FRML MDRD: ABNORMAL ML/MIN/{1.73_M2}
GLUCOSE BLD-MCNC: 127 MG/DL (ref 70–99)
GLUCOSE URINE: NEGATIVE
HCT VFR BLD CALC: 38.7 % (ref 36.3–47.1)
HEMOGLOBIN: 12.6 G/DL (ref 11.9–15.1)
IMMATURE GRANULOCYTES: 0 %
KETONES, URINE: NEGATIVE
LEUKOCYTE ESTERASE, URINE: NEGATIVE
LYMPHOCYTES # BLD: 30 % (ref 24–43)
MCH RBC QN AUTO: 29.4 PG (ref 25.2–33.5)
MCHC RBC AUTO-ENTMCNC: 32.6 G/DL (ref 28.4–34.8)
MCV RBC AUTO: 90.4 FL (ref 82.6–102.9)
MONOCYTES # BLD: 6 % (ref 3–12)
NITRITE, URINE: NEGATIVE
NRBC AUTOMATED: 0 PER 100 WBC
PDW BLD-RTO: 13 % (ref 11.8–14.4)
PH UA: 7 (ref 5–8)
PLATELET # BLD: 196 K/UL (ref 138–453)
PLATELET ESTIMATE: NORMAL
PMV BLD AUTO: 11.3 FL (ref 8.1–13.5)
POTASSIUM SERPL-SCNC: 4 MMOL/L (ref 3.7–5.3)
PROTEIN UA: NEGATIVE
RBC # BLD: 4.28 M/UL (ref 3.95–5.11)
RBC # BLD: NORMAL 10*6/UL
SEG NEUTROPHILS: 61 % (ref 36–65)
SEGMENTED NEUTROPHILS ABSOLUTE COUNT: 2.45 K/UL (ref 1.5–8.1)
SODIUM BLD-SCNC: 140 MMOL/L (ref 135–144)
SPECIFIC GRAVITY UA: 1.01 (ref 1–1.03)
TROPONIN INTERP: NORMAL
TROPONIN INTERP: NORMAL
TROPONIN T: NORMAL NG/ML
TROPONIN T: NORMAL NG/ML
TROPONIN, HIGH SENSITIVITY: <6 NG/L (ref 0–14)
TROPONIN, HIGH SENSITIVITY: <6 NG/L (ref 0–14)
TURBIDITY: CLEAR
URINE HGB: NEGATIVE
UROBILINOGEN, URINE: NORMAL
WBC # BLD: 4 K/UL (ref 3.5–11.3)
WBC # BLD: NORMAL 10*3/UL

## 2020-10-19 PROCEDURE — 70498 CT ANGIOGRAPHY NECK: CPT

## 2020-10-19 PROCEDURE — 85025 COMPLETE CBC W/AUTO DIFF WBC: CPT

## 2020-10-19 PROCEDURE — 2580000003 HC RX 258: Performed by: EMERGENCY MEDICINE

## 2020-10-19 PROCEDURE — 6360000004 HC RX CONTRAST MEDICATION: Performed by: EMERGENCY MEDICINE

## 2020-10-19 PROCEDURE — 84484 ASSAY OF TROPONIN QUANT: CPT

## 2020-10-19 PROCEDURE — 70450 CT HEAD/BRAIN W/O DYE: CPT

## 2020-10-19 PROCEDURE — 80048 BASIC METABOLIC PNL TOTAL CA: CPT

## 2020-10-19 PROCEDURE — 93005 ELECTROCARDIOGRAM TRACING: CPT | Performed by: EMERGENCY MEDICINE

## 2020-10-19 PROCEDURE — 71045 X-RAY EXAM CHEST 1 VIEW: CPT

## 2020-10-19 PROCEDURE — 81003 URINALYSIS AUTO W/O SCOPE: CPT

## 2020-10-19 PROCEDURE — 99284 EMERGENCY DEPT VISIT MOD MDM: CPT

## 2020-10-19 RX ORDER — 0.9 % SODIUM CHLORIDE 0.9 %
1000 INTRAVENOUS SOLUTION INTRAVENOUS ONCE
Status: COMPLETED | OUTPATIENT
Start: 2020-10-19 | End: 2020-10-19

## 2020-10-19 RX ORDER — 0.9 % SODIUM CHLORIDE 0.9 %
80 INTRAVENOUS SOLUTION INTRAVENOUS ONCE
Status: COMPLETED | OUTPATIENT
Start: 2020-10-19 | End: 2020-10-19

## 2020-10-19 RX ORDER — BUSPIRONE HYDROCHLORIDE 15 MG/1
TABLET ORAL
Qty: 56 TABLET | Refills: 3 | Status: SHIPPED | OUTPATIENT
Start: 2020-10-19 | End: 2021-03-08

## 2020-10-19 RX ORDER — PRAVASTATIN SODIUM 40 MG
TABLET ORAL
Qty: 28 TABLET | Refills: 3 | Status: SHIPPED | OUTPATIENT
Start: 2020-10-19 | End: 2021-02-05

## 2020-10-19 RX ORDER — SODIUM CHLORIDE 0.9 % (FLUSH) 0.9 %
10 SYRINGE (ML) INJECTION PRN
Status: DISCONTINUED | OUTPATIENT
Start: 2020-10-19 | End: 2020-10-19 | Stop reason: HOSPADM

## 2020-10-19 RX ADMIN — Medication 10 ML: at 17:11

## 2020-10-19 RX ADMIN — SODIUM CHLORIDE 1000 ML: 9 INJECTION, SOLUTION INTRAVENOUS at 15:14

## 2020-10-19 RX ADMIN — IOPAMIDOL 75 ML: 755 INJECTION, SOLUTION INTRAVENOUS at 17:11

## 2020-10-19 RX ADMIN — SODIUM CHLORIDE 80 ML: 9 INJECTION, SOLUTION INTRAVENOUS at 17:11

## 2020-10-19 ASSESSMENT — ENCOUNTER SYMPTOMS
TROUBLE SWALLOWING: 0
ABDOMINAL PAIN: 0
SHORTNESS OF BREATH: 0

## 2020-10-19 NOTE — ED PROVIDER NOTES
and headaches. Psychiatric/Behavioral: Negative for confusion. PASTMEDICAL HISTORY     Past Medical History:   Diagnosis Date    ASCUS with positive high risk HPV cervical 3/22/16    Asthma     Depression     Diverticulitis     ESBL (extended spectrum beta-lactamase) producing bacteria infection 02/03/2019    E.  Coli urine    GERD (gastroesophageal reflux disease)     Hyperlipidemia     MRSA (methicillin resistant staph aureus) culture positive 4/18/2016    lip    Rectocele     Tachycardia     takes Metoprolol     SURGICAL HISTORY       Past Surgical History:   Procedure Laterality Date    COLONOSCOPY N/A 7/19/2018    COLONOSCOPY performed by Amelia Pepe DO at [address]  2/25/2015    T [address] 47    RYAN, BSO performed at [facility] by Dr. Jarret Hurtado, Also had some type of bladder lift at this time    KNEE ARTHROSCOPY Left 5/13/2019    KNEE ARTHROSCOPY performed by Michael Jernigan MD at [place] [address] Left     #1 - lateral release, #2 - arthroscopy with muscle alignment    NERVE BLOCK  07/28/2017    caudal #1  decadron 10mg    NERVE BLOCK  09/22/2017    cadal # 2, decadron 10 mg    NERVE BLOCK  09/22/2017    caudal epidural steroid block #2 decadron 10 mg    NERVE BLOCK  11/10/2017    lumbar L4-5 epidural; depomedrol 80mg    UPPER GASTROINTESTINAL ENDOSCOPY  1/30/2019    EGD BIOPSY performed by Bairon Bassett MD at [address]       Previous Medications    ALBUTEROL SULFATE  (90 BASE) MCG/ACT INHALER    INHALE 2 PUFFS BY MOUTH INTO THE LUNGS ONCE EVERY 6 HOURS AS NEEDED FOR WHEEZING    BUSPIRONE (BUSPAR) 15 MG TABLET    TAKE ONE TABLET BY MOUTH TWICE DAILY    CHLORZOXAZONE (PARAFON FORTE) 500 MG TABLET    Take 0.5 tablets by mouth 2 times daily    CRANBERRY CONCENTRATE 500 MG CAPS    TAKE 1 CAPSULE BY MOUTH TWICE DAILY    DICLOFENAC (VOLTAREN) 50 MG EC TABLET    TAKE 1 TABLET BY MOUTH TWICE DAILY    DICLOFENAC-MISOPROSTOL (ARTHROTEC 50) 50-0.2 MG PER TABLET    TAKE 1 TABLET BY MOUTH TWICE DAILY    FLUTICASONE (FLONASE) 50 MCG/ACT NASAL SPRAY    INSTILL 2 SPRAYS INTO EACH NOSTRIL ONCE DAILY    IBUPROFEN (ADVIL;MOTRIN) 800 MG TABLET    Take 1 tablet by mouth every 8 hours as needed for Pain    KETOTIFEN (ZADITOR) 0.025 % OPHTHALMIC SOLUTION    Place 1 drop into both eyes 2 times daily    MELOXICAM (MOBIC) 7.5 MG TABLET    Take 1 tablet by mouth daily    METOPROLOL SUCCINATE (TOPROL XL) 25 MG EXTENDED RELEASE TABLET    Take 25 mg by mouth daily     MI-ACID GAS RELIEF 80 MG CHEWABLE TABLET    CHEW 1 TABLET BY MOUTH FOUR TIMES DAILY AS NEEDED FOR FLATULENCE    MISOPROSTOL (CYTOTEC) 200 MCG TABLET    Take 200 mcg by mouth daily    MULTIPLE VITAMINS-MINERALS (CULTURELLE PROBIOTICS + MULTIV) CHEW    Take 1 tablet by mouth daily    OMEPRAZOLE (PRILOSEC) 40 MG DELAYED RELEASE CAPSULE    TAKE 1 CAPSULE BY MOUTH DAILY EVERY MORNING FOR BREAKFAST    ONDANSETRON (ZOFRAN ODT) 4 MG DISINTEGRATING TABLET    Take 1 tablet by mouth every 6 hours as needed for Nausea or Vomiting    PRAVASTATIN (PRAVACHOL) 40 MG TABLET    TAKE ONE TABLET BY MOUTH ONCE DAILY    PREMARIN 0.625 MG/GM VAGINAL CREAM    PLACE 2 GRAMS VAGINALLY TWICE A WEEK FOR 12 DOSES    SENNA-DOCUSATE (PERICOLACE) 8.6-50 MG PER TABLET    Take 1 tablet by mouth daily as needed for Constipation    VENLAFAXINE (EFFEXOR XR) 75 MG EXTENDED RELEASE CAPSULE    TAKE 1 CAPSULE BY MOUTH DAILY    VITAMIN D (ERGOCALCIFEROL) 1.25 MG (68159 UT) CAPS CAPSULE    TAKE 1 CAPSULE BY MOUTH EVERY WEEK ON      ALLERGIES     is allergic to shellfish allergy; shrimp (diagnostic); seasonal; famotidine; and gabapentin. FAMILY HISTORY     She indicated that her mother is . She indicated that her father is alive. She indicated that her brother is alive. She indicated that her maternal grandmother is . She indicated that her maternal grandfather is .  She indicated that her paternal grandmother is . She indicated that her paternal grandfather is . She indicated that her niece is alive. SOCIAL HISTORY       Social History     Tobacco Use    Smoking status: Former Smoker     Packs/day: 2.00     Years: 0.50     Pack years: 1.00     Types: Cigarettes     Last attempt to quit:      Years since quittin.8    Smokeless tobacco: Never Used   Substance Use Topics    Alcohol use: Yes     Alcohol/week: 0.0 standard drinks     Comment: social    Drug use: No     PHYSICAL EXAM     INITIAL VITALS: /66   Pulse 65   Temp 98.3 °F (36.8 °C) (Oral)   Resp 15   Ht 5' 1\" (1.549 m)   Wt 173 lb (78.5 kg)   SpO2 97%   BMI 32.69 kg/m²    Physical Exam  Vitals signs and nursing note reviewed. Constitutional:       General: She is not in acute distress. Appearance: She is not ill-appearing, toxic-appearing or diaphoretic. HENT:      Head: Normocephalic and atraumatic. Mouth/Throat:      Mouth: Mucous membranes are moist.      Pharynx: Oropharynx is clear. Eyes:      General: No visual field deficit or scleral icterus. Extraocular Movements: Extraocular movements intact. Pupils: Pupils are equal, round, and reactive to light. Neck:      Musculoskeletal: Normal range of motion and neck supple. Cardiovascular:      Rate and Rhythm: Normal rate and regular rhythm. Pulses: Normal pulses. Heart sounds: Normal heart sounds. Pulmonary:      Effort: Pulmonary effort is normal. No respiratory distress. Breath sounds: Normal breath sounds. Abdominal:      General: There is no distension. Palpations: Abdomen is soft. Tenderness: There is no abdominal tenderness. Musculoskeletal: Normal range of motion. General: No deformity. Right lower leg: No edema. Left lower leg: No edema. Skin:     General: Skin is warm and dry. Capillary Refill: Capillary refill takes less than 2 seconds. Findings: No rash. Neurological:      General: No focal deficit present. Mental Status: She is alert and oriented to person, place, and time. GCS: GCS eye subscore is 4. GCS verbal subscore is 5. GCS motor subscore is 6. Cranial Nerves: No cranial nerve deficit, dysarthria or facial asymmetry. Sensory: Sensation is intact. Motor: Motor function is intact. No pronator drift. Coordination: Finger-Nose-Finger Test normal.      Deep Tendon Reflexes: Babinski sign absent on the right side. Babinski sign absent on the left side. Reflex Scores:       Patellar reflexes are 2+ on the right side and 2+ on the left side. Comments: Strength is 5-5 all 4 extremities   Psychiatric:         Thought Content: Thought content normal.         MEDICAL DECISION MAKING:          Please see ED Course below for MDM/ED course. DDx: Stroke, intracranial bleed, arrhythmia, anemia, electrolyte disturbance    All patient's question's and concerns were answered prior to disposition and patient and/or family expressed understanding and agreement of treatment plan. NIH STROKE SCALE:            PROCEDURES:    Procedures    DIAGNOSTIC RESULTS   EKG:All EKG's are interpreted by the Emergency Department Physician who either signs or Co-signs this chart in the absence of a cardiologist.    Normal sinus rhythm rate of 75 normal intervals normal axis no ST elevations or depressions no T wave changes, nonspecific EKG; compared to prior no significant changes    RADIOLOGY:All plain film, CT, MRI, and formal ultrasound images (except ED bedside ultrasound) are read by the radiologist, see reports below, unless otherwisenoted in MDM or here. CTA HEAD NECK W CONTRAST   Final Result   No evidence of dissection or traumatic injury. No large vessel occlusion or hemodynamic stenosis involving intracranial or   cervical arterial circulation.       Degenerate changes cervical spine noted with findings suspicious for a disc extrusion C4-C5         CT HEAD WO CONTRAST   Final Result   No acute intracranial abnormality. XR CHEST 1 VIEW   Final Result   No acute process. LABS: All lab results were reviewed by myself, and all abnormals are listed below. Labs Reviewed   BASIC METABOLIC PANEL W/ REFLEX TO MG FOR LOW K - Abnormal; Notable for the following components:       Result Value    Glucose 127 (*)     Bun/Cre Ratio 28 (*)     Anion Gap 8 (*)     All other components within normal limits   CBC WITH AUTO DIFFERENTIAL   TROPONIN   TROPONIN   URINE RT REFLEX TO CULTURE       EMERGENCY DEPARTMENTCOURSE:     Patient is a 81-year-old female here with lightheadedness and general malaise and weakness. No infectious symptoms. No falls. On exam no acute distress resting comfortably 95% on room air no respiratory distress heart sounds regular no murmurs rubs gallops afebrile nontoxic. She is neurologically intact. Will check cardiac and neurologic work-up, CT head, check urine hydrate and reassess. 5:16 PM EDT  Labs and CT unremarkable. Patient did ambulate to the restroom and states she still felt lightheaded. She seemed to have steady gait. She does mention that she had her neck manipulated by a chiropractor about 1 week ago and had a headache after then and has had residual mild discomfort in the back of her neck. Given this will CTA to rule out dissection and continue hydration. CTA negative. Patient ambulatory witnessed very steady gait, she states her lightheadedness was very subtle and only when getting up not ambulating. Given this given her stable vitals and unremarkable work-up believe she is stable for discharge with outpatient follow-up. Instructed to follow-up with her PCP in the next few days, return if her symptoms persist or worsen.       Vitals:    Vitals:    10/19/20 1724 10/19/20 1738 10/19/20 1802 10/19/20 1803   BP:  124/70 130/66    Pulse: 69 70  65   Resp: 14 15  15   Temp: TempSrc:       SpO2: 98% 97%  97%   Weight:       Height:           The patient was given the following medications while in the emergency department:  Orders Placed This Encounter   Medications    0.9 % sodium chloride bolus    0.9 % sodium chloride bolus    sodium chloride flush 0.9 % injection 10 mL    iopamidol (ISOVUE-370) 76 % injection 75 mL     CONSULTS:  None    FINAL IMPRESSION      1. Lightheadedness          DISPOSITION/PLAN   DISPOSITION  Decision to discharge      PATIENT REFERRED TO:  Poonam Evans MD  [address] [place] [address], [place] [address]    Schedule an appointment as soon as possible for a visit       [facility] ED  [address] [facility]  [phone]    If symptoms worsen    DISCHARGE MEDICATIONS:  New Prescriptions    No medications on file     Jose Clements MD  Attending Emergency Physician    This note was created with the assistance of a speech-recognition program. While intending to generate a document that actually reflects the content of the visit, no guarantees can be provided that every mistake has been identified and corrected by editing.                    Jose Clements MD  10/19/20 8805

## 2020-10-20 LAB
EKG ATRIAL RATE: 75 BPM
EKG P AXIS: 66 DEGREES
EKG P-R INTERVAL: 134 MS
EKG Q-T INTERVAL: 380 MS
EKG QRS DURATION: 78 MS
EKG QTC CALCULATION (BAZETT): 424 MS
EKG R AXIS: 10 DEGREES
EKG T AXIS: 58 DEGREES
EKG VENTRICULAR RATE: 75 BPM

## 2020-10-20 PROCEDURE — 93010 ELECTROCARDIOGRAM REPORT: CPT | Performed by: INTERNAL MEDICINE

## 2020-10-21 ENCOUNTER — HOSPITAL ENCOUNTER (OUTPATIENT)
Dept: PHYSICAL THERAPY | Age: 60
Setting detail: THERAPIES SERIES
Discharge: HOME | End: 2020-10-21
Payer: COMMERCIAL

## 2020-10-21 PROCEDURE — 97140 MANUAL THERAPY 1/> REGIONS: CPT

## 2020-10-21 PROCEDURE — 97110 THERAPEUTIC EXERCISES: CPT

## 2020-10-21 NOTE — FLOWSHEET NOTE
elevation   Lateral raise 2x10 2# To 100 deg; high pain with 2# so reduced to AROM   Overhead press   ADD NEXT if tolerated         Thera bands      -Ext 10x Purple    -Rows 10x Purple    -ER 10x Purple Bilateral    -IR 10x Purple    - Triceps 10x Purple          Posterior shld rolls 10x     Scapular retraction 10x     Scapular depression      Chin tucks with C-flexion            Supine      Chest press  1lb    Shoulder flexion  1lb    Horiz abd  1lb    Protraction  1lb                             Other:      Treatment Charges: Mins Units   [x]  Modalities -- HP 10 0   [x]  Ther Exercise 29 2   [x]  Manual Therapy 15 1   []  Ther Activities     []  Aquatics     []  Vasocompression     []  Other     Total Treatment time 44 3       Assessment: [x] Progressing toward goals. Pt with pain-free and full AROM of shoulder flexion, limited at ~110 deg shoulder abd. Assessed subscap/lat restrictions and this revealed tightness - spent time in manual muscle release and MET to address these restrictions. Able to restore full abd AROM after this, with minimal pain noted at ~130 deg abd. Educated pt on self lat/subscap stretch in doorway for HEP; pt with good teach-back demo of this exercise. Further attention to strengthening at this point - able to progress theraband resistance with good tolerance, min tactile cues to reduce scap elevation/upper trap compensation. Fair tolerance to front/lateral raise but needed to complete only AROM vs resisted for abduction d/t pain. [] No change. [] Other:  [x] Patient would continue to benefit from skilled physical therapy services in order to: decrease shoulder pain, increase ROM in ER/IR, increase strength and function. Problems:    [x]? ? Pain:  [x]? ? ROM:  [x]? ? Strength:  [x]? ? Function:  [x]? Other: 35/80 on UEFS = 44% fxn per PT     STG: (to be met in 10 treatments)  1. ? Pain: No more than 5/10 max pain in R shoulder with daily activities.   2. ? ROM: R shoulder AROM to the following to indicate progressing joint mobility:  a. Flex/abd: 160/120, respectively  b. ER/IR: To L4/To C6, respectively  3. ? Strength: Improving scapular strength evident by ability to complete prone ITY exercises for 20x ea on R shoulder without excessive upper trap compensation to further improve scapulohumoral rhythm needed for overhead reaching  4. ? Function:   a. Pt will report improving ability to reach into kitchen cupboards with minimal/no pain  b. Pt will be able to lift 5# using bilat UEs from chest to overhead height without difficulty and no more than minimal pain. c. No more than minimal pain (3/10) when beginning to integrate RUE into work duties  5. Independent with Home Exercise Programs     LTG: (to be met in 16 treatments)  6. ? Pain: No more than 3/10 max pain in R shoulder with daily activities. 7. ? ROM: Full and symmetrical R shoulder AROM all planes when compared to LUE, with no pain reported  8. ? Strength: 5/5 R shoulder in all planes without pain for improved carrying/lifting  9. ? Function:   a. Pt will report no difficulty or pain with RUE dressing, overhead reaching, and carrying  b. Pt will be able to lift 10# using bilat UEs from chest to overhead height without difficulty and no more than minimal pain. c. No pain with any work duties while fully integrating RUE into cleaning            Patient goals: \"no pain, work with out pain using R arm\"      Rehab Potential:  [x]? Good  []? Fair  []? Poor    Suggested Professional Referral:  [x]? No  []? Yes:  Barriers to Goal Achievement[de-identified]  [x]? No  []? Yes:  Domestic Concerns:  [x]? No  []? Yes:    Pt. Education:  [x] Yes  [] No  [x] Reviewed Prior HEP/Ed  Method of Education: [x] Verbal  [x] Demo  [x] Written  Comprehension of Education:  [x] Verbalizes understanding. [x] Demonstrates understanding. [] Needs review. [x] Demonstrates/verbalizes HEP/Ed previously given. Access Code: [email or web address]   URL: [email or web address] Date: 10/14/2020   Prepared by: Pao Garcia   Shoulder Flexion Wall Slide with Towel - 10 reps - 3 sets - 1x daily - 7x weekly   Standing shoulder flexion wall slides - 10 reps - 3 sets - 1x daily - 7x weekly   Shoulder Extension with Resistance - 10 reps - 3 sets - 1x daily - 7x weekly   Standing Bilateral Low Shoulder Row with Anchored Resistance - 10 reps - 3 sets - 1x daily - 7x weekly   Prone Shoulder Flexion - 10 reps - 3 sets - 1x daily - 7x weekly   Shoulder External Rotation with Anchored Resistance - 10 reps - 3 sets - 1x daily - 7x weekly   Shoulder Internal Rotation with Resistance - 10 reps - 3 sets - 1x daily - 7x weekly   Seated Scapular Retraction - 10 reps - 3 sets - 1x daily - 7x weekly   Standing Backward Shoulder Rolls - 10 reps - 3 sets - 1x daily - 7x weekly   Seated Passive Cervical Retraction - 10 reps - 3 sets - 1x daily - 7x weekly   Supine Shoulder Press AAROM in Abduction with Dowel - 10 reps - 3 sets - 1x daily - 7x weekly   Supine Shoulder Flexion Extension AAROM with Dowel - 10 reps - 3 sets - 1x daily - 7x weekly   Supine Shoulder Horizontal Abduction Adduction AAROM with Dowel - 10 reps - 3 sets - 1x daily - 7x weekly       Plan: [x] Continue current frequency toward long and short term goals.     [x] Specific Instructions for subsequent treatments:    - assess for subscap/lat tightness and address with manual as needed   - self stretching with table stretch, pec stretch   - can attempt pulleys for increased abd/flex ROM   - prone scap strengthening (depression, retraction, shld ext w/ retraction, row, T's, etc)   - bilateral ER, horizontal abd - with band when able   - wall slides to work on [employer] of flex/abd  - Bicep strengthening       Time In: 1:06 pm            Time Out: 2:00 pm     Electronically signed by:  Yamilet Rincon, PT

## 2020-10-22 ENCOUNTER — OFFICE VISIT (OUTPATIENT)
Dept: ORTHOPEDIC SURGERY | Age: 60
End: 2020-10-22
Payer: COMMERCIAL

## 2020-10-22 VITALS — BODY MASS INDEX: 32.69 KG/M2 | WEIGHT: 173 LBS

## 2020-10-22 PROCEDURE — 99213 OFFICE O/P EST LOW 20 MIN: CPT | Performed by: STUDENT IN AN ORGANIZED HEALTH CARE EDUCATION/TRAINING PROGRAM

## 2020-10-22 PROCEDURE — 20550 NJX 1 TENDON SHEATH/LIGAMENT: CPT | Performed by: STUDENT IN AN ORGANIZED HEALTH CARE EDUCATION/TRAINING PROGRAM

## 2020-10-22 RX ORDER — METHYLPREDNISOLONE ACETATE 80 MG/ML
80 INJECTION, SUSPENSION INTRA-ARTICULAR; INTRALESIONAL; INTRAMUSCULAR; SOFT TISSUE ONCE
Status: COMPLETED | OUTPATIENT
Start: 2020-10-22 | End: 2020-10-22

## 2020-10-22 RX ORDER — BUPIVACAINE HYDROCHLORIDE 2.5 MG/ML
0.5 INJECTION, SOLUTION INFILTRATION; PERINEURAL ONCE
Status: COMPLETED | OUTPATIENT
Start: 2020-10-22 | End: 2020-10-22

## 2020-10-22 RX ORDER — LIDOCAINE HYDROCHLORIDE 10 MG/ML
0.5 INJECTION, SOLUTION INFILTRATION; PERINEURAL ONCE
Status: COMPLETED | OUTPATIENT
Start: 2020-10-22 | End: 2020-10-22

## 2020-10-22 RX ADMIN — LIDOCAINE HYDROCHLORIDE 0.5 ML: 10 INJECTION, SOLUTION INFILTRATION; PERINEURAL at 15:22

## 2020-10-22 RX ADMIN — BUPIVACAINE HYDROCHLORIDE 1.25 MG: 2.5 INJECTION, SOLUTION INFILTRATION; PERINEURAL at 15:22

## 2020-10-22 RX ADMIN — METHYLPREDNISOLONE ACETATE 80 MG: 80 INJECTION, SUSPENSION INTRA-ARTICULAR; INTRALESIONAL; INTRAMUSCULAR; SOFT TISSUE at 15:22

## 2020-10-22 NOTE — PROGRESS NOTES
[facility]  [facility] ORTHO SPECIALISTS  [address] [place]  [address]  Dept: [phone]    Ambulatory Orthopedic Clinic    CHIEF COMPLAINT:    Chief Complaint   Patient presents with    Shoulder Pain     right shoulder f/u       HISTORY OF PRESENT ILLNESS:      The patient is a 62 y.o. female who is being seen for right shoulder pain. Pain has been present for over a month now. No direct injury to her right shoulder. Patient underwent a subacromial corticosteroid injection at her last visit on 9/24/2020. She has undergone approximately 1 month of physical therapy for her right shoulder at this time. She does state that her right shoulder pain has improved since her physical therapy began but she still having some anterior shoulder pain at this time and points directly over her biceps tendon. She has been taking Tylenol for pain. She has not been taking Advil as discussed at her last visit. Past Medical History:    Past Medical History:   Diagnosis Date    ASCUS with positive high risk HPV cervical 3/22/16    Asthma     Depression     Diverticulitis     ESBL (extended spectrum beta-lactamase) producing bacteria infection 02/03/2019    E.  Coli urine    GERD (gastroesophageal reflux disease)     Hyperlipidemia     MRSA (methicillin resistant staph aureus) culture positive 4/18/2016    lip    Rectocele     Tachycardia     takes Metoprolol       Past Surgical History:    Past Surgical History:   Procedure Laterality Date    COLONOSCOPY N/A 7/19/2018    COLONOSCOPY performed by Kyra Schmitt DO at [address]  2/25/2015    uro   BRI EvangelistaO performed at [facility] by Dr. Sinha Estimable, Also had some type of bladder lift at this time    KNEE ARTHROSCOPY Left 5/13/2019    KNEE ARTHROSCOPY performed by Mya Mendenhall MD at [address] [place] [address] Left     #1 - lateral release, #2 - arthroscopy with muscle alignment    NERVE BLOCK  07/28/2017 for Nausea or Vomiting 12 tablet 0    albuterol sulfate  (90 Base) MCG/ACT inhaler INHALE 2 PUFFS BY MOUTH INTO THE LUNGS ONCE EVERY 6 HOURS AS NEEDED FOR WHEEZING 18 g 3    MI-ACID GAS RELIEF 80 MG chewable tablet CHEW 1 TABLET BY MOUTH FOUR TIMES DAILY AS NEEDED FOR FLATULENCE 120 tablet 5    CRANBERRY CONCENTRATE 500 MG CAPS TAKE 1 CAPSULE BY MOUTH TWICE DAILY 56 capsule 3    ketotifen (ZADITOR) 0.025 % ophthalmic solution Place 1 drop into both eyes 2 times daily 10 mL 1    metoprolol succinate (TOPROL XL) 25 MG extended release tablet Take 25 mg by mouth daily       PREMARIN 0.625 MG/GM vaginal cream PLACE 2 GRAMS VAGINALLY TWICE A WEEK FOR 12 DOSES 30 g 1     No current facility-administered medications for this visit. Allergies:    Shellfish allergy; Shrimp (diagnostic); Seasonal; Famotidine; and Gabapentin    Social History:   Social History     Socioeconomic History    Marital status:      Spouse name: Not on file    Number of children: Not on file    Years of education: Not on file    Highest education level: Not on file   Occupational History    Not on file   Social Needs    Financial resource strain: Not on file    Food insecurity     Worry: Not on file     Inability: Not on file   Ayi Laile needs     Medical: Not on file     Non-medical: Not on file   Tobacco Use    Smoking status: Former Smoker     Packs/day: 2.00     Years: 0.50     Pack years: 1.00     Types: Cigarettes     Last attempt to quit:      Years since quittin.8    Smokeless tobacco: Never Used   Substance and Sexual Activity    Alcohol use:  Yes     Alcohol/week: 0.0 standard drinks     Comment: social    Drug use: No    Sexual activity: Not on file   Lifestyle    Physical activity     Days per week: Not on file     Minutes per session: Not on file    Stress: Not on file   Relationships    Social connections     Talks on phone: Not on file     Gets together: Not on file     Attends in clinic. ASSESSMENT:     1. Biceps tendinitis of right upper extremity         PLAN:  Patient did obtain some relief from her last corticosteroid injection though she does have some continued anterior shoulder pain likely from biceps tendinitis. Physical therapy also appears to be helping. Due to continued biceps pain we did discuss potentially giving the patient a corticosteroid injection in her biceps tendon sheath today. She did elect to move forward with this procedure. See procedure note below. I would like the patient to continue taking Advil for the next 2 weeks with meals. Encouraged to take 800 mg 3 times daily. Also continue physical therapy. We will see the patient back in 6 weeks for repeat clinical exam.  If she still having symptoms at that time we may move forward with further imaging to include an MRI. Procedure: Risks, benefits, and alternatives have been discussed regarding therapeutic and diagnostic injection of the Right biceps tendon sheath including bleeding, infection, allergic reaction to skin prep or medication, post-injection flare/reaction, skin depigmentation, and elevation of blood glucose. . Patient agreed to move forward with the proposed procedure. The appropriate anatomic landmarks were palpated, the anticipated injection sites were marked, the skin was prepped using an alcohol swab and betadine. Then proceeded to insert a 25 gauge needle into the joint, injecting 2 cc bupivicaine and 80 mg DepoMedrol/Kenalog without resistance, indicating an intra-articular position. Patient tolerated the procedure well and expressed interval improvement in symptoms.        Orders Placed This Encounter   Medications    lidocaine 1 % injection 0.5 mL    bupivacaine (MARCAINE) 0.25 % injection 1.25 mg    methylPREDNISolone acetate (DEPO-MEDROL) injection 80 mg       Orders Placed This Encounter   Procedures    HI INJECT TENDON SHEATH/LIGAMENT --------------------------------------------------------------  Marybeth Huynh DO, PGY-4  [facility]) Orthopedic Surgery   3:32 PM 10/22/20      Please excuse any typos/errors, as this note was created with the assistance of voice recognition software. While intending to generate a document that actually reflects the content of the visit, the document can still have some errors including those of syntax and sound-a-like substitutions which may escape proof reading. In such instances, actual meaning can be extrapolated by context.

## 2020-10-23 ENCOUNTER — HOSPITAL ENCOUNTER (OUTPATIENT)
Dept: PHYSICAL THERAPY | Age: 60
Setting detail: THERAPIES SERIES
Discharge: HOME | End: 2020-10-23
Payer: COMMERCIAL

## 2020-10-23 PROCEDURE — 97140 MANUAL THERAPY 1/> REGIONS: CPT

## 2020-10-23 PROCEDURE — 97110 THERAPEUTIC EXERCISES: CPT

## 2020-10-23 PROCEDURE — 97016 VASOPNEUMATIC DEVICE THERAPY: CPT

## 2020-10-23 NOTE — FLOWSHEET NOTE
[x] [facility]) [facility] &  Therapy  [address]  [phone]  F: [phone] [] [address]  [employer] [address]   [address]  P: [phone]  F: [phone] [] [place]  [address]  P: [phone]  F: [phone] [] [address]  P: [phone]  F: [phone] [] [address]  [facility]   [address]   [place]: [phone]  F: [phone]      Physical Therapy Daily Treatment Note    Date:  10/23/2020  Patient Name:  Lis Mullen    :  1960  MRN: [id number]  Physician: Spencer Beltran DO                                    Insurance: [facility] (30 vs)  Medical Diagnosis: Rotator cuff impingement syndrome of right shoulder; subluxation of tendon of long head of biceps  Rehab Codes: N08.916, R29.3, R53.1, M25.611, R20.2  Onset Date: 2020                 Next 's appt: 10/8/20  Visit# / total visits:      Cancels/No Shows: 0/1    Subjective:    Pain:  [x] Yes  [] No Location: R shoulder Pain Rating: (0-10 scale) 2/10  Pain altered Tx:  [x] No  [] Yes  Action:  Comments:  Pt reports she feels \"so-so\" today, noting 2/10 pain. Pt states she's still getting catching of the R shoulder but is getting better, and isn't in constant pain anymore.     Objective:  Modalities:    Precautions:  Exercises:  Exercise Reps/ Time Weight/ Level Comments   UBE 2/2 L3 Fwd/bwd   Manual 15 min  - Subscap MET  - Lat active release and MET   Pulleys 2/2  Flex/abd               Standing      Lat crossbody stretch in doorframe 3x30\"     Corner stretch 3x20\"     Reverse wall push ups 2x10     Wall slides  10x2\"  Flexion/ abd   Wax on / wax off 10x           Front raise 2x10 2# To 90 deg elevation   Lateral raise 2x10 2# To 100 deg; high pain with 2# so reduced to AROM   Overhead press   ADD NEXT if tolerated         Thera bands      -Ext 15x Purple    -Rows 15x Purple    -ER 15x Purple Bilateral    -IR 15x Purple    -Triceps 15x Purple    -Biceps 15x Purple Added 10.23         Posterior shld rolls 10x     Scapular retraction 10x     Scapular depression 10x     Chin tucks with C-flexion 10x           Supine   Held d/t pain   Chest press  1lb    Shoulder flexion  1lb    Horiz abd  1lb    Protraction  1lb                             Other:  Manual:  PROM in supine with flexion/abd.  x12 min    Treatment Charges: Mins Units   []  Modalities -- CP     [x]  Ther Exercise 40 2   [x]  Manual Therapy 12 1   []  Ther Activities     []  Aquatics     [x]  Vasocompression 15 1   []  Other     Total Treatment time 67 4       Assessment: [x] Progressing toward goals. Fair tolerance of treatment today with cautious movements from pain. Increased reps with thera bands with cueing for technique with rows. Good recall of exercises. Unable to complete supine shoulder flexion d/t sharp pain. Performed PROM with good ROM. Good advancement overall with limitation of pain. Ended with vaso compression to R shoulder to decrease pain and inflammation. [] No change. [] Other:  [x] Patient would continue to benefit from skilled physical therapy services in order to: decrease shoulder pain, increase ROM in ER/IR, increase strength and function. Problems:    [x]? ? Pain:  [x]? ? ROM:  [x]? ? Strength:  [x]? ? Function:  [x]? Other: 35/80 on UEFS = 44% fxn per PT     STG: (to be met in 10 treatments)  1. ? Pain: No more than 5/10 max pain in R shoulder with daily activities. 2. ? ROM: R shoulder AROM to the following to indicate progressing joint mobility:  a.  Flex/abd: 160/120, respectively  b. ER/IR: To L4/To C6, respectively  3. ? Strength: Improving scapular strength evident by ability to complete prone ITY exercises for 20x ea on R shoulder Extension with Resistance - 10 reps - 3 sets - 1x daily - 7x weekly   Standing Bilateral Low Shoulder Row with Anchored Resistance - 10 reps - 3 sets - 1x daily - 7x weekly   Prone Shoulder Flexion - 10 reps - 3 sets - 1x daily - 7x weekly   Shoulder External Rotation with Anchored Resistance - 10 reps - 3 sets - 1x daily - 7x weekly   Shoulder Internal Rotation with Resistance - 10 reps - 3 sets - 1x daily - 7x weekly   Seated Scapular Retraction - 10 reps - 3 sets - 1x daily - 7x weekly   Standing Backward Shoulder Rolls - 10 reps - 3 sets - 1x daily - 7x weekly   Seated Passive Cervical Retraction - 10 reps - 3 sets - 1x daily - 7x weekly   Supine Shoulder Press AAROM in Abduction with Dowel - 10 reps - 3 sets - 1x daily - 7x weekly   Supine Shoulder Flexion Extension AAROM with Dowel - 10 reps - 3 sets - 1x daily - 7x weekly   Supine Shoulder Horizontal Abduction Adduction AAROM with Dowel - 10 reps - 3 sets - 1x daily - 7x weekly       Plan: [x] Continue current frequency toward long and short term goals.     [x] Specific Instructions for subsequent treatments:    - assess for subscap/lat tightness and address with manual as needed   - self stretching with table stretch, pec stretch   - can attempt pulleys for increased abd/flex ROM   - prone scap strengthening (depression, retraction, shld ext w/ retraction, row, T's, etc)   - bilateral ER, horizontal abd - with band when able   - wall slides to work on [employer] of flex/abd  - Bicep strengthening       Time In: 3:45 pm            Time Out: 4:52 pm     Electronically signed by:  Nanda Toribio PTA

## 2020-10-28 ENCOUNTER — PROCEDURE VISIT (OUTPATIENT)
Dept: DERMATOLOGY | Age: 60
End: 2020-10-28
Payer: COMMERCIAL

## 2020-10-28 ENCOUNTER — HOSPITAL ENCOUNTER (OUTPATIENT)
Age: 60
Setting detail: SPECIMEN
Discharge: HOME | End: 2020-10-28
Payer: COMMERCIAL

## 2020-10-28 ENCOUNTER — HOSPITAL ENCOUNTER (OUTPATIENT)
Dept: PHYSICAL THERAPY | Age: 60
Setting detail: THERAPIES SERIES
Discharge: HOME | End: 2020-10-28
Payer: COMMERCIAL

## 2020-10-28 VITALS — SYSTOLIC BLOOD PRESSURE: 120 MMHG | DIASTOLIC BLOOD PRESSURE: 76 MMHG | HEART RATE: 76 BPM

## 2020-10-28 PROCEDURE — 12031 INTMD RPR S/A/T/EXT 2.5 CM/<: CPT | Performed by: PLASTIC SURGERY

## 2020-10-28 PROCEDURE — 11403 EXC TR-EXT B9+MARG 2.1-3CM: CPT | Performed by: PLASTIC SURGERY

## 2020-10-28 RX ORDER — LIDOCAINE HYDROCHLORIDE AND EPINEPHRINE 10; 10 MG/ML; UG/ML
2 INJECTION, SOLUTION INFILTRATION; PERINEURAL ONCE
Status: COMPLETED | OUTPATIENT
Start: 2020-10-28 | End: 2020-10-28

## 2020-10-28 RX ADMIN — LIDOCAINE HYDROCHLORIDE AND EPINEPHRINE 2 ML: 10; 10 INJECTION, SOLUTION INFILTRATION; PERINEURAL at 13:04

## 2020-10-28 NOTE — PROGRESS NOTES
[address] [facility] DERMATOLOGY  [facility] [address]         Office Procedure Note     Name: Yong Park Date/Time of Admission: No admission date for patient encounter. MRN: [id number] Attending Provider: No att. providers found   Room/Bed: @RB@ /Age: 1960/60 y.o. Date of Surgery: [unfilled] Surgeon: Alisha Ramirez MD   Facility: [facility] PCP: Meena Valles MD     Pre-Op DX:   Neoplasm of uncertain behavior right axilla    Post-Op DX:   Neoplasm of uncertain behavior right axilla    Operative Procedure:   Excision of right axillary lesion measuring 2.1 x 1.1 cm with complex closure of defect size measuring 2.3 x 1.3 cm    ANESTHESIA:   1% lidocaine with epinephrine 5 cc  SPECIMENS:      Right axillary lesion suture at 12 o'clock position  INDICATION FOR PROCEDURE:  The patient is a 62 y.o. female  All the risks, benefits, and alternative treatments were explained to the patient and an informed consent was obtained. DESCRIPTION OF PROCEDURE:  The patient was marked. A timeout was performed. Local anesthetic was injected. All areas were tested. After it was determined it was adequate anesthesia, then attention was turned to right axillary lesion. Then using a #15 blade the lesion was removed, along with margins. Hemostasis was achieved. Then undermining was performed medially, laterally, superiorly, and inferiorly to obtain tension-free closure. Then using 3-0 Vicryl in an interrupted manner the deep tissue was closed. Then using 4-0 Prolene in an interrupted manner, the skin was closed. The patient tolerated procedure well. Postoperative instructions and follow-up appointment was provided.

## 2020-10-28 NOTE — FLOWSHEET NOTE
[x] [facility]) - [facility] &  Therapy  [address]    [phone]  F: [phone]   [] [address]  [facility]   [address]  P: [phone]  F: [phone]  [] [place]  [address]  P: [phone]  F: [phone] [] [address]  P: [phone]  F: [phone]  [] [address]  [facility]   [address]   [place]: [phone]  F: [phone]   [] [facility]  [address] [facility] [address] [facility] [address]  [place]: [phone]   F: [phone]     Physical Therapy Cancel/No Show note    Date: 10/28/2020  Patient: Chandrika Nagy  : 1960  MRN: [id number]    Cancels/No Shows to date:     For today's appointment patient:    [x]  Cancelled, cx after appt time today.      [] Rescheduled appointment    [] No-show     Reason given by patient:    []  Patient ill    [x]  Conflicting appointment    [] No transportation      [] Conflict with work    [] No reason given    [] Weather related    [] [id number]    [] Other:      Comments:        [x] Next appointment was confirmed    Electronically signed by: Lizette Lundberg PTA

## 2020-10-30 ENCOUNTER — HOSPITAL ENCOUNTER (OUTPATIENT)
Dept: PHYSICAL THERAPY | Age: 60
Setting detail: THERAPIES SERIES
Discharge: HOME | End: 2020-10-30
Payer: COMMERCIAL

## 2020-10-30 LAB — DERMATOLOGY PATHOLOGY REPORT: NORMAL

## 2020-10-30 NOTE — FLOWSHEET NOTE
[x] [facility]) - [facility] &  Therapy  [address]    [phone]  F: [phone]   [] [address]  [facility]   [address]  P: [phone]  F: [phone]  [] [address] &  Therapy  [address] [facility]  P: [phone]  F: [phone] [] [address]  P: [phone]  F: [phone]  [] [address]  [facility]   [address]   [place]: [phone]  F: [phone]   [] [address]  [place]: [phone]   F: [phone]     Physical Therapy Cancel/No Show note    Date: 10/30/2020  Patient: Stanislaw Singletary  : 1960  MRN: [id number]    Cancels/No Shows to date:     For today's appointment patient:    [x]  Cancelled     [] Rescheduled appointment    [] No-show     Reason given by patient:    []  Patient ill    []  Conflicting appointment    [] No transportation      [] Conflict with work    [x] No reason given    [] Weather related    [] COVID-19    [] Other:      Comments:        [x] Next appointment was confirmed.     Electronically signed by: Vivek Lancaster PTA

## 2020-11-03 PROBLEM — F32.A DEPRESSION: Status: RESOLVED | Noted: 2020-11-03 | Resolved: 2020-11-03

## 2020-11-04 ENCOUNTER — HOSPITAL ENCOUNTER (OUTPATIENT)
Dept: PHYSICAL THERAPY | Age: 60
Setting detail: THERAPIES SERIES
Discharge: HOME | End: 2020-11-04
Payer: COMMERCIAL

## 2020-11-04 PROCEDURE — 97110 THERAPEUTIC EXERCISES: CPT

## 2020-11-04 PROCEDURE — 97016 VASOPNEUMATIC DEVICE THERAPY: CPT

## 2020-11-04 NOTE — FLOWSHEET NOTE
elevation   Lateral raise 2x10 2# To 100 deg; high pain with 2# so reduced to AROM   Overhead press   ADD NEXT if tolerated         Thera bands      -Ext 15x Purple Regressed to blue 11/4   -Rows 15x Purple    -ER 15x Purple Bilateral    -IR 15x Purple    -Triceps 15x Purple    -Biceps 15x Purple          Posterior shld rolls      Scapular retraction      Scapular depression      Chin tucks with C-flexion            Supine      Chest press 15x 1lb    Shoulder flexion 15x 1lb    Horiz abd 15x 1lb    Protraction 15x 1lb                             Other:  Manual:      Treatment Charges: Mins Units   []  Modalities -- CP     [x]  Ther Exercise 40 3   []  Manual Therapy     []  Ther Activities     []  Aquatics     [x]  Vasocompression 15 1   []  Other     Total Treatment time 55 4       Assessment: [x] Progressing toward goals. Resumed therapeutic exercise program as noted with good tolerance to all exercises this date. Patient with no verbalization of pain throughout program, only minimal soreness and fatigue through R shoulder with resuming activity. Held additional progressions as patient with small lapse in appts, apprehensive to soreness to follow today's treatment. Regressed from plum to blueberry tband for shoulder extension only for improved technique with less UT compensation with good carryover noted. Applied vaso at end of treatment for soreness relief with patient stating positive result. [] No change. [] Other:  [x] Patient would continue to benefit from skilled physical therapy services in order to: decrease shoulder pain, increase ROM in ER/IR, increase strength and function. Problems:    [x]? ? Pain:  [x]? ? ROM:  [x]? ? Strength:  [x]? ? Function:  [x]? Other: 35/80 on UEFS = 44% fxn per PT     STG: (to be met in 10 treatments)  1. ? Pain: No more than 5/10 max pain in R shoulder with daily activities.   2. ? ROM: R shoulder AROM to the following to indicate progressing joint mobility:  a. Flex/abd: 160/120, respectively  b. ER/IR: To L4/To C6, respectively  3. ? Strength: Improving scapular strength evident by ability to complete prone ITY exercises for 20x ea on R shoulder without excessive upper trap compensation to further improve scapulohumoral rhythm needed for overhead reaching  4. ? Function:   a. Pt will report improving ability to reach into kitchen cupboards with minimal/no pain  b. Pt will be able to lift 5# using bilat UEs from chest to overhead height without difficulty and no more than minimal pain. c. No more than minimal pain (3/10) when beginning to integrate RUE into work duties  5. Independent with Home Exercise Programs     LTG: (to be met in 16 treatments)  6. ? Pain: No more than 3/10 max pain in R shoulder with daily activities. 7. ? ROM: Full and symmetrical R shoulder AROM all planes when compared to LUE, with no pain reported  8. ? Strength: 5/5 R shoulder in all planes without pain for improved carrying/lifting  9. ? Function:   a. Pt will report no difficulty or pain with RUE dressing, overhead reaching, and carrying  b. Pt will be able to lift 10# using bilat UEs from chest to overhead height without difficulty and no more than minimal pain. c. No pain with any work duties while fully integrating RUE into cleaning            Patient goals: \"no pain, work with out pain using R arm\"      Rehab Potential:  [x]? Good  []? Fair  []? Poor    Suggested Professional Referral:  [x]? No  []? Yes:  Barriers to Goal Achievement[de-identified]  [x]? No  []? Yes:  Domestic Concerns:  [x]? No  []? Yes:    Pt. Education:  [x] Yes  [] No  [x] Reviewed Prior HEP/Ed  Method of Education: [x] Verbal  [x] Demo  [x] Written  Comprehension of Education:  [x] Verbalizes understanding. [x] Demonstrates understanding. [] Needs review. [x] Demonstrates/verbalizes HEP/Ed previously given. Access Code: [email or web address]   URL: [email or web address]   Date: 10/14/2020   Prepared by: Deanna Mendoza Gomes     Exercises   Shoulder Flexion Wall Slide with Towel - 10 reps - 3 sets - 1x daily - 7x weekly   Standing shoulder flexion wall slides - 10 reps - 3 sets - 1x daily - 7x weekly   Shoulder Extension with Resistance - 10 reps - 3 sets - 1x daily - 7x weekly   Standing Bilateral Low Shoulder Row with Anchored Resistance - 10 reps - 3 sets - 1x daily - 7x weekly   Prone Shoulder Flexion - 10 reps - 3 sets - 1x daily - 7x weekly   Shoulder External Rotation with Anchored Resistance - 10 reps - 3 sets - 1x daily - 7x weekly   Shoulder Internal Rotation with Resistance - 10 reps - 3 sets - 1x daily - 7x weekly   Seated Scapular Retraction - 10 reps - 3 sets - 1x daily - 7x weekly   Standing Backward Shoulder Rolls - 10 reps - 3 sets - 1x daily - 7x weekly   Seated Passive Cervical Retraction - 10 reps - 3 sets - 1x daily - 7x weekly   Supine Shoulder Press AAROM in Abduction with Dowel - 10 reps - 3 sets - 1x daily - 7x weekly   Supine Shoulder Flexion Extension AAROM with Dowel - 10 reps - 3 sets - 1x daily - 7x weekly   Supine Shoulder Horizontal Abduction Adduction AAROM with Dowel - 10 reps - 3 sets - 1x daily - 7x weekly       Plan: [x] Continue current frequency toward long and short term goals.     [x] Specific Instructions for subsequent treatments:    - assess for subscap/lat tightness and address with manual as needed   - self stretching with table stretch, pec stretch   - can attempt pulleys for increased abd/flex ROM   - prone scap strengthening (depression, retraction, shld ext w/ retraction, row, T's, etc)   - bilateral ER, horizontal abd - with band when able   - wall slides to work on [employer] of flex/abd  - Bicep strengthening       Time In: 105 pm            Time Out: 200 pm     Electronically signed by:  Jeffry Slaughter PTA

## 2020-11-06 ENCOUNTER — HOSPITAL ENCOUNTER (OUTPATIENT)
Dept: PHYSICAL THERAPY | Age: 60
Setting detail: THERAPIES SERIES
Discharge: HOME | End: 2020-11-06
Payer: COMMERCIAL

## 2020-11-06 PROCEDURE — 97016 VASOPNEUMATIC DEVICE THERAPY: CPT

## 2020-11-06 PROCEDURE — 97110 THERAPEUTIC EXERCISES: CPT
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
## 2020-11-11 ENCOUNTER — OFFICE VISIT (OUTPATIENT)
Dept: DERMATOLOGY | Age: 60
End: 2020-11-11
Payer: COMMERCIAL

## 2020-11-11 ENCOUNTER — HOSPITAL ENCOUNTER (OUTPATIENT)
Dept: PHYSICAL THERAPY | Age: 60
Setting detail: THERAPIES SERIES
Discharge: HOME | End: 2020-11-11
Payer: COMMERCIAL

## 2020-11-11 VITALS
WEIGHT: 173 LBS | HEIGHT: 61 IN | BODY MASS INDEX: 32.66 KG/M2 | DIASTOLIC BLOOD PRESSURE: 62 MMHG | OXYGEN SATURATION: 98 % | TEMPERATURE: 97.1 F | SYSTOLIC BLOOD PRESSURE: 115 MMHG | HEART RATE: 71 BPM

## 2020-11-11 PROCEDURE — 99213 OFFICE O/P EST LOW 20 MIN: CPT | Performed by: PLASTIC SURGERY

## 2020-11-11 PROCEDURE — 97110 THERAPEUTIC EXERCISES: CPT

## 2020-11-11 PROCEDURE — 97016 VASOPNEUMATIC DEVICE THERAPY: CPT

## 2020-11-11 NOTE — FLOWSHEET NOTE
[x] [facility]) - [facility] &  Therapy  [address]  [phone]  F: [phone] [] [address]  [facility]   [address]  P: [phone]  F: [phone] [] [address] &  Therapy  [address]  P: [phone]  F: [phone] [] [address]  P: [phone]  F: [phone] [] [address]  [facility]   [address]   [place]: [phone]  F: [phone]      Physical Therapy Daily Treatment Note    Date:  2020  Patient Name:  Carlin Landrum    :  1960  MRN: [id number]  Physician: Joyce Hall DO                                    Insurance: [facility] (30 vs)  Medical Diagnosis: Rotator cuff impingement syndrome of right shoulder; subluxation of tendon of long head of biceps  Rehab Codes: B95.623, R29.3, R53.1, M25.611, R20.2  Onset Date: 2020                 Next 's appt: 10/8/20  Visit# / total visits:      Cancels/No Shows:     Subjective:    Pain:  [x] Yes  [] No Location: R shoulder Pain Rating: (0-10 scale) 7-8/10 anterior shldr  Pain altered Tx:  [x] No  [] Yes  Action:  Comments:   Reports shoulder was pain free until about 30 mins ago. Reports she pet her dog just before she left house and that's when pain started (pt failed to report she just left MD office for suture removal from R  axilla area, this writer questions positioning of R  UE pre removal of sutures and during removal as the factor that would have started this new pain today)   Objective:  Modalities:    Precautions:  Exercises: Completed exercises marked with \"x\"  Exercise R UE Reps/ Time Weight/ Level Comments    UBE 2/2 L3 Fwd/bwd x   codmans 10x ea 1 lbs Flex/ext, abd/add.  Added due to pt subjective complaints/pian level   x   Manual   - Subscap MET  - Lat active release and MET    Sitting self caudal glide 3x15\"  Added due to pt subjective complaints/pain level 11/11 x          Standing       Lat crossbody stretch in doorframe 3x30\"      Corner stretch 3x20\"   x   Reverse wall push ups 2x10   x   Tband clocks   Next    Front raise 2x10 2# To 90 deg elevation    Lateral raise 2x10 2# To 100 deg; high pain with 2# so reduced to AROM    Overhead press 2x10 2# Added 11/6           Thera bands       -Ext 15x blue  x   -Rows 15x Purple Blue 11/11 x   -ER 15x Purple Bilateral     -IR 15x Purple     -Triceps 15x Purple     -Biceps 15x Purple            Posterior shld rolls       Scapular retraction       Scapular depression       Chin tucks with C-flexion              Supine       Manual  x  retarted due to pain: oscillation, distraction, distraction with ROM-11/11    Chest press 15x 2lb  x   Shoulder flexion 15x 2lb  x   Horiz abd 15x 2lb  x   Protraction 15x 2lb   x       x   Sidelying     Added 11/11     Scap. depression 10x5\"    x   ER 10x A AROM limited due to: see subjective complaints/pain level x   abd 10x A  x          TG prone    Added due to pt subjective complaints/pain level and retractor weakness-11/11     Ext  10x A  x   Rows  10x A  x   abd 10x A  x          Other:  Manual:      Treatment Charges: Mins Units   []  Modalities -- CP     [x]  Ther Exercise 38 3   [x]  Manual Therapy   8 -   []  Ther Activities     []  Aquatics     [x]  Vasocompression 15 1   []  Other     Total Treatment time 61 4       Assessment: [] Progressing toward goals. [] No change. [x] Other:  Adjusted session as listed in log due to pt subjective complaints/pain level. Tactile cues/verbal cues due to UT compensation with scapular retractor strengthening. Pt able to correct. Added supine manual shldr fleixon and abd to approx 170 degrees pain free. Ended with vaso compression for pain relief. Pt reported 4-5 /10 pain post session.    [x] Patient would continue to benefit from skilled physical therapy services in order to: decrease shoulder pain, increase ROM in ER/IR, increase strength and function. Problems:    [x]? ? Pain:  [x]? ? ROM:  [x]? ? Strength:  [x]? ? Function:  [x]? Other: 35/80 on UEFS = 44% fxn per PT     STG: (to be met in 10 treatments)  1. ? Pain: No more than 5/10 max pain in R shoulder with daily activities. 2. ? ROM: R shoulder AROM to the following to indicate progressing joint mobility:  a. Flex/abd: 160/120, respectively  b. ER/IR: To L4/To C6, respectively  3. ? Strength: Improving scapular strength evident by ability to complete prone ITY exercises for 20x ea on R shoulder without excessive upper trap compensation to further improve scapulohumoral rhythm needed for overhead reaching  4. ? Function:   a. Pt will report improving ability to reach into kitchen cupboards with minimal/no pain  b. Pt will be able to lift 5# using bilat UEs from chest to overhead height without difficulty and no more than minimal pain. c. No more than minimal pain (3/10) when beginning to integrate RUE into work duties  5. Independent with Home Exercise Programs     LTG: (to be met in 16 treatments)  6. ? Pain: No more than 3/10 max pain in R shoulder with daily activities. 7. ? ROM: Full and symmetrical R shoulder AROM all planes when compared to LUE, with no pain reported  8. ? Strength: 5/5 R shoulder in all planes without pain for improved carrying/lifting  9. ? Function:   a. Pt will report no difficulty or pain with RUE dressing, overhead reaching, and carrying  b. Pt will be able to lift 10# using bilat UEs from chest to overhead height without difficulty and no more than minimal pain. c. No pain with any work duties while fully integrating RUE into cleaning            Patient goals: \"no pain, work with out pain using R arm\"      Rehab Potential:  [x]? Good  []? Fair  []? Poor    Suggested Professional Referral:  [x]? No  []?  Yes:  Barriers to Goal Achievement[de-identified]  [x]? No  []? Yes:  Domestic Concerns:  [x]? No  []? Yes:    Pt. Education:  [x] Yes  [] No  [x] Reviewed Prior HEP/Ed  Method of Education: [x] Verbal  [x] Demo  [] Written  Comprehension of Education:  [x] Verbalizes understanding. [x] Demonstrates understanding. [] Needs review. [x] Demonstrates/verbalizes HEP/Ed previously given. 11/11/20: doorway pectoralis stretch, codman's, self caudal stretch \"gentle!!\"     Access Code: [email or web address]   URL: [email or web address]   Date: 10/14/2020   Prepared by: Santosh Cristina     Exercises   Shoulder Flexion Wall Slide with Towel - 10 reps - 3 sets - 1x daily - 7x weekly   Standing shoulder flexion wall slides - 10 reps - 3 sets - 1x daily - 7x weekly   Shoulder Extension with Resistance - 10 reps - 3 sets - 1x daily - 7x weekly   Standing Bilateral Low Shoulder Row with Anchored Resistance - 10 reps - 3 sets - 1x daily - 7x weekly   Prone Shoulder Flexion - 10 reps - 3 sets - 1x daily - 7x weekly   Shoulder External Rotation with Anchored Resistance - 10 reps - 3 sets - 1x daily - 7x weekly   Shoulder Internal Rotation with Resistance - 10 reps - 3 sets - 1x daily - 7x weekly   Seated Scapular Retraction - 10 reps - 3 sets - 1x daily - 7x weekly   Standing Backward Shoulder Rolls - 10 reps - 3 sets - 1x daily - 7x weekly   Seated Passive Cervical Retraction - 10 reps - 3 sets - 1x daily - 7x weekly   Supine Shoulder Press AAROM in Abduction with Dowel - 10 reps - 3 sets - 1x daily - 7x weekly   Supine Shoulder Flexion Extension AAROM with Dowel - 10 reps - 3 sets - 1x daily - 7x weekly   Supine Shoulder Horizontal Abduction Adduction AAROM with Dowel - 10 reps - 3 sets - 1x daily - 7x weekly       Plan: [x] Continue current frequency toward long and short term goals.     [x] Specific Instructions for subsequent treatments:    - assess for subscap/lat tightness and address with manual as needed   - self stretching with table stretch, pec stretch   - can attempt pulleys for increased abd/flex ROM   - prone scap strengthening (depression, retraction, shld ext w/ retraction, row, T's, etc)   - bilateral ER, horizontal abd - with band when able   - wall slides to work on [employer] of flex/abd  - Bicep strengthening       Time In:  1255          Time Out:  1401    Electronically signed by:  Celi Ortiz, PTA

## 2020-11-11 NOTE — PROGRESS NOTES
[facility] DERMATOLOGY  [address] [place] [phone]       OFFICE POST-OP NOTE    Patient Name:  Alva Wild    :  1960    MRN:  [id number]  STATUS POST  Chief Complaint   Patient presents with    Post-Op Check     post op SR right axilla. has redness and rash       SUBJECTIVE  Patient seen and examined. .      Pathology results:  Dermatology Pathology Report   Dermatology Pathology   Collected:  10/28/20 1130    Lab status:  Final    Resulting lab:  [employer]    Value:  -- Diagnosis --   SKIN, RIGHT AXILLA, EXCISION:     -  COMPOUND NEVUS. Marisel Gilman M.D.   **Electronically Signed Out**   jet/10/30/2020         Clinical Information   Pre-op Diagnosis:  NEOPLASM OF UNCERTAIN BEHAVIOR     Operative Findings:  RIGHT AXILLA [address]  @ 12:00 POSITION   Operation Performed: Boubacar Koo of Specimen   1: JAR #1 RIGHT AXILLA  SUTURE AT 12 O'CLOCK POSITION     Gross Description   \"DELORA RENETTA, RIGHT AXILLA  SUTURE AT 12:00 POSITION\" 0.8 x 0.6 x   0.2 (depth) cm oriented tan skin ellipse with a suture designating   12:00.  The 12:00 half is inked blue and 6:00 half black.  On the skin   surface, is a 0.4 cm macule.  Cassette summary:  \"A\" 3:00 half, \"B\"   9:00 half.  tm       Microscopic Description   The sections show nests of melanocytes at the dermo-epidermal junction   and in the dermis showing maturation with depth.  There is no evidence   of atypia or malignancy. SURGICAL PATHOLOGY CONSULTATION         Patient Name: Elaine Oates Med Rec: [id number]   Path Number: [id number]     [address] [place] [address]   ANATOMIC PATHOLOGY   [address] [place] [address] [address]   [phone]   Fax: [phone]      Patient and the patient.   PHYSICAL EXAM  Vital Signs:  /62 (Site: Left Upper Arm, Position: Sitting, Cuff Size: Medium Adult)   Pulse 71   Temp 97.1 °F (36.2 °C)   Ht 5' 1\" (1.549 m) Wt 173 lb (78.5 kg)   SpO2 98%   BMI 32.69 kg/m²     Incisions:  Suture line clean dry and intact. Patient has healed well. Rash surrounding the suture line. Mastisol and Steri-Strips. Skin:  No evidence of infection. Neurologic:  Alert & oriented x 3. ASSESSMENT   Diagnosis Orders   1. History of local excision of skin lesion         PLAN  1. Remove the sutures. 2.  Follow-up in 1 month to ensure the rash has resolved. 3.  Place Vaseline on the suture line. Plan discussed with patient.     Electronically signed by:  Barney Nichols M.D.   11/11/2020

## 2020-11-13 ENCOUNTER — HOSPITAL ENCOUNTER (OUTPATIENT)
Dept: PHYSICAL THERAPY | Age: 60
Setting detail: THERAPIES SERIES
Discharge: HOME | End: 2020-11-13
Payer: COMMERCIAL

## 2020-11-13 ENCOUNTER — HOSPITAL ENCOUNTER (EMERGENCY)
Age: 60
Discharge: HOME | End: 2020-11-13
Attending: EMERGENCY MEDICINE
Payer: COMMERCIAL

## 2020-11-13 ENCOUNTER — APPOINTMENT (OUTPATIENT)
Dept: GENERAL RADIOLOGY | Age: 60
End: 2020-11-13
Payer: COMMERCIAL

## 2020-11-13 VITALS
HEIGHT: 61 IN | DIASTOLIC BLOOD PRESSURE: 82 MMHG | HEART RATE: 85 BPM | OXYGEN SATURATION: 96 % | TEMPERATURE: 98.2 F | BODY MASS INDEX: 32.66 KG/M2 | RESPIRATION RATE: 16 BRPM | SYSTOLIC BLOOD PRESSURE: 123 MMHG | WEIGHT: 173 LBS

## 2020-11-13 PROCEDURE — 73610 X-RAY EXAM OF ANKLE: CPT

## 2020-11-13 PROCEDURE — 6370000000 HC RX 637 (ALT 250 FOR IP): Performed by: PHYSICIAN ASSISTANT

## 2020-11-13 PROCEDURE — 99284 EMERGENCY DEPT VISIT MOD MDM: CPT

## 2020-11-13 RX ORDER — IBUPROFEN 800 MG/1
800 TABLET ORAL ONCE
Status: COMPLETED | OUTPATIENT
Start: 2020-11-13 | End: 2020-11-13

## 2020-11-13 RX ADMIN — IBUPROFEN 800 MG: 800 TABLET, FILM COATED ORAL at 16:51

## 2020-11-13 ASSESSMENT — PAIN SCALES - GENERAL
PAINLEVEL_OUTOF10: 6
PAINLEVEL_OUTOF10: 6

## 2020-11-13 ASSESSMENT — PAIN DESCRIPTION - PAIN TYPE: TYPE: ACUTE PAIN

## 2020-11-13 ASSESSMENT — PAIN DESCRIPTION - ORIENTATION: ORIENTATION: LEFT

## 2020-11-13 ASSESSMENT — PAIN DESCRIPTION - LOCATION: LOCATION: ANKLE

## 2020-11-13 NOTE — ED TRIAGE NOTES
Mode of arrival (squad #, walk in, police, etc) : Walk In        Chief complaint(s): Gabriel pain        Arrival Note (brief scenario, treatment PTA, etc). : Pt arrives to ED c/o left ankle pain. Patient states that she twister her left ankle today when stepping off of a curb. Patient is able to move her toes. Patient does have numbness and tingling. Patients pedal pulse is palpable and strong. C= \"Have you ever felt that you should Cut down on your drinking? \"  No  A= \"Have people Annoyed you by criticizing your drinking? \"  No  G= \"Have you ever felt bad or Guilty about your drinking? \"  No  E= \"Have you ever had a drink as an Eye-opener first thing in the morning to steady your nerves or to help a hangover? \"  No      Deferred []      Reason for deferring: N/A    *If yes to two or more: probable alcohol abuse. *

## 2020-11-13 NOTE — FLOWSHEET NOTE
[x] [facility]) - [facility] &  Therapy  [address]    [phone]  F: [phone]   [] [address]  [facility]   [address]  P: [phone]  F: [phone]  [] [address] &  Therapy  [address] [facility]  P: [phone]  F: [phone] [] [address]  P: [phone]  F: [phone]  [] [address]  [facility]   [address]   [place]: [phone]  F: [phone]   [] [facility]  [address]  [place]: [phone]   F: [phone]     Physical Therapy Cancel/No Show note    Date: 2020  Patient: Denise Oden  : 1960  MRN: [id number]    Cancels/No Shows to date:     For today's appointment patient:    [x]  Cancelled     [] Rescheduled appointment    [] No-show     Reason given by patient:    []  Patient ill    []  Conflicting appointment    [] No transportation      [] Conflict with work    [] No reason given    [] Weather related    [] COVID-19    [x] Other:      Comments:  Overslept      [x] Next appointment was confirmed.     Electronically signed by: Michael Degroot PT

## 2020-11-13 NOTE — ED NOTES
Pt discharged in stable condition with discharge instructions, including follow up with PCP and podiatry.      Osmar Coelho RN  11/13/20 6956

## 2020-11-13 NOTE — ED PROVIDER NOTES
[address]  eMERGENCY dEPARTMENT eNCOUnter      Pt Name: Patricia Giron  MRN: [id number]  [place] 1960  Date of evaluation: 11/13/2020  Provider: Conrad Davis PA-C    CHIEF COMPLAINT       Chief Complaint   Patient presents with    Ankle Pain           HISTORY OF PRESENT ILLNESS  (Location/Symptom, Timing/Onset, Context/Setting, Quality, Duration, Modifying Factors, Severity.)   Patricia Giron is a 62 y.o. female who presents to the emergency department with complaints of left ankle pain. Pt states PTA she rolled her ankle off of a curb. States she has pain, swelling, tingling over lateral malleolus. Denies pain in foot. She is unable to bear weight. No other complaints. Nursing Notes were reviewed. REVIEW OF SYSTEMS    (2-9 systems for level 4, 10 or more for level 5)     Review of Systems   Ankle pain  Tingling       Except as noted above the remainder of the review of systems was reviewed and negative. PAST MEDICAL HISTORY     Past Medical History:   Diagnosis Date    ASCUS with positive high risk HPV cervical 3/22/16    Asthma     Depression     Diverticulitis     ESBL (extended spectrum beta-lactamase) producing bacteria infection 02/03/2019    E.  Coli urine    GERD (gastroesophageal reflux disease)     Hyperlipidemia     MRSA (methicillin resistant staph aureus) culture positive 4/18/2016    lip    Rectocele     Tachycardia     takes Metoprolol     None otherwise stated in nurses notes    SURGICAL HISTORY       Past Surgical History:   Procedure Laterality Date    COLONOSCOPY N/A 7/19/2018    COLONOSCOPY performed by Robles Herrera DO at [facility]  2/25/2015    uro    HYSTERECTOMY  1996    RYAN, BSO performed at [facility] by Dr. Zee Wilks, Also had some type of bladder lift at this time    KNEE ARTHROSCOPY Left 5/13/2019    KNEE ARTHROSCOPY performed by Andrew Carmichael MD at [address] [place] [address] Left     #1 - lateral release, #2 - arthroscopy with muscle alignment    NERVE BLOCK  07/28/2017    caudal #1  decadron 10mg    NERVE BLOCK  09/22/2017    cadal # 2, decadron 10 mg    NERVE BLOCK  09/22/2017    caudal epidural steroid block #2 decadron 10 mg    NERVE BLOCK  11/10/2017    lumbar L4-5 epidural; depomedrol 80mg    UPPER GASTROINTESTINAL ENDOSCOPY  1/30/2019    EGD BIOPSY performed by Rhoda Apley, MD at [address] [place] [address]     None otherwise stated in nurses notes    CURRENT MEDICATIONS       Previous Medications    ALBUTEROL SULFATE  (90 BASE) MCG/ACT INHALER    INHALE 2 PUFFS BY MOUTH INTO THE LUNGS ONCE EVERY 6 HOURS AS NEEDED FOR WHEEZING    BUSPIRONE (BUSPAR) 15 MG TABLET    TAKE ONE TABLET BY MOUTH TWICE DAILY    CHLORZOXAZONE (PARAFON FORTE) 500 MG TABLET    Take 0.5 tablets by mouth 2 times daily    CRANBERRY CONCENTRATE 500 MG CAPS    TAKE 1 CAPSULE BY MOUTH TWICE DAILY    DICLOFENAC (VOLTAREN) 50 MG EC TABLET    TAKE 1 TABLET BY MOUTH TWICE DAILY    DICLOFENAC-MISOPROSTOL (ARTHROTEC 50) 50-0.2 MG PER TABLET    TAKE 1 TABLET BY MOUTH TWICE DAILY    FLUTICASONE (FLONASE) 50 MCG/ACT NASAL SPRAY    INSTILL 2 SPRAYS INTO EACH NOSTRIL ONCE DAILY    IBUPROFEN (ADVIL;MOTRIN) 800 MG TABLET    Take 1 tablet by mouth every 8 hours as needed for Pain    KETOTIFEN (ZADITOR) 0.025 % OPHTHALMIC SOLUTION    Place 1 drop into both eyes 2 times daily    MELOXICAM (MOBIC) 7.5 MG TABLET    Take 1 tablet by mouth daily    METOPROLOL SUCCINATE (TOPROL XL) 25 MG EXTENDED RELEASE TABLET    Take 25 mg by mouth daily     MI-ACID GAS RELIEF 80 MG CHEWABLE TABLET    CHEW 1 TABLET BY MOUTH FOUR TIMES DAILY AS NEEDED FOR FLATULENCE    MISOPROSTOL (CYTOTEC) 200 MCG TABLET    Take 200 mcg by mouth daily    MULTIPLE VITAMINS-MINERALS (CULTURELLE PROBIOTICS + MULTIV) CHEW    Take 1 tablet by mouth daily    OMEPRAZOLE (PRILOSEC) 40 MG DELAYED RELEASE CAPSULE    TAKE 1 CAPSULE BY MOUTH DAILY EVERY MORNING FOR BREAKFAST    ONDANSETRON (ZOFRAN ODT) 4 MG DISINTEGRATING TABLET    Take 1 tablet by mouth every 6 hours as needed for Nausea or Vomiting    PRAVASTATIN (PRAVACHOL) 40 MG TABLET    TAKE ONE TABLET BY MOUTH ONCE DAILY    PREMARIN 0.625 MG/GM VAGINAL CREAM    PLACE 2 GRAMS VAGINALLY TWICE A WEEK FOR 12 DOSES    SENNA-DOCUSATE (PERICOLACE) 8.6-50 MG PER TABLET    Take 1 tablet by mouth daily as needed for Constipation    VENLAFAXINE (EFFEXOR XR) 75 MG EXTENDED RELEASE CAPSULE    TAKE 1 CAPSULE BY MOUTH DAILY    VITAMIN D (ERGOCALCIFEROL) 1.25 MG (06819 UT) CAPS CAPSULE    TAKE 1 CAPSULE BY MOUTH EVERY WEEK ON        ALLERGIES     Shellfish allergy; Shrimp (diagnostic); Seasonal; Famotidine; and Gabapentin    FAMILY HISTORY           Problem Relation Age of Onset    Colon Cancer Mother     High Blood Pressure Father     Heart Attack Brother     Heart Disease Brother     Crohn's Disease Niece      Family Status   Relation Name Status    Mother      Father  Alive    MGM      MGF      PGM      PGF      Brother  Alive    Niece  Alive        crohns      None otherwise stated in nurses notes    SOCIAL HISTORY      reports that she quit smoking about 25 years ago. Her smoking use included cigarettes. She has a 1.00 pack-year smoking history. She has never used smokeless tobacco. She reports current alcohol use. She reports that she does not use drugs. lives at home with others     PHYSICAL EXAM    (up to 7 for level 4, 8 or more for level 5)     ED Triage Vitals   BP Temp Temp src Pulse Resp SpO2 Height Weight   -- -- -- -- -- -- -- --       Physical Exam   Nursing note and vitals reviewed.   Constitutional: well-developed, well-nourished, nontoxic, well appearing, not distressed  HEENT:  normocephalic atraumatic, external ears normal appearance, no nasal deformity, no neck masses or edema, patient protecting airway, no stridor, phonating well  Eyes: pupils equal, sclera non-icteric, no discharge  Cardiovascular: no JVD  Respiratory: non-labored breathing, effort normal, no accessory muscle use visulized, no audible wheezing  Gastrointestinal: Abdomen not distended  Musculoskeletal: Examination of left ankle reveals mild swelling over the lateral malleolus. There is tenderness over the lateral malleolus. No tenderness over the metatarsals. No bruising, abrasions or erythema. No calf tenderness. Painful range of motion. 2/2 DP PT pulses. Brisk cap refill. Distal sensation intact. Unable to bear weight. Skin: no pallor, no rashes visible  Neuro: alert and oriented times 3, GCS 15, normal coordination, no dysarthria or aphasia  Psych: normal mood and affect, cooperative, normal thought content              DIAGNOSTIC RESULTS     EKG: All EKG's are interpreted by the Emergency Department Physician who either signs or Co-signs this chart in the absence of a cardiologist.        RADIOLOGY:   All plain film, CT, MRI, and formal ultrasound images (except ED bedside ultrasound) are read by the radiologist, see reports below, unless otherwise noted in MDM or here. XR ANKLE LEFT (MIN 3 VIEWS)   Final Result   There appears to be minimal soft tissue swelling about the lateral malleolus. No acute bony abnormality. Ct Head Wo Contrast    Result Date: 10/19/2020  EXAMINATION: CT OF THE HEAD WITHOUT CONTRAST  10/19/2020 3:03 pm TECHNIQUE: CT of the head was performed without the administration of intravenous contrast. Dose modulation, iterative reconstruction, and/or weight based adjustment of the mA/kV was utilized to reduce the radiation dose to as low as reasonably achievable. COMPARISON: None.  HISTORY: ORDERING SYSTEM PROVIDED HISTORY: lightheaded, off balance TECHNOLOGIST PROVIDED HISTORY: lightheaded, off balance Reason for Exam: Pt c/o lightheadedness intermittently since yesterday Acuity: Acute Type of Exam: Initial FINDINGS: BRAIN/VENTRICLES: There is no hemorrhage, edema, or mass effect. There is a CSF density lesion or structure in the subinsular white matter on the right almost certainly representing a dilated perivascular space, and present on the 2015 comparison exam.  There are no abnormal extra-axial fluid collections ORBITS: The visualized portion of the orbits demonstrate no acute abnormality. SINUSES: The visualized paranasal sinuses and mastoid air cells demonstrate no acute abnormality. SOFT TISSUES/SKULL:  No acute abnormality of the visualized skull or soft tissues. No acute intracranial abnormality. Ct Abdomen Pelvis W Iv Contrast Additional Contrast? Oral    Result Date: 10/16/2020  EXAMINATION: CT OF THE ABDOMEN AND PELVIS WITH CONTRAST 10/16/2020 1:11 pm TECHNIQUE: CT of the abdomen and pelvis was performed with the administration of intravenous contrast. Multiplanar reformatted images are provided for review. Dose modulation, iterative reconstruction, and/or weight based adjustment of the mA/kV was utilized to reduce the radiation dose to as low as reasonably achievable. COMPARISON: CT abdomen and pelvis January 6, 2020. HISTORY: ORDERING SYSTEM PROVIDED HISTORY: Abnormal bowel movement TECHNOLOGIST PROVIDED HISTORY: THIS CT IS VERY IMPORTANT AND FOLLOW UP IN 1 WEEK SO YES VERY IMPORTANT lOOKING FOR OBSTRUCTION Reason for Exam: Dx: Abnormal bowel movement R19.8 (ICD-10-CM); Constipation, unspecified constipation type K59.00 (ICD-10-CM) Acuity: Unknown Type of Exam: Unknown FINDINGS: Lower Chest: No acute findings. Organs: Small cyst right lobe of the liver. Gallbladder, portal vein, spleen, pancreas and adrenal glands all appear unremarkable. Subcentimeter low attenuating lesions are seen within the right kidney, too small for accurate characterization. Left kidney appears unremarkable. Abdominal aorta appears normal in caliber. GI/Bowel: Stomach is grossly unremarkable. Small bowel appears nondilated. Oral contrast reached the colon.   Appendix is normal. AORTIC ARCH/ARCH VESSELS: No dissection or arterial injury. No significant stenosis of the brachiocephalic or subclavian arteries. CAROTID ARTERIES: No dissection, arterial injury, or hemodynamically significant stenosis by NASCET criteria. VERTEBRAL ARTERIES: No dissection, arterial injury, or significant stenosis. SOFT TISSUES: The lung apices are clear. No cervical or superior mediastinal lymphadenopathy. The larynx and pharynx are unremarkable. No acute abnormality of the salivary and thyroid glands. BONES: Extend to a shin of the normal kyphosis of the upper thoracic spine noted. Unclear if this is related to positioning. There is multilevel disc space disease identified. There is suggestion of a disc extrusion CT C4-C5. Additionally there is bilateral uncovertebral joint hypertrophy at this level result in moderate bilateral foramina. CTA HEAD: ANTERIOR CIRCULATION: Azygos configuration the ACAs noted. A-comm present. No significant stenosis of the intracranial internal carotid, anterior cerebral, or middle cerebral arteries. No aneurysm. POSTERIOR CIRCULATION: No significant stenosis of the vertebral, basilar, or posterior cerebral arteries. No aneurysm. OTHER: No dural venous sinus thrombosis on this non-dedicated study. BRAIN: No mass effect or midline shift. No extra-axial fluid collection. The gray-white differentiation is maintained. No evidence of dissection or traumatic injury. No large vessel occlusion or hemodynamic stenosis involving intracranial or cervical arterial circulation. Degenerate changes cervical spine noted with findings suspicious for a disc extrusion C4-C5       LABS:  Labs Reviewed - No data to display    All other labs were within normal range or not returned as of this dictation.     EMERGENCY DEPARTMENT COURSE and DIFFERENTIAL DIAGNOSIS/MDM:   Vitals:    Vitals:    11/13/20 1647   BP: 123/82   Pulse: 85   Resp: 16   Temp: 98.2 °F (36.8 °C)   TempSrc: Oral   SpO2: 96% Weight: 173 lb (78.5 kg)   Height: 5' 1\" (1.549 m)       Patient instructed to return to the emergency room if symptoms worsen, return, or any other concern right away which is agreed by the patient    ED MEDS:  Orders Placed This Encounter   Medications    ibuprofen (ADVIL;MOTRIN) tablet 800 mg         CONSULTS:  None    PROCEDURES:  None      FINAL IMPRESSION      1. Sprain of left ankle, unspecified ligament, initial encounter          DISPOSITION/PLAN   DISPOSITION Decision To Discharge    PATIENT REFERRED TO:  Duong Horta MD  [address] [place] [address], [address]  [address] [facility]  [facility] [address]  [facility], [address] [facility] [address] [facility]  [phone]            DISCHARGE MEDICATIONS:  New Prescriptions    No medications on file         Summation      Patient Course:    Rolled ankle off of curb. Pain over lateral malleolus. Neurovascularly intact. Will get xray. Xray was unremarkable. Suspect sprain. Will dc home with ace wrap. Pt has crutches at home. Will refer to podiatry. Discussed results and plan with the pt. They expressed appropriate understanding. Pt given close follow up, supportive care instructions and strict return instructions at the bedside. ED Medications administered this visit:    Medications   ibuprofen (ADVIL;MOTRIN) tablet 800 mg (800 mg Oral Given 11/13/20 1651)       New Prescriptions from this visit:    New Prescriptions    No medications on file       Follow-up:  Duong Horta MD  [address] [place] [address], [address] [facility] [id number]  [facility] [address]  [facility], [address] [facility] [address] [facility]  [phone]              Final Impression:   1.  Sprain of left ankle, unspecified ligament, initial encounter               (Please note that portions of this note were completed with a voice recognition program.  Efforts were made to edit the dictations but occasionally words are mis-transcribed.)      (Please note that portions of this note were completed with a voice recognition program.  Efforts were made to edit the dictations but occasionally words are mis-transcribed.)    Giovanny Botello. [address], PA-C  11/13/20 9360

## 2020-11-14 NOTE — ED PROVIDER NOTES
[address]  eMERGENCY dEPARTMENT eNCOUnter   Independent Attestation     Pt Name: Alex Buckley  MRN: [id number]  [place] 1960  Date of evaluation: 11/13/20   Alex Buckley is a 62 y.o. female who presents with Ankle Pain    Vitals:   Vitals:    11/13/20 1647   BP: 123/82   Pulse: 85   Resp: 16   Temp: 98.2 °F (36.8 °C)   TempSrc: Oral   SpO2: 96%   Weight: 173 lb (78.5 kg)   Height: 5' 1\" (1.549 m)     Impression:   1. Sprain of left ankle, unspecified ligament, initial encounter      I was personally available for consultation in the Emergency Department. I have reviewed the chart and agree with the documentation as recorded by the [facility], including the assessment, treatment plan and disposition.   Ryan Johnson MD  Attending Emergency  Physician                 Ryan Johnson MD  11/14/20 0853

## 2020-11-17 RX ORDER — OMEPRAZOLE 40 MG/1
CAPSULE, DELAYED RELEASE ORAL
Qty: 90 CAPSULE | Refills: 2 | Status: SHIPPED | OUTPATIENT
Start: 2020-11-17 | End: 2021-06-25

## 2020-11-18 ENCOUNTER — HOSPITAL ENCOUNTER (OUTPATIENT)
Dept: PHYSICAL THERAPY | Age: 60
Setting detail: THERAPIES SERIES
Discharge: HOME | End: 2020-11-18
Payer: COMMERCIAL

## 2020-11-18 NOTE — FLOWSHEET NOTE
[x] [facility]  [address]    [phone]  F: [phone]           Physical Therapy Cancel/No Show note    Date: 2020  Patient: Sarthak Camarena  : 1960  MRN: [id number]    Cancels/No Shows to date: 3/2    For today's appointment patient:    [x]  Cancelled    [] Rescheduled appointment    [] No-show     Reason given by patient:    []  Patient ill    []  Conflicting appointment    [] No transportation      [] Conflict with work    [] No reason given    [] Weather related    [] CCRIM-50    [x] Other:      Comments:  Reports she is going to MD  for possible sprained ankle. Called pt she requested to keep  appt and she will call our clinic  with MD update.        [x] Next appointment was confirmed    Electronically signed by: Belén Owusu PTA

## 2020-11-19 ENCOUNTER — OFFICE VISIT (OUTPATIENT)
Dept: ORTHOPEDIC SURGERY | Age: 60
End: 2020-11-19
Payer: COMMERCIAL

## 2020-11-19 VITALS — HEIGHT: 61 IN | WEIGHT: 173 LBS | BODY MASS INDEX: 32.66 KG/M2

## 2020-11-19 PROCEDURE — 99213 OFFICE O/P EST LOW 20 MIN: CPT | Performed by: STUDENT IN AN ORGANIZED HEALTH CARE EDUCATION/TRAINING PROGRAM

## 2020-11-19 RX ORDER — DICLOFENAC SODIUM 75 MG/1
75 TABLET, DELAYED RELEASE ORAL 2 TIMES DAILY
Qty: 60 TABLET | Refills: 2 | Status: SHIPPED | OUTPATIENT
Start: 2020-11-19 | End: 2020-12-09 | Stop reason: DRUGHIGH

## 2020-11-19 ASSESSMENT — ENCOUNTER SYMPTOMS
CONSTIPATION: 0
SORE THROAT: 0
CHEST TIGHTNESS: 0
SHORTNESS OF BREATH: 0
VOICE CHANGE: 0
COUGH: 0
COLOR CHANGE: 0
NAUSEA: 0
DIARRHEA: 0

## 2020-11-19 NOTE — LETTER
[facility]  [address] [facility] [facility]  Phone: [phone]  Fax: [phone]    Kevin Schmidt MD        November 19, 2020     Patient: Rohit Giron   YOB: 1960   Date of Visit: 11/19/2020       To Whom it May Concern:    Jackie Payan was seen in my clinic on 11/19/2020. She can return to work with sit down job only until 12/17/2020. If you have any questions or concerns, please don't hesitate to call.     Sincerely,         Kevin Schmidt MD

## 2020-11-19 NOTE — PROGRESS NOTES
[facility]  [facility]  [address] [facility] [address]  Dept: [phone]  Dept Fax: [phone]        Orthopaedic Clinic Note      Subjective:   Sarthak Camarena is a 62y.o. year old female who presents to the clinic as a new patient today, follow up from ED, for left foot/ankle pain. 6 days ago she attempted to step off a curb when she inverted her foot significantly. Patient has been having pain primarily around the lateral aspect of her ankle as well as the medial aspect of her ankle. Is pain that is located primarily 2 cm proximal to her ankle joint. She endorses occasional numbness and tingling when resting her foot at certain angles. Otherwise, she has no numbness and tingling today or at baseline. She has tried ibuprofen with some improvement since her injury. She has not tried physical therapy or other treatment modalities. He presents to clinic today with crutches which are in good repair. Review of Systems   Constitutional: Positive for activity change. Negative for appetite change, fatigue and fever. HENT: Negative for hearing loss, mouth sores, sneezing, sore throat and voice change. Respiratory: Negative for cough, chest tightness and shortness of breath. Cardiovascular: Negative for chest pain, palpitations and leg swelling. Gastrointestinal: Negative for constipation, diarrhea and nausea. Genitourinary: Negative for decreased urine volume and difficulty urinating. Musculoskeletal: Positive for arthralgias and myalgias. Negative for gait problem. Skin: Negative for color change and rash. Neurological: Negative for tremors, facial asymmetry, weakness and numbness. Objective :   Physical exam  General: No acute distress  CV: No visual jugular vein distention, no dependent edema  Resp: Symmetric chest rise, normal work of breathing  Neuro: alert.  oriented  Eyes: Extra-ocular muscles intact  Mouth: Oral mucosa moist. No perioral

## 2020-12-02 ENCOUNTER — HOSPITAL ENCOUNTER (OUTPATIENT)
Dept: PHYSICAL THERAPY | Age: 60
Setting detail: THERAPIES SERIES
Discharge: HOME | End: 2020-12-02
Payer: COMMERCIAL

## 2020-12-02 ENCOUNTER — TELEPHONE (OUTPATIENT)
Dept: ORTHOPEDIC SURGERY | Age: 60
End: 2020-12-02

## 2020-12-02 PROCEDURE — 97110 THERAPEUTIC EXERCISES: CPT

## 2020-12-02 PROCEDURE — 97161 PT EVAL LOW COMPLEX 20 MIN: CPT

## 2020-12-02 NOTE — TELEPHONE ENCOUNTER
Received paperwork and contacted patient to let her know that it is waiting for a signature. Patient voices understanding.

## 2020-12-02 NOTE — CONSULTS
[x] [facility]        Outpatient Physical                Therapy       [address]       Phone: [phone]       Fax: [phone] [] [facility]       Promotion at [address] [facility]       Phone: [phone]       Fax: [phone] [] [facility]      [facility] Prom[facility]    [address]     Phone: [phone]     Fax:  [phone]     Physical Therapy Lower Extremity Evaluation    Date:  2020  Patient: Sergio Gitelman  : 1960  MRN: [id number]  Physician: Francisco Story DO   Insurance: [facility] ( 8 vs used, 22 remain)  Medical Diagnosis: Sprain of left ankle, unspecified ligament, initial encounter  Rehab Codes: M25.572, R53.1, M25.672, R26.9, R27.9, R20.2  Onset date: 20  Next Dr's appt.: 12/10/20        Subjective:   CC: Pt arrives for physical therapy evaluation of left ankle after stepping off a curb and twisting ankle (inversion sprain per ortho, but pt notes she thinks it rolled out) on 2020. Negative for fractures or acute bony pathology, just some swelling noted at lateral malleolus on x-ray in ER. Currently wearing lace up brace on L ankle as recommended by ortho. Notes lateral pain only, when in weightbearing. HPI 10 years ago had sprain on L ankle, no issues since.     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     Current Level of Function: limited to 1 hr standing/ambulating before needing to take sitting breaks, off work d/t condition, difficult stair climbing, adapting walk (limping)      Red Flags:      Yes  No Comments   Fatigue [] [x]     Fever/chills/sweats [] [x]    Malaise  [] [x]    Mental status change [] [x]     Paresthesias/numbness/weakness [x] []  tingling/numbness throughout foot when in dependent position for longer period of time   Unexplained weight changes [] [x]     Lightheaded/dizziness [] [x] Gait [] [x] []  Analysis: antalgic gait with reduced WB LLE, right trunk lean, shortened step length bilat             ROM  ° A/P STRENGTH    Left Right Left Right   Hip Flex       Ext       ER       IR       ABD       ADD       Knee Flx       Ext       Ankle DF lacking 10*/2* 10/14 4+ 5   PF 40 50 4** 5-   INV 30* 36* 4+* 5   EVER 10 30 4** 5          * = pain with testing  ** = most pain with testing     Special Tests: Positive: Talar tilt mildly positive bilat (L>R)      Negative: Anterior drawer (but painful during testing so may be guarded)         Functional Assessment    FUNCTION Normal Difficult Unable Comments   Sitting [x] [] []     Standing [] [x] []     Ambulation [] [x] []     Groom/Dress [] [x] []     Lift/Carry [] [x] []     Stairs [] [x] []     Bending [] [x] []     Squat [] [x] []     Kneel [] [x] []           Assessment: Roberto Villagomez is a 61 yo female who presents with signs and symptoms consistent with L ankle sprain including abnormal gait and neuromuscular control in weightbearing of L foot/ankle complex, weakness and stiffness in all planes of ankle mobility. This is impacting pt's ability to work and complete IADLs - pt will benefit from skilled PT to address these deficits and return to prior level of function. Problems:    [x] ? Pain:     [x] ? ROM:    [x] ? Strength:    [x] ? Function:    [x] ? Balance  [] Edema:  [x] Postural Deviations  [x] Gait Deviations  [] Other:        STG: (to be met in 8 treatments)  1. ? Pain: No more than 4/10 max pain with prolonged standing/ambulation at home. 2. ? ROM: L ankle AROM to the following degrees to indicate improving joint mobility post injury:   a. DF: 10 deg; eversion/inversion:20/35deg, respectively  3. ? Strength:  At least 4+/5 all planes of L ankle without pain during testing for improving stability at ankle in weightbearing  4. ? Balance: Pt will be able to tolerate SLS on level ground with LLE for at least 30 sec and no UE assist Noemí  [id number]   [x] Therapeutic Activity  45023 [x] Vasopneumatic cold with compression  56032    [x] Gait Training   41628 [] Ultrasound   [id number]   [x] Neuromuscular Re-education  65064 [] Electrical Stimulation Unattended  56222   [x] Manual Therapy  23586 [] Electrical Stimulation Attended  50823   [x] Instruction in HEP  [] Dry Needling   [] Aquatic Therapy   31509 [x] Cold/hotpack    [] Massage   52236      [] Lumbar/Cervical Traction  30619     []  Medication allergies reviewed for use of    Dexamethasone Sodium Phosphate 4mg/ml     with iontophoresis treatments. Pt is not allergic. Frequency:  2 x/week for 12 visits        Todays Treatment:  Modalities:   Precautions:  Exercises:  All exercises given for HEP:  Exercise Reps/ Time Weight/ Level Comments   Long sit calf stretch 2x30\"     Plantarflexor iso 10x5\"  Using contralateral foot as resistance   Eversion iso 10x5\"  \" \"   Ankle ABCs 1x           Other: Pt education provided re: pathology, PT POC to address impairments and return to function - billed with therex    Specific Instructions for next treatment:   - 4-way ankle strengthening: may need to begin with isometrics d/t lateral ankle pain, progress to concentric and resisted as able  - Calf stretching, all directions of ankle passive stretching by therapist (repetitive, short stretch with progressive stretch as tolerated)  - Gait train: weightshifts lateral/ant with UE assist, progressing to no UE assist  - SLS training  - TG squats/calf raises      Evaluation Complexity:  History (Personal factors, comorbidities) [] 0 [x] 1-2 [] 3+   Exam (limitations, restrictions) [] 1-2 [] 3 [x] 4+   Clinical presentation (progression) [x] Stable [] Evolving  [] Unstable   Decision Making [x] Low [] Moderate [] High    [x] Low Complexity [] Moderate Complexity [] High Complexity       Treatment Charges: Mins Units   [x] Evaluation       [x]  Low       []  Moderate       []  High 24 1   []  Modalities     [x] Ther Exercise 12 1   []  Manual Therapy     []  Ther Activities     []  Aquatics     []  Vasocompression     []  Other       TOTAL TREATMENT TIME: 36 min    Time in: 9:08 am   Time Out: 9:47 am    Electronically signed by: Aysha Beltran PT        Physician Signature:________________________________Date:__________________  By signing above or cosigning this note, I have reviewed this plan of care and certify a need for medically necessary rehabilitation services.      *PLEASE SIGN ABOVE AND FAX BACK ALL PAGES*

## 2020-12-02 NOTE — TELEPHONE ENCOUNTER
Patient wants to know if her [facility] paperwork has been received from [facility].

## 2020-12-02 NOTE — FLOWSHEET NOTE
[x] [facility]) [facility] &  Therapy  [address]  [phone]  F: [phone] [] [address]  [place] [address]   [address]  P: [phone]  F: [phone] [] [place]  [address]  P: [phone]  F: [phone] [] [address]  P: [phone]  F: [phone] [] [address]  [facility]   [address]   [place]: [phone]  F: [phone]      Physical Therapy Daily Treatment Note    Date:  2020  Patient Name:  Dalila Santiago    :  1960  MRN: [id number]  Physician: Miah Boland DO                                    Insurance: [facility] (30 vs)  Medical Diagnosis: Rotator cuff impingement syndrome of right shoulder; subluxation of tendon of long head of biceps  Rehab Codes: M25.511, R29.3, R53.1, M25.611, R20.2  Onset Date: 2020                 Next 's appt: 10/8/20  Visit# / total visits:      Cancels/No Shows:     Subjective:    Pain:  [] Yes  [x] No Location: R shoulder Pain Rating: (0-10 scale) 0/10  R shldr    Pain altered Tx:  [x] No  [] Yes  Action:  Comments:   Reports she has not been very active due to recent L ankle sprain (evaluation for L ankle this AM)    Has not been addressing HEP  for R shoulder due to ankle. Generally tired due to not sleeping last night and evaluation. Objective:  Modalities:    Precautions:  Exercises: Completed exercises marked with \"x\"  Exercise R UE Reps/ Time Weight/ Level Comments    UBE 2/2 L3 Fwd/bwd x   codmans  HEP 1 lbs Flex/ext, abd/add.   x   Manual   - Subscap MET  - Lat active release and MET    Standing       Lat crossbody stretch in doorframe 3x30\"   x   pect. corner stretch 3x20\"   x   Reverse wall push ups 2x10   x   Tband clocks 20x Added 12/2: 12,1,2,3 oclock  x   FW shdlr flexion 2x10 2lbs To 90 deg elevation x   FW shldr abd 2x10 2lbs To 100 deg; high pain with 2# so reduced to AROM x   Overhead press 2x10 2lbs   x          Thera bands       -Ext 15x blue  x   -triceps  15x purple  x   -Rows 15x Purple Blue   x   -ER 15x Purple Bilateral  x   -IR 15x Purple  x   -Biceps 15x Purple  x          Posterior shld rolls       Scapular retraction       Scapular depression       Chin tucks with C-flexion              Supine       Manual  x       Chest press 15x 2lb      Shoulder flexion 15x 2lb  x   Horiz abd 15x 2lb     Protraction 15x 2lb             Sidelying         Scap. depression 10x5\"    x   ER 10x2 2 lbs Added wt 12/2 x   abd 10x 2 lbs Added wt 12/2 x          TG prone     added wt and incr. Reps 12/2     Ext  12x 2 lbs   x   Rows  12x 2 lbs  x   abd 12x 2 lbs   x          Other:REASSESSMENT OF SHLR NEXT SESSION. Manual:      Treatment Charges: Mins Units   [x]  Modalities -- CP 10 -   []  Ther Exercise 39 3   []  Manual Therapy     []  Ther Activities     []  Aquatics     []  Vasocompression      []  Other     Total Treatment time 39 3       Assessment: [x] Progressing toward goals. Added tband SA/periscapular exercise for strengthening. Also, added wt with all prone  and SL exercises for periscapular  strengthening. Intermittent vc for exercise/stretching techniques, no pain reported with progressions. CPapplied to alleviate post work out complaints of edema/pain. [] No change. [x] Other:   [x] Patient would continue to benefit from skilled physical therapy services in order to: decrease shoulder pain, increase ROM in ER/IR, increase strength and function. Problems:    [x]? ? Pain:  [x]? ? ROM:  [x]? ? Strength:  [x]? ? Function:  [x]? Other: 35/80 on UEFS = 44% fxn per PT     STG: (to be met in 10 treatments)  1. ? Pain: No more than 5/10 max pain in R shoulder with daily activities.   2. ? ROM: R shoulder AROM to the following to indicate progressing joint mobility:  a. Flex/abd: 160/120, respectively  b. ER/IR: To L4/To C6, respectively  3. ? Strength: Improving scapular strength evident by ability to complete prone ITY exercises for 20x ea on R shoulder without excessive upper trap compensation to further improve scapulohumoral rhythm needed for overhead reaching  4. ? Function:   a. Pt will report improving ability to reach into kitchen cupboards with minimal/no pain  b. Pt will be able to lift 5# using bilat UEs from chest to overhead height without difficulty and no more than minimal pain. c. No more than minimal pain (3/10) when beginning to integrate RUE into work duties  5. Independent with Home Exercise Programs     LTG: (to be met in 16 treatments)  6. ? Pain: No more than 3/10 max pain in R shoulder with daily activities. 7. ? ROM: Full and symmetrical R shoulder AROM all planes when compared to LUE, with no pain reported  8. ? Strength: 5/5 R shoulder in all planes without pain for improved carrying/lifting  9. ? Function:   a. Pt will report no difficulty or pain with RUE dressing, overhead reaching, and carrying  b. Pt will be able to lift 10# using bilat UEs from chest to overhead height without difficulty and no more than minimal pain. c. No pain with any work duties while fully integrating RUE into cleaning            Patient goals: \"no pain, work with out pain using R arm\"      Rehab Potential:  [x]? Good  []? Fair  []? Poor    Suggested Professional Referral:  [x]? No  []? Yes:  Barriers to Goal Achievement[de-identified]  [x]? No  []? Yes:  Domestic Concerns:  [x]? No  []? Yes:    Pt. Education:  [x] Yes  [] No  [x] Reviewed Prior HEP/Ed  Method of Education: [x] Verbal  [x] Demo  [] Written  Comprehension of Education:  [x] Verbalizes understanding. [x] Demonstrates understanding. [] Needs review. [x] Demonstrates/verbalizes HEP/Ed previously given.    11/11/20: doorway pectoralis stretch, codman's, self caudal stretch \"gentle!!\"     Access Code: [email or web address]   URL: [email or web address]   Date: 10/14/2020   Prepared by: Jackeline Joy     Exercises   Shoulder Flexion Wall Slide with Towel - 10 reps - 3 sets - 1x daily - 7x weekly   Standing shoulder flexion wall slides - 10 reps - 3 sets - 1x daily - 7x weekly   Shoulder Extension with Resistance - 10 reps - 3 sets - 1x daily - 7x weekly   Standing Bilateral Low Shoulder Row with Anchored Resistance - 10 reps - 3 sets - 1x daily - 7x weekly   Prone Shoulder Flexion - 10 reps - 3 sets - 1x daily - 7x weekly   Shoulder External Rotation with Anchored Resistance - 10 reps - 3 sets - 1x daily - 7x weekly   Shoulder Internal Rotation with Resistance - 10 reps - 3 sets - 1x daily - 7x weekly   Seated Scapular Retraction - 10 reps - 3 sets - 1x daily - 7x weekly   Standing Backward Shoulder Rolls - 10 reps - 3 sets - 1x daily - 7x weekly   Seated Passive Cervical Retraction - 10 reps - 3 sets - 1x daily - 7x weekly   Supine Shoulder Press AAROM in Abduction with Dowel - 10 reps - 3 sets - 1x daily - 7x weekly   Supine Shoulder Flexion Extension AAROM with Dowel - 10 reps - 3 sets - 1x daily - 7x weekly   Supine Shoulder Horizontal Abduction Adduction AAROM with Dowel - 10 reps - 3 sets - 1x daily - 7x weekly       Plan: [x] Continue current frequency toward long and short term goals. [x] Specific Instructions for subsequent treatments:    - assess for subscap/lat tightness and address with manual as needed   -  Progress prone scap strengthening (depression, retraction, shld ext w/ retraction, row, T's, etc)    -RTC/periscapular strengthening.   - posture education.        Time In:  0951           Time Out:  1044     Electronically signed by:  Nadege Judge PTA

## 2020-12-04 ENCOUNTER — HOSPITAL ENCOUNTER (OUTPATIENT)
Dept: PHYSICAL THERAPY | Age: 60
Setting detail: THERAPIES SERIES
Discharge: HOME | End: 2020-12-04
Payer: COMMERCIAL

## 2020-12-04 PROCEDURE — 97016 VASOPNEUMATIC DEVICE THERAPY: CPT

## 2020-12-04 PROCEDURE — 97110 THERAPEUTIC EXERCISES: CPT

## 2020-12-04 NOTE — FLOWSHEET NOTE
[x] [facility] &  Therapy  [address]  [phone]  F: [phone] [] [address]  [place] [address]   [address]  P: [phone]  F: [phone] [] [place]  [address]  P: [phone]  F: [phone] [] [address]  P: [phone]  F: [phone] [] [address]  [facility]   [address]   [place]: [phone]  F: [phone]      Physical Therapy Daily Treatment Note    Date:  2020  Patient Name:  Rohit Giron    :  1960  MRN: [id number]    SHOULDER  Physician: Malik Lee DO                                    Insurance: [employer] (30 vs)  Medical Diagnosis: Rotator cuff impingement syndrome of right shoulder; subluxation of tendon of long head of biceps  Rehab Codes: Q28.034, R29.3, R53.1, M25.611, R20.2  Onset Date: 2020                 Next 's appt: 10/8/20  Visit# / total visits: 10/16     Cancels/No Shows: 1/2    ANKLE  Physician: Malik Lee DO                                    Insurance: [employer] ( 8 vs used, 22 remain)  Medical Diagnosis: Sprain of left ankle, unspecified ligament, initial encounter  Rehab Codes: M25.572, R53.1, M25.672, R26.9, R27.9, R20.2  Onset date: 20             Next Dr's appt.: 12/10/20      Subjective:    Pain:  [] Yes  [x] No Location: R shoulder Pain Rating: (0-10 scale) 0/10  R shldr  / 6/10 - ankle   Pain altered Tx:  [x] No  [] Yes  Action:  Comments: Patient reports ankle was sore following initial evaluation with exercises completed. Shoulder has been feeling good with no pain to report.          Objective:   Modalities:  Vaso to L ankle x 15 mins, 36°, low compression, supine with ankle elevated   Precautions:  Exercises: strengthening in L ankle. Fair tolerance to exercises as noted verbalizing pain only with ambulation through clinic. Ended with vasocompression to L ankle for soreness and symptom relief, educated patient on use of ice at home for soreness if persists. Educated to continue with ROM exs for HEP given at initial evaluation as patient fatigued with exercises this date. No pain noted through shoulder with exercises this date. Assessed short term goals as noted below with patient noting no issues with those assessed. [] No change. [x] Other:   [x] Patient would continue to benefit from skilled physical therapy services in order to: decrease shoulder pain, increase ROM in ER/IR, increase strength and function. Problems:    [x]? ? Pain:  [x]? ? ROM:  [x]? ? Strength:  [x]? ? Function:  [x]? Other: 35/80 on UEFS = 44% fxn per PT    SHOULDER    STG: (to be met in 10 treatments)  1. ? Pain: No more than 5/10 max pain in R shoulder with daily activities. -- MET  2. ? ROM: R shoulder AROM to the following to indicate progressing joint mobility:  a. Flex/abd: 160/120, respectively  b. ER/IR: To L4/To C6, respectively  3. ? Strength: Improving scapular strength evident by ability to complete prone ITY exercises for 20x ea on R shoulder without excessive upper trap compensation to further improve scapulohumoral rhythm needed for overhead reaching -- MET  4. ? Function:   a. Pt will report improving ability to reach into kitchen cupboards with minimal/no pain -- MET \"haven't noticed any pain or issues recently\"  b. Pt will be able to lift 5# using bilat UEs from chest to overhead height without difficulty and no more than minimal pain. c. No more than minimal pain (3/10) when beginning to integrate RUE into work duties -- MET   5. Independent with Home Exercise Programs -- MET      LTG: (to be met in 16 treatments)  6. ? Pain: No more than 3/10 max pain in R shoulder with daily activities.   7. ? ROM: Full and symmetrical R shoulder AROM all planes when compared to LUE, with no pain reported  8. ? Strength: 5/5 R shoulder in all planes without pain for improved carrying/lifting  9. ? Function:   a. Pt will report no difficulty or pain with RUE dressing, overhead reaching, and carrying  b. Pt will be able to lift 10# using bilat UEs from chest to overhead height without difficulty and no more than minimal pain. c. No pain with any work duties while fully integrating RUE into cleaning            Patient goals: \"no pain, work with out pain using R arm\"    ANKLE  STG: (to be met in 8 treatments)  6. ? Pain: No more than 4/10 max pain with prolonged standing/ambulation at home. 7. ? ROM: L ankle AROM to the following degrees to indicate improving joint mobility post injury:   a. DF: 10 deg; eversion/inversion:20/35deg, respectively  8. ? Strength: At least 4+/5 all planes of L ankle without pain during testing for improving stability at ankle in weightbearing  9. ? Balance: Pt will be able to tolerate SLS on level ground with LLE for at least 30 sec and no UE assist with no more than 5 instances of instability evidenced by med/lat forefoot elevation. 10. ? Function: Pt will be able to tolerate 2 hours of consecutive standing/ambulation with no more than minimal pain reported. 11. Independent with Home Exercise Programs  12. Demonstrate knowledge of fall prevention.     LTG: (to be met in 12 treatments)     8. ? Pain: No pain reported with all ADL/IADLs including prolonged standing at home and in community. 9. ? ROM: L ankle AROM symmetrical to R ankle with no pain at end range  10. ? Strength: At least 5-/5 all planes of L ankle without pain during testing to indicate further improved stability at ankle in weightbearing  11. ? Balance: Pt will be able to tolerate SLS on foam with LLE for at least 30 sec and no UE assist with no more than 5 instances of instability evidenced by med/lat forefoot elevation.   12. ?

## 2020-12-09 ENCOUNTER — HOSPITAL ENCOUNTER (OUTPATIENT)
Dept: PHYSICAL THERAPY | Age: 60
Setting detail: THERAPIES SERIES
Discharge: HOME | End: 2020-12-09
Payer: COMMERCIAL

## 2020-12-09 ENCOUNTER — OFFICE VISIT (OUTPATIENT)
Dept: DERMATOLOGY | Age: 60
End: 2020-12-09
Payer: COMMERCIAL

## 2020-12-09 VITALS
SYSTOLIC BLOOD PRESSURE: 116 MMHG | HEIGHT: 61 IN | WEIGHT: 174 LBS | TEMPERATURE: 97.1 F | HEART RATE: 81 BPM | DIASTOLIC BLOOD PRESSURE: 73 MMHG | OXYGEN SATURATION: 100 % | BODY MASS INDEX: 32.85 KG/M2

## 2020-12-09 PROCEDURE — 97110 THERAPEUTIC EXERCISES: CPT

## 2020-12-09 PROCEDURE — G8427 DOCREV CUR MEDS BY ELIG CLIN: HCPCS | Performed by: PLASTIC SURGERY

## 2020-12-09 PROCEDURE — 3017F COLORECTAL CA SCREEN DOC REV: CPT | Performed by: PLASTIC SURGERY

## 2020-12-09 PROCEDURE — 99213 OFFICE O/P EST LOW 20 MIN: CPT | Performed by: PLASTIC SURGERY

## 2020-12-09 PROCEDURE — 97016 VASOPNEUMATIC DEVICE THERAPY: CPT

## 2020-12-09 PROCEDURE — G8417 CALC BMI ABV UP PARAM F/U: HCPCS | Performed by: PLASTIC SURGERY

## 2020-12-09 PROCEDURE — 1036F TOBACCO NON-USER: CPT | Performed by: PLASTIC SURGERY

## 2020-12-09 PROCEDURE — G8484 FLU IMMUNIZE NO ADMIN: HCPCS | Performed by: PLASTIC SURGERY

## 2020-12-09 NOTE — FLOWSHEET NOTE
[x] [facility]) [facility] &  Therapy  [address]  [phone]  F: [phone] [] [address]  [facility]   [address]  P: [phone]  F: [phone] [] [place]  [address]  P: [phone]  F: [phone] [] [email or web address]  P: [phone]  F: [phone] [] [address]  [facility]   [address]   [place]: [phone]  F: [phone]      Physical Therapy Daily Treatment Note    Date:  2020  Patient Name:  Robert Bustos    :  1960  MRN: [id number]    SHOULDER  Physician: Cherelle Mary DO                                    Insurance: [employer] (30 vs)  Medical Diagnosis: Rotator cuff impingement syndrome of right shoulder; subluxation of tendon of long head of biceps  Rehab Codes: N69.950, R29.3, R53.1, M25.611, R20.2  Onset Date: 2020                 Next 's appt: 10/8/20  Visit# / total visits:      Cancels/No Shows: 1/2    ANKLE  Physician: Cherelle Mary DO                                    Insurance: [employer] ( 8 vs used, 22 remain)  Medical Diagnosis: Sprain of left ankle, unspecified ligament, initial encounter  Rehab Codes: M25.572, R53.1, M25.672, R26.9, R27.9, R20.2  Onset date: 20             Next Dr's appt.: 12/10/20  Visit #: 3/12      Subjective:    Pain:  [] Yes  [x] No Location: R shoulder Pain Rating: (0-10 scale) 0/10  R shldr  / 2-3/10 - ankle   Pain altered Tx:  [x] No  [] Yes  Action:  Comments: Patient arrives with less antalgic gait and less pain noted in ankle. States her shoulder was doing well, had a pop in it while folding laundry with a little pain accompanying, however no pain persisted since.          Objective:   Modalities:  Vaso to L ankle x 15 mins, 36°, low compression, supine with ankle elevated   Precautions:  Exercises: Completed exercises marked with \"x\"  Exercise   R UE, L ankle Reps/ Time Weight/ Level Comments    ANKLE       Nustep 5m   x          Calf stretch 3x20\"  wedge x   Weight shifts  10x5\"   Fwd/Bwd, Lat. Alt L foot in front and back x   BAPS 15x ea  Fwd/Bwd, Ev/In, CW/CCW x   Mini lunges 15x ea   x          Seated        Long sit calf stretch 3x30\"   x   4 way ankle 10x ea Lime Long siting x   Ankle ABCs 1x   Elevated on stool  x   Towel scrunch 10x5 scrunch   x                 SHOULDER        UBE   Fwd/bwd    codmans  HEP 1 lbs Flex/ext, abd/add. Manual   - Subscap MET  - Lat active release and MET    Standing       Lat crossbody stretch in doorframe 3x30\"      pect. corner stretch 3x20\"      Reverse wall push ups 2x10      Tband clocks 20x   Added 12/2: 12,1,2,3 oclock     FW shdlr flexion 2x10 2lbs To 90 deg elevation    FW shldr abd 2x10 2lbs To 100 deg; high pain with 2# so reduced to AROM    Overhead press 2x10 2lbs             Thera bands       -Ext 15x blue     -triceps  15x purple     -Rows 15x Purple Blue      -ER 15x Purple Bilateral     -IR 15x Purple     -Biceps 15x Purple            Posterior shld rolls       Scapular retraction       Scapular depression       Chin tucks with C-flexion              Supine       Manual  x       Chest press 15x 2lb      Shoulder flexion 15x 2lb     Horiz abd 15x 2lb     Protraction 15x 2lb             Sidelying         Scap. depression 10x5\"       ER 10x2 2 lbs     abd 10x 2 lbs            TG prone     Increased reps 12/4     Ext  2x10 2 lbs      Rows  2x10 2 lbs     Scaption 2x10 2 lbs                    Other:  Manual:      Treatment Charges: Mins Units   []  Modalities      []  Ther Exercise 40 3   []  Manual Therapy     []  Ther Activities     []  Aquatics     [x]  Vasocompression 15 1   []  Other     Total Treatment time 55 4       Assessment: [x] Progressing toward goals.  Patient with good tolerance to progressions in program for L ankle this date. Minimal to no pain noted throughout. Continues with minimal ROM limitations dominic INV/EV, however improving slowly. Continued with vasocompression at end of session for soreness relief with good result. Completed assessment of STG for shoulder this session with plans to discharge shoulder post this treatment per patient preference and goal achievement. Will review shoulder HEP at next session and continue progress ankle ROM and strengthening. [] No change. [x] Other:    [x] Patient would continue to benefit from skilled physical therapy services in order to: decrease shoulder pain, increase ROM in ER/IR, increase strength and function. Problems:    [x]? ? Pain:  [x]? ? ROM:  [x]? ? Strength:  [x]? ? Function:  [x]? Other: 35/80 on UEFS = 44% fxn per PT    SHOULDER    STG: (to be met in 10 treatments)  1. ? Pain: No more than 5/10 max pain in R shoulder with daily activities. -- MET  2. ? ROM: R shoulder AROM to the following to indicate progressing joint mobility:  a. Flex/abd: 160/120, respectively ° / 121° respectively   b. ER/IR: To L4/To C6, respectively MET   3. ? Strength: Improving scapular strength evident by ability to complete prone ITY exercises for 20x ea on R shoulder without excessive upper trap compensation to further improve scapulohumoral rhythm needed for overhead reaching -- MET  4. ? Function:   a. Pt will report improving ability to reach into kitchen cupboards with minimal/no pain -- MET \"haven't noticed any pain or issues recently\"  b. Pt will be able to lift 5# using bilat UEs from chest to overhead height without difficulty and no more than minimal pain. c. No more than minimal pain (3/10) when beginning to integrate RUE into work duties -- MET   5.  Independent with Home Exercise Programs -- MET      LTG: (to be met in 16 treatments)  6. ? Pain: No more than 3/10 max pain in R shoulder with daily activities. 7. ? ROM: Full and symmetrical R shoulder AROM all planes when compared to LUE, with no pain reported  8. ? Strength: 5/5 R shoulder in all planes without pain for improved carrying/lifting  9. ? Function:   a. Pt will report no difficulty or pain with RUE dressing, overhead reaching, and carrying  b. Pt will be able to lift 10# using bilat UEs from chest to overhead height without difficulty and no more than minimal pain. c. No pain with any work duties while fully integrating RUE into cleaning            Patient goals: \"no pain, work with out pain using R arm\"    ANKLE  STG: (to be met in 8 treatments)  6. ? Pain: No more than 4/10 max pain with prolonged standing/ambulation at home. 7. ? ROM: L ankle AROM to the following degrees to indicate improving joint mobility post injury:   a. DF: 10 deg; eversion/inversion:20/35deg, respectively  8. ? Strength: At least 4+/5 all planes of L ankle without pain during testing for improving stability at ankle in weightbearing  9. ? Balance: Pt will be able to tolerate SLS on level ground with LLE for at least 30 sec and no UE assist with no more than 5 instances of instability evidenced by med/lat forefoot elevation. 10. ? Function: Pt will be able to tolerate 2 hours of consecutive standing/ambulation with no more than minimal pain reported. 11. Independent with Home Exercise Programs  12. Demonstrate knowledge of fall prevention.     LTG: (to be met in 12 treatments)     8. ? Pain: No pain reported with all ADL/IADLs including prolonged standing at home and in community. 9. ? ROM: L ankle AROM symmetrical to R ankle with no pain at end range  10. ? Strength:  At least 5-/5 all planes of L ankle without pain during testing to indicate further improved stability at ankle in weightbearing  11. ? Balance: Pt will be able to tolerate SLS on foam with LLE for at least 30 sec and no UE assist with no more than 5 instances of instability evidenced by med/lat forefoot elevation. 12. ? Function:  a. Pt will be able to tolerate unlimited standing/ambulation times without pain in ankle. b. Pt will be able to squat/stoop to ground without pain in ankle reported. c. Pt will be able to ascend/descend stairs reciprocally without UE assist and no pain reported in ankle.                    Patient goals: \"no more pain\"         Rehab Potential:  [x]? Good  []? Fair  []? Poor    Suggested Professional Referral:  [x]? No  []? Yes:  Barriers to Goal Achievement[de-identified]  [x]? No  []? Yes:  Domestic Concerns:  [x]? No  []? Yes:    Pt. Education:  [x] Yes  [] No  [x] Reviewed Prior HEP/Ed  Method of Education: [x] Verbal  [x] Demo  [] Written  Comprehension of Education:  [x] Verbalizes understanding. [x] Demonstrates understanding. [] Needs review. [x] Demonstrates/verbalizes HEP/Ed previously given. 11/11/20: doorway pectoralis stretch, codman's, self caudal stretch \"gentle!!\"     Access Code: [email or web address]   URL: [email or web address]   Date: 10/14/2020   Prepared by: Sylvia Mora     Exercises   Shoulder Flexion Wall Slide with Towel - 10 reps - 3 sets - 1x daily - 7x weekly   Standing shoulder flexion wall slides - 10 reps - 3 sets - 1x daily - 7x weekly   Shoulder Extension with Resistance - 10 reps - 3 sets - 1x daily - 7x weekly   Standing Bilateral Low Shoulder Row with Anchored Resistance - 10 reps - 3 sets - 1x daily - 7x weekly   Prone Shoulder Flexion - 10 reps - 3 sets - 1x daily - 7x weekly   Shoulder External Rotation with Anchored Resistance - 10 reps - 3 sets - 1x daily - 7x weekly   Shoulder Internal Rotation with Resistance - 10 reps - 3 sets - 1x daily - 7x weekly   Seated Scapular Retraction - 10 reps - 3 sets - 1x daily - 7x weekly   Standing Backward Shoulder Rolls - 10 reps - 3 sets - 1x daily - 7x weekly   Seated Passive Cervical Retraction - 10 reps - 3 sets - 1x daily - 7x weekly   Supine Shoulder Press AAROM in Abduction with Dowel - 10 reps - 3 sets - 1x daily - 7x weekly   Supine Shoulder Flexion Extension AAROM with Dowel - 10 reps - 3 sets - 1x daily - 7x weekly   Supine Shoulder Horizontal Abduction Adduction AAROM with Dowel - 10 reps - 3 sets - 1x daily - 7x weekly       Plan: [x] Continue current frequency toward long and short term goals. [x] Specific Instructions for subsequent treatments: plan to discharge shoulder treatments post this treatment per patient preference and goal achievement.         ANKLE   - Gait train: weightshifts lateral/ant with UE assist, progressing to no UE assist  - SLS training  - TG squats/calf raises    Frequency:  2 x/week for 12 visits -- ANKLE      Time In: 205 pm            Time Out:  300 pm     Electronically signed by:  Vanessa Handy PTA

## 2020-12-10 ENCOUNTER — OFFICE VISIT (OUTPATIENT)
Dept: ORTHOPEDIC SURGERY | Age: 60
End: 2020-12-10
Payer: COMMERCIAL

## 2020-12-10 VITALS — WEIGHT: 175 LBS | HEIGHT: 61 IN | BODY MASS INDEX: 33.04 KG/M2

## 2020-12-10 PROCEDURE — G8484 FLU IMMUNIZE NO ADMIN: HCPCS | Performed by: STUDENT IN AN ORGANIZED HEALTH CARE EDUCATION/TRAINING PROGRAM

## 2020-12-10 PROCEDURE — 1036F TOBACCO NON-USER: CPT | Performed by: STUDENT IN AN ORGANIZED HEALTH CARE EDUCATION/TRAINING PROGRAM

## 2020-12-10 PROCEDURE — G8427 DOCREV CUR MEDS BY ELIG CLIN: HCPCS | Performed by: STUDENT IN AN ORGANIZED HEALTH CARE EDUCATION/TRAINING PROGRAM

## 2020-12-10 PROCEDURE — 99213 OFFICE O/P EST LOW 20 MIN: CPT | Performed by: STUDENT IN AN ORGANIZED HEALTH CARE EDUCATION/TRAINING PROGRAM

## 2020-12-10 PROCEDURE — G8417 CALC BMI ABV UP PARAM F/U: HCPCS | Performed by: STUDENT IN AN ORGANIZED HEALTH CARE EDUCATION/TRAINING PROGRAM

## 2020-12-10 PROCEDURE — 3017F COLORECTAL CA SCREEN DOC REV: CPT | Performed by: STUDENT IN AN ORGANIZED HEALTH CARE EDUCATION/TRAINING PROGRAM

## 2020-12-10 NOTE — PROGRESS NOTES
[facility]  [facility]  [id number] [phone]  Dept: [phone]  Dept Fax: [phone]        Ambulatory Follow Up    Subjective:     Chief Complaint   Patient presents with   Heidy Weathers is a 62y.o. year old female who presents to our office today for routine followup regarding her:   1. Sprain of left ankle, unspecified ligament, initial encounter    . Patient presents the clinic today for follow-up evaluation of her left ankle. She was recently diagnosed with a left ankle sprain at her last visit in our office. She was instructed to go to physical therapy, utilize ice, elevate, and to rest her left lower extremity as much as possible. She states she is going to physical therapy and elevated and has tried to rest as much as possible. She states she has not used ice at all. She works at the [employer] and has been unable to work due to the injury. The injury occurred roughly around 11/13/2020 where she had twisted her ankle off of a curb while walking. She locates her pain today to the anterior lateral aspect of her ankle roughly between the distal tibia and fibula. She denies any current numbness or tingling although she does state she gets intermittent paresthesias at the distal aspects of her toes which she noted was something that occurred even prior to her injury. She has been using a over-the-counter brace for the left ankle with mild help in her symptoms. She has been taking Tylenol and Motrin for pain relief at this time. She has also been seen in our office regarding her right shoulder which she has received multiple injections. She states her right shoulder is symptom-free at this time and she has no complaints or concerns at this point regarding her right shoulder. Review of Systems:   General: Negative for fever and chills. Cardiovascular: Negative for chest pain and palpitations.    Musculoskeletal: extremity, continue to rest, and will remain off work for another 2 weeks. I discussed with the patient that if she follows up in 2 weeks and has no improvement in her symptoms and/or worsening of her symptoms we would consider an MRI for evaluation of any further underlying etiology such as a talar dome injury. She may continue to utilize her brace as needed to the left lower extremity. She will follow-up in the office in 2 weeks. Regarding her right shoulder she may follow-up as needed or if any new complaints or concerns arise. She discussed that she is symptom-free regarding her right shoulder at this point and that her chief complaint at this visit was her left ankle. Follow up:Return in about 2 weeks (around 12/24/2020) for re-evaluation of her left ankle. No orders of the defined types were placed in this encounter. No orders of the defined types were placed in this encounter.       Sofia Juarez DO  Orthopedic Surgery Resident, PGY-2  [facility]

## 2020-12-11 ENCOUNTER — HOSPITAL ENCOUNTER (OUTPATIENT)
Dept: PHYSICAL THERAPY | Age: 60
Setting detail: THERAPIES SERIES
Discharge: HOME | End: 2020-12-11
Payer: COMMERCIAL

## 2020-12-11 PROCEDURE — 97016 VASOPNEUMATIC DEVICE THERAPY: CPT

## 2020-12-11 PROCEDURE — 97110 THERAPEUTIC EXERCISES: CPT

## 2020-12-16 ENCOUNTER — HOSPITAL ENCOUNTER (OUTPATIENT)
Dept: PHYSICAL THERAPY | Age: 60
Setting detail: THERAPIES SERIES
Discharge: HOME | End: 2020-12-16
Payer: COMMERCIAL

## 2020-12-16 PROCEDURE — 97016 VASOPNEUMATIC DEVICE THERAPY: CPT

## 2020-12-16 PROCEDURE — 97110 THERAPEUTIC EXERCISES: CPT

## 2020-12-18 ENCOUNTER — HOSPITAL ENCOUNTER (OUTPATIENT)
Dept: PHYSICAL THERAPY | Age: 60
Setting detail: THERAPIES SERIES
Discharge: HOME | End: 2020-12-18
Payer: COMMERCIAL

## 2020-12-18 PROCEDURE — 97110 THERAPEUTIC EXERCISES: CPT

## 2020-12-18 PROCEDURE — 97016 VASOPNEUMATIC DEVICE THERAPY: CPT

## 2020-12-18 NOTE — FLOWSHEET NOTE
12/18         Seated       Long sit calf stretch 3x30\"     4 way ankle 15x Blue  Long siting   Ankle ABCs 1x   Elevated on stool    Towel scrunch 10x5 scrunch     Heel raises 2x10     LAQ 15x3\"                                   Other:  Manual:      Treatment Charges: Mins Units   []  Modalities      []  Ther Exercise 40 3   []  Manual Therapy     []  Ther Activities     []  Aquatics     [x]  Vasocompression 15 1   []  Other     Total Treatment time 55 4       Assessment: [x] Progressing toward goals. [] No change. [x] Other:  Held total gym squats this date as patient arrived with increased pain/symptoms. Also modified BAPS board to seated this date for less stress through L ankle joint with weightbearing. Overall, good tolerance with no complaints of increased pain or onset of symptoms throughout program. Patient verbalizes stretches in therapy help overall symptom improvement. [x] Patient would continue to benefit from skilled physical therapy services in order to: decrease shoulder pain, increase ROM in ER/IR, increase strength and function. ANKLE  STG: (to be met in 8 treatments)  1. ? Pain: No more than 4/10 max pain with prolonged standing/ambulation at home. 2. ? ROM: L ankle AROM to the following degrees to indicate improving joint mobility post injury:   a. DF: 10 deg; eversion/inversion:20/35deg, respectively  3. ? Strength: At least 4+/5 all planes of L ankle without pain during testing for improving stability at ankle in weightbearing  4. ? Balance: Pt will be able to tolerate SLS on level ground with LLE for at least 30 sec and no UE assist with no more than 5 instances of instability evidenced by med/lat forefoot elevation. 5. ? Function: Pt will be able to tolerate 2 hours of consecutive standing/ambulation with no more than minimal pain reported. 6. Independent with Home Exercise Programs  7.  Demonstrate knowledge of fall prevention.     LTG: (to be met in 12 treatments) 8. ? Pain: No pain reported with all ADL/IADLs including prolonged standing at home and in community. 9. ? ROM: L ankle AROM symmetrical to R ankle with no pain at end range  10. ? Strength: At least 5-/5 all planes of L ankle without pain during testing to indicate further improved stability at ankle in weightbearing  11. ? Balance: Pt will be able to tolerate SLS on foam with LLE for at least 30 sec and no UE assist with no more than 5 instances of instability evidenced by med/lat forefoot elevation. 12. ? Function:  a. Pt will be able to tolerate unlimited standing/ambulation times without pain in ankle. b. Pt will be able to squat/stoop to ground without pain in ankle reported. c. Pt will be able to ascend/descend stairs reciprocally without UE assist and no pain reported in ankle.                    Patient goals: \"no more pain\"         Rehab Potential:  [x]? Good  []? Fair  []? Poor    Suggested Professional Referral:  [x]? No  []? Yes:  Barriers to Goal Achievement[de-identified]  [x]? No  []? Yes:  Domestic Concerns:  [x]? No  []? Yes:    Pt. Education:  [x] Yes  [] No  [] Reviewed Prior HEP/Ed  Method of Education: [x] Verbal  [x] Demo  [x] Written  Comprehension of Education:  [x] Verbalizes understanding. [x] Demonstrates understanding. [] Needs review. [x] Demonstrates/verbalizes HEP/Ed previously given. 12/11/20: 4 way ankle and blue tband    Plan: [x] Continue current frequency toward long and short term goals towards ankle goals.      [x] Specific Instructions for subsequent treatments: fall prevention         ANKLE   - Gait train: weightshifts lateral/ant with UE assist, progressing to no UE assist  - SLS training  - TG squats/calf raises    Frequency:  2 x/week for 12 visits -- ANKLE      Time In: 200 pm            Time Out: 300 pm     Electronically signed by:  Vanessa Handy PTA

## 2020-12-23 ENCOUNTER — HOSPITAL ENCOUNTER (OUTPATIENT)
Dept: PHYSICAL THERAPY | Age: 60
Setting detail: THERAPIES SERIES
Discharge: HOME | End: 2020-12-23
Payer: COMMERCIAL

## 2020-12-23 NOTE — FLOWSHEET NOTE
[x] [facility]) [facility] &  Therapy  [address]    [phone]  F: [phone]   [] [address]  [place] [address]   [address]  P: [phone]  F: [phone]  [] [place]  [address]  P: [phone]  F: [phone] [] [address]  P: [phone]  F: [phone]  [] [address]  [facility]   [address]   [place]: [phone]  F: [phone]   [] [facility]  [address]  [place]: [phone]   F: [phone]     Physical Therapy Cancel/No Show note    Date: 2020  Patient: Marj Balderas  : 1960  MRN: [id number]    Cancels/No Shows to date: since  -    Restarted count , previous total 3/3 since beginning shoulder treatments.      For today's appointment patient:    [x]  Cancelled    [] Rescheduled appointment    [] No-show     Reason given by patient:    []  Patient ill    []  Conflicting appointment    [] No transportation      [] Conflict with work    [x] No reason given    [] Weather related    [] [id number]    [] Other:      Comments:        [x] Next appointment was confirmed PREVIOUSLY     Electronically signed by: Long Kc PTA

## 2020-12-30 ENCOUNTER — HOSPITAL ENCOUNTER (OUTPATIENT)
Dept: PHYSICAL THERAPY | Age: 60
Setting detail: THERAPIES SERIES
Discharge: HOME | End: 2020-12-30
Payer: COMMERCIAL

## 2020-12-30 PROCEDURE — 97016 VASOPNEUMATIC DEVICE THERAPY: CPT

## 2020-12-30 PROCEDURE — 97110 THERAPEUTIC EXERCISES: CPT

## 2020-12-30 NOTE — FLOWSHEET NOTE
[x] [facility]) [facility] &  Therapy  [address]  [phone]  F: [phone] [] [address]  [facility]   [address]  P: [phone]  F: [phone] [] [address] &  Therapy  [address]  P: [phone]  F: [phone] [] [address]  P: [phone]  F: [phone] [] [address]  [facility]   [address]   [place]: [phone]  F: [phone]      Physical Therapy Daily Treatment Note    Date:  2020  Patient Name:  Dalila Santiago    :  1960  MRN: [id number]    ANKLE  Physician: Miah Boland DO                                    Insurance: [facility] ( 8 vs used, 22 remain)  Medical Diagnosis: Sprain of left ankle, unspecified ligament, initial encounter  Rehab Codes: M25.572, R53.1, M25.672, R26.9, R27.9, R20.2  Onset date: 20             Next 's appt.: 12/10/20  Visit #:       Subjective:    Pain:  [] Yes  [x] No Location: R shoulder Pain Rating: (0-10 scale) 4/10 - ankle   Pain altered Tx:  [x] No  [] Yes  Action:  Comments: Patient [place] for approx 2.5 hours and felt fine while walking, however has most pain when she gets into bed and tries to sleep. States she has been trying to put more weight through her foot, however continues to have pain with rest and increased activity. Notes she sees referring doctor tomorrow. Objective:   Modalities:  Vaso to L ankle x 15 mins, 36°, low compression, supine with ankle elevated   Precautions:  Exercises: Completed exercises marked with \"x\"  Exercise    L ankle Reps/ Time Weight/ Level Comments   ANKLE      Nustep 6m L3          Calf stretch 3x20\"  wedge   Weight shifts  10x5\"   Fwd/Bwd, Lat.  Alt L foot in no UE assist with no more than 5 instances of instability evidenced by med/lat forefoot elevation. 5. ? Function: Pt will be able to tolerate 2 hours of consecutive standing/ambulation with no more than minimal pain reported. 6. Independent with Home Exercise Programs  7. Demonstrate knowledge of fall prevention.      LTG: (to be met in 12 treatments)     8. ? Pain: No pain reported with all ADL/IADLs including prolonged standing at home and in community. 9. ? ROM: L ankle AROM symmetrical to R ankle with no pain at end range  10. ? Strength: At least 5-/5 all planes of L ankle without pain during testing to indicate further improved stability at ankle in weightbearing  11. ? Balance: Pt will be able to tolerate SLS on foam with LLE for at least 30 sec and no UE assist with no more than 5 instances of instability evidenced by med/lat forefoot elevation. 12. ? Function:  a. Pt will be able to tolerate unlimited standing/ambulation times without pain in ankle. b. Pt will be able to squat/stoop to ground without pain in ankle reported. c. Pt will be able to ascend/descend stairs reciprocally without UE assist and no pain reported in ankle.                    Patient goals: \"no more pain\"         Rehab Potential:  [x]? Good  []? Fair  []? Poor    Suggested Professional Referral:  [x]? No  []? Yes:  Barriers to Goal Achievement[de-identified]  [x]? No  []? Yes:  Domestic Concerns:  [x]? No  []? Yes:    Pt. Education:  [x] Yes  [] No  [] Reviewed Prior HEP/Ed  Method of Education: [x] Verbal  [x] Demo  [x] Written  Comprehension of Education:  [x] Verbalizes understanding. [x] Demonstrates understanding. [] Needs review. [x] Demonstrates/verbalizes HEP/Ed previously given. 12/11/20: 4 way ankle and blue tband    Plan: [x] Continue current frequency toward long and short term goals towards ankle goals.      [x] Specific Instructions for subsequent treatments: fall prevention        Frequency:  2 x/week for 12 visits -- ANKLE      Time In: 100 pm            Time Out: 200 pm     Electronically signed by:  Macie Block, FRANCO

## 2020-12-31 ENCOUNTER — OFFICE VISIT (OUTPATIENT)
Dept: ORTHOPEDIC SURGERY | Age: 60
End: 2020-12-31
Payer: COMMERCIAL

## 2020-12-31 VITALS — BODY MASS INDEX: 33.04 KG/M2 | HEIGHT: 61 IN | WEIGHT: 175 LBS

## 2020-12-31 PROCEDURE — 3017F COLORECTAL CA SCREEN DOC REV: CPT | Performed by: STUDENT IN AN ORGANIZED HEALTH CARE EDUCATION/TRAINING PROGRAM

## 2020-12-31 PROCEDURE — G8484 FLU IMMUNIZE NO ADMIN: HCPCS | Performed by: STUDENT IN AN ORGANIZED HEALTH CARE EDUCATION/TRAINING PROGRAM

## 2020-12-31 PROCEDURE — G8427 DOCREV CUR MEDS BY ELIG CLIN: HCPCS | Performed by: STUDENT IN AN ORGANIZED HEALTH CARE EDUCATION/TRAINING PROGRAM

## 2020-12-31 PROCEDURE — 99213 OFFICE O/P EST LOW 20 MIN: CPT | Performed by: STUDENT IN AN ORGANIZED HEALTH CARE EDUCATION/TRAINING PROGRAM

## 2020-12-31 PROCEDURE — G8417 CALC BMI ABV UP PARAM F/U: HCPCS | Performed by: STUDENT IN AN ORGANIZED HEALTH CARE EDUCATION/TRAINING PROGRAM

## 2020-12-31 PROCEDURE — 1036F TOBACCO NON-USER: CPT | Performed by: STUDENT IN AN ORGANIZED HEALTH CARE EDUCATION/TRAINING PROGRAM

## 2020-12-31 NOTE — PROGRESS NOTES
LLE: No swelling or edema within the left lower extremity. There is point tenderness to the anterior lateral ankle. On ankle drawer examination I did feel some crepitance and a mild degree of laxity. No laxity noted on CFL testing. Compartments are soft and compressible throughout the extremity and foot. EHL/FHL/TA/GS complex motor intact. Pain with resisted eversion. Sural, saphenous, superificial/deep peroneal, and plantar nerve distribution SILT. Dorsalis pedis pulse 2+ with BCR. CV: no obvious JVD, no dependent edema, distal pulses 2+  Respiratory: chest rise symmetric, unlabored respirations, no audible wheezing  Skin: warm, well perfused, no obvious rashes or lesions  Psych: Patient displays understanding of exam, diagnosis, and plan. Radiology:   No new imaging obtained in the office today. Assessment:      1.  Sprain of left ankle, unspecified ligament, initial encounter         Plan: We discussed with the patient at length that we do feel she has been improving although with the mild setbacks and continued pain roughly a month and a half out from date of injury there is a small concern for further underlying etiology. I discussed that she could have possible talar dome injury versus a partial tear of her ATFL. For this reason I discussed an MRI would be prudent for further evaluation of these etiologies. She was amenable to this recommendation. I also discussed continuation of her physical therapy specifically utilizing the band and board therapy that her therapist has been using. She states she has roughly 3 more weeks of therapy left. I discussed that she may continue to use the diclofenac and she may add Tylenol as well for anti-inflammatory purposes. I discussed with her she should time up her anti-inflammatory medication so that she could take the medication prior to the activities where she knows she will be on her feet for extended period of time. She may continue to wear the brace as needed for support to the left ankle. I also discussed she may continue to do utilize ice and elevate her left ankle. We will see her back in the office after the MRI for review of the results. Follow up:Return for MRI Results Review. No orders of the defined types were placed in this encounter.          Orders Placed This Encounter   Procedures    MRI ANKLE LEFT WO CONTRAST     Standing Status:   Future     Standing Expiration Date:   12/31/2021       Willow Mayfield DO  Orthopedic Surgery Resident, PGY-2  [facility]

## 2021-01-06 ENCOUNTER — HOSPITAL ENCOUNTER (OUTPATIENT)
Dept: PHYSICAL THERAPY | Age: 61
Setting detail: THERAPIES SERIES
Discharge: HOME | End: 2021-01-06
Payer: COMMERCIAL

## 2021-01-06 PROCEDURE — 97110 THERAPEUTIC EXERCISES: CPT

## 2021-01-06 PROCEDURE — 97016 VASOPNEUMATIC DEVICE THERAPY: CPT

## 2021-01-06 NOTE — FLOWSHEET NOTE
[x] [facility]) - [facility] &  Therapy  [address]  [phone]  F: [phone] [] [address]  [place]   [address]  P: [phone]  F: [phone] [] [address] &  Therapy  [address]  P: [phone]  F: [phone] [] [address]  P: [phone]  F: [phone] [] [address]  [facility]   [address]   [place]: [phone]  F: [phone]      Physical Therapy Daily Treatment Note    Date:  2021  Patient Name:  Yessica Tse    :  1960  MRN: [id number]    ANKLE  Physician: Sarai Beasley DO                                    Insurance: [facility] ( 8 vs used, 22 remain)  Medical Diagnosis: Sprain of left ankle, unspecified ligament, initial encounter  Rehab Codes: M25.572, R53.1, M25.672, R26.9, R27.9, R20.2  Onset date: 20             Next Dr's appt.: 12/10/20  Visit #:       Subjective:    Pain:  [] Yes  [x] No Location: R shoulder Pain Rating: (0-10 scale) 1/10 - ankle   Pain altered Tx:  [x] No  [] Yes  Action:  Comments: Patients reports 1/10 pain in her ankle upon arrival. States she hasn't been on her feet much in the last couple days. Saw referring Dr. Brooke Quinn week and an MRI was ordered. Objective:   Modalities:  Vaso to L ankle x 15 mins, 36°, low compression, supine with ankle elevated   Precautions:  Exercises: Completed exercises marked with \"x\"  Exercise    L ankle Reps/ Time Weight/ Level Comments   ANKLE      Nustep 6m L3          Calf stretch 3x20\"  wedge   Weight shifts  10x5\"   Fwd/Bwd, Lat.  Alt L foot in front and back   BAPS 15x ea L2 Fwd/Bwd, Ev/In, CW/CCW   Mini lunges 15x ea  Bilateral   Rockerboard 15x ea L1 Ant/Post, Lat   SLS 1x20\" 1x30\"   Added 1/6   Total gym squat 15x L30    Toe tap to step 2x10 ea 8\"    Lateral lunges  10x ea           Seated       Long sit calf stretch 3x30\"     4 way ankle 15x Blue  Long siting   Ankle ABCs   Elevated on stool    Towel scrunch      Heel raises 2x10 2lb    LAQ 2x10 2lb                                  Other:  Manual:      Treatment Charges: Mins Units   []  Modalities      [x]  Ther Exercise 50 3   []  Manual Therapy     []  Ther Activities     []  Aquatics     [x]  Vasocompression 15 1   []  Other     Total Treatment time 65 4       Assessment: [x] Progressing toward goals. Pt states difficulty with lateral lunges throughout exercise however, was able to complete with no increased pain noted. Pt able to reach goal of maintaining SLS for 30\" this date. Increased ROM noted with inversion/eversion as well as PF/DF. Assessed remaining short term goals as noted below with progress made towards majority. Pt with fatigue following exercises, thus continued with vaso application with relief noted post tx.    [] No change. [] Other:     [x] Patient would continue to benefit from skilled physical therapy services in order to: decrease shoulder pain, increase ROM in ER/IR, increase strength and function. ANKLE  STG: (to be met in 8 treatments)  1. ? Pain: No more than 4/10 max pain with prolonged standing/ambulation at home. -- NOT MET, patient states \"at least 8-9/10\" with standing/ambulation  2. ? ROM: L ankle AROM to the following degrees to indicate improving joint mobility post injury:   a. DF: 10 deg; eversion/inversion:20/35deg, respectively -- 15 degrees DF; 22 degrees eversion/ 31 degrees inversion MET/PROGRESS  3. ? Strength:  At least 4+/5 all planes of L ankle without pain during testing for improving stability at ankle in weightbearing -- MET 5/5, MIN pain with eversion   4. ? Balance: Pt will be able to tolerate SLS on level ground with LLE for at least 30 sec and no UE assist with no more than 5 instances of instability evidenced by med/lat forefoot elevation. -- MET  5. ? Function: Pt will be able to tolerate 2 hours of consecutive standing/ambulation with no more than minimal pain reported. -- PROGRESS CONT WITH MOD PAIN  6. Independent with Home Exercise Programs -- MET  7. Demonstrate knowledge of fall prevention. -- MET ISSUED 1/6/2021     LTG: (to be met in 12 treatments)     8. ? Pain: No pain reported with all ADL/IADLs including prolonged standing at home and in community. 9. ? ROM: L ankle AROM symmetrical to R ankle with no pain at end range  10. ? Strength: At least 5-/5 all planes of L ankle without pain during testing to indicate further improved stability at ankle in weightbearing  11. ? Balance: Pt will be able to tolerate SLS on foam with LLE for at least 30 sec and no UE assist with no more than 5 instances of instability evidenced by med/lat forefoot elevation. 12. ? Function:  a. Pt will be able to tolerate unlimited standing/ambulation times without pain in ankle. b. Pt will be able to squat/stoop to ground without pain in ankle reported. c. Pt will be able to ascend/descend stairs reciprocally without UE assist and no pain reported in ankle.                    Patient goals: \"no more pain\"         Rehab Potential:  [x]? Good  []? Fair  []? Poor    Suggested Professional Referral:  [x]? No  []? Yes:  Barriers to Goal Achievement[de-identified]  [x]? No  []? Yes:  Domestic Concerns:  [x]? No  []? Yes:    Pt. Education:  [x] Yes  [] No  [] Reviewed Prior HEP/Ed  Method of Education: [x] Verbal  [x] Demo  [x] Written  Comprehension of Education:  [x] Verbalizes understanding. [x] Demonstrates understanding. [] Needs review. [x] Demonstrates/verbalizes HEP/Ed previously given. 12/11/20: 4 way ankle and blue tband    Plan: [x] Continue current frequency toward long and short term goals towards ankle goals.      [x] Specific Instructions for subsequent treatments: Standing heel raises, foam exercises       Frequency:  2 x/week for 12 visits -- ANKLE      Time In: 100 pm            Time Out: 2:10pm    Electronically signed by:  Farrukh Hamilton    Patient treated and note written by Farrukh Hamilton, Student Physical Therapist Assistant under supervision of Ashish Ybarra PTA.

## 2021-01-08 ENCOUNTER — HOSPITAL ENCOUNTER (OUTPATIENT)
Dept: PHYSICAL THERAPY | Age: 61
Setting detail: THERAPIES SERIES
Discharge: HOME | End: 2021-01-08
Payer: COMMERCIAL

## 2021-01-08 DIAGNOSIS — E55.9 VITAMIN D DEFICIENCY: ICD-10-CM

## 2021-01-11 DIAGNOSIS — F41.9 ANXIETY: ICD-10-CM

## 2021-01-11 DIAGNOSIS — M62.838 MUSCLE SPASM: ICD-10-CM

## 2021-01-11 RX ORDER — CHLORZOXAZONE 500 MG/1
TABLET ORAL
Qty: 28 TABLET | Refills: 3 | Status: SHIPPED | OUTPATIENT
Start: 2021-01-11 | End: 2021-01-12 | Stop reason: SDUPTHER

## 2021-01-11 RX ORDER — FLUTICASONE PROPIONATE 50 MCG
SPRAY, SUSPENSION (ML) NASAL
Qty: 16 G | Refills: 3 | Status: SHIPPED | OUTPATIENT
Start: 2021-01-11

## 2021-01-11 RX ORDER — VENLAFAXINE HYDROCHLORIDE 75 MG/1
75 CAPSULE, EXTENDED RELEASE ORAL DAILY
Qty: 28 CAPSULE | Refills: 3 | Status: SHIPPED | OUTPATIENT
Start: 2021-01-11 | End: 2021-04-02

## 2021-01-12 DIAGNOSIS — E55.9 VITAMIN D DEFICIENCY: ICD-10-CM

## 2021-01-12 DIAGNOSIS — M62.838 MUSCLE SPASM: ICD-10-CM

## 2021-01-12 RX ORDER — CHLORZOXAZONE 500 MG/1
TABLET ORAL
Qty: 28 TABLET | Refills: 3 | Status: SHIPPED | OUTPATIENT
Start: 2021-01-12 | End: 2021-07-26

## 2021-01-12 RX ORDER — ERGOCALCIFEROL 1.25 MG/1
CAPSULE ORAL
Qty: 4 CAPSULE | Refills: 3 | Status: SHIPPED | OUTPATIENT
Start: 2021-01-12 | End: 2022-10-21

## 2021-01-12 RX ORDER — ERGOCALCIFEROL 1.25 MG/1
50000 CAPSULE ORAL WEEKLY
Qty: 4 CAPSULE | Refills: 3 | Status: SHIPPED | OUTPATIENT
Start: 2021-01-12 | End: 2021-07-26

## 2021-01-12 NOTE — TELEPHONE ENCOUNTER
Jessica Tatum is calling to request a refill on the following medication(s):    Last Visit Date (If Applicable):  18/49/2427    Next Visit Date:    1/11/2021    Medication Request:  Requested Prescriptions     Pending Prescriptions Disp Refills    vitamin D (ERGOCALCIFEROL) 1.25 MG (52399 UT) CAPS capsule [Pharmacy Med Name: VITAMIN D 90575ASHQ ORAL CAPSULE] 4 capsule 3     Sig: TAKE 1 CAPSULE BY MOUTH EVERY WEEK ON [facility]

## 2021-01-13 ENCOUNTER — HOSPITAL ENCOUNTER (OUTPATIENT)
Dept: PHYSICAL THERAPY | Age: 61
Setting detail: THERAPIES SERIES
Discharge: HOME | End: 2021-01-13
Payer: COMMERCIAL

## 2021-01-13 NOTE — FLOWSHEET NOTE
[x] [facility]) [facility] &  Therapy  [address]    [phone]  F: [phone]   [] [address]  [address] [place] [facility]   [address]  P: [phone]  F: [phone]  [] [address]  [address]  P: [phone]  F: [phone] [] [address]  P: [phone]  F: [phone]  [] [address]  Psychiatric   [address]   [place]: [phone]  F: [phone]   [] [facility]  [address]  [place]: [phone]   F: [phone]     Physical Therapy Cancel/No Show note    Date: 2021  Patient: Tawnya Velásquez  : 1960  MRN: [id number]    Cancels/No Shows to date: since  - 3/0   Restarted count , previous total 3/3 since beginning shoulder treatments. For today's appointment patient:    [x]  Cancelled    [] Rescheduled appointment    [] No-show     Reason given by patient:    []  Patient ill    []  Conflicting appointment    [x] No transportation      [] Conflict with work    [] No reason given    [] Weather related    [] COVID-19    [] Other:      Comments:  Pt was unable to get her car to start today. [x] Next appointment was confirmed. Electronically signed by: Senthil Romero   Patient treated and note written by Senthil Romero, Student Physical Therapist Assistant under supervision of Kristy Beltran PTA.

## 2021-01-14 ENCOUNTER — TELEPHONE (OUTPATIENT)
Dept: ORTHOPEDIC SURGERY | Age: 61
End: 2021-01-14

## 2021-01-14 ENCOUNTER — HOSPITAL ENCOUNTER (OUTPATIENT)
Dept: MRI IMAGING | Age: 61
Discharge: HOME | End: 2021-01-16
Payer: COMMERCIAL

## 2021-01-14 ENCOUNTER — HOSPITAL ENCOUNTER (OUTPATIENT)
Age: 61
Discharge: HOME | End: 2021-01-14
Payer: COMMERCIAL

## 2021-01-14 DIAGNOSIS — S93.402A SPRAIN OF LEFT ANKLE, UNSPECIFIED LIGAMENT, INITIAL ENCOUNTER: ICD-10-CM

## 2021-01-14 PROCEDURE — 73721 MRI JNT OF LWR EXTRE W/O DYE: CPT

## 2021-01-14 PROCEDURE — 36415 COLL VENOUS BLD VENIPUNCTURE: CPT

## 2021-01-14 PROCEDURE — 83036 HEMOGLOBIN GLYCOSYLATED A1C: CPT

## 2021-01-15 ENCOUNTER — HOSPITAL ENCOUNTER (OUTPATIENT)
Dept: PHYSICAL THERAPY | Age: 61
Setting detail: THERAPIES SERIES
Discharge: HOME | End: 2021-01-15
Payer: COMMERCIAL

## 2021-01-15 LAB
ESTIMATED AVERAGE GLUCOSE: 114 MG/DL
HBA1C MFR BLD: 5.6 % (ref 4–6)

## 2021-01-15 PROCEDURE — 97016 VASOPNEUMATIC DEVICE THERAPY: CPT

## 2021-01-15 PROCEDURE — 97110 THERAPEUTIC EXERCISES: CPT

## 2021-01-15 NOTE — FLOWSHEET NOTE
[x] [facility]) - [facility] &  Therapy  [address]  [phone]  F: [phone] [] [address]  [facility]   [address]  P: [phone]  F: [phone] [] [address] &  Therapy  [address]  P: [phone]  F: [phone] [] [address]  P: [phone]  F: [phone] [] [address]  [facility]   [address]   [place]: [phone]  F: [phone]      Physical Therapy Daily Treatment Note    Date:  1/15/2021  Patient Name:  Carles Jeans    :  1960  MRN: [id number]    ANKLE  Physician: Munir Nunes DO                                    Insurance: [employer] ( 8 vs used, 22 remain)  Medical Diagnosis: Sprain of left ankle, unspecified ligament, initial encounter  Rehab Codes: M25.572, R53.1, M25.672, R26.9, R27.9, R20.2  Onset date: 20             Next 's appt.: 12/10/20  Visit #:       Subjective:    Pain:  [x] Yes  [] No Location: R shoulder Pain Rating: (0-10 scale) 8/10 pain in L knee   Pain altered Tx:  [x] No  [] Yes  Action:  Comments: Patients denies pain in her ankle today however, states that she has 8/10 pain in her left knee. She said her increased knee pain began after cleaning and being on her feet on Monday. Objective:   Modalities:  Vaso to L ankle x 15 mins, 36°, low compression, supine with ankle elevated   Precautions:  Exercises: Completed exercises marked with \"x\"  Exercise    L ankle Reps/ Time Weight/ Level Comments   ANKLE      Nustep 6m L3          Calf stretch 3x20\"  wedge   Weight shifts  10x5\"   Fwd/Bwd, Lat.  Alt L foot in front and back-- held 1/15   BAPS 15x ea L2 Fwd/Bwd, Ev/In, CW/CCW   Mini lunges 15x ea  Bilateral   Rockerboard 15x ea L1 Ant/Post, Lat   SLS 2x20\" ea   1 set done on foam added 1/15   Total gym squat 15x L30    Toe tap to step 2x10 ea 8\"    Lateral lunges  10x ea           Seated       Long sit calf stretch 3x30\"     4 way ankle 15x Blue  Long siting   Ankle ABCs   Elevated on stool    Towel scrunch      Heel raises 2x10 2lb    LAQ 2x10 2lb                                  Other:  Manual:      Treatment Charges: Mins Units   []  Modalities      [x]  Ther Exercise 40 3   []  Manual Therapy     []  Ther Activities     []  Aquatics     [x]  Vasocompression 15 1   []  Other     Total Treatment time 55 4       Assessment: [x] Progressing toward goals. Pt displayed good tolerance to standing and seated exercises to increase strength, balance and ROM with no significant increase in pain/discomfort this date. Pt with noted fatigue throughout exercises, thus continued with vaso application. No adverse response noted post tx.    [] No change. [] Other:     [x] Patient would continue to benefit from skilled physical therapy services in order to: decrease shoulder pain, increase ROM in ER/IR, increase strength and function. ANKLE  STG: (to be met in 8 treatments)  1. ? Pain: No more than 4/10 max pain with prolonged standing/ambulation at home. -- NOT MET, patient states \"at least 8-9/10\" with standing/ambulation  2. ? ROM: L ankle AROM to the following degrees to indicate improving joint mobility post injury:   a. DF: 10 deg; eversion/inversion:20/35deg, respectively -- 15 degrees DF; 22 degrees eversion/ 31 degrees inversion MET/PROGRESS  3. ? Strength: At least 4+/5 all planes of L ankle without pain during testing for improving stability at ankle in weightbearing -- MET 5/5, MIN pain with eversion   4. ? Balance: Pt will be able to tolerate SLS on level ground with LLE for at least 30 sec and no UE assist with no more than 5 instances of instability evidenced by med/lat forefoot elevation.  -- MET  5. ? Function: Pt will be able to tolerate 2 hours of consecutive standing/ambulation with no more than minimal pain reported. -- PROGRESS CONT WITH MOD PAIN  6. Independent with Home Exercise Programs -- MET  7. Demonstrate knowledge of fall prevention. -- MET ISSUED 1/6/2021     LTG: (to be met in 12 treatments)     8. ? Pain: No pain reported with all ADL/IADLs including prolonged standing at home and in community. 9. ? ROM: L ankle AROM symmetrical to R ankle with no pain at end range  10. ? Strength: At least 5-/5 all planes of L ankle without pain during testing to indicate further improved stability at ankle in weightbearing  11. ? Balance: Pt will be able to tolerate SLS on foam with LLE for at least 30 sec and no UE assist with no more than 5 instances of instability evidenced by med/lat forefoot elevation. 12. ? Function:  a. Pt will be able to tolerate unlimited standing/ambulation times without pain in ankle. b. Pt will be able to squat/stoop to ground without pain in ankle reported. c. Pt will be able to ascend/descend stairs reciprocally without UE assist and no pain reported in ankle.                    Patient goals: \"no more pain\"         Rehab Potential:  [x]? Good  []? Fair  []? Poor    Suggested Professional Referral:  [x]? No  []? Yes:  Barriers to Goal Achievement[de-identified]  [x]? No  []? Yes:  Domestic Concerns:  [x]? No  []? Yes:    Pt. Education:  [x] Yes  [] No  [] Reviewed Prior HEP/Ed  Method of Education: [x] Verbal  [x] Demo  [] Written  Comprehension of Education:  [x] Verbalizes understanding. [x] Demonstrates understanding. [] Needs review. [x] Demonstrates/verbalizes HEP/Ed previously given. 12/11/20: 4 way ankle and blue tband    Plan: [x] Continue current frequency toward long and short term goals towards ankle goals. [x] Specific Instructions for subsequent treatments: 3 way hip, add standing foam exercises.       Frequency:  2 x/week for 12 visits -- ANKLE      Time In: 1:00 pm            Time Out: 2:00 pm    Electronically signed by: Princess Collins    Patient treated and note written by Princess Collins, Student Physical Therapist Assistant under supervision of Pito Chery PTA.

## 2021-01-19 RX ORDER — KETOTIFEN FUMARATE 0.35 MG/ML
SOLUTION/ DROPS OPHTHALMIC
Qty: 10 ML | Refills: 1 | Status: SHIPPED | OUTPATIENT
Start: 2021-01-19

## 2021-01-20 ENCOUNTER — HOSPITAL ENCOUNTER (OUTPATIENT)
Dept: PHYSICAL THERAPY | Age: 61
Setting detail: THERAPIES SERIES
Discharge: HOME | End: 2021-01-20
Payer: COMMERCIAL

## 2021-01-20 PROCEDURE — 97016 VASOPNEUMATIC DEVICE THERAPY: CPT

## 2021-01-20 PROCEDURE — 97110 THERAPEUTIC EXERCISES: CPT

## 2021-01-20 NOTE — FLOWSHEET NOTE
[x] [facility]) - [facility] &  Therapy  [address]  [phone]  F: [phone] [] [address]  [facility]   [address]  P: [phone]  F: [phone] [] [address] &  Therapy  [address]  P: [phone]  F: [phone] [] [address]  P: [phone]  F: [phone] [] [address]  [facility]   [address]   [place]: [phone]  F: [phone]      Physical Therapy Daily Treatment Note    Date:  2021  Patient Name:  Jeanine Brothers    :  1960  MRN: [id number]    ANKLE  Physician: Shekhar Scherer DO                                    Insurance: [facility] ( 8 vs used, 22 remain)  Medical Diagnosis: Sprain of left ankle, unspecified ligament, initial encounter  Rehab Codes: M25.572, R53.1, M25.672, R26.9, R27.9, R20.2  Onset date: 20             Next Dr's appt.: 12/10/20  Visit #: 10/12      Subjective:    Pain:  [x] Yes  [] No Location: R shoulder Pain Rating: (0-10 scale) 3/10 pain in L ankle   Pain altered Tx:  [x] No  [] Yes  Action:  Comments:  Patient complains of 3/10 pain in her ankle today however, says she doesn't have as much knee pain as she's been having. Objective:   Modalities:  Vaso to L ankle x 15 mins, 36°, low compression, supine with ankle elevated    Precautions:  Exercises: Completed exercises marked with \"x\"  Exercise    L ankle Reps/ Time Weight/ Level Comments   ANKLE      Nustep 5m L3          Calf stretch 3x20\"  wedge   Weight shifts  10x5\"   Fwd/Bwd, Lat.  Alt L foot in front and back-- held    BAPS 15x ea L2 Fwd/Bwd, Ev/In, CW/CCW   Mini lunges 15x ea  Bilateral   Rockerboard 15x ea L1 Ant/Post, Lat   SLS 2x20\" ea      Total gym squat 20x L30 Increased reps 1/20   Toe tap to step 2x10 ea 8\"    Lateral lunges  10x ea     3 way hip  10 ea  Added 1/20         Seated       Long sit calf stretch 3x30\"     4 way ankle 20x Blue  Long siting -- Increased reps 1/20   Ankle ABCs   Elevated on stool    Towel scrunch      Heel raises 2x10 2lb    LAQ 2x10 2lb          Sidelying: Added 1/20   Hip abduction  15x ea 2 lb    Clams  2x10 ea Lime           Other:  Manual:      Treatment Charges: Mins Units   []  Modalities      [x]  Ther Exercise 40 3   []  Manual Therapy     []  Ther Activities     []  Aquatics     [x]  Vasocompression 15 1   []  Other     Total Treatment time 55 4       Assessment: [x] Progressing toward goals. Issued additional HEP this date with good verbal understanding of exercises from patient. Added 3 way hip as well as side-lying exercises this date which patient was able to complete with MIN verbal cues throughout. Pt states she had no increased pain with added exercises however, noted being fatigue after tx today thus continued with vaso for pain reduction. No adverse response noted post tx.    _ No change. [] Other:     [x] Patient would continue to benefit from skilled physical therapy services in order to: decrease shoulder pain, increase ROM in ER/IR, increase strength and function. ANKLE  STG: (to be met in 8 treatments)  1. ? Pain: No more than 4/10 max pain with prolonged standing/ambulation at home. -- NOT MET, patient states \"at least 8-9/10\" with standing/ambulation  2. ? ROM: L ankle AROM to the following degrees to indicate improving joint mobility post injury:   a. DF: 10 deg; eversion/inversion:20/35deg, respectively -- 15 degrees DF; 22 degrees eversion/ 31 degrees inversion MET/PROGRESS  3. ? Strength:  At least 4+/5 all planes of L ankle without pain during testing for improving stability at ankle in weightbearing -- MET 5/5, MIN pain with eversion   4. ? Balance: Pt will be able to tolerate SLS on level ground with LLE for at least 30 sec and no UE assist with no more than 5 instances of instability evidenced by med/lat forefoot elevation. -- MET  5. ? Function: Pt will be able to tolerate 2 hours of consecutive standing/ambulation with no more than minimal pain reported. -- PROGRESS CONT WITH MOD PAIN  6. Independent with Home Exercise Programs -- MET  7. Demonstrate knowledge of fall prevention. -- MET ISSUED 1/6/2021     LTG: (to be met in 12 treatments)     8. ? Pain: No pain reported with all ADL/IADLs including prolonged standing at home and in community. 9. ? ROM: L ankle AROM symmetrical to R ankle with no pain at end range  10. ? Strength: At least 5-/5 all planes of L ankle without pain during testing to indicate further improved stability at ankle in weightbearing  11. ? Balance: Pt will be able to tolerate SLS on foam with LLE for at least 30 sec and no UE assist with no more than 5 instances of instability evidenced by med/lat forefoot elevation. 12. ? Function:  a. Pt will be able to tolerate unlimited standing/ambulation times without pain in ankle. b. Pt will be able to squat/stoop to ground without pain in ankle reported. c. Pt will be able to ascend/descend stairs reciprocally without UE assist and no pain reported in ankle.                    Patient goals: \"no more pain\"         Rehab Potential:  [x]? Good  []? Fair  []? Poor    Suggested Professional Referral:  [x]? No  []? Yes:  Barriers to Goal Achievement[de-identified]  [x]? No  []? Yes:  Domestic Concerns:  [x]? No  []? Yes:    Pt. Education:  [x] Yes  [] No  [] Reviewed Prior HEP/Ed  Method of Education: [x] Verbal  [x] Demo  [] Written  Comprehension of Education:  [x] Verbalizes understanding. [x] Demonstrates understanding. [] Needs review. [x] Demonstrates/verbalizes HEP/Ed previously given. HEP 12/11/20: 4 way ankle and blue tband  HEP 1/20/21 Lateral lunge, mini lunge, gastroc stretch, SLS, seated heel raise, standing heel raise, LAQ.     Plan: [x] Continue current frequency toward long and short term goals towards ankle goals. [x] Specific Instructions for subsequent treatments: add standing foam exercises. Frequency:  2 x/week for 12 visits -- ANKLE      Time In: 1:00 pm            Time Out: 2:00 pm    Electronically signed by:  Shameka Faith    Patient treated and note written by Shameka Faith, Student Physical Therapist Assistant under supervision of Lorraine Sherman PTA.

## 2021-01-21 ENCOUNTER — OFFICE VISIT (OUTPATIENT)
Dept: ORTHOPEDIC SURGERY | Age: 61
End: 2021-01-21
Payer: COMMERCIAL

## 2021-01-21 VITALS — WEIGHT: 175 LBS | BODY MASS INDEX: 33.04 KG/M2 | HEIGHT: 61 IN

## 2021-01-21 DIAGNOSIS — S93.402A SPRAIN OF LEFT ANKLE, UNSPECIFIED LIGAMENT, INITIAL ENCOUNTER: Primary | ICD-10-CM

## 2021-01-21 PROCEDURE — G8484 FLU IMMUNIZE NO ADMIN: HCPCS | Performed by: STUDENT IN AN ORGANIZED HEALTH CARE EDUCATION/TRAINING PROGRAM

## 2021-01-21 PROCEDURE — G8427 DOCREV CUR MEDS BY ELIG CLIN: HCPCS | Performed by: STUDENT IN AN ORGANIZED HEALTH CARE EDUCATION/TRAINING PROGRAM

## 2021-01-21 PROCEDURE — 99213 OFFICE O/P EST LOW 20 MIN: CPT | Performed by: STUDENT IN AN ORGANIZED HEALTH CARE EDUCATION/TRAINING PROGRAM

## 2021-01-21 PROCEDURE — G8417 CALC BMI ABV UP PARAM F/U: HCPCS | Performed by: STUDENT IN AN ORGANIZED HEALTH CARE EDUCATION/TRAINING PROGRAM

## 2021-01-21 PROCEDURE — 3017F COLORECTAL CA SCREEN DOC REV: CPT | Performed by: STUDENT IN AN ORGANIZED HEALTH CARE EDUCATION/TRAINING PROGRAM

## 2021-01-21 PROCEDURE — 1036F TOBACCO NON-USER: CPT | Performed by: STUDENT IN AN ORGANIZED HEALTH CARE EDUCATION/TRAINING PROGRAM

## 2021-01-21 NOTE — LETTER
[facility]  [address] [facility]  Phone: [phone]  Fax: [phone]    Jadon Whitt DO        January 21, 2021     Patient: Radha Cantor   YOB: 1960   Date of Visit: 1/21/2021       To Whom it May Concern:    Keli Phan was seen in my clinic on 1/21/2021. She should remain out of work for the next four weeks. Approximate return to work date is 2/26/2021. If you have any questions or concerns, please don't hesitate to call.     Sincerely,         Jadon Whitt DO

## 2021-01-21 NOTE — PROGRESS NOTES
LLE: Skin is intact, there is no erythema ecchymoses or open wounds. No significant TTP at the ankle. Anterior posterior drawer test equivocal.  Near full range of motion on dorsiflexion/plantarflexion. Compartments soft. 2+ DP pulse. TA/EHL/FHL/GS motor intact. Deep and Superficial Peroneal/Saphenous/Sural SILT. CV: no obvious JVD, no dependent edema, distal pulses 2+  Respiratory: chest rise symmetric, unlabored respirations, no audible wheezing  Skin: warm, well perfused, no obvious rashes or lesions  Psych: Patient displays understanding of exam, diagnosis, and plan. Radiology:   No radiographs in office today    Assessment:      1. Sprain of left ankle, unspecified ligament, initial encounter         Plan:      Thorough discussion regarding etiology and prognosis of this patient's injury. Discussed MRI with the patient which demonstrated partial thickness tears of the ATFL and PTFL. Bard Grey patient's improvements in pain and function, will continue with another course of physical therapy. Prescription was provided today. Encouraged proprioceptive exercise in addition to balance and strengthening. Encouraged patient to continue exercise at home multiple times a day. Patient may continue using the [address] [place] [address] [employer] as needed. Continue with Tylenol and anti-inflammatories for pain. We will follow-up with the patient in approximately 6 weeks as she continues her course of physical therapy. Return sooner if symptoms worsen.       follow up: 6 weeks       Orders Placed This Encounter   Procedures   [facility]     Referral Priority:   Routine     Referral Type:   Eval and Treat     Referral Reason:   Specialty Services Required     Requested Specialty:   Physical Therapy     Number of Visits Requested:   Janusz Lino 1, DO  Orthopedic Surgery Resident, PGY-1  [facility]

## 2021-01-22 ENCOUNTER — HOSPITAL ENCOUNTER (OUTPATIENT)
Dept: PHYSICAL THERAPY | Age: 61
Setting detail: THERAPIES SERIES
Discharge: HOME | End: 2021-01-22
Payer: COMMERCIAL

## 2021-01-22 PROCEDURE — 97112 NEUROMUSCULAR REEDUCATION: CPT

## 2021-01-22 PROCEDURE — 97016 VASOPNEUMATIC DEVICE THERAPY: CPT

## 2021-01-22 PROCEDURE — 97110 THERAPEUTIC EXERCISES: CPT

## 2021-01-22 NOTE — PROGRESS NOTES
[x] [facility]) - [facility] &  Therapy  [address]  [phone]  F: [phone] [] [address]  [facility]   [address]  P: [phone]  F: [phone] [] [address] &  Therapy  [address]  P: [phone]  F: [phone] [] [address]  P: [phone]  F: [phone] [] [address]  [facility]   [address]   [place]: [phone]  F: [phone]      Physical Therapy Progress Note    Date: 2021      Patient: Jane Braxton  : 1960  MRN: [id number]    [facility],                                     Insurance: [address] of left ankle, unspecified ligament, initial encounter  Rehab Codes: M25.572, R53.1, M25.672, R26.9, R27.9, R20.2  Onset date: 20             Next Dr's appt.: 12/10/20  Visit #:   Date range of services: 20 to 20       Subjective:  Pain:  [x]? Yes  []? No   Location: R shoulder   Pain Rating: (0-10 scale) 3/10 pain in L ankle   Pain altered Tx:  [x]? No  []? Yes  Action:  Comments:  Patient continues to note pain with weightbearing for longer periods of time.  Notes at last doctor visit, she was order 4 more weeks off of work, continued therapy requested at 2-3x/wk, for 6-8 wks.        Objective:  Test Measurements:    ROM DEGREES A/P ROM DEGREES A/P STRENGTH  STRENGTH      LEFT  RIGHT LEFT RIGHT   ANKLE DF 10A/15P 12A/18P 5                  PF 40A/45P 45A/50P 5-                  INV 40A/42P 40A/45P 5                  EVER 10A/12P 12A/15P 5        SLS: 30\" on level ground each, minimal instability  SLS on foam: 30\" on RLE, 3x UE assist; 20\" on LLE, 3x UE assist, 6x instability at foot    Function:   - Limited tolerance to prolonged standing (onset of pain at 5 min) and prolonged ambulation (pain onset at 1 hr)  - Stairs using marked time sequencing with ascent/descent    Assessment:  STG: (to be met in 8 treatments)  1. ? Pain: No more than 4/10 max pain with prolonged standing/ambulation at home. -- NOT MET, patient states \"at least 8-9/10\" with standing/ambulation  2. ? ROM: L ankle AROM to the following degrees to indicate improving joint mobility post injury:   a. DF: 10 deg; eversion/inversion:20/35deg, respectively -- 15 degrees DF; 22 degrees eversion/ 31 degrees inversion MET/PROGRESS  3. ? Strength: At least 4+/5 all planes of L ankle without pain during testing for improving stability at ankle in weightbearing -- MET 5/5, MIN pain with eversion   4. ? Balance: Pt will be able to tolerate SLS on level ground with LLE for at least 30 sec and no UE assist with no more than 5 instances of instability evidenced by med/lat forefoot elevation. -- MET  5. ? Function: Pt will be able to tolerate 2 hours of consecutive standing/ambulation with no more than minimal pain reported. -- PROGRESS CONT WITH MOD PAIN  6. Independent with Home Exercise Programs -- MET  7. Demonstrate knowledge of fall prevention.  -- MET ISSUED 1/6/2021     LTG: (to be met in 12 treatments) - Assessed on 1/22, extended and additional goals added:     8. ? Pain: No pain reported with all ADL/IADLs including prolonged standing at home and in community. - NOT MET, 3-4/10  9. ? ROM: L ankle AROM symmetrical to R ankle with no pain at end range - Made progress  10. ? Strength: At least 5-/5 all planes of L ankle without pain during testing to indicate further improved stability at ankle in weightbearing - MET, 5/5 all motions exept 5-/5 PF   11. ? Balance:   a. Pt will be able to tolerate SLS on foam with LLE for at least 30 sec and no UE assist with no more than 5 instances of instability evidenced by med/lat forefoot elevation. - NOT MET, 6x instability at foot, 3x upper Signature:________________________________Date:__________________  By signing above or cosigning this note, I have reviewed this plan of care and certify a need for medically necessary rehabilitation services.      *PLEASE SIGN ABOVE AND FAX BACK ALL PAGES*

## 2021-01-22 NOTE — FLOWSHEET NOTE
[x] [facility]) [facility] &  Therapy  [address]  [phone]  F: [phone] [] [address]  [facility]   [address]  P: [phone]  F: [phone] [] [address] &  Therapy  [address]  P: [phone]  F: [phone] [] [address]  P: [phone]  F: [phone] [] [address]  [facility]   [address]   [place]: [phone]  F: [phone]      Physical Therapy Daily Treatment Note    Date:  2021  Patient Name:  Connor Friend    :  1960  MRN: [id number]    ANKLE  Physician: Coral Carmona DO                                    Ins[facility]: [facility] ( 8 vs used, 22 remain)  Medical Diagnosis: Sprain of left ankle, unspecified ligament, initial encounter  Rehab Codes: M25.572, R53.1, M25.672, R26.9, R27.9, R20.2  Onset date: 20             Next Dr's appt.: 12/10/20  Visit #:       Subjective:    Pain:  [x] Yes  [] No Location: R shoulder Pain Rating: (0-10 scale) 3/10 pain in L ankle   Pain altered Tx:  [x] No  [] Yes  Action:  Comments:  Patient continues to note pain with weightbearing for longer periods of time. Notes at last doctor visit, she was order 4 more weeks off of work, continued therapy requested at 2-3x/wk, for 6-8 wks.      Objective:      ROM DEGREES A/P ROM DEGREES A/P STRENGTH  STRENGTH     LEFT  RIGHT LEFT RIGHT   ANKLE DF 10A/15P 12A/18P 5                 PF 40A/45P 45A/50P 5-                 INV 40A/42P 40A/45P 5                 EVER 10A/12P 12A/15P 5      SLS: 30\" on level ground each, minimal instability  SLS on foam: 30\" on RLE, 3x UE assist; 20\" on LLE, 3x UE assist, 6x instability at foot    Modalities:  Vaso to L ankle x 15 mins, 36°, low compression, supine with ankle elevated  1/20  Precautions:  Exercises: Completed exercises marked with \"x\"  Exercise     Reps/ Time Weight/ Level Comments x   L ANKLE   SHOES OFF ENTIRE TREATMENT    Nustep  L3            Calf stretch 3x20\"  wedge    Rockerboard 15x ea L1 Ant/Post, Lat           Standing in // bars   Mirror in front of pt to monitor form    SLS on foam 4x20\" ea   x   Dynamic lunge onto foam 2x15 ea  Progressing to no hands x   Dynamic fwd lunge onto BOSU 2x15 ea  \" \" x   Dynamic lateral lunge onto BOSU 2x15 ea  \" \" x   BAPS full weight   RLE lifted, BUE assist x   SLS w/ ball catch       SLS with cone taps   LLE CKC, RLE reaching to tap tones           Other: Exercises removed as pt has progressed    Time spent in skilled LTG assessment, discussing PT POC moving forward, pathology with patient - billed with therex      Treatment Charges: Mins Units   []  Modalities      [x]  Ther Exercise 18 1   []  Manual Therapy     []  Ther Activities     []  Aquatics     [x]  Vasocompression 15 1   [x]  Other: neuro 20 2   Total Treatment time 53 4       Assessment: [x] Progressing toward goals. LTG assessment this date - good strength appreciated throughout L ankle, could improve calf strength a bit (5-/5). Minimal ROM deficits in L ankle in all planes as compared to R ankle, will still benefit from ankle DF stretching. Most deficits noted in L ankle proprioception and balance, especially on unlevel ground and when barefoot. Will continue therapy focused on these deficits, making sure not using shoes during visit to challenge proprioception/stability. Pain reported to be 6/10 post treatment, vasocompression to assist with pain control.     _ No change. [] Other:     [x] Patient would continue to benefit from skilled physical therapy services in order to: decrease shoulder pain, increase ROM in ER/IR, increase strength and function.          ANKLE  STG: (to be met in 8 treatments)  1. ? Pain: No more than 4/10 max pain with prolonged standing/ambulation at home. -- NOT MET, patient states \"at least 8-9/10\" with standing/ambulation  2. ? ROM: L ankle AROM to the following degrees to indicate improving joint mobility post injury:   a. DF: 10 deg; eversion/inversion:20/35deg, respectively -- 15 degrees DF; 22 degrees eversion/ 31 degrees inversion MET/PROGRESS  3. ? Strength: At least 4+/5 all planes of L ankle without pain during testing for improving stability at ankle in weightbearing -- MET 5/5, MIN pain with eversion   4. ? Balance: Pt will be able to tolerate SLS on level ground with LLE for at least 30 sec and no UE assist with no more than 5 instances of instability evidenced by med/lat forefoot elevation. -- MET  5. ? Function: Pt will be able to tolerate 2 hours of consecutive standing/ambulation with no more than minimal pain reported. -- PROGRESS CONT WITH MOD PAIN  6. Independent with Home Exercise Programs -- MET  7. Demonstrate knowledge of fall prevention. -- MET ISSUED 1/6/2021     LTG: (to be met in 12 treatments) - Assessed on 1/22:     8. ? Pain: No pain reported with all ADL/IADLs including prolonged standing at home and in community. - NOT MET, 3-4/10  9. ? ROM: L ankle AROM symmetrical to R ankle with no pain at end range - Made progress  10. ? Strength: At least 5-/5 all planes of L ankle without pain during testing to indicate further improved stability at ankle in weightbearing - MET, 5/5 all motions exept 5-/5 PF   11. ? Balance: Pt will be able to tolerate SLS on foam with LLE for at least 30 sec and no UE assist with no more than 5 instances of instability evidenced by med/lat forefoot elevation. - NOT MET, 6x instability at foot, 3x upper extremity assist  12. ? Function:  a. Pt will be able to tolerate unlimited standing/ambulation times without pain in ankle. - NOT MET, no more than 1 hr for walking, standing pain onset at 5 min  b. Pt will be able to squat/stoop to ground without pain in ankle reported.  - MET  c. Pt will be able to ascend/descend stairs reciprocally without UE assist and no pain reported in ankle. - Not met, though pt notes at baseline she negotiates stairs using marked time completion d/t knees                    Patient goals: \"no more pain\" - Ongoing         Rehab Potential:  [x]? Good  []? Fair  []? Poor    Suggested Professional Referral:  [x]? No  []? Yes:  Barriers to Goal Achievement[de-identified]  [x]? No  []? Yes:  Domestic Concerns:  [x]? No  []? Yes:    Pt. Education:  [x] Yes  [] No  [] Reviewed Prior HEP/Ed  Method of Education: [x] Verbal  [x] Demo  [] Written  Comprehension of Education:  [x] Verbalizes understanding. [x] Demonstrates understanding. [] Needs review. [x] Demonstrates/verbalizes HEP/Ed previously given. HEP 12/11/20: 4 way ankle and blue tband  HEP 1/20/21 Lateral lunge, mini lunge, gastroc stretch, SLS, seated heel raise, standing heel raise, LAQ. Plan: [x] Continue current frequency toward long and short term goals towards ankle goals. [x] Specific Instructions for subsequent treatments: add standing foam exercises.       Frequency:  2 x/week for 12 visits -- ANKLE, requesting additional visits      Time In: 2:08 pm            Time Out: 3:16 pm    Electronically signed by:  Chela Mccurdy, PT

## 2021-01-26 ENCOUNTER — HOSPITAL ENCOUNTER (OUTPATIENT)
Dept: PHYSICAL THERAPY | Age: 61
Setting detail: THERAPIES SERIES
Discharge: HOME | End: 2021-01-26
Payer: COMMERCIAL

## 2021-01-26 PROCEDURE — 97016 VASOPNEUMATIC DEVICE THERAPY: CPT

## 2021-01-26 PROCEDURE — 97110 THERAPEUTIC EXERCISES: CPT

## 2021-01-26 NOTE — FLOWSHEET NOTE
[x] [facility]) [facility] &  Therapy  [address]  [phone]  F: [phone] [] [address]  [address] [facility] [employer]   [address]  P: [phone]  F: [phone] [] [address] &  Therapy  [address] [facility]  P: [phone]  F: [phone] [] [address]  P: [phone]  F: [phone] [] [address]  [facility]   [address]   [place]: [phone]  F: [phone]      Physical Therapy Daily Treatment Note    Date:  2021  Patient Name:  Scooter German    :  1960  MRN: [id number]    ANKLE  Physician: Bakari Shepard DO                                    Insurance: [facility] ( 8 vs used, 22 remain)  Medical Diagnosis: Sprain of left ankle, unspecified ligament, initial encounter  Rehab Codes: M25.572, R53.1, M25.672, R26.9, R27.9, R20.2  Onset date: 20             Next Dr's appt.: 12/10/20  Visit #:  (12 more visits added to [address] per 's order 21)      Subjective:    Pain:  [x] Yes  [] No Location: R shoulder Pain Rating: (0-10 scale) 2/10 pain in L ankle   Pain altered Tx:  [x] No  [] Yes  Action:  Comments:  Patient states her ankle is feeling fine however, L knee is bothering her states it \"popped over the weekend. \"     Objective:     Modalities:  Vaso to L ankle x 15 mins, 36°, low compression, supine with ankle elevated    Precautions:  Exercises: Completed exercises marked with \"x\"  Exercise     Reps/ Time Weight/ Level Comments x   L ANKLE   SHOES OFF ENTIRE TREATMENT    Nustep  L3  x          Calf stretch 3x20\"  wedge x   Rockerboard 15x ea L1 Ant/Post, Lat x          SLS on foam 4x20\" ea  Added  x   Dynamic lunge onto foam 2x15 ea  No UE assist  x   Dynamic fwd lunge onto BOSU 2x15 ea  \" \" x med/lat forefoot elevation. -- MET  5. ? Function: Pt will be able to tolerate 2 hours of consecutive standing/ambulation with no more than minimal pain reported. -- PROGRESS CONT WITH MOD PAIN  6. Independent with Home Exercise Programs -- MET  7. Demonstrate knowledge of fall prevention. -- MET ISSUED 1/6/2021     LTG: (to be met in 12 treatments) - Assessed on 1/22, extended and additional goals added:     8. ? Pain: No pain reported with all ADL/IADLs including prolonged standing at home and in community. - NOT MET, 3-4/10  9. ? ROM: L ankle AROM symmetrical to R ankle with no pain at end range - Made progress  10. ? Strength: At least 5-/5 all planes of L ankle without pain during testing to indicate further improved stability at ankle in weightbearing - MET, 5/5 all motions exept 5-/5 PF   11. ? Balance:   a. Pt will be able to tolerate SLS on foam with LLE for at least 30 sec and no UE assist with no more than 5 instances of instability evidenced by med/lat forefoot elevation. - NOT MET, 6x instability at foot, 3x upper extremity assist  b. NEW: Pt will be able to complete squats on Bosu flat top with good balance and no UE assist needed  c. NEW: Pt will be able to complete BAPS level 2 in all directions using only unilat UE assist with good balance, control  12. ? Function:  a.  Pt will be able to tolerate unlimited standing/ambulation times without pain in ankle. - NOT MET, no more than 1 hr for walking, standing pain onset at 5 min  b. Pt will be able to squat/stoop to ground without pain in ankle reported. - MET  c. Pt will be able to ascend/descend stairs reciprocally without UE assist and no pain reported in ankle. - Not met, though pt notes at baseline she negotiates stairs using marked time completion d/t knees                    Patient goals: \"no more pain\" - Ongoing    Rehab Potential:  [x]? Good  []? Fair  []? Poor    Suggested Professional Referral:  [x]? No  []?  Yes:  Barriers to Goal Achievement[de-identified]  [x]? No  []? Yes:  Domestic Concerns:  [x]? No  []? Yes:    Pt. Education:  [x] Yes  [] No  [] Reviewed Prior HEP/Ed  Method of Education: [x] Verbal  [x] Demo  [] Written  Comprehension of Education:  [x] Verbalizes understanding. [x] Demonstrates understanding. [] Needs review. [x] Demonstrates/verbalizes HEP/Ed previously given. HEP 12/11/20: 4 way ankle and blue tband  HEP 1/20/21 Lateral lunge, mini lunge, gastroc stretch, SLS, seated heel raise, standing heel raise, LAQ. Plan: [x] Continue current frequency toward long and short term goals towards ankle goals. [x] Specific Instructions for subsequent treatments:       Frequency:  2 x/week for 12 visits -- ANKLE, requesting additional visits      Time In: 3:00 pm            Time Out: 4:00 pm    Electronically signed by:  Christiano Streeter    Patient treated and note written by Christiano Streeter, Student Physical Therapist Assistant under supervision of Emy Harding PTA.

## 2021-01-28 ENCOUNTER — HOSPITAL ENCOUNTER (OUTPATIENT)
Dept: PHYSICAL THERAPY | Age: 61
Setting detail: THERAPIES SERIES
Discharge: HOME | End: 2021-01-28
Payer: COMMERCIAL

## 2021-01-28 NOTE — FLOWSHEET NOTE
[x] [facility]) [facility] &  Therapy  [address]    [phone]  F: [phone]   [] [address]  [facility]   [address]  P: [phone]  F: [phone]  [] [place]  [address]  P: [phone]  F: [phone] [] [address]  P: [phone]  F: [phone]  [] [address]  [facility]   [address]   [place]: [phone]  F: [phone]   [] [facility]  [address] [facility] [address]  [place]: [phone]   F: [phone]     Physical Therapy Cancel/No Show note    Date: 2021  Patient: Elijah Burciaga  : 1960  MRN: [id number]    Cancels/No Shows to date: since  - 3/0   Restarted count , previous total 3/3 since beginning shoulder treatments. For today's appointment patient:    [x]  Cancelled    [] Rescheduled appointment    [] No-show     Reason given by patient:    [x]  Patient ill    []  Conflicting appointment    [] No transportation      [] Conflict with work    [] No reason given    [] Weather related    [] [id number]    [] Other:      Comments:        [x] Next appointment was confirmed. Electronically signed by: Shameka Faith   Patient treated and note written by Shameka Faith, Student Physical Therapist Assistant under supervision of Lorraine Sherman PTA.

## 2021-02-02 ENCOUNTER — HOSPITAL ENCOUNTER (OUTPATIENT)
Dept: PHYSICAL THERAPY | Age: 61
Setting detail: THERAPIES SERIES
Discharge: HOME | End: 2021-02-02
Payer: COMMERCIAL

## 2021-02-02 NOTE — FLOWSHEET NOTE
[x] [facility]) [facility] &  Therapy  [address]    [phone]  F: [phone]   [] [address]  [facility]   [address]  P: [phone]  F: [phone]  [] [place]  [address]  P: [phone]  F: [phone] [] [address]  P: [phone]  F: [phone]  [] [address]  [facility]   [address]   [place]: [phone]  F: [phone]   [] [facility]  [address]  [place]: [phone]   F: [phone]     Physical Therapy Cancel/No Show note    Date: 2021  Patient: Alycia Ye  : 1960  MRN: [id number]    Cancels/No Shows to date: since  -    Restarted count , previous total 3/3 since beginning shoulder treatments. For today's appointment patient:    [x]  Cancelled    [] Rescheduled appointment    [] No-show     Reason given by patient:    [x]  Patient ill    []  Conflicting appointment    [] No transportation      [] Conflict with work    [] No reason given    [] Weather related    [] [id number]    [] Other:      Comments:        [x] Next appointment was confirmed for 2021     Electronically signed by: Princess Collins   Patient treated and note written by Princess Collins, Student Physical Therapist Assistant under supervision of Pito Chery PTA.

## 2021-02-04 ENCOUNTER — HOSPITAL ENCOUNTER (OUTPATIENT)
Dept: PHYSICAL THERAPY | Age: 61
Setting detail: THERAPIES SERIES
Discharge: HOME | End: 2021-02-04
Payer: COMMERCIAL

## 2021-02-04 PROCEDURE — 97016 VASOPNEUMATIC DEVICE THERAPY: CPT

## 2021-02-04 PROCEDURE — 97110 THERAPEUTIC EXERCISES: CPT

## 2021-02-04 NOTE — FLOWSHEET NOTE
[x] [facility] &  Therapy  [address]  [phone]  F: [phone] [] [address]  [place] [address]   [address]  P: [phone]  F: [phone] [] [place]  [address]  P: [phone]  F: [phone] [] [address]  P: [phone]  F: [phone] [] [address]  [facility]   [address]   [place]: [phone]  F: [phone]      Physical Therapy Daily Treatment Note    Date:  2021  Patient Name:  Scooter German    :  1960  MRN: [id number]    ANKLE  Physician: Bakari Shepard DO                                    Insurance: [facility] ( 8 vs used, 22 remain)  Medical Diagnosis: Sprain of left ankle, unspecified ligament, initial encounter  Rehab Codes: M25.572, R53.1, M25.672, R26.9, R27.9, R20.2  Onset date: 20             Next Dr's appt.: 12/10/20  Visit #: 14 (12 more visits added to [address] per 's order 21)      Subjective:    Pain:  [x] Yes  [] No Location: R shoulder Pain Rating: (0-10 scale) 3/10 pain in L ankle   Pain altered Tx:  [x] No  [] Yes  Action:  Comments:  Patient states she has 3/10 pain in her ankle today. Says it was really painful yesterday from overworking and stretching.     Objective:     Modalities:  Vaso to L ankle x 15 mins, 36°, low compression, supine with ankle elevated  2/4  Precautions:  Exercises: Completed exercises marked with \"x\"  Exercise     Reps/ Time Weight/ Level Comments x   L ANKLE   SHOES OFF ENTIRE TREATMENT    Nustep 5 L3  x          Calf stretch 3x20\"  wedge x   Rockerboard 15x ea L1 Ant/Post, Lat           SLS on foam 4x20\" ea   x   Dynamic lunge onto foam 2x15 ea   x   Dynamic fwd lunge onto BOSU 2x15 ea   x   Dynamic lateral lunge onto BOSU 2x15 ea   x   BAPS full weight 10x ea  RLE lifted, BUE assist x   SLS w/ ball catch x15ea      SLS with cone taps x15ea   Bilateral. Cones set at 12, 1 and 3 o'clock.  x   Squats on foam  2x10  Added 2/4 -- no UE assist  x   Other:       Treatment Charges: Mins Units   []  Modalities      [x]  Ther Exercise 40 3   []  Manual Therapy     []  Ther Activities     []  Aquatics     [x]  Vasocompression 15 1   []  Other: Total Treatment time 55 4       Assessment: [x] Progressing toward goals. Added squats on foam this date to increase LE strength with positive results noted. Patient able to complete with 1 UE assist. MIN verbal cues needed to correct posture/technique with good carryover. Patient states BAPS is difficult due to inability to get full ankle ROM however, able to complete all sets with no increased pain or discomfort. Patient notes increased fatigue this date due to missing last 2 tx sessions thus continued with vaso, no adverse response noted post tx. Will continue to progress as tolerates. _ No change. [] Other:     [x] Patient would continue to benefit from skilled physical therapy services in order to: decrease shoulder pain, increase ROM in ER/IR, increase strength and function. ANKLE      STG: (to be met in 8 treatments)  1. ? Pain: No more than 4/10 max pain with prolonged standing/ambulation at home.  -- NOT MET, patient states \"at least 8-9/10\" with standing/ambulation  2. ? ROM: L ankle AROM to the following degrees to indicate improving joint mobility post injury:   a. DF: 10 deg; eversion/inversion:20/35deg, respectively -- 15 degrees DF; 22 degrees eversion/ 31 degrees inversion MET/PROGRESS  3. ? Strength: At least 4+/5 all planes of L ankle without pain during testing for improving stability at ankle in weightbearing -- MET 5/5, MIN pain with eversion   4. ? Balance: Pt will be able to tolerate SLS on level ground with LLE for at least 30 sec and no UE assist with no more than 5 instances of instability evidenced by med/lat forefoot elevation. -- MET  5. ? Function: Pt will be able to tolerate 2 hours of consecutive standing/ambulation with no more than minimal pain reported. -- PROGRESS CONT WITH MOD PAIN  6. Independent with Home Exercise Programs -- MET  7. Demonstrate knowledge of fall prevention. -- MET ISSUED 1/6/2021     LTG: (to be met in 12 treatments) - Assessed on 1/22, extended and additional goals added:     8. ? Pain: No pain reported with all ADL/IADLs including prolonged standing at home and in community. - NOT MET, 3-4/10  9. ? ROM: L ankle AROM symmetrical to R ankle with no pain at end range - Made progress  10. ? Strength: At least 5-/5 all planes of L ankle without pain during testing to indicate further improved stability at ankle in weightbearing - MET, 5/5 all motions exept 5-/5 PF   11. ? Balance:   a. Pt will be able to tolerate SLS on foam with LLE for at least 30 sec and no UE assist with no more than 5 instances of instability evidenced by med/lat forefoot elevation. - NOT MET, 6x instability at foot, 3x upper extremity assist  b. NEW: Pt will be able to complete squats on Bosu flat top with good balance and no UE assist needed  c. NEW: Pt will be able to complete BAPS level 2 in all directions using only unilat UE assist with good balance, control  12. ? Function:  a.  Pt will be able to tolerate unlimited standing/ambulation times without pain in ankle. - NOT MET, no more than 1 hr for walking, standing pain onset at 5 min  b. Pt will be able to squat/stoop to ground without pain in ankle reported. - MET  c. Pt will be able to ascend/descend stairs reciprocally without UE assist and no pain reported in ankle. - Not met, though pt notes at baseline she negotiates stairs using marked time completion d/t knees                    Patient goals: \"no more pain\" - Ongoing    Rehab Potential:  [x]? Good  []? Fair  []? Poor    Suggested Professional Referral:  [x]? No  []? Yes:  Barriers to Goal Achievement[de-identified]  [x]? No  []? Yes:  Domestic Concerns:  [x]? No  []? Yes:    Pt. Education:  [x] Yes  [] No  [] Reviewed Prior HEP/Ed  Method of Education: [x] Verbal  [x] Demo  [] Written  Comprehension of Education:  [x] Verbalizes understanding. [x] Demonstrates understanding. [] Needs review. [x] Demonstrates/verbalizes HEP/Ed previously given. HEP 12/11/20: 4 way ankle and blue tband  HEP 1/20/21 Lateral lunge, mini lunge, gastroc stretch, SLS, seated heel raise, standing heel raise, LAQ. Plan: [x] Continue current frequency toward long and short term goals towards ankle goals. [x] Specific Instructions for subsequent treatments: 4 way hip with band, re-bounder. Frequency:  2 x/week for 12 visits -- ANKLE, requesting additional visits      Time In: 2:05 pm            Time Out: 3:00pm    Electronically signed by:  Diane Rivas    Patient treated and note written by Diane Rivas, Student Physical Therapist Assistant under supervision of Kelvin Jhaveri PTA.

## 2021-02-05 RX ORDER — PRAVASTATIN SODIUM 40 MG
TABLET ORAL
Qty: 28 TABLET | Refills: 3 | Status: SHIPPED | OUTPATIENT
Start: 2021-02-05 | End: 2021-02-08 | Stop reason: SDUPTHER

## 2021-02-05 NOTE — TELEPHONE ENCOUNTER
Beckie Barbour is calling to request a refill on the following medication(s):    Last Visit Date (If Applicable):  63/12/5413    Next Visit Date:    Visit date not found    Medication Request:  Requested Prescriptions     Pending Prescriptions Disp Refills    diclofenac (VOLTAREN) 50 MG EC tablet [Pharmacy Med Name: DICLOFENAC SODIUM DR 50MG ENTERIC COATED TABLET] 56 tablet 2     Sig: TAKE 1 TABLET BY MOUTH TWICE DAILY    pravastatin (PRAVACHOL) 40 MG tablet [Pharmacy Med Name: PRAVASTATIN SODIUM 40MG ORAL TABLET] 28 tablet 3     Sig: TAKE 1 TABLET BY MOUTH DAILY

## 2021-02-06 NOTE — PATIENT INSTRUCTIONS
Follow up with family doctor in 1 week as needed. Return immediately if worse, new symptoms develop, symptoms persist or have any questions or concerns. Push fluids, keep hydrated  Cool mist humidifier bedside  Continue all medications as prescribed  May alternate tylenol/motrin over the counter for pain or fever, take per package instructions. You were tested for COVID today. We will call you with results when they are available. If you have not heard from us in 7 days, please call our office. The COVID-19 test that was done today can take 1-6 days for results. Until then you should assume you have this disease and adhere to home isolation as described below. When we get the test results back, one of the following readings will be obtained. 1. A positive test means you have the virus. 2.  An inconclusive test means it wasn't sure if you have the virus or not. An inconclusive test result is treated as a positive result and recommendations  are the same as a positive test result. We may ask you to repeat this test in this circumstance. 3.  A negative test means you probably do not have the virus. Prevention steps for People with positive or inconclusive test results or suspected  COVID-19 (including persons under investigation) who do not need to be hospitalized  and   People with confirmed COVID-19 who were hospitalized and determined to be medically stable to go home    Contacts who are NOT healthcare providers or first responders and are asymptomatic (no fever,  cough, shortness of breath, or difficulty breathing) should self-quarantine for 14 days from the last  date of exposure to confirmed or suspected COVID-19. Your healthcare provider and public health staff will evaluate whether you can be cared for at home. If it is determined that you do not need to be hospitalized and can be isolated at home, you will be monitored by staff from your health department.  You should follow the prevention steps below until a healthcare provider or local or state health department says you can return to your normal activities. Stay home except to get medical care  People who are mildly ill with COVID-19 are able to isolate at home during their illness. You should restrict activities outside your home, except for getting medical care. Do not go to work, school, or public areas. Avoid using public transportation, ride-sharing, or taxis. Separate yourself from other people and animals in your home  People: As much as possible, you should stay in a specific room and away from other people in your home. Also, you should use a separate bathroom, if available. Animals: You should restrict contact with pets and other animals while you are sick with COVID-19, just like you would around other people. Although there have not been reports of pets or other animals becoming sick with COVID-19, it is still recommended that people sick with COVID-19 limit contact with animals until more information is known about the virus. When possible, have another member of your household care for your animals while you are sick. If you are sick with COVID-19, avoid contact with your pet, including petting, snuggling, being kissed or licked, and sharing food. If you must care for your pet or be around animals while you are sick, wash your hands before and after you interact with pets and wear a facemask. Call ahead before visiting your doctor  If you have a medical appointment, call the healthcare provider and tell them that you have or may have COVID-19. This will help the healthcare providers office take steps to keep other people from getting infected or exposed. Wear a facemask  You should wear a facemask when you are around other people (e.g., sharing a room or vehicle) or pets and before you enter a healthcare providers office.  If you are not able to wear a facemask (for example, because it causes trouble breathing), then people who live with you should not stay in the same room with you; they should also wear a facemask if they enter your room. Cover your coughs and sneezes  Cover your mouth and nose with a tissue when you cough or sneeze. Throw used tissues in a lined trash can. Immediately wash your hands with soap and water for at least 20 seconds or, if soap and water are not available, clean your hands with an alcohol-based hand  that contains at least 60% alcohol. Clean your hands often  Wash your hands often with soap and water for at least 20 seconds, especially after blowing your nose, coughing, or sneezing; going to the bathroom; and before eating or preparing food. If soap and water are not readily available, use an alcohol-based hand  with at least 60% alcohol, covering all surfaces of your hands and rubbing them together until they feel dry. Soap and water are the best option if hands are visibly dirty. Avoid touching your eyes, nose, and mouth with unwashed hands. Avoid sharing personal household items  You should not share dishes, drinking glasses, cups, eating utensils, towels, or bedding with other people or pets in your home. After using these items, they should be washed thoroughly with soap and water. Clean all high-touch surfaces everyday  High touch surfaces include counters, tabletops, doorknobs, bathroom fixtures, toilets, phones, keyboards, tablets, and bedside tables. Also, clean any surfaces that may have blood, stool, or body fluids on them. Use a household cleaning spray or wipe, according to the label instructions. Labels contain instructions for safe and effective use of the cleaning product including precautions you should take when applying the product, such as wearing gloves and making sure you have good ventilation during use of the product. Monitor your symptoms  Seek prompt medical attention if your illness is worsening (e.g., difficulty breathing).  Before seeking care, severe call your healthcare provider and tell them that you have, or are being evaluated for, COVID-19. Put on a facemask before you enter the facility. These steps will help the healthcare providers office to keep other people in the office or waiting room from getting infected or exposed. Persons who are placed under active monitoring or facilitated self-monitoring should follow instructions provided by their local health department or occupational health professionals, as appropriate. When working with your local health department check their available hours. If you have a medical emergency and need to call 911, notify the dispatch personnel that you have, or are being evaluated for COVID-19. If possible, put on a facemask before emergency medical services arrive. Discontinuing home isolation  Patients with confirmed COVID-19 should remain under home isolation precautions until the risk of secondary transmission to others is thought to be low. The decision to discontinue home isolation precautions should be made on a case-by-case basis, in consultation with your physician and the health department. Please do NOT make this decision on your own. If your results of the COVID-19 test is NEGATIVE -     The patient may stop isolation, in consultation with your health care provider, typically when: Your healthcare provider has determined that the cause of the illness is NOT COVID-19 and approves your return to work. OR  Ten (10) days have passed since onset of symptoms AND three days (72 hours) have passed with no fever without taking medication (like Tylenol) to reduce fever,  respiratory symptoms have resolved and you have been evaluated by your health care provider. Please follow up with your physician for evaluation about this.       The following websites are the best places for up to date information on this fluid situation. https://coronavirus. [place].gov/wps/portal/gov/covid-19/home/local-health-districts-and-providers/guidance-for-covid-19-exposure-management    Preventing the Spread of Coronavirus Disease 2019 in Homes and Residential Communities     For the most recent information go to [employer]  https://coronavirus. [place].[facility]/wps/portal/gov/covid-19/home/local-[facility]-districts-and-providers/guidance-for-covid-19-exposure-management    Patient Education        Sore Throat: Care Instructions  Your Care Instructions     Infection by bacteria or a virus causes most sore throats. Cigarette smoke, dry air, air pollution, allergies, and yelling can also cause a sore throat. Sore throats can be painful and annoying. Fortunately, most sore throats go away on their own. If you have a bacterial infection, your doctor may prescribe antibiotics. Follow-up care is a key part of your treatment and safety. Be sure to make and go to all appointments, and call your doctor if you are having problems. It's also a good idea to know your test results and keep a list of the medicines you take. How can you care for yourself at home? · If your doctor prescribed antibiotics, take them as directed. Do not stop taking them just because you feel better. You need to take the full course of antibiotics. · Gargle with warm salt water once an hour to help reduce swelling and relieve discomfort. Use 1 teaspoon of salt mixed in 1 cup of warm water. · Take an over-the-counter pain medicine, such as acetaminophen (Tylenol), ibuprofen (Advil, Motrin), or naproxen (Aleve). Read and follow all instructions on the label. · Be careful when taking over-the-counter cold or flu medicines and Tylenol at the same time. Many of these medicines have acetaminophen, which is Tylenol. Read the labels to make sure that you are not taking more than the recommended dose.  Too much acetaminophen (Tylenol) cover.  ? Wear the face cover whenever you're around other people. It can help stop the spread of the virus when you cough or sneeze. ? Clean and disinfect your home every day. Use household  and disinfectant wipes or sprays. Take special care to clean things that you grab with your hands. These include doorknobs, remote controls, phones, and handles on your refrigerator and microwave. And don't forget countertops, tabletops, bathrooms, and computer keyboards. When to call for help  [id number] anytime you think you may need emergency care. For example, call if:  · You have severe trouble breathing. (You can't talk at all.)  · You have constant chest pain or pressure. · You are severely dizzy or lightheaded. · You are confused or can't think clearly. · Your face and lips have a blue color. · You pass out (lose consciousness) or are very hard to wake up. Call your doctor now if you develop symptoms such as:  · Shortness of breath. · Fever. · Cough. If you need to get care, call ahead to the doctor's office for instructions before you go. Make sure you wear a face cover to prevent exposing other people to the virus. Where can you get the latest information? The following health organizations are tracking and studying this virus. Their websites contain the most up-to-date information. Nicki Orozco also learn what to do if you think you may have been exposed to the virus. · U.S. Centers for Disease Control and Prevention (CDC): The CDC provides updated news about the disease and travel advice. The website also tells you how to prevent the spread of infection. www.cdc.gov  · World Health Organization [facility]): WHO offers information about the virus outbreaks. WHO also has travel advice. www.who.int  Current as of: May 8, 2020               Content Version: 12.5  © 8751-6569 Healthwise, Incorporated. Care instructions adapted under license by [facility] ([facility]).  If you have questions about a medical condition or this instruction, always ask your healthcare professional. [place] [address] any warranty or liability for your use of this information. Patient Education        10 Things to Do When You Have COVID-19    Stay home. Don't go to school, work, or public areas. And don't use public transportation, ride-shares, or taxis unless you have no choice. Leave your home only if you need to get medical care. But call the doctor's office first so they know you're coming. And wear a cloth face cover. Ask before leaving isolation. Talk with your doctor or other health professional about when it will be safe for you to leave isolation. Wear a cloth face cover when you are around other people. It can help stop the spread of the virus when you cough or sneeze. Limit contact with people in your home. If possible, stay in a separate bedroom and use a separate bathroom. Avoid contact with pets and other animals. If possible, have a friend or family member care for them while you're sick. Cover your mouth and nose with a tissue when you cough or sneeze. Then throw the tissue in the trash right away. Wash your hands often, especially after you cough or sneeze. Use soap and water, and scrub for at least 20 seconds. If soap and water aren't available, use an alcohol-based hand . Don't share personal household items. These include bedding, towels, cups and glasses, and eating utensils. Clean and disinfect your home every day. Use household  or disinfectant wipes or sprays. Take special care to clean things that you grab with your hands. These include doorknobs, remote controls, phones, and handles on your refrigerator and microwave. And don't forget countertops, tabletops, bathrooms, and computer keyboards. Take acetaminophen (Tylenol) to relieve fever and body aches. Read and follow all instructions on the label. Current as of:  May 8, 2020               Content Version: 12.5  © 4902-9304 Healthwise, Incorporated. Care instructions adapted under license by [facility] ([facility]). If you have questions about a medical condition or this instruction, always ask your healthcare professional. [address] any warranty or liability for your use of this information.

## 2021-02-08 RX ORDER — PRAVASTATIN SODIUM 40 MG
40 TABLET ORAL DAILY
Qty: 30 TABLET | Refills: 3 | Status: SHIPPED | OUTPATIENT
Start: 2021-02-08 | End: 2021-06-28

## 2021-02-10 ENCOUNTER — HOSPITAL ENCOUNTER (OUTPATIENT)
Dept: PHYSICAL THERAPY | Age: 61
Setting detail: THERAPIES SERIES
Discharge: HOME | End: 2021-02-10
Payer: COMMERCIAL

## 2021-02-10 NOTE — FLOWSHEET NOTE
[x] [facility]) [facility] &  Therapy  [address]    [phone]  F: [phone]   [] [address]  [facility]   [address]  P: [phone]  F: [phone]  [] [address]  [address]  P: [phone]  F: [phone] [] [address]  P: [phone]  F: [phone]  [] [address]  [facility]   [address]   [place]: [phone]  F: [phone]   [] [place] [address] [facility] [address]  [place]: [phone]   F: [phone]     Physical Therapy Cancel/No Show note    Date: 2/10/2021  Patient: Yessica Tse  : 1960  MRN: [id number]    Cancels/No Shows to date: since  -         For today's appointment patient:    [x]  Cancelled    [] Rescheduled appointment    [] No-show     Reason given by patient:    [x]  Patient ill    []  Conflicting appointment    [] No transportation      [] Conflict with work    [] No reason given    [] Weather related    [] COVID-19    [] Other:      Comments:        [x] Next appointment was confirmed  - will discuss attendance policy and no more cancels accepted - may need to schedule 1 visit at a time d/t frequent cancels. Electronically signed by: Christiano Streeter   Patient treated and note written by Christiano Streeter, Student Physical Therapist Assistant under supervision of Emy Harding PTA.

## 2021-02-12 ENCOUNTER — HOSPITAL ENCOUNTER (OUTPATIENT)
Dept: PHYSICAL THERAPY | Age: 61
Setting detail: THERAPIES SERIES
Discharge: HOME | End: 2021-02-12
Payer: COMMERCIAL

## 2021-02-12 PROCEDURE — 97110 THERAPEUTIC EXERCISES: CPT

## 2021-02-12 PROCEDURE — 97016 VASOPNEUMATIC DEVICE THERAPY: CPT

## 2021-02-12 NOTE — FLOWSHEET NOTE
[x] [facility]  [address]  [phone]  F: [phone] [] [address]  [employer] [address]   [address]  P: [phone]  F: [phone] [] [address] [facility]  Therapy  [address]  P: [phone]  F: [phone] [] [address]  P: [phone]  F: [phone] [] [address]  [facility]   [address]   [place]: [phone]  F: [phone]      Physical Therapy Daily Treatment Note    Date:  2021  Patient Name:  Anat Calderon    :  1960  MRN: [id number]    ANKLE  Physician: Arnaldo Lubin DO                                    Insurance: [employer] (7 visits used in )  Medical Diagnosis: Sprain of left ankle, unspecified ligament, initial encounter  Rehab Codes: M25.572, R53.1, M25.672, R26.9, R27.9, R20.2  Onset date: 20             Next Dr's appt.: 12/10/20  Visit #: 15/24 (12 more visits added to [address] per 's order 21)     CX/NS: 3/0      Subjective:    Pain:  [x] Yes  [] No Location: R shoulder Pain Rating: (0-10 scale) 1/10 pain in L ankle   Pain altered Tx:  [x] No  [] Yes  Action:  Comments:  Patient arrives stating she feels much better than was Wednesday, states she shoveled snow earlier in the week and was \"hardly able to walk\" after and that is why she cancelled therapy.      Objective:     Modalities:  Vaso to L ankle x 15 mins, 36°, low compression, supine with ankle elevated  2/4  Precautions:  Exercises: Completed exercises marked with \"x\"  Exercise     Reps/ Time Weight/ Level Comments x   L ANKLE   SHOES OFF ENTIRE TREATMENT    Nustep 5' L3  x          Calf stretch 3x20\"  wedge x   Rockerboard 15x ea L1 Ant/Post, Lat x          SLS on foam 4x20\" ea   x   Dynamic lunge onto foam 2x15 ea      Dynamic fwd lunge onto BOSU 2x15 ea   x   Dynamic lateral lunge onto BOSU 2x15 ea   x   BAPS full weight 10x ea  RLE lifted, BUE assist x   SLS w/ ball catch x15ea      SLS with cone taps x15ea   Bilateral. Cones set at 12, 1 and 3 o'clock.  x   Squats on foam  2x10   x   Other:       Treatment Charges: Mins Units   []  Modalities      [x]  Ther Exercise 40 3   []  Manual Therapy     []  Ther Activities     []  Aquatics     [x]  Vasocompression 15 1   []  Other: Total Treatment time 55 4       Assessment: [x] Progressing toward goals. Continued with program as noted with no progressions this date due to intermittent attendance and fair onset of fatigue throughout treatment. Patient able to complete all exercises and reps suggested as noted requiring short rest breaks between exercises and occasionally during exercises. Patient with verbalized fatigue, however no complaints of increased pain throughout, ended with vaso with positive results. Patient to see ortho next week, optimistic for return to work. _ No change. [] Other:     [x] Patient would continue to benefit from skilled physical therapy services in order to: decrease shoulder pain, increase ROM in ER/IR, increase strength and function. ANKLE      STG: (to be met in 8 treatments)  1. ? Pain: No more than 4/10 max pain with prolonged standing/ambulation at home.  -- NOT MET, patient states \"at least 8-9/10\" with standing/ambulation  2. ? ROM: L ankle AROM to the following degrees to indicate improving joint mobility post injury:   a. DF: 10 deg; eversion/inversion:20/35deg, respectively -- 15 degrees DF; 22 degrees eversion/ 31 degrees inversion MET/PROGRESS  3. ? Strength: At least 4+/5 all planes of L ankle without pain during testing for improving stability at ankle in weightbearing -- MET 5/5, MIN pain with eversion   4. ? Balance: Pt will be able to tolerate SLS on level ground with LLE for at least 30 sec and no UE assist with no more than 5 instances of instability evidenced by med/lat forefoot elevation. -- MET  5. ? Function: Pt will be able to tolerate 2 hours of consecutive standing/ambulation with no more than minimal pain reported. -- PROGRESS CONT WITH MOD PAIN  6. Independent with Home Exercise Programs -- MET  7. Demonstrate knowledge of fall prevention. -- MET ISSUED 1/6/2021     LTG: (to be met in 12 treatments) - Assessed on 1/22, extended and additional goals added:     8. ? Pain: No pain reported with all ADL/IADLs including prolonged standing at home and in community. - NOT MET, 3-4/10  9. ? ROM: L ankle AROM symmetrical to R ankle with no pain at end range - Made progress  10. ? Strength: At least 5-/5 all planes of L ankle without pain during testing to indicate further improved stability at ankle in weightbearing - MET, 5/5 all motions exept 5-/5 PF   11. ? Balance:   a. Pt will be able to tolerate SLS on foam with LLE for at least 30 sec and no UE assist with no more than 5 instances of instability evidenced by med/lat forefoot elevation. - NOT MET, 6x instability at foot, 3x upper extremity assist  b. NEW: Pt will be able to complete squats on Bosu flat top with good balance and no UE assist needed  c. NEW: Pt will be able to complete BAPS level 2 in all directions using only unilat UE assist with good balance, control  12. ? Function:  a.  Pt will be able to tolerate unlimited standing/ambulation times without pain in ankle. - NOT MET, no more than 1 hr for walking, standing pain onset at 5 min  b. Pt will be able to squat/stoop to ground without pain in ankle reported. - MET  c. Pt will be able to ascend/descend stairs reciprocally without UE assist and no pain reported in ankle. - Not met, though pt notes at baseline she negotiates stairs using marked time completion d/t knees                    Patient goals: \"no more pain\" - Ongoing    Rehab Potential:  [x]? Good  []? Fair  []?  Poor    Suggested Professional Referral:  [x]? No  []? Yes:  Barriers to Goal Achievement[de-identified]  [x]? No  []? Yes:  Domestic Concerns:  [x]? No  []? Yes:    Pt. Education:  [x] Yes  [] No  [] Reviewed Prior HEP/Ed  Method of Education: [x] Verbal  [x] Demo  [] Written  Comprehension of Education:  [x] Verbalizes understanding. [x] Demonstrates understanding. [] Needs review. [x] Demonstrates/verbalizes HEP/Ed previously given. HEP 12/11/20: 4 way ankle and blue tband  HEP 1/20/21 Lateral lunge, mini lunge, gastroc stretch, SLS, seated heel raise, standing heel raise, LAQ. Plan: [x] Continue current frequency toward long and short term goals towards ankle goals. [x] Specific Instructions for subsequent treatments: 4 way hip with band, re-bounder.       Frequency:  2 x/week for 24 visits       Time In: 350 pm            Time Out: 450 pm    Electronically signed by:  Edith Simms PTA

## 2021-02-17 ENCOUNTER — HOSPITAL ENCOUNTER (OUTPATIENT)
Dept: PHYSICAL THERAPY | Age: 61
Setting detail: THERAPIES SERIES
Discharge: HOME | End: 2021-02-17
Payer: COMMERCIAL

## 2021-02-17 ENCOUNTER — HOSPITAL ENCOUNTER (OUTPATIENT)
Dept: MAMMOGRAPHY | Age: 61
Discharge: HOME | End: 2021-02-19
Payer: COMMERCIAL

## 2021-02-17 DIAGNOSIS — Z12.31 ENCOUNTER FOR SCREENING MAMMOGRAM FOR MALIGNANT NEOPLASM OF BREAST: ICD-10-CM

## 2021-02-17 PROCEDURE — 77063 BREAST TOMOSYNTHESIS BI: CPT

## 2021-02-17 PROCEDURE — 97016 VASOPNEUMATIC DEVICE THERAPY: CPT

## 2021-02-17 PROCEDURE — 97110 THERAPEUTIC EXERCISES: CPT

## 2021-02-17 NOTE — FLOWSHEET NOTE
4x20\" ea   x   Dynamic lunge onto foam       Walking lunges   NEXT    Dynamic fwd lunge onto BOSU 2x15 ea   x   Dynamic lateral lunge onto BOSU 2x15 ea   x   BAPS full weight 15x ea L2 RLE lifted, BUE assist, all planes   x   SLS w/ ball catch x15ea      SLS with cone taps x15ea   Bilateral. Cones set at 12, 1 and 3 o'clock. Squats on foam  2x10  Long blue foam  x   4 way hip 10x  Lime  WILLY LE - Added  x   Other:       Treatment Charges: Mins Units   []  Modalities      [x]  Ther Exercise 45 3   []  Manual Therapy     []  Ther Activities     []  Aquatics     [x]  Vasocompression 15 1   []  Other: Total Treatment time 60 4       Assessment: [x] Progressing toward goals. Added 4 way hip with theraband and standing heel raises this date for increased strengthening and stability through L ankle with good tolerance. Patient continues with some fatigue noted throughout treatment, however able to complete all reps as noted. Patient with improved single leg stability during single leg stance this date with decreased med/lat forefoot elevation as well as improved isolation of ankle ROM during BAPS. Continued with vaso at end of treatment for alleviation of L ankle fatigue with positive result.       _ No change. [] Other:     [x] Patient would continue to benefit from skilled physical therapy services in order to: decrease shoulder pain, increase ROM in ER/IR, increase strength and function. ANKLE      STG: (to be met in 8 treatments)  1. ? Pain: No more than 4/10 max pain with prolonged standing/ambulation at home.  -- NOT MET, patient states \"at least 8-9/10\" with standing/ambulation  2. ? ROM: L ankle AROM to the following degrees to indicate improving joint mobility post injury:   a. DF: 10 deg; eversion/inversion:20/35deg, respectively -- 15 degrees DF; 22 degrees eversion/ 31 degrees inversion MET/PROGRESS  3. ? Strength: At least 4+/5 all planes of L ankle without pain during testing for improving stability at ankle in weightbearing -- MET 5/5, MIN pain with eversion   4. ? Balance: Pt will be able to tolerate SLS on level ground with LLE for at least 30 sec and no UE assist with no more than 5 instances of instability evidenced by med/lat forefoot elevation. -- MET  5. ? Function: Pt will be able to tolerate 2 hours of consecutive standing/ambulation with no more than minimal pain reported. -- PROGRESS CONT WITH MOD PAIN  6. Independent with Home Exercise Programs -- MET  7. Demonstrate knowledge of fall prevention. -- MET ISSUED 1/6/2021     LTG: (to be met in 12 treatments) - Assessed on 1/22, extended and additional goals added:     8. ? Pain: No pain reported with all ADL/IADLs including prolonged standing at home and in community. - NOT MET, 3-4/10  9. ? ROM: L ankle AROM symmetrical to R ankle with no pain at end range - Made progress  10. ? Strength: At least 5-/5 all planes of L ankle without pain during testing to indicate further improved stability at ankle in weightbearing - MET, 5/5 all motions exept 5-/5 PF   11. ? Balance:   a. Pt will be able to tolerate SLS on foam with LLE for at least 30 sec and no UE assist with no more than 5 instances of instability evidenced by med/lat forefoot elevation. - NOT MET, 6x instability at foot, 3x upper extremity assist  b. NEW: Pt will be able to complete squats on Bosu flat top with good balance and no UE assist needed  c. NEW: Pt will be able to complete BAPS level 2 in all directions using only unilat UE assist with good balance, control  12. ? Function:  a.  Pt will be able to tolerate unlimited standing/ambulation times without pain in ankle. - NOT MET, no more than 1 hr for walking, standing pain onset at 5 min  b. Pt will be able to squat/stoop to ground without pain in ankle reported. - MET  c.  Pt will be able to ascend/descend stairs reciprocally without UE assist and no pain reported in ankle. - Not met, though pt notes at baseline she negotiates stairs using marked time completion d/t knees                    Patient goals: \"no more pain\" - Ongoing    Rehab Potential:  [x]? Good  []? Fair  []? Poor    Suggested Professional Referral:  [x]? No  []? Yes:  Barriers to Goal Achievement[de-identified]  [x]? No  []? Yes:  Domestic Concerns:  [x]? No  []? Yes:    Pt. Education:  [x] Yes  [] No  [] Reviewed Prior HEP/Ed  Method of Education: [x] Verbal  [x] Demo  [] Written  Comprehension of Education:  [x] Verbalizes understanding. [x] Demonstrates understanding. [] Needs review. [x] Demonstrates/verbalizes HEP/Ed previously given. HEP 12/11/20: 4 way ankle and blue tband  HEP 1/20/21 Lateral lunge, mini lunge, gastroc stretch, SLS, seated heel raise, standing heel raise, LAQ. Plan: [x] Continue current frequency toward long and short term goals towards ankle goals. [x] Specific Instructions for subsequent treatments: re-bounder.       Frequency:  2 x/week for 24 visits       Time In: 900 am            Time Out: 1000 am    Electronically signed by:  Alena Bob PTA

## 2021-02-18 ENCOUNTER — OFFICE VISIT (OUTPATIENT)
Dept: ORTHOPEDIC SURGERY | Age: 61
End: 2021-02-18
Payer: COMMERCIAL

## 2021-02-18 VITALS — WEIGHT: 175 LBS | BODY MASS INDEX: 32.2 KG/M2 | HEIGHT: 62 IN

## 2021-02-18 DIAGNOSIS — S93.492D SPRAIN OF ANTERIOR TALOFIBULAR LIGAMENT OF LEFT ANKLE, SUBSEQUENT ENCOUNTER: Primary | ICD-10-CM

## 2021-02-18 PROCEDURE — G8428 CUR MEDS NOT DOCUMENT: HCPCS | Performed by: ORTHOPAEDIC SURGERY

## 2021-02-18 PROCEDURE — G8417 CALC BMI ABV UP PARAM F/U: HCPCS | Performed by: ORTHOPAEDIC SURGERY

## 2021-02-18 PROCEDURE — 99213 OFFICE O/P EST LOW 20 MIN: CPT | Performed by: ORTHOPAEDIC SURGERY

## 2021-02-18 PROCEDURE — 3017F COLORECTAL CA SCREEN DOC REV: CPT | Performed by: ORTHOPAEDIC SURGERY

## 2021-02-18 PROCEDURE — G8484 FLU IMMUNIZE NO ADMIN: HCPCS | Performed by: ORTHOPAEDIC SURGERY

## 2021-02-18 PROCEDURE — 1036F TOBACCO NON-USER: CPT | Performed by: ORTHOPAEDIC SURGERY

## 2021-02-18 NOTE — PROGRESS NOTES
LLE: Tenderness to palpation over the ATFL and PTFL region. Minimal swelling over the lateral ankle. Anterior drawer of the ankle in both dorsiflexion and plantarflexion demonstrates 1+ laxity compared to the contralateral side. Pain with passive inversion of the hindfoot, minimal pain with passive eversion of the hindfoot. Minimal pain with active dorsi and plantar flexion. Full active dorsi and plantar flexion. 2+ DP and PT pulses. No motor or sensory deficits to the left lower extremity. PROCEDURES: None    RADIOLOGY:  History: 62y.o. year old female with a history of multiple left ankle sprains. Comparison: 11/13/2020    Findings: 4 views AP, lateral, mortise, gravity stress of the left ankle demonstrates no acute osseous abnormality. No visualized dislocations. No widening of the syndesmosis on gravity stress view. No obvious lytic or blastic lesions present. Impression: No acute fracture or dislocation of the left ankle. ASSESSMENT:   1. Multiple left ankle sprains  2. MRI evidence of left ATFL and PTFL tears    PLAN:  Mychal Torres is here for follow-up of her left ankle sprain. Patient was doing quite well although reinjured it yesterday when she stepped out of her car and sustained an inversion injury to her left ankle. Radiographs in the office today demonstrate no acute osseous abnormality. Patient was given a work note for 2 more weeks off of work until she goes to her next appointment. She was referred to Dr. Mireya Moy foot and ankle specialist for further evaluation with the possibility of lateral ankle reconstruction. Motrin and Tylenol for pain and swelling. Continue with the [address] [place] [address]. She can follow-up in our office on an as-needed basis. -Return if symptoms worsen or fail to improve. No orders of the defined types were placed in this encounter.      Orders Placed This Encounter   Procedures    XR ANKLE LEFT (MIN 3 VIEWS) Ap, lateral, mortise, and gravity stress view please   Guerda Rain MD, Orthopedic Surgery, [place]     Referral Priority:   Routine     Referral Type:   Eval and Treat     Referral Reason:   Specialty Services Required     Referred to Provider:   Batsheva Sosa MD     Requested Specialty:   Orthopedic Surgery     Number of Visits Requested:   1         Electronically signed by Jaylin Alejandro DO on 2/18/2021 at 2:04 PM    This dictation was generated by voice recognition computer software. Although all attempts are made to edit the dictation for accuracy, there may be errors in the transcription that are not intended. The actual meaning should be extrapolated by the context of the sentence.

## 2021-02-18 NOTE — LETTER
[facility]  [address] [facility]  Phone: [phone]  Fax: [place] [address], DO        February 18, 2021     Patient: Antoinette Trotter   YOB: 1960   Date of Visit: 2/18/2021       To Whom it May Concern:    Micky Andrade was seen in my clinic on 2/18/2021. She should remain off of work until 3/5/21. If you have any questions or concerns, please don't hesitate to call.     Sincerely,         Nneka Hernandez, DO

## 2021-02-19 ENCOUNTER — HOSPITAL ENCOUNTER (OUTPATIENT)
Dept: PHYSICAL THERAPY | Age: 61
Setting detail: THERAPIES SERIES
Discharge: HOME | End: 2021-02-19
Payer: COMMERCIAL

## 2021-02-19 PROCEDURE — 97110 THERAPEUTIC EXERCISES: CPT

## 2021-02-19 PROCEDURE — 97016 VASOPNEUMATIC DEVICE THERAPY: CPT

## 2021-02-19 NOTE — FLOWSHEET NOTE
[x] [facility]) - [facility]  [facility]  [address]  [phone]  F: [phone] [] [address]  [facility]   [address]  P: [phone]  F: [phone] [] [address] &  Therapy  [address] [facility]  P: [phone]  F: [phone] [] [address]  P: [phone]  F: [phone] [] [address]  [facility]   [address]   [place]: [phone]  F: [phone]      Physical Therapy Daily Treatment Note    Date:  2021  Patient Name:  Camelia Montiel    :  1960  MRN: [id number]    ANKLE  Physician: Kris Verdugo DO                                    Insurance: [employer] (8 visits used in )  Medical Diagnosis: Sprain of left ankle, unspecified ligament, initial encounter  Rehab Codes: M25.572, R53.1, M25.672, R26.9, R27.9, R20.2  Onset date: 20             Next Dr's appt.: 12/10/20  Visit #:  (12 more visits added to [address] per Dr's order 21)     CX/NS: 3/0      Subjective:    Pain:  [x] Yes  [] No Location: R shoulder Pain Rating: (0-10 scale) 5/10 pain in L ankle   Pain altered Tx:  [x] No  [] Yes  Action:  Comments:  Patient arrives with increased antalgic gait this date. States she fell on snow while getting out of her truck on Wednesday, xrays were done at ortho appt yesterday with no breaks noted.  Ortho referred to foot/ankle specialist.       Objective:     Modalities:  Vaso to L ankle x 15 mins, 36°, low compression, supine with ankle elevated    Precautions:  Exercises: Completed exercises marked with \"x\"  Exercise     Reps/ Time Weight/ Level Comments    L ANKLE   SHOES OFF ENTIRE TREATMENT    Nustep 5' L3  x          Calf stretch 3x20\"  wedge x   Rockerboard 30x ea L1 Ant/Post, Lat - Increased reps  Heel raises 2 way 2x10  Toes In -- Added 2/17 x   SLS on foam 4x20\" ea   x   Dynamic lunge onto foam       Walking lunges   NEXT    Dynamic fwd lunge onto BOSU 2x15 ea  Onto foam 2/19 x   Dynamic lateral lunge onto BOSU 2x15 ea  Onto foam 2/19 x   BAPS full weight 15x ea L2 RLE lifted, BUE assist, all planes -- WBAT 2/19   x   SLS w/ ball catch x15ea      SLS with cone taps x15ea   Bilateral. Cones set at 12, 1 and 3 o'clock. Squats on foam  2x10  Long blue foam  x   4 way hip 10x  Lime  WILLY LE  x          Seated       Heel/toe raises 2x10   x   LAQ 20x   x   L ankle ABCs 1x   x          Other:       Treatment Charges: Mins Units   []  Modalities      [x]  Ther Exercise 45 3   []  Manual Therapy     []  Ther Activities     []  Aquatics     [x]  Vasocompression 15 1   []  Other: Total Treatment time 60 4       Assessment: [x] Progressing toward goals. Modified therapeutic exercises as needed to decrease discomfort throughout treatment as patient arrived with significantly increased pain and symptoms due to recent fall. Patient with overall good tolerance to program with minimal modifications and short rest breaks, patient verbalized minimal increased pain and fatigue throughout. Added seated exs as noted for non-weightbearing and ROM focused exs this date due to recent fall and increased stiffness with good carryover. Ended with vaso to alleviate fatigue and pain with good result. Educated patient to continue icing at home as able to keep any new onset of swelling and pain symptoms at [place] [address] with good carryover noted. _ No change. [x] Other: Patient to call to schedule with foot/ankle specialist.     [x] Patient would continue to benefit from skilled physical therapy services in order to: decrease shoulder pain, increase ROM in ER/IR, increase strength and function. ANKLE      STG: (to be met in 8 treatments)  1. ? Pain: No more than 4/10 max pain with prolonged standing/ambulation at home.  -- NOT MET, patient states \"at least 8-9/10\" with standing/ambulation  2. ? ROM: L ankle AROM to the following degrees to indicate improving joint mobility post injury:   a. DF: 10 deg; eversion/inversion:20/35deg, respectively -- 15 degrees DF; 22 degrees eversion/ 31 degrees inversion MET/PROGRESS  3. ? Strength: At least 4+/5 all planes of L ankle without pain during testing for improving stability at ankle in weightbearing -- MET 5/5, MIN pain with eversion   4. ? Balance: Pt will be able to tolerate SLS on level ground with LLE for at least 30 sec and no UE assist with no more than 5 instances of instability evidenced by med/lat forefoot elevation. -- MET  5. ? Function: Pt will be able to tolerate 2 hours of consecutive standing/ambulation with no more than minimal pain reported. -- PROGRESS CONT WITH MOD PAIN  6. Independent with Home Exercise Programs -- MET  7. Demonstrate knowledge of fall prevention.  -- MET ISSUED 1/6/2021     LTG: (to be met in 12 treatments) - Assessed on 1/22, extended and additional goals added:     8. ? Pain: No pain reported with all ADL/IADLs including prolonged standing at home and in community. - NOT MET, 3-4/10  9. ? ROM: L ankle AROM symmetrical to R ankle with no pain at end range - Made progress  10. ? Strength: At least 5-/5 all planes of L ankle without pain during testing to indicate further improved stability at ankle in weightbearing - MET, 5/5 all motions exept 5-/5 PF   11. ? Balance:   a. Pt will be able to tolerate SLS on foam with LLE for at least 30 sec and no UE assist with no more than 5 instances of instability evidenced by med/lat forefoot elevation. - NOT MET, 6x instability at foot, 3x upper extremity assist  b. NEW: Pt will be able to complete squats on Bosu flat top with good balance and no UE assist needed  c. NEW: Pt will be able to complete BAPS level 2 in all directions using only unilat UE assist with good balance, control  12. ? Function:  a.  Pt will be able to tolerate unlimited standing/ambulation times without pain in ankle. - NOT MET, no more than 1 hr for walking, standing pain onset at 5 min  b. Pt will be able to squat/stoop to ground without pain in ankle reported. - MET  c. Pt will be able to ascend/descend stairs reciprocally without UE assist and no pain reported in ankle. - Not met, though pt notes at baseline she negotiates stairs using marked time completion d/t knees                    Patient goals: \"no more pain\" - Ongoing    Rehab Potential:  [x]? Good  []? Fair  []? Poor    Suggested Professional Referral:  [x]? No  []? Yes:  Barriers to Goal Achievement[de-identified]  [x]? No  []? Yes:  Domestic Concerns:  [x]? No  []? Yes:    Pt. Education:  [x] Yes  [] No  [] Reviewed Prior HEP/Ed  Method of Education: [x] Verbal  [x] Demo  [] Written  Comprehension of Education:  [x] Verbalizes understanding. [x] Demonstrates understanding. [] Needs review. [x] Demonstrates/verbalizes HEP/Ed previously given. HEP 12/11/20: 4 way ankle and blue tband  HEP 1/20/21 Lateral lunge, mini lunge, gastroc stretch, SLS, seated heel raise, standing heel raise, LAQ. Plan: [x] Continue current frequency toward long and short term goals towards ankle goals. [x] Specific Instructions for subsequent treatments: re-bounder.       Frequency:  2 x/week for 24 visits       Time In: 900 am            Time Out: 1004 am    Electronically signed by:  Mariah Nelson PTA

## 2021-02-24 ENCOUNTER — HOSPITAL ENCOUNTER (OUTPATIENT)
Dept: PHYSICAL THERAPY | Age: 61
Setting detail: THERAPIES SERIES
Discharge: HOME | End: 2021-02-24
Payer: COMMERCIAL

## 2021-02-24 PROCEDURE — 97110 THERAPEUTIC EXERCISES: CPT

## 2021-02-24 PROCEDURE — 97016 VASOPNEUMATIC DEVICE THERAPY: CPT

## 2021-02-24 NOTE — FLOWSHEET NOTE
[x] [facility]) - [facility] &  Therapy  [address]  [phone]  F: [phone] [] [address]  [facility]   [address]  P: [phone]  F: [phone] [] [address] &  Therapy  [address]  P: [phone]  F: [phone] [] [address]  P: [phone]  F: [phone] [] [address]  [facility]   [address]   [place]: [phone]  F: [phone]      Physical Therapy Daily Treatment Note    Date:  2021  Patient Name:  Cyrena Boast    :  1960  MRN: [id number]    ANKLE  Physician: Poppy Mccloud DO                                    Insurance: Severiano Creamer (8 visits used in )  Medical Diagnosis: Sprain of left ankle, unspecified ligament, initial encounter  Rehab Codes: M25.572, R53.1, M25.672, R26.9, R27.9, R20.2  Onset date: 20             Next Dr's appt.: 12/10/20  Visit #:  (12 more visits added to [address] per 's order 21)     CX/NS: 3/0      Subjective:    Pain:  [x] Yes  [] No Location: R shoulder Pain Rating: (0-10 scale) 5/10 pain in L ankle   Pain altered Tx:  [x] No  [] Yes  Action:  Comments:  Patient arrives this date with 5/10 pain. States she is feeling \"miserable\" says she is still having increased pain from her fall last week. Patient states she sees foot/ankle specialist this Friday at 1:00.     Objective:     Modalities:  Vaso to L ankle x 15 mins, 36°, low compression, supine with ankle elevated    Precautions:  Exercises: Completed exercises marked with \"x\"  Exercise     Reps/ Time Weight/ Level Comments    L ANKLE   SHOES OFF ENTIRE TREATMENT    Nustep 5' L3  x          Calf stretch 3x20\"  wedge x   Rockerboard 30x ea L1 Ant/Post, Lat  x   Heel raises 2 way 2x10  Toes In --  x SLS on foam 4x20\" ea   x   Dynamic lunge onto foam       Walking lunges   NEXT    Dynamic fwd lunge onto BOSU 2x15 ea  Onto foam 2/24 x   Dynamic lateral lunge onto BOSU 2x15 ea  Onto foam 2/24 x   BAPS full weight 15x ea L2 RLE lifted, BUE assist, all planes x   SLS w/ ball catch x15ea      SLS with cone taps x15ea   Bilateral. Cones set at 12, 1 and 3 o'clock. Squats on foam  2x10  Long blue foam  x   4 way hip 10x  Lime  WILLY LE  x          Re-bounder    NEXT            Seated       Heel/toe raises 2x10   x   LAQ 20x   x   L ankle ABCs 1x   x          Other:       Treatment Charges: Mins Units   []  Modalities      [x]  Ther Exercise 35 2   []  Manual Therapy     []  Ther Activities     []  Aquatics     [x]  Vasocompression 15 1   []  Other: Total Treatment time 50 3       Assessment: [x] Progressing toward goals. Held progressions this date due to patients increased pain noted upon arrival. Patient states she has increased discomfort with squats, instructed patient to perform mini squat this date. No relief noted, thus suggested holding remaining reps. However, patient declined and completed all reps. Verbal cues given for decreased speed with 4 way hip with good carryover. Patient notes increased fatigue throughout standing exercises with MIN brief rest breaks needed. Ended with vaso for pain relief as documented with positive results. _ No change. [] Other:      [x] Patient would continue to benefit from skilled physical therapy services in order to: decrease shoulder pain, increase ROM in ER/IR, increase strength and function. ANKLE      STG: (to be met in 8 treatments)  1. ? Pain: No more than 4/10 max pain with prolonged standing/ambulation at home.  -- NOT MET, patient states \"at least 8-9/10\" with standing/ambulation  2. ? ROM: L ankle AROM to the following degrees to indicate improving joint mobility post injury:   a. DF: 10 deg; eversion/inversion:20/35deg, respectively -- 15 degrees

## 2021-02-25 DIAGNOSIS — M25.572 LEFT ANKLE PAIN, UNSPECIFIED CHRONICITY: Primary | ICD-10-CM

## 2021-02-26 ENCOUNTER — HOSPITAL ENCOUNTER (OUTPATIENT)
Dept: PHYSICAL THERAPY | Age: 61
Setting detail: THERAPIES SERIES
Discharge: HOME | End: 2021-02-26
Payer: COMMERCIAL

## 2021-02-26 ENCOUNTER — OFFICE VISIT (OUTPATIENT)
Dept: ORTHOPEDIC SURGERY | Age: 61
End: 2021-02-26
Payer: COMMERCIAL

## 2021-02-26 VITALS
OXYGEN SATURATION: 98 % | BODY MASS INDEX: 32.2 KG/M2 | WEIGHT: 175 LBS | HEIGHT: 62 IN | TEMPERATURE: 97.1 F | HEART RATE: 78 BPM | RESPIRATION RATE: 13 BRPM

## 2021-02-26 DIAGNOSIS — M25.372 INSTABILITY OF LEFT ANKLE JOINT: Primary | ICD-10-CM

## 2021-02-26 DIAGNOSIS — S93.402D SPRAIN OF LEFT ANKLE, UNSPECIFIED LIGAMENT, SUBSEQUENT ENCOUNTER: ICD-10-CM

## 2021-02-26 DIAGNOSIS — M76.72 PERONEAL TENDINITIS OF LEFT LOWER EXTREMITY: ICD-10-CM

## 2021-02-26 DIAGNOSIS — M21.862 GASTROCNEMIUS EQUINUS OF LEFT LOWER EXTREMITY: ICD-10-CM

## 2021-02-26 PROCEDURE — 99214 OFFICE O/P EST MOD 30 MIN: CPT | Performed by: ORTHOPAEDIC SURGERY

## 2021-02-26 PROCEDURE — 3017F COLORECTAL CA SCREEN DOC REV: CPT | Performed by: ORTHOPAEDIC SURGERY

## 2021-02-26 PROCEDURE — G8427 DOCREV CUR MEDS BY ELIG CLIN: HCPCS | Performed by: ORTHOPAEDIC SURGERY

## 2021-02-26 PROCEDURE — 97530 THERAPEUTIC ACTIVITIES: CPT

## 2021-02-26 PROCEDURE — G8484 FLU IMMUNIZE NO ADMIN: HCPCS | Performed by: ORTHOPAEDIC SURGERY

## 2021-02-26 PROCEDURE — 1036F TOBACCO NON-USER: CPT | Performed by: ORTHOPAEDIC SURGERY

## 2021-02-26 PROCEDURE — G8417 CALC BMI ABV UP PARAM F/U: HCPCS | Performed by: ORTHOPAEDIC SURGERY

## 2021-02-26 PROCEDURE — 97161 PT EVAL LOW COMPLEX 20 MIN: CPT

## 2021-02-26 NOTE — CONSULTS
[x] [facility]) - [facility] &  Therapy  [address]    [phone]  F: [phone]   [] [address]  [facility]   [address]  P: [phone]  F: [phone]  [] [address] &  Therapy  [address]  P: [phone]  F: [phone]   [] [address]  P: [phone]  F: [phone]  [] [place] [id number] [facility] [address]  [place]: [phone]   F: [phone] [] [facility] &  Therapy  [facility] [address]   [place]: [phone]  F: [phone]           Physical Therapy Evaluation    Date:  2021   Patient: Cyrena Boast  : 1960  MRN: [id number]  Physician: Dr. Woods Lab: Gael ([id number])  Medical Diagnosis: Instability of left ankle joint; Peroneal tendinitis of left lower extremity; Gastrocnemius equinus of left lower extremity; Sprain of left ankle, unspecified ligament, subsequent encounter  Rehab Codes: S93.492A, R53.1, R26.9, R27.9  Onset date: 20   Next Dr's appt.: DOS 3/4/21    Subjective:   CC/HPI: Pt arrives for DME evaluation after consult with Dr. Yajaira Dawson for chronic inversion sprains; 2-3 sprains since initial sprain on 20, but was chronic sprainer prior to that as well. Pt describes that she will undergo lateral ankle reconstruction and possible fusion to address continued lack of stability in ankle and inability to work d/t continued ankle pain and sprains.      PMHx:      Tests: [x] X-Ray: 21:   Narrative   History: 62y.o. year old female with a history of multiple left ankle    sprains.       Comparison: 2020       Findings: 4 views AP, lateral, mortise, gravity stress of the left ankle    demonstrates no acute osseous abnormality.  No visualized dislocations.     No widening of the syndesmosis on gravity stress view.  No obvious lytic    or blastic lesions present.       Impression: No acute fracture or dislocation of the left ankle. [] MRI:    [] Other:     Medications:  [x] Refer to full medical record [] None [] Other:  Allergies:       [x] Refer to full medical record [] None [] Other:        Martial Status Living alone   Home type 1-story house  Walk in shower, smaller bathroom; grab bars in and outside shower   Stairs from outside 2 ANDREA, no railings   Stairs inside None   [facility] status Off work d/t condition   Work Activities/duties  Walking/standing throughout shift, squatting   Recreational Activities        Pain present? Yes   Location Lateral ankle   Pain Rating currently 5/10   Pain at worse 10/10   Pain at best 1/10   Description of pain Constant, aching, sharp   Altered Sensation Numbness in toes all on L foot   What makes it worse Weightbearing prolonged, not having shoes on, walking on unlevel ground   What makes it better Rest, elevation   Pain altered treatment/action No   Symptom progression Progressive   Sleep No pain             Objective:    ROM:- 0 deg DF, 35deg PF, 10deg eversion, 40deg inversion - all painful, slightly limited from baseline d/t pain                     Strength:   Grossly 4+/5 except plantarflexors 4/5 - does note that she has more pain today after Dr. Westley Rizo visit prior to today's eval, so may be more pain limiting. Edema: Mild swelling in lateral ankle        Flexibility: NT              Palpation/Pain: TTP throughout lateral/anterior ankle      Special tests: NT       Educated pt on use of DME, including: (Check box of device used)     [x] Knee Scooter: Instructed pt on appropriate height being even with contralateral knee. Reviewed safety concerns such as not gliding on turns and using breaks appropriately.     [x] Rolling Walker: Instructed pt on appropriate height of even with wrists with arms at resting at side. Educated pt on safe gait pattern while using walker. Explained to pt the difference between a 4 wheeled walker and rolling walker and how a rolling walker is most appropriate for a NWB pt.      [] Axillary Crutches: Instructed pt on appropriate height of two finger width between crutch and axilla, as well as elbow flexion of approximately 20deg. Educated pt on how to adjust both crutch and handle height. [x] Stairs:  Using rolling walker to negotiate landing step outside front door    Pt with good understanding of all techniques/uses and expressed no safety concerns. At this time pt will most benefit from use of: knee scooter, rolling walker, and       Comments: Recommended tub transfer bench for safe shower transfers d/t living alone with tub shower, narrow tub edge. Provided pt with options on where and how to obtain this. Assessment:  STG: (to be met in 1 treatments)  1. Educate patient on proper use of DME - MET  2. Patient to perform transfers and weight bearing status independent without assistance - MET  3. Independent with Home Exercise Programs - MET  4. Demonstrate Knowledge of fall prevention - MET                     Patient goals: \"get surgery\"    Rehab Potential:  [x] Good  [] Fair  [] Poor   Suggested Professional Referral:  [x] No  [] Yes:  Barriers to Goal Achievement[de-identified]  [x] No  [] Yes:  Domestic Concerns:  [] No  [] Yes:    Pt. Education:  [x] Plans/Goals, Risks/Benefits discussed  [] Home exercise program    Method of Education: [x] Verbal  [] Demo  [] Written  Comprehension of Education:  [x] Verbalizes understanding. [] Demonstrates understanding. [] Needs Review. [] Demonstrates/verbalizes understanding of HEP/Ed previously given.     Treatment Plan:  [] Therapeutic Exercise      [] Manual Therapy       [x] Instruction in HEP        [] Neuromuscular Re-education     [] Vasocompression Vlad Siddiqui)        [x] Gait Training                      []  Medication allergies reviewed for use of    Dexamethasone Sodium Phosphate 4mg/ml     with iontophoresis treatments. Pt is not allergic. Frequency:  1 x/week for 1 visits - completed today      Shmuel Darling Treatment:    Exercises:  Exercise     Reps/ Time Weight/ Level Comments   Gait train w/ scooter x     Scooter adjust to proper height x  Demonstrated for pt to complete with her scooter at home         Transfer training w/ rolling walker x  Bed, toilet, in bathroom   Stair negotiation with walker and scooter out in community x  Discussed having person at home first, using walker to negotiate 1 landing step - having someone bring scooter out for pt                           Other:    Specific Instructions for next treatment: N/A, does not need additional therapy d/t demonstrating safety with all mobility and transfers while maintaining LLE NWB    Evaluation Complexity:  History (Personal factors, comorbidities) [] 0 [x] 1-2 [] 3+   Exam (limitations, restrictions) [] 1-2 [x] 3 [] 4+   Clinical presentation (progression) [x] Stable [] Evolving  [] Unstable   Decision Making [x] Low [] Moderate [] High    [x] Low Complexity [] Moderate Complexity [] High Complexity       [x]  DME note sent to Dr. Anibal Waters Charges: Mins Units   [x] Evaluation       [x]  Low       []  Moderate       []  High 20 1   []  Modalities     []  Ther Exercise     []  Manual Therapy     [x]  Ther Activities 20 1   []  Aquatics     []  Vasocompression     []  Gait       TOTAL TREATMENT TIME: 40 min    Time in:3:20 pm   Time Out:4:00 pm    Electronically signed by: Robin Santos PT        Physician Signature:________________________________Date:__________________  By signing above or cosigning this note, I have reviewed this plan of care and certify a need for medically necessary rehabilitation services.      *PLEASE SIGN ABOVE AND FAX BACK ALL PAGES*

## 2021-02-26 NOTE — LETTER
Dr. Shelbi Farley  [address] [place] [address]  [phone]        2/26/2021     Patient: Tawnya Velásquez  YOB: 1960    Dear Wilberto Shelby MD,    I had the pleasure of seeing one of your patients, Tawnya Velásquez, recently in the office. We have decided to proceed with surgery, and the patient may be reaching out to your office in the near future for medical clearance/optimization. Below are the relevant portions of my assessment and plan of care. ASSESSMENT AND PLAN:  She has a left ankle sprain, sustained on 11/13/2020, with a history of left ankle instability (20 ankle sprains total, positive anterior drawer, unable to adequately assess her talar tilt secondary to pain), findings suspicious for peroneal tendon injury (tenderness to palpation along the course of her peroneal tendons, weak and painful eversion), with an underlying gastroc equinus contracture. Notably, she has no relevant past medical history. We had a discussion today about the likely diagnosis and its natural history, physical exam and imaging findings, as well as various treatment options in detail. Surgically, we discussed a possible left lateral ankle ligamentous stabilization procedure, with peroneal tendon exploration/tenolysis/debridement, with gastroc recession. Initially, I did recommend conservative management, however, after long discussion and explanation of what she has been doing for many months, she has explored extensive conservative management options already. She has done several months of physical therapy twice per week, and reports that she is still rolling her ankle, even when using the lace up ankle brace. She also reports that she has been unable to work since November, as her ankle feels too unstable. I offer the patient an ankle injection, to help with her pain, as well as discussed continuing physical therapy, and possibly obtaining a new brace, however she does wish to proceed with a more definitive solution. She does wish to proceed with surgery in the near future, so that she may get back to work. I do believe that surgery is reasonable, and should help her instability. Orders/referrals were placed as below at today's visit. At today's visit, the patient was ordered DME as below. I also ordered physical therapy for the patient to hep reinforce/teach the relevant weight bearing precautions, to help allow for safe transfers and early mobilization, as well as effectively utilize DME. Surgical booking paperwork was completed and submitted. We spoke about the risks/benefits/alternatives to a surgical intervention. They understand that the risks of surgery may include but are not limited to pain, infection, bleeding, blood clot, damage to soft tissue/vessel/nerve, future surgery, scarring/stiffness, decreased strength/weakness, cosmetic deformity, neuroma/neuritis/phantom pains, delayed soft tissue/bone healing, nonunion, malunion, failure of hardware/fixation/surgery, iatrogenic fracture/dislocation, damage to bone/joint(s), worsening of condition, recurrence, limb length discrepancy, avascular necrosis of bone, neurovascular compromise/compartment syndrome, tourniquet complications, failure of surgery, dissatisfaction with outcome, loss of limb, stroke, heart attack, pulmonary embolus, mental status change, anesthesia risks/reaction, and even death. They expressed verbal understanding of the risks and wish to proceed with surgical intervention. All questions were answered. No guarantees were made or implied. Informed consent was obtained. The patient was counseled about the risks of vicky Covid-19 during the perioperative period and any recovery window from their procedure. The patient was made aware that vicky Covid-19 may worsen their prognosis for recovering from their procedure and lend to a higher morbidity and/or mortality risk. All material risks, benefits, and reasonable alternatives including postponing the procedure were discussed. The patient does wish to proceed with their procedure at this time. We also had a discussion about the risk of blood clot and thromboembolic events. The patient understands that there is an increased risk with surgery/immobilization, and understands nothing will completely eliminate the risk of DVT/PE's, and that any prophylactic medication does not substitute for early mobilization. Given her risk profile, I have recommended the following strategy to decrease the risk of blood clots, and the patient agrees and wishes to proceed:  Aspirin 325 mg PO q Day. All questions were answered and the above plan was agreed upon. The patient will return to clinic postoperatively . Thank you in advance! I look forward to serving you and your patients again in the future. Please don't hesitate to contact me at my mobile number .         Garland Wilkerson MD  Orthopedic Surgery

## 2021-02-28 NOTE — PROGRESS NOTES
[facility]  [facility] [place] [address]  Dept: [phone]    Ambulatory Orthopedic Consult      CHIEF COMPLAINT:    Chief Complaint   Patient presents with    Foot Pain     Left Foot        HISTORY OF PRESENT ILLNESS:      The patient is a 62 y.o. female who is being seen for evaluation of pain at the above location at the left ankle/lateral hindfoot, which began remotely (but reports that she exacerbated her pain on 11/13/2020 secondary to an inversion injury). The pain is described mainly with mechanical terms (dull/sharp/throbbing). The pain is worse with activity and better with rest. The patient reports a progressive course. The patient has tried:      [x]  rest/activity modification          [x]  NSAIDs      []  opiates      []  orthotics        []  change in shoes   [x]  home exercises  [x]  physical therapy      []  CAM boot     [x]  brace:    []  injection:       []  surgery:      The patient reports an associated sense of ankle instability, and a history of approximately 20 ipsilateral ankle sprains, and reports that she has had 3 ankle sprains since her ankle sprain on 11/13/2020 as above. She reports that she has been doing physical therapy twice a week since November/December, and is even rolled her ankle now while wearing the ankle brace. REVIEW OF SYSTEMS:  Constitutional: Negative for fever. HENT: Negative for tinnitus. Eyes: Negative for pain. Respiratory: Negative for shortness of breath. Cardiovascular: Negative for chest pain. Gastrointestinal: Negative for abdominal pain. Genitourinary: Negative for dysuria. Skin: Negative for rash. Neurological: Negative for headaches. Hematological: Does not bruise/bleed easily.    Musculoskeletal: See HPI for pertinent positives     Past Medical History:   ibuprofen (ADVIL;MOTRIN) 800 MG tablet, Take 1 tablet by mouth every 8 hours as needed for Pain, Disp: 90 tablet, Rfl: 2    miSOPROStol (CYTOTEC) 200 MCG tablet, Take 200 mcg by mouth daily, Disp: , Rfl:     senna-docusate (PERICOLACE) 8.6-50 MG per tablet, Take 1 tablet by mouth daily as needed for Constipation, Disp: 30 tablet, Rfl: 1    Multiple Vitamins-Minerals (CULTURELLE PROBIOTICS + MULTIV) CHEW, Take 1 tablet by mouth daily, Disp: 30 tablet, Rfl: 2    ondansetron (ZOFRAN ODT) 4 MG disintegrating tablet, Take 1 tablet by mouth every 6 hours as needed for Nausea or Vomiting, Disp: 12 tablet, Rfl: 0    albuterol sulfate  (90 Base) MCG/ACT inhaler, INHALE 2 PUFFS BY MOUTH INTO THE LUNGS ONCE EVERY 6 HOURS AS NEEDED FOR WHEEZING, Disp: 18 g, Rfl: 3    MI-ACID GAS RELIEF 80 MG chewable tablet, CHEW 1 TABLET BY MOUTH FOUR TIMES DAILY AS NEEDED FOR FLATULENCE, Disp: 120 tablet, Rfl: 5    CRANBERRY CONCENTRATE 500 MG CAPS, TAKE 1 CAPSULE BY MOUTH TWICE DAILY, Disp: 56 capsule, Rfl: 3    metoprolol succinate (TOPROL XL) 25 MG extended release tablet, Take 25 mg by mouth daily , Disp: , Rfl:     PREMARIN 0.625 MG/GM vaginal cream, PLACE 2 GRAMS VAGINALLY TWICE A WEEK FOR 12 DOSES, Disp: 30 g, Rfl: 1     Allergies:    Shellfish allergy, Shrimp (diagnostic), Seasonal, Famotidine, and Gabapentin    Family History:  family history includes Colon Cancer in her mother; Crohn's Disease in her niece; Heart Attack in her brother; Heart Disease in her brother; High Blood Pressure in her father. Social History:   Social History     Occupational History    Not on file   Tobacco Use    Smoking status: Former Smoker     Packs/day: 2.00     Years: 0.50     Pack years: 1.00     Types: Cigarettes     Quit date:      Years since quittin.1    Smokeless tobacco: Never Used   Substance and Sexual Activity    Alcohol use: Yes     Alcohol/week: 0.0 standard drinks     Comment: social    Drug use:  No  Sexual activity: Not on file     Occupation: Works full-time as a  at the [employer]     OBJECTIVE:  Pulse 78   Temp 97.1 °F (36.2 °C)   Resp 13   Ht 5' 1.5\" (1.562 m)   Wt 175 lb (79.4 kg)   SpO2 98%   BMI 32.53 kg/m²    Psych: alert and oriented to person, time, and place  Cardio:  well perfused extremities  Resp:  normal respiratory effort  Skin:  no cyanosis  Hem/lymph:  no lymphedema  Neuro:  sensation to light touch grossly intact throughout all nerve distributions in the foot   Musculoskeletal:    RLE:  Alignment:  Heel neutral   Vascular: Toes warm and well perfused, compartments soft/compressible. No significant swelling of foot. Skin: Intact without rash/lesions/AV malformations. Strength: Able to fire/perform the following with appropriate strength:    [x]  Tib Ant:     [x]  Gastroc-Soleus:         [x]  Inversion:    [x]  Eversion:         [x]  FHL:     [x]  EHL:      Motion:  Normal for the following joints:    [x]  Ankle:     [x]  Subtalar:        [x]  1st MTP:      []  1st TMT:            Tenderness to Palpation:    Tenderness to palpation:  none    Instability:   -Talar tilt: Negative  -Anterior drawer: Negative  -No peroneal subluxation/dislocation with dorsiflexion + eversion or with circumduction       LLE:  Alignment:  Heel neutral   Vascular: Toes warm and well perfused, compartments soft/compressible. No significant swelling of foot. Skin: Intact without rash/lesions/AV malformations.   Strength: Able to fire/perform the following with appropriate strength:    [x]  Tib Ant:     [x]  Gastroc-Soleus:         [x]  Inversion:    [x]  Eversion: Painful, weak       [x]  FHL:     [x]  EHL:      Motion:  Normal for the following joints:    [x]  Ankle:     [x]  Subtalar:        [x]  1st MTP:      []  1st TMT:            Tenderness to Palpation: Tenderness to palpation:  anterior ankle joint line, diffusely throughout the lateral ankle/hindfoot, along the course the peroneal tendons (posterior to the fibula and just distal to the fibula)  -Gastroc equinus    Instability:   -Talar tilt: Equivocal, limited exam secondary to pain  -Anterior drawer: Positive, asymmetric to contralateral side  -No peroneal subluxation/dislocation with dorsiflexion + eversion or with circumduction       RADIOLOGY:   2/26/2021 FINDINGS:  Three weightbearing views (AP, Mortise, and Lateral) of the left ankle and three weightbearing views (AP, Oblique, Lateral) of the left foot were obtained in the office today and reviewed, revealing no acute fracture, dislocation, or radioopaque foreign body/tumor. The ankle mortise is maintained with no widening of the clear spaces. IMPRESSION:  No acute fracture/dislocation. Electronically signed by Grace Martines MD      -MRI from 1/14/2021 reviewed, both images and report as below:  No acute internal derangement.       Moderate remote partial-thickness tear of the anterior talofibular ligament.       Mild remote partial-thickness tearing of the posterior talofibular ligament   with an associated ganglion cyst.       Mild plantar fasciitis is suspected.           ASSESSMENT AND PLAN:  Body mass index is 32.53 kg/m². She has a left ankle sprain, sustained on 11/13/2020, with a history of left ankle instability (20 ankle sprains total, positive anterior drawer, unable to adequately assess her talar tilt secondary to pain), findings suspicious for peroneal tendon injury (tenderness to palpation along the course of her peroneal tendons, weak and painful eversion), with an underlying gastroc equinus contracture. Notably, she has no relevant past medical history. We had a discussion today about the likely diagnosis and its natural history, physical exam and imaging findings, as well as various treatment options in detail. Surgically, we discussed a possible left lateral ankle ligamentous stabilization procedure, with peroneal tendon exploration/tenolysis/debridement, with gastroc recession; on MRI, she does have a ganglion cyst at the site of her PTFL, and this may be leading to some of her symptoms as well, and thus excising the cyst may be helpful. Initially, I did recommend conservative management, however, after long discussion and explanation of what she has been doing for many months, she has explored extensive conservative management options already. She has done several months of physical therapy twice per week, and reports that she is still rolling her ankle, even when using the lace up ankle brace. She also reports that she has been unable to work since November, as her ankle feels too unstable. I offer the patient an ankle injection, to help with her pain, as well as discussed continuing physical therapy, and possibly obtaining a new brace, however she does wish to proceed with a more definitive solution. She does wish to proceed with surgery in the near future, so that she may get back to work. I do believe that surgery is reasonable, and should help her instability. Orders/referrals were placed as below at today's visit. At today's visit, the patient was ordered DME as below. I also ordered physical therapy for the patient to hep reinforce/teach the relevant weight bearing precautions, to help allow for safe transfers and early mobilization, as well as effectively utilize DME. Surgical booking paperwork was completed and submitted. We spoke about the risks/benefits/alternatives to a surgical intervention. They understand that the risks of surgery may include but are not limited to pain, infection, bleeding, blood clot, damage to soft tissue/vessel/nerve, future surgery, scarring/stiffness, decreased strength/weakness, cosmetic deformity, neuroma/neuritis/phantom pains, delayed soft tissue/bone healing, nonunion, malunion, failure of hardware/fixation/surgery, iatrogenic fracture/dislocation, damage to bone/joint(s), worsening of condition, recurrence, limb length discrepancy, avascular necrosis of bone, neurovascular compromise/compartment syndrome, tourniquet complications, failure of surgery, dissatisfaction with outcome, loss of limb, stroke, heart attack, pulmonary embolus, mental status change, anesthesia risks/reaction, and even death. They expressed verbal understanding of the risks and wish to proceed with surgical intervention. All questions were answered. No guarantees were made or implied. Informed consent was obtained. The patient was counseled about the risks of vicky Covid-19 during the perioperative period and any recovery window from their procedure. The patient was made aware that vicky Covid-19 may worsen their prognosis for recovering from their procedure and lend to a higher morbidity and/or mortality risk. All material risks, benefits, and reasonable alternatives including postponing the procedure were discussed. The patient does wish to proceed with their procedure at this time. We also had a discussion about the risk of blood clot and thromboembolic events. The patient understands that there is an increased risk with surgery/immobilization, and understands nothing will completely eliminate the risk of DVT/PE's, and that any prophylactic medication does not substitute for early mobilization. Given her risk profile, I have recommended the following strategy to decrease the risk of blood clots, and the patient agrees and wishes to proceed:  Aspirin 325 mg PO q Day. All questions were answered and the above plan was agreed upon. The patient will return to clinic postoperatively . At the patient's next visit, depending on how the patient is doing and/or new imaging/labs results, we may consider the following options:    []  Orthotic (OTC)     []  Orthotic (custom)          []  Rocker bottom shoes     []  Brace (OTC)        []  Brace (custom)             []  CAM boot        []  Night splint         []  Heel cups        []  Strap      []  Toe sleeves/splints    []  PT:                     []  Wean out of immobilization   []  Advance activity       []  Topical               []  NSAIDs          []  Ronni         []  Referral:         []  Stress xrays       []  CT         []  MRI        []  Injection:         []  Consider OR      []  Pick OR date    No follow-ups on file. No orders of the defined types were placed in this encounter.     Orders Placed This Encounter   Procedures    Ambulatory referral to Physical Therapy     Referral Priority:   Routine     Referral Type:   Eval and Treat     Referral Reason:   Specialty Services Required     Requested Specialty:   Physical Therapy     Number of Visits Requested:   1    DME Order for (Specify) as OP     - DME device ordered:  crutches   - Length of Need:  3 Months    DME Order for (Specify) as OP     - DME device ordered:  rolling knee scooter   - Length of Need:  3 Months    DME Order for (Specify) as OP - DME device ordered:  rolling walker   - Length of Need:  3 Months         Hilda Pool MD  Orthopedic Surgery        Please excuse any typos/errors, as this note was created with the assistance of voice recognition software. While intending to generate a document that actually reflects the content of the visit, the document can still have some errors including those of syntax and sound-a-like substitutions which may escape proof reading. In such instances, actual meaning can be extrapolated by context.

## 2021-03-01 ENCOUNTER — HOSPITAL ENCOUNTER (OUTPATIENT)
Dept: PREADMISSION TESTING | Age: 61
Discharge: HOME | End: 2021-03-05
Payer: COMMERCIAL

## 2021-03-01 ENCOUNTER — HOSPITAL ENCOUNTER (OUTPATIENT)
Dept: LAB | Age: 61
Setting detail: SPECIMEN
Discharge: HOME | End: 2021-03-01
Payer: COMMERCIAL

## 2021-03-01 VITALS
HEIGHT: 61 IN | WEIGHT: 175 LBS | BODY MASS INDEX: 33.04 KG/M2 | TEMPERATURE: 96 F | RESPIRATION RATE: 14 BRPM | DIASTOLIC BLOOD PRESSURE: 61 MMHG | HEART RATE: 102 BPM | SYSTOLIC BLOOD PRESSURE: 123 MMHG

## 2021-03-01 DIAGNOSIS — Z01.818 PREOP TESTING: Primary | ICD-10-CM

## 2021-03-01 LAB
ABSOLUTE EOS #: 0.17 K/UL (ref 0–0.44)
ABSOLUTE IMMATURE GRANULOCYTE: 0.01 K/UL (ref 0–0.3)
ABSOLUTE LYMPH #: 1.44 K/UL (ref 1.1–3.7)
ABSOLUTE MONO #: 0.33 K/UL (ref 0.1–1.2)
ANION GAP SERPL CALCULATED.3IONS-SCNC: 11 MMOL/L (ref 9–17)
BASOPHILS # BLD: 1 % (ref 0–2)
BASOPHILS ABSOLUTE: 0.04 K/UL (ref 0–0.2)
BUN BLDV-MCNC: 15 MG/DL (ref 6–20)
BUN/CREAT BLD: 25 (ref 9–20)
CALCIUM SERPL-MCNC: 9.2 MG/DL (ref 8.6–10.4)
CHLORIDE BLD-SCNC: 104 MMOL/L (ref 98–107)
CO2: 25 MMOL/L (ref 20–31)
CREAT SERPL-MCNC: 0.61 MG/DL (ref 0.5–0.9)
DIFFERENTIAL TYPE: NORMAL
EOSINOPHILS RELATIVE PERCENT: 4 % (ref 1–4)
GFR AFRICAN AMERICAN: >60 ML/MIN
GFR NON-AFRICAN AMERICAN: >60 ML/MIN
GFR SERPL CREATININE-BSD FRML MDRD: ABNORMAL ML/MIN/{1.73_M2}
GFR SERPL CREATININE-BSD FRML MDRD: ABNORMAL ML/MIN/{1.73_M2}
GLUCOSE BLD-MCNC: 153 MG/DL (ref 70–99)
HCT VFR BLD CALC: 40.6 % (ref 36.3–47.1)
HEMOGLOBIN: 13.2 G/DL (ref 11.9–15.1)
IMMATURE GRANULOCYTES: 0 %
LYMPHOCYTES # BLD: 31 % (ref 24–43)
MCH RBC QN AUTO: 30.2 PG (ref 25.2–33.5)
MCHC RBC AUTO-ENTMCNC: 32.5 G/DL (ref 28.4–34.8)
MCV RBC AUTO: 92.9 FL (ref 82.6–102.9)
MONOCYTES # BLD: 7 % (ref 3–12)
NRBC AUTOMATED: 0 PER 100 WBC
PDW BLD-RTO: 12.1 % (ref 11.8–14.4)
PLATELET # BLD: 296 K/UL (ref 138–453)
PLATELET ESTIMATE: NORMAL
PMV BLD AUTO: 11.3 FL (ref 8.1–13.5)
POTASSIUM SERPL-SCNC: 3.8 MMOL/L (ref 3.7–5.3)
RBC # BLD: 4.37 M/UL (ref 3.95–5.11)
RBC # BLD: NORMAL 10*6/UL
SEG NEUTROPHILS: 57 % (ref 36–65)
SEGMENTED NEUTROPHILS ABSOLUTE COUNT: 2.64 K/UL (ref 1.5–8.1)
SODIUM BLD-SCNC: 140 MMOL/L (ref 135–144)
WBC # BLD: 4.6 K/UL (ref 3.5–11.3)
WBC # BLD: NORMAL 10*3/UL

## 2021-03-01 PROCEDURE — 36415 COLL VENOUS BLD VENIPUNCTURE: CPT

## 2021-03-01 PROCEDURE — U0003 INFECTIOUS AGENT DETECTION BY NUCLEIC ACID (DNA OR RNA); SEVERE ACUTE RESPIRATORY SYNDROME CORONAVIRUS 2 (SARS-COV-2) (CORONAVIRUS DISEASE [COVID-19]), AMPLIFIED PROBE TECHNIQUE, MAKING USE OF HIGH THROUGHPUT TECHNOLOGIES AS DESCRIBED BY CMS-2020-01-R: HCPCS

## 2021-03-01 PROCEDURE — 80048 BASIC METABOLIC PNL TOTAL CA: CPT

## 2021-03-01 PROCEDURE — U0005 INFEC AGEN DETEC AMPLI PROBE: HCPCS

## 2021-03-01 PROCEDURE — 85025 COMPLETE CBC W/AUTO DIFF WBC: CPT

## 2021-03-01 ASSESSMENT — PAIN DESCRIPTION - DESCRIPTORS: DESCRIPTORS: BURNING;ACHING

## 2021-03-01 ASSESSMENT — PAIN DESCRIPTION - FREQUENCY: FREQUENCY: CONTINUOUS

## 2021-03-01 ASSESSMENT — PAIN DESCRIPTION - PROGRESSION: CLINICAL_PROGRESSION: GRADUALLY WORSENING

## 2021-03-01 ASSESSMENT — PAIN SCALES - GENERAL: PAINLEVEL_OUTOF10: 5

## 2021-03-01 NOTE — PRE-PROCEDURE INSTRUCTIONS
[facility] ON Thursday, 3/4/2021 at 56 AM      Continue to take your home medications as you normally do up to and including the night before surgery with the exception of any blood thinning medications. Please stop any blood thinning medications as directed by your surgeon or prescribing physician. Failure to stop certain medications may interfere with your scheduled surgery. These may include:  Aspirin, Warfarin (Coumadin), Clopidogrel (Plavix), Ibuprofen (Motrin, Advil), Naproxen (Aleve), Meloxicam (Mobic), Celecoxib (Celebrex), Eliquis, Pradaxa, Xarelto, Effient, Fish Oil, Herbal supplements, diclofenac,        Please take the following medication(s) the day of surgery with a small sip of water:  Metoprolol, chlorzoxazone, cytotec, prilosec, buspar, effexor    Please use your inhalers at home the day of surgery. PREPARING FOR YOUR SURGERY:     Before surgery, you can play an important role in your own health. Because skin is not sterile, we need to be sure that your skin is as free of germs as possible before surgery by carefully washing before surgery. Preparing or prepping skin before surgery can reduce the risk of a surgical site infection.   Do not shave the area of your body where your surgery will be performed unless you received specific permission from your physician.  ////  You will need to shower at home the night before surgery and the morning of surgery with a special soap called chlorhexidine gluconate (CHG*). *Not to be used by people allergic to Chlorhexidine Gluconate (CHG). Following these instructions will help you be sure that your skin is clean before surgery. Instructions on cleaning your skin before surgery: The night before your surgery:      You will need to shower with warm water (not hot) and the CHG soap.  Use a clean wash cloth and a clean towel. Have clean clothes available to put on after the shower.         First wash your hair with regular shampoo. Rinse your hair and body thoroughly to remove the shampoo.  Wash your face and genital area (private parts) with your regular soap or water only. Thoroughly rinse your body with warm water from the neck down.  Turn water off to prevent rinsing the soap off too soon.  With a clean wet washcloth and half of the CHG soap in the bottle, lather your entire body from the neck down. Do not use CHG soap near your eyes or ears to avoid injury to those areas.  Wash thoroughly, paying special attention to the area where your surgery will be performed.  Wash your body gently for five (5) minutes. Avoid scrubbing your skin too hard.  Turn the water back on and rinse your body thoroughly.  Pat yourself dry with a clean, soft towel. Do not apply lotion, cream or powder.  Dress with clean freshly washed clothes. The morning of surgery:     Repeat shower following steps above - using remaining half of CHG soap in bottle. Patient Instructions:    Rojas If you are having any type of anesthesia you are to have nothing to eat or drink after midnight the night before your surgery. This includes gum, hard candy, mints, water or smoking or chewing tobacco.  The only exception to this is a small sip of water to take with any morning dose of heart, blood pressure, or seizure medications. No alcoholic beverages for 24 hours prior to surgery.  Brush your teeth but do not swallow water.  Bring your eyeglasses and case with you. No contacts are to be worn the day of surgery. You also may bring your hearing aids. Most surgical procedures involving anesthesia will require that you remove your dentures prior to surgery.  If you are on C-PAP or Bi-PAP at home and plan on staying in the hospital overnight for your surgery please bring the machine with you. · Do not wear any jewelry or body piercings day of surgery.   Also, NO lotion, perfume or deodorant to be used the day of surgery. No nail polish on the operative extremity (arm/leg surgeries)    · Do not bring any valuables such as jewelry, cash, or credit cards. If you are staying overnight with us, please bring a small bag of personal items.  Please wear loose, comfortable clothing. If you are potentially going to have a cast or brace bring clothing that will fit over them.  In case of illness - If you have cold or flu like symptoms (high fever, runny nose, sore throat, cough, etc.) rash, nausea, vomiting, loose stools, and/or recent contact with someone who has a contagious disease (chicken pox, measles, etc.) Please call your doctor before coming to the hospital.     If your child is having surgery please make arrangements for any other children to be cared for at home on the day of surgery. Other children are not permitted in recovery room and we want you to be able to spend time with the patient. If other arrangements are not available then we suggest that you have a second adult to stay in the waiting room. Day of Surgery/Procedure:    As a patient at [place] [address] you can expect quality medical and nursing care that is centered on your individual needs. Our goal is to make your surgical experience as comfortable as possible    . Transportation After Your Surgery/Procedure: You will need a friend or family member to drive you home after your procedure. Your  must be 25years of age or older and able to sign off on your discharge instructions. A taxi cab or any other form of public transportation is not acceptable. Your friend or family member must stay at the hospital throughout your procedure. Someone must remain with you for the first 24 hours after your surgery if you receive anesthesia or medication.   If you do not have someone to stay with you, your procedure may be cancelled.       If you have any other questions regarding your procedure or the day of surgery, please call [phone]      _________________________  ____________________________  Signature (Patient)              Signature (Provider) & date

## 2021-03-02 LAB
SARS-COV-2: NORMAL
SARS-COV-2: NOT DETECTED
SOURCE: NORMAL

## 2021-03-02 RX ORDER — ACETAMINOPHEN 500 MG
1000 TABLET ORAL ONCE
Status: CANCELLED | OUTPATIENT
Start: 2021-03-04

## 2021-03-03 ENCOUNTER — VIRTUAL VISIT (OUTPATIENT)
Dept: FAMILY MEDICINE CLINIC | Age: 61
End: 2021-03-03
Payer: COMMERCIAL

## 2021-03-03 ENCOUNTER — ANESTHESIA EVENT (OUTPATIENT)
Dept: OPERATING ROOM | Age: 61
End: 2021-03-03
Payer: COMMERCIAL

## 2021-03-03 DIAGNOSIS — M25.372 INSTABILITY OF LEFT ANKLE JOINT: Primary | ICD-10-CM

## 2021-03-03 DIAGNOSIS — J45.30 MILD PERSISTENT ASTHMA WITHOUT COMPLICATION: ICD-10-CM

## 2021-03-03 PROCEDURE — 99442 PR PHYS/QHP TELEPHONE EVALUATION 11-20 MIN: CPT | Performed by: FAMILY MEDICINE

## 2021-03-03 ASSESSMENT — PATIENT HEALTH QUESTIONNAIRE - PHQ9
SUM OF ALL RESPONSES TO PHQ QUESTIONS 1-9: 0
SUM OF ALL RESPONSES TO PHQ QUESTIONS 1-9: 0
SUM OF ALL RESPONSES TO PHQ9 QUESTIONS 1 & 2: 0
1. LITTLE INTEREST OR PLEASURE IN DOING THINGS: 0

## 2021-03-03 NOTE — PROGRESS NOTES
General FM note    TeleHealth encounter -- Audio/Visual (During [id number] public health emergency)    Scooter German is a 62 y.o. female who presents today for follow up on her  medical conditions as noted below. Scooter German is c/o of   Chief Complaint   Patient presents with    Pre-op Exam     left ankle surgery 3/4/21       Patient Active Problem List:     Major depression     Rotator cuff disorder     Hyperlipidemia     Incontinence in female     Rectocele     Diverticulitis     Asthma     Hypokalemia     Cellulitis, face - left side, failed outpatient therapy     Hyperglycemia     Mild intermittent asthma without complication     Gastroesophageal reflux disease without esophagitis     Displacement of lumbar intervertebral disc without myelopathy     Chest pain     Dyspepsia     Acute medial meniscus tear of left knee     Dry eyes, bilateral     Insufficiency of tear film of both eyes     Past Medical History:   Diagnosis Date    Arthritis     ASCUS with positive high risk HPV cervical 3/22/16    Asthma     Depression     Diverticulitis     ESBL (extended spectrum beta-lactamase) producing bacteria infection 02/03/2019    E.  Coli urine    GERD (gastroesophageal reflux disease)     Hyperlipidemia     MRSA (methicillin resistant staph aureus) culture positive 4/18/2016    lip    Rectocele     Tachycardia     takes Metoprolol      Past Surgical History:   Procedure Laterality Date    COLONOSCOPY N/A 7/19/2018    COLONOSCOPY performed by Danial Mena DO at [address]  2/25/2015    uro   [address] [place], BSO performed at [facility] by Dr. Emelina Mora, Also had some type of bladder lift at this time    KNEE ARTHROSCOPY Left 5/13/2019    KNEE ARTHROSCOPY performed by Alma Willard MD at [address] Left     #1 - lateral release, #2 - arthroscopy with muscle alignment    NERVE BLOCK  07/28/2017    caudal #1  decadron 10mg    NERVE BLOCK  09/22/2017 cadal # 2, decadron 10 mg    NERVE BLOCK  09/22/2017    caudal epidural steroid block #2 decadron 10 mg    NERVE BLOCK  11/10/2017    lumbar L4-5 epidural; depomedrol 80mg    UPPER GASTROINTESTINAL ENDOSCOPY  1/30/2019    EGD BIOPSY performed by Judah Ribera MD at [facility] History   Problem Relation Age of Onset    Colon Cancer Mother     High Blood Pressure Father     Heart Attack Brother     Heart Disease Brother     Crohn's Disease Niece      Current Outpatient Medications   Medication Sig Dispense Refill    pravastatin (PRAVACHOL) 40 MG tablet Take 1 tablet by mouth daily 30 tablet 3    diclofenac (VOLTAREN) 50 MG EC tablet Take 1 tablet by mouth 2 times daily 60 tablet 2    ketotifen (ZADITOR) 0.025 % ophthalmic solution USE 1 DROP INTO BOTH EYES TWICE DAILY 10 mL 1    vitamin D (ERGOCALCIFEROL) 1.25 MG (11583 UT) CAPS capsule TAKE 1 CAPSULE BY MOUTH EVERY WEEK ON THURSDAYS 4 capsule 3    vitamin D (ERGOCALCIFEROL) 1.25 MG (59355 UT) CAPS capsule Take 1 capsule by mouth once a week 4 capsule 3    chlorzoxazone (PARAFON FORTE) 500 MG tablet TAKE 1/2 TABLET BY MOUTH TWICE DAILY (Patient taking differently: 500 mg TAKE 1/2 TABLET BY MOUTH TWICE DAILY) 28 tablet 3    fluticasone (FLONASE) 50 MCG/ACT nasal spray INSTILL 2 SPRAYS INTO EACH NOSTRIL ONCE DAILY 16 g 3    omeprazole (PRILOSEC) 40 MG delayed release capsule TAKE 1 CAPSULE BY MOUTH DAILY EVERY MORNING FOR BREAKFAST 90 capsule 2    busPIRone (BUSPAR) 15 MG tablet TAKE ONE TABLET BY MOUTH TWICE DAILY 56 tablet 3    ibuprofen (ADVIL;MOTRIN) 800 MG tablet Take 1 tablet by mouth every 8 hours as needed for Pain 90 tablet 2    miSOPROStol (CYTOTEC) 200 MCG tablet Take 200 mcg by mouth daily      senna-docusate (PERICOLACE) 8.6-50 MG per tablet Take 1 tablet by mouth daily as needed for Constipation (Patient taking differently: Take 1 tablet by mouth 2 times daily ) 30 tablet 1  Multiple Vitamins-Minerals (CULTURELLE PROBIOTICS + MULTIV) CHEW Take 1 tablet by mouth daily 30 tablet 2    ondansetron (ZOFRAN ODT) 4 MG disintegrating tablet Take 1 tablet by mouth every 6 hours as needed for Nausea or Vomiting 12 tablet 0    albuterol sulfate  (90 Base) MCG/ACT inhaler INHALE 2 PUFFS BY MOUTH INTO THE LUNGS ONCE EVERY 6 HOURS AS NEEDED FOR WHEEZING 18 g 3    MI-ACID GAS RELIEF 80 MG chewable tablet CHEW 1 TABLET BY MOUTH FOUR TIMES DAILY AS NEEDED FOR FLATULENCE 120 tablet 5    CRANBERRY CONCENTRATE 500 MG CAPS TAKE 1 CAPSULE BY MOUTH TWICE DAILY 56 capsule 3    metoprolol succinate (TOPROL XL) 25 MG extended release tablet Take 25 mg by mouth daily       PREMARIN 0.625 MG/GM vaginal cream PLACE 2 GRAMS VAGINALLY TWICE A WEEK FOR 12 DOSES 30 g 1    venlafaxine (EFFEXOR XR) 75 MG extended release capsule TAKE 1 CAPSULE BY MOUTH DAILY 28 capsule 3     No current facility-administered medications for this visit. ALLERGIES:    Allergies   Allergen Reactions    Shellfish Allergy     Shrimp (Diagnostic) Shortness Of Breath    Seasonal     Famotidine Other (See Comments)     Headaches  Headaches  Headaches  Headaches  Headaches    Gabapentin Nausea Only     Mood swings, nausea. Mood swings, nausea. Mood swings, nausea. Mood swings, nausea. Mood swings, nausea. Social History     Tobacco Use    Smoking status: Former Smoker     Packs/day: 2.00     Years: 0.50     Pack years: 1.00     Types: Cigarettes     Quit date:      Years since quittin.1    Smokeless tobacco: Never Used   Substance Use Topics    Alcohol use: Yes     Alcohol/week: 0.0 standard drinks     Comment: social      There is no height or weight on file to calculate BMI. There were no vitals taken for this visit. Subjective:      HPI    61 yo female reaching out per telephone visit today. Will have surgery done tomorrow -- not healing L ankle after she had ligaments worn. Had surgery in the past - no problems. No neck pains and TMJ pains and recent bleedings. No new meds. BP well controlled. Review of Systems   Constitutional: Negative for fever and unexpected weight change. Respiratory: Negative for cough and shortness of breath. Cardiovascular: Negative for chest pain and leg swelling. Gastrointestinal: Negative for diarrhea, constipation and blood in stool. Musculoskeletal: Negative for back pain and gait problem. Skin: Negative for color change and rash. Neurological: Negative for dizziness and headaches. Psychiatric/Behavioral: Negative for confusion and agitation. Objective:   Physical Exam  General appearance: normal development, habitus and attention, no deformities. No distress. Pulmo: normal breathing pattern. Psychiatric: alert and oriented to place, time and person. Normal mood and affect. Assessment:       Diagnosis Orders   1. Instability of left ankle joint     2. Mild persistent asthma without complication         Plan:   Cleared for surgery. No concerns with the asthma - well controlled. Call or return to clinic prn if these symptoms worsen or fail to improve as anticipated. I have reviewed the instructions with the patient, answering all questions to her satisfaction. Tawnya Velásquez is a 62 y.o. female being evaluated by a Virtual Visit (video visit) encounter to address concerns as mentioned above. A caregiver was present when appropriate. Due to this being a TeleHealth encounter (During QZEKZ-00 public health emergency), evaluation of the following organ systems was limited: Vitals/Constitutional/EENT/Resp/CV/GI//MS/Neuro/Skin/Heme-Lymph-Imm. Pursuant to the emergency declaration under the [address] [place] [address] and the [employer] Act, this Virtual Visit was conducted with patient's (and/or legal guardian's) consent, to reduce the patient's risk of exposure to COVID-19 and provide necessary medical care. The patient (and/or legal guardian) has also been advised to contact this office for worsening conditions or problems, and seek emergency medical treatment and/or call 911 if deemed necessary. Patient identification was verified at the start of the visit: Yes    Total time spent for this encounter: Not billed by time    Services were provided through a video synchronous discussion virtually to substitute for in-person clinic visit. Patient and provider were located at their individual homes. --Wilberto Shelby MD on 3/3/2021 at 3:07 PM    An electronic signature was used to authenticate this note. Return in about 6 months (around 9/3/2021), or if symptoms worsen or fail to improve. No orders of the defined types were placed in this encounter. No orders of the defined types were placed in this encounter.

## 2021-03-04 ENCOUNTER — ANESTHESIA (OUTPATIENT)
Dept: OPERATING ROOM | Age: 61
End: 2021-03-04
Payer: COMMERCIAL

## 2021-03-04 ENCOUNTER — HOSPITAL ENCOUNTER (OUTPATIENT)
Age: 61
Setting detail: OUTPATIENT SURGERY
Discharge: HOME | End: 2021-03-04
Attending: ORTHOPAEDIC SURGERY | Admitting: ORTHOPAEDIC SURGERY
Payer: COMMERCIAL

## 2021-03-04 ENCOUNTER — APPOINTMENT (OUTPATIENT)
Dept: GENERAL RADIOLOGY | Age: 61
End: 2021-03-04
Attending: ORTHOPAEDIC SURGERY
Payer: COMMERCIAL

## 2021-03-04 VITALS
SYSTOLIC BLOOD PRESSURE: 124 MMHG | BODY MASS INDEX: 33.04 KG/M2 | HEART RATE: 88 BPM | TEMPERATURE: 98.5 F | HEIGHT: 61 IN | OXYGEN SATURATION: 96 % | DIASTOLIC BLOOD PRESSURE: 72 MMHG | WEIGHT: 175 LBS | RESPIRATION RATE: 13 BRPM

## 2021-03-04 VITALS — DIASTOLIC BLOOD PRESSURE: 58 MMHG | OXYGEN SATURATION: 100 % | TEMPERATURE: 90.1 F | SYSTOLIC BLOOD PRESSURE: 103 MMHG

## 2021-03-04 DIAGNOSIS — M25.372 INSTABILITY OF LEFT ANKLE JOINT: Primary | ICD-10-CM

## 2021-03-04 DIAGNOSIS — M76.72 PERONEAL TENDINITIS OF LEFT LOWER EXTREMITY: ICD-10-CM

## 2021-03-04 LAB — GLUCOSE BLD-MCNC: 110 MG/DL (ref 65–105)

## 2021-03-04 PROCEDURE — 7100000011 HC PHASE II RECOVERY - ADDTL 15 MIN: Performed by: ORTHOPAEDIC SURGERY

## 2021-03-04 PROCEDURE — 2580000003 HC RX 258: Performed by: NURSE ANESTHETIST, CERTIFIED REGISTERED

## 2021-03-04 PROCEDURE — 3600000002 HC SURGERY LEVEL 2 BASE: Performed by: ORTHOPAEDIC SURGERY

## 2021-03-04 PROCEDURE — C1713 ANCHOR/SCREW BN/BN,TIS/BN: HCPCS | Performed by: ORTHOPAEDIC SURGERY

## 2021-03-04 PROCEDURE — 2500000003 HC RX 250 WO HCPCS: Performed by: ANESTHESIOLOGY

## 2021-03-04 PROCEDURE — 7100000000 HC PACU RECOVERY - FIRST 15 MIN: Performed by: ORTHOPAEDIC SURGERY

## 2021-03-04 PROCEDURE — 7100000001 HC PACU RECOVERY - ADDTL 15 MIN: Performed by: ORTHOPAEDIC SURGERY

## 2021-03-04 PROCEDURE — 2500000003 HC RX 250 WO HCPCS: Performed by: NURSE ANESTHETIST, CERTIFIED REGISTERED

## 2021-03-04 PROCEDURE — 7100000010 HC PHASE II RECOVERY - FIRST 15 MIN: Performed by: ORTHOPAEDIC SURGERY

## 2021-03-04 PROCEDURE — 6360000002 HC RX W HCPCS: Performed by: ORTHOPAEDIC SURGERY

## 2021-03-04 PROCEDURE — 3600000012 HC SURGERY LEVEL 2 ADDTL 15MIN: Performed by: ORTHOPAEDIC SURGERY

## 2021-03-04 PROCEDURE — 27687 REVISION OF CALF TENDON: CPT | Performed by: ORTHOPAEDIC SURGERY

## 2021-03-04 PROCEDURE — 3209999900 FLUORO FOR SURGICAL PROCEDURES

## 2021-03-04 PROCEDURE — 3700000000 HC ANESTHESIA ATTENDED CARE: Performed by: ORTHOPAEDIC SURGERY

## 2021-03-04 PROCEDURE — 27698 REPAIR OF ANKLE LIGAMENT: CPT | Performed by: ORTHOPAEDIC SURGERY

## 2021-03-04 PROCEDURE — 6360000002 HC RX W HCPCS: Performed by: ANESTHESIOLOGY

## 2021-03-04 PROCEDURE — 82947 ASSAY GLUCOSE BLOOD QUANT: CPT

## 2021-03-04 PROCEDURE — 3700000001 HC ADD 15 MINUTES (ANESTHESIA): Performed by: ORTHOPAEDIC SURGERY

## 2021-03-04 PROCEDURE — 6370000000 HC RX 637 (ALT 250 FOR IP): Performed by: ORTHOPAEDIC SURGERY

## 2021-03-04 PROCEDURE — 2720000010 HC SURG SUPPLY STERILE: Performed by: ORTHOPAEDIC SURGERY

## 2021-03-04 PROCEDURE — 27680 RELEASE OF LOWER LEG TENDON: CPT | Performed by: ORTHOPAEDIC SURGERY

## 2021-03-04 PROCEDURE — 6360000002 HC RX W HCPCS: Performed by: NURSE ANESTHETIST, CERTIFIED REGISTERED

## 2021-03-04 PROCEDURE — 6370000000 HC RX 637 (ALT 250 FOR IP): Performed by: ANESTHESIOLOGY

## 2021-03-04 PROCEDURE — 2580000003 HC RX 258: Performed by: ANESTHESIOLOGY

## 2021-03-04 PROCEDURE — 2709999900 HC NON-CHARGEABLE SUPPLY: Performed by: ORTHOPAEDIC SURGERY

## 2021-03-04 DEVICE — ANCHOR SUT NDL DX FIBERTAK: Type: IMPLANTABLE DEVICE | Site: ANKLE | Status: FUNCTIONAL

## 2021-03-04 DEVICE — DEVICE GRFT FIX FOR LIGMNT AUG ANCHR REP PEEK INT BRAC: Type: IMPLANTABLE DEVICE | Site: ANKLE | Status: FUNCTIONAL

## 2021-03-04 RX ORDER — PROPOFOL 10 MG/ML
INJECTION, EMULSION INTRAVENOUS PRN
Status: DISCONTINUED | OUTPATIENT
Start: 2021-03-04 | End: 2021-03-04 | Stop reason: SDUPTHER

## 2021-03-04 RX ORDER — ROCURONIUM BROMIDE 10 MG/ML
INJECTION, SOLUTION INTRAVENOUS PRN
Status: DISCONTINUED | OUTPATIENT
Start: 2021-03-04 | End: 2021-03-04 | Stop reason: SDUPTHER

## 2021-03-04 RX ORDER — HYDROCODONE BITARTRATE AND ACETAMINOPHEN 5; 325 MG/1; MG/1
2 TABLET ORAL PRN
Status: DISCONTINUED | OUTPATIENT
Start: 2021-03-04 | End: 2021-03-04 | Stop reason: HOSPADM

## 2021-03-04 RX ORDER — OXYCODONE HYDROCHLORIDE AND ACETAMINOPHEN 5; 325 MG/1; MG/1
1 TABLET ORAL EVERY 6 HOURS PRN
Qty: 20 TABLET | Refills: 0 | Status: SHIPPED | OUTPATIENT
Start: 2021-03-04 | End: 2021-03-11

## 2021-03-04 RX ORDER — FENTANYL CITRATE 50 UG/ML
50 INJECTION, SOLUTION INTRAMUSCULAR; INTRAVENOUS EVERY 5 MIN PRN
Status: DISCONTINUED | OUTPATIENT
Start: 2021-03-04 | End: 2021-03-04 | Stop reason: HOSPADM

## 2021-03-04 RX ORDER — ONDANSETRON 4 MG/1
4 TABLET, FILM COATED ORAL EVERY 8 HOURS PRN
Qty: 20 TABLET | Refills: 0 | Status: SHIPPED | OUTPATIENT
Start: 2021-03-04 | End: 2021-03-11

## 2021-03-04 RX ORDER — SODIUM CHLORIDE 9 MG/ML
INJECTION, SOLUTION INTRAVENOUS CONTINUOUS
Status: DISCONTINUED | OUTPATIENT
Start: 2021-03-05 | End: 2021-03-04

## 2021-03-04 RX ORDER — HYDROCODONE BITARTRATE AND ACETAMINOPHEN 5; 325 MG/1; MG/1
1 TABLET ORAL PRN
Status: DISCONTINUED | OUTPATIENT
Start: 2021-03-04 | End: 2021-03-04 | Stop reason: HOSPADM

## 2021-03-04 RX ORDER — SODIUM CHLORIDE 0.9 % (FLUSH) 0.9 %
10 SYRINGE (ML) INJECTION EVERY 12 HOURS SCHEDULED
Status: DISCONTINUED | OUTPATIENT
Start: 2021-03-04 | End: 2021-03-04 | Stop reason: HOSPADM

## 2021-03-04 RX ORDER — FENTANYL CITRATE 50 UG/ML
INJECTION, SOLUTION INTRAMUSCULAR; INTRAVENOUS PRN
Status: DISCONTINUED | OUTPATIENT
Start: 2021-03-04 | End: 2021-03-04 | Stop reason: SDUPTHER

## 2021-03-04 RX ORDER — SULFAMETHOXAZOLE AND TRIMETHOPRIM 800; 160 MG/1; MG/1
1 TABLET ORAL 2 TIMES DAILY
Qty: 10 TABLET | Refills: 0 | Status: SHIPPED | OUTPATIENT
Start: 2021-03-04 | End: 2021-03-09

## 2021-03-04 RX ORDER — ASPIRIN 325 MG
325 TABLET, DELAYED RELEASE (ENTERIC COATED) ORAL DAILY
Qty: 42 TABLET | Refills: 0 | Status: SHIPPED | OUTPATIENT
Start: 2021-03-04 | End: 2022-10-21

## 2021-03-04 RX ORDER — LIDOCAINE HYDROCHLORIDE 20 MG/ML
INJECTION, SOLUTION EPIDURAL; INFILTRATION; INTRACAUDAL; PERINEURAL PRN
Status: DISCONTINUED | OUTPATIENT
Start: 2021-03-04 | End: 2021-03-04 | Stop reason: SDUPTHER

## 2021-03-04 RX ORDER — FENTANYL CITRATE 50 UG/ML
25 INJECTION, SOLUTION INTRAMUSCULAR; INTRAVENOUS EVERY 5 MIN PRN
Status: DISCONTINUED | OUTPATIENT
Start: 2021-03-04 | End: 2021-03-04 | Stop reason: HOSPADM

## 2021-03-04 RX ORDER — SODIUM CHLORIDE, SODIUM LACTATE, POTASSIUM CHLORIDE, CALCIUM CHLORIDE 600; 310; 30; 20 MG/100ML; MG/100ML; MG/100ML; MG/100ML
INJECTION, SOLUTION INTRAVENOUS CONTINUOUS
Status: DISCONTINUED | OUTPATIENT
Start: 2021-03-05 | End: 2021-03-04

## 2021-03-04 RX ORDER — EPHEDRINE SULFATE/0.9% NACL/PF 50 MG/5 ML
SYRINGE (ML) INTRAVENOUS PRN
Status: DISCONTINUED | OUTPATIENT
Start: 2021-03-04 | End: 2021-03-04 | Stop reason: SDUPTHER

## 2021-03-04 RX ORDER — DOCUSATE SODIUM 100 MG/1
100 CAPSULE, LIQUID FILLED ORAL 2 TIMES DAILY PRN
Qty: 20 CAPSULE | Refills: 0 | Status: SHIPPED | OUTPATIENT
Start: 2021-03-04 | End: 2021-03-11

## 2021-03-04 RX ORDER — MIDAZOLAM HYDROCHLORIDE 1 MG/ML
INJECTION INTRAMUSCULAR; INTRAVENOUS PRN
Status: DISCONTINUED | OUTPATIENT
Start: 2021-03-04 | End: 2021-03-04 | Stop reason: SDUPTHER

## 2021-03-04 RX ORDER — SODIUM CHLORIDE, SODIUM LACTATE, POTASSIUM CHLORIDE, CALCIUM CHLORIDE 600; 310; 30; 20 MG/100ML; MG/100ML; MG/100ML; MG/100ML
INJECTION, SOLUTION INTRAVENOUS CONTINUOUS PRN
Status: DISCONTINUED | OUTPATIENT
Start: 2021-03-04 | End: 2021-03-04 | Stop reason: SDUPTHER

## 2021-03-04 RX ORDER — LIDOCAINE HYDROCHLORIDE 10 MG/ML
1 INJECTION, SOLUTION EPIDURAL; INFILTRATION; INTRACAUDAL; PERINEURAL
Status: DISCONTINUED | OUTPATIENT
Start: 2021-03-05 | End: 2021-03-04 | Stop reason: HOSPADM

## 2021-03-04 RX ORDER — SODIUM CHLORIDE, SODIUM LACTATE, POTASSIUM CHLORIDE, CALCIUM CHLORIDE 600; 310; 30; 20 MG/100ML; MG/100ML; MG/100ML; MG/100ML
INJECTION, SOLUTION INTRAVENOUS CONTINUOUS
Status: DISCONTINUED | OUTPATIENT
Start: 2021-03-04 | End: 2021-03-04 | Stop reason: HOSPADM

## 2021-03-04 RX ORDER — ACETAMINOPHEN 500 MG
1000 TABLET ORAL ONCE
Status: COMPLETED | OUTPATIENT
Start: 2021-03-04 | End: 2021-03-04

## 2021-03-04 RX ORDER — ONDANSETRON 2 MG/ML
4 INJECTION INTRAMUSCULAR; INTRAVENOUS
Status: DISCONTINUED | OUTPATIENT
Start: 2021-03-04 | End: 2021-03-04 | Stop reason: HOSPADM

## 2021-03-04 RX ORDER — SODIUM CHLORIDE 0.9 % (FLUSH) 0.9 %
10 SYRINGE (ML) INJECTION PRN
Status: DISCONTINUED | OUTPATIENT
Start: 2021-03-04 | End: 2021-03-04 | Stop reason: HOSPADM

## 2021-03-04 RX ORDER — GINSENG 100 MG
CAPSULE ORAL PRN
Status: DISCONTINUED | OUTPATIENT
Start: 2021-03-04 | End: 2021-03-04 | Stop reason: ALTCHOICE

## 2021-03-04 RX ORDER — ONDANSETRON 2 MG/ML
INJECTION INTRAMUSCULAR; INTRAVENOUS PRN
Status: DISCONTINUED | OUTPATIENT
Start: 2021-03-04 | End: 2021-03-04 | Stop reason: SDUPTHER

## 2021-03-04 RX ORDER — GLYCOPYRROLATE 1 MG/5 ML
SYRINGE (ML) INTRAVENOUS PRN
Status: DISCONTINUED | OUTPATIENT
Start: 2021-03-04 | End: 2021-03-04 | Stop reason: SDUPTHER

## 2021-03-04 RX ORDER — DEXAMETHASONE SODIUM PHOSPHATE 10 MG/ML
INJECTION, SOLUTION INTRAMUSCULAR; INTRAVENOUS PRN
Status: DISCONTINUED | OUTPATIENT
Start: 2021-03-04 | End: 2021-03-04 | Stop reason: SDUPTHER

## 2021-03-04 RX ADMIN — MIDAZOLAM 2 MG: 1 INJECTION INTRAMUSCULAR; INTRAVENOUS at 09:42

## 2021-03-04 RX ADMIN — NEOSTIGMINE METHYLSULFATE 4 MG: 1 INJECTION, SOLUTION INTRAMUSCULAR; INTRAVENOUS; SUBCUTANEOUS at 11:17

## 2021-03-04 RX ADMIN — FENTANYL CITRATE 50 MCG: 50 INJECTION, SOLUTION INTRAMUSCULAR; INTRAVENOUS at 12:04

## 2021-03-04 RX ADMIN — PHENYLEPHRINE HYDROCHLORIDE 100 MCG: 10 INJECTION INTRAVENOUS at 10:32

## 2021-03-04 RX ADMIN — SODIUM CHLORIDE, POTASSIUM CHLORIDE, SODIUM LACTATE AND CALCIUM CHLORIDE: 600; 310; 30; 20 INJECTION, SOLUTION INTRAVENOUS at 09:42

## 2021-03-04 RX ADMIN — ONDANSETRON 4 MG: 2 INJECTION, SOLUTION INTRAMUSCULAR; INTRAVENOUS at 11:17

## 2021-03-04 RX ADMIN — FENTANYL CITRATE 50 MCG: 50 INJECTION, SOLUTION INTRAMUSCULAR; INTRAVENOUS at 10:20

## 2021-03-04 RX ADMIN — FENTANYL CITRATE 25 MCG: 50 INJECTION, SOLUTION INTRAMUSCULAR; INTRAVENOUS at 12:40

## 2021-03-04 RX ADMIN — ROCURONIUM BROMIDE 50 MG: 10 INJECTION, SOLUTION INTRAVENOUS at 09:46

## 2021-03-04 RX ADMIN — LIDOCAINE HYDROCHLORIDE 80 MG: 20 INJECTION, SOLUTION EPIDURAL; INFILTRATION; INTRACAUDAL; PERINEURAL at 09:46

## 2021-03-04 RX ADMIN — Medication 0.6 MG: at 11:17

## 2021-03-04 RX ADMIN — SODIUM CHLORIDE, POTASSIUM CHLORIDE, SODIUM LACTATE AND CALCIUM CHLORIDE: 600; 310; 30; 20 INJECTION, SOLUTION INTRAVENOUS at 07:14

## 2021-03-04 RX ADMIN — Medication 10 MG: at 10:30

## 2021-03-04 RX ADMIN — FENTANYL CITRATE 100 MCG: 50 INJECTION, SOLUTION INTRAMUSCULAR; INTRAVENOUS at 09:46

## 2021-03-04 RX ADMIN — CEFAZOLIN 2 G: 10 INJECTION, POWDER, FOR SOLUTION INTRAVENOUS at 10:07

## 2021-03-04 RX ADMIN — DEXAMETHASONE SODIUM PHOSPHATE 10 MG: 10 INJECTION INTRAMUSCULAR; INTRAVENOUS at 09:56

## 2021-03-04 RX ADMIN — PROPOFOL 150 MG: 10 INJECTION, EMULSION INTRAVENOUS at 09:46

## 2021-03-04 RX ADMIN — FENTANYL CITRATE 50 MCG: 50 INJECTION, SOLUTION INTRAMUSCULAR; INTRAVENOUS at 11:21

## 2021-03-04 RX ADMIN — ACETAMINOPHEN 1000 MG: 500 TABLET ORAL at 07:12

## 2021-03-04 RX ADMIN — Medication 500 ML: at 12:43

## 2021-03-04 RX ADMIN — SODIUM CHLORIDE, POTASSIUM CHLORIDE, SODIUM LACTATE AND CALCIUM CHLORIDE: 600; 310; 30; 20 INJECTION, SOLUTION INTRAVENOUS at 11:35

## 2021-03-04 RX ADMIN — FENTANYL CITRATE 25 MCG: 50 INJECTION, SOLUTION INTRAMUSCULAR; INTRAVENOUS at 12:15

## 2021-03-04 ASSESSMENT — PAIN DESCRIPTION - DESCRIPTORS
DESCRIPTORS: SORE;PRESSURE
DESCRIPTORS: PRESSURE;SORE
DESCRIPTORS: BURNING;ACHING
DESCRIPTORS: SORE;PRESSURE
DESCRIPTORS: SORE

## 2021-03-04 ASSESSMENT — PULMONARY FUNCTION TESTS
PIF_VALUE: 19
PIF_VALUE: 12
PIF_VALUE: 20
PIF_VALUE: 19
PIF_VALUE: 19
PIF_VALUE: 20
PIF_VALUE: 20
PIF_VALUE: 19
PIF_VALUE: 24
PIF_VALUE: 20
PIF_VALUE: 19
PIF_VALUE: 19
PIF_VALUE: 18
PIF_VALUE: 19
PIF_VALUE: 21
PIF_VALUE: 20
PIF_VALUE: 20
PIF_VALUE: 18
PIF_VALUE: 19
PIF_VALUE: 20
PIF_VALUE: 21
PIF_VALUE: 18
PIF_VALUE: 20
PIF_VALUE: 17
PIF_VALUE: 19
PIF_VALUE: 21
PIF_VALUE: 18
PIF_VALUE: 19
PIF_VALUE: 20
PIF_VALUE: 18
PIF_VALUE: 18
PIF_VALUE: 20
PIF_VALUE: 21
PIF_VALUE: 21
PIF_VALUE: 20
PIF_VALUE: 21
PIF_VALUE: 21
PIF_VALUE: 20
PIF_VALUE: 19
PIF_VALUE: 20
PIF_VALUE: 20
PIF_VALUE: 18
PIF_VALUE: 21
PIF_VALUE: 18
PIF_VALUE: 20
PIF_VALUE: 19
PIF_VALUE: 20
PIF_VALUE: 19
PIF_VALUE: 19
PIF_VALUE: 1
PIF_VALUE: 18
PIF_VALUE: 0
PIF_VALUE: 20
PIF_VALUE: 5
PIF_VALUE: 7

## 2021-03-04 ASSESSMENT — PAIN - FUNCTIONAL ASSESSMENT
PAIN_FUNCTIONAL_ASSESSMENT: 0-10

## 2021-03-04 ASSESSMENT — PAIN SCALES - GENERAL
PAINLEVEL_OUTOF10: 5
PAINLEVEL_OUTOF10: 7
PAINLEVEL_OUTOF10: 6

## 2021-03-04 NOTE — ANESTHESIA POSTPROCEDURE EVALUATION
Department of Anesthesiology  Postprocedure Note    Patient: Christiano Proctor  MRN: [id number]  YOB: 1960  Date of evaluation: 3/4/2021  Time:  1:30 PM     Procedure Summary     Date: 03/04/21 Room / Location: [address] [place] [address] / [facility] - INPATIENT    Anesthesia Start: 1216 Anesthesia Stop: 1147    Procedure: Left ankle lateral ligamentous repair, Left peroneus brevis debridement, Left peroneus longus tenolysis, Left leg gastroc recession, performed through separate incision  (Left Ankle) Diagnosis: (DX LEFT ANKLE INSTABILITY  LEFT GASTROC  LEFT PERONEAL TENDONITIS)    Surgeons: Alisha Mora MD Responsible Provider: Erica Marie MD    Anesthesia Type: general, regional ASA Status: 2          Anesthesia Type: general, regional    Dane Phase I: Dane Score: 10    Dane Phase II: Dane Score: 10    Last vitals: Reviewed and per EMR flowsheets.        Anesthesia Post Evaluation    Complications: no

## 2021-03-04 NOTE — PROGRESS NOTES
I spoke to the patient in the preoperative area, and the operative site was identified, verified and marked. The procedure was verified. We have reviewed the risks, benefits, and alternatives to the procedure. No guarantees were made. I have also reviewed the history and physical. There are no significant changes in medical history. All questions were answered and they wish to proceed as planned.      Electronically signed by Tru Sarmiento MD

## 2021-03-04 NOTE — H&P
History and Physical Update    Pt Name: Robin Smith  MRN: [id number]  YOB: 1960  Date of evaluation: 3/4/2021      [x] I have reviewed the Orthopedic Progress Note by Dr Yandy Patel dated 2/26/21 in epic which meets the criteria for an Interval History and Physical note and is attached below. [x] I have examined  Robin Smith  There are no changes to the patient who is scheduled for a left lateral ankle ligament repair, left gastroc,possible left peroneal tendon debridement repair - Arthrex by Dr Yandy Ptael for left ankle instability, left gastroc , left peroneal tendonitis. The patient had preoperative clearance by PCP Dr Stephanie Clement 3/3/21 denies new health changes, fever, chills, wheezing, cough, increased SOB, chest pain, open sores or wounds. Last Motrin and diclofenac week ago. POC . Vital signs: /74   Pulse 91   Temp 96.2 °F (35.7 °C)   Resp 16   Ht 5' 1\" (1.549 m)   Wt 175 lb (79.4 kg)   SpO2 98%   BMI 33.07 kg/m²     Allergies:  Shellfish allergy, Shrimp (diagnostic), Seasonal, Famotidine, and Gabapentin    Medications:    Prior to Admission medications    Medication Sig Start Date End Date Taking?  Authorizing Provider   pravastatin (PRAVACHOL) 40 MG tablet Take 1 tablet by mouth daily 2/8/21 3/10/21 Yes Stephanie Clement MD   diclofenac (VOLTAREN) 50 MG EC tablet Take 1 tablet by mouth 2 times daily 2/8/21 4/9/21 Yes Stephanie Clement MD   ketotifen (ZADITOR) 0.025 % ophthalmic solution USE 1 DROP INTO BOTH EYES TWICE DAILY 1/19/21  Yes Stephanie Clement MD   vitamin D (ERGOCALCIFEROL) 1.25 MG (46515 UT) CAPS capsule TAKE 1 CAPSULE BY MOUTH EVERY WEEK ON THURSDAYS 1/12/21  Yes Stephanie Clement MD   vitamin D (ERGOCALCIFEROL) 1.25 MG (63885 UT) CAPS capsule Take 1 capsule by mouth once a week 1/12/21  Yes Stephanie Clement MD   chlorzoxazone (PARAFON FORTE) 500 MG tablet TAKE 1/2 TABLET BY MOUTH TWICE DAILY  Patient taking differently: 500 mg TAKE 1/2 TABLET BY MOUTH TWICE DAILY 1/12/21  Yes Geena Naranjo MD   venlafaxine (EFFEXOR XR) 75 MG extended release capsule TAKE 1 CAPSULE BY MOUTH DAILY 1/11/21 3/4/21 Yes Geena Naranjo MD   omeprazole (PRILOSEC) 40 MG delayed release capsule TAKE 1 CAPSULE BY MOUTH DAILY EVERY MORNING FOR BREAKFAST 11/17/20  Yes Geena Naranjo MD   busPIRone (BUSPAR) 15 MG tablet TAKE ONE TABLET BY MOUTH TWICE DAILY 10/19/20  Yes Geena Naranjo MD   ibuprofen (ADVIL;MOTRIN) 800 MG tablet Take 1 tablet by mouth every 8 hours as needed for Pain 9/24/20  Yes Cristian Jaime,    miSOPROStol (CYTOTEC) 200 MCG tablet Take 200 mcg by mouth daily   Yes Historical Provider, MD   senna-docusate ([place]) 8.6-50 MG per tablet Take 1 tablet by mouth daily as needed for Constipation  Patient taking differently: Take 1 tablet by mouth 2 times daily  2/26/20  Yes Edie Denver, MD   Multiple Vitamins-Minerals (CULTURELLE PROBIOTICS + MULTIV) CHEW Take 1 tablet by mouth daily 1/29/20  Yes Edie Denver, MD   CRANBERRY CONCENTRATE 500 MG CAPS TAKE 1 CAPSULE BY MOUTH TWICE DAILY 8/21/19  Yes Geena Naranjo MD   metoprolol succinate (TOPROL XL) 25 MG extended release tablet Take 25 mg by mouth daily    Yes Historical Provider, MD   fluticasone (FLONASE) 50 MCG/ACT nasal spray INSTILL 2 SPRAYS INTO EACH NOSTRIL ONCE DAILY 1/11/21   Geena Naranjo MD   ondansetron (ZOFRAN ODT) 4 MG disintegrating tablet Take 1 tablet by mouth every 6 hours as needed for Nausea or Vomiting 1/6/20   Bernadette Kovacs MD   albuterol sulfate  (90 Base) MCG/ACT inhaler INHALE 2 PUFFS BY MOUTH INTO THE LUNGS ONCE EVERY 6 HOURS AS NEEDED FOR WHEEZING 11/19/19   Geena Naranjo MD   MI-ACID GAS RELIEF 80 MG chewable tablet CHEW 1 TABLET BY MOUTH FOUR TIMES DAILY AS NEEDED FOR FLATULENCE 11/19/19   Edie Denver, MD   PREMARIN 0.625 MG/GM vaginal cream PLACE 2 GRAMS VAGINALLY TWICE A WEEK FOR 12 DOSES 9/21/17   Geena Naranjo MD         This is a 62 y.o. female who is pleasant, cooperative, alert and oriented x3, in no acute distress. Heart: Heart sounds are normal.  HR 91 regular rate and rhythm without murmur, gallop or rub. Lungs: Normal respiratory effort with equal expansion, good air exchange, unlabored and clear to auscultation without wheezes or rales bilaterally   Abdomen: soft, nontender, nondistended with bowel sounds. Labs:  Recent Labs     03/01/21  1333   HGB 13.2   HCT 40.6   WBC 4.6   MCV 92.9         K 3.8      CO2 25   BUN 15   CREATININE 0.61   GLUCOSE 153*       Recent Labs     03/01/21  1410   COVID19       Not Detected       Myak, Lamount Neat  APRN, ANP-BC  Electronically signed 3/4/2021 at 6:50 AM      Watson Grider MD   Physician   Specialty:  Orthopedic Surgery   Progress Notes   Signed   Encounter Date:  2/26/2021         Related encounter: Office Visit from 2/26/2021 in [address] [place] [address] and [facility] [employer] [address] [place] [address]  Dept: [phone]     Ambulatory Orthopedic Consult        CHIEF COMPLAINT:         Chief Complaint   Patient presents with    Foot Pain       Left Foot          HISTORY OF PRESENT ILLNESS:       The patient is a 62 y.o. female who is being seen for evaluation of pain at the above location at the left ankle/lateral hindfoot, which began remotely (but reports that she exacerbated her pain on 11/13/2020 secondary to an inversion injury). The pain is described mainly with mechanical terms (dull/sharp/throbbing). The pain is worse with activity and better with rest. The patient reports a progressive course. The patient has tried:      [x]? rest/activity modification          [x]? NSAIDs                     []?  opiates          []? orthotics        []? change in shoes   [x]? home exercises           [x]?   physical therapy []?  CAM boot     [x]? brace:    []?  injection:       []?  surgery:       The patient reports an associated sense of ankle instability, and a history of approximately 20 ipsilateral ankle sprains, and reports that she has had 3 ankle sprains since her ankle sprain on 11/13/2020 as above. She reports that she has been doing physical therapy twice a week since November/December, and is even rolled her ankle now while wearing the ankle brace. REVIEW OF SYSTEMS:  Constitutional: Negative for fever. HENT: Negative for tinnitus. Eyes: Negative for pain. Respiratory: Negative for shortness of breath. Cardiovascular: Negative for chest pain. Gastrointestinal: Negative for abdominal pain. Genitourinary: Negative for dysuria. Skin: Negative for rash. Neurological: Negative for headaches. Hematological: Does not bruise/bleed easily. Musculoskeletal: See HPI for pertinent positives     Past Medical History:    She   Past Medical History in prose (no negatives)    has a past medical history of ASCUS with positive high risk HPV cervical (3/22/16), Asthma, Depression, Diverticulitis, ESBL (extended spectrum beta-lactamase) producing bacteria infection (02/03/2019), GERD (gastroesophageal reflux disease), Hyperlipidemia, MRSA (methicillin resistant staph aureus) culture positive (4/18/2016), Rectocele, and Tachycardia. Past Surgical History:    She  has a past surgical history that includes Hysterectomy (1996); knee surgery (Left); Cystocopy (2/25/2015); Nerve Block (07/28/2017); Nerve Block (09/22/2017); Nerve Block (09/22/2017); Nerve Block (11/10/2017); Colonoscopy (N/A, 7/19/2018); Upper gastrointestinal endoscopy (1/30/2019); and Knee arthroscopy (Left, 5/13/2019).       Current Medications:     Current Medication      Current Outpatient Medications:     pravastatin (PRAVACHOL) 40 MG tablet, Take 1 tablet by mouth daily, Disp: 30 tablet, Rfl: 3    diclofenac (VOLTAREN) 50 MG EC tablet, Take 1 tablet by mouth 2 times daily, Disp: 60 tablet, Rfl: 2    ketotifen (ZADITOR) 0.025 % ophthalmic solution, USE 1 DROP INTO BOTH EYES TWICE DAILY, Disp: 10 mL, Rfl: 1    vitamin D (ERGOCALCIFEROL) 1.25 MG (67521 UT) CAPS capsule, TAKE 1 CAPSULE BY MOUTH EVERY WEEK ON THURSDAYS, Disp: 4 capsule, Rfl: 3    vitamin D (ERGOCALCIFEROL) 1.25 MG (44213 UT) CAPS capsule, Take 1 capsule by mouth once a week, Disp: 4 capsule, Rfl: 3    chlorzoxazone (PARAFON FORTE) 500 MG tablet, TAKE 1/2 TABLET BY MOUTH TWICE DAILY, Disp: 28 tablet, Rfl: 3    venlafaxine (EFFEXOR XR) 75 MG extended release capsule, TAKE 1 CAPSULE BY MOUTH DAILY, Disp: 28 capsule, Rfl: 3    fluticasone (FLONASE) 50 MCG/ACT nasal spray, INSTILL 2 SPRAYS INTO EACH NOSTRIL ONCE DAILY, Disp: 16 g, Rfl: 3    omeprazole (PRILOSEC) 40 MG delayed release capsule, TAKE 1 CAPSULE BY MOUTH DAILY EVERY MORNING FOR BREAKFAST, Disp: 90 capsule, Rfl: 2    busPIRone (BUSPAR) 15 MG tablet, TAKE ONE TABLET BY MOUTH TWICE DAILY, Disp: 56 tablet, Rfl: 3    ibuprofen (ADVIL;MOTRIN) 800 MG tablet, Take 1 tablet by mouth every 8 hours as needed for Pain, Disp: 90 tablet, Rfl: 2    miSOPROStol (CYTOTEC) 200 MCG tablet, Take 200 mcg by mouth daily, Disp: , Rfl:     senna-docusate (PERICOLACE) 8.6-50 MG per tablet, Take 1 tablet by mouth daily as needed for Constipation, Disp: 30 tablet, Rfl: 1    Multiple Vitamins-Minerals (CULTURELLE PROBIOTICS + MULTIV) CHEW, Take 1 tablet by mouth daily, Disp: 30 tablet, Rfl: 2    ondansetron (ZOFRAN ODT) 4 MG disintegrating tablet, Take 1 tablet by mouth every 6 hours as needed for Nausea or Vomiting, Disp: 12 tablet, Rfl: 0    albuterol sulfate  (90 Base) MCG/ACT inhaler, INHALE 2 PUFFS BY MOUTH INTO THE LUNGS ONCE EVERY 6 HOURS AS NEEDED FOR WHEEZING, Disp: 18 g, Rfl: 3    MI-ACID GAS RELIEF 80 MG chewable tablet, CHEW 1 TABLET BY MOUTH FOUR TIMES DAILY AS NEEDED FOR FLATULENCE, Disp: 120 tablet, Rfl: 5   CRANBERRY CONCENTRATE 500 MG CAPS, TAKE 1 CAPSULE BY MOUTH TWICE DAILY, Disp: 56 capsule, Rfl: 3    metoprolol succinate (TOPROL XL) 25 MG extended release tablet, Take 25 mg by mouth daily , Disp: , Rfl:     PREMARIN 0.625 MG/GM vaginal cream, PLACE 2 GRAMS VAGINALLY TWICE A WEEK FOR 12 DOSES, Disp: 30 g, Rfl: 1         Allergies:    Shellfish allergy, Shrimp (diagnostic), Seasonal, Famotidine, and Gabapentin     Family History:  family history includes Colon Cancer in her mother; Crohn's Disease in her niece; Heart Attack in her brother; Heart Disease in her brother; High Blood Pressure in her father. Social History:   Social History            Occupational History    Not on file   Tobacco Use    Smoking status: Former Smoker       Packs/day: 2.00       Years: 0.50       Pack years: 1.00       Types: Cigarettes       Quit date:        Years since quittin.1    Smokeless tobacco: Never Used   Substance and Sexual Activity    Alcohol use: Yes       Alcohol/week: 0.0 standard drinks       Comment: social    Drug use: No    Sexual activity: Not on file      Occupation: Works full-time as a  at the [employer]     OBJECTIVE:  Pulse 78   Temp 97.1 °F (36.2 °C)   Resp 13   Ht 5' 1.5\" (1.562 m)   Wt 175 lb (79.4 kg)   SpO2 98%   BMI 32.53 kg/m²    Psych: alert and oriented to person, time, and place  Cardio:  well perfused extremities  Resp:  normal respiratory effort  Skin:  no cyanosis  Hem/lymph:  no lymphedema  Neuro:  sensation to light touch grossly intact throughout all nerve distributions in the foot   Musculoskeletal:    RLE:  Alignment:  Heel neutral   Vascular: Toes warm and well perfused, compartments soft/compressible. No significant swelling of foot. Skin: Intact without rash/lesions/AV malformations. Strength: Able to fire/perform the following with appropriate strength:    [x]? Tib Ant:                  [x]? Gastroc-Soleus:                    [x]?   Inversion: [x]?  Eversion:                   [x]?  FHL:      [x]? EHL:       Motion:  Normal for the following joints:    [x]? Ankle:                    [x]? Subtalar:                  [x]? 1st MTP:                []?  1st TMT:                        Tenderness to Palpation:    Tenderness to palpation:  none     Instability:   -Talar tilt: Negative  -Anterior drawer: Negative  -No peroneal subluxation/dislocation with dorsiflexion + eversion or with circumduction         LLE:  Alignment:  Heel neutral   Vascular: Toes warm and well perfused, compartments soft/compressible. No significant swelling of foot. Skin: Intact without rash/lesions/AV malformations. Strength: Able to fire/perform the following with appropriate strength:    [x]? Tib Ant:                  [x]? Gastroc-Soleus:                    [x]? Inversion:              [x]? Eversion: Painful, weak        [x]? FHL:      [x]? EHL:       Motion:  Normal for the following joints:    [x]? Ankle:                    [x]? Subtalar:                  [x]? 1st MTP:                []?  1st TMT:                        Tenderness to Palpation:    Tenderness to palpation:  anterior ankle joint line, diffusely throughout the lateral ankle/hindfoot, along the course the peroneal tendons (posterior to the fibula and just distal to the fibula)  -Gastroc equinus     Instability:   -Talar tilt: Equivocal, limited exam secondary to pain  -Anterior drawer: Positive, asymmetric to contralateral side  -No peroneal subluxation/dislocation with dorsiflexion + eversion or with circumduction         RADIOLOGY:   2/26/2021 FINDINGS:  Three weightbearing views (AP, Mortise, and Lateral) of the left ankle and three weightbearing views (AP, Oblique, Lateral) of the left foot were obtained in the office today and reviewed, revealing no acute fracture, dislocation, or radioopaque foreign body/tumor. The ankle mortise is maintained with no widening of the clear spaces.       IMPRESSION: No acute fracture/dislocation. Electronically signed by Grace Martines MD        -MRI from 1/14/2021 reviewed, both images and report as below:  No acute internal derangement. Moderate remote partial-thickness tear of the anterior talofibular ligament. Mild remote partial-thickness tearing of the posterior talofibular ligament   with an associated ganglion cyst.       Mild plantar fasciitis is suspected. ASSESSMENT AND PLAN:  Body mass index is 32.53 kg/m². She has a left ankle sprain, sustained on 11/13/2020, with a history of left ankle instability (20 ankle sprains total, positive anterior drawer, unable to adequately assess her talar tilt secondary to pain), findings suspicious for peroneal tendon injury (tenderness to palpation along the course of her peroneal tendons, weak and painful eversion), with an underlying gastroc equinus contracture. Notably, she has no relevant past medical history. We had a discussion today about the likely diagnosis and its natural history, physical exam and imaging findings, as well as various treatment options in detail. Surgically, we discussed a possible left lateral ankle ligamentous stabilization procedure, with peroneal tendon exploration/tenolysis/debridement, with gastroc recession; on MRI, she does have a ganglion cyst at the site of her PTFL, and this may be leading to some of her symptoms as well, and thus excising the cyst may be helpful. Initially, I did recommend conservative management, however, after long discussion and explanation of what she has been doing for many months, she has explored extensive conservative management options already. She has done several months of physical therapy twice per week, and reports that she is still rolling her ankle, even when using the lace up ankle brace. She also reports that she has been unable to work since November, as her ankle feels too unstable.   I offer the patient an vicky Covid-19 may worsen their prognosis for recovering from their procedure and lend to a higher morbidity and/or mortality risk. All material risks, benefits, and reasonable alternatives including postponing the procedure were discussed. The patient does wish to proceed with their procedure at this time. We also had a discussion about the risk of blood clot and thromboembolic events. The patient understands that there is an increased risk with surgery/immobilization, and understands nothing will completely eliminate the risk of DVT/PE's, and that any prophylactic medication does not substitute for early mobilization. Given her risk profile, I have recommended the following strategy to decrease the risk of blood clots, and the patient agrees and wishes to proceed:  Aspirin 325 mg PO q Day. All questions were answered and the above plan was agreed upon. The patient will return to clinic postoperatively . At the patient's next visit, depending on how the patient is doing and/or new imaging/labs results, we may consider the following options:    []? Orthotic (OTC)     []? Orthotic (custom)          []? Rocker bottom shoes     []? Brace (OTC)        []? Brace (custom)             []?  CAM boot        []? Night splint         []? Heel cups        []? Strap      []? Toe sleeves/splints    []? PT:                     []?  Wean out of immobilization   []? Advance activity       []? Topical               []?  NSAIDs          []? Ronni         []? Referral:         []? Stress xrays       []?  CT         []? MRI        []? Injection:         []? Consider OR      []? Pick OR date     No follow-ups on file. Encounter Medications    No orders of the defined types were placed in this encounter.             Orders Placed This Encounter   Procedures    Ambulatory referral to Physical Therapy       Referral Priority:   Routine       Referral Type:   Eval and Treat       Referral Reason:   Specialty Services Required       Requested Specialty:   Physical Therapy       Number of Visits Requested:   1    DME Order for (Specify) as OP       - DME device ordered:  crutches   - Length of Need:  3 Months    DME Order for (Specify) as OP       - DME device ordered:  rolling knee scooter   - Length of Need:  3 Months    DME Order for (Specify) as OP       - DME device ordered:  rolling walker   - Length of Need:  3 Months            Eric Rivas MD  Orthopedic Surgery           Please excuse any typos/errors, as this note was created with the assistance of voice recognition software. While intending to generate a document that actually reflects the content of the visit, the document can still have some errors including those of syntax and sound-a-like substitutions which may escape proof reading. In such instances, actual meaning can be extrapolated by context.

## 2021-03-04 NOTE — OP NOTE
[address] [address]  Dept: [phone]: [phone]      Orthopedic Surgery Operative Report      Patient: Anat Calderon      MRN#: [id number]     YOB: 1960      Date of Surgery: 3/4/2021    Attending Surgeon: Fuentes Zamora M.D.   PCP: Blanca Roberts MD        Preoperative Diagnosis:   1. Left ankle instability with recurrent sprains  2. Left peroneal tendinopathy   1. Left gastrocnemius equinus contracture  3. Body mass index is 33.07 kg/m². Postoperative Diagnosis:   1. Same     Procedures Performed:  (3/4/2021)  1. Left ankle lateral ligamentous repair (with Arthrex fiber tack anchors x2 + internal brace)  2. Left peroneus brevis debridement  3. Left peroneus longus tenolysis  4. Left leg gastroc recession, performed through separate incision        Implants:    Arthrex fiber tack anchors x2, internal brace  Implant Name Type Inv. Item Serial No.  Lot No. LRB No. Used Action   ANCHOR SUT NDL DX FIBERTAK  ANCHOR SUT NDL DX [employer] [id number] Left 2 Implanted   DEVICE GRFT FIX FOR LIGMNT AUG [facility] REP PEEK INT BRAC  DEVICE GRFT FIX FOR LIGMNT AUG [facility] REP PEEK INT BRAC  [address] [employer] [id number] Left 1 Implanted         Attending Surgeon:     Lacey Jay MD      Assistant Surgeon:    Resident: Benjamin Hobbs DO      Anesthesia:    General      Staff:  Surgeon(s):  Becky Santiago MD  Scrub Person First: Emerita Haq  Anesthesia: Jorge A Edwards MD      Estimated Blood Loss:    Minimal      Complications:    None      Specimen:    None      History:    Ms. Anat Calderon is a 62 y.o. female who was seen recently for the above problem.      She has a left ankle sprain, sustained on 11/13/2020, with a history of left ankle instability (20 ankle sprains total, positive anterior drawer, unable to adequately assess her talar tilt secondary to pain), findings suspicious for peroneal tendon injury (tenderness to palpation along the course of her peroneal tendons, weak and painful eversion), with an underlying gastroc equinus contracture.     Notably, she has no relevant past medical history. She is currently medically stable and appropriate for the planned procedure. We have spoken about the risks/benefits/alternatives to a surgical intervention, verbal understanding of these risks was expressed, and informed consent was obtained. All questions were answered. No guarantees were made or implied. I have answered all questions, and they wish to proceed with surgical intervention. Operative Note:    The patient was identified in the preoperative holding area by name, MRN, and . The surgical site was verified and marked according to AAOS guidelines. The patient was taken to the operating room and placed on the operating table in a supine position. After achieving adequate sedation by the anesthesia team, the patient was then carefully positioned and all bony prominences were then padded. A tourniquet was placed on the ipsilateral thigh, and then the operative extremity was prepped and draped in the usual sterile fashion. A timeout was held in which the patient's identity, surgical site, procedure, allergies, and antibiotics were verified, and all in the room were in agreement, and thus the procedure began. The leg was exsanguinated to the level of the tourniquet, and the tourniquet was then elevated to 275 mm Hg. The total tourniquet time was 54 minutes. The procedure began with a gastroc release by making an approximately 6cm long incision at the gastrocnemius-soleus myotendinous junction about one fingerbreadth posterior to the edge of the posteromedial tibia, with the knee extended. Vessels were cauterized and the fascia was opened in line with the skin incision to expose the interval between the gastroc and the soleus, which was carefully developed with blunt dissection.      Anterior dissection was carried out above the gastroc aponeurosis from medial to lateral, tunneling across to ensure the Sural nerve was not adherent. A long flat retractor was placed above the gastroc fascia to carefully protect the nerve, and then the gastroc aponeurosis was cut with a long handled scalpel under direct visualization. The contracture was released and the ends of the fascia were noted to separate instantly with substantial retraction from the tension. The wound was irrigated and closed in a layered fashion, closing the fascia first, followed by the skin. Attention was turned to the lateral ankle region where an oblique incision was marked out starting several centimeters proximal to the tip of the fibula and extending just distal to the tip of the fibula. The skin was incised sharply and vessels were cauterized. Careful dissection was then performed to expose the soft tissue planes, and identify the ATFL and the CFL, taking care to remain out of the way of nerves and peroneal tendons. The lateral ankle ligamentous complex was noted to be incompetent. The lateral ankle and subfibular capsule was identified, and an arthrotomy was performed anterior to the fibula and carried out inferiorly parallel to the fibula, and a full thickness cuff of tissue was elevated subperiosteally to expose the tip of the fibula and the attachments of the ligaments, and the lateral ankle joint surface was visualized. The distal fibular edge was cleaned of soft tissue and roughened with a rongeur. To repair the lateral ankle ligaments, the following was used:  two Arthrex FiberTak anchors and an Arthrex internal brace to reinforce the repair, taking care not to over tighten. The anchors were placed into bone by predrilling and inserting by hand, placing the anchors carefully so as not to violate the joint surface.   The preloaded suture material was then passed through the ATFL, CFL, and capsule, and the soft tissue was advanced onto the distal fibula while holding the hindfoot in eversion. The inferior peroneal retinaculum was then incorporated into the soft tissue repair. The anchors were all well-seated and the fixation was stable. Careful dissection was then performed to expose and identify the peroneal sheath. The sheath was sharply incised in line with the incision, leaving a small cuff of tissue posteriorly at the fibular ridge for later repair, while protecting the tendons. Notably, there was noted to be no cystic lesion at the PTFL noted within the field-of-view. The peroneus brevis was noted to have no tear and low lying muscle belly. Sharp debridement using a scalpel and Metzenbaum scissors were used to debride the low lying muscle belly back to several cm's proximal to the tip of the lateral malleolus. Approximately 0% of the tendon was excised, leaving only healthy viable tendon behind. After this had been performed, the peroneal tendon was noted to be able to glide smoothly without catching/tethering/bunching. The peroneus longus was noted to have no tear and synovitis/scar tissue encasing the tendon. Sharp debridement using a scalpel and Metzenbaum scissors were used to debride the scar tissue and synovitic tissue adherent to the tendon, to free it up appropriately to allow appropriate gliding and excursion. Approximately 0% of the tendon was excised, leaving only healthy viable tendon behind. After this had been performed, the peroneal tendon was noted to be able to glide smoothly without catching/tethering/bunching. Copious sterile irrigation was used to rinse the operative sites, and a layered closure was performed using 0 Vicryl, 2-0 vicryl and 3-0 nylon, providing appropriate tension to the skin. The skin was cleaned and gently dried. Steri-Strips were then applied, and anti-bacterial ointment was applied on top of that, with Adaptic and fluffs. A sterile layer of cast padding was then applied.  A short leg splint with the ankle at neutral was applied. The patient was aroused from anesthesia without complication, and gently transferred to the bed and then transported to the postoperative area in a stable condition. The patient was noted to have tolerated the procedure well without complication. Postoperative Plan:         Precautions:  nonweight bearing x 4 weeks anticipated on the Left lower extremity   -             []  Physical Therapy/Home exercises       Immobilization:      [x]  Splint/Cast  []  CAM boot  []  Comfortable shoe    []  Other:      DVT ppx:   [x]  Early mobilization       [x]  Medication as prescribed (Aspirin 325 mg PO q Day)      []  No chemical ppx needed; mechanical only   -    Pain control:  Medication as prescribed (dosing and quantity) indicated for acute postoperative pain control   -    Special concerns:       []        [x]  Avoid strenuous activity/pain provoking maneuvers and high-impact repetitive exercises    -    Disposition:   PACU      The patient has been instructed to follow up in the office. Per the patient's wishes, I called the patient's daughter to discuss her condition and the particulars of the surgery, however there was no answer.            Stanislaw Hawk MD  Orthopedic Surgery

## 2021-03-04 NOTE — ANESTHESIA PRE PROCEDURE
Department of Anesthesiology  Preprocedure Note       Name:  Marta Larry   Age:  62 y.o.  :  1960                                          MRN:  [id number]         Date:  3/4/2021      Surgeon: Trevor Krishnan):  Amrik Raymond MD    Procedure: Procedure(s):  LEFT LATERAL ANKLE LIGAMENT   REPAIR , LEFT GASTROC, POSSIBLE LEFT PERONEAL TENDON DEBRIDEMENT REPAIR    [place]    [address]    Medications prior to admission:   Prior to Admission medications    Medication Sig Start Date End Date Taking?  Authorizing Provider   pravastatin (PRAVACHOL) 40 MG tablet Take 1 tablet by mouth daily 2/8/21 3/10/21 Yes Lesia Dong MD   diclofenac (VOLTAREN) 50 MG EC tablet Take 1 tablet by mouth 2 times daily 21 Yes Lesia Dong MD   ketotifen (ZADITOR) 0.025 % ophthalmic solution USE 1 DROP INTO BOTH EYES TWICE DAILY 21  Yes Lesia Dong MD   vitamin D (ERGOCALCIFEROL) 1.25 MG (66295 UT) CAPS capsule TAKE 1 CAPSULE BY MOUTH EVERY WEEK ON   Yes Lesia Dong MD   vitamin D (ERGOCALCIFEROL) 1.25 MG (21419 UT) CAPS capsule Take 1 capsule by mouth once a week 21  Yes Lesia Dong MD   chlorzoxazone (PARAFON FORTE) 500 MG tablet TAKE 1/2 TABLET BY MOUTH TWICE DAILY  Patient taking differently: 500 mg TAKE 1/2 TABLET BY MOUTH TWICE DAILY 21  Yes Lesia Dong MD   venlafaxine (EFFEXOR XR) 75 MG extended release capsule TAKE 1 CAPSULE BY MOUTH DAILY 1/11/21 3/4/21 Yes Lesia Dong MD   omeprazole (PRILOSEC) 40 MG delayed release capsule TAKE 1 CAPSULE BY MOUTH DAILY EVERY MORNING FOR BREAKFAST 20  Yes Lesia Dong MD   busPIRone (BUSPAR) 15 MG tablet TAKE ONE TABLET BY MOUTH TWICE DAILY 10/19/20  Yes Lesia Dong MD   ibuprofen (ADVIL;MOTRIN) 800 MG tablet Take 1 tablet by mouth every 8 hours as needed for Pain 20  Yes Demarcus Rivera DO   miSOPROStol (CYTOTEC) 200 MCG tablet Take 200 mcg by mouth daily   Yes Historical Provider, MD senna-docusate (PERICOLACE) 8.6-50 MG per tablet Take 1 tablet by mouth daily as needed for Constipation  Patient taking differently: Take 1 tablet by mouth 2 times daily  2/26/20  Yes Edward Lr MD   Multiple Vitamins-Minerals (CULTURELLE PROBIOTICS + MULTIV) CHEW Take 1 tablet by mouth daily 1/29/20  Yes Edward Lr MD   CRANBERRY CONCENTRATE 500 MG CAPS TAKE 1 CAPSULE BY MOUTH TWICE DAILY 8/21/19  Yes Miguel Quinn MD   metoprolol succinate (TOPROL XL) 25 MG extended release tablet Take 25 mg by mouth daily    Yes Historical Provider, MD   fluticasone (FLONASE) 50 MCG/ACT nasal spray INSTILL 2 SPRAYS INTO EACH NOSTRIL ONCE DAILY 1/11/21   Miguel Quinn MD   ondansetron (ZOFRAN ODT) 4 MG disintegrating tablet Take 1 tablet by mouth every 6 hours as needed for Nausea or Vomiting 1/6/20   Mercedes Mcmahon MD   albuterol sulfate  (90 Base) MCG/ACT inhaler INHALE 2 PUFFS BY MOUTH INTO THE LUNGS ONCE EVERY 6 HOURS AS NEEDED FOR WHEEZING 11/19/19   Miguel Quinn MD   MI-ACID GAS RELIEF 80 MG chewable tablet CHEW 1 TABLET BY MOUTH FOUR TIMES DAILY AS NEEDED FOR FLATULENCE 11/19/19   Edward Lr MD   PREMARIN 0.625 MG/GM vaginal cream PLACE 2 GRAMS VAGINALLY TWICE A WEEK FOR 12 DOSES 9/21/17   Miguel Quinn MD       Current medications:    Current Facility-Administered Medications   Medication Dose Route Frequency Provider Last Rate Last Admin    [START ON 3/5/2021] 0.9 % sodium chloride infusion   Intravenous Continuous Edson Gurrola DO        [START ON 3/5/2021] lactated ringers infusion   Intravenous Continuous Edson Gurrola DO        sodium chloride flush 0.9 % injection 10 mL  10 mL Intravenous 2 times per day Edson Gurrola DO        sodium chloride flush 0.9 % injection 10 mL  10 mL Intravenous PRN Amber You, DO        [START ON 3/5/2021] lidocaine PF 1 % injection 1 mL  1 mL Intradermal Once PRN Amber You, DO  ceFAZolin (ANCEF) 2000 mg in dextrose 5 % 50 mL IVPB  2,000 mg Intravenous Once Grace Martines MD        acetaminophen (TYLENOL) tablet 1,000 mg  1,000 mg Oral Once Radha Barrow MD           Allergies: Allergies   Allergen Reactions    Shellfish Allergy     Shrimp (Diagnostic) Shortness Of Breath    Seasonal     Famotidine Other (See Comments)     Headaches  Headaches  Headaches  Headaches  Headaches    Gabapentin Nausea Only     Mood swings, nausea. Mood swings, nausea. Mood swings, nausea. Mood swings, nausea. Mood swings, nausea. Problem List:    Patient Active Problem List   Diagnosis Code    Major depression F32.9    Rotator cuff disorder M67.919    Hyperlipidemia E78.5    Incontinence in female R32    Rectocele N81.6    Diverticulitis K57.92    Asthma J45.909    Hypokalemia E87.6    Cellulitis, face - left side, failed outpatient therapy L03. 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  Coli urine    GERD (gastroesophageal reflux disease)     Hyperlipidemia     MRSA (methicillin resistant staph aureus) culture positive 4/18/2016    lip    Rectocele     Tachycardia     takes Metoprolol       Past Surgical History:        Procedure Laterality Date    COLONOSCOPY N/A 7/19/2018    COLONOSCOPY performed by Gregg Grossman DO at [address] [place] [address]  2/25/2015 uro    HYSTERECTOMY      RYAN, BSO performed at [facility] by Dr. Mami Santizo, Also had some type of bladder lift at this time    KNEE ARTHROSCOPY Left 2019    KNEE ARTHROSCOPY performed by Elsy Boss MD at [place] [address] Left     #1 - lateral release, #2 - arthroscopy with muscle alignment    NERVE BLOCK  2017    caudal #1  decadron 10mg    NERVE BLOCK  2017    cadal # 2, decadron 10 mg    NERVE BLOCK  2017    caudal epidural steroid block #2 decadron 10 mg    NERVE BLOCK  11/10/2017    lumbar L4-5 epidural; depomedrol 80mg    UPPER GASTROINTESTINAL ENDOSCOPY  2019    EGD BIOPSY performed by Uriel Dobbs MD at [place] [address] History:    Social History     Tobacco Use    Smoking status: Former Smoker     Packs/day: 2.00     Years: 0.50     Pack years: 1.00     Types: Cigarettes     Quit date:      Years since quittin.1    Smokeless tobacco: Never Used   Substance Use Topics    Alcohol use: Yes     Alcohol/week: 0.0 standard drinks     Comment: social                                Counseling given: Not Answered      Vital Signs (Current):   Vitals:    21 0639 21 0640   BP:  136/74   Pulse:  91   Resp:  16   Temp:  96.2 °F (35.7 °C)   SpO2:  98%   Weight: 175 lb (79.4 kg)    Height: 5' 1\" (1.549 m)                                               BP Readings from Last 3 Encounters:   21 136/74   21 123/61   20 116/73       NPO Status: Time of last liquid consumption:                         Time of last solid consumption:                         Date of last liquid consumption: 21                        Date of last solid food consumption: 21    BMI:   Wt Readings from Last 3 Encounters:   21 175 lb (79.4 kg)   21 175 lb (79.4 kg)   21 175 lb (79.4 kg)     Body mass index is 33.07 kg/m².     CBC:   Lab Results   Component Value Date    WBC 4.6 2021    RBC 4.37 2021 HGB 13.2 03/01/2021    HCT 40.6 03/01/2021    MCV 92.9 03/01/2021    RDW 12.1 03/01/2021     03/01/2021       CMP:   Lab Results   Component Value Date     03/01/2021    K 3.8 03/01/2021     03/01/2021    CO2 25 03/01/2021    BUN 15 03/01/2021    CREATININE 0.61 03/01/2021    GFRAA >60 03/01/2021    LABGLOM >60 03/01/2021    GLUCOSE 153 03/01/2021    PROT 6.6 10/12/2020    CALCIUM 9.2 03/01/2021    BILITOT 0.35 10/12/2020    ALKPHOS 70 10/12/2020    AST 17 10/12/2020    ALT 27 10/12/2020       POC Tests: No results for input(s): POCGLU, POCNA, POCK, POCCL, POCBUN, POCHEMO, POCHCT in the last 72 hours. Coags: No results found for: PROTIME, INR, APTT    HCG (If Applicable): No results found for: PREGTESTUR, PREGSERUM, HCG, HCGQUANT     ABGs: No results found for: PHART, PO2ART, RLT3SVB, YXW3FSG, BEART, F9NMGZOR     Type & Screen (If Applicable):  No results found for: LABABO, LABRH    Drug/Infectious Status (If Applicable):  Lab Results   Component Value Date    HEPCAB NONREACTIVE 12/18/2019       COVID-19 Screening (If Applicable):   Lab Results   Component Value Date    COVID19 Not Detected 03/01/2021    COVID19 Detected 08/07/2020         Anesthesia Evaluation    Airway: Mallampati: I  TM distance: >3 FB   Neck ROM: full  Mouth opening: > = 3 FB Dental:          Pulmonary:                              Cardiovascular:                      Neuro/Psych:               GI/Hepatic/Renal:             Endo/Other:                     Abdominal:           Vascular:                                        Anesthesia Plan      general     ASA 2             Anesthetic plan and risks discussed with patient.                       Ángel Jc MD   3/4/2021

## 2021-03-05 ENCOUNTER — CLINICAL DOCUMENTATION (OUTPATIENT)
Dept: ORTHOPEDIC SURGERY | Age: 61
End: 2021-03-05

## 2021-03-05 DIAGNOSIS — F41.9 ANXIETY: ICD-10-CM

## 2021-03-05 NOTE — TELEPHONE ENCOUNTER
Jessica Tatum is calling to request a refill on the following medication(s):    Last Visit Date (If Applicable):  7/0/2659    Next Visit Date:    Visit date not found    Medication Request:  Requested Prescriptions     Pending Prescriptions Disp Refills    busPIRone (BUSPAR) 15 MG tablet [Pharmacy Med Name: BUSPIRONE HCL 15MG ORAL TABLET] 56 tablet 3     Sig: TAKE 1 TABLET BY MOUTH TWICE DAILY

## 2021-03-08 RX ORDER — BUSPIRONE HYDROCHLORIDE 15 MG/1
TABLET ORAL
Qty: 56 TABLET | Refills: 3 | Status: SHIPPED | OUTPATIENT
Start: 2021-03-08 | End: 2021-05-28

## 2021-03-19 ENCOUNTER — OFFICE VISIT (OUTPATIENT)
Dept: ORTHOPEDIC SURGERY | Age: 61
End: 2021-03-19

## 2021-03-19 VITALS — HEIGHT: 61 IN | BODY MASS INDEX: 33.04 KG/M2 | WEIGHT: 175 LBS | RESPIRATION RATE: 12 BRPM | TEMPERATURE: 98.2 F

## 2021-03-19 DIAGNOSIS — M25.372 INSTABILITY OF LEFT ANKLE JOINT: Primary | ICD-10-CM

## 2021-03-19 PROCEDURE — 99024 POSTOP FOLLOW-UP VISIT: CPT | Performed by: ORTHOPAEDIC SURGERY

## 2021-03-19 RX ORDER — HYDROCODONE BITARTRATE AND ACETAMINOPHEN 5; 325 MG/1; MG/1
1 TABLET ORAL EVERY 6 HOURS PRN
Qty: 15 TABLET | Refills: 0 | Status: SHIPPED | OUTPATIENT
Start: 2021-03-19 | End: 2021-03-26

## 2021-03-19 NOTE — PROGRESS NOTES
Sutures/staples were removed and steri-strips applied             Precautions:  nonweight bearing x 4 weeks anticipated on the Left lower extremity             --At postop week 4, the patient may begin weightbearing as tolerated in the cam boot, and may begin active range of motion and gentle active assisted range of motion into plantarflexion and dorsiflexion. No inversion/eversion until 12 weeks. May wean out of the cam boot into a lace up ankle brace at the 6-week radha. Cam boot on 24 hours except for exercise until the 6-week radha. [x]? Physical Therapy/Home exercises        Immobilization:      []? Splint/Cast               [x]? CAM boot                    []?  Comfortable shoe    []? Other:                DVT ppx:   [x]? Early mobilization              [x]? Medication as prescribed (Aspirin 325 mg PO q Day)                     []?  No chemical ppx needed; mechanical only             -     Pain control:  Medication as prescribed (dosing and quantity) indicated for acute postoperative pain control             -15 tabs of hydrocodone ordered today     Special concerns:       []?         [x]? Avoid strenuous activity/pain provoking maneuvers and high-impact repetitive exercises       All questions were answered and the patient agrees with the above plan. The patient will return to clinic in 4 weeks with ankle x-rays, simulated weightbearing. At her next visit, if her contralateral right side is still bothering her, we discussed that we will obtain contralateral right foot and ankle x-rays, fully weightbearing. Return in about 4 weeks (around 4/16/2021). Orders Placed This Encounter   Medications    HYDROcodone-acetaminophen (NORCO) 5-325 MG per tablet     Sig: Take 1 tablet by mouth every 6 hours as needed for Pain for up to 7 days. Do not exceed 3000 mg of Acetaminophen in 24 hrs.      Dispense:  15 tablet     Refill:  0     Reduce doses taken as pain becomes manageable     No orders of the defined types were placed in this encounter. Eli Hankins MD  Orthopedic Surgery        Please excuse any typos/errors, as this note was created with the assistance of voice recognition software. While intending to generate a document that actually reflects the content of the visit, the document can still have some errors including those of syntax and sound-a-like substitutions which may escape proof reading. In such instances, actual meaning can be extrapolated by context.

## 2021-04-02 DIAGNOSIS — F41.9 ANXIETY: ICD-10-CM

## 2021-04-02 RX ORDER — VENLAFAXINE HYDROCHLORIDE 75 MG/1
75 CAPSULE, EXTENDED RELEASE ORAL DAILY
Qty: 28 CAPSULE | Refills: 3 | Status: SHIPPED | OUTPATIENT
Start: 2021-04-02 | End: 2021-07-26

## 2021-04-02 NOTE — TELEPHONE ENCOUNTER
Emily Austin is calling to request a refill on the following medication(s):    Last Visit Date (If Applicable):  5/5/2666    Next Visit Date:    Visit date not found    Medication Request:  Requested Prescriptions     Pending Prescriptions Disp Refills    venlafaxine (EFFEXOR XR) 75 MG extended release capsule [Pharmacy Med Name: VENLAFAXINE HCL ER 75MG ORAL CAPSULE] 28 capsule 3     Sig: TAKE 1 CAPSULE BY MOUTH DAILY

## 2021-04-13 ENCOUNTER — OFFICE VISIT (OUTPATIENT)
Dept: ORTHOPEDIC SURGERY | Age: 61
End: 2021-04-13

## 2021-04-13 VITALS — HEIGHT: 61 IN | RESPIRATION RATE: 12 BRPM | WEIGHT: 175 LBS | BODY MASS INDEX: 33.04 KG/M2

## 2021-04-13 DIAGNOSIS — M25.372 INSTABILITY OF LEFT ANKLE JOINT: Primary | ICD-10-CM

## 2021-04-13 PROCEDURE — 99024 POSTOP FOLLOW-UP VISIT: CPT | Performed by: ORTHOPAEDIC SURGERY

## 2021-04-13 NOTE — PROGRESS NOTES
[facility]  [address] [address]  Dept: [phone]    Ambulatory Orthopedic Postoperative Visit     Preoperative Diagnosis:   1. Left ankle instability with recurrent sprains  2. Left peroneal tendinopathy   1. Left gastrocnemius equinus contracture  3. Body mass index is 33.07 kg/m².     Postoperative Diagnosis:   1. Same      Procedures Performed:  (3/4/2021)  1. Left ankle lateral ligamentous repair (with Arthrex fiber tack anchors x2 + internal brace)  2. Left peroneus brevis debridement  3. Left peroneus longus tenolysis  4. Left leg gastroc recession, performed through separate incision       SUBJECTIVE:     The patient returns post op from the above stated procedure. Reports doing well overall, reports improved pain at her surgical site, denies wound drainage/issues, fevers/chills/night sweats, calf swelling/pain, chest pain, shortness of breath. She reports that her low back pain and contralateral right foot/ankle pain is significantly improved, and she denies any pain there currently. She reports that she is only put about 25% of her weight thus far on her left foot, and has not yet been to physical therapy. OBJECTIVE:  Resp 12   Ht 5' 1\" (1.549 m)   Wt 175 lb (79.4 kg)   BMI 33.07 kg/m²    NAD, resting comfortably  Incisions clean/dry/intact, no erythema/dehiscence/drainage  Sensation to light touch grossly intact throughout, but altered sensation on the dorsal aspect of her foot  Warm and well perfused  Grossly neurovascularly intact distally  No signs of infection  No calf swelling/tenderness      RADIOLOGY:   4/13/2021 FINDINGS:  Three views (AP, Mortise, and Lateral) of the left ankle were obtained in the office today and reviewed, revealing no acute fracture, dislocation, or radioopaque foreign body/tumor. The ankle mortise is maintained with no widening of the clear spaces.  Overall alignment is extrapolated by context.

## 2021-04-16 ENCOUNTER — HOSPITAL ENCOUNTER (OUTPATIENT)
Dept: PHYSICAL THERAPY | Age: 61
Setting detail: THERAPIES SERIES
Discharge: HOME | End: 2021-04-16
Payer: COMMERCIAL

## 2021-04-16 PROCEDURE — 97161 PT EVAL LOW COMPLEX 20 MIN: CPT

## 2021-04-16 NOTE — CONSULTS
[x] Claude Armendariz        Outpatient Physical                Therapy       [address]       Phone: [phone]       Fax: [phone] [] [facility]       Promotion at [address] [facility]       Phone: [phone]       Fax: [phone] [] [facility]    [address] [facility]     Phone: [phone]     Fax:  [phone]     Physical Therapy Lower Extremity Evaluation    Date:  2021  Patient: Burnell Hashimoto  : 1960  MRN: [id number]  Physician: Dr. May Patel: [facility])  Medical Diagnosis: Instability of left ankle joint  Rehab Codes: M25.572, M25.672, R26.9, R27.9, R53.1, R20.2  Onset date: 3/4/21 DOS  Next Dr's appt.: 21        Subjective:   CC: Pt arrives for physical therapy evaluation of post-op L ankle for the following procedures on 3/4/21:    Procedures Performed:  (3/4/2021)  1. Left ankle lateral ligamentous repair (with Arthrex fiber tack anchors x2 + internal brace)  2. Left peroneus brevis debridement  3. Left peroneus longus tenolysis  4. Left leg gastroc recession, performed through separate incision    Put in short leg splint with ankle at neutral, sutures removed ~2 wks post op. Was NWB 4 wks, transitioned to CAM boot at 4 weeks and then lace-up ankle brace at 6 weeks post op (2 days ago). Currently, pt reports she's been walking with walker and notes that makes pain increase. Notes she's been having a hard time managing L knee/low back pain initially, but slowly getting better. Pain noted in posterior L heel.        HPI: left recurrent lateral ankle sprain, peroneal tendinopathy       Precautions: 6 weeks post op as of 4/15/21; WBAT in CAM boot, weaning into lace up ankle brace at 6 wks, NO inversion/eversion ROM until 12 weeks    Prior Level of Function: independent with all ADL/IADLs, frequent inversion sprainer causing pain with ambulation     Current Level of Function: walking with walker and CAM boot donned limited distances (no more than 5-10 min consecutively), daughter/granddaughter doing household cleaning/cooking and laundry, needs assist for descending ramp out of house in walker      Red Flags:      Yes  No Comments   Fatigue [] [x]     Fever/chills/sweats [] [x]    Malaise  [] [x]    Mental status change [] [x]     Paresthesias/numbness/weakness [x] []  Numbness/tingling in posterior and lateral heel, lateral foot, and posterior calf   Unexplained weight changes [] [x]     Lightheaded/dizziness [] [x]    Shortness of breath [] [x]         Home Environment: Lives alone  in [place] house with 2 ANDREA but also uses ramp (notes it's steep) at entry. 1st-floor bathroom with 1st-floor bedroom. Using shower chair. Caretaker to granddaughter often.     Available assistive devices: rollator, FWW        PMHx: [] Unremarkable [] Diabetes [] HTN  [] Pacemaker   [x] MI/Heart Problems - Tachy  [] Cancer [] Arthritis [] Other:              [] Refer to full medical chart  In EPIC   Tests: [] X-Ray: [] MRI:  [] Other:     Comorbidities:   [x] Obesity [] Dialysis  [] Other:   [] Asthma/COPD [] Dementia [] Other:   [] Stroke [] Sleep apnea [] Other:   [] Vascular disease [] Rheumatic disease [] Other:       Medications: [x] Refer to full medical record [] None [] Other:  Allergies:      [x] Refer to full medical record [] None [] Other:    Working:  Off d/t condition  Job/ADL Description:  at [employer] - walking all shift; reaching, stooping, bending      Pain:  [x] Yes  [] No Location: lateral ankle Pain Rating: (0-10 scale) 6/10  Pain altered Tx:  [] Yes  [x] No  Action:  Symptoms:  [x] Improving [] Worsening [] Same  Better:  Keeping it elevated  Worse: prolonged walking  Sleep: [] OK    [x] Disturbed    Patient Goals: \"walking with no pain\"        OBJECTIVE:    Observation:  OBSERVATION No Deficit Deficit Not Tested Comments   Posture    x   Limited WB through LLE in standing, in CAM boot   Palpation [] [x] [] Mild TTP lateral malleolus, incision, CFL   Sensation [] [x] [] Numbness at incision, posterior calf, anterior foot near ATFL   Edema [] [x] []  mild - failed to get measurement d/t therapist error, will obtain in upcoming sessions       Neurological [x] [] []     Patellar Mobility [] [] [x]     Patellar Orientation [] [] [x]     Gait [] [x] []  Using rolling scooter for community amb; in walker - very slow teddy, decreased step length, increased double limb support time, antalgic on LLE - all in CAM boot on LLE             ROM  ° A/P STRENGTH    Left Right Left Right   Hip Flex   5-    Ext 10 20 4- 4+   ER       IR       ABD   4- 4   ADD       Knee Flx   5-    Ext   4*    Ankle DF 2A/6P  4+    PF 35A/40P  4-    INV HELD  HELD    EVER HELD  HELD           Great toe ext 75  4+    Great toe flex 35  2+    Quad length in prone: 125 L, 121 R (pain in anterior knee for both at end range)    * = pain with testing    Special Tests: HELD d/t post op status       Functional Tests: HELD d/t postop WB status WBAT, painful      Functional Assessment    FUNCTION Normal Difficult Unable Comments   Sitting [x] [] []     Standing [] [x] []     Ambulation [] [x] []     Groom/Dress [] [x] []     Lift/Carry [] [x] []     Stairs [] [x] []     Bending [x] [] []     Squat [] [x] []     Kneel [x] [] []           Assessment: Anel Gomez is a 61 yo female who presents with impairments/activity limitations secondary to undergoing L ankle lateral reconstruction including reduced ankle ROM/strength, impaired gross LLE strength, reduced functional mobility, and dependence on family members outside of home (as pt lives alone) for IADLs including household chores and cooking. Pt will benefit from skilled physical therapy to address these impairments/activity limitations, and progress to prior LOF with skilled knowledge of recovery/rehab for current condition. Problems:    [x] ?  Pain: [x] ? ROM:    [x] ? Strength:    [x] ? Function:    [x] ? Balance  [x] Edema:  [x] Postural Deviations  [x] Gait Deviations  [x] Other: Balance deficit        STG: (to be met in 12 treatments)  1. ? Pain: No more than 6/10 max pain in L ankle with therapy and at home. 2. ? ROM:   a. At least 15deg PROM L ankle DF with knee extended to progress to maximal DF joint mobility to allow proper gait/stair mechanics  b. At least 45deg PROM L ankle PF for improved push off with gait  3. ? Strength:   a. At least 4/5 L PF strength for improving gait mechanics and stability in hind foot with ambulation/stair climbing  b. Able to tolerate L ankle inv/ev submaximal isometric strengthening without increase in pain    4. ? Balance: Pt able to hold tandem stance bilat for at least 30\" without UE assist to indicate improving balance in narrow ROBERT conditions  5. ? Function:   a. Pt able to ambulate 300 ft in lace-up brace on LLE using least restrictive device and near equal stance time, improving step length, and consistent heel strike LLE  b. Pt able to complete all transfers without UE assist and no imbalance noted  c. Pt able to ambulate up/down an incline with least restrictive device without assist or supervision needed to allow for improved safety with entering/exiting home independently  6. Independent with Home Exercise Programs  7. Demonstrate Knowledge of fall prevention    LTG: (to be met in 24 treatments)  8. ? Pain: No more than 3/10 max pain in L ankle with therapy and at home. 9. ? ROM: Full ROM all planes of L ankle to indicate restored joint mobility post - op  10. ? Strength:   a. At least 5-/5 L ankle gross strength all planes  b. Able to complete 15 SLS heel raises on LLE to indicate improved functional PF strength  11. ? Balance: Pt able to complete retro and tandem ambulation for 50 ft ea without any sign of imbalance  12. ? Function:   a.  Pt able to ambulate 500 ft in lace-up brace on LLE without device Comments   Ankle DF stretch w/ belt 30\"  demo   Ankle SLR stretch w/ belt 30\"     SLR 3- way   Prone, sidelying, supine - demo'd for HEP               Other: Pt education provided re: surgical treatment, progression through rehab, precautions, PT POC  - included under therex minutes    Specific Instructions for next treatment:   - focus on L ankle PF/DF ROM, passive and active/active assist  - WBAT in CAM BOOT lace up brace: weightshifts lateral/fwd, SLS as able  - gait train with walker, increasing WB    - quad/hamstring flexibility  - hip/knee OKC strength  - vasocompression for pain/swelling post treatment      Evaluation Complexity:  History (Personal factors, comorbidities) [] 0 [x] 1-2 [] 3+   Exam (limitations, restrictions) [] 1-2 [] 3 [x] 4+   Clinical presentation (progression) [x] Stable [] Evolving  [] Unstable   Decision Making [x] Low [] Moderate [] High    [x] Low Complexity [] Moderate Complexity [] High Complexity       Treatment Charges: Mins Units   [x] Evaluation       [x]  Low       []  Moderate       []  High 40 1   []  Modalities     [x]  Ther Exercise 5 --   []  Manual Therapy     []  Ther Activities     []  Aquatics     []  Vasocompression     []  Other       TOTAL TREATMENT TIME: 45 min    Time in:2:05 pm   Time Out:2:55 pm    Electronically signed by: Alexander Rosario PT        Physician Signature:________________________________Date:__________________  By signing above or cosigning this note, I have reviewed this plan of care and certify a need for medically necessary rehabilitation services.      *PLEASE SIGN ABOVE AND FAX BACK ALL PAGES*

## 2021-04-27 ENCOUNTER — HOSPITAL ENCOUNTER (OUTPATIENT)
Dept: PHYSICAL THERAPY | Age: 61
Setting detail: THERAPIES SERIES
Discharge: HOME | End: 2021-04-27
Payer: COMMERCIAL

## 2021-04-27 PROCEDURE — 97110 THERAPEUTIC EXERCISES: CPT

## 2021-04-27 PROCEDURE — 97016 VASOPNEUMATIC DEVICE THERAPY: CPT

## 2021-04-27 PROCEDURE — 97140 MANUAL THERAPY 1/> REGIONS: CPT

## 2021-04-27 NOTE — FLOWSHEET NOTE
4/19  Precautions: 6 weeks post op as of 4/15/21; WBAT in CAM boot, weaning into lace up ankle brace at 6 wks, NO inversion/eversion ROM until 12 weeks (5/27/21)    Exercises: Exercises below given as written for HEP:  Exercise:  LLE Reps/ Time Weight/ Level Comments   sci fit 5 mins L1                 Standing      3 way L hip OKC 15xea A    Wt shifting 12x  Fwd, lateral    Wall lean  add     marching add     SLS    Add as able                Sitting      Ankle DF,PF 20x2 ea  A    gastroc stretch 3x30\"    leg on mat   Soleus stretch 3x30\"  Heel on mat   Gr toe ext. 15x AA    Towel toes curls  10x A                Supine      Manual  10 mins  3x30\"  PROM ankle DF/PF, scar massage, gastro stretching. Gastro stretch with HS stretch and ankle inversion  belt 3x30\"    belt, inversion   SLR 3- way  x    sidelying, supine - demo'd for HEP                                                Other:  Recommended pt wear cam boot into/out of clinic and community distances. Recommended pt wean out of cam boot vs full time in velcro brace. Recommended pt address 1-2 hr AM and 1-2 hrs PM in home only in velcro brace and progress an hr every 4-5 days if pain not progressing.       Specific Instructions for next treatment:   - focus on L ankle PF/DF ROM, passive and active/active assist  - WBAT in CAM BOOT lace up brace: weightshifts lateral/fwd, SLS as able  - gait train with walker, increasing WB    - quad/hamstring flexibility  - hip/knee OKC strength  - vasocompression for pain/swelling post treatment          Treatment Charges: Mins Units   []  Modalities     [x]  Ther Exercise 25 1   [x]  Manual Therapy 10 1   []  Ther Activities     []  Aquatics     [x]  Vasocompression 15 1   []  Other     Total Treatment time   50 3     Assessment: [x] Progressing toward goals initiated sci fit for muscle circulation warm up, standing wt shifting and L hip OKC strengthening exercises.  Address Manual stretching, ROM and scar massage with education on purpose and written HEP issued. Pt voiced and or demonstrated good understanding. Ended with Vaso compression for edema and pain control. Pt given w/c ride to her car and she does not have her cam boot and distance of walk (see subjective complaint and other comment)    [] No change. [] Other:  [x] Patient would continue to benefit from skilled physical therapy services in order to:  Progress ankle ROM and strengthening as well as progressing wt bearing in order to return to full ADL and job. STG: (to be met in 12 treatments)  1. ? Pain: No more than 6/10 max pain in L ankle with therapy and at home. 2. ? ROM:   a. At least 15deg PROM L ankle DF with knee extended to progress to maximal DF joint mobility to allow proper gait/stair mechanics  b. At least 45deg PROM L ankle PF for improved push off with gait  3. ? Strength:   a. At least 4/5 L PF strength for improving gait mechanics and stability in hind foot with ambulation/stair climbing  b. Able to tolerate L ankle inv/ev submaximal isometric strengthening without increase in pain    4. ? Balance: Pt able to hold tandem stance bilat for at least 30\" without UE assist to indicate improving balance in narrow ROBERT conditions  5. ? Function:   a. Pt able to ambulate 300 ft in lace-up brace on LLE using least restrictive device and near equal stance time, improving step length, and consistent heel strike LLE  b. Pt able to complete all transfers without UE assist and no imbalance noted  c. Pt able to ambulate up/down an incline with least restrictive device without assist or supervision needed to allow for improved safety with entering/exiting home independently  6. Independent with Home Exercise Programs  7. Demonstrate Knowledge of fall prevention     LTG: (to be met in 24 treatments)  8. ? Pain: No more than 3/10 max pain in L ankle with therapy and at home.   9. ? ROM: Full ROM all planes of L ankle to indicate restored joint mobility post - op  10. ? Strength:   a. At least 5-/5 L ankle gross strength all planes  b. Able to complete 15 SLS heel raises on LLE to indicate improved functional PF strength  11. ? Balance: Pt able to complete retro and tandem ambulation for 50 ft ea without any sign of imbalance  12. ? Function:   a. Pt able to ambulate 500 ft in lace-up brace on LLE without device and equal stance time, no antalgic gait evident, consistent heel strike and full push off appreciated, and at a minimum of 1.0m/s to indicate safety for community ambulation speed  b. Pt able to ascend/descend stairs with min UE assist while completing with reciprocal pattern and good balance noted                 Patient goals: \"get back to work, walk normal, less pain\"       Pt. Education:  [x] Yes  [] No  [] Reviewed Prior HEP/Ed  Method of Education: [x] Verbal  [x] Demo  [x] Written  Comprehension of Education:  [x] Verbalizes understanding. [x] Demonstrates understanding. [] Needs review. [x] Demonstrates/verbalizes HEP/Ed previously given. Access Code: [email or web address]/Date: 04/27/2021Prepared by: Shay Stockton Sitting Calf Stretch with Strap - 3 x daily - 7 x weekly - 3 sets - 3 reps   Long Sitting Soleus Stretch on Bolster with Strap - 3 x daily - 7 x weekly - 3 sets - 3 reps   Supine Hamstring Stretch with Strap with ankle DF and inversion - 3 x daily - 7 x weekly - 3 sets - 3 reps   Supine Ankle Pumps - 3 x daily - 7 x weekly - 2 sets - 20 reps   Seated Heel Toe Raises - 3 x daily - 7 x weekly - 2 sets - 20 reps   Isometric Ankle Dorsiflexion and Plantarflexion - 1 x daily - 3-4 x weekly - 1-2 sets - 10 reps   Long Sitting Isometric Ankle Plantarflexion with Ball at Wall - 1 x daily - 3-4 x weekly - 1-2 sets - 10 reps      Plan: [x] Continue current frequency toward long and short term goals. [x] Specific Instructions for subsequent treatments:  Progress ckc exercises and gait in velcro brace.  Add mat 3 way hip       Time In:1215            Time PMO:6454    Electronically signed by:  Sherrell Parra PTA

## 2021-04-30 ENCOUNTER — HOSPITAL ENCOUNTER (OUTPATIENT)
Dept: PHYSICAL THERAPY | Age: 61
Setting detail: THERAPIES SERIES
Discharge: HOME | End: 2021-04-30
Payer: COMMERCIAL

## 2021-04-30 PROCEDURE — 97110 THERAPEUTIC EXERCISES: CPT

## 2021-04-30 PROCEDURE — 97140 MANUAL THERAPY 1/> REGIONS: CPT

## 2021-04-30 PROCEDURE — 97016 VASOPNEUMATIC DEVICE THERAPY: CPT

## 2021-04-30 NOTE — FLOWSHEET NOTE
boot, weaning into lace up ankle brace at 6 wks, NO inversion/eversion ROM until 12 weeks (5/27/21)    Exercises: Exercises below given as written for HEP:  Exercise:  LLE Reps/ Time Weight/ Level Comments   sci fit 5 mins L1     gait  12 ft x2  Tennis shoes.  (limited gait due to not having velcro brace)          Standing      Gastro stretch 3x20\"  Without wedge, unilateral   3 way L hip OKC 15xea A Incr. Reps 4/30   Wt shifting 12x  Fwd, lateral, retro    Wall lean  ADD     marching 15x     SLS ADD                  Sitting      Ankle DF,PF 20x1 ea  A    gastroc stretch 3x30\"    leg on mat, HELD 4/30   Soleus stretch 3x30\"  Heel on mat   HS stretch 3x30\"  added   Gr toe ext. 15x A    Lessor toe ext  15x A added   Tissue  toes curls  15x A    Foot intrinsic  10x5\"  added         Supine      Manual  10 mins  5x15\"  PROM ankle DF/PF, scar massage, gastro stretching, STM plantar surface. Gastro stretch with HS stretch and ankle inversion  belt 3x30\"    belt, inversion   Hip flexor/quad streth ADD     SLR 3- way  x    sidelying, supine - verbal instructions for HEP.                     Other:  Again recommended pt wear cam boot into/out of clinic and community distances for now due to pain levels. Recommended pt wean out of cam boot vs full time in velcro brace.   Recommended pt address 1-2 hr AM and 1-2 hrs PM in home only in velcro brace and progress an hr (both AM and PM times)  every 4-5 days if pain not progressing.       Specific Instructions for next treatment:   - focus on L ankle PF/DF ROM, passive and active/active assist  - WBAT in CAM BOOT lace up brace: weightshifts lateral/fwd, SLS as able  - gait train with walker, increasing WB    - quad/hamstring flexibility  - hip/knee OKC strength  - vasocompression for pain/swelling post treatment          Treatment Charges: Mins Units   []  Modalities     [x]  Ther Exercise 40 2   [x]  Manual Therapy 10 1   []  Ther Activities     [] Aquatics     [x]  Vasocompression 15 1   []  Other     Total Treatment time   65 4     Assessment: [x] Progressing toward goals slowly progressing  ckc ex and gait  due to pain level and pt forgot her velcro brace. Demonstrated improved ckc wt shift ROM with fair tolerance. Addressed Manual ankle  ROM, scar massage, and STM. Education on 3 way hip OKC on bed (hip ext in SL due to complaint of LB pain in prone) Proximal lateal scar tough . [] No change. [] Other:  [x] Patient would continue to benefit from skilled physical therapy services in order to:  Progress ankle ROM and strengthening as well as progressing wt bearing in order to return to full ADL and job. STG: (to be met in 12 treatments)  1. ? Pain: No more than 6/10 max pain in L ankle with therapy and at home. 2. ? ROM:   a. At least 15deg PROM L ankle DF with knee extended to progress to maximal DF joint mobility to allow proper gait/stair mechanics  b. At least 45deg PROM L ankle PF for improved push off with gait  3. ? Strength:   a. At least 4/5 L PF strength for improving gait mechanics and stability in hind foot with ambulation/stair climbing  b. Able to tolerate L ankle inv/ev submaximal isometric strengthening without increase in pain    4. ? Balance: Pt able to hold tandem stance bilat for at least 30\" without UE assist to indicate improving balance in narrow ROBERT conditions  5. ? Function:   a. Pt able to ambulate 300 ft in lace-up brace on LLE using least restrictive device and near equal stance time, improving step length, and consistent heel strike LLE  b. Pt able to complete all transfers without UE assist and no imbalance noted  c. Pt able to ambulate up/down an incline with least restrictive device without assist or supervision needed to allow for improved safety with entering/exiting home independently  6. Independent with Home Exercise Programs  7.  Demonstrate Knowledge of fall prevention     LTG: (to be met in 24 treatments)  8. ? Pain: No more than 3/10 max pain in L ankle with therapy and at home. 9. ? ROM: Full ROM all planes of L ankle to indicate restored joint mobility post - op  10. ? Strength:   a. At least 5-/5 L ankle gross strength all planes  b. Able to complete 15 SLS heel raises on LLE to indicate improved functional PF strength  11. ? Balance: Pt able to complete retro and tandem ambulation for 50 ft ea without any sign of imbalance  12. ? Function:   a. Pt able to ambulate 500 ft in lace-up brace on LLE without device and equal stance time, no antalgic gait evident, consistent heel strike and full push off appreciated, and at a minimum of 1.0m/s to indicate safety for community ambulation speed  b. Pt able to ascend/descend stairs with min UE assist while completing with reciprocal pattern and good balance noted                 Patient goals: \"get back to work, walk normal, less pain\"       Pt. Education:  [x] Yes  [] No  [x] Reviewed Prior HEP/Ed  Method of Education: [x] Verbal  [x] Demo  [] Written  Comprehension of Education:  [x] Verbalizes understanding. [x] Demonstrates understanding. [] Needs review. [x] Demonstrates/verbalizes HEP/Ed previously given. Access Code: [email or web address]: [email or web address] com/Date: 04/27/2021Prepared by: Angelina Guerrero Sitting Calf Stretch with Strap - 3 x daily - 7 x weekly - 3 sets - 3 reps   Long Sitting Soleus Stretch on Bolster with Strap - 3 x daily - 7 x weekly - 3 sets - 3 reps   Supine Hamstring Stretch with Strap with ankle DF and inversion - 3 x daily - 7 x weekly - 3 sets - 3 reps   Supine Ankle Pumps - 3 x daily - 7 x weekly - 2 sets - 20 reps   Seated Heel Toe Raises - 3 x daily - 7 x weekly - 2 sets - 20 reps   Isometric Ankle Dorsiflexion and Plantarflexion - 1 x daily - 3-4 x weekly - 1-2 sets - 10 reps   Long Sitting Isometric Ankle Plantarflexion with Ball at Wall - 1 x daily - 3-4 x weekly - 1-2 sets - 10 reps      Plan: [x] Continue current frequency toward long and short term goals. [x] Specific Instructions for subsequent treatments:  Progress ckc exercises and gait in velcro brace.  Add mat 3 way hip       Time In:   1104        Time Out: 1209     Electronically signed by:  Xuan Blount PTA

## 2021-05-04 ENCOUNTER — HOSPITAL ENCOUNTER (OUTPATIENT)
Dept: PHYSICAL THERAPY | Age: 61
Setting detail: THERAPIES SERIES
Discharge: HOME | End: 2021-05-04
Payer: COMMERCIAL

## 2021-05-04 PROCEDURE — 97016 VASOPNEUMATIC DEVICE THERAPY: CPT

## 2021-05-04 PROCEDURE — 97110 THERAPEUTIC EXERCISES: CPT

## 2021-05-04 NOTE — FLOWSHEET NOTE
[x] [facility]  [facility]  [address]  [phone]  F: [phone] [] [address]  [place] [address]   [address]  P: [phone]  F: [phone] [] [place]  [address]  P: [phone]  F: [phone] [] [address]  P: [phone]  F: [phone] [] [address]  [facility]   [address]   [place]: [phone]  F: [phone]      Physical Therapy Daily Treatment Note    Date:  2021  Patient Name:  Lavelle Winn    :  1960  MRN: [id number]   Physician: Dr. Meliton Rodrigues: Gael (13vs)  Medical Diagnosis: Instability of left ankle joint; Peroneal tendinitis of left lower extremity; Gastrocnemius equinus of left lower extremity; Sprain of left ankle, unspecified ligament, subsequent encounter  Rehab Codes: B92.184N, R53.1, R26.9, R27.9  Onset date: 20                         Next 's appt.: DOS 21 Js  Visit# / total visits:  ; Progress note for STG due at visit #12    Cancels/No Shows: 0/0    Subjective:    Pain:  [x] Yes  [] No Location: L foot  Pain Rating: (0-10 scale) 4-5/10 dorsum foot  and lateral ankle. Pain altered Tx:  [] No  [x] Yes  Action:      Comments:    Arrival in cam boot  (as recommended for community walking due to pain) Addressing HEP and wearing velcro brace/tennis as recommended (2 hrs AM and 2 hr PM)     Objective:  Modalities:   Precautions  WBAT in lace up brace LLE beginning   Procedures Performed:  (3/4/2021)  1. Left ankle lateral ligamentous repair (with Arthrex fiber tack anchors x2 + internal brace)  2. Left peroneus brevis debridement  3. Left peroneus longus tenolysis  4.  Left leg gastroc recession, performed through separate incision  Precautions: 6 weeks post op as of 4/15/21; WBAT in CAM boot, weaning into lace up ankle brace at 6 wks, NO inversion/eversion ROM until 12 weeks (5/27/21)    Exercises: Exercises below given as written for HEP:  Exercise:  L LE Reps/ Time Weight/ Level Comments   sci fit 5 mins L1     gait   60 ft 2   Velcro brace/tennis shoe   No Device 1x RW 1x.         Standing      Gastro stretch 3x20\"  Without wedge, unilateral   Heel raises  15x  ROM to ifrah, added 5/4   3 way hip  15xea A  added R LE 5/4   Wt shifting 15x  Fwd, lateral, retro    Wall lean  10x5\"  Added 5/4   marching 15x     SLS 2x10\"   added 5/4               Sitting      domers  2 mins  Added 5/4   Ankle DF,PF 20x1 ea  A    gastroc stretch 3x30\"    leg on mat, HELD 5/4   Soleus stretch 3x30\"  Heel on mat   HS stretch 3x30\"   erbal and demo on  5/4   Gr toe ext. 10x2 A Incr sets 5/4   Lessor toe ext  10x2 A Incr. Sets 5/4   Tissue  toes curls  15x A    Foot intrinsic  10x5\"           Supine      Manual  x  PROM ankle DF/PF, scar massage, gastro stretching, STM plantar surface. Gastro stretch with HS stretch and ankle inversion  belt 3x1 min\"    belt, inversion   Hip flexor/quad streth ADD     SLR 3- way      sidelying, supine - verbal instructions for HEP.                     Other:  5/4/21:  Instructed pt to progress to 3 hrs AM/3 hrs PM in velcro brace/tennis shoes     Specific Instructions for next treatment:   - focus on L ankle PF/DF ROM, passive and active/active assist  - WBAT in CAM BOOT lace up brace: weightshifts lateral/fwd, SLS as able  - gait train with walker, increasing WB    - quad/hamstring flexibility  - hip/knee OKC strength  - vasocompression for pain/swelling post treatment          Treatment Charges: Mins Units   []  Modalities     [x]  Ther Exercise 66  4   []  Manual Therapy --     []  Ther Activities     []  Aquatics     [x]  Vasocompression 15 1   []  Other Total Treatment time    81 5      Assessment: [x] Progressing toward progressing  FWB  bearing activities in velcro brace as charted. Fair tolerance demonstrated. Fatigue noted with SLS and wall leans. [] No change. [x] Other: Tight HS and gastroc. Again educated pt on frequency of daily stretching and active DF/PF. Pt voiced understanding. Gait with mild limp and decreased stance time and limited push off due to pain on plantar surface. Added domer rolling today and issued cardboard roller for HEP. [x] Patient would continue to benefit from skilled physical therapy services in order to:  Progress ankle ROM and strengthening as well as progressing wt bearing in order to return to full ADL and job. STG: (to be met in 12 treatments)  1. ? Pain: No more than 6/10 max pain in L ankle with therapy and at home. 2. ? ROM:   a. At least 15deg PROM L ankle DF with knee extended to progress to maximal DF joint mobility to allow proper gait/stair mechanics  b. At least 45deg PROM L ankle PF for improved push off with gait  3. ? Strength:   a. At least 4/5 L PF strength for improving gait mechanics and stability in hind foot with ambulation/stair climbing  b. Able to tolerate L ankle inv/ev submaximal isometric strengthening without increase in pain    4. ? Balance: Pt able to hold tandem stance bilat for at least 30\" without UE assist to indicate improving balance in narrow ROBERT conditions  5. ? Function:   a. Pt able to ambulate 300 ft in lace-up brace on LLE using least restrictive device and near equal stance time, improving step length, and consistent heel strike LLE  b. Pt able to complete all transfers without UE assist and no imbalance noted  c. Pt able to ambulate up/down an incline with least restrictive device without assist or supervision needed to allow for improved safety with entering/exiting home independently  6. Independent with Home Exercise Programs  7.  Demonstrate Knowledge of fall prevention     LTG: (to be met in 24 treatments)  8. ? Pain: No more than 3/10 max pain in L ankle with therapy and at home. 9. ? ROM: Full ROM all planes of L ankle to indicate restored joint mobility post - op  10. ? Strength:   a. At least 5-/5 L ankle gross strength all planes  b. Able to complete 15 SLS heel raises on LLE to indicate improved functional PF strength  11. ? Balance: Pt able to complete retro and tandem ambulation for 50 ft ea without any sign of imbalance  12. ? Function:   a. Pt able to ambulate 500 ft in lace-up brace on LLE without device and equal stance time, no antalgic gait evident, consistent heel strike and full push off appreciated, and at a minimum of 1.0m/s to indicate safety for community ambulation speed  b. Pt able to ascend/descend stairs with min UE assist while completing with reciprocal pattern and good balance noted                 Patient goals: \"get back to work, walk normal, less pain\"       Pt. Education:  [x] Yes  [] No  [x] Reviewed Prior HEP/Ed  Method of Education: [x] Verbal  [x] Demo  [] Written  Comprehension of Education:  [x] Verbalizes understanding. [x] Demonstrates understanding. [] Needs review. [x] Demonstrates/verbalizes HEP/Ed previously given. Access Code: [email or web address]: [email or web address]/Date: 04/27/2021Prepared by: Jimmie Merlos Sitting Calf Stretch with Strap - 3 x daily - 7 x weekly - 3 sets - 3 reps   Long Sitting Soleus Stretch on Bolster with Strap - 3 x daily - 7 x weekly - 3 sets - 3 reps   Supine Hamstring Stretch with Strap with ankle DF and inversion - 3 x daily - 7 x weekly - 3 sets - 3 reps   Supine Ankle Pumps - 3 x daily - 7 x weekly - 2 sets - 20 reps   Seated Heel Toe Raises - 3 x daily - 7 x weekly - 2 sets - 20 reps   Isometric Ankle Dorsiflexion and Plantarflexion - 1 x daily - 3-4 x weekly - 1-2 sets - 10 reps   Long Sitting Isometric Ankle Plantarflexion with Ball at Wall - 1 x daily - 3-4 x weekly - 1-2 sets - 10 reps    Frequency:  2-3 x/week for 24 visits  Plan: [x] Continue current frequency toward long and short term goals. [x] Specific Instructions for subsequent treatments:  Progress ckc exercises and gait in velcro brace.  Add mat 3 way hip       Time In:    1302       Time Out:1422    Electronically signed by:  Rachana Chapman PTA

## 2021-05-07 ENCOUNTER — HOSPITAL ENCOUNTER (OUTPATIENT)
Dept: PHYSICAL THERAPY | Age: 61
Setting detail: THERAPIES SERIES
Discharge: HOME | End: 2021-05-07
Payer: COMMERCIAL

## 2021-05-07 PROCEDURE — 97110 THERAPEUTIC EXERCISES: CPT

## 2021-05-07 PROCEDURE — 97016 VASOPNEUMATIC DEVICE THERAPY: CPT

## 2021-05-07 NOTE — FLOWSHEET NOTE
[x] [facility] &  Therapy  [address]  [phone]  F: [phone] [] [address]  [employer] [address]   [address]  P: [phone]  F: [phone] [] [place]  [address]  P: [phone]  F: [phone] [] [address]  P: [phone]  F: [phone] [] [address]  [facility]   [address]   [place]: [phone]  F: [phone]      Physical Therapy Daily Treatment Note    Date:  2021  Patient Name:  Lavelle Winn    :  1960  MRN: [id number]   Physician: Dr. Coelho Games: [facility] (13vs)  Medical Diagnosis: Instability of left ankle joint; Peroneal tendinitis of left lower extremity; Gastrocnemius equinus of left lower extremity; Sprain of left ankle, unspecified ligament, subsequent encounter  Rehab Codes: T38.618U, R53.1, R26.9, R27.9  Onset date: 20                         Next 's appt.: DOS 21 Js  Visit# / total visits:  ; Progress note for STG due at visit #12    Cancels/No Shows: 0/0    Subjective:    Pain:  [x] Yes  [] No Location: L foot  Pain Rating: (0-10 scale) 4/10 dorsum foot  and lateral ankle. Pain altered Tx:  [] No  [x] Yes  Action:      Comments:   Reports achilles and joint line area pain was up yesterday, feel the wet weather has something to do with it. Reports if does have less pain tomorrow. In velcro brace 3 hr AM and PM each. Addressing HEP daily. Objective:  Modalities:   Precautions  WBAT in lace up brace LLE beginning   Procedures Performed:  (3/4/2021)  1. Left ankle lateral ligamentous repair (with Arthrex fiber tack anchors x2 + internal brace)  2.  Left peroneus brevis debridement  3. Left peroneus longus tenolysis  4. Left leg gastroc recession, performed through separate incision  Precautions: 6 weeks post op as of 4/15/21; WBAT in CAM boot, weaning into lace up ankle brace at 6 wks, NO inversion/eversion ROM until 12 weeks (5/27/21)    Exercises:  5/7/2021 completed exercises marked with \"x\"    Exercise:  L LE Reps/ Time Weight/ Level Comments    sci fit 5 mins L1  x    gait  454 ft x1    Velcro brace/tennis shoe   RW, vc for push off.  x   Sit to standing  add  Without UE support           Standing       Gastro stretch 5x20\"   wedge  x   Soleus stretch 3x20\"  Added 5/7 x   Heel raises  10x2  ROM to ifrah, incr. Sets 5/7 x   3 way hip  15xea A  Added R LE 5/4, 10x ea/ex, ea LE 5/7 x   Wt shifting 15x  Fwd, lateral, retro  x   Wall lean  10x5\"  Foot intrinsics focus  x   marching 20x  Incr. Reps 5/7 x   Rocker board  2x10 L2 DF/PF only, Added 5/7 x   SLS 3x-12-15\"      x   tandem stance add      Step ups  10x 2 in Fwd, added 5/7 x          Sitting       domers  2 mins       Ankle DF,PF 20x1 ea  A  x   gastroc stretch 3x30\"    leg on mat, HELD 5/7     Soleus stretch 3x30\"  Heel on mat x   HS stretch 1x30\"    x   Gr toe ext. 10x2 A     Lessor toe ext  10x2 A       toes curls  15x A     Foot intrinsic  10x2 5\"  Incr. sets x   Sub max iso ankle inv/ev 10x5\"  Restarted 5/7 x   Heel prop 2 mins   x   Quad sets 10x5\"   x          Supine       Manual  x  PROM ankle DF/PF, scar massage, gastro stretching, STM plantar surface. Gastro stretch with HS stretch and ankle inversion  belt 3x1 min\"    belt, inversion x   Hip flexor/quad streth ADD      SLR 3- way      sidelying, supine - verbal instructions for HEP.                                                         Other:  5/7/21: instructed pt to progress to 4 hrs AM and 4 hr PM in velcro brace/tennis shoes 5/8     Specific Instructions for next treatment:   - focus on L ankle PF/DF ROM, passive and active/active assist  - WBAT in up/down an incline with least restrictive device without assist or supervision needed to allow for improved safety with entering/exiting home independently  6. Independent with Home Exercise Programs  7. Demonstrate Knowledge of fall prevention     LTG: (to be met in 24 treatments)  8. ? Pain: No more than 3/10 max pain in L ankle with therapy and at home. 9. ? ROM: Full ROM all planes of L ankle to indicate restored joint mobility post - op  10. ? Strength:   a. At least 5-/5 L ankle gross strength all planes  b. Able to complete 15 SLS heel raises on LLE to indicate improved functional PF strength  11. ? Balance: Pt able to complete retro and tandem ambulation for 50 ft ea without any sign of imbalance  12. ? Function:   a. Pt able to ambulate 500 ft in lace-up brace on LLE without device and equal stance time, no antalgic gait evident, consistent heel strike and full push off appreciated, and at a minimum of 1.0m/s to indicate safety for community ambulation speed  b. Pt able to ascend/descend stairs with min UE assist while completing with reciprocal pattern and good balance noted                 Patient goals: \"get back to work, walk normal, less pain\"       Pt. Education:  [x] Yes  [] No  [x] Reviewed Prior HEP/Ed  Method of Education: [x] Verbal  [x] Demo  [] Written  Comprehension of Education:  [x] Verbalizes understanding. [x] Demonstrates understanding. [] Needs review. [x] Demonstrates/verbalizes HEP/Ed previously given. Access Code: [email or web address]: [email or web address]/Date: 04/27/2021Prepared by: Chivo Quiroz Sitting Calf Stretch with Strap - 3 x daily - 7 x weekly - 3 sets - 3 reps   Long Sitting Soleus Stretch on Bolster with Strap - 3 x daily - 7 x weekly - 3 sets - 3 reps   Supine Hamstring Stretch with Strap with ankle DF and inversion - 3 x daily - 7 x weekly - 3 sets - 3 reps   Supine Ankle Pumps - 3 x daily - 7 x weekly - 2 sets - 20 reps   Seated Heel Toe Raises - 3 x daily - 7 x weekly - 2 sets - 20 reps   Isometric Ankle Dorsiflexion and Plantarflexion - 1 x daily - 3-4 x weekly - 1-2 sets - 10 reps   Long Sitting Isometric Ankle Plantarflexion with Ball at Wall - 1 x daily - 3-4 x weekly - 1-2 sets - 10 reps    Frequency:  2-3 x/week for 24 visits  Plan: [x] Continue current frequency toward long and short term goals. [x] Specific Instructions for subsequent treatments:  Progress ckc exercises and gait in velcro brace.  Add mat 3 way hip       Time In:   1303        Time Out: 1420    Electronically signed by:  Ashley Cantrell PTA

## 2021-05-11 ENCOUNTER — HOSPITAL ENCOUNTER (OUTPATIENT)
Dept: PHYSICAL THERAPY | Age: 61
Setting detail: THERAPIES SERIES
Discharge: HOME | End: 2021-05-11
Payer: COMMERCIAL

## 2021-05-11 NOTE — FLOWSHEET NOTE
[x] [facility]) - [facility] &  Therapy  [address]    [phone]  F: [phone]   [] [address]  [facility]   [address]  P: [phone]  F: [phone]  [] [address] &  Therapy  [address] [facility]  P: [phone]  F: [phone] [] [address]  P: [phone]  F: [phone]  [] [address]  Psychiatric   [address]   [place]: [phone]  F: [phone]   [] [address]  [place]: [phone]   F: [phone]     Physical Therapy Cancel/No Show note    Date: 2021  Patient: Vincenzo Lubin  : 1960  MRN: [id number]    Cancels/No Shows to date:     For today's appointment patient:    [x]  Cancelled    [] Rescheduled appointment    [] No-show     Reason given by patient:    []  Patient ill    []  Conflicting appointment    [] No transportation      [] Conflict with work    [] No reason given    [] Weather related    [] COVID-19    [x] Other:      Comments:  Not feeling well.       [] Next appointment was confirmed    Electronically signed by: Xuan Blount PTA

## 2021-05-14 ENCOUNTER — HOSPITAL ENCOUNTER (OUTPATIENT)
Dept: PHYSICAL THERAPY | Age: 61
Setting detail: THERAPIES SERIES
Discharge: HOME | End: 2021-05-14
Payer: COMMERCIAL

## 2021-05-14 PROCEDURE — 97110 THERAPEUTIC EXERCISES: CPT

## 2021-05-14 PROCEDURE — 97016 VASOPNEUMATIC DEVICE THERAPY: CPT

## 2021-05-14 NOTE — FLOWSHEET NOTE
[x] [facility] &  Therapy  [address]  [phone]  F: [phone] [] [address]  [facility]   [address]  P: [phone]  F: [phone] [] [address]  [address]  P: [phone]  F: [phone] [] [address]  P: [phone]  F: [phone] [] [address]  [facility]   [address]   [place]: [phone]  F: [phone]      Physical Therapy Daily Treatment Note    Date:  2021  Patient Name:  Vincenzo Lubin    :  1960  MRN: [id number]   Physician: Dr. Dobbins Prost: Adriana Duron (13vs)  Medical Diagnosis: Instability of left ankle joint; Peroneal tendinitis of left lower extremity; Gastrocnemius equinus of left lower extremity; Sprain of left ankle, unspecified ligament, subsequent encounter  Rehab Codes: S30.200K, R53.1, R26.9, R27.9  Onset date: 20                         Next 's appt.: DOS 21 Js  Visit# / total visits:  ; Progress note for STG due at visit #12    Cancels/No Shows: 0/0    Subjective:    Pain:  [x] Yes  [] No Location: L foot  Pain Rating: (0-10 scale) 4/10 dorsum foot  and lateral ankle. Pain altered Tx:  [] No  [x] Yes  Action:      Comments:   Patient arrives stating more burning in ankle this date, most since prior to surgery. Notes not pain so much as a burn. Notes she took a shower without the shower chair the other day. Objective:  Modalities:   Precautions  WBAT in lace up brace LLE beginning   Procedures Performed:  (3/4/2021)  1. Left ankle lateral ligamentous repair (with Arthrex fiber tack anchors x2 + internal brace)  2. Left peroneus brevis debridement  3.  Left peroneus longus tenolysis  4. Left leg gastroc recession, performed through separate incision  Precautions: 6 weeks post op as of 4/15/21; WBAT in CAM boot, weaning into lace up ankle brace at 6 wks, NO inversion/eversion ROM until 12 weeks (5/27/21)    Exercises:  5/14/2021 completed exercises marked with \"x\"    Exercise:  L LE Reps/ Time Weight/ Level Comments    sci fit 5 mins L1  x    gait  454 ft x1    Velcro brace/tennis shoe   RW, vc for push off. Sit to standing  add  Without UE support           Standing       Gastro stretch 5x20\"   wedge  x   Soleus stretch 3x20\"  Added 5/7 x   Heel raises  10x2  ROM to ifrah, incr. Sets 5/7 x   3 way hip  15xea A  Added R LE 5/4, 10x ea/ex, ea LE 5/7 x   Wt shifting 15x  Fwd, lateral, retro  x   Wall lean  10x5\"  Foot intrinsics focus  x   marching 20x  Incr. Reps 5/7 x   Rocker board  2x10 L2 DF/PF only, Added 5/7 x   SLS 3x-12-15\"      x   tandem stance 3x15\"  Added 5/14, in circuit with SLS x   Step ups  10x 2 in Fwd, added 5/7 x          Sitting       domers  2 mins       Ankle DF,PF 20x1 ea  A  x   gastroc stretch 3x30\"    leg on mat, HELD 5/7     Soleus stretch 3x30\"  Heel on mat x   HS stretch 3x30\"    x   Gr toe ext. 10x2 A     Lessor toe ext  10x2 A       toes curls  15x A     Foot intrinsic  10x2 5\"  Incr. sets x   Sub max iso ankle inv/ev 10x5\"  Restarted 5/7 x   Heel prop 2 mins      Quad sets 10x5\"   x          Supine       Manual  x  PROM ankle DF/PF, scar massage, gastro stretching, STM plantar surface. Gastro stretch with HS stretch and ankle inversion  belt 3x1 min\"    belt, inversion    Hip flexor/quad streth ADD      SLR 3- way      sidelying, supine - verbal instructions for HEP.                                                         Other:  5/7/21: instructed pt to progress to 4 hrs AM and 4 hr PM in velcro brace/tennis shoes 5/8     Specific Instructions for next treatment:   - focus on L ankle PF/DF ROM, passive and active/active assist  - WBAT in CAM BOOT lace up brace: weightshifts lateral/fwd, SLS as able  - gait train with walker, increasing WB    - quad/hamstring flexibility  - hip/knee OKC strength  - vasocompression for pain/swelling post treatment          Treatment Charges: Mins Units   []  Modalities     [x]  Ther Exercise 45 3   []  Manual Therapy     []  Ther Activities     []  Aquatics     [x]  Vasocompression 15 1   []  Other  gait     Total Treatment time    60 4     Assessment: [x] Progressing toward progressing  Patient with improved SLS and static balance overall this date with addition of tandem stance. Patient notes no exacerbation of burning sensation verbalized at arrival. Improved heel/toe gait pattern during ambulation through clinic this date as well. [] No change. [x] Other:    [x] Patient would continue to benefit from skilled physical therapy services in order to:  Progress ankle ROM and strengthening as well as progressing wt bearing in order to return to full ADL and job. STG: (to be met in 12 treatments)  1. ? Pain: No more than 6/10 max pain in L ankle with therapy and at home. 2. ? ROM:   a. At least 15deg PROM L ankle DF with knee extended to progress to maximal DF joint mobility to allow proper gait/stair mechanics  b. At least 45deg PROM L ankle PF for improved push off with gait  3. ? Strength:   a. At least 4/5 L PF strength for improving gait mechanics and stability in hind foot with ambulation/stair climbing  b. Able to tolerate L ankle inv/ev submaximal isometric strengthening without increase in pain    4. ? Balance: Pt able to hold tandem stance bilat for at least 30\" without UE assist to indicate improving balance in narrow ROBERT conditions  5. ? Function:   a. Pt able to ambulate 300 ft in lace-up brace on LLE using least restrictive device and near equal stance time, improving step length, and consistent heel strike LLE  b. Pt able to complete all transfers without UE assist and no imbalance noted  c.  Pt able to ambulate up/down an incline with least restrictive device without assist or supervision needed to allow for improved safety with entering/exiting home independently  6. Independent with Home Exercise Programs  7. Demonstrate Knowledge of fall prevention     LTG: (to be met in 24 treatments)  8. ? Pain: No more than 3/10 max pain in L ankle with therapy and at home. 9. ? ROM: Full ROM all planes of L ankle to indicate restored joint mobility post - op  10. ? Strength:   a. At least 5-/5 L ankle gross strength all planes  b. Able to complete 15 SLS heel raises on LLE to indicate improved functional PF strength  11. ? Balance: Pt able to complete retro and tandem ambulation for 50 ft ea without any sign of imbalance  12. ? Function:   a. Pt able to ambulate 500 ft in lace-up brace on LLE without device and equal stance time, no antalgic gait evident, consistent heel strike and full push off appreciated, and at a minimum of 1.0m/s to indicate safety for community ambulation speed  b. Pt able to ascend/descend stairs with min UE assist while completing with reciprocal pattern and good balance noted                 Patient goals: \"get back to work, walk normal, less pain\"       Pt. Education:  [x] Yes  [] No  [x] Reviewed Prior HEP/Ed  Method of Education: [x] Verbal  [x] Demo  [] Written  Comprehension of Education:  [x] Verbalizes understanding. [x] Demonstrates understanding. [] Needs review. [x] Demonstrates/verbalizes HEP/Ed previously given. Access Code: [email or web address]: [email or web address]/Date: 04/27/2021Prepared by: Gricel Mendoza Sitting Calf Stretch with Strap - 3 x daily - 7 x weekly - 3 sets - 3 reps   Long Sitting Soleus Stretch on Bolster with Strap - 3 x daily - 7 x weekly - 3 sets - 3 reps   Supine Hamstring Stretch with Strap with ankle DF and inversion - 3 x daily - 7 x weekly - 3 sets - 3 reps   Supine Ankle Pumps - 3 x daily - 7 x weekly - 2 sets - 20 reps   Seated Heel

## 2021-05-17 ENCOUNTER — HOSPITAL ENCOUNTER (OUTPATIENT)
Dept: PHYSICAL THERAPY | Age: 61
Setting detail: THERAPIES SERIES
Discharge: HOME | End: 2021-05-17
Payer: COMMERCIAL

## 2021-05-17 PROCEDURE — 97110 THERAPEUTIC EXERCISES: CPT

## 2021-05-17 PROCEDURE — 97112 NEUROMUSCULAR REEDUCATION: CPT

## 2021-05-17 PROCEDURE — 97016 VASOPNEUMATIC DEVICE THERAPY: CPT

## 2021-05-17 NOTE — FLOWSHEET NOTE
brace)  2. Left peroneus brevis debridement  3. Left peroneus longus tenolysis  4. Left leg gastroc recession, performed through separate incision  Precautions: 6 weeks post op as of 4/15/21; WBAT in CAM boot, weaning into lace up ankle brace at 6 wks, NO inversion/eversion ROM until 12 weeks (5/27/21)    Exercises:  5/17/2021 completed exercises marked with \"x\"    Exercise:  L LE Reps/ Time Weight/ Level Comments    sci fit 6 mins L2.5 Incr. Resistance 5/17 x    gait  454 ft x1    Velcro brace/tennis shoe   RW, vc for push off.     Gait/balance with many directional changes and retro steps    5 mins   No device, velcro brace, added 5/17 x   Sit to standing  add  Without UE support. L foot posterior placement. Standing       Gastro stretch 5x30\"   wedge  x   Soleus stretch 3x20\"    x   Heel raises  2x10 A   x   3 way hip  15xea A  L CKC x   Wt shifting 15x  Fwd, lateral, retro      Wall lean  10x5\"  Foot intrinsics focus  x   marching 20x A   x   Rocker board  25x L2 DF/PF only,  Incr. Reps 5/17 x   SLS 3x-12-15\"      x   tandem stance 3x15\"   challenging 5/17 x   Step ups  10x2 4 in  incr. Height 5/17 x   Step overs  10x2 2 in Added 5/17           Sitting       domers  2 mins       Ankle DF,PF  HEP  A      gastroc stretch HEP          Soleus stretch  D/C      HS stretch 3x30\"        Gr toe ext. HEP A     Lessor toe ext  HEP A       toes curls  15x A     Foot intrinsic  10x2 5\"  Incr. sets     Sub max iso ankle inv/ev 10x5\"    x   Heel prop 2 mins      Quad sets 10x5\"       tband ankle DF/PF only 10x blue Added 5/17 x   Gr toe flex/ext tband  10x lime Added 5/17 x          Supine       Manual  x  PROM ankle DF/PF, scar massage, gastro stretching, STM plantar surface. Gastro stretch with HS stretch and ankle inversion  belt 3x1 min\"    belt, inversion x   Hip flexor/quad stretch 3x30\"  Added 5/17 x   SLR 3- way      sidelying, supine - verbal instructions for HEP.                                                         Other:  5/7/21 and 5/17/21:instructed pt to progress to 5 hrs AM and 5 hr PM in velcro brace/tennis. Instructed she can use cam boot for long distance community ambulation if ankle pain high. pts copy of resistive ankle left in clinic, will issue next session along with green tband  (pt has blue band at home)      Specific Instructions for next treatment:   - focus on L ankle PF/DF ROM, passive and active/active assist  - WBAT in CAM BOOT lace up brace: weightshifts lateral/fwd, SLS as able  - gait train with walker, increasing WB    - quad/hamstring flexibility  - hip/knee OKC strength  - vasocompression for pain/swelling post treatment          Treatment Charges: Mins Units   []  Modalities     [x]  Ther Exercise 40  2   []  Manual Therapy     []  Ther Activities     []  Aquatics     [x]  Vasocompression 15 1   [x]  Other  Neuro re-ed   8 1   Total Treatment time    63 4     Assessment: [x] Progressing toward progressing   Added dynamic walking with directional change commands  and some retro step commands  x 5 mins with supervision in tennis shoes/velcro brace. No LOB demonstrated. Added resistive tband ankle DF/PF and toe flexion/ext, pt demonstrated good understanding but toe ROM  fatigue easily. [] No change. [x] Other:     [x] Patient would continue to benefit from skilled physical therapy services in order to:  Progress ankle ROM and strengthening as well as progressing wt bearing in order to return to full ADL and job. STG: (to be met in 12 treatments)  1. ? Pain: No more than 6/10 max pain in L ankle with therapy and at home. 2. ? ROM:   a. At least 15deg PROM L ankle DF with knee extended to progress to maximal DF joint mobility to allow proper gait/stair mechanics  b. At least 45deg PROM L ankle PF for improved push off with gait  3. ? Strength:   a. At least 4/5 L PF strength for improving gait mechanics and stability in hind foot with ambulation/stair climbing  b.  Able to tolerate L ankle inv/ev submaximal isometric strengthening without increase in pain    4. ? Balance: Pt able to hold tandem stance bilat for at least 30\" without UE assist to indicate improving balance in narrow ROBERT conditions  5. ? Function:   a. Pt able to ambulate 300 ft in lace-up brace on LLE using least restrictive device and near equal stance time, improving step length, and consistent heel strike LLE  b. Pt able to complete all transfers without UE assist and no imbalance noted  c. Pt able to ambulate up/down an incline with least restrictive device without assist or supervision needed to allow for improved safety with entering/exiting home independently  6. Independent with Home Exercise Programs  7. Demonstrate Knowledge of fall prevention     LTG: (to be met in 24 treatments)  8. ? Pain: No more than 3/10 max pain in L ankle with therapy and at home. 9. ? ROM: Full ROM all planes of L ankle to indicate restored joint mobility post - op  10. ? Strength:   a. At least 5-/5 L ankle gross strength all planes  b. Able to complete 15 SLS heel raises on LLE to indicate improved functional PF strength  11. ? Balance: Pt able to complete retro and tandem ambulation for 50 ft ea without any sign of imbalance  12. ? Function:   a. Pt able to ambulate 500 ft in lace-up brace on LLE without device and equal stance time, no antalgic gait evident, consistent heel strike and full push off appreciated, and at a minimum of 1.0m/s to indicate safety for community ambulation speed  b. Pt able to ascend/descend stairs with min UE assist while completing with reciprocal pattern and good balance noted                 Patient goals: \"get back to work, walk normal, less pain\"       Pt. Education:  [x] Yes  [] No  [x] Reviewed Prior HEP/Ed  Method of Education: [x] Verbal  [x] Demo  [] Written  Comprehension of Education:  [x] Verbalizes understanding. [x] Demonstrates understanding. [] Needs review.   [x]

## 2021-05-19 ENCOUNTER — HOSPITAL ENCOUNTER (OUTPATIENT)
Dept: PHYSICAL THERAPY | Age: 61
Setting detail: THERAPIES SERIES
Discharge: HOME | End: 2021-05-19
Payer: COMMERCIAL

## 2021-05-19 PROCEDURE — 97016 VASOPNEUMATIC DEVICE THERAPY: CPT

## 2021-05-19 PROCEDURE — 97110 THERAPEUTIC EXERCISES: CPT

## 2021-05-19 PROCEDURE — 97112 NEUROMUSCULAR REEDUCATION: CPT

## 2021-05-19 NOTE — FLOWSHEET NOTE
[x] [facility]) [facility] &  Therapy  [address]  [phone]  F: [phone] [] [address]  [place] [address]   [address]  P: [phone]  F: [phone] [] [address] &  Therapy  [address]  P: [phone]  F: [phone] [] [address]  P: [phone]  F: [phone] [] [address]  [facility]   [address]   [place]: [phone]  F: [phone]      Physical Therapy Daily Treatment Note    Date:  2021  Patient Name:  Wilbert Gonzales    :  1960  MRN: [id number]   Physician: Dr. Batool Napier: [employer] (13vs)  Medical Diagnosis: Instability of left ankle joint; Peroneal tendinitis of left lower extremity; Gastrocnemius equinus of left lower extremity; Sprain of left ankle, unspecified ligament, subsequent encounter  Rehab Codes: T47.187R, R53.1, R26.9, R27.9  Onset date: 20                         Next 's appt.: DOS 21 Js  Visit# / total visits:  (correct visit # ),   Progress note for STG due at visit #12    Cancels/No Shows: 0/0    Subjective:    Pain:  [x] Yes  [] No Location: L foot  Pain Rating: (0-10 scale) 3-4/10  Lateral ankle   Pain altered Tx:  [] No  [x] Yes  Action:      Comments:    Reports burning sensation has let up, but had lateral ankle pain this AM with this pain easing up a litlle as day goes on. No complaints with prior session. Objective:  Modalities:   Precautions  WBAT in lace up brace LLE beginning   Procedures Performed:  (3/4/2021)  1. Left ankle lateral ligamentous repair (with [employer] fiber tack anchors x 2 + internal brace)  2. Left peroneus brevis debridement  3.  Left peroneus longus tenolysis  4. Left leg gastroc recession, performed through separate incision  Precautions: 6 weeks post op as of 4/15/21; WBAT in CAM boot, weaning into lace up ankle brace at 6 wks, NO inversion/eversion ROM until 12 weeks (5/27/21)    Exercises:  5/19/2021 completed exercises marked with \"x\"    Exercise:  L LE Reps/ Time Weight/ Level Comments    sci fit 6 mins L2.5 I  x    gait  454 ft x1    Velcro brace/tennis shoe   RW, vc for push off.     Gait/balance with many directional changes and retro steps    5 mins   No device, velcro brace, added 5/17     Sit to standing  add  Without UE support. L foot posterior placement. Standing       Gastro stretch 5x20\"   wedge  x   Soleus stretch 5x20\"   incr. Reps 5/18 x   Toe extension stretch 3x15\"   x   Heel raises  2x10 A   x   3 way hip  15xea A  L CKC      mini fwd lunges 10x2  Added  x   Wall lean  10x5\"  Foot intrinsics focus  x   marching 20x A   x   Rocker board  25x L2 DF/PF only,    x   SLS 3x-12-15\"      x   tandem stance 3x15\"     x   Step ups  10x2 4 in   x   Step overs  10x2 2 in   x   TG single heel raise 2x10 L20  fatiguing, added 5/19 x          Sitting       domers  2 mins    x   Ankle DF,PF  HEP  A      gastroc stretch HEP          Soleus stretch  D/C      HS stretch 3x30\"        Gr toe ext. HEP A     Lessor toe ext  HEP A     toes curls marble pic up 12x2 A  x   Foot intrinsic  10x2 5\"    x   Sub max iso ankle inv/ev 10x5\"    x   Heel prop 2 mins      Quad sets 10x5\"       tband ankle DF/PF only 10x blue   x   Gr toe flex/ext tband  x lime Attempted, unable ? fatigue due to end of session. x          Supine       Manual  x  PROM ankle DF/PF, scar massage, gastro stretching, STM plantar surface. Gastro stretch with HS stretch and ankle inversion  belt 3x1 min\"    belt, inversion, restart next session     Hip flexor/quad stretch 3x30\"  Added 5/17, restart next session.     SLR 3- way      sidelying, supine - verbal instructions for HEP.                                                        Other:  5/7/21 and 5/17/21:instructed pt to progress to 5 hrs AM and 5 hr PM in velcro brace/tennis. Instructed she can use cam boot for long distance community ambulation if ankle pain high. pts copy of resistive ankle left in clinic, will issue next session along with green jonna  (pt has blue band at home)      Specific Instructions for next treatment:   - focus on L ankle PF/DF ROM, passive and active/active assist  - WBAT in CAM BOOT lace up brace: weightshifts lateral/fwd, SLS as able  - gait train with walker, increasing WB    - quad/hamstring flexibility  - hip/knee OKC strength  - vasocompression for pain/swelling post treatment          Treatment Charges: Mins Units   []  Modalities     [x]  Ther Exercise  50 3   []  Manual Therapy     []  Ther Activities     []  Aquatics     [x]  Vasocompression 15 1   [x]  Other  Neuro re-ed  10 1   Total Treatment time   75 5     Assessment: [x] Progressing toward progressing   Focus on stability, ankle DF/PF muscle length, ROM and strengthening as charted. Added PWB single heel raises, fatigues easily. Demonstrated improvement with all static balance exercise. Gait mechanics demonstrates decreased L knee flexion and swing thru due to medial knee pain. Pt will address with MD on 5/21/21. [] No change. [x] Other:     [x] Patient would continue to benefit from skilled physical therapy services in order to:  Progress ankle ROM and strengthening as well as progressing wt bearing in order to return to full ADL and job. STG: (to be met in 12 treatments)  1. ? Pain: No more than 6/10 max pain in L ankle with therapy and at home. 2. ? ROM:   a. At least 15deg PROM L ankle DF with knee extended to progress to maximal DF joint mobility to allow proper gait/stair mechanics  b. At least 45deg PROM L ankle PF for improved push off with gait  3. ? Strength:   a.  At least 4/5 L PF strength for improving gait mechanics and stability in hind foot with ambulation/stair climbing  b. Able to tolerate L ankle inv/ev submaximal isometric strengthening without increase in pain    4. ? Balance: Pt able to hold tandem stance bilat for at least 30\" without UE assist to indicate improving balance in narrow ROBERT conditions  5. ? Function:   a. Pt able to ambulate 300 ft in lace-up brace on LLE using least restrictive device and near equal stance time, improving step length, and consistent heel strike LLE  b. Pt able to complete all transfers without UE assist and no imbalance noted  c. Pt able to ambulate up/down an incline with least restrictive device without assist or supervision needed to allow for improved safety with entering/exiting home independently  6. Independent with Home Exercise Programs  7. Demonstrate Knowledge of fall prevention     LTG: (to be met in 24 treatments)  8. ? Pain: No more than 3/10 max pain in L ankle with therapy and at home. 9. ? ROM: Full ROM all planes of L ankle to indicate restored joint mobility post - op  10. ? Strength:   a. At least 5-/5 L ankle gross strength all planes  b. Able to complete 15 SLS heel raises on LLE to indicate improved functional PF strength  11. ? Balance: Pt able to complete retro and tandem ambulation for 50 ft ea without any sign of imbalance  12. ? Function:   a. Pt able to ambulate 500 ft in lace-up brace on LLE without device and equal stance time, no antalgic gait evident, consistent heel strike and full push off appreciated, and at a minimum of 1.0m/s to indicate safety for community ambulation speed  b. Pt able to ascend/descend stairs with min UE assist while completing with reciprocal pattern and good balance noted                 Patient goals: \"get back to work, walk normal, less pain\"       Pt.  Education:  [x] Yes  [] No  [x] Reviewed Prior HEP/Ed  Method of Education: [x] Verbal  [x] Demo  [] Written  Comprehension of Education:  [x] [employer] understanding. [x] Demonstrates understanding. [] Needs review. [x] Demonstrates/verbalizes HEP/Ed previously given. Access Code: [email or web address]: [employer]/Date: 04/27/2021Prepared by: Flip Constant Sitting Calf Stretch with Strap - 3 x daily - 7 x weekly - 3 sets - 3 reps   Long Sitting Soleus Stretch on Bolster with Strap - 3 x daily - 7 x weekly - 3 sets - 3 reps   Supine Hamstring Stretch with Strap with ankle DF and inversion - 3 x daily - 7 x weekly - 3 sets - 3 reps   Supine Ankle Pumps - 3 x daily - 7 x weekly - 2 sets - 20 reps   Seated Heel Toe Raises - 3 x daily - 7 x weekly - 2 sets - 20 reps   Isometric Ankle Dorsiflexion and Plantarflexion - 1 x daily - 3-4 x weekly - 1-2 sets - 10 reps   Long Sitting Isometric Ankle Plantarflexion with Ball at Wall - 1 x daily - 3-4 x weekly - 1-2 sets - 10 reps  Access Code: [email or web address]  URL: [email or web address]  Date: 05/17/2021  Prepared by: Rebekah Lee    Exercises  Long Sitting Ankle Dorsiflexion with Anchored Resistance - 1 x daily - 5 x weekly - 2 sets - 10 reps  Ankle and Toe Plantarflexion with Resistance - 1 x daily - 5 x weekly - 2 sets - 10 reps    Frequency:  2-3 x/week for 24 visits  Plan: [x] Continue current frequency toward long and short term goals. [x] Specific Instructions for subsequent treatments:  Progress ckc exercises and gait in velcro brace.         Time In:    1258       Time Out:  1424     Electronically signed by:  Cholo Coronado PTA

## 2021-05-21 ENCOUNTER — HOSPITAL ENCOUNTER (OUTPATIENT)
Dept: PHYSICAL THERAPY | Age: 61
Setting detail: THERAPIES SERIES
Discharge: HOME | End: 2021-05-21
Payer: COMMERCIAL

## 2021-05-21 ENCOUNTER — OFFICE VISIT (OUTPATIENT)
Dept: ORTHOPEDIC SURGERY | Age: 61
End: 2021-05-21

## 2021-05-21 VITALS
RESPIRATION RATE: 12 BRPM | WEIGHT: 175 LBS | HEART RATE: 97 BPM | BODY MASS INDEX: 33.04 KG/M2 | HEIGHT: 61 IN | OXYGEN SATURATION: 98 %

## 2021-05-21 DIAGNOSIS — M25.372 INSTABILITY OF LEFT ANKLE JOINT: Primary | ICD-10-CM

## 2021-05-21 PROCEDURE — 97110 THERAPEUTIC EXERCISES: CPT

## 2021-05-21 PROCEDURE — 99024 POSTOP FOLLOW-UP VISIT: CPT | Performed by: ORTHOPAEDIC SURGERY

## 2021-05-21 PROCEDURE — 97016 VASOPNEUMATIC DEVICE THERAPY: CPT

## 2021-05-21 PROCEDURE — 97112 NEUROMUSCULAR REEDUCATION: CPT

## 2021-05-21 NOTE — PROGRESS NOTES
mortise is maintained with no widening of the clear spaces. Overall alignment is satisfactory. IMPRESSION:  No acute fracture/dislocation. Electronically signed by Andrew Recinos MD      ASSESSMENT AND PLAN:     11 weeks s/p above, doing well overall       Precautions:  nonweight bearing x 4 weeks anticipated on the Left lower extremity--completed             --             [x]? Physical Therapy/Home exercises   --continue to progress her activity, no limitations in terms of motion     Immobilization:      []? Splint/Cast               []?  CAM boot                    [x]? Comfortable shoe    [x]? Other: Lace up ankle brace as needed, she does not need this for daily activities               DVT ppx:   [x]? Early mobilization              []?  Medication as prescribed (Aspirin 325 mg PO q Day)                     [x]? No chemical ppx needed; mechanical only             -          [x]? Avoid strenuous activity/pain provoking maneuvers and high-impact repetitive exercises       All questions were answered and the patient agrees with the above plan. The patient will return to clinic in 4 to 6 weeks with left knee x-rays (she wishes to discuss her left knee at her next visit)           Return in about 4 weeks (around 6/18/2021). No orders of the defined types were placed in this encounter.     Orders Placed This Encounter   Procedures    Ambulatory referral to Physical Therapy     Referral Priority:   Routine     Referral Type:   Eval and Treat     Referral Reason:   Specialty Services Required     Requested Specialty:   Physical Therapy     Number of Visits Requested:   1    DME Order for (Specify) as OP     DME: compression stockings (20-30 mm Hg)  Routine, External, Referral By - Sybil Jett, Qty-1, Supply Name: Compression Stockings Prognosis: good Estimated length of need: 99         Juan Francisco King MD  Orthopedic Surgery        Please excuse any typos/errors, as this note was created with the assistance of voice recognition software. While intending to generate a document that actually reflects the content of the visit, the document can still have some errors including those of syntax and sound-a-like substitutions which may escape proof reading. In such instances, actual meaning can be extrapolated by context.

## 2021-05-21 NOTE — FLOWSHEET NOTE
[x] [facility]) [facility] &  Therapy  [address]  [phone]  F: [phone] [] [address]  [place] [address]   [address]  P: [phone]  F: [phone] [] [place]  [address]  P: [phone]  F: [phone] [] [address]  P: [phone]  F: [phone] [] [address]  [place]   [address]   [place]: [phone]  F: [phone]      Physical Therapy Daily Treatment Note    Date:  2021  Patient Name:  Pj Bryson    :  1960  MRN: [id number]   Physician: Dr. Sasha Baker: [employer] (13vs)  Medical Diagnosis: Instability of left ankle joint; Peroneal tendinitis of left lower extremity; Gastrocnemius equinus of left lower extremity; Sprain of left ankle, unspecified ligament, subsequent encounter  Rehab Codes: U95.706N, R53.1, R26.9, R27.9  Onset date: 20                         Next 's appt.: DOS 21 Js  Visit# / total visits:  (correct visit # ),   Progress note for STG due at visit #12    Cancels/No Shows: 0/0    Subjective:    Pain:  [x] Yes  [] No Location: L foot  Pain Rating: (0-10 scale) 3-4/10  Lateral ankle   Pain altered Tx:  [] No  [x] Yes  Action:      Comments:    Reports foot/ankle feels fine today but otherwise does not feel feel right. (was not specific when asked to elaborate)  Pt has fully weaned out of cam boot. Objective:  Modalities:   Precautions  WBAT in lace up brace LLE beginning   Procedures Performed:  (3/4/2021)  1. Left ankle lateral ligamentous repair (with Arthrex fiber tack anchors x 2 + internal brace)  2. Left peroneus brevis debridement  3. Left peroneus longus tenolysis  4.  Left leg gastroc recession, performed through separate incision  Precautions: 6 weeks post op as of 4/15/21; WBAT in CAM boot, weaning into lace up ankle brace at 6 wks, NO inversion/eversion ROM until 12 weeks (5/27/21)    Exercises:  5/21/2021 completed exercises marked with \"x\"    Exercise:  L LE Reps/ Time Weight/ Level Comments    sci fit 6 mins L2.5   x    gait  100 ft x1    Velcro brace/tennis shoe   RW, vc for push off.  x   Gait/balance with many directional changes and retro steps    3 mins   No device, velcro brace, added 5/17  x   Sit to standing  add  Without UE support. L foot posterior placement. Standing       Gastro stretch 3x30\"   wedge  x   Soleus stretch 3x30\"   incr. Reps 5/18 x   Toe extension stretch 3x15\"       Heel raises  2x10 A   x   3 way hip  15xea A  L CKC      mini fwd lunges 10x2    x   Wall lean  10x5\"  Foot intrinsics focus  x   marching 20x A       Rocker board  25x L2 DF/PF only,        baps 20x L2 DF/PF only, added  x   SLS 3x-12-15\"      x   tandem stance 3x15\"     x   Step ups  10x2 4 in   x   Step overs  10x2 2 in   x   TG single heel raise 2x10 L20  fatiguing,  NOT available 5/21            Sitting       domers  2 mins        Ankle DF,PF  HEP  A      gastroc stretch HEP          Soleus stretch  D/C      HS stretch 3x30\"        Gr toe ext. HEP A     Lessor toe ext  HEP A     toes curls marble pic up 12x2 A  x   Foot intrinsic  10x2 5\"    x   Sub max iso ankle inv/ev 10x5\"    x   Heel prop 2 mins      Quad sets 10x5\"       tband ankle DF/PF only 10x blue   x   Gr toe flex/ext tband  x lime  AROM 5/21 x          Supine       Manual  x  PROM ankle DF/PF, scar massage, gastro stretching, STM plantar surface. Gastro stretch with HS stretch and ankle inversion  belt 3x1 min\"    belt, inversion,      x   Hip flexor/quad stretch 3x30\"   , restart next session. SLR 3- way      sidelying, supine - verbal instructions for HEP.                                                         Other:  5/7/21 and 5/17/21:instructed pt to progress to 5 hrs AM and 5 hr PM in velcro brace/tennis. Instructed she can use cam boot for long distance community ambulation if ankle pain high.        Specific Instructions for next treatment:   - focus on L ankle PF/DF ROM, passive and active/active assist  - WBAT in CAM BOOT lace up brace: weightshifts lateral/fwd, SLS as able  - gait train with walker, increasing WB    - quad/hamstring flexibility  - hip/knee OKC strength  - vasocompression for pain/swelling post treatment          Treatment Charges: Mins Units   []  Modalities     [x]  Ther Exercise 39  2   []  Manual Therapy     []  Ther Activities     []  Aquatics     [x]  Vasocompression 15 1   [x]  Other  Neuro re-ed  10 1   Total Treatment time    64 4     Assessment: [x] Progressing toward progressing progressing with static standing stability. Weaned fully out of cam boot. [] No change. [x] Other:  Gait  demonstrated decreased L distal LE swing thru and push off. Pt reports medial knee pain. L ankle is sore and fatigues only over the past two days. [x] Patient would continue to benefit from skilled physical therapy services in order to:  Progress ankle ROM and strengthening as well as progressing wt bearing in order to return to full ADL and job. STG: (to be met in 12 treatments)  1. ? Pain: No more than 6/10 max pain in L ankle with therapy and at home. 2. ? ROM:   a. At least 15deg PROM L ankle DF with knee extended to progress to maximal DF joint mobility to allow proper gait/stair mechanics  b. At least 45deg PROM L ankle PF for improved push off with gait  3. ? Strength:   a. At least 4/5 L PF strength for improving gait mechanics and stability in hind foot with ambulation/stair climbing  b.  Able to tolerate L ankle inv/ev submaximal isometric strengthening without increase in pain    4. ? Balance: Pt able to hold tandem stance bilat for at least 30\" without UE assist to indicate improving balance in narrow ROBERT conditions  5. ? Function:   a. Pt able to ambulate 300 ft in lace-up brace on LLE using least restrictive device and near equal stance time, improving step length, and consistent heel strike LLE  b. Pt able to complete all transfers without UE assist and no imbalance noted  c. Pt able to ambulate up/down an incline with least restrictive device without assist or supervision needed to allow for improved safety with entering/exiting home independently  6. Independent with Home Exercise Programs  7. Demonstrate Knowledge of fall prevention     LTG: (to be met in 24 treatments)  8. ? Pain: No more than 3/10 max pain in L ankle with therapy and at home. 9. ? ROM: Full ROM all planes of L ankle to indicate restored joint mobility post - op  10. ? Strength:   a. At least 5-/5 L ankle gross strength all planes  b. Able to complete 15 SLS heel raises on LLE to indicate improved functional PF strength  11. ? Balance: Pt able to complete retro and tandem ambulation for 50 ft ea without any sign of imbalance  12. ? Function:   a. Pt able to ambulate 500 ft in lace-up brace on LLE without device and equal stance time, no antalgic gait evident, consistent heel strike and full push off appreciated, and at a minimum of 1.0m/s to indicate safety for community ambulation speed  b. Pt able to ascend/descend stairs with min UE assist while completing with reciprocal pattern and good balance noted                 Patient goals: \"get back to work, walk normal, less pain\"       Pt. Education:  [x] Yes  [] No  [x] Reviewed Prior HEP/Ed  Method of Education: [x] Verbal  [x] Demo  [] Written  Comprehension of Education:  [x] Verbalizes understanding. [x] Demonstrates understanding. [] Needs review. [x] Demonstrates/verbalizes HEP/Ed previously given. Access Code: [email or web address]: [email or web address]. com/Date: 04/27/2021Prepared by: Debi Jaime   Long Sitting Calf Stretch with

## 2021-05-24 ENCOUNTER — HOSPITAL ENCOUNTER (OUTPATIENT)
Dept: PHYSICAL THERAPY | Age: 61
Setting detail: THERAPIES SERIES
Discharge: HOME | End: 2021-05-24
Payer: COMMERCIAL

## 2021-05-24 NOTE — FLOWSHEET NOTE
[x] [facility] ([facility]) [facility] &  Therapy  [address]    [phone]  F: [phone]   [] [address]  [facility]   [address]  P: [phone]  F: [phone]  [] [address] &  Therapy  [address]  P: [phone]  F: [phone] [] [address]  P: [phone]  F: [phone]  [] [address]  [address] [address] [facility] [address]   [address]   [place]: [phone]  F: [phone]   [] [facility]  [address] [facility] [address]  [place]: [phone]   F: [phone]     Physical Therapy Cancel/No Show note    Date: 2021  Patient: Corby De Souza  : 1960  MRN: [id number]    Cancels/No Shows to date:     For today's appointment patient:    [x]  Cancelled    [] Rescheduled appointment    [] No-show     Reason given by patient:    []  Patient ill    []  Conflicting appointment    [] No transportation      [] Conflict with work    [] No reason given    [] Weather related    [] CSIIN-95    [x] Other:      Comments: Still out of town.       [x] Next appointment was confirmed    Electronically signed by: Laurie Perez PT

## 2021-05-28 ENCOUNTER — HOSPITAL ENCOUNTER (OUTPATIENT)
Dept: PHYSICAL THERAPY | Age: 61
Setting detail: THERAPIES SERIES
Discharge: HOME | End: 2021-05-28
Payer: COMMERCIAL

## 2021-05-28 DIAGNOSIS — F41.9 ANXIETY: ICD-10-CM

## 2021-05-28 PROCEDURE — 97110 THERAPEUTIC EXERCISES: CPT

## 2021-05-28 RX ORDER — BUSPIRONE HYDROCHLORIDE 15 MG/1
TABLET ORAL
Qty: 56 TABLET | Refills: 3 | Status: SHIPPED | OUTPATIENT
Start: 2021-05-28 | End: 2021-09-20

## 2021-05-28 NOTE — TELEPHONE ENCOUNTER
Corby De Souza is calling to request a refill on the following medication(s):    Last Visit Date (If Applicable):  9/0/9680    Next Visit Date:    Visit date not found    Medication Request:  Requested Prescriptions     Pending Prescriptions Disp Refills    busPIRone (BUSPAR) 15 MG tablet [Pharmacy Med Name: BUSPIRONE HCL 15MG ORAL TABLET] 56 tablet 3     Sig: TAKE 1 TABLET BY MOUTH TWICE DAILY

## 2021-05-28 NOTE — FLOWSHEET NOTE
plantar surface. Gastro stretch with HS stretch and ankle inversion  belt 3x1 min\"    belt, inversion,      x   Hip flexor/quad stretch 3x30\"   , restart next session. SLR 3- way      sidelying, supine - verbal instructions for HEP.                       Other:           Specific Instructions for next treatment:   - focus on L ankle PF/DF ROM, passive and active/active assist  - WBAT in CAM BOOT lace up brace: weightshifts lateral/fwd, SLS as able  - gait train with walker, increasing WB    - quad/hamstring flexibility  - hip/knee OKC strength  - vasocompression for pain/swelling post treatment          Treatment Charges: Mins Units   []  Modalities     [x]  Ther Exercise  55 4   []  Manual Therapy     []  Ther Activities     []  Aquatics     [x]  Vasocompression  --    [x]  Other  Neuro re-ed  --    Total Treatment time    55 4     Assessment: [x] Progressing toward Added ankle inversion/eversion as charted. focus and continue with stretching distal  musculature of distal L LE.       [] No change. [x] Other:  Gait  demonstrated decreased L distal LE swing thru and push off. Pt reports medial knee pain. L ankle is sore and fatigues only over the past two days. Fatigue with single heel raise on total gym. [x] Patient would continue to benefit from skilled physical therapy services in order to:  Progress ankle ROM and strengthening as well as progressing wt bearing in order to return to full ADL and job. STG: (to be met in 12 treatments)  1. ? Pain: No more than 6/10 max pain in L ankle with therapy and at home. 2. ? ROM:   a. At least 15deg PROM L ankle DF with knee extended to progress to maximal DF joint mobility to allow proper gait/stair mechanics  b. At least 45deg PROM L ankle PF for improved push off with gait  3. ? Strength:   a.  At least 4/5 L PF strength for improving gait mechanics and stability in hind foot with ambulation/stair climbing  b. Able to tolerate L ankle inv/ev submaximal isometric strengthening without increase in pain    4. ? Balance: Pt able to hold tandem stance bilat for at least 30\" without UE assist to indicate improving balance in narrow ROBERT conditions  5. ? Function:   a. Pt able to ambulate 300 ft in lace-up brace on LLE using least restrictive device and near equal stance time, improving step length, and consistent heel strike LLE  b. Pt able to complete all transfers without UE assist and no imbalance noted  c. Pt able to ambulate up/down an incline with least restrictive device without assist or supervision needed to allow for improved safety with entering/exiting home independently  6. Independent with Home Exercise Programs  7. Demonstrate Knowledge of fall prevention     LTG: (to be met in 24 treatments)  8. ? Pain: No more than 3/10 max pain in L ankle with therapy and at home. 9. ? ROM: Full ROM all planes of L ankle to indicate restored joint mobility post - op  10. ? Strength:   a. At least 5-/5 L ankle gross strength all planes  b. Able to complete 15 SLS heel raises on LLE to indicate improved functional PF strength  11. ? Balance: Pt able to complete retro and tandem ambulation for 50 ft ea without any sign of imbalance  12. ? Function:   a. Pt able to ambulate 500 ft in lace-up brace on LLE without device and equal stance time, no antalgic gait evident, consistent heel strike and full push off appreciated, and at a minimum of 1.0m/s to indicate safety for community ambulation speed  b. Pt able to ascend/descend stairs with min UE assist while completing with reciprocal pattern and good balance noted                 Patient goals: \"get back to work, walk normal, less pain\"       Pt. Education:  [x] Yes  [] No  [x] Reviewed Prior HEP/Ed  Method of Education: [x] Verbal  [x] Demo  [] Written  Comprehension of Education:  [x] Verbalizes understanding. [x] Demonstrates understanding. [] Needs review.   [x] Demonstrates/verbalizes HEP/Ed previously given. Access Code: [email or web address]: [employer]/Date: 04/27/2021Prepared by: Angelina Guerrero Sitting Calf Stretch with Strap - 3 x daily - 7 x weekly - 3 sets - 3 reps   Long Sitting Soleus Stretch on Bolster with Strap - 3 x daily - 7 x weekly - 3 sets - 3 reps   Supine Hamstring Stretch with Strap with ankle DF and inversion - 3 x daily - 7 x weekly - 3 sets - 3 reps   Supine Ankle Pumps - 3 x daily - 7 x weekly - 2 sets - 20 reps   Seated Heel Toe Raises - 3 x daily - 7 x weekly - 2 sets - 20 reps   Isometric Ankle Dorsiflexion and Plantarflexion - 1 x daily - 3-4 x weekly - 1-2 sets - 10 reps   Long Sitting Isometric Ankle Plantarflexion with Ball at Wall - 1 x daily - 3-4 x weekly - 1-2 sets - 10 reps  Access Code: [email or web address]  URL: [employer]/  Date: 05/17/2021  Prepared by: Jesica Maldonado    Exercises  Long Sitting Ankle Dorsiflexion with Anchored Resistance - 1 x daily - 5 x weekly - 2 sets - 10 reps  Ankle and Toe Plantarflexion with Resistance - 1 x daily - 5 x weekly - 2 sets - 10 reps    Frequency:  2-3 x/week for 24 visits  Plan: [x] Continue current frequency toward long and short term goals. [x] Specific Instructions for subsequent treatments:  Progress ckc exercises and gait in velcro brace.         Time In:    1133         Time Out:  5853    Electronically signed by:  Ethel Rinne, PTA

## 2021-06-01 ENCOUNTER — HOSPITAL ENCOUNTER (OUTPATIENT)
Dept: PHYSICAL THERAPY | Age: 61
Setting detail: THERAPIES SERIES
Discharge: HOME | End: 2021-06-01
Payer: COMMERCIAL

## 2021-06-01 NOTE — FLOWSHEET NOTE
[x] [facility]) [facility] &  Therapy  [address]    [phone]  F: [phone]   [] [address]  [facility]   [address]  P: [phone]  F: [phone]  [] [place]  [address]  P: [phone]  F: [phone] [] [address]  P: [phone]  F: [phone]  [] [address]  [facility]   [address]   [place]: [phone]  F: [phone]   [] [facility]  [address] [facility] [address]  [place]: [phone]   F: [phone]     Physical Therapy Cancel/No Show note    Date: 2021  Patient: Amy Dong  : 1960  MRN: [id number]    Cancels/No Shows to date: 30    For today's appointment patient:    [x]  Cancelled    [] Rescheduled appointment    [] No-show     Reason given by patient:    []  Patient ill    []  Conflicting appointment    [] No transportation      [] Conflict with work    [] No reason given    [] Weather related    [] [id number]    [] Other:      Comments:        [x] Next appointment was confirmed  21    Electronically signed by: Duc Rodríguez PTA

## 2021-06-04 ENCOUNTER — HOSPITAL ENCOUNTER (OUTPATIENT)
Dept: PHYSICAL THERAPY | Age: 61
Setting detail: THERAPIES SERIES
Discharge: HOME | End: 2021-06-04
Payer: COMMERCIAL

## 2021-06-04 NOTE — FLOWSHEET NOTE
[x] [address]  [address]    [phone]  F: [phone]     Physical Therapy Cancel/No Show note    Date: 2021  Patient: Garland Black  : 1960  MRN: [id number]    Cancels/No Shows to date: 3/0    For today's appointment patient:    [x]  Cancelled    [] Rescheduled appointment    [] No-show     Reason given by patient:    []  Patient ill    []  Conflicting appointment    [x] No transportation      [] Conflict with work    [] No reason given    [] Weather related    [] UCLND-53    [] Other:      Comments:        [] Next appointment was confirmed      Electronically signed by: Senia Caldwell PTA

## 2021-06-09 ENCOUNTER — HOSPITAL ENCOUNTER (OUTPATIENT)
Dept: PHYSICAL THERAPY | Age: 61
Setting detail: THERAPIES SERIES
Discharge: HOME | End: 2021-06-09
Payer: COMMERCIAL

## 2021-06-09 PROCEDURE — 97110 THERAPEUTIC EXERCISES: CPT

## 2021-06-09 PROCEDURE — 97016 VASOPNEUMATIC DEVICE THERAPY: CPT

## 2021-06-09 NOTE — FLOWSHEET NOTE
[x] [facility]) [facility] &  Therapy  [address]  [phone]  F: [phone] [] [address]  [address]   [address]  P: [phone]  F: [phone] [] [address] &  Therapy  [address]  P: [phone]  F: [phone] [] [address]  P: [phone]  F: [phone] [] [address]  [facility]   [address]   [place]: [phone]  F: [phone]      Physical Therapy Daily Treatment Note    Date:  2021  Patient Name:  King Alfred    :  1960  MRN: [id number]   Physician: Dr. Moises Brothers: Rakesh Barrett (13vs)  Medical Diagnosis: Instability of left ankle joint; Peroneal tendinitis of left lower extremity; Gastrocnemius equinus of left lower extremity; Sprain of left ankle, unspecified ligament, subsequent encounter  Rehab Codes: C04.148U, R53.1, R26.9, R27.9  Onset date: 20                         Next 's appt.: DOS 21 Js  Visit# / total visits: 10/24 (correct visit # ),   Progress note for STG due at visit #12    Cancels/No Shows: 0/0    Subjective:    Pain:  [x] Yes  [] No Location: L foot  Pain Rating: (0-10 scale) 2/10  Lateral ankle   Pain altered Tx:  [] No  [x] Yes  Action:      Comments:     Patient arrives stating overall feeling exhausted today, however notes minimal pain in ankle today. Improved gait pattern at arrival.     Objective:  Modalities:   Precautions  WBAT in lace up brace LLE beginning   Procedures Performed:  (3/4/2021)  1. Left ankle lateral ligamentous repair (with Arthrex fiber tack anchors x 2 + internal brace)  2. Left peroneus brevis debridement  3. Left peroneus longus tenolysis  4.  Left leg gastroc recession, performed through separate incision  Precautions: 6 weeks post op as of 4/15/21; WBAT in CAM boot, weaning into lace up ankle brace at 6 wks, NO inversion/eversion ROM until 12 weeks (5/27/21)    Exercises:  6/9/2021 completed exercises marked with \"x\"    Exercise:  L LE Reps/ Time Weight/ Level Comments    sci fit 6 mins L2.5   x    gait  100 ft x1    Velcro brace/tennis shoe   RW, vc for push off.     Gait/balance with many directional changes and retro steps    3 mins   No device, velcro brace, added 5/17    Sit to standing  add  Without UE support. L foot posterior placement. Standing       Gastro stretch 3x30\"   wedge  x   Soleus stretch 3x30\"   incr. Reps 5/18 x   Toe extension stretch 3x15\"  Arch stretch x   Heel raises  1x10 A   x   Heel raises  2x10 2'\" eccentric focus, added  x   3 way hip  15xea A  L CKC      mini fwd lunges 10x2    x   Wall lean  10x5\"  Foot intrinsics focus  x   marching 20x A       baps 20x L2 DF/PF only, Added IV/EV 6/9  x   SLS 2x-15\"      x   tandem stance 1x15\"  1x30\"     X  x   Step ups  1x15 6in  incr. Height, decr  5/28 x   Lateral step ups 1x15 6 in Increased height 6/9 x   Step overs  2x20 2 in   x   TG single heel raise 1x10 L20  fatiguing,       x          Sitting       domers  2 mins        Ankle DF,PF  HEP  A      gastroc stretch HEP          Soleus stretch  D/C      HS stretch 3x30\"        Gr toe ext. HEP A     Lessor toe ext  HEP A     toes curls marble pic up 12x2 A     Foot intrinsic  10x2 5\"    x   Sub max iso ankle inv/ev 10x5\"       Heel prop 2 mins      Quad sets 10x5\"       tband ankle DF/PF only 10x blue   x   tband ankle IN/EV 10x Lime Added 6/9- PWB x   Gr toe flex/ext tband  x lime  AROM 5/21 x   baps board  2x10  1x10  Inv/eversion-added  Cw,ccw-added      Ankle circles  1x10  Cw, CCW, added     Towel swooshes  2x10  Inversion, eversion added  x   Supine       Manual  x  PROM ankle DF/PF, scar massage, gastro stretching, STM plantar surface. Gastro stretch with HS stretch and ankle inversion  belt 3x1 min\"    belt, inversion,      x   Hip flexor/quad stretch 3x30\"   , restart next session. SLR 3- way      sidelying, supine - verbal instructions for HEP.                       Other:           Specific Instructions for next treatment:   - focus on L ankle PF/DF ROM, passive and active/active assist  - WBAT in CAM BOOT lace up brace: weightshifts lateral/fwd, SLS as able  - gait train with walker, increasing WB    - quad/hamstring flexibility  - hip/knee OKC strength  - vasocompression for pain/swelling post treatment          Treatment Charges: Mins Units   []  Modalities     [x]  Ther Exercise 45 3   []  Manual Therapy     []  Ther Activities     []  Aquatics     [x]  Vasocompression  15 1   []  Other  Neuro re-ed      Total Treatment time    60 4     Assessment: [x] Progressing toward. Added light IN/EV resisted strengthening with theraband and standing at BAPS with good tolerance. Patient notes good recall for most exercises. Minimal fatigue through L ankle this date, short rest breaks throughout. Patient with significantly improved gait pattern at arrival this date, thus less time spent with gait. No change. [x] Other:     [x] Patient would continue to benefit from skilled physical therapy services in order to:  Progress ankle ROM and strengthening as well as progressing wt bearing in order to return to full ADL and job. STG: (to be met in 12 treatments)  1. ? Pain: No more than 6/10 max pain in L ankle with therapy and at home. 2. ? ROM:   a. At least 15deg PROM L ankle DF with knee extended to progress to maximal DF joint mobility to allow proper gait/stair mechanics  b. At least 45deg PROM L ankle PF for improved push off with gait  3. ? Strength:   a. At least 4/5 L PF strength for improving gait mechanics and stability in hind foot with ambulation/stair climbing  b.  Able to tolerate L ankle inv/ev submaximal isometric strengthening without increase in pain    4. ? Balance: Pt able to hold tandem stance bilat for at least 30\" without UE assist to indicate improving balance in narrow ROBERT conditions  5. ? Function:   a. Pt able to ambulate 300 ft in lace-up brace on LLE using least restrictive device and near equal stance time, improving step length, and consistent heel strike LLE  b. Pt able to complete all transfers without UE assist and no imbalance noted  c. Pt able to ambulate up/down an incline with least restrictive device without assist or supervision needed to allow for improved safety with entering/exiting home independently  6. Independent with Home Exercise Programs  7. Demonstrate Knowledge of fall prevention     LTG: (to be met in 24 treatments)  8. ? Pain: No more than 3/10 max pain in L ankle with therapy and at home. 9. ? ROM: Full ROM all planes of L ankle to indicate restored joint mobility post - op  10. ? Strength:   a. At least 5-/5 L ankle gross strength all planes  b. Able to complete 15 SLS heel raises on LLE to indicate improved functional PF strength  11. ? Balance: Pt able to complete retro and tandem ambulation for 50 ft ea without any sign of imbalance  12. ? Function:   a. Pt able to ambulate 500 ft in lace-up brace on LLE without device and equal stance time, no antalgic gait evident, consistent heel strike and full push off appreciated, and at a minimum of 1.0m/s to indicate safety for community ambulation speed  b. Pt able to ascend/descend stairs with min UE assist while completing with reciprocal pattern and good balance noted                 Patient goals: \"get back to work, walk normal, less pain\"       Pt. Education:  [x] Yes  [] No  [x] Reviewed Prior HEP/Ed  Method of Education: [x] Verbal  [x] Demo  [] Written  Comprehension of Education:  [x] Verbalizes understanding. [x] Demonstrates understanding. [] Needs review.   [x] Demonstrates/verbalizes HEP/Ed previously given. Access Code: [email or web address]: [employer]. [employer]/Date: 04/27/2021Prepared by: Gricel Mendoza Sitting Calf Stretch with Strap - 3 x daily - 7 x weekly - 3 sets - 3 reps   Long Sitting Soleus Stretch on Bolster with Strap - 3 x daily - 7 x weekly - 3 sets - 3 reps   Supine Hamstring Stretch with Strap with ankle DF and inversion - 3 x daily - 7 x weekly - 3 sets - 3 reps   Supine Ankle Pumps - 3 x daily - 7 x weekly - 2 sets - 20 reps   Seated Heel Toe Raises - 3 x daily - 7 x weekly - 2 sets - 20 reps   Isometric Ankle Dorsiflexion and Plantarflexion - 1 x daily - 3-4 x weekly - 1-2 sets - 10 reps   Long Sitting Isometric Ankle Plantarflexion with Ball at Wall - 1 x daily - 3-4 x weekly - 1-2 sets - 10 reps  Access Code: [email or web address]  URL: [email or web address]/  Date: 05/17/2021  Prepared by: Esdras Llanos    Exercises  Long Sitting Ankle Dorsiflexion with Anchored Resistance - 1 x daily - 5 x weekly - 2 sets - 10 reps  Ankle and Toe Plantarflexion with Resistance - 1 x daily - 5 x weekly - 2 sets - 10 reps    Frequency:  2-3 x/week for 24 visits  Plan: [x] Continue current frequency toward long and short term goals. [x] Specific Instructions for subsequent treatments:  Progress ckc exercises and gait in velcro brace.         Time In:    355 pm         Time Out:  500 pm    Electronically signed by:  Mckenna Knight PTA

## 2021-06-14 ENCOUNTER — HOSPITAL ENCOUNTER (OUTPATIENT)
Dept: PHYSICAL THERAPY | Age: 61
Setting detail: THERAPIES SERIES
Discharge: HOME | End: 2021-06-14
Payer: COMMERCIAL

## 2021-06-14 ENCOUNTER — APPOINTMENT (OUTPATIENT)
Dept: GENERAL RADIOLOGY | Age: 61
End: 2021-06-14
Payer: COMMERCIAL

## 2021-06-14 ENCOUNTER — HOSPITAL ENCOUNTER (EMERGENCY)
Age: 61
Discharge: HOME | End: 2021-06-14
Attending: EMERGENCY MEDICINE
Payer: COMMERCIAL

## 2021-06-14 VITALS
TEMPERATURE: 97.9 F | BODY MASS INDEX: 35.6 KG/M2 | DIASTOLIC BLOOD PRESSURE: 117 MMHG | HEART RATE: 96 BPM | SYSTOLIC BLOOD PRESSURE: 134 MMHG | OXYGEN SATURATION: 96 % | WEIGHT: 188.4 LBS | RESPIRATION RATE: 14 BRPM

## 2021-06-14 DIAGNOSIS — R06.09 DYSPNEA ON EXERTION: Primary | ICD-10-CM

## 2021-06-14 DIAGNOSIS — J30.2 SEASONAL ALLERGIES: ICD-10-CM

## 2021-06-14 LAB
ABSOLUTE EOS #: 0.22 K/UL (ref 0–0.44)
ABSOLUTE IMMATURE GRANULOCYTE: 0.03 K/UL (ref 0–0.3)
ABSOLUTE LYMPH #: 2.38 K/UL (ref 1.1–3.7)
ABSOLUTE MONO #: 0.48 K/UL (ref 0.1–1.2)
ALBUMIN SERPL-MCNC: 4.4 G/DL (ref 3.5–5.2)
ALBUMIN/GLOBULIN RATIO: ABNORMAL (ref 1–2.5)
ALP BLD-CCNC: 91 U/L (ref 35–104)
ALT SERPL-CCNC: 37 U/L (ref 5–33)
ANION GAP SERPL CALCULATED.3IONS-SCNC: 13 MMOL/L (ref 9–17)
AST SERPL-CCNC: 24 U/L
BASOPHILS # BLD: 1 % (ref 0–2)
BASOPHILS ABSOLUTE: 0.08 K/UL (ref 0–0.2)
BILIRUB SERPL-MCNC: 0.19 MG/DL (ref 0.3–1.2)
BNP INTERPRETATION: NORMAL
BUN BLDV-MCNC: 21 MG/DL (ref 8–23)
BUN/CREAT BLD: 27 (ref 9–20)
CALCIUM SERPL-MCNC: 9.7 MG/DL (ref 8.6–10.4)
CHLORIDE BLD-SCNC: 104 MMOL/L (ref 98–107)
CO2: 25 MMOL/L (ref 20–31)
CREAT SERPL-MCNC: 0.77 MG/DL (ref 0.5–0.9)
D-DIMER QUANTITATIVE: 0.29 MG/L FEU (ref 0–0.59)
DIFFERENTIAL TYPE: NORMAL
EOSINOPHILS RELATIVE PERCENT: 3 % (ref 1–4)
GFR AFRICAN AMERICAN: >60 ML/MIN
GFR NON-AFRICAN AMERICAN: >60 ML/MIN
GFR SERPL CREATININE-BSD FRML MDRD: ABNORMAL ML/MIN/{1.73_M2}
GFR SERPL CREATININE-BSD FRML MDRD: ABNORMAL ML/MIN/{1.73_M2}
GLUCOSE BLD-MCNC: 117 MG/DL (ref 70–99)
HCT VFR BLD CALC: 43.1 % (ref 36.3–47.1)
HEMOGLOBIN: 13.8 G/DL (ref 11.9–15.1)
IMMATURE GRANULOCYTES: 0 %
LYMPHOCYTES # BLD: 32 % (ref 24–43)
MCH RBC QN AUTO: 29.8 PG (ref 25.2–33.5)
MCHC RBC AUTO-ENTMCNC: 32 G/DL (ref 28.4–34.8)
MCV RBC AUTO: 93.1 FL (ref 82.6–102.9)
MONOCYTES # BLD: 6 % (ref 3–12)
NRBC AUTOMATED: 0 PER 100 WBC
PDW BLD-RTO: 13.3 % (ref 11.8–14.4)
PLATELET # BLD: 300 K/UL (ref 138–453)
PLATELET ESTIMATE: NORMAL
PMV BLD AUTO: 11.4 FL (ref 8.1–13.5)
POTASSIUM SERPL-SCNC: 4.4 MMOL/L (ref 3.7–5.3)
PRO-BNP: 52 PG/ML
RBC # BLD: 4.63 M/UL (ref 3.95–5.11)
RBC # BLD: NORMAL 10*6/UL
SEG NEUTROPHILS: 58 % (ref 36–65)
SEGMENTED NEUTROPHILS ABSOLUTE COUNT: 4.32 K/UL (ref 1.5–8.1)
SODIUM BLD-SCNC: 142 MMOL/L (ref 135–144)
TOTAL PROTEIN: 6.7 G/DL (ref 6.4–8.3)
TROPONIN INTERP: NORMAL
TROPONIN T: NORMAL NG/ML
TROPONIN, HIGH SENSITIVITY: 10 NG/L (ref 0–14)
WBC # BLD: 7.5 K/UL (ref 3.5–11.3)
WBC # BLD: NORMAL 10*3/UL

## 2021-06-14 PROCEDURE — 99282 EMERGENCY DEPT VISIT SF MDM: CPT

## 2021-06-14 PROCEDURE — 80053 COMPREHEN METABOLIC PANEL: CPT

## 2021-06-14 PROCEDURE — 71045 X-RAY EXAM CHEST 1 VIEW: CPT

## 2021-06-14 PROCEDURE — 93005 ELECTROCARDIOGRAM TRACING: CPT | Performed by: EMERGENCY MEDICINE

## 2021-06-14 PROCEDURE — 84484 ASSAY OF TROPONIN QUANT: CPT

## 2021-06-14 PROCEDURE — 85025 COMPLETE CBC W/AUTO DIFF WBC: CPT

## 2021-06-14 PROCEDURE — 85379 FIBRIN DEGRADATION QUANT: CPT

## 2021-06-14 PROCEDURE — 97110 THERAPEUTIC EXERCISES: CPT

## 2021-06-14 PROCEDURE — 83880 ASSAY OF NATRIURETIC PEPTIDE: CPT

## 2021-06-14 RX ORDER — ALBUTEROL SULFATE 90 UG/1
AEROSOL, METERED RESPIRATORY (INHALATION)
Qty: 18 G | Refills: 3 | Status: SHIPPED | OUTPATIENT
Start: 2021-06-14 | End: 2022-08-03 | Stop reason: SDUPTHER

## 2021-06-14 ASSESSMENT — PAIN SCALES - GENERAL: PAINLEVEL_OUTOF10: 5

## 2021-06-14 NOTE — FLOWSHEET NOTE
[x] [facility]) [facility] &  Therapy  [address]  [phone]  F: [phone] [] [address]  [address]   [address]  P: [phone]  F: [phone] [] [address] &  Therapy  [address]  P: [phone]  F: [phone] [] [address]  P: [phone]  F: [phone] [] [address]  [facility]   [address]   [place]: [phone]  F: [phone]      Physical Therapy Daily Treatment Note    Date:  2021  Patient Name:  Vincenzo Lubin    :  1960  MRN: [id number]   Physician: Dr. Dobbins Prost: Adriana Duron (13vs)  Medical Diagnosis: Instability of left ankle joint; Peroneal tendinitis of left lower extremity; Gastrocnemius equinus of left lower extremity; Sprain of left ankle, unspecified ligament, subsequent encounter  Rehab Codes: S05.088X, R53.1, R26.9, R27.9  Onset date: 20                         Next 's appt.: DOS 21 Js, 21 ORTHO  Visit# / total visits:  (correct visit # ),   Progress note for STG due at visit #12    Cancels/No Shows: 0/0    Subjective:    Pain:  [x] Yes  [] No Location: L foot  Pain Rating: (0-10 scale) 2/10  Lateral ankle   Pain altered Tx:  [] No  [x] Yes  Action:      Comments:    Reports left knee is painful. Observed pt walking with mild limp and decreased hip ext,  L LE push off/kinee flexion. Has alyssa HALLMAN appt . Reports ankle is feeling better. Addressing HEP. Hopes to get a job soon. Objective:  Modalities: vaso compression 15 mins - HELD  Precautions  WBAT in lace up brace LLE    Procedures Performed:  (3/4/2021)  1.  Left ankle lateral ligamentous repair (with Arthrex fiber tack anchors x 2 + surface. Gastro stretch with HS stretch and ankle inversion  belt 3x1 min\"    belt, inversion,    RESTARTED NEXT SESSION      Hip flexor/quad stretch 3x30\"        SLR 3- way      sidelying, supine - verbal instructions for HEP.                       Other:     6/14/21: DF 35, DF AROM lacking 2 to neutral, 5° PROM DF.         Specific Instructions for next treatment:   -ankle ROM/strength all planes. -stability-add dynamic   - quad/hamstring flexibility          Treatment Charges: Mins Units   []  Modalities     [x]  Ther Exercise 46  3   []  Manual Therapy     []  Ther Activities     []  Aquatics     [x]  Vasocompression      []  Other  Neuro re-ed      Total Treatment time   46 3     Assessment: [x] Progressing toward Progressed gastroc stretching and ankle  inversion/eversion strengthening exercises as charted. Knee pain hinders some ankle CKC strengthening exercises due to medial knee pain. Improved SL heel raise ROM/endurance  in PWB          No change. [x] Other:     [x] Patient would continue to benefit from skilled physical therapy services in order to:  Progress ankle ROM and strengthening as well as progressing wt bearing in order to return to full ADL and job. STG: (to be met in 12 treatments)  1. ? Pain: No more than 6/10 max pain in L ankle with therapy and at home. 6/14/21: MET  2. ? ROM:   a. At least 15deg PROM L ankle DF with knee extended to progress to maximal DF joint mobility to allow proper gait/stair mechanics  b. At least 45deg PROM L ankle PF for improved push off with gait  3. ? Strength:   a. At least 4/5 L PF strength for improving gait mechanics and stability in hind foot with ambulation/stair climbing 4/16/21 MET 5/5 PF, DF 4+/5  b.  Able to tolerate L ankle inv/ev submaximal isometric strengthening without increase in pain  6/14/21: met  4. ? Balance: Pt able to hold tandem stance bilat for at least 30\" without UE assist to indicate improving balance in narrow ROBERT conditions 6/1421 MET  5. ? Function:   a. Pt able to ambulate 300 ft in lace-up brace on LLE using least restrictive device and near equal stance time, improving step length, and consistent heel strike LLE 6/4/21 MET  b. Pt able to complete all transfers without UE assist and no imbalance noted 6/14/21 MET  c. Pt able to ambulate up/down an incline with least restrictive device without assist or supervision needed to allow for improved safety with entering/exiting home independently  6. Independent with Home Exercise Programs 6/14/21MET  7. Demonstrate Knowledge of fall prevention  6/14/21: MET     LTG: (to be met in 24 treatments)  8. ? Pain: No more than 3/10 max pain in L ankle with therapy and at home. 9. ? ROM: Full ROM all planes of L ankle to indicate restored joint mobility post - op  10. ? Strength:   a. At least 5-/5 L ankle gross strength all planes  b. Able to complete 15 SLS heel raises on LLE to indicate improved functional PF strength  11. ? Balance: Pt able to complete retro and tandem ambulation for 50 ft ea without any sign of imbalance  12. ? Function:   a. Pt able to ambulate 500 ft in lace-up brace on LLE without device and equal stance time, no antalgic gait evident, consistent heel strike and full push off appreciated, and at a minimum of 1.0m/s to indicate safety for community ambulation speed  b. Pt able to ascend/descend stairs with min UE assist while completing with reciprocal pattern and good balance noted                 Patient goals: \"get back to work, walk normal, less pain\"       Pt. Education:  [x] Yes  [] No  [x] Reviewed Prior HEP/Ed  Method of Education: [x] Verbal  [x] Demo  [] Written  Comprehension of Education:  [x] Verbalizes understanding. [x] Demonstrates understanding. [] Needs review. [x] Demonstrates/verbalizes HEP/Ed previously given. Access Code: [email or web address]: [email or web address]. com/Date: 04/27/2021Prepared by: Kenn Dhillon MillsExercises   Long Sitting Calf Stretch with Strap - 3 x daily - 7 x weekly - 3 sets - 3 reps   Long Sitting Soleus Stretch on Bolster with Strap - 3 x daily - 7 x weekly - 3 sets - 3 reps   Supine Hamstring Stretch with Strap with ankle DF and inversion - 3 x daily - 7 x weekly - 3 sets - 3 reps   Supine Ankle Pumps - 3 x daily - 7 x weekly - 2 sets - 20 reps   Seated Heel Toe Raises - 3 x daily - 7 x weekly - 2 sets - 20 reps   Isometric Ankle Dorsiflexion and Plantarflexion - 1 x daily - 3-4 x weekly - 1-2 sets - 10 reps   Long Sitting Isometric Ankle Plantarflexion with Ball at Wall - 1 x daily - 3-4 x weekly - 1-2 sets - 10 reps  Access Code: [email or web address]  URL: [email or web address]. com/  Date: 05/17/2021       Exercises  Long Sitting Ankle Dorsiflexion with Anchored Resistance - 1 x daily - 5 x weekly - 2 sets - 10 reps  Ankle and Toe Plantarflexion with Resistance - 1 x daily - 5 x weekly - 2 sets - 10 reps    Frequency:  2-3 x/week for 24 visits  Plan: [x] Continue current frequency toward long and short term goals. [x] Specific Instructions for subsequent treatments:  Progress  ankle inversion,eversion  ROM/strength. Add dynamic stability exercises.        Time In:   1615          Time Out:  1717    Electronically signed by:  Randall Sparks PTA

## 2021-06-15 ENCOUNTER — CARE COORDINATION (OUTPATIENT)
Dept: CARE COORDINATION | Age: 61
End: 2021-06-15

## 2021-06-15 LAB
EKG ATRIAL RATE: 100 BPM
EKG P AXIS: 38 DEGREES
EKG P-R INTERVAL: 134 MS
EKG Q-T INTERVAL: 326 MS
EKG QRS DURATION: 68 MS
EKG QTC CALCULATION (BAZETT): 420 MS
EKG R AXIS: -13 DEGREES
EKG T AXIS: 29 DEGREES
EKG VENTRICULAR RATE: 100 BPM

## 2021-06-15 PROCEDURE — 93010 ELECTROCARDIOGRAM REPORT: CPT | Performed by: INTERNAL MEDICINE

## 2021-06-15 NOTE — ED PROVIDER NOTES
EMERGENCY DEPARTMENT ENCOUNTER    Pt Name: Conor Ha  MRN: [id number]  [place] 1960  Date of evaluation: 6/14/21  CHIEF COMPLAINT       Chief Complaint   Patient presents with    Shortness of Breath     hx asthma, denies using inhaler. onset approx. 1730     HISTORY OF PRESENT ILLNESS   Patient is a 73-year-old with PMH of asthma who presents to the ED complaining of wheezing and shortness of breath. Symptoms started earlier today after rehab for her left ankle injury. It was a more rigorous session than normal.  She developed diaphoresis, wheezing and shortness of breath. She sat down, called her self down and symptoms improved. She would have liked to use her inhaler however she ran out of her prescription. While walking into the emergency room she continued to have shortness of breath. No other issues noted this time. No fever, chest pain, vomiting. No abdominal pain, nausea, changes in urine or stool. REVIEW OF SYSTEMS     Review of Systems   All other systems reviewed and are negative. PASTMEDICAL HISTORY     Past Medical History:   Diagnosis Date    Arthritis     ASCUS with positive high risk HPV cervical 3/22/16    Asthma     Depression     Diverticulitis     ESBL (extended spectrum beta-lactamase) producing bacteria infection 02/03/2019    E.  Coli urine    GERD (gastroesophageal reflux disease)     Hyperlipidemia     MRSA (methicillin resistant staph aureus) culture positive 4/18/2016    lip    Rectocele     Tachycardia     takes Metoprolol     SURGICAL HISTORY       Past Surgical History:   Procedure Laterality Date    ANKLE SURGERY Left 3/4/2021    Left ankle lateral ligamentous repair, Left peroneus brevis debridement, Left peroneus longus tenolysis, Left leg gastroc recession, performed through separate incision  performed by Sergey Belle MD at [facility] 7/19/2018    COLONOSCOPY performed by Sierra Hernandez IV, DO at [facility] 2/25/2015    uro    HYSTERECTOMY  1996    RYAN, BSO performed at [facility] by Dr. Jaswinder Montano, Also had some type of bladder lift at this time    KNEE ARTHROSCOPY Left 5/13/2019    KNEE ARTHROSCOPY performed by Kyler Valdez MD at [address] [place] [address] Left     #1 - lateral release, #2 - arthroscopy with muscle alignment    NERVE BLOCK  07/28/2017    caudal #1  decadron 10mg    NERVE BLOCK  09/22/2017    cadal # 2, decadron 10 mg    NERVE BLOCK  09/22/2017    caudal epidural steroid block #2 decadron 10 mg    NERVE BLOCK  11/10/2017    lumbar L4-5 epidural; depomedrol 80mg    UPPER GASTROINTESTINAL ENDOSCOPY  1/30/2019    EGD BIOPSY performed by Montez Ortega MD at [address] [place] [address]       Current Discharge Medication List      CONTINUE these medications which have NOT CHANGED    Details   busPIRone (BUSPAR) 15 MG tablet TAKE 1 TABLET BY MOUTH TWICE DAILY  Qty: 56 tablet, Refills: 3    Associated Diagnoses: Anxiety      venlafaxine (EFFEXOR XR) 75 MG extended release capsule TAKE 1 CAPSULE BY MOUTH DAILY  Qty: 28 capsule, Refills: 3    Associated Diagnoses: Anxiety      Handicap Placard MISC by Does not apply route DISABLED PARKING PERMIT AUTHORIZATION  Routine     Qty-1    The patient has the following condition(s) which qualifies him/her for disabled parking: Walking severely limited due to orthopedic condition     Privilege Duration: Temporary for 3 months  Qty: 1 each, Refills: 0    Associated Diagnoses: Instability of left ankle joint; Peroneal tendinitis of left lower extremity      aspirin 325 MG EC tablet Take 1 tablet by mouth daily  Qty: 42 tablet, Refills: 0      pravastatin (PRAVACHOL) 40 MG tablet Take 1 tablet by mouth daily  Qty: 30 tablet, Refills: 3      ketotifen (ZADITOR) 0.025 % ophthalmic solution USE 1 DROP INTO BOTH EYES TWICE DAILY  Qty: 10 mL, Refills: 1      !! vitamin D (ERGOCALCIFEROL) 1.25 MG (49353 UT) CAPS capsule TAKE 1 CAPSULE BY MOUTH EVERY WEEK ON   Qty: 4 capsule, Refills: 3    Associated Diagnoses: Vitamin D deficiency      !! vitamin D (ERGOCALCIFEROL) 1.25 MG (89576 UT) CAPS capsule Take 1 capsule by mouth once a week  Qty: 4 capsule, Refills: 3    Associated Diagnoses: Vitamin D deficiency      chlorzoxazone (PARAFON FORTE) 500 MG tablet TAKE 1/2 TABLET BY MOUTH TWICE DAILY  Qty: 28 tablet, Refills: 3    Associated Diagnoses: Muscle spasm      fluticasone (FLONASE) 50 MCG/ACT nasal spray INSTILL 2 SPRAYS INTO EACH NOSTRIL ONCE DAILY  Qty: 16 g, Refills: 3      omeprazole (PRILOSEC) 40 MG delayed release capsule TAKE 1 CAPSULE BY MOUTH DAILY EVERY MORNING FOR BREAKFAST  Qty: 90 capsule, Refills: 2    Associated Diagnoses: Gastroesophageal reflux disease, unspecified whether esophagitis present      miSOPROStol (CYTOTEC) 200 MCG tablet Take 200 mcg by mouth daily      senna-docusate (PERICOLACE) 8.6-50 MG per tablet Take 1 tablet by mouth daily as needed for Constipation  Qty: 30 tablet, Refills: 1      Multiple Vitamins-Minerals (CULTURELLE PROBIOTICS + MULTIV) CHEW Take 1 tablet by mouth daily  Qty: 30 tablet, Refills: 2      MI-ACID GAS RELIEF 80 MG chewable tablet CHEW 1 TABLET BY MOUTH FOUR TIMES DAILY AS NEEDED FOR FLATULENCE  Qty: 120 tablet, Refills: 5      CRANBERRY CONCENTRATE 500 MG CAPS TAKE 1 CAPSULE BY MOUTH TWICE DAILY  Qty: 56 capsule, Refills: 3      metoprolol succinate (TOPROL XL) 25 MG extended release tablet Take 25 mg by mouth daily       PREMARIN 0.625 MG/GM vaginal cream PLACE 2 GRAMS VAGINALLY TWICE A WEEK FOR 12 DOSES  Qty: 30 g, Refills: 1    Associated Diagnoses: ASCUS with positive high risk HPV cervical; Vaginal prolapse; Post menopausal problems       !! - Potential duplicate medications found. Please discuss with provider. ALLERGIES     is allergic to shellfish allergy, shrimp (diagnostic), seasonal, famotidine, and gabapentin. FAMILY HISTORY     She indicated that her mother is .  She indicated that her father is alive. She indicated that her brother is alive. She indicated that her maternal grandmother is . She indicated that her maternal grandfather is . She indicated that her paternal grandmother is . She indicated that her paternal grandfather is . She indicated that her niece is alive. SOCIAL HISTORY       Social History     Tobacco Use    Smoking status: Former Smoker     Packs/day: 2.00     Years: 0.50     Pack years: 1.00     Types: Cigarettes     Quit date:      Years since quittin.4    Smokeless tobacco: Never Used   Vaping Use    Vaping Use: Never used   Substance Use Topics    Alcohol use: Yes     Alcohol/week: 0.0 standard drinks     Comment: social    Drug use: No     PHYSICAL EXAM     INITIAL VITALS: BP (!) 134/117   Pulse 96   Temp 97.9 °F (36.6 °C) (Oral)   Resp 14   Wt 188 lb 6.4 oz (85.5 kg)   SpO2 96%   BMI 35.60 kg/m²    Physical Exam  HENT:      Head: Normocephalic. Right Ear: External ear normal.      Left Ear: External ear normal.      Nose: Nose normal.   Eyes:      Conjunctiva/sclera: Conjunctivae normal.   Cardiovascular:      Rate and Rhythm: Normal rate. Heart sounds: Normal heart sounds. Pulmonary:      Effort: Pulmonary effort is normal.      Breath sounds: Normal breath sounds. Abdominal:      General: Abdomen is flat. Skin:     General: Skin is dry. Neurological:      Mental Status: She is alert. Mental status is at baseline. Psychiatric:         Mood and Affect: Mood normal.         Behavior: Behavior normal.         MEDICAL DECISION MAKING:   The patient is hemodynamically stable, afebrile, nontoxic-appearing. Physical exam unremarkable. Based on history and exam high suspicion for reactive airway disease secondary to rigorous rehab session today. Low suspicion for ACS, PE.  ED plan for basic labs, serial troponins, EKG, chest x-ray, reassess.        DIAGNOSTIC RESULTS   EKG:All EKG's are interpreted by the Emergency Department Physician who either signs or Co-signs this chart in the absence of a cardiologist.        RADIOLOGY:All plain film, CT, MRI, and formal ultrasound images (except ED bedside ultrasound) are read by the radiologist, see reports below, unless otherwisenoted in MDM or here. XR CHEST PORTABLE   Final Result   No acute cardiopulmonary abnormality. LABS: All lab results were reviewed by myself, and all abnormals are listed below. Labs Reviewed   COMPREHENSIVE METABOLIC PANEL W/ REFLEX TO MG FOR LOW K - Abnormal; Notable for the following components:       Result Value    Glucose 117 (*)     Bun/Cre Ratio 27 (*)     ALT 37 (*)     Total Bilirubin 0.19 (*)     All other components within normal limits   CBC WITH AUTO DIFFERENTIAL   TROPONIN   BRAIN NATRIURETIC PEPTIDE   D-DIMER, QUANTITATIVE   TROPONIN       EMERGENCY DEPARTMENTCOURSE:   Patient did well in the ED. Labs:  Potassium 4.4  Creatinine 0.77  Troponin 10  BNP 52  D-dimer 0.29  WBC 7.5  EKG nonischemic  Chest x-ray negative for infiltrate  Likely acute reactive airway disease secondary to rigorous rehab session today. Transmitted Rx for albuterol inhaler. No further work-up indicated time. Nursing notes reviewed. At this time this is what I find, the patient appears well and does not appear sick or toxic. I gave my usual and customary discussion of the risks and benefits of discharge versus admission. I answered the patient's questions. I gave the patient strict return precautions. Patient expressed understanding of the discharge instructions. Dictated but not reviewed.       Vitals:    Vitals:    06/14/21 1953 06/14/21 1956   BP: (!) 134/117    Pulse: 96    Resp: 14    Temp: 97.9 °F (36.6 °C)    TempSrc: Oral    SpO2:  96%   Weight: 188 lb 6.4 oz (85.5 kg)        The patient was given the following medications while in the emergency department:  Orders Placed This Encounter   Medications    albuterol

## 2021-06-16 ENCOUNTER — HOSPITAL ENCOUNTER (OUTPATIENT)
Dept: PHYSICAL THERAPY | Age: 61
Setting detail: THERAPIES SERIES
Discharge: HOME | End: 2021-06-16
Payer: COMMERCIAL

## 2021-06-16 PROCEDURE — 97110 THERAPEUTIC EXERCISES: CPT

## 2021-06-16 NOTE — FLOWSHEET NOTE
[x] [facility]) [facility]  [facility]  [address]  [phone]  F: [phone] [] [address]  [place] [address]   [address]  P: [phone]  F: [phone] [] [address] &  Therapy  [address]  P: [phone]  F: [phone] [] [address]  P: [phone]  F: [phone] [] [address]  [facility]   [address]   [place]: [phone]  F: [phone]      Physical Therapy Daily Treatment Note    Date:  2021  Patient Name:  Pj Bryson    :  1960  MRN: [id number]   Physician: Dr. Baez Cull: Evangelina Walton (13vs)  Medical Diagnosis: Instability of left ankle joint; Peroneal tendinitis of left lower extremity; Gastrocnemius equinus of left lower extremity; Sprain of left ankle, unspecified ligament, subsequent encounter  Rehab Codes: M34.088Z, R53.1, R26.9, R27.9  Onset date: 20                         Next 's appt.: DOS 21 Js, 21 ORTHO  Visit# / total visits:  (correct visit # ),   Progress note for STG due at visit #12    Cancels/No Shows: 0/0    Subjective:    Pain:  [x] Yes  [] No Location: L foot  Pain Rating: (0-10 scale) 2/10  Lateral ankle   Pain altered Tx:  [] No  [x] Yes  Action:      Comments:    Reports she had an asthma attack , went to ER and has inhaler now. States she want to take it easy today with PT. Without velcro ankle brace today. Objective:   Modalities: vaso compression 15 mins - HELD  Precautions  WBAT in lace up brace LLE    Procedures Performed:  (3/4/2021)  1. Left ankle lateral ligamentous repair (with Arthrex fiber tack anchors x 2 + internal brace)  2. Left peroneus brevis debridement  3.  Left peroneus longus tenolysis  4. Left leg gastroc recession, performed through separate incision  5. Precautions:   FWB in lace up ankle brace   Exercises:  6/16/2021 completed exercises marked with \"x\"  Exercise:  L LE Reps/ Time Weight/ Level Comments    sci fit 6 mins L2.5   x    gait  100 ft x1    Velcro brace/tennis shoe   RW, vc for push off.     Gait/balance with many directional changes and retro steps    3 mins   No device, velcro brace, added 5/17    Sit to standing   6x  Without UE support. L foot posterior placement.-added 6/14  x          Standing       Gastro stretch 3x30\"   wedge  x   Soleus stretch 3x30\"    x   Toe extension stretch 3x15\"  Arch stretch     prostretch gastroc stretch 3x20\"  Added 6/14 x   Heel raises  1x10 A       Heel raises  2x10 2'\" eccentric focus,       3 way hip steamboats  15xea  lime  L CKC,   x    mini fwd lunges 10x2        Wall lean  10x5\"  Foot intrinsics focus      marching 20x A       baps 20x  20x L2  L2 DF/PF   IV/EV, CW, CCW    x  x   Rocker board  20x  Med/lateral, added 6/16 x   SLS 1x-21\"  1x30\"       tandem stance  2x30\"            Step ups  1x15 8 in      Lateral step ups 1x15 6 in  x   Step overs  1x15 4 in    x   Step downs  1x15 4 in    x   TG single heel raise 2x10 L28 I  x          Sitting       domers  2 mins           A      HS stretch 3x30\"        toes curls marble pic up 12x2 A     Foot intrinsic  10x2 5\"     x   Sub max iso ankle inv/ev 10x5\"       tband ankle DF/PF   15x blue  lime 6/14  x   tband ankle IN/EV 15x lime    x   Gr toe flex/ext tband  x lime  A      baps board  2x10  1x10  Inv/eversion-added  Cw,ccw-added      Ankle circles  1x10  Cw, CCW, added     Towel swooshes  2x10  Inversion, eversion added      Supine       Manual  x  PROM ankle DF/PF, scar massage, gastro stretching, STM plantar surface.     Gastro stretch with HS stretch and ankle inversion  belt 3x1 min\"    belt, inversion,    RESTARTED NEXT SESSION      Hip flexor/quad stretch restrictive device and near equal stance time, improving step length, and consistent heel strike LLE 6/4/21 MET  b. Pt able to complete all transfers without UE assist and no imbalance noted 6/14/21 MET  c. Pt able to ambulate up/down an incline with least restrictive device without assist or supervision needed to allow for improved safety with entering/exiting home independently  6. Independent with Home Exercise Programs 6/14/21MET  7. Demonstrate Knowledge of fall prevention  6/14/21: MET     LTG: (to be met in 24 treatments)  8. ? Pain: No more than 3/10 max pain in L ankle with therapy and at home. 9. ? ROM: Full ROM all planes of L ankle to indicate restored joint mobility post - op  10. ? Strength:   a. At least 5-/5 L ankle gross strength all planes  b. Able to complete 15 SLS heel raises on LLE to indicate improved functional PF strength  11. ? Balance: Pt able to complete retro and tandem ambulation for 50 ft ea without any sign of imbalance  12. ? Function:   a. Pt able to ambulate 500 ft in lace-up brace on LLE without device and equal stance time, no antalgic gait evident, consistent heel strike and full push off appreciated, and at a minimum of 1.0m/s to indicate safety for community ambulation speed  b. Pt able to ascend/descend stairs with min UE assist while completing with reciprocal pattern and good balance noted                 Patient goals: \"get back to work, walk normal, less pain\"       Pt. Education:  [x] Yes  [] No  [x] Reviewed Prior HEP/Ed  Method of Education: [x] Verbal  [x] Demo  [] Written  Comprehension of Education:  [x] Verbalizes understanding. [x] Demonstrates understanding. [] Needs review. [x] Demonstrates/verbalizes HEP/Ed previously given. Access Code: [email or web address]: [email or web address]: 04/27/2021Prepared by: Amie Marquez Sitting Calf Stretch with Strap - 3 x daily - 7 x weekly - 3 sets - 3 reps   Long Sitting Soleus Stretch on Bolster with Strap - 3 x daily - 7 x weekly - 3 sets - 3 reps   Supine Hamstring Stretch with Strap with ankle DF and inversion - 3 x daily - 7 x weekly - 3 sets - 3 reps   Supine Ankle Pumps - 3 x daily - 7 x weekly - 2 sets - 20 reps   Seated Heel Toe Raises - 3 x daily - 7 x weekly - 2 sets - 20 reps   Isometric Ankle Dorsiflexion and Plantarflexion - 1 x daily - 3-4 x weekly - 1-2 sets - 10 reps   Long Sitting Isometric Ankle Plantarflexion with Ball at Wall - 1 x daily - 3-4 x weekly - 1-2 sets - 10 reps  Access Code: [email or web address]  URL: [email or web address]  Date: 05/17/2021       Exercises  Long Sitting Ankle Dorsiflexion with Anchored Resistance - 1 x daily - 5 x weekly - 2 sets - 10 reps  Ankle and Toe Plantarflexion with Resistance - 1 x daily - 5 x weekly - 2 sets - 10 reps    Frequency:  2-3 x/week for 24 visits  Plan: [x] Continue current frequency toward long and short term goals. [x] Specific Instructions for subsequent treatments:  Progress  ankle inversion,eversion  ROM/strength. Add dynamic stability exercises.        Time In:   1438           Time Out:   1540    Electronically signed by:  Patti Rendon PTA

## 2021-06-18 ENCOUNTER — OFFICE VISIT (OUTPATIENT)
Dept: ORTHOPEDIC SURGERY | Age: 61
End: 2021-06-18
Payer: COMMERCIAL

## 2021-06-18 ENCOUNTER — HOSPITAL ENCOUNTER (OUTPATIENT)
Dept: PHYSICAL THERAPY | Age: 61
Setting detail: THERAPIES SERIES
Discharge: HOME | End: 2021-06-18
Payer: COMMERCIAL

## 2021-06-18 VITALS — RESPIRATION RATE: 12 BRPM | TEMPERATURE: 97.4 F | HEIGHT: 61 IN | BODY MASS INDEX: 35.5 KG/M2 | WEIGHT: 188 LBS

## 2021-06-18 DIAGNOSIS — M25.372 INSTABILITY OF LEFT ANKLE JOINT: Primary | ICD-10-CM

## 2021-06-18 DIAGNOSIS — M17.10 ARTHRITIS OF KNEE: Primary | ICD-10-CM

## 2021-06-18 DIAGNOSIS — M25.372 INSTABILITY OF LEFT ANKLE JOINT: ICD-10-CM

## 2021-06-18 DIAGNOSIS — M23.307 DEGENERATIVE TEAR OF MENISCUS OF LEFT KNEE: ICD-10-CM

## 2021-06-18 PROCEDURE — 3017F COLORECTAL CA SCREEN DOC REV: CPT | Performed by: ORTHOPAEDIC SURGERY

## 2021-06-18 PROCEDURE — 99214 OFFICE O/P EST MOD 30 MIN: CPT | Performed by: ORTHOPAEDIC SURGERY

## 2021-06-18 PROCEDURE — 1036F TOBACCO NON-USER: CPT | Performed by: ORTHOPAEDIC SURGERY

## 2021-06-18 PROCEDURE — 97110 THERAPEUTIC EXERCISES: CPT

## 2021-06-18 PROCEDURE — G8427 DOCREV CUR MEDS BY ELIG CLIN: HCPCS | Performed by: ORTHOPAEDIC SURGERY

## 2021-06-18 PROCEDURE — G8417 CALC BMI ABV UP PARAM F/U: HCPCS | Performed by: ORTHOPAEDIC SURGERY

## 2021-06-18 NOTE — PROGRESS NOTES
[facility] AND SPORTS MEDICINE  Formerly [facility] Rosita Gonzalez  [address] [place] [address]  Dept: [phone]    Ambulatory Orthopedic Follow Up Visit    Preoperative Diagnosis:   1. Left ankle instability with recurrent sprains  2. Left peroneal tendinopathy   1. Left gastrocnemius equinus contracture  3. Body mass index is 33.07 kg/m².     Postoperative Diagnosis:   1. Same      Procedures Performed:  (3/4/2021)  1. Left ankle lateral ligamentous repair (with Arthrex fiber tack anchors x2 + internal brace)  2. Left peroneus brevis debridement  3. Left peroneus longus tenolysis  4. Left leg gastroc recession, performed through separate incision      CHIEF COMPLAINT:    Chief Complaint   Patient presents with    Ankle Pain     left    Knee Pain     left         HISTORY OF PRESENT ILLNESS:       She is a 62 y.o. female, seen again today in the office for evaluation of a new issue as above, which began atraumatically over 20 years ago  . At today's visit, she is using no brace/assistive device. History is obtained today from:   [x]  the patient     []  EMR     []  one family member/friend    []  multiple family members/friends    []  other:      Regarding the previous issue, she reports the previous problem is doing better. She denies any significant pain in her left ankle, and reports that her ankle is much stronger (she is not using a brace or assistive device for this either). REVIEW OF SYSTEMS:   Constitutional: Negative for fever. HENT: Negative for tinnitus. Eyes: Negative for pain. Respiratory: Negative for shortness of breath. Cardiovascular: Negative for chest pain. Gastrointestinal: Negative for abdominal pain. Genitourinary: Negative for dysuria. Skin: Negative for rash. Neurological: Negative for headaches. Hematological: Does not bruise/bleed easily.    Musculoskeletal: See HPI for pertinent positives     Past Medical History:    She  has a past medical history of Arthritis, ASCUS with positive high risk HPV cervical (3/22/16), Asthma, Depression, Diverticulitis, ESBL (extended spectrum beta-lactamase) producing bacteria infection (02/03/2019), GERD (gastroesophageal reflux disease), Hyperlipidemia, MRSA (methicillin resistant staph aureus) culture positive (4/18/2016), Rectocele, and Tachycardia. Past Surgical History:    She  has a past surgical history that includes Hysterectomy (1996); knee surgery (Left); Cystocopy (2/25/2015); Nerve Block (07/28/2017); Nerve Block (09/22/2017); Nerve Block (09/22/2017); Nerve Block (11/10/2017); Colonoscopy (N/A, 7/19/2018); Upper gastrointestinal endoscopy (1/30/2019); Knee arthroscopy (Left, 5/13/2019); and Ankle surgery (Left, 3/4/2021).      Current Medications:     Current Outpatient Medications:     albuterol sulfate  (90 Base) MCG/ACT inhaler, INHALE 2 PUFFS BY MOUTH INTO THE LUNGS ONCE EVERY 6 HOURS AS NEEDED FOR WHEEZING, Disp: 18 g, Rfl: 3    busPIRone (BUSPAR) 15 MG tablet, TAKE 1 TABLET BY MOUTH TWICE DAILY, Disp: 56 tablet, Rfl: 3    venlafaxine (EFFEXOR XR) 75 MG extended release capsule, TAKE 1 CAPSULE BY MOUTH DAILY, Disp: 28 capsule, Rfl: 3    Handicap Placard MISC, by Does not apply route DISABLED PARKING PERMIT AUTHORIZATION Routine   Qty-1  The patient has the following condition(s) which qualifies him/her for disabled parking: Walking severely limited due to orthopedic condition   Privilege Duration: Temporary for 3 months, Disp: 1 each, Rfl: 0    aspirin 325 MG EC tablet, Take 1 tablet by mouth daily, Disp: 42 tablet, Rfl: 0    pravastatin (PRAVACHOL) 40 MG tablet, Take 1 tablet by mouth daily, Disp: 30 tablet, Rfl: 3    ketotifen (ZADITOR) 0.025 % ophthalmic solution, USE 1 DROP INTO BOTH EYES TWICE DAILY, Disp: 10 mL, Rfl: 1    vitamin D (ERGOCALCIFEROL) 1.25 MG (03920 UT) CAPS capsule, TAKE 1 CAPSULE BY MOUTH EVERY WEEK ON THURSDAYS, Disp: 4 capsule, Rfl: 3    vitamin D (ERGOCALCIFEROL) 1.25 MG (59629 UT) CAPS capsule, Take 1 capsule by mouth once a week, Disp: 4 capsule, Rfl: 3    chlorzoxazone (PARAFON FORTE) 500 MG tablet, TAKE 1/2 TABLET BY MOUTH TWICE DAILY (Patient taking differently: 500 mg TAKE 1/2 TABLET BY MOUTH TWICE DAILY), Disp: 28 tablet, Rfl: 3    fluticasone (FLONASE) 50 MCG/ACT nasal spray, INSTILL 2 SPRAYS INTO EACH NOSTRIL ONCE DAILY, Disp: 16 g, Rfl: 3    omeprazole (PRILOSEC) 40 MG delayed release capsule, TAKE 1 CAPSULE BY MOUTH DAILY EVERY MORNING FOR BREAKFAST, Disp: 90 capsule, Rfl: 2    miSOPROStol (CYTOTEC) 200 MCG tablet, Take 200 mcg by mouth daily, Disp: , Rfl:     senna-docusate (PERICOLACE) 8.6-50 MG per tablet, Take 1 tablet by mouth daily as needed for Constipation (Patient taking differently: Take 1 tablet by mouth 2 times daily ), Disp: 30 tablet, Rfl: 1    Multiple Vitamins-Minerals (CULTURELLE PROBIOTICS + MULTIV) CHEW, Take 1 tablet by mouth daily, Disp: 30 tablet, Rfl: 2    MI-ACID GAS RELIEF 80 MG chewable tablet, CHEW 1 TABLET BY MOUTH FOUR TIMES DAILY AS NEEDED FOR FLATULENCE, Disp: 120 tablet, Rfl: 5    CRANBERRY CONCENTRATE 500 MG CAPS, TAKE 1 CAPSULE BY MOUTH TWICE DAILY, Disp: 56 capsule, Rfl: 3    metoprolol succinate (TOPROL XL) 25 MG extended release tablet, Take 25 mg by mouth daily , Disp: , Rfl:     PREMARIN 0.625 MG/GM vaginal cream, PLACE 2 GRAMS VAGINALLY TWICE A WEEK FOR 12 DOSES, Disp: 30 g, Rfl: 1     Allergies:    Shellfish allergy, Shrimp (diagnostic), Seasonal, Famotidine, and Gabapentin    Family History:  family history includes Colon Cancer in her mother; Crohn's Disease in her niece; Heart Attack in her brother; Heart Disease in her brother; High Blood Pressure in her father.     Social History:   Social History     Occupational History    Not on file   Tobacco Use    Smoking status: Former Smoker     Packs/day: 2.00     Years: 0.50     Pack years: 1.00     Types: Cigarettes     Quit date: 1995     Years since quittin.4    Smokeless tobacco: Never Used   Vaping Use    Vaping Use: Never used   Substance and Sexual Activity    Alcohol use: Yes     Alcohol/week: 0.0 standard drinks     Comment: social    Drug use: No    Sexual activity: Not on file       OBJECTIVE:  Temp 97.4 °F (36.3 °C)   Resp 12   Ht 5' 1\" (1.549 m)   Wt 188 lb (85.3 kg)   BMI 35.52 kg/m²    Psych: alert and oriented to person, time, and place  Cardio:  well perfused extremities  Resp:  normal respiratory effort  Skin:  no cyanosis  Hem/lymph:  no lymphedema  Neuro:  sensation to light touch unchanged since last visit  Musculoskeletal:      LLE:  Incisions clean/dry/intact, no erythema/dehiscence/drainage  Sensation to light touch grossly intact throughout, but altered sensation on the dorsal aspect of her foot  Warm and well perfused  Grossly neurovascularly intact distally  No signs of infection  No calf swelling/tenderness  -Instability:  Talar tilt: Negative; Anterior drawer: Negative  -No peroneal subluxation/dislocation with dorsiflexion + eversion or with circumduction    LLE:  Vascular: Limb well perfused, compartments soft/compressible. Skin: No erythema/ulcers. Intact. Neurovascular Status:  Grossly neurovascularly intact throughout   Tenderness to Palpation:  anteromedial and anterolateral knee joint line        RADIOLOGY:   2021 FINDINGS:  Four weightbearing views (AP, Tunnel, Lateral, and Sunrise) of the left knee were obtained in the office today and reviewed, revealing no acute fracture, dislocation, or radioopaque foreign body/tumor. Overall alignment is satisfactory. Tricompartmental degenerative changes of the knee with joint narrowing, sclerosis, and osteophytes. Retained hardware in the proximal tibia. IMPRESSION: No acute fracture/dislocation. Degenerative changes as above. Electronically signed by Yao Carrasco MD        ASSESSMENT AND PLAN:  Body mass index is 35.52 kg/m².        She is status post the above (on 3/4/2021), doing well overall. She denies any significant pain in her left ankle, and now reports that her left knee is her limiting factor. She does have ipsilateral left knee tricompartmental osteoarthritis along with likely underlying degenerative meniscal tears. She has a left ankle sprain, sustained on 11/13/2020, with a history of left ankle instability (20 ankle sprains total, positive anterior drawer, unable to adequately assess her talar tilt secondary to pain), findings suspicious for peroneal tendon injury (tenderness to palpation along the course of her peroneal tendons, weak and painful eversion), with an underlying gastroc equinus contracture.     Notably, she has no relevant past medical history. We had a discussion today about the likely diagnosis and its natural history, physical exam and imaging findings, as well as various treatment options in detail. Surgically, we discussed a possible future left knee arthroscopic meniscal debridement, depending on her clinical course. At today's visit, as she has not tried any conservative management, I did recommend beginning with this, and she agrees. Orders/referrals were placed as below at today's visit. The patient was provided information on how to obtain an over-the-counter soft knee brace. The patient was also provided a referral to physical therapy. I provided a prescription for Voltaren (4g TOPL q QID PRN pain). All questions were answered and the above plan was agreed upon. The patient will return to clinic in 8 weeks without x-rays. At her next visit, depending on how she is doing, we may consider an MRI of her left knee with possible subsequent injection versus consideration of surgery.               At the patient's next visit, depending on how the patient is doing and/or new imaging/labs results, we may consider the following options:    []  Orthotic (OTC)     []  Orthotic (custom)          []

## 2021-06-18 NOTE — FLOWSHEET NOTE
peroneus brevis debridement  3. Left peroneus longus tenolysis  4. Left leg gastroc recession, performed through separate incision  5. Precautions:   FWB in lace up ankle brace   Exercises:  6/18/2021 completed exercises marked with \"x\"  Exercise:  L LE Reps/ Time Weight/ Level Comments    sci fit 6 mins L2.5       gait  100 ft x1    Velcro brace/tennis shoe   RW, vc for push off.     Gait/balance with many directional changes and retro steps    3 mins   No device, velcro brace, added 5/17    Sit to standing   10x  Without UE support. L foot posterior placement.-added 6/14  x          Standing       Gastro stretch 3x30\"   wedge  x   Soleus stretch 3x30\"       Toe extension stretch 3x15\"  Arch stretch     prostretch gastroc stretch 3x20\"  Added 6/14 x   Heel raises  1x10 A       Heel raises  2x10 2'\" eccentric focus,       3 way hip steamboats  15xea  lime  L CKC,       mini fwd lunges 10x2        Wall lean  10x5\"  Foot intrinsics focus      marching 20x A       baps 20x  20x L2  L2 DF/PF   IV/EV, CW, CCW    x  x   Rocker board  20x  Med/lateral, added 6/16 x   SLS 1x-21\"  1x30\"       tandem stance  2x30\"            Step ups  1x15 8 in      Lateral step ups 1x15 6 in  x   Step overs  1x15 4 in    x   Step downs  1x15 4 in    x   TG single heel raise 2x10 L28 I  x                        Sitting       domers  2 mins           A      HS stretch 3x30\"        toes curls marble pic up 12x2 A     Foot intrinsic  10x2 5\"    x   Sub max iso ankle inv/ev 10x5\"       tband ankle DF/PF   15x blue  lime 6/14  x   tband ankle IN/EV 15x lime    x   Gr toe flex/ext tband  x lime  A      baps board  2x10  1x10  Inv/eversion-added  Cw,ccw-added      Ankle circles  1x10  Cw, CCW, added     Towel swooshes  2x10  Inversion, eversion added      Supine       Manual  x  PROM ankle DF/PF, scar massage, gastro stretching, STM plantar surface.  x   Gastro stretch with HS stretch and ankle inversion  belt 3x1 min\"    belt, inversion, Resumed 6/18   x   Hip flexor/quad stretch 3x30\"        SLR 3- way      sidelying, supine - verbal instructions for HEP.                       Other:     6/14/21: DF 35, DF AROM lacking 2 to neutral, 5° PROM DF.   6/18/21 Goni measurements L ankle AROM:   PF 42  INV 22  EV 18     Specific Instructions for next treatment:   -ankle ROM/strength all planes. -stability-add dynamic   - quad/hamstring flexibility          Treatment Charges: Mins Units   [x]  Modalities CP     [x]  Ther Exercise  44 3   []  Manual Therapy     []  Ther Activities     []  Aquatics     [x]  Vasocompression --    []  Other  Neuro re-ed      Total Treatment time   44 3     Assessment: [x] Progressing toward goals  Progressed reps for STS without UE support this date. Pt with some discomfort noted with baps board and single leg heel raises on TG. Applied PROM to ankle into plantar flexion and dorsi flexion to further increase ROM. Vc's as needed to ensure proper positioning throughout session. Good tolerance to stepping exercises this date. Continue to progress pt as tolerated. No change. [] Other:       [x] Patient would continue to benefit from skilled physical therapy services in order to: Progress ankle ROM and strengthening as well as progressing wt bearing in order to return to full ADL and job. STG: (to be met in 12 treatments)  1. ? Pain: No more than 6/10 max pain in L ankle with therapy and at home. 6/14/21: MET  2. ? ROM:   a. At least 15deg PROM L ankle DF with knee extended to progress to maximal DF joint mobility to allow proper gait/stair mechanics  b. At least 45deg PROM L ankle PF for improved push off with gait  3. ? Strength:   a. At least 4/5 L PF strength for improving gait mechanics and stability in hind foot with ambulation/stair climbing 4/16/21 MET 5/5 PF, DF 4+/5  b.  Able to tolerate L ankle inv/ev submaximal isometric strengthening without increase in pain  6/14/21: met  4. ? Balance: Pt able to hold tandem stance bilat for at least 30\" without UE assist to indicate improving balance in narrow ROBERT conditions 6/1421 MET  5. ? Function:   a. Pt able to ambulate 300 ft in lace-up brace on LLE using least restrictive device and near equal stance time, improving step length, and consistent heel strike LLE 6/4/21 MET  b. Pt able to complete all transfers without UE assist and no imbalance noted 6/14/21 MET  c. Pt able to ambulate up/down an incline with least restrictive device without assist or supervision needed to allow for improved safety with entering/exiting home independently  6. Independent with Home Exercise Programs 6/14/21MET  7. Demonstrate Knowledge of fall prevention  6/14/21: MET     LTG: (to be met in 24 treatments)  8. ? Pain: No more than 3/10 max pain in L ankle with therapy and at home. 9. ? ROM: Full ROM all planes of L ankle to indicate restored joint mobility post - op  10. ? Strength:   a. At least 5-/5 L ankle gross strength all planes  b. Able to complete 15 SLS heel raises on LLE to indicate improved functional PF strength  11. ? Balance: Pt able to complete retro and tandem ambulation for 50 ft ea without any sign of imbalance  12. ? Function:   a. Pt able to ambulate 500 ft in lace-up brace on LLE without device and equal stance time, no antalgic gait evident, consistent heel strike and full push off appreciated, and at a minimum of 1.0m/s to indicate safety for community ambulation speed  b. Pt able to ascend/descend stairs with min UE assist while completing with reciprocal pattern and good balance noted                 Patient goals: \"get back to work, walk normal, less pain\"       Pt. Education:  [x] Yes  [] No  [x] Reviewed Prior HEP/Ed  Method of Education: [x] Verbal  [x] Demo  [] Written  Comprehension of Education:  [x] Verbalizes understanding. [x] Demonstrates understanding. [] Needs review.   [x] Demonstrates/verbalizes HEP/Ed previously given.   Access Code: [email or web address]: [employer]/Date: 04/27/2021Prepared by: Shay Stockton Sitting Calf Stretch with Strap - 3 x daily - 7 x weekly - 3 sets - 3 reps   Long Sitting Soleus Stretch on Bolster with Strap - 3 x daily - 7 x weekly - 3 sets - 3 reps   Supine Hamstring Stretch with Strap with ankle DF and inversion - 3 x daily - 7 x weekly - 3 sets - 3 reps   Supine Ankle Pumps - 3 x daily - 7 x weekly - 2 sets - 20 reps   Seated Heel Toe Raises - 3 x daily - 7 x weekly - 2 sets - 20 reps   Isometric Ankle Dorsiflexion and Plantarflexion - 1 x daily - 3-4 x weekly - 1-2 sets - 10 reps   Long Sitting Isometric Ankle Plantarflexion with Ball at Wall - 1 x daily - 3-4 x weekly - 1-2 sets - 10 reps  Access Code: [email or web address]  URL: [email or web address]  Date: 05/17/2021       Exercises  Long Sitting Ankle Dorsiflexion with Anchored Resistance - 1 x daily - 5 x weekly - 2 sets - 10 reps  Ankle and Toe Plantarflexion with Resistance - 1 x daily - 5 x weekly - 2 sets - 10 reps    Frequency:  2-3 x/week for 24 visits  Plan: [x] Continue current frequency toward long and short term goals. [x] Specific Instructions for subsequent treatments:  Progress  ankle inversion,eversion  ROM/strength. Add dynamic stability exercises.        Time In:  11:56 am     Time Out: 12:40 pm     Electronically signed by:  Mahi Chappell PTA

## 2021-06-23 ENCOUNTER — HOSPITAL ENCOUNTER (OUTPATIENT)
Dept: PHYSICAL THERAPY | Age: 61
Setting detail: THERAPIES SERIES
Discharge: HOME | End: 2021-06-23
Payer: COMMERCIAL

## 2021-06-23 PROCEDURE — 97110 THERAPEUTIC EXERCISES: CPT

## 2021-06-23 NOTE — FLOWSHEET NOTE
[x] [facility]) - [facility] &  Therapy  [address]  [phone]  F: [phone] [] [address]  [place] [address]   [address]  P: [phone]  F: [phone] [] [address] &  Therapy  [address]  P: [phone]  F: [phone] [] [address]  P: [phone]  F: [phone] [] [address]  [facility]   [address]   [place]: [phone]  F: [phone]      Physical Therapy Daily Treatment Note    Date:  2021  Patient Name:  Jared Black    :  1960  MRN: [id number]   Physician: Dr. Forrest Remak: Ad Tsai (13vs)  Medical Diagnosis: Instability of left ankle joint; Peroneal tendinitis of left lower extremity; Gastrocnemius equinus of left lower extremity; Sprain of left ankle, unspecified ligament, subsequent encounter  Rehab Codes: L44.583N, R53.1, R26.9, R27.9  Onset date: 20                         Next 's appt.: DOS 21 Js, 21 ORTHO  Visit# / total visits: 15/24 (correct visit # ),   Progress note for STG due at visit #12    Cancels/No Shows:     Subjective:    Pain:  [] Yes  [x] No Location: L foot  Pain Rating: (0-10 scale) 0/10  Pain altered Tx:  [x] No  [] Yes  Action:      Comments: 6 mins late. Reports Dr Anthony Martell released her to return to work. He is to write script to left knee therapy. She reports she is to continue PT for ankle and returns in 6 wks for ankle. (no orders in system for L knee eval and treat, pt instructed to call MD for order)  Reports velcro brace was discontinued as well. Objective:   Modalities: vaso compression 15 mins - pt deferred.    Precautions  FBW no restrictions 21    Procedures Performed:  (3/4/2021)  1. Left ankle lateral ligamentous repair (with Arthrex fiber tack anchors x 2 + internal brace)  2. Left peroneus brevis debridement  3. Left peroneus longus tenolysis  4. Left leg gastroc recession, performed through separate incision  5. Exercises:  6/23/2021 completed exercises marked with \"x\"  Exercise:  L LE Reps/ Time Weight/ Level Comments    sci fit 6 mins L2.5  Held due to late arrival 6/23     gait  100 ft x1    Velcro brace/tennis shoe   RW, vc for push off.     Gait/balance with many directional changes and retro steps    3 mins   No device, velcro brace, added 5/17    Sit to standing   10x  Without UE support. L foot posterior placement. Standing       Gastro stretch 3x30\"   wedge  x   Soleus stretch 3x30\"    x   Toe extension stretch 3x15\"  Arch stretch     prostretch gastroc stretch 3x20\"  Added 6/14 x   Heel raises  1x10 A       Heel raises  2x10 2'\" eccentric focus,    x   L SLS dead lift  Touching 8 in step  1x8  Added 6/23. Incr. L knee pain, limited reps  x   3 way hip steamboats  15xea  lime  L CKC,       mini fwd lunges 10x2        Wall lean  10x5\"  Foot intrinsics focus      baps 20x  20x L2  L2 DF/PF   IV/EV, CW, CCW    x  x   Rocker board  20x  Med/lateral,       SLS 2x30\"    x   tandem stance  2x30\"            Step ups  1x15 8 in      Lateral step ups 1x15 6 in     Step overs  1x15 4 in       Step downs  1x15 4 in       TG single heel raise 1x10  1x4 L28 I  x                 Sitting       HS stretch 3x30\"   RESTART NEXT SESSION     Foot intrinsic  10x2 5\"        Sub max iso ankle inv/ev 10x5\"       tband ankle DF/PF   2x10 blue    x   tband ankle IN/EV 2x10 lime    x   Gr toe flex/ext tband  x lime  A      Towel swooshes  2x10  Inversion, eversion added      Supine       Manual  x  PROM ankle DF/PF, scar massage, gastro stretching, STM plantar surface.      Gastro stretch with HS stretch and ankle inversion  belt 3x1 min\"    belt, inversion,        x   Hip flexor/quad stretch 3x30\"        SLR 3- way      sidelying, supine - verbal instructions for HEP.                       Other:     6/14/21: DF 35, DF AROM lacking 2 to neutral, 5° PROM DF.   6/18/21 Goni measurements L ankle AROM:   PF 42  INV 22  EV 18       Treatment Charges: Mins Units   [x]  Modalities CP --    [x]  Ther Exercise  53 4   []  Manual Therapy     []  Ther Activities     []  Aquatics     [x]  Vasocompression --    []  Other  Neuro re-ed      Total Treatment time    53 4     Assessment: [x] Progressing toward goals  Continue to L ankle strengthening and stability as charted. Limited with SLS and eccentric work progressions due to Left knee pain. See subjective info above. No change. [x] Other:  Weak ankle eversion/inversion. Education on address tband HEP daily and progressing to 3x10 reps, pt voiced understandign. [x] Patient would continue to benefit from skilled physical therapy services in order to: Progress ankle ROM and strengthening as well as progressing wt bearing in order to return to full ADL and job. STG: (to be met in 12 treatments)  1. ? Pain: No more than 6/10 max pain in L ankle with therapy and at home. 6/14/21: MET  2. ? ROM:   a. At least 15deg PROM L ankle DF with knee extended to progress to maximal DF joint mobility to allow proper gait/stair mechanics  b. At least 45deg PROM L ankle PF for improved push off with gait  3. ? Strength:   a. At least 4/5 L PF strength for improving gait mechanics and stability in hind foot with ambulation/stair climbing 4/16/21 MET 5/5 PF, DF 4+/5  b. Able to tolerate L ankle inv/ev submaximal isometric strengthening without increase in pain  6/14/21: met  4. ? Balance: Pt able to hold tandem stance bilat for at least 30\" without UE assist to indicate improving balance in narrow ROBERT conditions 6/1421 MET  5. ? Function:   a.  Pt able to ambulate 300 ft in lace-up brace on LLE using least restrictive device and near equal stance time, improving step length, and consistent heel strike LLE 6/4/21 MET  b. Pt able to complete all transfers without UE assist and no imbalance noted 6/14/21 MET  c. Pt able to ambulate up/down an incline with least restrictive device without assist or supervision needed to allow for improved safety with entering/exiting home independently  6. Independent with Home Exercise Programs 6/14/21MET  7. Demonstrate Knowledge of fall prevention  6/14/21: MET     LTG: (to be met in 24 treatments)  8. ? Pain: No more than 3/10 max pain in L ankle with therapy and at home. 9. ? ROM: Full ROM all planes of L ankle to indicate restored joint mobility post - op  10. ? Strength:   a. At least 5-/5 L ankle gross strength all planes  b. Able to complete 15 SLS heel raises on LLE to indicate improved functional PF strength  11. ? Balance: Pt able to complete retro and tandem ambulation for 50 ft ea without any sign of imbalance  12. ? Function:   a. Pt able to ambulate 500 ft in lace-up brace on LLE without device and equal stance time, no antalgic gait evident, consistent heel strike and full push off appreciated, and at a minimum of 1.0m/s to indicate safety for community ambulation speed  b. Pt able to ascend/descend stairs with min UE assist while completing with reciprocal pattern and good balance noted                 Patient goals: \"get back to work, walk normal, less pain\"       Pt. Education:  [x] Yes  [] No  [x] Reviewed Prior HEP/Ed  Method of Education: [x] Verbal  [x] Demo  [] Written  Comprehension of Education:  [x] Verbalizes understanding. [x] Demonstrates understanding. [] Needs review. [x] Demonstrates/verbalizes HEP/Ed previously given. Access Code: [email or web address]: [email or web address]/Date: 04/27/2021Prepared by: Crystal Bradley Sitting Calf Stretch with Strap - 3 x daily - 7 x weekly - 3 sets - 3 reps   Long Sitting Soleus Stretch on Bolster with Strap - 3 x daily - 7 x weekly - 3 sets - 3 reps   Supine Hamstring Stretch with Strap with ankle DF and inversion - 3 x daily - 7 x weekly - 3 sets - 3 reps   Supine Ankle Pumps - 3 x daily - 7 x weekly - 2 sets - 20 reps   Seated Heel Toe Raises - 3 x daily - 7 x weekly - 2 sets - 20 reps   Isometric Ankle Dorsiflexion and Plantarflexion - 1 x daily - 3-4 x weekly - 1-2 sets - 10 reps   Long Sitting Isometric Ankle Plantarflexion with Ball at Wall - 1 x daily - 3-4 x weekly - 1-2 sets - 10 reps  Access Code: [email or web address]  URL: [email or web address]  Date: 05/17/2021       Exercises  Long Sitting Ankle Dorsiflexion with Anchored Resistance - 1 x daily - 5 x weekly - 2 sets - 10 reps  Ankle and Toe Plantarflexion with Resistance - 1 x daily - 5 x weekly - 2 sets - 10 reps    Frequency:  2-3 x/week for 24 visits  Plan: [x] Continue current frequency toward long and short term goals. [x] Specific Instructions for subsequent treatments:  Progress  ankle inversion,eversion  ROM/strength. Add dynamic stability exercises.        Time In: 1436   Time Out:  1529    Electronically signed by:  Kelly Pereira PTA

## 2021-06-25 ENCOUNTER — HOSPITAL ENCOUNTER (OUTPATIENT)
Dept: PHYSICAL THERAPY | Age: 61
Setting detail: THERAPIES SERIES
Discharge: HOME | End: 2021-06-25
Payer: COMMERCIAL

## 2021-06-25 DIAGNOSIS — K21.9 GASTROESOPHAGEAL REFLUX DISEASE, UNSPECIFIED WHETHER ESOPHAGITIS PRESENT: ICD-10-CM

## 2021-06-25 PROCEDURE — 97110 THERAPEUTIC EXERCISES: CPT

## 2021-06-25 RX ORDER — OMEPRAZOLE 40 MG/1
CAPSULE, DELAYED RELEASE ORAL
Qty: 28 CAPSULE | Refills: 3 | Status: SHIPPED | OUTPATIENT
Start: 2021-06-25 | End: 2022-07-01

## 2021-06-25 NOTE — FLOWSHEET NOTE
[x] [facility]) [facility] &  Therapy  [address]  [phone]  F: [phone] [] [address]  [facility]   [address]  P: [phone]  F: [phone] [] [address] &  Therapy  [address]  P: [phone]  F: [phone] [] [address]  P: [phone]  F: [phone] [] [address]  [facility]   [address]   [place]: [phone]  F: [phone]      Physical Therapy Daily Treatment Note    Date:  2021  Patient Name:  Terrance Brantley    :  1960  MRN: [id number]   Physician: Dr. Jelly Blackwood: Juan Carlos Restrepo (13vs)  Medical Diagnosis: Instability of left ankle joint; Peroneal tendinitis of left lower extremity; Gastrocnemius equinus of left lower extremity; Sprain of left ankle, unspecified ligament, subsequent encounter  Rehab Codes: B83.153K, R53.1, R26.9, R27.9  Onset date: 20                         Next 's appt.: DOS 21 Js, 21 ORTHO  Visit# / total visits:  (correct visit # ),   Progress note for STG due at visit #12    Cancels/No Shows:     Subjective:    Pain:  [] Yes  [x] No Location: L foot  Pain Rating: (0-10 scale) 0/10  Pain altered Tx:  [x] No  [] Yes  Action:      Comments:     Reports no notable pain in ankle with home and community activities but does report L knee pain is her limiting factor now. Objective:   Modalities: vaso compression 15 mins - pt deferred. Precautions  FBW no restrictions 21    Procedures Performed:  (3/4/2021)  1. Left ankle lateral ligamentous repair (with Arthrex fiber tack anchors x 2 + internal brace)  2. Left peroneus brevis debridement  3. Left peroneus longus tenolysis  4.  Left leg gastroc recession, performed through separate incision  5. Exercises:  6/25/2021 completed exercises marked with \"x\"  Exercise:  L LE Reps/ Time Weight/ Level Comments    sci fit 6 mins L2.5      Sit to standing   10x  Without UE support. L foot posterior placement. Standing       Gastro stretch 3x30\"   wedge  x   Soleus stretch 3x30\"    x   Toe extension stretch 3x15\"  Arch stretch     prostretch gastroc stretch 3x30\"    x   Heel raises  1x10 A       Heel raises  2x10 4' eccentric focus,    x   L SLS dead lift  Touching 8 in step  1x8  Added 6/23. Incr. L knee pain, limited reps  x   4 way hip steamboats  15xea  blue   L CKC,  Incr. Resistance 6/25  x    mini fwd lunges 10x2        Wall lean  10x5\"  Foot intrinsics focus      baps 20x  20x L2  L2 DF/PF   IV/EV, CW, CCW    x  x   Rocker board  20x L2 Med/lateral, NO UE SUPPORT 6/25      SLS on foam(theraband blue) 1x23\"  1x15\"  1x20\"    added foam 6/25 x   tandem stance  1x18\"  1x30\"   airex foam  theraband foam  x  x      Step ups  + 8 in      Lateral step ups 1x15 6 in     Step overs  1x10 4 in    x   Step downs  1x10 4 in    x   TG single heel raise 1x10  1x5 L30  incr. Angle 6/25 x          cybex resistive walking  3x ea   Lateral R/l, fwd, added 6/25 x   Sitting       HS stretch 3x30\"        Foot intrinsic  10x2 5\"        Sub max iso ankle inv/ev 10x5\"       tband ankle DF/PF   2x10 blue    x   tband ankle IN/EV 2x10 lime    x   Gr toe flex/ext tband  x lime  A      Towel swooshes  2x10  Inversion, eversion added      Supine       Manual  x  PROM ankle DF/PF, scar massage, gastro stretching, STM plantar surface. Gastro stretch with HS stretch and ankle inversion  belt 3x1 min\"    belt, inversion,           Hip flexor/quad stretch 3x30\"        SLR 3- way      sidelying, supine - verbal instructions for HEP.                                                         Other:     6/14/21: DF 35, DF AROM lacking 2 to neutral, 5° PROM DF.   6/18/21 Goni measurements L ankle AROM:   PF 42  INV 22  EV 18       Treatment Charges: Mins Units   [x]  Modalities CP --    [x]  Ther Exercise 55  4   []  Manual Therapy     []  Ther Activities     []  Aquatics     [x]  Vasocompression     []  Other  Neuro re-ed      Total Treatment time   55 4     Assessment: [x] Progressing toward goals continue with strengthening, ROM, and stability with progressions in exercise log as charted. No change. [x] Other:   Ankle eversion weak. Education on three exercise options for strengthening again for HEP, pt voiced understanding. Gastro fatigues with SLS heel raise on total gym, PWB. Left knee pain intermittently limits sls stability tolerance due to OA pain. [x] Patient would continue to benefit from skilled physical therapy services in order to: Progress ankle ROM and strengthening as well as progressing wt bearing in order to return to full ADL and job. STG: (to be met in 12 treatments)  1. ? Pain: No more than 6/10 max pain in L ankle with therapy and at home. 6/14/21: MET  2. ? ROM:   a. At least 15deg PROM L ankle DF with knee extended to progress to maximal DF joint mobility to allow proper gait/stair mechanics  b. At least 45deg PROM L ankle PF for improved push off with gait  3. ? Strength:   a. At least 4/5 L PF strength for improving gait mechanics and stability in hind foot with ambulation/stair climbing 4/16/21 MET 5/5 PF, DF 4+/5  b. Able to tolerate L ankle inv/ev submaximal isometric strengthening without increase in pain  6/14/21: met  4. ? Balance: Pt able to hold tandem stance bilat for at least 30\" without UE assist to indicate improving balance in narrow ROBERT conditions 6/1421 MET  5. ? Function:   a.  Pt able to ambulate 300 ft in lace-up brace on LLE using least restrictive device and near equal stance time, improving step length, and consistent heel strike LLE 6/4/21 MET  b. Pt able to complete all transfers without UE assist and no

## 2021-06-25 NOTE — TELEPHONE ENCOUNTER
Terrance Brantley is calling to request a refill on the following medication(s):    Last Visit Date (If Applicable):  1/7/2410    Next Visit Date:    Visit date not found    Medication Request:  Requested Prescriptions     Pending Prescriptions Disp Refills    diclofenac (VOLTAREN) 50 MG EC tablet [Pharmacy Med Name: DICLOFENAC SODIUM DR 50MG ENTERIC COATED TABLET] 56 tablet      Sig: TAKE 1 TABLET BY MOUTH TWICE DAILY    omeprazole (PRILOSEC) 40 MG delayed release capsule [Pharmacy Med Name: OMEPRAZOLE 40MG ORAL CAPSULE] 28 capsule      Sig: TAKE 1 CAPSULE BY MOUTH DAILY EVERY MORNING

## 2021-06-28 ENCOUNTER — HOSPITAL ENCOUNTER (OUTPATIENT)
Dept: PHYSICAL THERAPY | Age: 61
Setting detail: THERAPIES SERIES
Discharge: HOME | End: 2021-06-28
Payer: COMMERCIAL

## 2021-06-28 DIAGNOSIS — K21.9 GASTROESOPHAGEAL REFLUX DISEASE, UNSPECIFIED WHETHER ESOPHAGITIS PRESENT: ICD-10-CM

## 2021-06-28 RX ORDER — PRAVASTATIN SODIUM 40 MG
40 TABLET ORAL DAILY
Qty: 30 TABLET | Refills: 3 | OUTPATIENT
Start: 2021-06-28 | End: 2021-07-28

## 2021-06-28 RX ORDER — PRAVASTATIN SODIUM 40 MG
40 TABLET ORAL DAILY
Qty: 28 TABLET | Refills: 3 | Status: SHIPPED | OUTPATIENT
Start: 2021-06-28 | End: 2021-09-20

## 2021-06-28 RX ORDER — OMEPRAZOLE 40 MG/1
CAPSULE, DELAYED RELEASE ORAL
Qty: 28 CAPSULE | Refills: 3 | OUTPATIENT
Start: 2021-06-28

## 2021-06-28 NOTE — TELEPHONE ENCOUNTER
Sincere Lunsford is calling to request a refill on the following medication(s):    Last Visit Date (If Applicable):  2/9/0025    Next Visit Date:    Visit date not found    Medication Request:  Requested Prescriptions     Pending Prescriptions Disp Refills    pravastatin (PRAVACHOL) 40 MG tablet 30 tablet 3     Sig: Take 1 tablet by mouth daily    diclofenac (VOLTAREN) 50 MG EC tablet 56 tablet 2    omeprazole (PRILOSEC) 40 MG delayed release capsule 28 capsule 3     Sig: TAKE 1 CAPSULE BY MOUTH DAILY EVERY MORNING

## 2021-06-28 NOTE — TELEPHONE ENCOUNTER
Corby Galvez is calling to request a refill on the following medication(s):    Medication Request:  Requested Prescriptions     Pending Prescriptions Disp Refills    pravastatin (PRAVACHOL) 40 MG tablet [Pharmacy Med Name: PRAVASTATIN SODIUM 40MG ORAL TABLET] 28 tablet      Sig: TAKE 1 TABLET BY MOUTH DAILY       Last Visit Date (If Applicable):  0/2/7165    Next Visit Date:    Visit date not found

## 2021-06-28 NOTE — FLOWSHEET NOTE
[x] [facility]) - [facility] &  Therapy  [address]    [phone]  F: [phone]   [] [address]  [facility]   [address]  P: [phone]  F: [phone]  [] [address] &  Therapy  [address] [facility]  P: [phone]  F: [phone] [] [address]  P: [phone]  F: [phone]  [] [address]  Psychiatric   [address]   [place]: [phone]  F: [phone]   [] [place] [address] [facility] [address]  [place]: [phone]   F: [phone]     Physical Therapy Cancel/No Show note    Date: 2021  Patient: Denise Oden  : 1960  MRN: [id number]    Cancels/No Shows to date:     For today's appointment patient:    [x]  Cancelled    [] Rescheduled appointment    [] No-show     Reason given by patient:    []  Patient ill    []  Conflicting appointment    [] No transportation      [] Conflict with work    [] No reason given    [] Weather related    [] COVID-19    [x] Other:      Comments:  Pt called and stated she was on the other side of [facility].      Electronically signed by: Nadege Austin PTA

## 2021-06-30 ENCOUNTER — HOSPITAL ENCOUNTER (OUTPATIENT)
Dept: PHYSICAL THERAPY | Age: 61
Setting detail: THERAPIES SERIES
Discharge: HOME | End: 2021-06-30
Payer: COMMERCIAL

## 2021-07-02 ENCOUNTER — HOSPITAL ENCOUNTER (OUTPATIENT)
Dept: PHYSICAL THERAPY | Age: 61
Setting detail: THERAPIES SERIES
Discharge: HOME | End: 2021-07-02
Payer: COMMERCIAL

## 2021-07-02 PROCEDURE — 97110 THERAPEUTIC EXERCISES: CPT

## 2021-07-02 PROCEDURE — 97016 VASOPNEUMATIC DEVICE THERAPY: CPT

## 2021-07-02 NOTE — FLOWSHEET NOTE
[x] [facility]) [facility] &  Therapy  [address]  [phone]  F: [phone] [] [address]  [place] [address]   [address]  P: [phone]  F: [phone] [] [place]  [address]  P: [phone]  F: [phone] [] [address]  P: [phone]  F: [phone] [] [address]  [facility]   [address]   [place]: [phone]  F: [phone]      Physical Therapy Daily Treatment Note    Date:  2021  Patient Name:  Charlie Cr    :  1960  MRN: [id number]   Physician: Dr. Jose Alberto Smith: Lennox Luciano (13vs)  Medical Diagnosis: Instability of left ankle joint; Peroneal tendinitis of left lower extremity; Gastrocnemius equinus of left lower extremity; Sprain of left ankle, unspecified ligament, subsequent encounter  Rehab Codes: M77.898D, R53.1, R26.9, R27.9  Onset date: 20                         Next 's appt.: DOS 21 Js, 21 ORTHO  Visit# / total visits:  (correct visit # ),   Progress note for STG due at visit #12    Cancels/No Shows:     Subjective:    Pain:  [] Yes  [x] No Location: L foot  Pain Rating: (0-10 scale) 0/10  Pain altered Tx:  [x] No  [] Yes  Action:      Comments:     Reports no pain this date. States she has been compliant with previously issued HEP with no questions or concerns. Objective:   Modalities: vaso compression 15 mins - pt deferred. Precautions  FBW no restrictions 21    Procedures Performed:  (3/4/2021)  1. Left ankle lateral ligamentous repair (with Arthrex fiber tack anchors x 2 + internal brace)  2. Left peroneus brevis debridement  3. Left peroneus longus tenolysis  4.  Left leg gastroc recession, performed through separate incision  5. Exercises:  7/2/2021 completed exercises marked with \"x\"  Exercise:  L LE Reps/ Time Weight/ Level Comments    sci fit 6 mins L2.5   x   Sit to standing   10x  Without UE support. L foot posterior placement. Standing       Gastro stretch 3x30\"   wedge  x   Soleus stretch 3x30\"    x   Toe extension stretch 3x15\"  Arch stretch     prostretch gastroc stretch 3x30\"       Heel raises  1x10 A    x   Heel raises  2x10 4' eccentric focus,    x   L SLS dead lift  Touching 8 in step  1x8  Added 6/23. Incr. L knee pain, limited reps  x   4 way hip steamboats  15xea  blue   L CKC,  Incr. Resistance 6/25      mini fwd lunges 10x2        Wall lean  10x5\"  Foot intrinsics focus      baps 20x  20x L2  L2 DF/PF   IV/EV, CW, CCW    x  x   Rocker board  20x L2 Med/lateral, NO UE SUPPORT 6/25      SLS on foam(theraband blue) 1x33\"  1x11\"  1x23\"    added foam 6/25 x   tandem stance  1x63\"  1x60\"   airex foam  theraband foam  x  x      Step ups  + 8 in   x   Lateral step ups 1x15 6 in  x   Step overs  1x10 4 in    x   Step downs  1x10 4 in    x   TG single heel raise 1x10  1x5 L30  incr. Angle 6/25 x          cybex resistive walking  3x ea   Lateral R/l, fwd, added 6/25    Sitting       HS stretch 3x30\"        Foot intrinsic  10x2 5\"        Sub max iso ankle inv/ev 10x5\"       tband ankle DF/PF   2x10 blue    x   tband ankle IN/EV 2x10 blue   x   Gr toe flex/ext tband  x lime  A      Towel swooshes  2x10  Inversion, eversion added      Supine       Manual  x  PROM ankle DF/PF, scar massage, gastro stretching, STM plantar surface. Gastro stretch with HS stretch and ankle inversion  belt 3x1 min\"    belt, inversion,           Hip flexor/quad stretch 3x30\"        SLR 3- way      sidelying, supine - verbal instructions for HEP.                                                         Other:     6/14/21: DF 35, DF AROM lacking 2 to neutral, 5° PROM DF.   6/18/21 Goni measurements L ankle AROM:   PF 42  INV 22  EV 18       Treatment Charges: Mins Units   []  Modalities CP     [x]  Ther Exercise 38 2   []  Manual Therapy     []  Ther Activities     []  Aquatics     [x]  Vasocompression 10 1   []  Other  Neuro re-ed      Total Treatment time   48 3     Assessment: [x] Progressing toward goals. ContinueD with strengthening, ROM, and stability with progressions in exercise log as charted. Pt most challenged with L IN/EV on BAPs board this date. Increased height for step ups and lateral step downs to 6\" step to progress LE strength with good tolerance. Pt was able to perform L SLS for 33secs and tandem stance for 63s this date with some instability noted. Increased resistance for ankle IN/EV to blue theraband with fair tolerance. Educated pt to continue working towards increasing ankle IN/EV ROM with good understanding. Overall, good tolerance of exercises and progressions this date. Ended session with vaso to reduce any swelling or tenderness with pt reporting good relief of symptoms. No change. [x] Other:   Ankle eversion weak. Education on three exercise options for strengthening again for HEP, pt voiced understanding. Gastro fatigues with SLS heel raise on total gym, PWB. Left knee pain intermittently limits sls stability tolerance due to OA pain. [x] Patient would continue to benefit from skilled physical therapy services in order to: Progress ankle ROM and strengthening as well as progressing wt bearing in order to return to full ADL and job. STG: (to be met in 12 treatments)  1. ? Pain: No more than 6/10 max pain in L ankle with therapy and at home. 6/14/21: MET  2. ? ROM:   a. At least 15deg PROM L ankle DF with knee extended to progress to maximal DF joint mobility to allow proper gait/stair mechanics  b. At least 45deg PROM L ankle PF for improved push off with gait  3. ? Strength:   a.  At least 4/5 L PF strength for improving gait mechanics and stability in hind foot with ambulation/stair climbing 4/16/21 MET 5/5 PF, DF 4+/5  b. Able to tolerate L ankle inv/ev submaximal isometric strengthening without increase in pain  6/14/21: met  4. ? Balance: Pt able to hold tandem stance bilat for at least 30\" without UE assist to indicate improving balance in narrow ROBERT conditions 6/1421 MET  5. ? Function:   a. Pt able to ambulate 300 ft in lace-up brace on LLE using least restrictive device and near equal stance time, improving step length, and consistent heel strike LLE 6/4/21 MET  b. Pt able to complete all transfers without UE assist and no imbalance noted 6/14/21 MET  c. Pt able to ambulate up/down an incline with least restrictive device without assist or supervision needed to allow for improved safety with entering/exiting home independently  6. Independent with Home Exercise Programs 6/14/21MET  7. Demonstrate Knowledge of fall prevention  6/14/21: MET     LTG: (to be met in 24 treatments)  8. ? Pain: No more than 3/10 max pain in L ankle with therapy and at home. 9. ? ROM: Full ROM all planes of L ankle to indicate restored joint mobility post - op  10. ? Strength:   a. At least 5-/5 L ankle gross strength all planes  b. Able to complete 15 SLS heel raises on LLE to indicate improved functional PF strength  11. ? Balance: Pt able to complete retro and tandem ambulation for 50 ft ea without any sign of imbalance  12. ? Function:   a. Pt able to ambulate 500 ft in lace-up brace on LLE without device and equal stance time, no antalgic gait evident, consistent heel strike and full push off appreciated, and at a minimum of 1.0m/s to indicate safety for community ambulation speed  b. Pt able to ascend/descend stairs with min UE assist while completing with reciprocal pattern and good balance noted                 Patient goals: \"get back to work, walk normal, less pain\"       Pt.  Education:  [x] Yes  [] No  [x] Reviewed Prior HEP/Ed  Method of Education: [x] Verbal  [x] Demo  [] Written  Comprehension of Education:  [x] Verbalizes understanding. [x] Demonstrates understanding. [] Needs review. [x] Demonstrates/verbalizes HEP/Ed previously given. Access Code: [email or web address]: [email or web address]. com/Date: 04/27/2021Prepared by: Grover Riding Sitting Calf Stretch with Strap - 3 x daily - 7 x weekly - 3 sets - 3 reps   Long Sitting Soleus Stretch on Bolster with Strap - 3 x daily - 7 x weekly - 3 sets - 3 reps   Supine Hamstring Stretch with Strap with ankle DF and inversion - 3 x daily - 7 x weekly - 3 sets - 3 reps   Supine Ankle Pumps - 3 x daily - 7 x weekly - 2 sets - 20 reps   Seated Heel Toe Raises - 3 x daily - 7 x weekly - 2 sets - 20 reps   Isometric Ankle Dorsiflexion and Plantarflexion - 1 x daily - 3-4 x weekly - 1-2 sets - 10 reps   Long Sitting Isometric Ankle Plantarflexion with Ball at Wall - 1 x daily - 3-4 x weekly - 1-2 sets - 10 reps  Access Code: [email or web address]  URL: [email or web address]  Date: 05/17/2021       Exercises  Long Sitting Ankle Dorsiflexion with Anchored Resistance - 1 x daily - 5 x weekly - 2 sets - 10 reps  Ankle and Toe Plantarflexion with Resistance - 1 x daily - 5 x weekly - 2 sets - 10 reps    Frequency:  2-3 x/week for 24 visits  Plan: [x] Continue current frequency toward long and short term goals. [x] Specific Instructions for subsequent treatments:  Progress  ankle inversion,eversion  ROM/strength. Add dynamic stability exercises.        Time In: 2:00pm    Time Out: 2:54 pm    Electronically signed by:  Patrick Lundberg PTA

## 2021-07-06 ENCOUNTER — HOSPITAL ENCOUNTER (OUTPATIENT)
Dept: PHYSICAL THERAPY | Age: 61
Setting detail: THERAPIES SERIES
Discharge: HOME | End: 2021-07-06
Payer: COMMERCIAL

## 2021-07-06 PROCEDURE — 97016 VASOPNEUMATIC DEVICE THERAPY: CPT

## 2021-07-06 PROCEDURE — 97110 THERAPEUTIC EXERCISES: CPT

## 2021-07-06 NOTE — FLOWSHEET NOTE
[x] [facility]) [facility] &  Therapy  [address]  [phone]  F: [phone] [] [address]  [place] [address]   [address]  P: [phone]  F: [phone] [] [address] &  Therapy  [address]  P: [phone]  F: [phone] [] [address]  P: [phone]  F: [phone] [] [address]  [facility]   [address]   [place]: [phone]  F: [phone]      Physical Therapy Daily Treatment Note    Date:  2021  Patient Name:  Melo Sherman    :  1960  MRN: [id number]   Physician: Dr. Chuck Braswell: Taina Sebastian (13vs)  Medical Diagnosis: Instability of left ankle joint; Peroneal tendinitis of left lower extremity; Gastrocnemius equinus of left lower extremity; Sprain of left ankle, unspecified ligament, subsequent encounter  Rehab Codes: Z12.959X, R53.1, R26.9, R27.9  Onset date: 20                         Next 's appt.: DOS 21 Js, 21 ORTHO  Visit# / total visits:  (correct visit # ),   Progress note for STG due at visit #12    Cancels/No Shows:     Subjective:    Pain:  [] Yes  [x] No Location: L foot  Pain Rating: (0-10 scale) 0/10  Pain altered Tx:  [x] No  [] Yes  Action:      Comments:     Reports L knee is bother her more than L ankle. (has not called Dr. Marianne Austin office back about script for knee PT)    Objective:   Modalities: vaso compression 15 mins - medium compression with wedge. Precautions  FBW no restrictions 21    Procedures Performed:  (3/4/2021)  1. Left ankle lateral ligamentous repair (with Arthrex fiber tack anchors x 2 + internal brace)  2. Left peroneus brevis debridement  3.  Left peroneus longus tenolysis  4. Left leg gastroc recession, performed through separate incision  5. Exercises:  7/6/2021 completed exercises marked with \"x\"  Exercise:  L LE Reps/ Time Weight/ Level Comments    sci fit 6 mins L2.5   x   Sit to standing   10x  Without UE support. L foot posterior placement. Standing       Gastro stretch 3x30\"   wedge  x   Soleus stretch 3x30\"        prostretch gastroc stretch 3x30\"    x   Heel raises  1x10 A       Heel raises  2x10 4' eccentric focus,    x   L SLS dead lift  Touching 8 in step  1x15  12\" step 7/6 x   4 way hip steamboats  15xea  blue   L CKC,    x    mini fwd lunges 10x2        Wall lean  10x5\"  Foot intrinsics focus      baps 20x  20x L2  L2 DF/PF   IV/EV, CW, CCW    x  x   Rocker board  20x L2 Med/lateral, NO UE SUPPORT 6/25      SLS on foam(theraband blue) 1x17\"  1x30\"         x   tandem stance  1x63\"  1x60\"   airex foam  theraband foam        Step ups  + 8 in       Lateral step ups 1x15 6 in     Step overs  1x10 4 in       Step downs  1x10 4 in       Power steps  1x15 6 in  Added 7/6 x   TG  L single heel raise  2x10 L35  incr. angle  and sets 7/6 x   FWB  L SLS heel raise 1x10  Added 7/6 x   Flight of stair 10 steps  One HR, reciprocal pattern, L knee pain incr. Added 7/6.  x   cybex resistive walking  3x ea  2 plates Lateral R/l, fwd,  Intermittent CGA/Min assist for bal.  x          Sitting       HS stretch 3x30\"     x   Foot intrinsic  10x2 5\"        Sub max iso ankle inv/ev 10x5\"       Toe flexion curls, marble  2x10   x    4 way ankle tband 2x10 blue    x   Gr toe flex/ext tband  x lime  A      Towel swooshes  3x10 1.5 lbs  Inversion, eversion,  added wt and incr. sets 7/6     x   Supine       Manual  x  PROM ankle DF/PF, scar massage, gastro stretching, STM plantar surface.      Gastro stretch with HS stretch and ankle inversion  belt 3x1 min\"    belt, inversion,        x   Hip flexor/quad stretch 3x30\"                                  Other: 6/14/21: DF 35, DF AROM lacking 2 to neutral, 5° PROM DF.   6/18/21 Goni measurements L ankle AROM:   PF 42  INV 22  EV 18       Treatment Charges: Mins Units   []  Modalities CP     [x]  Ther Exercise 50  3   []  Manual Therapy     []  Ther Activities     []  Aquatics     [x]  Vasocompression 15 1   []  Other  Neuro re-ed      Total Treatment time   65 4     Assessment: [x] Progressing toward goals pt able to address increased reps with Single left heel raises on total gym with improved endurance and without pain. Able to progress  FWB L SLS low reps, pt demonstrated  fatigue   Able to address stair climbing with reciprocal gait without LOB and single UE support, pt reports L knee pain as complaint when descending. No change. [x] Other:   Ankle eversion weak. Education on three exercise options for strengthening again for HEP, pt voiced understanding. Gastro fatigues with SLS heel raise on total gym, PWB. Left knee pain intermittently limits sls stability tolerance due to OA pain. [x] Patient would continue to benefit from skilled physical therapy services in order to: Progress ankle ROM and strengthening as well as progressing wt bearing in order to return to full ADL and job. STG: (to be met in 12 treatments)  1. ? Pain: No more than 6/10 max pain in L ankle with therapy and at home. 6/14/21: MET  2. ? ROM:   a. At least 15deg PROM L ankle DF with knee extended to progress to maximal DF joint mobility to allow proper gait/stair mechanics  b. At least 45deg PROM L ankle PF for improved push off with gait  3. ? Strength:   a. At least 4/5 L PF strength for improving gait mechanics and stability in hind foot with ambulation/stair climbing 4/16/21 MET 5/5 PF, DF 4+/5  b.  Able to tolerate L ankle inv/ev submaximal isometric strengthening without increase in pain  6/14/21: met  4. ? Balance: Pt able to hold tandem stance bilat for at least 30\" without UE assist to indicate improving balance in narrow ROBERT conditions 6/1421 MET  5. ? Function:   a. Pt able to ambulate 300 ft in lace-up brace on LLE using least restrictive device and near equal stance time, improving step length, and consistent heel strike LLE 6/4/21 MET  b. Pt able to complete all transfers without UE assist and no imbalance noted 6/14/21 MET  c. Pt able to ambulate up/down an incline with least restrictive device without assist or supervision needed to allow for improved safety with entering/exiting home independently  6. Independent with Home Exercise Programs 6/14/21MET  7. Demonstrate Knowledge of fall prevention  6/14/21: MET     LTG: (to be met in 24 treatments)  8. ? Pain: No more than 3/10 max pain in L ankle with therapy and at home. 7/6/21 MET  9. ? ROM: Full ROM all planes of L ankle to indicate restored joint mobility post - op  10. ? Strength:   a. At least 5-/5 L ankle gross strength all planes  b. Able to complete 15 SLS heel raises on LLE to indicate improved functional PF strength 7/6/21; met  11. ? Balance: Pt able to complete retro and tandem ambulation for 50 ft ea without any sign of imbalance  12. ? Function:   a. Pt able to ambulate 500 ft in lace-up brace on LLE without device and equal stance time, no antalgic gait evident, consistent heel strike and full push off appreciated, and at a minimum of 1.0m/s to indicate safety for community ambulation speed  b. Pt able to ascend/descend stairs with min UE assist while completing with reciprocal pattern and good balance noted 7/6/21: MET, incr. Left knee pain. Patient goals: \"get back to work, walk normal, less pain\"       Pt. Education:  [x] Yes  [] No  [x] Reviewed Prior HEP/Ed  Method of Education: [x] Verbal  [x] Demo  [] Written  Comprehension of Education:  [x] Verbalizes understanding. [x] Demonstrates understanding. [] Needs review. [x] Demonstrates/verbalizes HEP/Ed previously given.    Access Code: [email or web address]: [email or web address]. com/Date: 04/27/2021Prepared by: Fercho Smart Sitting Calf Stretch with Strap - 3 x daily - 7 x weekly - 3 sets - 3 reps   Long Sitting Soleus Stretch on Bolster with Strap - 3 x daily - 7 x weekly - 3 sets - 3 reps   Supine Hamstring Stretch with Strap with ankle DF and inversion - 3 x daily - 7 x weekly - 3 sets - 3 reps   Supine Ankle Pumps - 3 x daily - 7 x weekly - 2 sets - 20 reps   Seated Heel Toe Raises - 3 x daily - 7 x weekly - 2 sets - 20 reps   Isometric Ankle Dorsiflexion and Plantarflexion - 1 x daily - 3-4 x weekly - 1-2 sets - 10 reps   Long Sitting Isometric Ankle Plantarflexion with Ball at Wall - 1 x daily - 3-4 x weekly - 1-2 sets - 10 reps  Access Code: [email or web address]  URL: [email or web address]/  Date: 05/17/2021       Exercises  Long Sitting Ankle Dorsiflexion with Anchored Resistance - 1 x daily - 5 x weekly - 2 sets - 10 reps  Ankle and Toe Plantarflexion with Resistance - 1 x daily - 5 x weekly - 2 sets - 10 reps    Frequency:  2-3 x/week for 24 visits  Plan: [x] Continue current frequency toward long and short term goals. [x] Specific Instructions for subsequent treatments:  Progress  ankle inversion,eversion  ROM/strength. Add dynamic stability exercises.        Time In:  1150    Time Out:  1300    Electronically signed by:  Loistine Fothergill, PTA

## 2021-07-09 ENCOUNTER — HOSPITAL ENCOUNTER (OUTPATIENT)
Dept: PHYSICAL THERAPY | Age: 61
Setting detail: THERAPIES SERIES
Discharge: HOME | End: 2021-07-09
Payer: COMMERCIAL

## 2021-07-09 NOTE — FLOWSHEET NOTE
[x] [facility]) - [facility] &  Therapy  [address]    [phone]  F: [phone]   [] [address]  [facility]   [address]  P: [phone]  F: [phone]  [] [address] &  Therapy  [address] [facility]  P: [phone]  F: [phone] [] [address]  P: [phone]  F: [phone]  [] [address]  [facility]   [address]   [place]: [phone]  F: [phone]   [] [place] [address] [facility] [address]  [place]: [phone]   F: [phone]     Physical Therapy Cancel/No Show note    Date: 2021  Patient: Yong Park  : 1960  MRN: [id number]    Cancels/No Shows to date:     For today's appointment patient:    [x]  Cancelled    [] Rescheduled appointment    [] No-show     Reason given by patient:    []  Patient ill    []  Conflicting appointment    [] No transportation      [] Conflict with work    [] No reason given    [] Weather related    [] COVID-19    [x] Other:      Comments:  Waiting for water company to turn water on.        [x] Next appointment was confirmed  21    Electronically signed by: Nadege Judge PTA

## 2021-07-12 ENCOUNTER — HOSPITAL ENCOUNTER (OUTPATIENT)
Dept: PHYSICAL THERAPY | Age: 61
Setting detail: THERAPIES SERIES
Discharge: HOME | End: 2021-07-12
Payer: COMMERCIAL

## 2021-07-12 PROCEDURE — 97110 THERAPEUTIC EXERCISES: CPT
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
## 2021-07-14 ENCOUNTER — HOSPITAL ENCOUNTER (OUTPATIENT)
Dept: PHYSICAL THERAPY | Age: 61
Setting detail: THERAPIES SERIES
Discharge: HOME | End: 2021-07-14
Payer: COMMERCIAL

## 2021-07-23 DIAGNOSIS — E55.9 VITAMIN D DEFICIENCY: ICD-10-CM

## 2021-07-23 DIAGNOSIS — F41.9 ANXIETY: ICD-10-CM

## 2021-07-23 DIAGNOSIS — M62.838 MUSCLE SPASM: ICD-10-CM

## 2021-07-23 NOTE — TELEPHONE ENCOUNTER
Denise Oden is calling to request a refill on the following medication(s):    Last Visit Date (If Applicable):  9/3/5572    Next Visit Date:    Visit date not found    Medication Request:  Requested Prescriptions     Pending Prescriptions Disp Refills    venlafaxine (EFFEXOR XR) 75 MG extended release capsule [Pharmacy Med Name: VENLAFAXINE HCL ER 75MG ORAL CAPSULE] 28 capsule 3     Sig: TAKE 1 CAPSULE BY MOUTH DAILY    chlorzoxazone (PARAFON FORTE) 500 MG tablet [Pharmacy Med Name: CHLORZOXAZONE 500MG ORAL TABLET] 28 tablet 3     Sig: TAKE 1/2 TABLET BY MOUTH TWICE DAILY    vitamin D (ERGOCALCIFEROL) 1.25 MG (81395 UT) CAPS capsule [Pharmacy Med Name: VITAMIN D [id number] ORAL CAPSULE] 4 capsule 3     Sig: TAKE 1 CAPSULE BY MOUTH EVERY WEEK

## 2021-07-26 DIAGNOSIS — F41.9 ANXIETY: ICD-10-CM

## 2021-07-26 RX ORDER — VENLAFAXINE HYDROCHLORIDE 75 MG/1
75 CAPSULE, EXTENDED RELEASE ORAL DAILY
Qty: 28 CAPSULE | Refills: 3 | Status: SHIPPED | OUTPATIENT
Start: 2021-07-26 | End: 2021-11-15

## 2021-07-26 RX ORDER — ERGOCALCIFEROL 1.25 MG/1
CAPSULE ORAL
Qty: 4 CAPSULE | Refills: 3 | Status: SHIPPED | OUTPATIENT
Start: 2021-07-26 | End: 2021-11-15

## 2021-07-26 RX ORDER — CHLORZOXAZONE 500 MG/1
500 TABLET ORAL 2 TIMES DAILY PRN
Qty: 28 TABLET | Refills: 3 | Status: SHIPPED | OUTPATIENT
Start: 2021-07-26 | End: 2021-11-15

## 2021-07-26 RX ORDER — VENLAFAXINE HYDROCHLORIDE 75 MG/1
75 CAPSULE, EXTENDED RELEASE ORAL DAILY
Qty: 28 CAPSULE | Refills: 3 | Status: SHIPPED | OUTPATIENT
Start: 2021-07-26 | End: 2021-07-26 | Stop reason: SDUPTHER

## 2021-07-26 NOTE — TELEPHONE ENCOUNTER
[facility] is calling to request a refill on the following medication(s):    Last Visit Date (If Applicable):  1/4/6998    Next Visit Date:    Visit date not found    Medication Request:  Requested Prescriptions     Pending Prescriptions Disp Refills    venlafaxine (EFFEXOR XR) 75 MG extended release capsule 28 capsule 3     Sig: Take 1 capsule by mouth daily

## 2021-07-27 NOTE — FLOWSHEET NOTE
[x] [facility]) [facility] &  Therapy  [address]  [phone]  F: [phone] [] [address] [facility] [address]   [address]  P: [phone]  F: [phone] [] [place]  [address] [place] [address]  P: [phone]  F: [phone] [] [address] [facility]   [address]   [place]: [phone]  F: [phone]           Lindsey Amato   1960   [id number]    7/27/2021   Called pt and left message for her to call clinic with her intentions for PT.          Electronically signed by Lorrie Kidd PTA on 7/27/2021 at 9:48 AM

## 2021-08-13 ENCOUNTER — APPOINTMENT (OUTPATIENT)
Dept: CT IMAGING | Age: 61
End: 2021-08-13
Payer: COMMERCIAL

## 2021-08-13 ENCOUNTER — HOSPITAL ENCOUNTER (EMERGENCY)
Age: 61
Discharge: HOME | End: 2021-08-13
Attending: EMERGENCY MEDICINE
Payer: COMMERCIAL

## 2021-08-13 VITALS
RESPIRATION RATE: 17 BRPM | WEIGHT: 175 LBS | HEART RATE: 94 BPM | OXYGEN SATURATION: 98 % | TEMPERATURE: 98.9 F | DIASTOLIC BLOOD PRESSURE: 66 MMHG | HEIGHT: 61 IN | SYSTOLIC BLOOD PRESSURE: 126 MMHG | BODY MASS INDEX: 33.04 KG/M2

## 2021-08-13 DIAGNOSIS — S06.0X0A CONCUSSION WITHOUT LOSS OF CONSCIOUSNESS, INITIAL ENCOUNTER: Primary | ICD-10-CM

## 2021-08-13 PROCEDURE — 70450 CT HEAD/BRAIN W/O DYE: CPT

## 2021-08-13 PROCEDURE — 99283 EMERGENCY DEPT VISIT LOW MDM: CPT

## 2021-08-13 PROCEDURE — 96372 THER/PROPH/DIAG INJ SC/IM: CPT

## 2021-08-13 PROCEDURE — 6360000002 HC RX W HCPCS: Performed by: EMERGENCY MEDICINE

## 2021-08-13 PROCEDURE — 6370000000 HC RX 637 (ALT 250 FOR IP): Performed by: EMERGENCY MEDICINE

## 2021-08-13 RX ORDER — METOCLOPRAMIDE 10 MG/1
10 TABLET ORAL ONCE
Status: COMPLETED | OUTPATIENT
Start: 2021-08-13 | End: 2021-08-13

## 2021-08-13 RX ORDER — KETOROLAC TROMETHAMINE 30 MG/ML
60 INJECTION, SOLUTION INTRAMUSCULAR; INTRAVENOUS ONCE
Status: COMPLETED | OUTPATIENT
Start: 2021-08-13 | End: 2021-08-13

## 2021-08-13 RX ORDER — ACETAMINOPHEN 500 MG
1000 TABLET ORAL ONCE
Status: COMPLETED | OUTPATIENT
Start: 2021-08-13 | End: 2021-08-13

## 2021-08-13 RX ADMIN — METOCLOPRAMIDE 10 MG: 10 TABLET ORAL at 22:31

## 2021-08-13 RX ADMIN — ACETAMINOPHEN 1000 MG: 500 TABLET ORAL at 22:31

## 2021-08-13 RX ADMIN — KETOROLAC TROMETHAMINE 60 MG: 30 INJECTION, SOLUTION INTRAMUSCULAR at 22:31

## 2021-08-13 ASSESSMENT — PAIN DESCRIPTION - DESCRIPTORS: DESCRIPTORS: ACHING

## 2021-08-13 ASSESSMENT — PAIN SCALES - GENERAL
PAINLEVEL_OUTOF10: 6
PAINLEVEL_OUTOF10: 6

## 2021-08-13 ASSESSMENT — PAIN DESCRIPTION - ORIENTATION: ORIENTATION: LEFT

## 2021-08-13 ASSESSMENT — PAIN DESCRIPTION - PAIN TYPE: TYPE: ACUTE PAIN

## 2021-08-13 ASSESSMENT — PAIN DESCRIPTION - LOCATION: LOCATION: HEAD

## 2021-08-14 NOTE — ED NOTES
Pt presents to the er c/o an intermittent episodes of a headache and dizziness after falling and hitting her head twice on a nightstand over the past three weeks pt is alert and oriented x 4 speech is appropriate no other neuro deficits noted     Bibi Lopez RN  08/13/21 6695

## 2021-08-14 NOTE — ED PROVIDER NOTES
EMERGENCY DEPARTMENT ENCOUNTER    Pt Name: Roberto Villagomez  MRN: [id number]  [place] 1960  Date of evaluation: 8/13/21  CHIEF COMPLAINT       Chief Complaint   Patient presents with    Head Injury     pt fell out of bed and hit her head last week, pt was cleaning this week and hit her head again     Dizziness     HISTORY OF PRESENT ILLNESS   Patient is a 81-year-old female who presents the ED complaining of headache and lightheadedness after hitting her head on her nightstand a couple of weeks ago. She states 3 weeks ago she tossed and turned during her sleep, rolled out of bed and struck the left side of her head on her nightstand. She hit her head again, same spot, seen nightstand, approximately 2 weeks ago when cleaning her bedroom. Since that time she has intermittent headaches, feeling \"foggy \", at times dizzy and at times nauseated. No vision changes, neck pain, chest pain, shortness of breath, abdominal pain. She is not on anticoagulants. REVIEW OF SYSTEMS     Review of Systems   All other systems reviewed and are negative. PASTMEDICAL HISTORY     Past Medical History:   Diagnosis Date    Arthritis     ASCUS with positive high risk HPV cervical 3/22/16    Asthma     Depression     Diverticulitis     ESBL (extended spectrum beta-lactamase) producing bacteria infection 02/03/2019    E.  Coli urine    GERD (gastroesophageal reflux disease)     Hyperlipidemia     MRSA (methicillin resistant staph aureus) culture positive 4/18/2016    lip    Rectocele     Tachycardia     takes Metoprolol     SURGICAL HISTORY       Past Surgical History:   Procedure Laterality Date    ANKLE SURGERY Left 3/4/2021    Left ankle lateral ligamentous repair, Left peroneus brevis debridement, Left peroneus longus tenolysis, Left leg gastroc recession, performed through separate incision  performed by Diane Lambert [facility] at [facility] 7/19/2018    COLONOSCOPY performed by Padmini Jung IV, DO at [address]  2/25/2015    uro    HYSTERECTOMY  1996    RYAN, BSO performed at [facility] by Dr. Inna Ruiz, Also had some type of bladder lift at this time    KNEE ARTHROSCOPY Left 5/13/2019    KNEE ARTHROSCOPY performed by Meggan Boyd MD at [address] [place] [address] Left     #1 - lateral release, #2 - arthroscopy with muscle alignment    NERVE BLOCK  07/28/2017    caudal #1  decadron 10mg    NERVE BLOCK  09/22/2017    cadal # 2, decadron 10 mg    NERVE BLOCK  09/22/2017    caudal epidural steroid block #2 decadron 10 mg    NERVE BLOCK  11/10/2017    lumbar L4-5 epidural; depomedrol 80mg    UPPER GASTROINTESTINAL ENDOSCOPY  1/30/2019    EGD BIOPSY performed by Yrn Vogt MD at [place] [address]       Previous Medications    ALBUTEROL SULFATE  (90 BASE) MCG/ACT INHALER    INHALE 2 PUFFS BY MOUTH INTO THE LUNGS ONCE EVERY 6 HOURS AS NEEDED FOR WHEEZING    ASPIRIN 325 MG EC TABLET    Take 1 tablet by mouth daily    BUSPIRONE (BUSPAR) 15 MG TABLET    TAKE 1 TABLET BY MOUTH TWICE DAILY    CHLORZOXAZONE (PARAFON FORTE) 500 MG TABLET    Take 1 tablet by mouth 2 times daily as needed for Muscle spasms TAKE 1/2 TABLET BY MOUTH TWICE DAILY    CRANBERRY CONCENTRATE 500 MG CAPS    TAKE 1 CAPSULE BY MOUTH TWICE DAILY    DICLOFENAC (VOLTAREN) 50 MG EC TABLET    TAKE 1 TABLET BY MOUTH TWICE DAILY    FLUTICASONE (FLONASE) 50 MCG/ACT NASAL SPRAY    INSTILL 2 SPRAYS INTO EACH NOSTRIL ONCE DAILY    HANDICAP PLACARD MISC    by Does not apply route DISABLED PARKING PERMIT AUTHORIZATION  Routine     Qty-1    The patient has the following condition(s) which qualifies him/her for disabled parking: Walking severely limited due to orthopedic condition     Privilege Duration: Temporary for 3 months    KETOTIFEN (ZADITOR) 0.025 % OPHTHALMIC SOLUTION    USE 1 DROP INTO BOTH EYES TWICE DAILY    METOPROLOL SUCCINATE (TOPROL XL) 25 MG EXTENDED RELEASE TABLET    Take 25 mg by mouth daily     MI-ACID GAS RELIEF 80 MG CHEWABLE TABLET    CHEW 1 TABLET BY MOUTH FOUR TIMES DAILY AS NEEDED FOR FLATULENCE    MISOPROSTOL (CYTOTEC) 200 MCG TABLET    Take 200 mcg by mouth daily    MULTIPLE VITAMINS-MINERALS (CULTURELLE PROBIOTICS + MULTIV) CHEW    Take 1 tablet by mouth daily    OMEPRAZOLE (PRILOSEC) 40 MG DELAYED RELEASE CAPSULE    TAKE 1 CAPSULE BY MOUTH DAILY EVERY MORNING    PRAVASTATIN (PRAVACHOL) 40 MG TABLET    TAKE 1 TABLET BY MOUTH DAILY    PREMARIN 0.625 MG/GM VAGINAL CREAM    PLACE 2 GRAMS VAGINALLY TWICE A WEEK FOR 12 DOSES    SENNA-DOCUSATE (PERICOLACE) 8.6-50 MG PER TABLET    Take 1 tablet by mouth daily as needed for Constipation    VENLAFAXINE (EFFEXOR XR) 75 MG EXTENDED RELEASE CAPSULE    Take 1 capsule by mouth daily    VITAMIN D (ERGOCALCIFEROL) 1.25 MG (47589 UT) CAPS CAPSULE    TAKE 1 CAPSULE BY MOUTH EVERY WEEK ON     VITAMIN D (ERGOCALCIFEROL) 1.25 MG (92140 UT) CAPS CAPSULE    TAKE 1 CAPSULE BY MOUTH EVERY WEEK     ALLERGIES     is allergic to shellfish allergy, shrimp (diagnostic), seasonal, famotidine, and gabapentin. FAMILY HISTORY     She indicated that her mother is . She indicated that her father is alive. She indicated that her brother is alive. She indicated that her maternal grandmother is . She indicated that her maternal grandfather is . She indicated that her paternal grandmother is . She indicated that her paternal grandfather is . She indicated that her niece is alive. SOCIAL HISTORY       Social History     Tobacco Use    Smoking status: Former Smoker     Packs/day: 2.00     Years: 0.50     Pack years: 1.00     Types: Cigarettes     Quit date:      Years since quittin.6    Smokeless tobacco: Never Used   Vaping Use    Vaping Use: Never used   Substance Use Topics    Alcohol use:  Yes     Alcohol/week: 0.0 standard drinks     Comment: social    Drug use: No     PHYSICAL EXAM     INITIAL VITALS: /66   Pulse 94 Temp 98.9 °F (37.2 °C) (Oral)   Resp 17   Ht 5' 1\" (1.549 m)   Wt 175 lb (79.4 kg)   SpO2 98%   BMI 33.07 kg/m²    Physical Exam  HENT:      Head: Normocephalic and atraumatic. Right Ear: External ear normal.      Left Ear: External ear normal.      Nose: Nose normal.   Eyes:      Extraocular Movements: Extraocular movements intact. Conjunctiva/sclera: Conjunctivae normal.      Pupils: Pupils are equal, round, and reactive to light. Cardiovascular:      Rate and Rhythm: Normal rate. Pulmonary:      Effort: Pulmonary effort is normal.   Abdominal:      General: Abdomen is flat. Skin:     General: Skin is dry. Neurological:      Mental Status: She is alert. Mental status is at baseline. Psychiatric:         Mood and Affect: Mood normal.         Behavior: Behavior normal.         MEDICAL DECISION MAKING:   The patient is hemodynamically stable, afebrile, nontoxic-appearing. Physical exam unremarkable. No signs of head trauma. Based on history and exam likely concussion, low suspicion for space-occupying lesion. ED plan for head CT, Reglan, Toradol, Tylenol for headache. DIAGNOSTIC RESULTS   EKG:All EKG's are interpreted by the Emergency Department Physician who either signs or Co-signs this chart in the absence of a cardiologist.        RADIOLOGY:All plain film, CT, MRI, and formal ultrasound images (except ED bedside ultrasound) are read by the radiologist, see reports below, unless otherwisenoted in MDM or here. CT Head WO Contrast   Final Result   No acute intracranial abnormality. LABS: All lab results were reviewed by myself, and all abnormals are listed below. Labs Reviewed - No data to display    EMERGENCY DEPARTMENTCOURSE:   Patient did well in the ED. CT head negative for acute injury. High clinical suspicion for concussion symptoms. Headache improved with analgesia. No further work-up indicated at this time. Nursing notes reviewed.   At this time this is what I find, the patient appears well and does not appear sick or toxic. I gave my usual and customary discussion of the risks and benefits of discharge versus admission. I answered the patient's questions. I gave the patient strict return precautions. Patient expressed understanding of the discharge instructions. Dictated but not reviewed. Vitals:    Vitals:    08/13/21 2200   BP: 126/66   Pulse: 94   Resp: 17   Temp: 98.9 °F (37.2 °C)   TempSrc: Oral   SpO2: 98%   Weight: 175 lb (79.4 kg)   Height: 5' 1\" (1.549 m)       The patient was given the following medications while in the emergency department:  Orders Placed This Encounter   Medications    acetaminophen (TYLENOL) tablet 1,000 mg    ketorolac (TORADOL) injection 60 mg    metoclopramide (REGLAN) tablet 10 mg     CONSULTS:  None    FINAL IMPRESSION      1. Concussion without loss of consciousness, initial encounter          DISPOSITION/PLAN   DISPOSITION Decision To Discharge 08/13/2021 11:47:35 PM      PATIENT REFERRED TO:  No follow-up provider specified.   DISCHARGE MEDICATIONS:  New Prescriptions    No medications on file     Guy Angel MD  Attending Emergency Physician                    Souleymane Vega MD  08/13/21 9191

## 2021-08-19 ENCOUNTER — TELEPHONE (OUTPATIENT)
Dept: ORTHOPEDIC SURGERY | Age: 61
End: 2021-08-19

## 2021-09-13 ENCOUNTER — HOSPITAL ENCOUNTER (EMERGENCY)
Age: 61
Discharge: HOME | End: 2021-09-14
Attending: EMERGENCY MEDICINE
Payer: COMMERCIAL

## 2021-09-13 ENCOUNTER — APPOINTMENT (OUTPATIENT)
Dept: CT IMAGING | Age: 61
End: 2021-09-13
Payer: COMMERCIAL

## 2021-09-13 VITALS
SYSTOLIC BLOOD PRESSURE: 135 MMHG | HEART RATE: 86 BPM | TEMPERATURE: 98.3 F | RESPIRATION RATE: 14 BRPM | BODY MASS INDEX: 33.99 KG/M2 | WEIGHT: 180 LBS | OXYGEN SATURATION: 98 % | HEIGHT: 61 IN | DIASTOLIC BLOOD PRESSURE: 72 MMHG

## 2021-09-13 DIAGNOSIS — S02.2XXA CLOSED FRACTURE OF NASAL BONE, INITIAL ENCOUNTER: Primary | ICD-10-CM

## 2021-09-13 PROCEDURE — 70486 CT MAXILLOFACIAL W/O DYE: CPT

## 2021-09-13 PROCEDURE — 70450 CT HEAD/BRAIN W/O DYE: CPT

## 2021-09-13 PROCEDURE — 72125 CT NECK SPINE W/O DYE: CPT

## 2021-09-13 ASSESSMENT — PAIN SCALES - GENERAL: PAINLEVEL_OUTOF10: 10

## 2021-09-14 PROCEDURE — 6370000000 HC RX 637 (ALT 250 FOR IP): Performed by: EMERGENCY MEDICINE

## 2021-09-14 PROCEDURE — 96372 THER/PROPH/DIAG INJ SC/IM: CPT

## 2021-09-14 PROCEDURE — 6360000002 HC RX W HCPCS: Performed by: EMERGENCY MEDICINE

## 2021-09-14 PROCEDURE — 99283 EMERGENCY DEPT VISIT LOW MDM: CPT

## 2021-09-14 RX ORDER — KETOROLAC TROMETHAMINE 30 MG/ML
60 INJECTION, SOLUTION INTRAMUSCULAR; INTRAVENOUS ONCE
Status: COMPLETED | OUTPATIENT
Start: 2021-09-14 | End: 2021-09-14

## 2021-09-14 RX ORDER — ACETAMINOPHEN 500 MG
1000 TABLET ORAL ONCE
Status: COMPLETED | OUTPATIENT
Start: 2021-09-14 | End: 2021-09-14

## 2021-09-14 RX ADMIN — ACETAMINOPHEN 1000 MG: 500 TABLET ORAL at 00:54

## 2021-09-14 RX ADMIN — KETOROLAC TROMETHAMINE 60 MG: 30 INJECTION, SOLUTION INTRAMUSCULAR at 00:54

## 2021-09-14 ASSESSMENT — PAIN SCALES - GENERAL: PAINLEVEL_OUTOF10: 9

## 2021-09-14 NOTE — ED PROVIDER NOTES
performed by Eliceo Fragoso MD at [place] [address] Left     #1 - lateral release, #2 - arthroscopy with muscle alignment    NERVE BLOCK  07/28/2017    caudal #1  decadron 10mg    NERVE BLOCK  09/22/2017    cadal # 2, decadron 10 mg    NERVE BLOCK  09/22/2017    caudal epidural steroid block #2 decadron 10 mg    NERVE BLOCK  11/10/2017    lumbar L4-5 epidural; depomedrol 80mg    UPPER GASTROINTESTINAL ENDOSCOPY  1/30/2019    EGD BIOPSY performed by Tao Perez MD at [facility]       Previous Medications    ALBUTEROL SULFATE  (90 BASE) MCG/ACT INHALER    INHALE 2 PUFFS BY MOUTH INTO THE LUNGS ONCE EVERY 6 HOURS AS NEEDED FOR WHEEZING    ASPIRIN 325 MG EC TABLET    Take 1 tablet by mouth daily    BUSPIRONE (BUSPAR) 15 MG TABLET    TAKE 1 TABLET BY MOUTH TWICE DAILY    CHLORZOXAZONE (PARAFON FORTE) 500 MG TABLET    Take 1 tablet by mouth 2 times daily as needed for Muscle spasms TAKE 1/2 TABLET BY MOUTH TWICE DAILY    CRANBERRY CONCENTRATE 500 MG CAPS    TAKE 1 CAPSULE BY MOUTH TWICE DAILY    DICLOFENAC (VOLTAREN) 50 MG EC TABLET    TAKE 1 TABLET BY MOUTH TWICE DAILY    FLUTICASONE (FLONASE) 50 MCG/ACT NASAL SPRAY    INSTILL 2 SPRAYS INTO EACH NOSTRIL ONCE DAILY    HANDICAP PLACARD MISC    by Does not apply route DISABLED PARKING PERMIT AUTHORIZATION  Routine     Qty-1    The patient has the following condition(s) which qualifies him/her for disabled parking: Walking severely limited due to orthopedic condition     Privilege Duration: Temporary for 3 months    KETOTIFEN (ZADITOR) 0.025 % OPHTHALMIC SOLUTION    USE 1 DROP INTO BOTH EYES TWICE DAILY    METOPROLOL SUCCINATE (TOPROL XL) 25 MG EXTENDED RELEASE TABLET    Take 25 mg by mouth daily     MI-ACID GAS RELIEF 80 MG CHEWABLE TABLET    CHEW 1 TABLET BY MOUTH FOUR TIMES DAILY AS NEEDED FOR FLATULENCE    MISOPROSTOL (CYTOTEC) 200 MCG TABLET    Take 200 mcg by mouth daily    MULTIPLE VITAMINS-MINERALS (CULTURELLE PROBIOTICS + MULTIV) CHEW    Take 1 tablet by mouth daily    OMEPRAZOLE (PRILOSEC) 40 MG DELAYED RELEASE CAPSULE    TAKE 1 CAPSULE BY MOUTH DAILY EVERY MORNING    PRAVASTATIN (PRAVACHOL) 40 MG TABLET    TAKE 1 TABLET BY MOUTH DAILY    PREMARIN 0.625 MG/GM VAGINAL CREAM    PLACE 2 GRAMS VAGINALLY TWICE A WEEK FOR 12 DOSES    SENNA-DOCUSATE (PERICOLACE) 8.6-50 MG PER TABLET    Take 1 tablet by mouth daily as needed for Constipation    VENLAFAXINE (EFFEXOR XR) 75 MG EXTENDED RELEASE CAPSULE    Take 1 capsule by mouth daily    VITAMIN D (ERGOCALCIFEROL) 1.25 MG (02239 UT) CAPS CAPSULE    TAKE 1 CAPSULE BY MOUTH EVERY WEEK ON     VITAMIN D (ERGOCALCIFEROL) 1.25 MG (72617 UT) CAPS CAPSULE    TAKE 1 CAPSULE BY MOUTH EVERY WEEK     ALLERGIES     is allergic to shellfish allergy, shrimp (diagnostic), seasonal, famotidine, and gabapentin. FAMILY HISTORY     She indicated that her mother is . She indicated that her father is alive. She indicated that her brother is alive. She indicated that her maternal grandmother is . She indicated that her maternal grandfather is . She indicated that her paternal grandmother is . She indicated that her paternal grandfather is . She indicated that her niece is alive. SOCIAL HISTORY       Social History     Tobacco Use    Smoking status: Former Smoker     Packs/day: 2.00     Years: 0.50     Pack years: 1.00     Types: Cigarettes     Quit date:      Years since quittin.7    Smokeless tobacco: Never Used   Vaping Use    Vaping Use: Never used   Substance Use Topics    Alcohol use: Yes     Alcohol/week: 0.0 standard drinks     Comment: social    Drug use: No     PHYSICAL EXAM     INITIAL VITALS: /72   Pulse 86   Temp 98.3 °F (36.8 °C) (Oral)   Resp 14   Ht 5' 1\" (1.549 m)   Wt 180 lb (81.6 kg)   SpO2 98%   BMI 34.01 kg/m²    Physical Exam  HENT:      Head: Normocephalic.       Right Ear: External ear normal.      Left Ear: External ear normal.      Nose: Nose normal.        Comments: Dark-colored bruising over bridge of nose. No septal hematoma. Mouth/Throat:      Mouth: Mucous membranes are moist.      Comments: Bite test normal.  Eyes:      Conjunctiva/sclera: Conjunctivae normal.   Cardiovascular:      Rate and Rhythm: Normal rate. Pulmonary:      Effort: Pulmonary effort is normal.   Abdominal:      General: Abdomen is flat. Skin:     General: Skin is dry. Neurological:      Mental Status: She is alert. Mental status is at baseline. Psychiatric:         Mood and Affect: Mood normal.         Behavior: Behavior normal.         MEDICAL DECISION MAKING:   The patient is hemodynamically stable, afebrile, nontoxic-appearing. Physical exam notable for dark-colored bruising over bridge of nose. Based on history and exam likely nasal bone contusion. ED plan for imaging of head, neck, face. DIAGNOSTIC RESULTS   EKG:All EKG's are interpreted by the Emergency Department Physician who either signs or Co-signs this chart in the absence of a cardiologist.        RADIOLOGY:All plain film, CT, MRI, and formal ultrasound images (except ED bedside ultrasound) are read by the radiologist, see reports below, unless otherwisenoted in MDM or here. CT MAXILLOFACIAL WO CONTRAST   Final Result   1. Minimally displaced right nasal bone fracture   2. Fracture through the base of the left maxillary spine         CT Cervical Spine WO Contrast   Final Result   No evidence of an acute fracture or traumatic malalignment involving the   cervical spine         CT Head WO Contrast   Final Result   No acute intracranial abnormality. LABS: All lab results were reviewed by myself, and all abnormals are listed below. Labs Reviewed - No data to display    EMERGENCY DEPARTMENTCOURSE:   Patient did well in the ED. CT scan shows minimally displaced right nasal bone fracture, fracture through base of left maxillary spine.   Pain well controlled. Advised to follow-up with maxillofacial clinic at [facility].  No further work-up indicated at this time. Nursing notes reviewed. At this time this is what I find, the patient appears well and does not appear sick or toxic. I gave my usual and customary discussion of the risks and benefits of discharge versus admission. I answered the patient's questions. I gave the patient strict return precautions. Patient expressed understanding of the discharge instructions. Dictated but not reviewed. Vitals:    Vitals:    09/13/21 1848 09/13/21 1851   BP:  135/72   Pulse:  86   Resp:  14   Temp:  98.3 °F (36.8 °C)   TempSrc:  Oral   SpO2:  98%   Weight: 180 lb (81.6 kg)    Height: 5' 1\" (1.549 m)        The patient was given the following medications while in the emergency department:  Orders Placed This Encounter   Medications    ketorolac (TORADOL) injection 60 mg    acetaminophen (TYLENOL) tablet 1,000 mg     CONSULTS:  None    FINAL IMPRESSION      1.  Closed fracture of nasal bone, initial encounter          DISPOSITION/PLAN   DISPOSITION Decision To Discharge 09/14/2021 12:44:19 AM      PATIENT REFERRED TO:  [address] [place] [address]  [phone]  In 2 days      DISCHARGE MEDICATIONS:  New Prescriptions    No medications on file     Chip Del Toro MD  Attending Emergency Physician                   Yvonne Staton MD  09/14/21 [id number]

## 2021-09-14 NOTE — ED NOTES
Pt presents to ED via private auto with c/o facial pain from fall. Pt states she rolled off her bed in the middle of the night. Hit face first. Bruising noted on face between eyes and on nose. Pt denies LOC. Pt able to ambulate without assist. Pt afebrile, vitals stable. Even, non-labored breathing.       Lindsey Patel RN  09/13/21 [id number]

## 2021-09-18 DIAGNOSIS — F41.9 ANXIETY: ICD-10-CM

## 2021-09-20 RX ORDER — PRAVASTATIN SODIUM 40 MG
40 TABLET ORAL DAILY
Qty: 28 TABLET | Refills: 3 | Status: SHIPPED | OUTPATIENT
Start: 2021-09-20 | End: 2022-01-10

## 2021-09-20 RX ORDER — BUSPIRONE HYDROCHLORIDE 15 MG/1
TABLET ORAL
Qty: 56 TABLET | Refills: 3 | Status: SHIPPED | OUTPATIENT
Start: 2021-09-20 | End: 2022-01-10

## 2021-09-20 NOTE — TELEPHONE ENCOUNTER
[facility] is calling to request a refill on the following medication(s):    Last Visit Date (If Applicable):  3/9/1752    Next Visit Date:    Visit date not found    Medication Request:  Requested Prescriptions     Pending Prescriptions Disp Refills    diclofenac (VOLTAREN) 50 MG EC tablet [Pharmacy Med Name: Diclofenac Sodium 50 MG Oral Tablet Delayed Release] 56 tablet 2     Sig: TAKE 1 TABLET BY MOUTH TWICE DAILY    busPIRone (BUSPAR) 15 MG tablet [Pharmacy Med Name: busPIRone HCl 15 MG Oral Tablet] 56 tablet 3     Sig: TAKE 1 TABLET BY MOUTH TWICE DAILY    pravastatin (PRAVACHOL) 40 MG tablet [Pharmacy Med Name: Pravastatin Sodium 40 MG Oral Tablet (Pravachol)] 28 tablet 3     Sig: TAKE 1 TABLET BY MOUTH DAILY

## 2021-11-12 DIAGNOSIS — F41.9 ANXIETY: ICD-10-CM

## 2021-11-12 DIAGNOSIS — M62.838 MUSCLE SPASM: ICD-10-CM

## 2021-11-12 DIAGNOSIS — E55.9 VITAMIN D DEFICIENCY: ICD-10-CM

## 2021-11-12 NOTE — TELEPHONE ENCOUNTER
Yunior Peters is calling to request a refill on the following medication(s):    Last Visit Date (If Applicable):  9/2/0634    Next Visit Date:    Visit date not found    Medication Request:  Requested Prescriptions     Pending Prescriptions Disp Refills    chlorzoxazone (PARAFON FORTE) 500 MG tablet [Pharmacy Med Name: Chlorzoxazone 500 MG Oral Tablet] 28 tablet 3     Sig: TAKE 1/2 TABLET BY MOUTH TWICE DAILY AS NEEDED    vitamin D (ERGOCALCIFEROL) 1.25 MG (87316 UT) CAPS capsule [Pharmacy Med Name: Vitamin D (Ergocalciferol) 1.25 MG (34459 UT) Oral Capsule (Drisdol)] 4 capsule 3     Sig: TAKE 1 CAPSULE BY MOUTH EVERY WEEK    venlafaxine (EFFEXOR XR) 75 MG extended release capsule [Pharmacy Med Name: Venlafaxine HCl ER 75 MG Oral Capsule Extended Release 24 Hour (Effexor XR)] 28 capsule 3     Sig: TAKE 1 CAPSULE BY MOUTH DAILY

## 2021-11-15 RX ORDER — VENLAFAXINE HYDROCHLORIDE 75 MG/1
75 CAPSULE, EXTENDED RELEASE ORAL DAILY
Qty: 28 CAPSULE | Refills: 3 | Status: SHIPPED | OUTPATIENT
Start: 2021-11-15 | End: 2022-03-08

## 2021-11-15 RX ORDER — CHLORZOXAZONE 500 MG/1
TABLET ORAL
Qty: 28 TABLET | Refills: 3 | Status: SHIPPED | OUTPATIENT
Start: 2021-11-15 | End: 2022-03-08

## 2021-11-15 RX ORDER — ERGOCALCIFEROL 1.25 MG/1
CAPSULE ORAL
Qty: 4 CAPSULE | Refills: 3 | Status: SHIPPED | OUTPATIENT
Start: 2021-11-15 | End: 2022-03-08

## 2021-12-09 ENCOUNTER — OFFICE VISIT (OUTPATIENT)
Dept: FAMILY MEDICINE CLINIC | Age: 61
End: 2021-12-09
Payer: COMMERCIAL

## 2021-12-09 VITALS
BODY MASS INDEX: 33.9 KG/M2 | DIASTOLIC BLOOD PRESSURE: 83 MMHG | HEART RATE: 82 BPM | WEIGHT: 179.4 LBS | SYSTOLIC BLOOD PRESSURE: 134 MMHG | OXYGEN SATURATION: 95 % | TEMPERATURE: 97 F

## 2021-12-09 DIAGNOSIS — S76.011A MUSCLE STRAIN OF RIGHT GLUTEAL REGION, INITIAL ENCOUNTER: Primary | ICD-10-CM

## 2021-12-09 PROCEDURE — 1036F TOBACCO NON-USER: CPT | Performed by: FAMILY MEDICINE

## 2021-12-09 PROCEDURE — G8484 FLU IMMUNIZE NO ADMIN: HCPCS | Performed by: FAMILY MEDICINE

## 2021-12-09 PROCEDURE — 99213 OFFICE O/P EST LOW 20 MIN: CPT | Performed by: FAMILY MEDICINE

## 2021-12-09 PROCEDURE — G8417 CALC BMI ABV UP PARAM F/U: HCPCS | Performed by: FAMILY MEDICINE

## 2021-12-09 PROCEDURE — G8427 DOCREV CUR MEDS BY ELIG CLIN: HCPCS | Performed by: FAMILY MEDICINE

## 2021-12-09 PROCEDURE — 3017F COLORECTAL CA SCREEN DOC REV: CPT | Performed by: FAMILY MEDICINE

## 2021-12-09 SDOH — ECONOMIC STABILITY: FOOD INSECURITY: WITHIN THE PAST 12 MONTHS, YOU WORRIED THAT YOUR FOOD WOULD RUN OUT BEFORE YOU GOT MONEY TO BUY MORE.: NEVER TRUE

## 2021-12-09 SDOH — ECONOMIC STABILITY: FOOD INSECURITY: WITHIN THE PAST 12 MONTHS, THE FOOD YOU BOUGHT JUST DIDN'T LAST AND YOU DIDN'T HAVE MONEY TO GET MORE.: NEVER TRUE

## 2021-12-09 ASSESSMENT — PATIENT HEALTH QUESTIONNAIRE - PHQ9
SUM OF ALL RESPONSES TO PHQ QUESTIONS 1-9: 0
SUM OF ALL RESPONSES TO PHQ QUESTIONS 1-9: 0
2. FEELING DOWN, DEPRESSED OR HOPELESS: 0
SUM OF ALL RESPONSES TO PHQ9 QUESTIONS 1 & 2: 0
SUM OF ALL RESPONSES TO PHQ QUESTIONS 1-9: 0
1. LITTLE INTEREST OR PLEASURE IN DOING THINGS: 0

## 2021-12-09 ASSESSMENT — SOCIAL DETERMINANTS OF HEALTH (SDOH): HOW HARD IS IT FOR YOU TO PAY FOR THE VERY BASICS LIKE FOOD, HOUSING, MEDICAL CARE, AND HEATING?: NOT HARD AT ALL

## 2021-12-09 NOTE — PROGRESS NOTES
General FM note    Brianda Ohcoa is a 62 y.o. female who presents today for follow up on her  medical conditions as noted below. Brianda Ochoa is c/o of   Chief Complaint   Patient presents with    Leg Pain     sciatica rt side       Patient Active Problem List:     Major depression     Rotator cuff disorder     Hyperlipidemia     Incontinence in female     Rectocele     Diverticulitis     Asthma     Hypokalemia     Cellulitis, face - left side, failed outpatient therapy     Hyperglycemia     Mild intermittent asthma without complication     Gastroesophageal reflux disease without esophagitis     Displacement of lumbar intervertebral disc without myelopathy     Chest pain     Dyspepsia     Acute medial meniscus tear of left knee     Dry eyes, bilateral     Insufficiency of tear film of both eyes     Instability of left ankle joint     Past Medical History:   Diagnosis Date    Arthritis     ASCUS with positive high risk HPV cervical 3/22/16    Asthma     Depression     Diverticulitis     ESBL (extended spectrum beta-lactamase) producing bacteria infection 02/03/2019    E.  Coli urine    GERD (gastroesophageal reflux disease)     Hyperlipidemia     MRSA (methicillin resistant staph aureus) culture positive 4/18/2016    lip    Rectocele     Tachycardia     takes Metoprolol      Past Surgical History:   Procedure Laterality Date    ANKLE SURGERY Left 3/4/2021    Left ankle lateral ligamentous repair, Left peroneus brevis debridement, Left peroneus longus tenolysis, Left leg gastroc recession, performed through separate incision  performed by Shima Drew MD at [facility] 7/19/2018    COLONOSCOPY performed by Omayra Cannon DO at [address]  2/25/2015    uro   [address] DAVIS, BSO performed at [facility] by Dr. Javy Carney, Also had some type of bladder lift at this time    KNEE ARTHROSCOPY Left 5/13/2019    KNEE ARTHROSCOPY performed by Jasvir Cruz Gary Almanza MD at [facility] 1934 Left     #1 - lateral release, #2 - arthroscopy with muscle alignment    NERVE BLOCK  07/28/2017    caudal #1  decadron 10mg    NERVE BLOCK  09/22/2017    cadal # 2, decadron 10 mg    NERVE BLOCK  09/22/2017    caudal epidural steroid block #2 decadron 10 mg    NERVE BLOCK  11/10/2017    lumbar L4-5 epidural; depomedrol 80mg    UPPER GASTROINTESTINAL ENDOSCOPY  1/30/2019    EGD BIOPSY performed by Paul Santa MD at [address] [place] [address] History   Problem Relation Age of Onset    Colon Cancer Mother     High Blood Pressure Father     Heart Attack Brother     Heart Disease Brother     Crohn's Disease Niece      Current Outpatient Medications   Medication Sig Dispense Refill    betamethasone valerate (VALISONE) 0.1 % cream Apply topically 2 times daily.  45 g 0    chlorzoxazone (PARAFON FORTE) 500 MG tablet TAKE 1/2 TABLET BY MOUTH TWICE DAILY AS NEEDED 28 tablet 3    vitamin D (ERGOCALCIFEROL) 1.25 MG (18122 UT) CAPS capsule TAKE 1 CAPSULE BY MOUTH EVERY WEEK 4 capsule 3    venlafaxine (EFFEXOR XR) 75 MG extended release capsule TAKE 1 CAPSULE BY MOUTH DAILY 28 capsule 3    diclofenac (VOLTAREN) 50 MG EC tablet TAKE 1 TABLET BY MOUTH TWICE DAILY 56 tablet 2    busPIRone (BUSPAR) 15 MG tablet TAKE 1 TABLET BY MOUTH TWICE DAILY 56 tablet 3    omeprazole (PRILOSEC) 40 MG delayed release capsule TAKE 1 CAPSULE BY MOUTH DAILY EVERY MORNING 28 capsule 3    albuterol sulfate  (90 Base) MCG/ACT inhaler INHALE 2 PUFFS BY MOUTH INTO THE LUNGS ONCE EVERY 6 HOURS AS NEEDED FOR WHEEZING 18 g 3    Handicap Placard MISC by Does not apply route DISABLED PARKING PERMIT AUTHORIZATION  Routine     Qty-1    The patient has the following condition(s) which qualifies him/her for disabled parking: Walking severely limited due to orthopedic condition     Privilege Duration: Temporary for 3 months 1 each 0    ketotifen (ZADITOR) 0.025 % ophthalmic solution USE 1 DROP INTO BOTH EYES TWICE DAILY 10 mL 1    vitamin D (ERGOCALCIFEROL) 1.25 MG (78936 UT) CAPS capsule TAKE 1 CAPSULE BY MOUTH EVERY WEEK ON  4 capsule 3    fluticasone (FLONASE) 50 MCG/ACT nasal spray INSTILL 2 SPRAYS INTO EACH NOSTRIL ONCE DAILY 16 g 3    miSOPROStol (CYTOTEC) 200 MCG tablet Take 200 mcg by mouth daily      senna-docusate (PERICOLACE) 8.6-50 MG per tablet Take 1 tablet by mouth daily as needed for Constipation (Patient taking differently: Take 1 tablet by mouth 2 times daily ) 30 tablet 1    Multiple Vitamins-Minerals (CULTURELLE PROBIOTICS + MULTIV) CHEW Take 1 tablet by mouth daily 30 tablet 2    MI-ACID GAS RELIEF 80 MG chewable tablet CHEW 1 TABLET BY MOUTH FOUR TIMES DAILY AS NEEDED FOR FLATULENCE 120 tablet 5    CRANBERRY CONCENTRATE 500 MG CAPS TAKE 1 CAPSULE BY MOUTH TWICE DAILY 56 capsule 3    metoprolol succinate (TOPROL XL) 25 MG extended release tablet Take 25 mg by mouth daily       PREMARIN 0.625 MG/GM vaginal cream PLACE 2 GRAMS VAGINALLY TWICE A WEEK FOR 12 DOSES 30 g 1    pravastatin (PRAVACHOL) 40 MG tablet TAKE 1 TABLET BY MOUTH DAILY 28 tablet 3    aspirin 325 MG EC tablet Take 1 tablet by mouth daily 42 tablet 0     No current facility-administered medications for this visit. ALLERGIES:    Allergies   Allergen Reactions    Shellfish Allergy     Shrimp (Diagnostic) Shortness Of Breath    Seasonal     Famotidine Other (See Comments)     Headaches  Headaches  Headaches  Headaches  Headaches    Gabapentin Nausea Only     Mood swings, nausea. Mood swings, nausea. Mood swings, nausea. Mood swings, nausea. Mood swings, nausea. Social History     Tobacco Use    Smoking status: Former Smoker     Packs/day: 2.00     Years: 0.50     Pack years: 1.00     Types: Cigarettes     Quit date:      Years since quittin.9    Smokeless tobacco: Never Used   Substance Use Topics    Alcohol use:  Yes     Alcohol/week: 0.0 standard drinks     Comment: social Body mass index is 33.9 kg/m². /83   Pulse 82   Temp 97 °F (36.1 °C)   Wt 179 lb 6.4 oz (81.4 kg)   SpO2 95%   BMI 33.90 kg/m²     Subjective:      HPI    62 y.o. female coming in today bc of R leg pain. Started around 6 months ago. Not able to lay on it. She states but this pain did get better and the pain was at the lateral area of her right thigh area. Then she started to have pain at her R buttock. Just below. Her buttock. Pain when sitting on it, when walking a lot it's pain full. It just feels like \"a brick \". There is no radiating pain there is no other discomfort just at this point point. Review of Systems   Constitutional: Negative for fever and unexpected weight change. Pertinent items are noted in HPI. Objective:   Physical Exam  Constitutional: VS (see above). General appearance: normal development, habitus and attention, no deformities. No distress. Eyes: normal conjunctiva and lids. Physical Exam  Musculoskeletal:        Legs:        Skin: no rashes, lesions or ulcers. Musculoskeletal: normal gait. Nails: no clubbing or cyanosis. Psychiatric: alert and oriented to place, time and person. Normal mood and affect. Assessment:       Diagnosis Orders   1. Muscle strain of right gluteal region, initial encounter  Mari Garcia       Plan:   Patient will follow up with PT. She will continue NSAID over-the-counter morning and night. Call for any changes. Return in about 6 months (around 6/9/2022), or if symptoms worsen or fail to improve.   Orders Placed This Encounter   Procedures   [facility] - [facility]     Referral Priority:   Routine     Referral Type:   Eval and Treat     Referral Reason:   Specialty Services Required     Requested Specialty:   Physical Therapy     Number of Visits Requested:   1     Orders Placed This Encounter   Medications    betamethasone valerate (VALISONE) 0.1 % cream     Sig: Apply topically 2 times

## 2021-12-10 NOTE — TELEPHONE ENCOUNTER
Vineet Booth is calling to request a refill on the following medication(s):    Last Visit Date (If Applicable):  66/4/5563    Next Visit Date:    Visit date not found    Medication Request:  Requested Prescriptions     Pending Prescriptions Disp Refills    diclofenac (VOLTAREN) 50 MG EC tablet [Pharmacy Med Name: Diclofenac Sodium 50 MG Oral Tablet Delayed Release] 56 tablet 2     Sig: TAKE 1 TABLET BY MOUTH TWICE DAILY

## 2021-12-23 ENCOUNTER — OFFICE VISIT (OUTPATIENT)
Dept: SURGERY | Age: 61
End: 2021-12-23
Payer: COMMERCIAL

## 2021-12-23 VITALS — WEIGHT: 179 LBS | RESPIRATION RATE: 12 BRPM | HEIGHT: 61 IN | BODY MASS INDEX: 33.79 KG/M2

## 2021-12-23 DIAGNOSIS — D48.5 NEOPLASM OF UNCERTAIN BEHAVIOR OF SKIN: Primary | ICD-10-CM

## 2021-12-23 PROCEDURE — G8417 CALC BMI ABV UP PARAM F/U: HCPCS | Performed by: PLASTIC SURGERY

## 2021-12-23 PROCEDURE — 99213 OFFICE O/P EST LOW 20 MIN: CPT | Performed by: PLASTIC SURGERY

## 2021-12-23 PROCEDURE — G8484 FLU IMMUNIZE NO ADMIN: HCPCS | Performed by: PLASTIC SURGERY

## 2021-12-23 PROCEDURE — 3017F COLORECTAL CA SCREEN DOC REV: CPT | Performed by: PLASTIC SURGERY

## 2021-12-23 PROCEDURE — 1036F TOBACCO NON-USER: CPT | Performed by: PLASTIC SURGERY

## 2021-12-23 PROCEDURE — G8427 DOCREV CUR MEDS BY ELIG CLIN: HCPCS | Performed by: PLASTIC SURGERY

## 2021-12-23 NOTE — PROGRESS NOTES
[address] [facility] DERMATOLOGY  [facility] [address]       Office Progress Note     Chief Complaint   Patient presents with    Annual Exam     Right axillary lesion, new lesions on back         HPI:   Radha Miller is a 62 y.o. female who presents status post excision of right axillary lesion on 10/28 2020. The lesion was consistent with compound nevus. Patient has a new lesion on the right upper back which is itches and it has increased in size and causes her pain and discomfort.     Medications:     Current Outpatient Medications   Medication Sig Dispense Refill    diclofenac (VOLTAREN) 50 MG EC tablet TAKE 1 TABLET BY MOUTH TWICE DAILY 56 tablet 2    chlorzoxazone (PARAFON FORTE) 500 MG tablet TAKE 1/2 TABLET BY MOUTH TWICE DAILY AS NEEDED 28 tablet 3    vitamin D (ERGOCALCIFEROL) 1.25 MG (95179 UT) CAPS capsule TAKE 1 CAPSULE BY MOUTH EVERY WEEK 4 capsule 3    venlafaxine (EFFEXOR XR) 75 MG extended release capsule TAKE 1 CAPSULE BY MOUTH DAILY 28 capsule 3    busPIRone (BUSPAR) 15 MG tablet TAKE 1 TABLET BY MOUTH TWICE DAILY 56 tablet 3    pravastatin (PRAVACHOL) 40 MG tablet TAKE 1 TABLET BY MOUTH DAILY 28 tablet 3    omeprazole (PRILOSEC) 40 MG delayed release capsule TAKE 1 CAPSULE BY MOUTH DAILY EVERY MORNING 28 capsule 3    albuterol sulfate  (90 Base) MCG/ACT inhaler INHALE 2 PUFFS BY MOUTH INTO THE LUNGS ONCE EVERY 6 HOURS AS NEEDED FOR WHEEZING 18 g 3    Handicap Placard MISC by Does not apply route DISABLED PARKING PERMIT AUTHORIZATION  Routine     Qty-1    The patient has the following condition(s) which qualifies him/her for disabled parking: Walking severely limited due to orthopedic condition     Privilege Duration: Temporary for 3 months 1 each 0    aspirin 325 MG EC tablet Take 1 tablet by mouth daily 42 tablet 0    ketotifen (ZADITOR) 0.025 % ophthalmic solution USE 1 DROP INTO BOTH EYES TWICE DAILY 10 mL 1    vitamin D (ERGOCALCIFEROL) 1.25 MG (52181 UT) CAPS capsule TAKE 1 CAPSULE BY MOUTH EVERY WEEK ON THURSDAYS 4 capsule 3    fluticasone (FLONASE) 50 MCG/ACT nasal spray INSTILL 2 SPRAYS INTO EACH NOSTRIL ONCE DAILY 16 g 3    miSOPROStol (CYTOTEC) 200 MCG tablet Take 200 mcg by mouth daily      senna-docusate (PERICOLACE) 8.6-50 MG per tablet Take 1 tablet by mouth daily as needed for Constipation (Patient taking differently: Take 1 tablet by mouth 2 times daily ) 30 tablet 1    Multiple Vitamins-Minerals (CULTURELLE PROBIOTICS + MULTIV) CHEW Take 1 tablet by mouth daily 30 tablet 2    MI-ACID GAS RELIEF 80 MG chewable tablet CHEW 1 TABLET BY MOUTH FOUR TIMES DAILY AS NEEDED FOR FLATULENCE 120 tablet 5    CRANBERRY CONCENTRATE 500 MG CAPS TAKE 1 CAPSULE BY MOUTH TWICE DAILY 56 capsule 3    metoprolol succinate (TOPROL XL) 25 MG extended release tablet Take 25 mg by mouth daily       PREMARIN 0.625 MG/GM vaginal cream PLACE 2 GRAMS VAGINALLY TWICE A WEEK FOR 12 DOSES 30 g 1     No current facility-administered medications for this visit. Allergies: Allergies   Allergen Reactions    Shellfish Allergy     Shrimp (Diagnostic) Shortness Of Breath    Seasonal     Famotidine Other (See Comments)     Headaches  Headaches  Headaches  Headaches  Headaches    Gabapentin Nausea Only     Mood swings, nausea. Mood swings, nausea. Mood swings, nausea. Mood swings, nausea. Mood swings, nausea. Past Medical History:   Diagnosis Date    Arthritis     ASCUS with positive high risk HPV cervical 3/22/16    Asthma     Depression     Diverticulitis     ESBL (extended spectrum beta-lactamase) producing bacteria infection 02/03/2019    E.  Coli urine    GERD (gastroesophageal reflux disease)     Hyperlipidemia     MRSA (methicillin resistant staph aureus) culture positive 4/18/2016    lip    Rectocele     Tachycardia     takes Metoprolol     Past Surgical History:   Procedure Laterality Date    ANKLE SURGERY Left 3/4/2021 Worried About [facility] in the Last Year: Never true    Kallie [employer] in the Last Year: Never true   Transportation Needs:     Lack of Transportation (Medical): Not on file    Lack of Transportation (Non-Medical): Not on file   Physical Activity:     Days of Exercise per Week: Not on file    Minutes of Exercise per Session: Not on file   Stress:     Feeling of Stress : Not on file   Social Connections:     Frequency of Communication with Friends and Family: Not on file    Frequency of Social Gatherings with Friends and Family: Not on file    Attends Taoist Services: Not on file    Active Member of [address] [place] [address] or Organizations: Not on file    Attends Club or Organization Meetings: Not on file    Marital Status: Not on file   Intimate Partner Violence:     Fear of Current or Ex-Partner: Not on file    Emotionally Abused: Not on file    Physically Abused: Not on file    Sexually Abused: Not on file   Housing Stability:     Unable to Pay for Housing in the Last Year: Not on file    Number of [place] in the Last Year: Not on file    Unstable Housing in the Last Year: Not on file     Family History   Problem Relation Age of Onset    Colon Cancer Mother     High Blood Pressure Father     Heart Attack Brother     Heart Disease Brother     Crohn's Disease Niece      Review of Systems:      Constitutional: Negative for fever, chills, fatigue and unexpected weight change. HENT: Negative for hearing loss, sore throat and facial swelling. Eyes: Patient has a history of dry eyes. Respiratory: Patient has a history of asthma. .    Cardiovascular: Patient has a history of hyperlipidemia, chest pain and hyperglycemia. Patient has a history of tachycardia. Gastrointestinal: Patient has a history of diverticulitis. Patient has a history of GERD. Skin: Negative for pallor and rash. Neurological: Negative for seizures, syncope and numbness. Hematological: Does not bruise/bleed easily. Psychiatric/Behavioral: Negative for behavioral problems. Patient has a history of depression. Physical Exam:   Resp 12   Ht 5' 1\" (1.549 m)   Wt 179 lb (81.2 kg)   BMI 33.82 kg/m²    Body mass index is 33.82 kg/m². Physical Exam   Constitutional: Oriented to person, place, and time. Appears well-developed and well-nourished. No distress. HEENT: Normocephalic and atraumatic. External ears within normal limits. Extraocular muscles are intact. Conjunctivae were anicteric. Pulmonary/Chest: Effort normal. No respiratory distress. Neurological: Alert and oriented to person, place, and time. Skin patient has healed well. This lesion itches and causes her discomfort      Is probably consistent with an actinic keratosis this does not cause her any problems. Extremities:  Moves all extremities. Abdomen:  Soft. Psychiatric: Normal mood and affect. Behavior is normal.    Labs:   @LASTLABM(1m)@    Imaging:   Xr Ankle Left (min 3 Views)    Result Date: 11/13/2020  EXAMINATION: THREE XRAY VIEWS OF THE LEFT ANKLE 11/13/2020 4:56 pm COMPARISON: None. HISTORY: ORDERING SYSTEM PROVIDED HISTORY: pain over lateral malleolus TECHNOLOGIST PROVIDED HISTORY: pain over lateral malleolus Reason for Exam: pain over lateral malleolus Acuity: Acute Type of Exam: Initial Mechanism of Injury: pain over lateral malleolus FINDINGS: No fractures or dislocations. No suspicious focal bony lesions. No bony erosions or bony destructive changes. No degenerative changes noted. Ankle mortise relationships are maintained. There appears to be minimal soft tissue swelling about the lateral malleolus. No radiopaque foreign bodies or soft tissue gas. Small plantar calcaneal bone spur. There appears to be minimal soft tissue swelling about the lateral malleolus. No acute bony abnormality. Impression/Plan:      Diagnosis Orders   1. Neoplasm of uncertain behavior of skin           Plan:   We will plan to excise the lesion on the right upper back. The risks were discussed with patient in detail. All questions were answered. The following information was discussed with the patient, but this is not an all-inclusive-other issue were also discussed with patient. GENERAL INFORMATION  The surgical removal of skin lesions and tumors is frequently performed by plastic surgeons. Certain skin lesions and skin tumors will not disappear spontaneously, surgical removal is a treatment option. There are many different techniques for removing skin lesions and skin tumors. ALTERNATIVE TREATMENTS  Alternative forms of management consist of not treating the skin lesion/skin tumor condition. Removal of skin lesions and some superficial skin tumors may be accomplished by other treatments including the use of liquid nitrogen (freezing), lasers, topical medications, and electric cautery. Risks and potential complications are associated with alternative forms of treatment. Deeper tumors cannot be treated by this. RISKS OF SKIN LESION/SKIN TUMOR SURGERY    I have explained to the patient that every surgical procedure involves a certain amount of risk and it is important that an understanding of these risks and the possible complications associated with them is understood. In addition, every procedure has limitations. An individual's choice to undergo a surgical procedure is based on the comparison of the risk to potential benefit. Although some of patients do not experience these complications. Bleeding- It is possible, to experience a bleeding episode during or after surgery. Intraoperative blood transfusions may be required. Should post-operative bleeding occur, it may require an emergency treatment to drain the accumulated blood or blood transfusion. Do not take any aspirin or anti-inflammatory medications for ten days before or after surgery, as this may increase the risk of bleeding.   Non-prescription herbs and dietary supplements can increase the risk of surgical bleeding. Hematoma can occur at any time following injury. If blood transfusions are necessary to treat blood loss, there is the risk of blood-related infections such as hepatitis and HIV (AIDS). Heparin medications that are used to prevent blood clots in veins can produce bleeding and decreased blood platelets. Please check with the physician who prescribed that blood thinners such as aspirin Coumadin etc. Prior to stopping them. Infection- Infection can occur after surgery. Should an infection occur, additional treatment including antibiotics, hospitalization, or additional surgery may be necessary. Scarring- All surgery leaves scars, some more visible than others. Although wound healing after a surgical procedure is expected, abnormal scars may occur within the skin and deeper tissues. Scars may be unattractive and of different color than the surrounding skin tone. Scar appearance may also vary within the same scar. There is the possibility of visible marks from sutures used to close the wound after the removal of skin lesions and tumors. There is the possibility that scars may limit motion and function. In some cases, scars may require surgical revision or treatment. Obesity: If you're BMI is greater than 30 you may have a higher chance of complications. This may include but not limited to wound healing and infections. Also, if you have other medical problems such as diabetes and hypertension it may affect your healing as well as your surgical results in bleeding. Damage to Deeper Structures- There is the potential for injury to deeper structures including nerves, blood vessels, muscles, and lungs (pneumothorax) during any surgical procedure. The potential for this to occur varies according to where on the body surgery is being performed. Injury to deeper structures may be temporary or permanent.   Cancer- In some situations, a skin lesion or tumor that appears to be benign may be determined to be cancerous after laboratory analysis. Additional treatments or surgery may be necessary. Recurrence- In some situation, skin lesions and tumors can recur after surgical excision. Additional treatment or secondary surgery may be necessary. Skin Discoloration / Swelling- Some bruising and swelling normally occurs following surgery. The skin in or near the surgical site can appear either lighter or darker than surrounding skin. Although uncommon, swelling and skin discoloration may persist for long periods of time and, in rare situations, may be permanent. Skin Sensitivity- Itching, tenderness, or exaggerated responses to hot or cold temperatures may occur after surgery. Usually this resolves during healing, but in some situations, it may be chronic, permanent. Pain- You will experience pain after your surgery. Pain of varying intensity and duration may occur and persist after surgery. Chronic pain may occur from nerves becoming trapped in scar tissue. Sutures- Some surgical techniques use deep sutures. You may notice these sutures after your surgery. Sutures may spontaneously poke through the skin, become visible or produce irritation that requires removal.  Delayed Healing- Wound disruption or delayed wound healing is possible. Some areas of the skin may not heal normally and may take a long time to heal.  Some areas of skin may die, requiring frequent dressing changes or further surgery to remove the non-healed tissue. Smokers have a greater risk of skin loss and wound healing complications. Skin Contour Irregularities- Contour irregularities and depressions may occur after surgery. Visible and palpable wrinkling of skin can occur. Residual skin irregularities at the ends of the incisions or dog ears are always a possibility and may require additional surgery.   This may improve with time, or it can be surgically corrected. Allergic Reactions- In some cases, local allergies to tape, suture materials and glues, blood products, topical preparations or injected agents have been reported. Serious systemic reactions including shock (anaphylaxis) may occur to drugs used during surgery and prescription medications. Allergic reactions may require additional treatment. Surgical Anesthesia- Both local and general anesthesia involve risk. There is the possibility of complications, injury, and even death from all forms of surgical anesthesia or sedation. Change in Skin Sensation- It is common to experience diminished (or loss) of skin sensation in areas that have had surgery. Diminished (or complete loss of skin sensation) may not totally resolve. Shock- In rare circumstances, your surgical procedure can cause severe trauma, particularly when multiple or extensive procedures are performed. Although serious complications are infrequent, infections or excessive fluid loss can lead to severe illness and even death. If surgical shock occurs, hospitalization and additional treatment would be necessary. Unsatisfactory Result- There is no guarantee or warranty expressed or implied, on the results that may be obtained. There is the possibility of a poor result from the removal of skin lesions and tumors. This would include risks such as unacceptable visible deformities, loss of function, poor healing, wound disruption, skin death and loss of sensation. You may be disappointed with the results of reconstructive surgery. It may be necessary to perform additional surgery to improve your results. Cardiac and Pulmonary Complications- Surgery, especially longer procedures, may be associated with the formation of, or increase in, blood clots in the venous system.   Pulmonary complications may occur secondarily to both blood clots (pulmonary emboli), fat deposits (fat emboli) or partial collapse of the lungs after general anesthesia. Pulmonary and fat emboli can be life-threatening or fatal in some circumstances. Air travel, inactivity and other conditions may increase the incidence of blood clots traveling to the lungs causing a major blood clot that may result in death. It is important to discuss with your physician any past history of blood clots or swollen legs that may contribute to this condition. Cardiac complications are a risk with any surgery and anesthesia, even in patients without symptoms. If you experience shortness of breath, chest pains, or unusual heart beats, seek medical attention immediately. Should any of these complications occur, you may require hospitalization and additional treatment. Smoking, Second-Hand Smoke Exposure, Nicotine Products (Patch, Gum, Nasal Spray)-   Patients who are currently smoking, use tobacco products, or nicotine products (patch, gum, or nasal spray) are at a greater risk for significant surgical complications of skin dying, delayed healing, and additional scarring. Individuals exposed to second-hand smoke are also at potential risk for similar complications attributable to nicotine exposure. Additionally, smoking may have a significant negative effect on anesthesia and recovery from anesthesia, with coughing and possibly increased bleeding. Individuals who are not exposed to tobacco smoke or nicotine-containing products have a significantly lower risk of this type of complication. It is important to refrain from smoking at least 8-week weeks before and after surgery. This may apply to secondhand smoking. Mental Health Disorders and Surgery- It is important that all patients seeking to undergo surgery have realistic expectations that focus on improvement rather than perfection. Complications or less than satisfactory results are sometimes unavoidable, may require additional surgery and often are stressful.   Please openly discuss with your surgeon, prior to surgery, any history that you may have of significant emotional depression or mental health disorders. Although many individuals may benefit psychologically from the results of surgery, effects on mental health cannot be accurately predicted. Medications- There are many adverse reactions that occur as the result of taking over the counter, herbal, and/or prescription medications. Be sure to check with your physician about any drug interactions that may exist with medications which you are already taking. If you have an adverse reaction, stop the drugs immediately and call your plastic surgeon for further instructions. If the reaction is severe, go immediately to the nearest emergency room. When taking the prescribed pain medications after surgery, realize that they can affect your thought process and coordination. Do not drive, do not operate complex equipment, do not make any important decisions, and do not drink any alcohol while taking these medications. Be sure to take your prescribed medication only as directed. ADDITIONAL SURGERY NECESSARY  Should complications occur, additional surgery or other treatments may be necessary. Even though risks and complications occur infrequently, the risks cited are the ones that are particularly associated with skin lesion and skin tumor removal.  Other complications and risks can occur but are even more uncommon. The practice of medicine and surgery is not an exact science. Although good results are expected, there is no guarantee or warranty expressed or implied, on the results that may be obtained. PATIENT COMPLIANCE   Follow all physician instructions carefully; this is essential for the success of your outcome. It is important that the surgical incisions are not subjected to excessive force, swelling, abrasion, or motion during the time of healing. Protective dressings and drains should not be removed unless instructed by your plastic surgeon.   Successful post-operative function depends on both surgery and subsequent care. Physical activity that increases your pulse or heart rate may cause bruising, swelling, fluid accumulation and the need for return to surgery. It is wise to refrain from intimate physical activities after surgery until your physician states it is safe. It is important that you participate in follow-up care, return for aftercare, and promote your recovery after surgery. HEALTH INSURANCE  Most health insurance companies exclude coverage for cosmetic surgical operations or any complications that might occur from surgery. Please carefully review your health insurance subscriber information pamphlet. Most insurance plans exclude coverage for secondary or revisionary surgery. Your type of surgery will most likely be covered by your insurance company, I went there is no guarantees or warrantees implied or otherwise. The cost of surgery involves several charges for the services provided. The total includes fees charged by your surgeon, the cost of surgical supplies, anesthesia, laboratory tests, and possible outpatient hospital charges, depending on where the surgery is performed. Depending on whether the cost of surgery is covered by an insurance plan, you will be responsible for necessary co-payments, deductibles, and charges not covered. The fees charged for this procedure do not include any potential future costs for additional procedures that you elect to have or require in order to revise, optimize, or complete your outcome. Additional costs may occur should complications develop from the surgery. Secondary surgery or hospital day-surgery charges involved with revision surgery will also be your responsibility. II have also explained to the patient that we may obtain photographs.   These images or illustration along with my medical records created in my case may be used for obtaining insurance approval, for use in examination, may assist in education, testing, credentialing and or certifying by [address] [place] [address] Rd., [address] of Plastic Surgery. These images and records may be used to communicate with referring physician.     Electronically signed by:  Jody Gomez M.D.   12/23/2021

## 2022-01-07 DIAGNOSIS — F41.9 ANXIETY: ICD-10-CM

## 2022-01-07 NOTE — TELEPHONE ENCOUNTER
Jared Black is calling to request a refill on the following medication(s):    Last Visit Date (If Applicable):  93/6/2784    Next Visit Date:    Visit date not found    Medication Request:  Requested Prescriptions     Pending Prescriptions Disp Refills    busPIRone (BUSPAR) 15 MG tablet [Pharmacy Med Name: busPIRone HCl 15 MG Oral Tablet] 56 tablet 3     Sig: TAKE 1 TABLET BY MOUTH TWICE DAILY    pravastatin (PRAVACHOL) 40 MG tablet [Pharmacy Med Name: Pravastatin Sodium 40 MG Oral Tablet (Pravachol)] 28 tablet 3     Sig: TAKE 1 TABLET BY MOUTH DAILY

## 2022-01-10 RX ORDER — BUSPIRONE HYDROCHLORIDE 15 MG/1
TABLET ORAL
Qty: 56 TABLET | Refills: 3 | Status: SHIPPED | OUTPATIENT
Start: 2022-01-10 | End: 2022-05-09

## 2022-01-10 RX ORDER — PRAVASTATIN SODIUM 40 MG
40 TABLET ORAL DAILY
Qty: 28 TABLET | Refills: 3 | Status: SHIPPED | OUTPATIENT
Start: 2022-01-10 | End: 2022-05-09

## 2022-02-16 ENCOUNTER — OFFICE VISIT (OUTPATIENT)
Dept: SURGERY | Age: 62
End: 2022-02-16
Payer: COMMERCIAL

## 2022-02-16 ENCOUNTER — HOSPITAL ENCOUNTER (OUTPATIENT)
Age: 62
Setting detail: SPECIMEN
Discharge: HOME | End: 2022-02-16

## 2022-02-16 VITALS — BODY MASS INDEX: 33.79 KG/M2 | HEIGHT: 61 IN | OXYGEN SATURATION: 100 % | RESPIRATION RATE: 12 BRPM | WEIGHT: 179 LBS

## 2022-02-16 DIAGNOSIS — D48.5 NEOPLASM OF UNCERTAIN BEHAVIOR OF SKIN: Primary | ICD-10-CM

## 2022-02-16 PROCEDURE — 11401 EXC TR-EXT B9+MARG 0.6-1 CM: CPT | Performed by: PLASTIC SURGERY

## 2022-02-16 PROCEDURE — 13100 CMPLX RPR TRUNK 1.1-2.5 CM: CPT | Performed by: PLASTIC SURGERY

## 2022-02-16 RX ORDER — LIDOCAINE HYDROCHLORIDE AND EPINEPHRINE 10; 10 MG/ML; UG/ML
20 INJECTION, SOLUTION INFILTRATION; PERINEURAL ONCE
Status: COMPLETED | OUTPATIENT
Start: 2022-02-16 | End: 2022-02-16

## 2022-02-16 RX ADMIN — LIDOCAINE HYDROCHLORIDE AND EPINEPHRINE 5 ML: 10; 10 INJECTION, SOLUTION INFILTRATION; PERINEURAL at 15:31

## 2022-02-16 NOTE — PROGRESS NOTES
[address] [facility]  [facility] [address]         Office Procedure Note     Name: Nik Bacon Date/Time of Admission: No admission date for patient encounter. MRN: [id number] Attending Provider: No att. providers found   Room/Bed: @RB@ /Age: 1960/61 y.o. 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 female . All the risks, benefits, and alternative treatments were explained to the patient and an informed consent was obtained. DESCRIPTION OF PROCEDURE:  The patient was marked. A timeout was performed. All areas were prepped and draped in usual customary sterile manner    Local anesthetic was injected. All areas were tested. After it was determined it was adequate anesthesia, then attention was turned to. Right upper back then using a #15 blade the lesion was excised to its visible margin. Hemostasis was achieved. Then extensive undermining was performed medially, laterally, superiorly, and inferiorly to obtain tension-free closure. Then the dogears were removed using a combination of pickup, scissors, scalpel. Then all areas were inspected. Any other bleeding point was in the identified and was cauterized. Then using 4-0 Prolene in an interrupted manner the skin was closed. The patient tolerated procedure well. The area was dressed. Postoperative instructions and follow-up appointment was provided.

## 2022-02-22 LAB — DERMATOLOGY PATHOLOGY REPORT: NORMAL

## 2022-03-07 DIAGNOSIS — F41.9 ANXIETY: ICD-10-CM

## 2022-03-07 DIAGNOSIS — M62.838 MUSCLE SPASM: ICD-10-CM

## 2022-03-07 DIAGNOSIS — E55.9 VITAMIN D DEFICIENCY: ICD-10-CM

## 2022-03-08 RX ORDER — CHLORZOXAZONE 500 MG/1
TABLET ORAL
Qty: 28 TABLET | Refills: 3 | Status: SHIPPED | OUTPATIENT
Start: 2022-03-08 | End: 2022-03-16 | Stop reason: ALTCHOICE

## 2022-03-08 RX ORDER — ERGOCALCIFEROL 1.25 MG/1
CAPSULE ORAL
Qty: 4 CAPSULE | Refills: 3 | Status: SHIPPED | OUTPATIENT
Start: 2022-03-08 | End: 2022-07-20

## 2022-03-08 RX ORDER — VENLAFAXINE HYDROCHLORIDE 75 MG/1
75 CAPSULE, EXTENDED RELEASE ORAL DAILY
Qty: 28 CAPSULE | Refills: 3 | Status: SHIPPED | OUTPATIENT
Start: 2022-03-08 | End: 2022-07-20

## 2022-03-16 ENCOUNTER — APPOINTMENT (OUTPATIENT)
Dept: CT IMAGING | Age: 62
End: 2022-03-16
Payer: COMMERCIAL

## 2022-03-16 ENCOUNTER — HOSPITAL ENCOUNTER (EMERGENCY)
Age: 62
Discharge: HOME | End: 2022-03-16
Attending: EMERGENCY MEDICINE
Payer: COMMERCIAL

## 2022-03-16 VITALS
RESPIRATION RATE: 16 BRPM | WEIGHT: 180 LBS | BODY MASS INDEX: 33.99 KG/M2 | DIASTOLIC BLOOD PRESSURE: 73 MMHG | HEART RATE: 84 BPM | SYSTOLIC BLOOD PRESSURE: 121 MMHG | HEIGHT: 61 IN | TEMPERATURE: 98.2 F | OXYGEN SATURATION: 98 %

## 2022-03-16 DIAGNOSIS — R10.9 FLANK PAIN: Primary | ICD-10-CM

## 2022-03-16 LAB
ABSOLUTE EOS #: 0.3 K/UL (ref 0–0.44)
ABSOLUTE IMMATURE GRANULOCYTE: 0.02 K/UL (ref 0–0.3)
ABSOLUTE LYMPH #: 2.55 K/UL (ref 1.1–3.7)
ABSOLUTE MONO #: 0.51 K/UL (ref 0.1–1.2)
ALBUMIN SERPL-MCNC: 4.3 G/DL (ref 3.5–5.2)
ALP BLD-CCNC: 82 U/L (ref 35–104)
ALT SERPL-CCNC: 25 U/L (ref 5–33)
ANION GAP SERPL CALCULATED.3IONS-SCNC: 8 MMOL/L (ref 9–17)
AST SERPL-CCNC: 15 U/L
BASOPHILS # BLD: 1 % (ref 0–2)
BASOPHILS ABSOLUTE: 0.06 K/UL (ref 0–0.2)
BILIRUB SERPL-MCNC: 0.15 MG/DL (ref 0.3–1.2)
BILIRUBIN DIRECT: <0.08 MG/DL
BILIRUBIN URINE: NEGATIVE
BILIRUBIN, INDIRECT: ABNORMAL MG/DL (ref 0–1)
BUN BLDV-MCNC: 23 MG/DL (ref 8–23)
BUN/CREAT BLD: 29 (ref 9–20)
CALCIUM SERPL-MCNC: 9.3 MG/DL (ref 8.6–10.4)
CHLORIDE BLD-SCNC: 106 MMOL/L (ref 98–107)
CO2: 27 MMOL/L (ref 20–31)
COLOR: YELLOW
CREAT SERPL-MCNC: 0.79 MG/DL (ref 0.5–0.9)
EOSINOPHILS RELATIVE PERCENT: 4 % (ref 1–4)
GFR AFRICAN AMERICAN: >60 ML/MIN
GFR NON-AFRICAN AMERICAN: >60 ML/MIN
GFR SERPL CREATININE-BSD FRML MDRD: ABNORMAL ML/MIN/{1.73_M2}
GLUCOSE BLD-MCNC: 101 MG/DL (ref 70–99)
GLUCOSE URINE: NEGATIVE
HCT VFR BLD CALC: 39.4 % (ref 36.3–47.1)
HEMOGLOBIN: 13 G/DL (ref 11.9–15.1)
IMMATURE GRANULOCYTES: 0 %
KETONES, URINE: NEGATIVE
LEUKOCYTE ESTERASE, URINE: NEGATIVE
LIPASE: 49 U/L (ref 13–60)
LYMPHOCYTES # BLD: 30 % (ref 24–43)
MCH RBC QN AUTO: 29.3 PG (ref 25.2–33.5)
MCHC RBC AUTO-ENTMCNC: 33 G/DL (ref 28.4–34.8)
MCV RBC AUTO: 88.9 FL (ref 82.6–102.9)
MONOCYTES # BLD: 6 % (ref 3–12)
NITRITE, URINE: NEGATIVE
NRBC AUTOMATED: 0 PER 100 WBC
PDW BLD-RTO: 12.6 % (ref 11.8–14.4)
PH UA: 5.5 (ref 5–8)
PLATELET # BLD: 253 K/UL (ref 138–453)
PMV BLD AUTO: 11.8 FL (ref 8.1–13.5)
POTASSIUM SERPL-SCNC: 3.6 MMOL/L (ref 3.7–5.3)
PROTEIN UA: NEGATIVE
RBC # BLD: 4.43 M/UL (ref 3.95–5.11)
SEG NEUTROPHILS: 59 % (ref 36–65)
SEGMENTED NEUTROPHILS ABSOLUTE COUNT: 5.02 K/UL (ref 1.5–8.1)
SODIUM BLD-SCNC: 141 MMOL/L (ref 135–144)
SPECIFIC GRAVITY UA: 1.04 (ref 1–1.03)
TOTAL PROTEIN: 6.8 G/DL (ref 6.4–8.3)
TURBIDITY: CLEAR
URINE HGB: NEGATIVE
UROBILINOGEN, URINE: NORMAL
WBC # BLD: 8.5 K/UL (ref 3.5–11.3)

## 2022-03-16 PROCEDURE — 81003 URINALYSIS AUTO W/O SCOPE: CPT

## 2022-03-16 PROCEDURE — 6360000002 HC RX W HCPCS: Performed by: EMERGENCY MEDICINE

## 2022-03-16 PROCEDURE — 83690 ASSAY OF LIPASE: CPT

## 2022-03-16 PROCEDURE — 2580000003 HC RX 258: Performed by: EMERGENCY MEDICINE

## 2022-03-16 PROCEDURE — 96374 THER/PROPH/DIAG INJ IV PUSH: CPT

## 2022-03-16 PROCEDURE — 85025 COMPLETE CBC W/AUTO DIFF WBC: CPT

## 2022-03-16 PROCEDURE — 96375 TX/PRO/DX INJ NEW DRUG ADDON: CPT

## 2022-03-16 PROCEDURE — 80076 HEPATIC FUNCTION PANEL: CPT

## 2022-03-16 PROCEDURE — 74176 CT ABD & PELVIS W/O CONTRAST: CPT

## 2022-03-16 PROCEDURE — 80048 BASIC METABOLIC PNL TOTAL CA: CPT

## 2022-03-16 PROCEDURE — 99283 EMERGENCY DEPT VISIT LOW MDM: CPT

## 2022-03-16 RX ORDER — CYCLOBENZAPRINE HCL 10 MG
10 TABLET ORAL 3 TIMES DAILY PRN
Qty: 21 TABLET | Refills: 0 | Status: SHIPPED | OUTPATIENT
Start: 2022-03-16 | End: 2022-03-26

## 2022-03-16 RX ORDER — 0.9 % SODIUM CHLORIDE 0.9 %
1000 INTRAVENOUS SOLUTION INTRAVENOUS ONCE
Status: COMPLETED | OUTPATIENT
Start: 2022-03-16 | End: 2022-03-16

## 2022-03-16 RX ORDER — TRAMADOL HYDROCHLORIDE 50 MG/1
50 TABLET ORAL EVERY 6 HOURS PRN
Qty: 12 TABLET | Refills: 0 | Status: SHIPPED | OUTPATIENT
Start: 2022-03-16 | End: 2022-03-19

## 2022-03-16 RX ORDER — ONDANSETRON 2 MG/ML
4 INJECTION INTRAMUSCULAR; INTRAVENOUS ONCE
Status: COMPLETED | OUTPATIENT
Start: 2022-03-16 | End: 2022-03-16

## 2022-03-16 RX ORDER — KETOROLAC TROMETHAMINE 30 MG/ML
30 INJECTION, SOLUTION INTRAMUSCULAR; INTRAVENOUS ONCE
Status: COMPLETED | OUTPATIENT
Start: 2022-03-16 | End: 2022-03-16

## 2022-03-16 RX ORDER — METHYLPREDNISOLONE 4 MG/1
TABLET ORAL
COMMUNITY
Start: 2022-03-01 | End: 2022-03-16 | Stop reason: ALTCHOICE

## 2022-03-16 RX ORDER — BENZONATATE 100 MG/1
CAPSULE ORAL
COMMUNITY
Start: 2022-03-01 | End: 2022-10-21

## 2022-03-16 RX ORDER — PREDNISONE 50 MG/1
50 TABLET ORAL DAILY
Qty: 5 TABLET | Refills: 0 | Status: SHIPPED | OUTPATIENT
Start: 2022-03-16 | End: 2022-03-21

## 2022-03-16 RX ORDER — AZITHROMYCIN 250 MG/1
TABLET, FILM COATED ORAL
COMMUNITY
Start: 2022-03-01 | End: 2022-10-21

## 2022-03-16 RX ADMIN — SODIUM CHLORIDE 1000 ML: 9 INJECTION, SOLUTION INTRAVENOUS at 02:25

## 2022-03-16 RX ADMIN — KETOROLAC TROMETHAMINE 30 MG: 30 INJECTION, SOLUTION INTRAMUSCULAR; INTRAVENOUS at 02:26

## 2022-03-16 RX ADMIN — ONDANSETRON 4 MG: 2 INJECTION INTRAMUSCULAR; INTRAVENOUS at 02:26

## 2022-03-16 ASSESSMENT — ENCOUNTER SYMPTOMS
NAUSEA: 0
RHINORRHEA: 0
SHORTNESS OF BREATH: 0
COUGH: 0
COLOR CHANGE: 0
DIARRHEA: 0
SORE THROAT: 0
EYE REDNESS: 0
VOMITING: 0
EYE DISCHARGE: 0

## 2022-03-16 ASSESSMENT — PAIN SCALES - GENERAL
PAINLEVEL_OUTOF10: 7
PAINLEVEL_OUTOF10: 7

## 2022-03-16 ASSESSMENT — PAIN DESCRIPTION - PAIN TYPE: TYPE: ACUTE PAIN

## 2022-03-16 ASSESSMENT — PAIN - FUNCTIONAL ASSESSMENT: PAIN_FUNCTIONAL_ASSESSMENT: 0-10

## 2022-03-16 ASSESSMENT — PAIN DESCRIPTION - LOCATION: LOCATION: FLANK

## 2022-03-16 NOTE — ED PROVIDER NOTES
EMERGENCY DEPARTMENT ENCOUNTER    Pt Name: Krys Law  MRN: [id number]  [place] 1960  Date of evaluation: 3/16/22  CHIEF COMPLAINT       Chief Complaint   Patient presents with    Flank Pain     left side. no hx of kidney stones. constant sharp, aching pain. pain wraps around to front of abdomen. HISTORY OF PRESENT ILLNESS   This is a 71-year-old female that presents with complaints of left-sided flank pain. Patient has no history of kidney stones, she states that for the past few days she has had some left-sided flank pain, she states her symptoms worsened this evening and she came in for evaluation. Patient denies any fevers or chills, she has no chest pain, she states that she has some associated nausea. No dysuria or hematuria. REVIEW OF SYSTEMS     Review of Systems   Constitutional: Negative for chills and fever. HENT: Negative for rhinorrhea and sore throat. Eyes: Negative for discharge, redness and visual disturbance. Respiratory: Negative for cough and shortness of breath. Cardiovascular: Negative for chest pain, palpitations and leg swelling. Gastrointestinal: Negative for diarrhea, nausea and vomiting. Genitourinary: Positive for flank pain. Negative for dysuria and hematuria. Musculoskeletal: Negative for arthralgias, myalgias and neck pain. Skin: Negative for color change and rash. Neurological: Negative for seizures, weakness and headaches. Psychiatric/Behavioral: Negative for hallucinations, self-injury and suicidal ideas. PASTMEDICAL HISTORY     Past Medical History:   Diagnosis Date    Arthritis     ASCUS with positive high risk HPV cervical 3/22/16    Asthma     Depression     Diverticulitis     ESBL (extended spectrum beta-lactamase) producing bacteria infection 02/03/2019    E.  Coli urine    GERD (gastroesophageal reflux disease)     Hyperlipidemia     MRSA (methicillin resistant staph aureus) culture positive 4/18/2016    lip  Rectocele     Tachycardia     takes Metoprolol     Past Problem List  Patient Active Problem List   Diagnosis Code    Major depression F32.9    Rotator cuff disorder M67.919    Hyperlipidemia E78.5    Incontinence in female R32    Rectocele N81.6    Diverticulitis K57.92    Asthma J45.909    Hypokalemia E87.6    Cellulitis, face - left side, failed outpatient therapy L03. 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 Cinthia Adames, Also had some type of bladder lift at this time    KNEE ARTHROSCOPY Left 5/13/2019    KNEE ARTHROSCOPY performed by Rizwana Carranza MD at [place] [address] Left     #1 - lateral release, #2 - arthroscopy with muscle alignment    NERVE BLOCK  07/28/2017    caudal #1  decadron 10mg    NERVE BLOCK  09/22/2017    cadal # 2, decadron 10 mg    NERVE BLOCK  09/22/2017    caudal epidural steroid block #2 decadron 10 mg    NERVE BLOCK  11/10/2017    lumbar L4-5 epidural; depomedrol 80mg    UPPER GASTROINTESTINAL ENDOSCOPY  1/30/2019    EGD BIOPSY performed by Digna Hameed MD at [facility]       Previous Medications    ALBUTEROL SULFATE  (90 BASE) MCG/ACT INHALER    INHALE 2 PUFFS BY MOUTH INTO THE LUNGS ONCE EVERY 6 HOURS AS NEEDED FOR WHEEZING    ASPIRIN 325 MG EC TABLET    Take 1 tablet by mouth daily    AZITHROMYCIN (ZITHROMAX) 250 MG TABLET        BENZONATATE (TESSALON) 100 MG CAPSULE        BUSPIRONE (BUSPAR) 15 MG TABLET    TAKE 1 TABLET BY MOUTH TWICE DAILY    CRANBERRY CONCENTRATE 500 MG CAPS    TAKE 1 CAPSULE BY MOUTH TWICE DAILY    DICLOFENAC (VOLTAREN) 50 MG EC TABLET    TAKE 1 TABLET BY MOUTH TWICE DAILY    FLUTICASONE (FLONASE) 50 MCG/ACT NASAL SPRAY    INSTILL 2 SPRAYS INTO EACH NOSTRIL ONCE DAILY    HANDICAP PLACARD MISC    by Does not apply route DISABLED PARKING PERMIT AUTHORIZATION  Routine     Qty-1    The patient has the following condition(s) which qualifies him/her for disabled parking: Walking severely limited due to orthopedic condition     Privilege Duration: Temporary for 3 months    KETOTIFEN (ZADITOR) 0.025 % OPHTHALMIC SOLUTION    USE 1 DROP INTO BOTH EYES TWICE DAILY    METOPROLOL SUCCINATE (TOPROL XL) 25 MG EXTENDED RELEASE TABLET    Take 25 mg by mouth daily     MI-ACID GAS RELIEF 80 MG CHEWABLE TABLET    CHEW 1 TABLET BY MOUTH FOUR TIMES DAILY AS NEEDED FOR FLATULENCE    MISOPROSTOL (CYTOTEC) 200 MCG TABLET    Take 200 mcg by mouth daily    MULTIPLE VITAMINS-MINERALS (CULTURELLE PROBIOTICS + MULTIV) CHEW    Take 1 tablet by mouth daily    OMEPRAZOLE (PRILOSEC) 40 MG DELAYED RELEASE CAPSULE    TAKE 1 CAPSULE BY MOUTH DAILY EVERY MORNING    PRAVASTATIN (PRAVACHOL) 40 MG TABLET    TAKE 1 TABLET BY MOUTH DAILY    PREMARIN 0.625 MG/GM VAGINAL CREAM    PLACE 2 GRAMS VAGINALLY TWICE A WEEK FOR 12 DOSES    SENNA-DOCUSATE (PERICOLACE) 8.6-50 MG PER TABLET    Take 1 tablet by mouth daily as needed for Constipation    VENLAFAXINE (EFFEXOR XR) 75 MG EXTENDED RELEASE CAPSULE    TAKE 1 CAPSULE BY MOUTH DAILY    VITAMIN D (ERGOCALCIFEROL) 1.25 MG (32076 UT) CAPS CAPSULE    TAKE 1 CAPSULE BY MOUTH EVERY WEEK ON     VITAMIN D (ERGOCALCIFEROL) 1.25 MG (72362 UT) CAPS CAPSULE    TAKE 1 CAPSULE BY MOUTH EVERY WEEK     ALLERGIES     is allergic to shellfish allergy, shrimp (diagnostic), seasonal, famotidine, and gabapentin. FAMILY HISTORY     She indicated that her mother is . She indicated that her father is alive. She indicated that her brother is alive. She indicated that her maternal grandmother is . She indicated that her maternal grandfather is . She indicated that her paternal grandmother is . She indicated that her paternal grandfather is . She indicated that her niece is alive. SOCIAL HISTORY       Social History     Tobacco Use    Smoking status: Former Smoker     Packs/day: 2.00     Years: 0.50     Pack years: 1.00     Types: Cigarettes     Quit date:      Years since quittin.2    Smokeless tobacco: Never Used   Vaping Use    Vaping Use: Never used   Substance Use Topics    Alcohol use: Yes     Alcohol/week: 0.0 standard drinks     Comment: social    Drug use: No     PHYSICAL EXAM     INITIAL VITALS: /73   Pulse 84   Temp 98.2 °F (36.8 °C) (Oral)   Resp 16   Ht 5' 1\" (1.549 m)   Wt 180 lb (81.6 kg)   SpO2 98%   BMI 34.01 kg/m²    Physical Exam  Constitutional:       Appearance: Normal appearance. She is well-developed. She is not ill-appearing or toxic-appearing. HENT:      Head: Normocephalic and atraumatic. Eyes:      Conjunctiva/sclera: Conjunctivae normal.      Pupils: Pupils are equal, round, and reactive to light. Neck:      Trachea: Trachea normal.   Cardiovascular:      Rate and Rhythm: Normal rate and regular rhythm. Heart sounds: S1 normal and S2 normal. No murmur heard. Pulmonary:      Effort: Pulmonary effort is normal. No accessory muscle usage or respiratory distress. Breath sounds: Normal breath sounds.    Chest:      Chest wall: No deformity or tenderness. Abdominal:      General: Bowel sounds are normal. There is no distension or abdominal bruit. Palpations: Abdomen is not rigid. Tenderness: There is no abdominal tenderness. There is no guarding or rebound. Musculoskeletal:      Cervical back: Normal range of motion and neck supple. Skin:     General: Skin is warm. Findings: No rash. Neurological:      Mental Status: She is alert and oriented to person, place, and time. GCS: GCS eye subscore is 4. GCS verbal subscore is 5. GCS motor subscore is 6. Psychiatric:         Speech: Speech normal.         MEDICAL DECISION MAKIN-year-old female, left-sided flank pain is concerning for a kidney stone, plan is basic labs CT and reevaluation. 4:25 AM EDT  Patient CT scan and laboratory studies unremarkable, plan is short course of pain control steroids, I believe the patient could have some musculoskeletal pain, possibly radiculopathy versus a strained muscle from coughing. CRITICAL CARE:       PROCEDURES:    Procedures    DIAGNOSTIC RESULTS   EKG:All EKG's are interpreted by the Emergency Department Physician who either signs or Co-signs this chart in the absence of a cardiologist.        RADIOLOGY:All plain film, CT, MRI, and formal ultrasound images (except ED bedside ultrasound) are read by the radiologist, see reports below, unless otherwisenoted in MDM or here. CT ABDOMEN PELVIS WO CONTRAST Additional Contrast? None   Preliminary Result   *Punctate nonobstructing stone in the lower pole of the right kidney. No   obstructing ureteral calculi. *Fatty liver. *Benign-appearing cyst in the right lobe of the liver is unchanged. *Colonic diverticulosis. LABS: All lab results were reviewed by myself, and all abnormals are listed below.   Labs Reviewed   URINALYSIS WITH REFLEX TO CULTURE - Abnormal; Notable for the following components:       Result Value    Specific Gravity, UA 1.044 (*) All other components within normal limits   BASIC METABOLIC PANEL - Abnormal; Notable for the following components:    Glucose 101 (*)     Bun/Cre Ratio 29 (*)     Potassium 3.6 (*)     Anion Gap 8 (*)     All other components within normal limits   HEPATIC FUNCTION PANEL - Abnormal; Notable for the following components: Total Bilirubin 0.15 (*)     All other components within normal limits   CBC WITH AUTO DIFFERENTIAL   LIPASE       EMERGENCY DEPARTMENTCOURSE:         Vitals:    Vitals:    03/16/22 0121   BP: 121/73   Pulse: 84   Resp: 16   Temp: 98.2 °F (36.8 °C)   TempSrc: Oral   SpO2: 98%   Weight: 180 lb (81.6 kg)   Height: 5' 1\" (1.549 m)       The patient was given the following medications while in the emergency department:  Orders Placed This Encounter   Medications    ondansetron (ZOFRAN) injection 4 mg    0.9 % sodium chloride bolus    ketorolac (TORADOL) injection 30 mg    traMADol (ULTRAM) 50 MG tablet     Sig: Take 1 tablet by mouth every 6 hours as needed for Pain for up to 3 days. Intended supply: 3 days. Take lowest dose possible to manage pain     Dispense:  12 tablet     Refill:  0    cyclobenzaprine (FLEXERIL) 10 MG tablet     Sig: Take 1 tablet by mouth 3 times daily as needed for Muscle spasms     Dispense:  21 tablet     Refill:  0    predniSONE (DELTASONE) 50 MG tablet     Sig: Take 1 tablet by mouth daily for 5 days     Dispense:  5 tablet     Refill:  0     CONSULTS:  None    FINAL IMPRESSION      1.  Flank pain          DISPOSITION/PLAN   DISPOSITION Decision To Discharge 03/16/2022 04:09:53 AM      PATIENT REFERRED TO:  Madison Lowry MD  [address] [place] [id number]  [phone]    Schedule an appointment as soon as possible for a visit in 2 days      DISCHARGE MEDICATIONS:  New Prescriptions    CYCLOBENZAPRINE (FLEXERIL) 10 MG TABLET    Take 1 tablet by mouth 3 times daily as needed for Muscle spasms    PREDNISONE (DELTASONE) 50 MG TABLET Take 1 tablet by mouth daily for 5 days    TRAMADOL (ULTRAM) 50 MG TABLET    Take 1 tablet by mouth every 6 hours as needed for Pain for up to 3 days. Intended supply: 3 days. Take lowest dose possible to manage pain     The care is provided during an unprecedented national emergency due to the novel coronavirus, COVID 19.   MD Namrata Jc MD  03/16/22 [id number]

## 2022-05-09 DIAGNOSIS — F41.9 ANXIETY: ICD-10-CM

## 2022-05-09 RX ORDER — BUSPIRONE HYDROCHLORIDE 15 MG/1
TABLET ORAL
Qty: 56 TABLET | Refills: 3 | Status: SHIPPED | OUTPATIENT
Start: 2022-05-09 | End: 2022-08-29

## 2022-05-09 RX ORDER — PRAVASTATIN SODIUM 40 MG
40 TABLET ORAL DAILY
Qty: 28 TABLET | Refills: 3 | Status: SHIPPED | OUTPATIENT
Start: 2022-05-09 | End: 2022-08-29

## 2022-06-16 DIAGNOSIS — M79.671 RIGHT FOOT PAIN: Primary | ICD-10-CM

## 2022-06-23 ENCOUNTER — OFFICE VISIT (OUTPATIENT)
Dept: ORTHOPEDIC SURGERY | Age: 62
End: 2022-06-23
Payer: COMMERCIAL

## 2022-06-23 VITALS — BODY MASS INDEX: 33.99 KG/M2 | WEIGHT: 180 LBS | RESPIRATION RATE: 12 BRPM | HEIGHT: 61 IN

## 2022-06-23 DIAGNOSIS — M72.2 PLANTAR FASCIITIS: Primary | ICD-10-CM

## 2022-06-23 DIAGNOSIS — M21.861 GASTROCNEMIUS EQUINUS OF RIGHT LOWER EXTREMITY: ICD-10-CM

## 2022-06-23 PROCEDURE — G8427 DOCREV CUR MEDS BY ELIG CLIN: HCPCS | Performed by: ORTHOPAEDIC SURGERY

## 2022-06-23 PROCEDURE — G8417 CALC BMI ABV UP PARAM F/U: HCPCS | Performed by: ORTHOPAEDIC SURGERY

## 2022-06-23 PROCEDURE — 20550 NJX 1 TENDON SHEATH/LIGAMENT: CPT | Performed by: ORTHOPAEDIC SURGERY

## 2022-06-23 PROCEDURE — 3017F COLORECTAL CA SCREEN DOC REV: CPT | Performed by: ORTHOPAEDIC SURGERY

## 2022-06-23 PROCEDURE — 1036F TOBACCO NON-USER: CPT | Performed by: ORTHOPAEDIC SURGERY

## 2022-06-23 PROCEDURE — 99214 OFFICE O/P EST MOD 30 MIN: CPT | Performed by: ORTHOPAEDIC SURGERY

## 2022-06-23 RX ORDER — TRIAMCINOLONE ACETONIDE 40 MG/ML
40 INJECTION, SUSPENSION INTRA-ARTICULAR; INTRAMUSCULAR ONCE
Status: COMPLETED | OUTPATIENT
Start: 2022-06-23 | End: 2022-06-23

## 2022-06-23 RX ORDER — LIDOCAINE HYDROCHLORIDE 10 MG/ML
2 INJECTION, SOLUTION INFILTRATION; PERINEURAL ONCE
Status: COMPLETED | OUTPATIENT
Start: 2022-06-23 | End: 2022-06-23

## 2022-06-23 RX ADMIN — LIDOCAINE HYDROCHLORIDE 2 ML: 10 INJECTION, SOLUTION INFILTRATION; PERINEURAL at 17:45

## 2022-06-23 RX ADMIN — TRIAMCINOLONE ACETONIDE 40 MG: 40 INJECTION, SUSPENSION INTRA-ARTICULAR; INTRAMUSCULAR at 17:45

## 2022-06-23 NOTE — PROGRESS NOTES
[address] AND SPORTS MEDICINE  [facility]  [address] [id number]  Dept: [phone]    Ambulatory Orthopedic Follow Up Visit    Preoperative Diagnosis:   1. Left ankle instability with recurrent sprains  2. Left peroneal tendinopathy   1. Left gastrocnemius equinus contracture  3. Body mass index is 33.07 kg/m².     Postoperative Diagnosis:   1. Same      Procedures Performed:  (3/4/2021)  1. Left ankle lateral ligamentous repair (with Arthrex fiber tack anchors x2 + internal brace)  2. Left peroneus brevis debridement  3. Left peroneus longus tenolysis  4. Left leg gastroc recession, performed through separate incision      CHIEF COMPLAINT:    Chief Complaint   Patient presents with    Foot Pain     right         HISTORY OF PRESENT ILLNESS:       She is a 65 y.o. female, seen again today in the office for evaluation of a new issue as above, which began atraumatically over 20 years ago  . At today's visit, she is using no brace/assistive device. History is obtained today from:   [x]  the patient     []  EMR     []  one family member/friend    []  multiple family members/friends    []  other:      Regarding the previous issue, she reports the previous problem is doing better. She denies any significant pain in her left ankle, and reports that her ankle is much stronger (she is not using a brace or assistive device for this either). INTERVAL HISTORY 6/23/2022:  She is seen again today in the office for evaluation of a new issue as above, which began around 3/15/2022 atraumatically  . At today's visit, she is using no brace/assistive device.      History is obtained today from:   [x]  the patient     []  EMR     []  one family member/friend    []  multiple family members/friends    []  other:          REVIEW OF SYSTEMS:   Musculoskeletal: See HPI for pertinent positives     Past Medical History:    She  has a past medical history of Arthritis, ASCUS with positive high risk HPV cervical (3/22/16), Asthma, Depression, Diverticulitis, ESBL (extended spectrum beta-lactamase) producing bacteria infection (02/03/2019), GERD (gastroesophageal reflux disease), Hyperlipidemia, MRSA (methicillin resistant staph aureus) culture positive (4/18/2016), Rectocele, and Tachycardia. Past Surgical History:    She  has a past surgical history that includes Hysterectomy (1996); knee surgery (Left); Cystocopy (2/25/2015); Nerve Block (07/28/2017); Nerve Block (09/22/2017); Nerve Block (09/22/2017); Nerve Block (11/10/2017); Colonoscopy (N/A, 7/19/2018); Upper gastrointestinal endoscopy (1/30/2019); Knee arthroscopy (Left, 5/13/2019); and Ankle surgery (Left, 3/4/2021).      Current Medications:     Current Outpatient Medications:     pravastatin (PRAVACHOL) 40 MG tablet, TAKE 1 TABLET BY MOUTH DAILY, Disp: 28 tablet, Rfl: 3    busPIRone (BUSPAR) 15 MG tablet, TAKE 1 TABLET BY MOUTH TWICE DAILY, Disp: 56 tablet, Rfl: 3    azithromycin (ZITHROMAX) 250 MG tablet, , Disp: , Rfl:     benzonatate (TESSALON) 100 MG capsule, , Disp: , Rfl:     diclofenac (VOLTAREN) 50 MG EC tablet, TAKE 1 TABLET BY MOUTH TWICE DAILY, Disp: 56 tablet, Rfl: 2    venlafaxine (EFFEXOR XR) 75 MG extended release capsule, TAKE 1 CAPSULE BY MOUTH DAILY, Disp: 28 capsule, Rfl: 3    vitamin D (ERGOCALCIFEROL) 1.25 MG (62623 UT) CAPS capsule, TAKE 1 CAPSULE BY MOUTH EVERY WEEK, Disp: 4 capsule, Rfl: 3    omeprazole (PRILOSEC) 40 MG delayed release capsule, TAKE 1 CAPSULE BY MOUTH DAILY EVERY MORNING, Disp: 28 capsule, Rfl: 3    albuterol sulfate  (90 Base) MCG/ACT inhaler, INHALE 2 PUFFS BY MOUTH INTO THE LUNGS ONCE EVERY 6 HOURS AS NEEDED FOR WHEEZING, Disp: 18 g, Rfl: 3    Handicap Placard MISC, by Does not apply route DISABLED PARKING PERMIT AUTHORIZATION Routine   Qty-1  The patient has the following condition(s) which qualifies him/her for disabled parking: Walking severely limited due to orthopedic condition   Privilege Duration: Temporary for 3 months, Disp: 1 each, Rfl: 0    aspirin 325 MG EC tablet, Take 1 tablet by mouth daily, Disp: 42 tablet, Rfl: 0    ketotifen (ZADITOR) 0.025 % ophthalmic solution, USE 1 DROP INTO BOTH EYES TWICE DAILY, Disp: 10 mL, Rfl: 1    vitamin D (ERGOCALCIFEROL) 1.25 MG (83485 UT) CAPS capsule, TAKE 1 CAPSULE BY MOUTH EVERY WEEK ON THURSDAYS, Disp: 4 capsule, Rfl: 3    fluticasone (FLONASE) 50 MCG/ACT nasal spray, INSTILL 2 SPRAYS INTO EACH NOSTRIL ONCE DAILY, Disp: 16 g, Rfl: 3    miSOPROStol (CYTOTEC) 200 MCG tablet, Take 200 mcg by mouth daily, Disp: , Rfl:     senna-docusate (PERICOLACE) 8.6-50 MG per tablet, Take 1 tablet by mouth daily as needed for Constipation (Patient taking differently: Take 1 tablet by mouth 2 times daily ), Disp: 30 tablet, Rfl: 1    Multiple Vitamins-Minerals (CULTURELLE PROBIOTICS + MULTIV) CHEW, Take 1 tablet by mouth daily, Disp: 30 tablet, Rfl: 2    MI-ACID GAS RELIEF 80 MG chewable tablet, CHEW 1 TABLET BY MOUTH FOUR TIMES DAILY AS NEEDED FOR FLATULENCE, Disp: 120 tablet, Rfl: 5    CRANBERRY CONCENTRATE 500 MG CAPS, TAKE 1 CAPSULE BY MOUTH TWICE DAILY, Disp: 56 capsule, Rfl: 3    metoprolol succinate (TOPROL XL) 25 MG extended release tablet, Take 25 mg by mouth daily , Disp: , Rfl:     PREMARIN 0.625 MG/GM vaginal cream, PLACE 2 GRAMS VAGINALLY TWICE A WEEK FOR 12 DOSES, Disp: 30 g, Rfl: 1     Allergies:    Shellfish allergy, Shrimp (diagnostic), Seasonal, Famotidine, and Gabapentin    Family History:  family history includes Colon Cancer in her mother; Crohn's Disease in her niece; Heart Attack in her brother; Heart Disease in her brother; High Blood Pressure in her father.     Social History:   Social History     Occupational History    Not on file   Tobacco Use    Smoking status: Former Smoker     Packs/day: 2.00     Years: 0.50     Pack years: 1.00     Types: Cigarettes Quit date: 18     Years since quittin.4    Smokeless tobacco: Never Used   Vaping Use    Vaping Use: Never used   Substance and Sexual Activity    Alcohol use: Yes     Alcohol/week: 0.0 standard drinks     Comment: social    Drug use: No    Sexual activity: Not on file       OBJECTIVE:  Resp 12   Ht 5' 1\" (1.549 m)   Wt 180 lb (81.6 kg)   BMI 34.01 kg/m²    Psych: alert and oriented to person, time, and place  Cardio:  well perfused extremities  Resp:  normal respiratory effort  Skin:  no cyanosis  Hem/lymph:  no lymphedema  Neuro:  sensation to light touch unchanged since last visit  Musculoskeletal:    RLE:  Vascular: Limb well perfused, compartments soft/compressible. Skin: No erythema/ulcers. Intact. Neurovascular Status:  Grossly neurovascularly intact throughout   Tenderness to Palpation: Plantar heel  -Gastroc equinus      LLE:  Incisions clean/dry/intact, no erythema/dehiscence/drainage  Sensation to light touch grossly intact throughout, but altered sensation on the dorsal aspect of her foot  Warm and well perfused  Grossly neurovascularly intact distally  No signs of infection  No calf swelling/tenderness  -Instability:  Talar tilt: Negative; Anterior drawer: Negative  -No peroneal subluxation/dislocation with dorsiflexion + eversion or with circumduction  Tenderness to Palpation:  anteromedial and anterolateral knee joint line        RADIOLOGY:   2022 FINDINGS:  Three weightbearing views (AP, Mortise, and Lateral) of the right ankle and three weightbearing views (AP, Oblique, Lateral) of the right foot were obtained in the office today and reviewed, revealing no acute fracture, dislocation, or radioopaque foreign body/tumor. The ankle mortise is maintained with no widening of the clear spaces. Overall alignment is satisfactory. Calcification of the plantar aspect of the calcaneus. IMPRESSION:  No acute fracture/dislocation.     Electronically signed by Gini Patel MD            FINDINGS:  Four weightbearing views (AP, Tunnel, Lateral, and Sunrise) of the left knee were obtained in the office today and reviewed, revealing no acute fracture, dislocation, or radioopaque foreign body/tumor. Overall alignment is satisfactory. Tricompartmental degenerative changes of the knee with joint narrowing, sclerosis, and osteophytes. Retained hardware in the proximal tibia. IMPRESSION: No acute fracture/dislocation. Degenerative changes as above. Electronically signed by Josef Sood MD      ASSESSMENT AND PLAN:  Body mass index is 34.01 kg/m². She has right foot pain secondary to plantar fasciitis with underlying gastroc equinus contracture. She also has a history of left ankle instability, status post left ankle lateral ligamentous stabilization, with peroneal tendon tenolysis, and gastroc recession. 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 Notably, she has no relevant past medical history. We had a discussion today about the likely diagnosis and its natural history, physical exam and imaging findings, as well as various treatment options in detail. Surgically, we discussed a possible right gastroc recession, depending on her clinical course. At today's visit, as she has not tried any conservative management, I did recommend beginning with nonsurgical treatments. Surgically, we have also previously discussed a possible future left knee arthroscopic meniscal debridement, depending on her clinical course. Orders/referrals were placed as below at today's visit. The patient was provided a night splint, and she will use this every night for 6 weeks. I referred the patient to physical therapy for gastroc and plantar fascia stretching. The patient was provided heel cups, to use while ambulatory for pain control.  I provided a prescription for Voltaren (4g TOPL q QID PRN pain). -After discussing her treatment options, she wished to proceed with a right plantar fascia injection as below. All questions were answered and the above plan was agreed upon. The patient will return to clinic in 3 months without x-rays. At her next visit, depending on how she is doing, we may consider continuing conservative management for her right plantar fasciitis, and we may also consider an MRI of her left knee in the future with possible subsequent injection versus surgery. PLANTAR FASCIA INJECTION PROCEDURE NOTE: After discussing the risks/benefits/alternatives to injection, an informed consent was obtained. The right plantar fascia was verified as the correct location and allergies were reviewed. The skin overlying the injection site was cleaned with an alcohol swab followed by a local sterile prep. A 25 gauge needle was introduced into the above location under sterile conditions. A mixture of 40 mg of Kenalog and 2 mL of 1% Lidocaine without epinephrine was injected. The patient was noted to tolerate the procedure well without immediate complication. A dressing was applied and verbal instruction/education was provided. At the patient's next visit, depending on how the patient is doing and/or new imaging/labs results, we may consider the following options:    []  Orthotic (OTC)     []  Orthotic (custom)          []  Rocker bottom shoes     []  Brace (OTC)        []  Brace (custom)             []  CAM boot        []  Night splint         []  Heel cups        []  Strap      []  Toe sleeves/splints    []  PT:                     []  Wean out of immobilization   []  Advance activity       []  Topical               []  NSAIDs          []  Ronni         []  Referral:         []  Stress xrays       []  CT         []  MRI        []  Injection:         []  Consider OR      []  Pick OR date    No follow-ups on file.     No orders of the defined types were placed in this encounter. No orders of the defined types were placed in this encounter. Fannie Duarte MD  Orthopedic Surgery        Please excuse any typos/errors, as this note was created with the assistance of voice recognition software. While intending to generate a document that actually reflects the content of the visit, the document can still have some errors including those of syntax and sound-a-like substitutions which may escape proof reading. In such instances, actual meaning can be extrapolated by context.

## 2022-06-30 DIAGNOSIS — K21.9 GASTROESOPHAGEAL REFLUX DISEASE, UNSPECIFIED WHETHER ESOPHAGITIS PRESENT: ICD-10-CM

## 2022-07-01 RX ORDER — OMEPRAZOLE 40 MG/1
CAPSULE, DELAYED RELEASE ORAL
Qty: 28 CAPSULE | Refills: 3 | Status: SHIPPED | OUTPATIENT
Start: 2022-07-01 | End: 2022-09-26

## 2022-07-15 ENCOUNTER — APPOINTMENT (OUTPATIENT)
Dept: GENERAL RADIOLOGY | Age: 62
End: 2022-07-15
Payer: COMMERCIAL

## 2022-07-15 ENCOUNTER — HOSPITAL ENCOUNTER (EMERGENCY)
Age: 62
Discharge: HOME | End: 2022-07-15
Attending: STUDENT IN AN ORGANIZED HEALTH CARE EDUCATION/TRAINING PROGRAM
Payer: COMMERCIAL

## 2022-07-15 VITALS
SYSTOLIC BLOOD PRESSURE: 125 MMHG | TEMPERATURE: 98.3 F | DIASTOLIC BLOOD PRESSURE: 75 MMHG | HEART RATE: 93 BPM | OXYGEN SATURATION: 97 % | RESPIRATION RATE: 18 BRPM

## 2022-07-15 DIAGNOSIS — R07.9 CHEST PAIN, UNSPECIFIED TYPE: Primary | ICD-10-CM

## 2022-07-15 DIAGNOSIS — F41.1 ANXIETY STATE: ICD-10-CM

## 2022-07-15 LAB
ABSOLUTE EOS #: 0.18 K/UL (ref 0–0.44)
ABSOLUTE IMMATURE GRANULOCYTE: 0.02 K/UL (ref 0–0.3)
ABSOLUTE LYMPH #: 2.26 K/UL (ref 1.1–3.7)
ABSOLUTE MONO #: 0.44 K/UL (ref 0.1–1.2)
ALBUMIN SERPL-MCNC: 4.1 G/DL (ref 3.5–5.2)
ALP BLD-CCNC: 79 U/L (ref 35–104)
ALT SERPL-CCNC: 25 U/L (ref 5–33)
ANION GAP SERPL CALCULATED.3IONS-SCNC: 12 MMOL/L (ref 9–17)
AST SERPL-CCNC: 18 U/L
BASOPHILS # BLD: 1 % (ref 0–2)
BASOPHILS ABSOLUTE: 0.06 K/UL (ref 0–0.2)
BILIRUB SERPL-MCNC: 0.13 MG/DL (ref 0.3–1.2)
BUN BLDV-MCNC: 24 MG/DL (ref 8–23)
BUN/CREAT BLD: 29 (ref 9–20)
CALCIUM SERPL-MCNC: 9.3 MG/DL (ref 8.6–10.4)
CHLORIDE BLD-SCNC: 104 MMOL/L (ref 98–107)
CO2: 25 MMOL/L (ref 20–31)
CREAT SERPL-MCNC: 0.82 MG/DL (ref 0.5–0.9)
EKG ATRIAL RATE: 84 BPM
EKG P AXIS: 70 DEGREES
EKG P-R INTERVAL: 132 MS
EKG Q-T INTERVAL: 374 MS
EKG QRS DURATION: 76 MS
EKG QTC CALCULATION (BAZETT): 441 MS
EKG R AXIS: 33 DEGREES
EKG T AXIS: 65 DEGREES
EKG VENTRICULAR RATE: 84 BPM
EOSINOPHILS RELATIVE PERCENT: 2 % (ref 1–4)
GFR AFRICAN AMERICAN: >60 ML/MIN
GFR NON-AFRICAN AMERICAN: >60 ML/MIN
GFR SERPL CREATININE-BSD FRML MDRD: ABNORMAL ML/MIN/{1.73_M2}
GLUCOSE BLD-MCNC: 115 MG/DL (ref 70–99)
HCT VFR BLD CALC: 42.3 % (ref 36.3–47.1)
HEMOGLOBIN: 13.3 G/DL (ref 11.9–15.1)
IMMATURE GRANULOCYTES: 0 %
LYMPHOCYTES # BLD: 29 % (ref 24–43)
MCH RBC QN AUTO: 28.9 PG (ref 25.2–33.5)
MCHC RBC AUTO-ENTMCNC: 31.4 G/DL (ref 28.4–34.8)
MCV RBC AUTO: 91.8 FL (ref 82.6–102.9)
MONOCYTES # BLD: 6 % (ref 3–12)
MYOGLOBIN: 29 NG/ML (ref 25–58)
NRBC AUTOMATED: 0 PER 100 WBC
PDW BLD-RTO: 13 % (ref 11.8–14.4)
PLATELET # BLD: 277 K/UL (ref 138–453)
PMV BLD AUTO: 11.4 FL (ref 8.1–13.5)
POTASSIUM SERPL-SCNC: 3.6 MMOL/L (ref 3.7–5.3)
PRO-BNP: 99 PG/ML
RBC # BLD: 4.61 M/UL (ref 3.95–5.11)
SARS-COV-2, RAPID: NOT DETECTED
SEG NEUTROPHILS: 62 % (ref 36–65)
SEGMENTED NEUTROPHILS ABSOLUTE COUNT: 4.93 K/UL (ref 1.5–8.1)
SODIUM BLD-SCNC: 141 MMOL/L (ref 135–144)
SPECIMEN DESCRIPTION: NORMAL
TOTAL PROTEIN: 6.3 G/DL (ref 6.4–8.3)
TROPONIN, HIGH SENSITIVITY: 6 NG/L (ref 0–14)
WBC # BLD: 7.9 K/UL (ref 3.5–11.3)

## 2022-07-15 PROCEDURE — 84484 ASSAY OF TROPONIN QUANT: CPT

## 2022-07-15 PROCEDURE — 83874 ASSAY OF MYOGLOBIN: CPT

## 2022-07-15 PROCEDURE — 80053 COMPREHEN METABOLIC PANEL: CPT

## 2022-07-15 PROCEDURE — 71045 X-RAY EXAM CHEST 1 VIEW: CPT

## 2022-07-15 PROCEDURE — 85025 COMPLETE CBC W/AUTO DIFF WBC: CPT

## 2022-07-15 PROCEDURE — 83880 ASSAY OF NATRIURETIC PEPTIDE: CPT

## 2022-07-15 PROCEDURE — 87635 SARS-COV-2 COVID-19 AMP PRB: CPT

## 2022-07-15 PROCEDURE — 99283 EMERGENCY DEPT VISIT LOW MDM: CPT

## 2022-07-15 PROCEDURE — 93005 ELECTROCARDIOGRAM TRACING: CPT

## 2022-07-15 ASSESSMENT — PAIN SCALES - GENERAL: PAINLEVEL_OUTOF10: 6

## 2022-07-15 ASSESSMENT — PAIN - FUNCTIONAL ASSESSMENT: PAIN_FUNCTIONAL_ASSESSMENT: 0-10

## 2022-07-15 ASSESSMENT — PAIN DESCRIPTION - LOCATION: LOCATION: HEAD

## 2022-07-15 NOTE — ED NOTES
Charting done during downtime. Paperwork, results on chart. Pt arrived in ER at 0130.            Clearance TAURUS Lemus  07/15/22 [id number]

## 2022-07-15 NOTE — DISCHARGE INSTRUCTIONS
Take your medication as indicated. For pain use ibuprofen (Motrin / Advil) or acetaminophen (Tylenol), unless prescribed medications that have acetaminophen in it. You can take over the counter acetaminophen tablets (1 - 2 tablets of the 500-mg strength every 6 hours) or ibuprofen tablets (2 tablets every 4 hours). If you have not had a stress test in over a year your primary care physician may order this test as further work-up for your chest pain. If you have a cardiologist, then you should also call them to discuss further treatment options. PLEASE RETURN TO THE EMERGENCY DEPARTMENT IMMEDIATELY for worsening symptoms of increasing pain, shortness of breath, feeling of your heart fluttering or racing, swelling to your feet, unable to lay flat, or if you develop any concerning symptoms such as: high fever not relieved by acetaminophen (Tylenol) and/or ibuprofen (Motrin / Advil), chills, persistent nausea and/or vomiting, loss of consciousness, numbness, weakness or tingling in the arms or legs or change in color of the extremities, changes in mental status, persistent headache, blurry vision, loss of bladder / bowel control, unable to follow up with your physician, or other any other care or concern.

## 2022-07-17 ASSESSMENT — ENCOUNTER SYMPTOMS
COLOR CHANGE: 0
ABDOMINAL PAIN: 0
SHORTNESS OF BREATH: 0
EYE REDNESS: 0
EYE DISCHARGE: 0

## 2022-07-17 NOTE — ED PROVIDER NOTES
[facility] ED  Emergency Department Encounter     Pt Name: Marty Kurtz  MRN: [id number]  [place] 1960  Date of evaluation: 22  PCP:  Arnaldo Hunter MD    CHIEF COMPLAINT       Chief Complaint   Patient presents with    Chest Pain    Headache    Fatigue    Anxiety       HISTORY OFPRESENT ILLNESS  (Location/Symptom, Timing/Onset, Context/Setting, Quality, Duration, Modifying [place].)      Marty Kurtz is a 65 y.o. female who presents with chest pain, headache, fatigue as well as history anxiety. States this started today. Has been ongoing all day. Intermittent. Substernal.  Anterior chest.  Worse with palpation. Worse with coughing. Denying previous cardiac history she is aware of. PAST MEDICAL / SURGICAL / SOCIAL / FAMILY HISTORY      has a past medical history of Arthritis, ASCUS with positive high risk HPV cervical, Asthma, Depression, Diverticulitis, ESBL (extended spectrum beta-lactamase) producing bacteria infection, GERD (gastroesophageal reflux disease), Hyperlipidemia, MRSA (methicillin resistant staph aureus) culture positive, Rectocele, and Tachycardia. has a past surgical history that includes Hysterectomy (); knee surgery (Left); Cystocopy (2015); Nerve Block (2017); Nerve Block (2017); Nerve Block (2017); Nerve Block (11/10/2017); Colonoscopy (N/A, 2018); Upper gastrointestinal endoscopy (2019); Knee arthroscopy (Left, 2019); and Ankle surgery (Left, 3/4/2021).     Social History     Socioeconomic History    Marital status:      Spouse name: Not on file    Number of children: Not on file    Years of education: Not on file    Highest education level: Not on file   Occupational History    Not on file   Tobacco Use    Smoking status: Former     Packs/day: 2.00     Years: 0.50     Pack years: 1.00     Types: Cigarettes     Quit date: 18     Years since quittin.5    Smokeless tobacco: Never   Vaping Use    Vaping Use: Never used   Substance and Sexual Activity    Alcohol use: Yes     Alcohol/week: 0.0 standard drinks     Comment: social    Drug use: No    Sexual activity: Not on file   Other Topics Concern    Not on file   Social History Narrative    Not on file     Social Determinants of Health     Financial Resource Strain: Low Risk     Difficulty of Paying Living Expenses: Not hard at all   Food Insecurity: No Food Insecurity    Worried About Running Out of Food in the Last Year: Never true    Ran Out of Food in the Last Year: Never true   Transportation Needs: Not on file   Physical Activity: Not on file   Stress: Not on file   Social Connections: Not on file   Intimate Partner Violence: Not on file   Housing Stability: Not on file       Family History   Problem Relation Age of Onset    Colon Cancer Mother     High Blood Pressure Father     Heart Attack Brother     Heart Disease Brother     Crohn's Disease Niece        Allergies:  Shellfish allergy, Shrimp (diagnostic), Seasonal, Famotidine, and Gabapentin    Home Medications:  Prior to Admission medications    Medication Sig Start Date End Date Taking?  Authorizing Provider   diclofenac (VOLTAREN) 50 MG EC tablet TAKE 1 TABLET BY MOUTH TWICE DAILY 7/1/22   Angelica Gardiner MD   omeprazole (PRILOSEC) 40 MG delayed release capsule TAKE 1 CAPSULE BY MOUTH DAILY EVERY MORNING 7/1/22   Angelica Gardiner MD   diclofenac sodium (VOLTAREN) 1 % GEL Apply 4 g topically 4 times daily as needed for Pain 6/23/22   Robin Monday, MD   pravastatin (PRAVACHOL) 40 MG tablet TAKE 1 TABLET BY MOUTH DAILY 5/9/22 6/8/22  Angelica Gardiner MD   busPIRone (BUSPAR) 15 MG tablet TAKE 1 TABLET BY MOUTH TWICE DAILY 5/9/22   Angelica Gardiner MD   azithromycin (ZITHROMAX) 250 MG tablet  3/1/22   Historical Provider, MD   benzonatate (TESSALON) 100 MG capsule  3/1/22   Historical Provider, MD   venlafaxine (EFFEXOR XR) 75 MG extended release capsule TAKE 1 CAPSULE BY MOUTH DAILY 3/8/22 4/7/22  Carlos Robles MD   vitamin D (ERGOCALCIFEROL) 1.25 MG (31175 UT) CAPS capsule TAKE 1 CAPSULE BY MOUTH EVERY WEEK 3/8/22   Carlos Robles MD   albuterol sulfate  (90 Base) MCG/ACT inhaler INHALE 2 PUFFS BY MOUTH INTO THE LUNGS ONCE EVERY 6 HOURS AS NEEDED FOR WHEEZING 6/14/21   Alexandria Cosby MD   Handicap [place] MISC by Does not apply route DISABLED PARKING PERMIT AUTHORIZATION  Routine     Qty-1    The patient has the following condition(s) which qualifies him/her for disabled parking: Walking severely limited due to orthopedic condition     Privilege Duration: Temporary for 3 months 3/4/21   Jessica Nguyen MD   aspirin 325 MG EC tablet Take 1 tablet by mouth daily 3/4/21 4/15/21  Jessica Nguyen MD   ketotifen (ZADITOR) 0.025 % ophthalmic solution USE 1 DROP INTO BOTH EYES TWICE DAILY 1/19/21   Carlos Robles MD   vitamin D (ERGOCALCIFEROL) 1.25 MG (30131 UT) CAPS capsule TAKE 1 CAPSULE BY MOUTH EVERY WEEK ON Madhuri Buoy 1/12/21   Carlos Robles MD   fluticasone (FLONASE) 50 MCG/ACT nasal spray INSTILL 2 SPRAYS INTO EACH NOSTRIL ONCE DAILY 1/11/21   Carlos Robles MD   miSOPROStol (CYTOTEC) 200 MCG tablet Take 200 mcg by mouth daily    Historical Provider, MD   senna-docusate (Judi Garciaffer) 8.6-50 MG per tablet Take 1 tablet by mouth daily as needed for Constipation  Patient taking differently: Take 1 tablet by mouth 2 times daily  2/26/20   Katie Motley MD   Multiple Vitamins-Minerals (CULTURELLE PROBIOTICS + MULTIV) CHEW Take 1 tablet by mouth daily 1/29/20   Katie Motley MD   MI-ACID GAS RELIEF 80 MG chewable tablet CHEW 1 TABLET BY MOUTH FOUR TIMES DAILY AS NEEDED FOR FLATULENCE 11/19/19   Katie Motley MD   CRANBERRY CONCENTRATE 500 MG CAPS TAKE 1 CAPSULE BY MOUTH TWICE DAILY 8/21/19   Carlos Robles MD   metoprolol succinate (TOPROL XL) 25 MG extended release tablet Take 25 mg by mouth daily     Historical Provider, MD   PREMARIN 0.625 MG/GM vaginal cream PLACE 2 GRAMS VAGINALLY TWICE A WEEK FOR 12 DOSES 9/21/17   Ana Fitch MD       REVIEW OF SYSTEMS    (2-9 systems for level 4, 10 or more for level 5)      Review of Systems   Constitutional:  Positive for fatigue. Negative for chills and fever. Eyes:  Negative for discharge and redness. Respiratory:  Negative for shortness of breath. Cardiovascular:  Positive for chest pain. Gastrointestinal:  Negative for abdominal pain. Genitourinary:  Negative for flank pain. Musculoskeletal:  Negative for myalgias. Skin:  Negative for color change and rash. Allergic/Immunologic: Negative for environmental allergies. Neurological:  Positive for headaches. Psychiatric/Behavioral:  Negative for agitation and confusion. The patient is nervous/anxious. PHYSICAL EXAM   (up to 7 for level 4, 8 or more for level 5)     INITIAL VITALS:    oral temperature is 98.3 °F (36.8 °C). Her blood pressure is 125/75 and her pulse is 93. Her respiration is 18 and oxygen saturation is 97%. Physical Exam  Vitals and nursing note reviewed. Constitutional:       Appearance: She is well-developed. HENT:      Head: Normocephalic and atraumatic. Eyes:      General: No scleral icterus. Conjunctiva/sclera: Conjunctivae normal.      Pupils: Pupils are equal, round, and reactive to light. Cardiovascular:      Rate and Rhythm: Normal rate and regular rhythm. Heart sounds: Normal heart sounds. No murmur heard. No friction rub. No gallop. Pulmonary:      Effort: Pulmonary effort is normal. No respiratory distress. Breath sounds: Normal breath sounds. No wheezing or rales. Chest:      Chest wall: Tenderness present. Musculoskeletal:         General: Normal range of motion. Skin:     General: Skin is warm and dry. Findings: No erythema or rash. Neurological:      Mental Status: She is alert and oriented to person, place, and time.    Psychiatric:         Behavior: Behavior normal. follow up and prescription information listed in the Disposition section. Patient states they will follow-up with primary care physician and/or return to the emergency department should they experience a change or worsening of symptoms. Patient will be discharged with resources: summary of visit, instructions, follow-up information, prescriptions if necessary. Patient/ family instructed to read discharge paperwork. All of their questions and concerns were addressed. Suspicion for any acute life-threatening processes is low. Patient voices understanding of plan. PROCEDURES:  None    CONSULTS:  None    CRITICAL CARE:  0    FINAL IMPRESSION      1. Chest pain, unspecified type    2.  Anxiety state          DISPOSITION / PLAN     DISPOSITION Decision To Discharge 07/15/2022 04:03:22 AM    Discharge    PATIENTREFERRED TO:  Rajendra Topete MD  [address] [place] [address]  [phone]    Schedule an appointment as soon as possible for a visit in 1 week      DISCHARGE MEDICATIONS:  Discharge Medication List as of 7/15/2022  4:18 AM          Conchita Harrison DO  EmergencyMedicine Attending    (Please note that portions of this note were completed with a voice recognition program.  Efforts were made to edit the dictations but occasionally words are mis-transcribed.)       Conchita Harrison DO  07/17/22 0044

## 2022-07-19 DIAGNOSIS — F41.9 ANXIETY: ICD-10-CM

## 2022-07-19 DIAGNOSIS — M62.838 MUSCLE SPASM: ICD-10-CM

## 2022-07-19 DIAGNOSIS — E55.9 VITAMIN D DEFICIENCY: ICD-10-CM

## 2022-07-20 RX ORDER — CHLORZOXAZONE 500 MG/1
TABLET ORAL
Qty: 28 TABLET | Refills: 3 | Status: SHIPPED | OUTPATIENT
Start: 2022-07-20 | End: 2022-10-20

## 2022-07-20 RX ORDER — VENLAFAXINE HYDROCHLORIDE 75 MG/1
CAPSULE, EXTENDED RELEASE ORAL
Qty: 28 CAPSULE | Refills: 3 | Status: SHIPPED | OUTPATIENT
Start: 2022-07-20 | End: 2022-10-20

## 2022-07-20 RX ORDER — ERGOCALCIFEROL 1.25 MG/1
CAPSULE ORAL
Qty: 4 CAPSULE | Refills: 3 | Status: SHIPPED | OUTPATIENT
Start: 2022-07-20 | End: 2022-10-20

## 2022-07-27 DIAGNOSIS — M72.2 PLANTAR FASCIITIS: ICD-10-CM

## 2022-08-03 ENCOUNTER — OFFICE VISIT (OUTPATIENT)
Dept: FAMILY MEDICINE CLINIC | Age: 62
End: 2022-08-03
Payer: COMMERCIAL

## 2022-08-03 VITALS
DIASTOLIC BLOOD PRESSURE: 80 MMHG | HEART RATE: 77 BPM | BODY MASS INDEX: 35.45 KG/M2 | OXYGEN SATURATION: 97 % | WEIGHT: 187.6 LBS | TEMPERATURE: 97.3 F | SYSTOLIC BLOOD PRESSURE: 132 MMHG

## 2022-08-03 DIAGNOSIS — K21.9 GASTROESOPHAGEAL REFLUX DISEASE, UNSPECIFIED WHETHER ESOPHAGITIS PRESENT: Primary | ICD-10-CM

## 2022-08-03 DIAGNOSIS — Z12.31 ENCOUNTER FOR SCREENING MAMMOGRAM FOR MALIGNANT NEOPLASM OF BREAST: ICD-10-CM

## 2022-08-03 DIAGNOSIS — J30.2 SEASONAL ALLERGIES: ICD-10-CM

## 2022-08-03 PROCEDURE — 1036F TOBACCO NON-USER: CPT | Performed by: FAMILY MEDICINE

## 2022-08-03 PROCEDURE — 3017F COLORECTAL CA SCREEN DOC REV: CPT | Performed by: FAMILY MEDICINE

## 2022-08-03 PROCEDURE — 99213 OFFICE O/P EST LOW 20 MIN: CPT | Performed by: FAMILY MEDICINE

## 2022-08-03 PROCEDURE — G8417 CALC BMI ABV UP PARAM F/U: HCPCS | Performed by: FAMILY MEDICINE

## 2022-08-03 PROCEDURE — G8427 DOCREV CUR MEDS BY ELIG CLIN: HCPCS | Performed by: FAMILY MEDICINE

## 2022-08-03 RX ORDER — LANSOPRAZOLE 30 MG/1
30 CAPSULE, DELAYED RELEASE ORAL DAILY
Qty: 30 CAPSULE | Refills: 3 | Status: ON HOLD
Start: 2022-08-03 | End: 2022-11-01 | Stop reason: HOSPADM

## 2022-08-03 RX ORDER — ALBUTEROL SULFATE 90 UG/1
AEROSOL, METERED RESPIRATORY (INHALATION)
Qty: 18 G | Refills: 3 | Status: SHIPPED | OUTPATIENT
Start: 2022-08-03 | End: 2022-10-20

## 2022-08-03 RX ORDER — EPINEPHRINE 0.3 MG/.3ML
0.3 INJECTION SUBCUTANEOUS ONCE
Qty: 0.3 ML | Refills: 0 | Status: SHIPPED | OUTPATIENT
Start: 2022-08-03 | End: 2022-08-03

## 2022-08-03 ASSESSMENT — PATIENT HEALTH QUESTIONNAIRE - PHQ9
SUM OF ALL RESPONSES TO PHQ QUESTIONS 1-9: 6
SUM OF ALL RESPONSES TO PHQ9 QUESTIONS 1 & 2: 2
1. LITTLE INTEREST OR PLEASURE IN DOING THINGS: 0
4. FEELING TIRED OR HAVING LITTLE ENERGY: 2
8. MOVING OR SPEAKING SO SLOWLY THAT OTHER PEOPLE COULD HAVE NOTICED. OR THE OPPOSITE, BEING SO FIGETY OR RESTLESS THAT YOU HAVE BEEN MOVING AROUND A LOT MORE THAN USUAL: 0
6. FEELING BAD ABOUT YOURSELF - OR THAT YOU ARE A FAILURE OR HAVE LET YOURSELF OR YOUR FAMILY DOWN: 0
5. POOR APPETITE OR OVEREATING: 0
9. THOUGHTS THAT YOU WOULD BE BETTER OFF DEAD, OR OF HURTING YOURSELF: 0
3. TROUBLE FALLING OR STAYING ASLEEP: 2
7. TROUBLE CONCENTRATING ON THINGS, SUCH AS READING THE NEWSPAPER OR WATCHING TELEVISION: 0
2. FEELING DOWN, DEPRESSED OR HOPELESS: 2
SUM OF ALL RESPONSES TO PHQ QUESTIONS 1-9: 6
10. IF YOU CHECKED OFF ANY PROBLEMS, HOW DIFFICULT HAVE THESE PROBLEMS MADE IT FOR YOU TO DO YOUR WORK, TAKE CARE OF THINGS AT HOME, OR GET ALONG WITH OTHER PEOPLE: 0

## 2022-08-03 NOTE — PROGRESS NOTES
General FM note    Lucita Chávez is a 65 y.o. female who presents today for follow up on her  medical conditions as noted below. Lucita Chávez is c/o of   Chief Complaint   Patient presents with    Chest Pain     7/15/22 ED [facility]- anxiety, headache    Headache       Patient Active Problem List:     Major depression     Rotator cuff disorder     Hyperlipidemia     Incontinence in female     Rectocele     Diverticulitis     Asthma     Hypokalemia     Cellulitis, face - left side, failed outpatient therapy     Hyperglycemia     Mild intermittent asthma without complication     Gastroesophageal reflux disease without esophagitis     Displacement of lumbar intervertebral disc without myelopathy     Chest pain     Dyspepsia     Acute medial meniscus tear of left knee     Dry eyes, bilateral     Insufficiency of tear film of both eyes     Instability of left ankle joint     Past Medical History:   Diagnosis Date    Arthritis     ASCUS with positive high risk HPV cervical 3/22/16    Asthma     Depression     Diverticulitis     ESBL (extended spectrum beta-lactamase) producing bacteria infection 02/03/2019    E.  Coli urine    GERD (gastroesophageal reflux disease)     Hyperlipidemia     MRSA (methicillin resistant staph aureus) culture positive 4/18/2016    lip    Rectocele     Tachycardia     takes Metoprolol      Past Surgical History:   Procedure Laterality Date    ANKLE SURGERY Left 3/4/2021    Left ankle lateral ligamentous repair, Left peroneus brevis debridement, Left peroneus longus tenolysis, Left leg gastroc recession, performed through separate incision  performed by Fanta Gonzalez MD at [facility] 36 N/A 7/19/2018    COLONOSCOPY performed by Carmen Jenkins IV, DO at [address] [place] [address]  2/25/2015    uro    HYSTERECTOMY (CERVIX STATUS UNKNOWN)  1996    RYAN, BSO performed at [facility] by Dr. Arlene Martin, Also had some type of bladder lift at this time    KNEE ARTHROSCOPY Left 5/13/2019 KNEE ARTHROSCOPY performed by Nika Su MD at [facility] Left     #1 - lateral release, #2 - arthroscopy with muscle alignment    NERVE BLOCK  07/28/2017    caudal #1  decadron 10mg    NERVE BLOCK  09/22/2017    cadal # 2, decadron 10 mg    NERVE BLOCK  09/22/2017    caudal epidural steroid block #2 decadron 10 mg    NERVE BLOCK  11/10/2017    lumbar L4-5 epidural; depomedrol 80mg    UPPER GASTROINTESTINAL ENDOSCOPY  1/30/2019    EGD BIOPSY performed by Anca Rodgers MD at [facility] History   Problem Relation Age of Onset    Colon Cancer Mother     High Blood Pressure Father     Heart Attack Brother     Heart Disease Brother     Crohn's Disease Niece      Current Outpatient Medications   Medication Sig Dispense Refill    lansoprazole (PREVACID) 30 MG delayed release capsule Take 1 capsule by mouth in the morning.  30 capsule 3    albuterol sulfate HFA (PROVENTIL;VENTOLIN;PROAIR) 108 (90 Base) MCG/ACT inhaler INHALE 2 PUFFS BY MOUTH INTO THE LUNGS ONCE EVERY 6 HOURS AS NEEDED FOR WHEEZING 18 g 3    EPINEPHrine (EPIPEN 2-VIOLET) 0.3 MG/0.3ML SOAJ injection Inject 0.3 mLs into the muscle once for 1 dose Use as directed for allergic reaction 0.3 mL 0    diclofenac sodium (VOLTAREN) 1 % GEL Apply 4 g topically 4 times daily as needed for Pain 200 g 0    chlorzoxazone (PARAFON FORTE) 500 MG tablet TAKE 1/2 TABLET BY MOUTH TWICE DAILY AS NEEDED 28 tablet 3    vitamin D (ERGOCALCIFEROL) 1.25 MG (04354 UT) CAPS capsule TAKE 1 CAPSULE BY MOUTH EVERY WEEK 4 capsule 3    venlafaxine (EFFEXOR XR) 75 MG extended release capsule DTAKE 1 CAPSULE BY MOUTH DAILY 28 capsule 3    diclofenac (VOLTAREN) 50 MG EC tablet TAKE 1 TABLET BY MOUTH TWICE DAILY 56 tablet 2    omeprazole (PRILOSEC) 40 MG delayed release capsule TAKE 1 CAPSULE BY MOUTH DAILY EVERY MORNING 28 capsule 3    diclofenac sodium (VOLTAREN) 1 % GEL Apply 4 g topically 4 times daily as needed for Pain 200 g 0    busPIRone (BUSPAR) 15 MG tablet TAKE 1 TABLET BY MOUTH TWICE DAILY 56 tablet 3    azithromycin (ZITHROMAX) 250 MG tablet       benzonatate (TESSALON) 100 MG capsule       Handicap Placard MISC by Does not apply route DISABLED PARKING PERMIT AUTHORIZATION  Routine     Qty-1    The patient has the following condition(s) which qualifies him/her for disabled parking: Walking severely limited due to orthopedic condition     Privilege Duration: Temporary for 3 months 1 each 0    ketotifen (ZADITOR) 0.025 % ophthalmic solution USE 1 DROP INTO BOTH EYES TWICE DAILY 10 mL 1    vitamin D (ERGOCALCIFEROL) 1.25 MG (65885 UT) CAPS capsule TAKE 1 CAPSULE BY MOUTH EVERY WEEK ON THURSDAYS 4 capsule 3    fluticasone (FLONASE) 50 MCG/ACT nasal spray INSTILL 2 SPRAYS INTO EACH NOSTRIL ONCE DAILY 16 g 3    miSOPROStol (CYTOTEC) 200 MCG tablet Take 200 mcg by mouth daily      senna-docusate (PERICOLACE) 8.6-50 MG per tablet Take 1 tablet by mouth daily as needed for Constipation (Patient taking differently: Take 1 tablet by mouth in the morning and 1 tablet before bedtime.) 30 tablet 1    Multiple Vitamins-Minerals (CULTURELLE PROBIOTICS + MULTIV) CHEW Take 1 tablet by mouth daily 30 tablet 2    MI-ACID GAS RELIEF 80 MG chewable tablet CHEW 1 TABLET BY MOUTH FOUR TIMES DAILY AS NEEDED FOR FLATULENCE 120 tablet 5    CRANBERRY CONCENTRATE 500 MG CAPS TAKE 1 CAPSULE BY MOUTH TWICE DAILY 56 capsule 3    metoprolol succinate (TOPROL XL) 25 MG extended release tablet Take 25 mg by mouth daily       PREMARIN 0.625 MG/GM vaginal cream PLACE 2 GRAMS VAGINALLY TWICE A WEEK FOR 12 DOSES 30 g 1    pravastatin (PRAVACHOL) 40 MG tablet TAKE 1 TABLET BY MOUTH DAILY 28 tablet 3    aspirin 325 MG EC tablet Take 1 tablet by mouth daily 42 tablet 0     No current facility-administered medications for this visit.      ALLERGIES:    Allergies   Allergen Reactions    Shellfish Allergy     Shrimp (Diagnostic) Shortness Of Breath    Seasonal     Famotidine Other (See Comments) Headaches  Headaches  Headaches  Headaches  Headaches    Gabapentin Nausea Only     Mood swings, nausea. Mood swings, nausea. Mood swings, nausea. Mood swings, nausea. Mood swings, nausea. Social History     Tobacco Use    Smoking status: Former     Packs/day: 2.00     Years: 0.50     Pack years: 1.00     Types: Cigarettes     Quit date:      Years since quittin.6    Smokeless tobacco: Never   Substance Use Topics    Alcohol use: Yes     Alcohol/week: 0.0 standard drinks     Comment: social      Body mass index is 35.45 kg/m². /80   Pulse 77   Temp 97.3 °F (36.3 °C)   Wt 187 lb 9.6 oz (85.1 kg)   SpO2 97%   BMI 35.45 kg/m²     Subjective:      HPI    65 y.o. female coming today after she was seen in the ER on 7/15/2022 because of chest pains. She states that she made an appointment with a cardiologist already. She tells me that she has discomfort right in the middle at her chest very severe at times. The patient works at [employer]. She states that she has been eating a lot in as well as drinks carbonated drinks and eats jalapeno related foods. She has been taking the Prilosec 40 mg a day which has not helped. She has not seen a gastroenterologist.    Julienne Hernandez about a headaches at [facility] - started in 2022. \"Really hurts\" no add sx including nausea vomiting vision changes. Lasted 5 days last, aleve no help. Never had similar headaches before. The patient states she had 2 or 3 of these headaches. She also would like to have a EpiPen be because she is going on vacation with her family. She is very afraid that something could happen without having to have EpiPen. Review of Systems   Constitutional: Negative for fever and unexpected weight change. Pertinent items are noted in HPI. Objective:   Physical Exam  Constitutional: VS (see above). General appearance: normal development, habitus and attention, no deformities. No distress.   Eyes: normal conjunctiva and lids. CAV: RRR, no RMG. No edema lower extremities. Pulmo: CTA bilateral, no CWR. Skin: no rashes, lesions or ulcers. Musculoskeletal: normal gait. Nails: no clubbing or cyanosis. Psychiatric: alert and oriented to place, time and person. Normal mood and affect. Assessment:       Diagnosis Orders   1. Gastroesophageal reflux disease, unspecified whether esophagitis present        2. Encounter for screening mammogram for malignant neoplasm of breast  SESAR DIGITAL SCREEN W OR WO CAD BILATERAL      3. Seasonal allergies  albuterol sulfate HFA (PROVENTIL;VENTOLIN;PROAIR) 108 (90 Base) MCG/ACT inhaler          Plan:   It appears that the patient has very bad controlled acid reflux. She has been on a very not good diet occluding carbonated drinks fatty foods spicy foods. She will use the Prevacid 30 mg delayed release capsules daily instead of the Prilosec. She will follow-up with her gastroenterologist.  Jeff Dawkins ordered. Medications refilled. She will call for any changes. Return if symptoms worsen or fail to improve. Orders Placed This Encounter   Procedures    SESAR DIGITAL SCREEN W OR WO CAD BILATERAL     Standing Status:   Future     Standing Expiration Date:   10/3/2023     Scheduling Instructions:      May perform additional studies at the discretion of the radiologist: Breast US, breast MRI, imaging guided biopsy, cyst aspiration or MBI. Orders Placed This Encounter   Medications    lansoprazole (PREVACID) 30 MG delayed release capsule     Sig: Take 1 capsule by mouth in the morning.      Dispense:  30 capsule     Refill:  3    albuterol sulfate HFA (PROVENTIL;VENTOLIN;PROAIR) 108 (90 Base) MCG/ACT inhaler     Sig: INHALE 2 PUFFS BY MOUTH INTO THE LUNGS ONCE EVERY 6 HOURS AS NEEDED FOR WHEEZING     Dispense:  18 g     Refill:  3    EPINEPHrine (EPIPEN 2-VIOLET) 0.3 MG/0.3ML SOAJ injection     Sig: Inject 0.3 mLs into the muscle once for 1 dose Use as directed for allergic reaction Dispense:  0.3 mL     Refill:  0       Call or return to clinic prn if these symptoms worsen or fail to improve as anticipated. I have reviewed the instructions with the patient, answering all questions to patient's satisfaction. Verna Goodwin received counseling on the following healthy behaviors: nutrition, exercise, and medication adherence  Reviewed prior labs and health maintenance. Continue current medications, diet and exercise. Discussed use, benefit, and side effects of prescribed medications. Barriers to medication compliance addressed. Patient given educational materials - see patient instructions. All patient questions answered. Patient voiced understanding.       Electronically signed by Lou Brownlee MD on 8/3/2022 at 9:30 AM       (Please note that portions of this note were completed with a voice recognition program. Efforts were made to edit the dictations but occasionally words are mis-transcribed.)

## 2022-08-29 DIAGNOSIS — F41.9 ANXIETY: ICD-10-CM

## 2022-08-29 RX ORDER — BUSPIRONE HYDROCHLORIDE 15 MG/1
TABLET ORAL
Qty: 56 TABLET | Refills: 3 | Status: SHIPPED | OUTPATIENT
Start: 2022-08-29

## 2022-08-29 RX ORDER — PRAVASTATIN SODIUM 40 MG
40 TABLET ORAL DAILY
Qty: 28 TABLET | Refills: 3 | Status: ON HOLD
Start: 2022-08-29 | End: 2022-11-01 | Stop reason: HOSPADM

## 2022-08-29 NOTE — TELEPHONE ENCOUNTER
Last Visit:  8/3/2022     Next Visit Date:  Future Appointments   Date Time Provider Elaine Stuart   9/15/2022 10:15 AM STA DIAG MAMMO RM 3 ST[facility] MAMMO ST[facility] Radiolog       Health Maintenance   Topic Date Due    COVID-19 Vaccine (1) Never done    Pneumococcal 0-64 years Vaccine (1 - PCV) Never done    Shingles vaccine (1 of 2) Never done    Flu vaccine (1) 09/01/2022    Breast cancer screen  02/17/2023    Depression Monitoring  08/03/2023    Diabetes screen  01/14/2024    DTaP/Tdap/Td vaccine (2 - Td or Tdap) 06/07/2025    Lipids  10/12/2025    Colorectal Cancer Screen  07/19/2028    Hepatitis C screen  Completed    HIV screen  Completed    Hepatitis A vaccine  Aged Out    Hepatitis B vaccine  Aged Out    Hib vaccine  Aged Out    Meningococcal (ACWY) vaccine  Aged Out       Hemoglobin A1C (%)   Date Value   01/14/2021 5.6             ( goal A1C is < 7)   No results found for: LABMICR  LDL Cholesterol (mg/dL)   Date Value   10/12/2020 112   02/19/2015 79       (goal LDL is <100)   AST (U/L)   Date Value   07/15/2022 18     ALT (U/L)   Date Value   07/15/2022 25     BUN (mg/dL)   Date Value   07/15/2022 24 (H)     BP Readings from Last 3 Encounters:   08/03/22 132/80   07/15/22 125/75   03/16/22 121/73          (goal 120/80)    All Future Testing planned in [facility]  Lab Frequency Next Occurrence   SESAR DIGITAL SCREEN W OR WO CAD BILATERAL Once 08/03/2022               Patient Active Problem List:     Major depression     Rotator cuff disorder     Hyperlipidemia     Incontinence in female     Rectocele     Diverticulitis     Asthma     Hypokalemia     Cellulitis, face - left side, failed outpatient therapy     Hyperglycemia     Mild intermittent asthma without complication     Gastroesophageal reflux disease without esophagitis     Displacement of lumbar intervertebral disc without myelopathy     Chest pain     Dyspepsia     Acute medial meniscus tear of left knee     Dry eyes, bilateral     Insufficiency of tear film

## 2022-09-23 ENCOUNTER — HOSPITAL ENCOUNTER (EMERGENCY)
Age: 62
Discharge: HOME | End: 2022-09-23
Attending: EMERGENCY MEDICINE
Payer: COMMERCIAL

## 2022-09-23 VITALS
RESPIRATION RATE: 19 BRPM | TEMPERATURE: 98 F | SYSTOLIC BLOOD PRESSURE: 134 MMHG | HEART RATE: 84 BPM | DIASTOLIC BLOOD PRESSURE: 88 MMHG | OXYGEN SATURATION: 99 %

## 2022-09-23 DIAGNOSIS — M72.2 PLANTAR FASCIITIS OF RIGHT FOOT: Primary | ICD-10-CM

## 2022-09-23 PROCEDURE — 96372 THER/PROPH/DIAG INJ SC/IM: CPT

## 2022-09-23 PROCEDURE — 99284 EMERGENCY DEPT VISIT MOD MDM: CPT

## 2022-09-23 PROCEDURE — 6360000002 HC RX W HCPCS

## 2022-09-23 RX ORDER — KETOROLAC TROMETHAMINE 30 MG/ML
30 INJECTION, SOLUTION INTRAMUSCULAR; INTRAVENOUS ONCE
Status: DISCONTINUED | OUTPATIENT
Start: 2022-09-23 | End: 2022-09-23

## 2022-09-23 RX ORDER — KETOROLAC TROMETHAMINE 30 MG/ML
30 INJECTION, SOLUTION INTRAMUSCULAR; INTRAVENOUS ONCE
Status: COMPLETED | OUTPATIENT
Start: 2022-09-23 | End: 2022-09-23

## 2022-09-23 RX ORDER — IBUPROFEN 800 MG/1
800 TABLET ORAL 3 TIMES DAILY PRN
Qty: 30 TABLET | Refills: 0 | Status: SHIPPED | OUTPATIENT
Start: 2022-09-23

## 2022-09-23 RX ADMIN — KETOROLAC TROMETHAMINE 30 MG: 30 INJECTION, SOLUTION INTRAMUSCULAR at 15:32

## 2022-09-23 ASSESSMENT — PAIN SCALES - GENERAL: PAINLEVEL_OUTOF10: 8

## 2022-09-23 ASSESSMENT — ENCOUNTER SYMPTOMS
DIARRHEA: 0
VOMITING: 0
EYE PAIN: 0
SHORTNESS OF BREATH: 0
SORE THROAT: 0
BACK PAIN: 0
CONSTIPATION: 0
COUGH: 0
ABDOMINAL PAIN: 0
NAUSEA: 0

## 2022-09-23 ASSESSMENT — PAIN - FUNCTIONAL ASSESSMENT: PAIN_FUNCTIONAL_ASSESSMENT: 0-10

## 2022-09-23 NOTE — ED PROVIDER NOTES
[facility] ED     Emergency Department     Faculty Attestation    I performed a history and physical examination of the patient and discussed management with the resident. I reviewed the residents note and agree with the documented findings and plan of care. Any areas of disagreement are noted on the chart. I was personally present for the key portions of any procedures. I have documented in the chart those procedures where I was not present during the key portions. I have reviewed the emergency nurses triage note. I agree with the chief complaint, past medical history, past surgical history, allergies, medications, social and family history as documented unless otherwise noted below. For Physician Assistant/ Nurse Practitioner cases/documentation I have personally evaluated this patient and have completed at least one if not all key elements of the E/M (history, physical exam, and MDM). Additional findings are as noted. Patient here with exacerbation of her ongoing right plantar fasciitis. Had been doing well recently until just going on vacation she did not have her splints or inserts with her was walking around in flip-flops. Typical pain for her no injury or trauma she can recall. On exam focal plantar tenderness strong pulses.   Discussed importance of follow-up with orthopedics resuming her previous regimen patient variable will discharge home      Critical Care     none    Ale Kim MD, Shelbi Speaker  Attending Emergency  Physician           Ale Kim MD  09/23/22 [id number]

## 2022-09-23 NOTE — DISCHARGE INSTRUCTIONS
You are seen and evaluated emergency department for worsening right foot pain. You had no concerning findings and this is most likely an exacerbation of your known plantar fasciitis. This pain will take time to resolve, however you can take Motrin as prescribed for the next few days to help with pain relief. Do not take naproxen if you are taking the prescribed Motrin. You can also use ice to massage the bottom of the foot to assist with pain control. You can also continue using diclofenac gel. You should continue doing the exercises you were previously prescribed and are outlined in the attached documents. You should follow-up with the provider who has been managing your Planter fasciitis. You can return to the emergency department if your pain worsens or you are unable to perform your daily activities. See attached instructions. Return to the emergency department if you have any symptoms indicating immediate medical care.

## 2022-09-23 NOTE — ED PROVIDER NOTES
(PREVACID) 30 MG delayed release capsule Take 1 capsule by mouth in the morning.  8/3/22   Mary Ann Brewer MD   albuterol sulfate HFA (PROVENTIL;VENTOLIN;PROAIR) 108 (90 Base) MCG/ACT inhaler INHALE 2 PUFFS BY MOUTH INTO THE LUNGS ONCE EVERY 6 HOURS AS NEEDED FOR WHEEZING 8/3/22   Mary Ann Brewer MD   EPINEPHrine (EPIPEN 2-VIOLET) 0.3 MG/0.3ML SOAJ injection Inject 0.3 mLs into the muscle once for 1 dose Use as directed for allergic reaction 8/3/22 8/3/22  Mary Ann Brewer MD   diclofenac sodium (VOLTAREN) 1 % GEL Apply 4 g topically 4 times daily as needed for Pain 7/27/22   Vera Huynh MD   chlorzoxazone (PARAFON FORTE) 500 MG tablet TAKE 1/2 TABLET BY MOUTH TWICE DAILY AS NEEDED 7/20/22   Mary Ann Brewer MD   vitamin D (ERGOCALCIFEROL) 1.25 MG (66991 UT) CAPS capsule TAKE 1 CAPSULE BY MOUTH EVERY WEEK 7/20/22   Mary Ann Brewer MD   venlafaxine (EFFEXOR XR) 75 MG extended release capsule DTAKE 1 CAPSULE BY MOUTH DAILY 7/20/22   Mary Ann Brewer MD   omeprazole (PRILOSEC) 40 MG delayed release capsule TAKE 1 CAPSULE BY MOUTH DAILY EVERY MORNING 7/1/22   Mary Ann Brewer MD   diclofenac sodium (VOLTAREN) 1 % GEL Apply 4 g topically 4 times daily as needed for Pain 6/23/22   Vera Huynh MD   azithromycin (ZITHROMAX) 250 MG tablet  3/1/22   Historical Provider, MD   benzonatate (TESSALON) 100 MG capsule  3/1/22   Historical Provider, MD   Handicap Placard [facility] by Does not apply route DISABLED PARKING PERMIT AUTHORIZATION  Routine     Qty-1    The patient has the following condition(s) which qualifies him/her for disabled parking: Walking severely limited due to orthopedic condition     Privilege Duration: Temporary for 3 months 3/4/21   Vera Huynh MD   aspirin 325 MG EC tablet Take 1 tablet by mouth daily 3/4/21 4/15/21  Vera Huynh MD   ketotifen (ZADITOR) 0.025 % ophthalmic solution USE 1 DROP INTO BOTH EYES TWICE DAILY 1/19/21   Mary Ann Brewer MD   vitamin D (ERGOCALCIFEROL) 1.25 MG (83374 UT) CAPS capsule TAKE 1 CAPSULE BY MOUTH EVERY WEEK ON THURSDAYS 1/12/21   Ana Fitch MD   fluticasone (FLONASE) 50 MCG/ACT nasal spray INSTILL 2 SPRAYS INTO EACH NOSTRIL ONCE DAILY 1/11/21   Ana Fitch MD   miSOPROStol (CYTOTEC) 200 MCG tablet Take 200 mcg by mouth daily    Historical Provider, MD   senna-docusate (Adama Yuval) 8.6-50 MG per tablet Take 1 tablet by mouth daily as needed for Constipation  Patient taking differently: Take 1 tablet by mouth in the morning and 1 tablet before bedtime. 2/26/20   Jory Alvardao MD   Multiple Vitamins-Minerals (CULTURELLE PROBIOTICS + MULTIV) CHEW Take 1 tablet by mouth daily 1/29/20   Jory Alvarado MD   MI-ACID GAS RELIEF 80 MG chewable tablet CHEW 1 TABLET BY MOUTH FOUR TIMES DAILY AS NEEDED FOR FLATULENCE 11/19/19   Jory Alvarado MD   CRANBERRY CONCENTRATE 500 MG CAPS TAKE 1 CAPSULE BY MOUTH TWICE DAILY 8/21/19   Ana Fitch MD   metoprolol succinate (TOPROL XL) 25 MG extended release tablet Take 25 mg by mouth daily     Historical Provider, MD   PREMARIN 0.625 MG/GM vaginal cream PLACE 2 GRAMS VAGINALLY TWICE A WEEK FOR 12 DOSES 9/21/17   Ana Fitch MD       REVIEW OF SYSTEMS    (2-9 systems for level 4, 10 or more for level 5)      Review of Systems   Constitutional:  Negative for appetite change, chills, fatigue and fever. HENT:  Negative for congestion, ear pain and sore throat. Eyes:  Negative for pain and visual disturbance. Respiratory:  Negative for cough and shortness of breath. Cardiovascular:  Negative for chest pain and leg swelling. Gastrointestinal:  Negative for abdominal pain, constipation, diarrhea, nausea and vomiting. Genitourinary:  Negative for dysuria and vaginal bleeding. Musculoskeletal:  Positive for arthralgias. Negative for back pain and myalgias. Skin:  Negative for wound. Neurological:  Negative for dizziness, weakness, light-headedness, numbness and headaches.      PHYSICAL EXAM   (up to 7 for level 4, 8 or more for level 5)      INITIAL VITALS:   /88   Pulse 84   Temp 98 °F (36.7 °C) (Oral)   Resp 19   SpO2 99%     Physical Exam  Vitals reviewed. Constitutional:       Appearance: Normal appearance. She is obese. HENT:      Head: Normocephalic and atraumatic. Right Ear: External ear normal.      Left Ear: External ear normal.      Nose: Nose normal.      Mouth/Throat:      Mouth: Mucous membranes are moist.      Pharynx: Oropharynx is clear. Eyes:      Extraocular Movements: Extraocular movements intact. Conjunctiva/sclera: Conjunctivae normal.      Pupils: Pupils are equal, round, and reactive to light. Cardiovascular:      Rate and Rhythm: Normal rate and regular rhythm. Pulses: Normal pulses. Heart sounds: Normal heart sounds. Pulmonary:      Effort: Pulmonary effort is normal.      Breath sounds: Normal breath sounds. Abdominal:      General: Abdomen is flat. Palpations: Abdomen is soft. Tenderness: There is no abdominal tenderness. Musculoskeletal:         General: Tenderness (pain to right pedal foot, posterior aspect) present. Cervical back: Normal range of motion and neck supple. Skin:     General: Skin is warm and dry. Capillary Refill: Capillary refill takes less than 2 seconds. Neurological:      General: No focal deficit present. Mental Status: She is alert and oriented to person, place, and time. Psychiatric:         Mood and Affect: Mood normal.         Behavior: Behavior normal.       DIFFERENTIAL  DIAGNOSIS     PLAN (LABS / IMAGING / EKG):  No orders of the defined types were placed in this encounter.       MEDICATIONS ORDERED:  Orders Placed This Encounter   Medications    DISCONTD: ketorolac (TORADOL) injection 30 mg    ketorolac (TORADOL) injection 30 mg    ibuprofen (ADVIL;MOTRIN) 800 MG tablet     Sig: Take 1 tablet by mouth 3 times daily as needed for Pain     Dispense:  30 tablet     Refill:  0       DDX: Plantar fasciitis exacerbation    DIAGNOSTIC RESULTS / EMERGENCY DEPARTMENT COURSE / MDM   LAB RESULTS:  No results found for this visit on 09/23/22. IMPRESSION/MDM: 70-year-old female, history of plantar fasciitis of right foot, managed by orthopedic surgeon, 1 vacation and did not do exercise or wear brace, now with acute worsening of right foot pain without neurologic symptoms, vitals within normal limits, exam showing right foot neurovascular intact, pain to pedal aspect of foot consistent with prior plantar fasciitis pain, no skin changes or crepitus, most likely plantar fasciitis exacerbation. No imaging indicated at this time as there is been no trauma or injury to the foot since prior imaging. We will plan to give patient shot of Toradol in the emergency department and sent home with 100 Motrin for the next few days. She should follow-up with orthopedic doctor for ongoing management. Discussed this plan with patient and she is amenable to the idea. Patient is medically stable time of discharge. FINAL IMPRESSION      1.  Plantar fasciitis of right foot          DISPOSITION / PLAN     DISPOSITION Decision To Discharge 09/23/2022 03:37:03 PM      PATIENT REFERRED TO:  Jeannette Barger MD  [address] [place] [address] [facility]  [phone]    Schedule an appointment as soon as possible for a visit in 3 days      [facility] [phone] [id number] [phone]  Go to   If symptoms worsen    DISCHARGE MEDICATIONS:  Discharge Medication List as of 9/23/2022  3:46 PM        START taking these medications    Details   ibuprofen (ADVIL;MOTRIN) 800 MG tablet Take 1 tablet by mouth 3 times daily as needed for Pain, Disp-30 tablet, R-0Print             Doris Nieves MD  Emergency Medicine Resident    (Please note that portions of thisnote were completed with a voice recognition program.  Efforts were made to edit the dictations but occasionally words are mis-transcribed.)       Aylin Mckeon MD  Resident  09/23/22 [id number]

## 2022-09-24 DIAGNOSIS — M72.2 PLANTAR FASCIITIS: ICD-10-CM

## 2022-09-24 DIAGNOSIS — K21.9 GASTROESOPHAGEAL REFLUX DISEASE, UNSPECIFIED WHETHER ESOPHAGITIS PRESENT: ICD-10-CM

## 2022-09-26 DIAGNOSIS — M72.2 PLANTAR FASCIITIS: Primary | ICD-10-CM

## 2022-09-26 RX ORDER — OMEPRAZOLE 40 MG/1
CAPSULE, DELAYED RELEASE ORAL
Qty: 28 CAPSULE | Refills: 3 | Status: SHIPPED | OUTPATIENT
Start: 2022-09-26

## 2022-10-04 ENCOUNTER — OFFICE VISIT (OUTPATIENT)
Dept: ORTHOPEDIC SURGERY | Age: 62
End: 2022-10-04
Payer: COMMERCIAL

## 2022-10-04 VITALS — OXYGEN SATURATION: 100 % | WEIGHT: 187 LBS | HEIGHT: 61 IN | RESPIRATION RATE: 16 BRPM | BODY MASS INDEX: 35.3 KG/M2

## 2022-10-04 DIAGNOSIS — M21.861 GASTROCNEMIUS EQUINUS OF RIGHT LOWER EXTREMITY: ICD-10-CM

## 2022-10-04 DIAGNOSIS — M72.2 PLANTAR FASCIITIS: Primary | ICD-10-CM

## 2022-10-04 PROCEDURE — G8417 CALC BMI ABV UP PARAM F/U: HCPCS | Performed by: ORTHOPAEDIC SURGERY

## 2022-10-04 PROCEDURE — 1036F TOBACCO NON-USER: CPT | Performed by: ORTHOPAEDIC SURGERY

## 2022-10-04 PROCEDURE — 99214 OFFICE O/P EST MOD 30 MIN: CPT | Performed by: ORTHOPAEDIC SURGERY

## 2022-10-04 PROCEDURE — G8484 FLU IMMUNIZE NO ADMIN: HCPCS | Performed by: ORTHOPAEDIC SURGERY

## 2022-10-04 PROCEDURE — G8427 DOCREV CUR MEDS BY ELIG CLIN: HCPCS | Performed by: ORTHOPAEDIC SURGERY

## 2022-10-04 PROCEDURE — 3017F COLORECTAL CA SCREEN DOC REV: CPT | Performed by: ORTHOPAEDIC SURGERY

## 2022-10-04 NOTE — LETTER
[address] [place] [address] and Sports Medicine  [address] [address]  Phone: [phone]  Fax: [phone]    Timmy Mae MD    October 4, 2022     Teddy Delong MD  [address] [place] [address] [address]  [address]    Patient: Mike Edwards   MR Number: [id number]   YOB: 1960   Date of Visit: 10/4/2022       Dear Teddy Delong:    Thank you for referring Senia Naranjo to me for evaluation/treatment. Below are the relevant portions of my assessment and plan of care. She has right foot pain secondary to plantar fasciitis with underlying gastroc equinus contracture (a right plantar fascial injection on 6/23/2022 helped her pain approximately 50%). At today's visit, she reports that her right foot pain has been exacerbated, and she is doing worse. She also has a history of left ankle instability, status post left ankle lateral ligamentous stabilization, with peroneal tendon tenolysis, and gastroc recession. 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 Notably, she has no relevant past medical history. We had a discussion today about the likely diagnosis and its natural history, physical exam and imaging findings, as well as various treatment options in detail. Surgically, we again discussed a right gastroc recession. We discussed the expected postoperative course, including the relevant weightbearing restrictions/immobilization. We also discussed the patient's ability to return to work, as well as an estimate of the anticipated timeframe to return to work, in the context of their specific job functions/level of activity. No guarantees were made. We discussed both surgical and nonsurgical treatment options, including risk/benefits/alternatives.   At today's visit, and she is getting worse despite conservative management, she does state that she wishes to proceed with surgery in the near future. Surgically, we have also previously discussed a possible future left knee arthroscopic meniscal debridement, depending on her clinical course. Orders/referrals were placed as below at today's visit. The patient has been ordered DME (crutches/walker/rolling knee scooter), which are medically necessary items postoperatively. I also ordered physical therapy for the patient to help reinforce/teach the relevant weight bearing precautions, to help allow for safe transfers and early mobilization, as well as effectively utilize DME. Surgical booking paperwork was completed and submitted. All questions were answered and the above plan was agreed upon. The patient will return to clinic for a surgical discussion (right gastroc recession). In the future, we may also consider an MRI of her left knee in the future with possible subsequent injection versus surgery. If you have questions, please do not hesitate to call me. I look forward to following Linh Quesada along with you.     Sincerely,      Zay Lopez MD

## 2022-10-04 NOTE — PROGRESS NOTES
[address]  AND SPORTS MEDICINE  [address]  [facility] [address]  Dept: [phone]    Ambulatory Orthopedic Follow Up Visit    Preoperative Diagnosis:   Left ankle instability with recurrent sprains  Left peroneal tendinopathy   Left gastrocnemius equinus contracture  Body mass index is 33.07 kg/m². Postoperative Diagnosis:   Same      Procedures Performed:  (3/4/2021)  Left ankle lateral ligamentous repair (with Arthrex fiber tack anchors x2 + internal brace)  Left peroneus brevis debridement  Left peroneus longus tenolysis  Left leg gastroc recession, performed through separate incision      CHIEF COMPLAINT:    Chief Complaint   Patient presents with    Foot Pain     Right         HISTORY OF PRESENT ILLNESS:       She is a 65 y.o. female, seen again today in the office for evaluation of a new issue as above, which began atraumatically over 20 years ago  . At today's visit, she is using no brace/assistive device. History is obtained today from:   [x]  the patient     []  EMR     []  one family member/friend    []  multiple family members/friends    []  other:      Regarding the previous issue, she reports the previous problem is doing better. She denies any significant pain in her left ankle, and reports that her ankle is much stronger (she is not using a brace or assistive device for this either). INTERVAL HISTORY 6/23/2022:  She is seen again today in the office for evaluation of a new issue as above, which began around 3/15/2022 atraumatically  . At today's visit, she is using no brace/assistive device. History is obtained today from:   [x]  the patient     []  EMR     []  one family member/friend    []  multiple family members/friends    []  other:      INTERVAL HISTORY 10/4/2022:  She is seen again today in the office for follow up of a previous issue (as above).  Since being seen last, the patient is doing worse. At today's visit, she is not using a brace or assistive device. History is obtained today from:   [x]  the patient     []  EMR     []  one family member/friend    []  multiple family members/friends    []  other:      She reports that she recently went on vacation, and reports that since that time she has had an increase in pain in her right plantar heel. She reports that she was recently in the ER for her plantar heel pain. REVIEW OF SYSTEMS:   Musculoskeletal: See HPI for pertinent positives     Past Medical History:    She  has a past medical history of Arthritis, ASCUS with positive high risk HPV cervical (3/22/16), Asthma, Depression, Diverticulitis, ESBL (extended spectrum beta-lactamase) producing bacteria infection (02/03/2019), GERD (gastroesophageal reflux disease), Hyperlipidemia, MRSA (methicillin resistant staph aureus) culture positive (4/18/2016), Rectocele, and Tachycardia. Past Surgical History:    She  has a past surgical history that includes Hysterectomy (1996); knee surgery (Left); Cystocopy (2/25/2015); Nerve Block (07/28/2017); Nerve Block (09/22/2017); Nerve Block (09/22/2017); Nerve Block (11/10/2017); Colonoscopy (N/A, 7/19/2018); Upper gastrointestinal endoscopy (1/30/2019); Knee arthroscopy (Left, 5/13/2019); and Ankle surgery (Left, 3/4/2021).      Current Medications:     Current Outpatient Medications:     diclofenac sodium (VOLTAREN) 1 % GEL, Apply 4 g topically 4 times daily as needed for Pain, Disp: 200 g, Rfl: 0    omeprazole (PRILOSEC) 40 MG delayed release capsule, TAKE 1 CAPSULE BY MOUTH DAILY EVERY MORNING, Disp: 28 capsule, Rfl: 3    ibuprofen (ADVIL;MOTRIN) 800 MG tablet, Take 1 tablet by mouth 3 times daily as needed for Pain, Disp: 30 tablet, Rfl: 0    diclofenac (VOLTAREN) 50 MG EC tablet, TAKE 1 TABLET BY MOUTH TWICE DAILY, Disp: 56 tablet, Rfl: 2    busPIRone (BUSPAR) 15 MG tablet, TAKE 1 TABLET BY MOUTH TWICE DAILY, Disp: 56 tablet, Rfl: 3 pravastatin (PRAVACHOL) 40 MG tablet, TAKE 1 TABLET BY MOUTH DAILY, Disp: 28 tablet, Rfl: 3    lansoprazole (PREVACID) 30 MG delayed release capsule, Take 1 capsule by mouth in the morning., Disp: 30 capsule, Rfl: 3    albuterol sulfate HFA (PROVENTIL;VENTOLIN;PROAIR) 108 (90 Base) MCG/ACT inhaler, INHALE 2 PUFFS BY MOUTH INTO THE LUNGS ONCE EVERY 6 HOURS AS NEEDED FOR WHEEZING, Disp: 18 g, Rfl: 3    EPINEPHrine (EPIPEN 2-VIOLET) 0.3 MG/0.3ML SOAJ injection, Inject 0.3 mLs into the muscle once for 1 dose Use as directed for allergic reaction, Disp: 0.3 mL, Rfl: 0    diclofenac sodium (VOLTAREN) 1 % GEL, Apply 4 g topically 4 times daily as needed for Pain, Disp: 200 g, Rfl: 0    chlorzoxazone (PARAFON FORTE) 500 MG tablet, TAKE 1/2 TABLET BY MOUTH TWICE DAILY AS NEEDED, Disp: 28 tablet, Rfl: 3    vitamin D (ERGOCALCIFEROL) 1.25 MG (17376 UT) CAPS capsule, TAKE 1 CAPSULE BY MOUTH EVERY WEEK, Disp: 4 capsule, Rfl: 3    venlafaxine (EFFEXOR XR) 75 MG extended release capsule, DTAKE 1 CAPSULE BY MOUTH DAILY, Disp: 28 capsule, Rfl: 3    diclofenac sodium (VOLTAREN) 1 % GEL, Apply 4 g topically 4 times daily as needed for Pain, Disp: 200 g, Rfl: 0    azithromycin (ZITHROMAX) 250 MG tablet, , Disp: , Rfl:     benzonatate (TESSALON) 100 MG capsule, , Disp: , Rfl:     Handicap Placard MISC, by Does not apply route DISABLED PARKING PERMIT AUTHORIZATION Routine   Qty-1  The patient has the following condition(s) which qualifies him/her for disabled parking: Walking severely limited due to orthopedic condition   Privilege Duration: Temporary for 3 months, Disp: 1 each, Rfl: 0    aspirin 325 MG EC tablet, Take 1 tablet by mouth daily, Disp: 42 tablet, Rfl: 0    ketotifen (ZADITOR) 0.025 % ophthalmic solution, USE 1 DROP INTO BOTH EYES TWICE DAILY, Disp: 10 mL, Rfl: 1    vitamin D (ERGOCALCIFEROL) 1.25 MG (39512 UT) CAPS capsule, TAKE 1 CAPSULE BY MOUTH EVERY WEEK ON THURSDAYS, Disp: 4 capsule, Rfl: 3    fluticasone (FLONASE) 50 MCG/ACT nasal spray, INSTILL 2 SPRAYS INTO EACH NOSTRIL ONCE DAILY, Disp: 16 g, Rfl: 3    miSOPROStol (CYTOTEC) 200 MCG tablet, Take 200 mcg by mouth daily, Disp: , Rfl:     senna-docusate (PERICOLACE) 8.6-50 MG per tablet, Take 1 tablet by mouth daily as needed for Constipation (Patient taking differently: Take 1 tablet by mouth in the morning and 1 tablet before bedtime.), Disp: 30 tablet, Rfl: 1    Multiple Vitamins-Minerals (CULTURELLE PROBIOTICS + MULTIV) CHEW, Take 1 tablet by mouth daily, Disp: 30 tablet, Rfl: 2    MI-ACID GAS RELIEF 80 MG chewable tablet, CHEW 1 TABLET BY MOUTH FOUR TIMES DAILY AS NEEDED FOR FLATULENCE, Disp: 120 tablet, Rfl: 5    CRANBERRY CONCENTRATE 500 MG CAPS, TAKE 1 CAPSULE BY MOUTH TWICE DAILY, Disp: 56 capsule, Rfl: 3    metoprolol succinate (TOPROL XL) 25 MG extended release tablet, Take 25 mg by mouth daily , Disp: , Rfl:     PREMARIN 0.625 MG/GM vaginal cream, PLACE 2 GRAMS VAGINALLY TWICE A WEEK FOR 12 DOSES, Disp: 30 g, Rfl: 1     Allergies:    Shellfish allergy, Shrimp (diagnostic), Seasonal, Famotidine, and Gabapentin    Family History:  family history includes Colon Cancer in her mother; Crohn's Disease in her niece; Heart Attack in her brother; Heart Disease in her brother; High Blood Pressure in her father. Social History:   Social History     Occupational History    Not on file   Tobacco Use    Smoking status: Former     Packs/day: 2.00     Years: 0.50     Pack years: 1.00     Types: Cigarettes     Quit date:      Years since quittin.7    Smokeless tobacco: Never   Vaping Use    Vaping Use: Never used   Substance and Sexual Activity    Alcohol use:  Yes     Alcohol/week: 0.0 standard drinks     Comment: social    Drug use: No    Sexual activity: Not on file       OBJECTIVE:  Resp 16   Ht 5' 1\" (1.549 m)   Wt 187 lb (84.8 kg)   SpO2 100%   BMI 35.33 kg/m²    Psych: alert and oriented to person, time, and place  Cardio:  well perfused extremities  Resp: normal respiratory effort  Skin:  no cyanosis  Hem/lymph:  no lymphedema  Neuro:  sensation to light touch unchanged since last visit  Musculoskeletal:    RLE:  Vascular: Limb well perfused, compartments soft/compressible. Skin: No erythema/ulcers. Intact. Neurovascular Status:  Grossly neurovascularly intact throughout   Tenderness to Palpation: Plantar heel  -Gastroc equinus  -Negative squeeze test of the calcaneus      (Previous exam)  LLE:  Incisions clean/dry/intact, no erythema/dehiscence/drainage  Sensation to light touch grossly intact throughout, but altered sensation on the dorsal aspect of her foot  Warm and well perfused  Grossly neurovascularly intact distally  No signs of infection  No calf swelling/tenderness  -Instability:  Talar tilt: Negative; Anterior drawer: Negative  -No peroneal subluxation/dislocation with dorsiflexion + eversion or with circumduction  Tenderness to Palpation:  anteromedial and anterolateral knee joint line          RADIOLOGY:   10/4/2022 No new radiology images today. Prior images reviewed for reference. FINDINGS:  Three weightbearing views (AP, Mortise, and Lateral) of the right ankle and three weightbearing views (AP, Oblique, Lateral) of the right foot were obtained in the office today and reviewed, revealing no acute fracture, dislocation, or radioopaque foreign body/tumor. The ankle mortise is maintained with no widening of the clear spaces. Overall alignment is satisfactory. Calcification of the plantar aspect of the calcaneus. IMPRESSION:  No acute fracture/dislocation. Electronically signed by Corrinne Games, MD            FINDINGS:  Four weightbearing views (AP, Tunnel, Lateral, and Sunrise) of the left knee were obtained in the office today and reviewed, revealing no acute fracture, dislocation, or radioopaque foreign body/tumor. Overall alignment is satisfactory.   Tricompartmental degenerative changes of the knee with joint narrowing, sclerosis, and osteophytes. Retained hardware in the proximal tibia. IMPRESSION: No acute fracture/dislocation. Degenerative changes as above. Electronically signed by Valery Fink MD      ASSESSMENT AND PLAN:  Body mass index is 35.33 kg/m². She has right foot pain secondary to plantar fasciitis with underlying gastroc equinus contracture (a right plantar fascial injection on 6/23/2022 helped her pain approximately 50%). At today's visit, she reports that her right foot pain has been exacerbated, and she is doing worse. She also has a history of left ankle instability, status post left ankle lateral ligamentous stabilization, with peroneal tendon tenolysis, and gastroc recession. 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 Notably, she has no relevant past medical history. We had a discussion today about the likely diagnosis and its natural history, physical exam and imaging findings, as well as various treatment options in detail. Surgically, we again discussed a right gastroc recession. We discussed the expected postoperative course, including the relevant weightbearing restrictions/immobilization. We also discussed the patient's ability to return to work, as well as an estimate of the anticipated timeframe to return to work, in the context of their specific job functions/level of activity. No guarantees were made. We discussed both surgical and nonsurgical treatment options, including risk/benefits/alternatives. At today's visit, and she is getting worse despite conservative management, she does state that she wishes to proceed with surgery in the near future. Surgically, we have also previously discussed a possible future left knee arthroscopic meniscal debridement, depending on her clinical course. Orders/referrals were placed as below at today's visit.      The patient has been ordered DME (crutches/walker/rolling knee scooter), which are medically necessary items postoperatively. I also ordered physical therapy for the patient to help reinforce/teach the relevant weight bearing precautions, to help allow for safe transfers and early mobilization, as well as effectively utilize DME. Surgical booking paperwork was completed and submitted. All questions were answered and the above plan was agreed upon. The patient will return to clinic for a surgical discussion (right gastroc recession). In the future, we may also consider an MRI of her left knee in the future with possible subsequent injection versus surgery. At the patient's next visit, depending on how the patient is doing and/or new imaging/labs results, we may consider the following options:    []  Orthotic (OTC)     []  Orthotic (custom)          []  Rocker bottom shoes     []  Brace (OTC)        []  Brace (custom)             []  CAM boot        []  Night splint         []  Heel cups        []  Strap      []  Toe sleeves/splints    []  PT:                     []  Wean out of immobilization   []  Advance activity       []  Topical               []  NSAIDs          []  Ronni         []  Referral:         []  Stress xrays       []  CT         []  MRI        []  Injection:         []  Consider OR      []  Pick OR date    No follow-ups on file. No orders of the defined types were placed in this encounter. No orders of the defined types were placed in this encounter. Lorene Gilbert MD  Orthopedic Surgery        Please excuse any typos/errors, as this note was created with the assistance of voice recognition software. While intending to generate a document that actually reflects the content of the visit, the document can still have some errors including those of syntax and sound-a-like substitutions which may escape proof reading. In such instances, actual meaning can be extrapolated by context.

## 2022-10-12 DIAGNOSIS — M72.2 PLANTAR FASCIITIS: Primary | ICD-10-CM

## 2022-10-13 ENCOUNTER — OFFICE VISIT (OUTPATIENT)
Dept: FAMILY MEDICINE CLINIC | Age: 62
End: 2022-10-13
Payer: COMMERCIAL

## 2022-10-13 ENCOUNTER — TELEPHONE (OUTPATIENT)
Dept: ORTHOPEDIC SURGERY | Age: 62
End: 2022-10-13

## 2022-10-13 VITALS
TEMPERATURE: 97.3 F | DIASTOLIC BLOOD PRESSURE: 84 MMHG | SYSTOLIC BLOOD PRESSURE: 131 MMHG | WEIGHT: 191.8 LBS | OXYGEN SATURATION: 99 % | HEART RATE: 75 BPM | BODY MASS INDEX: 36.24 KG/M2

## 2022-10-13 DIAGNOSIS — D17.24 LIPOMA OF LEFT LOWER EXTREMITY: Primary | ICD-10-CM

## 2022-10-13 DIAGNOSIS — M72.2 PLANTAR FASCIITIS OF RIGHT FOOT: ICD-10-CM

## 2022-10-13 PROCEDURE — G8427 DOCREV CUR MEDS BY ELIG CLIN: HCPCS | Performed by: FAMILY MEDICINE

## 2022-10-13 PROCEDURE — G8484 FLU IMMUNIZE NO ADMIN: HCPCS | Performed by: FAMILY MEDICINE

## 2022-10-13 PROCEDURE — 1036F TOBACCO NON-USER: CPT | Performed by: FAMILY MEDICINE

## 2022-10-13 PROCEDURE — 3017F COLORECTAL CA SCREEN DOC REV: CPT | Performed by: FAMILY MEDICINE

## 2022-10-13 PROCEDURE — G8417 CALC BMI ABV UP PARAM F/U: HCPCS | Performed by: FAMILY MEDICINE

## 2022-10-13 PROCEDURE — 99214 OFFICE O/P EST MOD 30 MIN: CPT | Performed by: FAMILY MEDICINE

## 2022-10-13 ASSESSMENT — PATIENT HEALTH QUESTIONNAIRE - PHQ9
SUM OF ALL RESPONSES TO PHQ QUESTIONS 1-9: 1
SUM OF ALL RESPONSES TO PHQ QUESTIONS 1-9: 1
5. POOR APPETITE OR OVEREATING: 0
SUM OF ALL RESPONSES TO PHQ QUESTIONS 1-9: 1
1. LITTLE INTEREST OR PLEASURE IN DOING THINGS: 0
10. IF YOU CHECKED OFF ANY PROBLEMS, HOW DIFFICULT HAVE THESE PROBLEMS MADE IT FOR YOU TO DO YOUR WORK, TAKE CARE OF THINGS AT HOME, OR GET ALONG WITH OTHER PEOPLE: 0
7. TROUBLE CONCENTRATING ON THINGS, SUCH AS READING THE NEWSPAPER OR WATCHING TELEVISION: 0
8. MOVING OR SPEAKING SO SLOWLY THAT OTHER PEOPLE COULD HAVE NOTICED. OR THE OPPOSITE, BEING SO FIGETY OR RESTLESS THAT YOU HAVE BEEN MOVING AROUND A LOT MORE THAN USUAL: 0
9. THOUGHTS THAT YOU WOULD BE BETTER OFF DEAD, OR OF HURTING YOURSELF: 0
2. FEELING DOWN, DEPRESSED OR HOPELESS: 1
4. FEELING TIRED OR HAVING LITTLE ENERGY: 0
3. TROUBLE FALLING OR STAYING ASLEEP: 0
SUM OF ALL RESPONSES TO PHQ9 QUESTIONS 1 & 2: 1
6. FEELING BAD ABOUT YOURSELF - OR THAT YOU ARE A FAILURE OR HAVE LET YOURSELF OR YOUR FAMILY DOWN: 0
SUM OF ALL RESPONSES TO PHQ QUESTIONS 1-9: 1

## 2022-10-13 NOTE — TELEPHONE ENCOUNTER
Isaura,   Patient left a  yesterday wanting someone to give her a call. She stated that she had all of these appointments show up, and no one called to explain what they are. Ph # was verified in message. She says her surgery is 11/4/22. Thank you.

## 2022-10-13 NOTE — PROGRESS NOTES
General FM note    Kasi Castaneda is a 65 y.o. female who presents today for follow up on her  medical conditions as noted below. Kasi Castaneda is c/o of   Chief Complaint   Patient presents with    Mass     Left thigh    Pre-op Exam     11/4/22 planter fasciitis Dr Caridad Prajapati       Patient Active Problem List:     Major depression     Rotator cuff disorder     Hyperlipidemia     Incontinence in female     Rectocele     Diverticulitis     Asthma     Hypokalemia     Cellulitis, face - left side, failed outpatient therapy     Hyperglycemia     Mild intermittent asthma without complication     Gastroesophageal reflux disease without esophagitis     Displacement of lumbar intervertebral disc without myelopathy     Chest pain     Dyspepsia     Acute medial meniscus tear of left knee     Dry eyes, bilateral     Insufficiency of tear film of both eyes     Instability of left ankle joint     Past Medical History:   Diagnosis Date    Arthritis     ASCUS with positive high risk HPV cervical 3/22/16    Asthma     Depression     Diverticulitis     ESBL (extended spectrum beta-lactamase) producing bacteria infection 02/03/2019    E.  Coli urine    GERD (gastroesophageal reflux disease)     Hyperlipidemia     MRSA (methicillin resistant staph aureus) culture positive 4/18/2016    lip    Rectocele     Tachycardia     takes Metoprolol      Past Surgical History:   Procedure Laterality Date    ANKLE SURGERY Left 3/4/2021    Left ankle lateral ligamentous repair, Left peroneus brevis debridement, Left peroneus longus tenolysis, Left leg gastroc recession, performed through separate incision  performed by Iram Helms MD at [address] [facility],[address] N/A 7/19/2018    COLONOSCOPY performed by Tiesha Wolfe IV, DO at [address]  2/25/2015    uro    HYSTERECTOMY (CERVIX STATUS UNKNOWN)  1996    RYAN, BSO performed at [facility] by Dr. Saravanan Rogers, Also had some type of bladder lift at this time    KNEE ARTHROSCOPY Left 5/13/2019    KNEE ARTHROSCOPY performed by Tony Shen MD at [place] [address] Left     #1 - lateral release, #2 - arthroscopy with muscle alignment    NERVE BLOCK  07/28/2017    caudal #1  decadron 10mg    NERVE BLOCK  09/22/2017    cadal # 2, decadron 10 mg    NERVE BLOCK  09/22/2017    caudal epidural steroid block #2 decadron 10 mg    NERVE BLOCK  11/10/2017    lumbar L4-5 epidural; depomedrol 80mg    UPPER GASTROINTESTINAL ENDOSCOPY  1/30/2019    EGD BIOPSY performed by Buckley Duane, MD at [address] [place] [address] History   Problem Relation Age of Onset    Colon Cancer Mother     High Blood Pressure Father     Heart Attack Brother     Heart Disease Brother     Crohn's Disease Niece      Current Outpatient Medications   Medication Sig Dispense Refill    diclofenac sodium (VOLTAREN) 1 % GEL Apply 4 g topically 4 times daily as needed for Pain 200 g 0    omeprazole (PRILOSEC) 40 MG delayed release capsule TAKE 1 CAPSULE BY MOUTH DAILY EVERY MORNING 28 capsule 3    ibuprofen (ADVIL;MOTRIN) 800 MG tablet Take 1 tablet by mouth 3 times daily as needed for Pain 30 tablet 0    diclofenac (VOLTAREN) 50 MG EC tablet TAKE 1 TABLET BY MOUTH TWICE DAILY 56 tablet 2    busPIRone (BUSPAR) 15 MG tablet TAKE 1 TABLET BY MOUTH TWICE DAILY 56 tablet 3    lansoprazole (PREVACID) 30 MG delayed release capsule Take 1 capsule by mouth in the morning.  30 capsule 3    albuterol sulfate HFA (PROVENTIL;VENTOLIN;PROAIR) 108 (90 Base) MCG/ACT inhaler INHALE 2 PUFFS BY MOUTH INTO THE LUNGS ONCE EVERY 6 HOURS AS NEEDED FOR WHEEZING 18 g 3    diclofenac sodium (VOLTAREN) 1 % GEL Apply 4 g topically 4 times daily as needed for Pain 200 g 0    chlorzoxazone (PARAFON FORTE) 500 MG tablet TAKE 1/2 TABLET BY MOUTH TWICE DAILY AS NEEDED 28 tablet 3    vitamin D (ERGOCALCIFEROL) 1.25 MG (20330 UT) CAPS capsule TAKE 1 CAPSULE BY MOUTH EVERY WEEK 4 capsule 3    venlafaxine (EFFEXOR XR) 75 MG extended release capsule DTAKE 1 CAPSULE BY MOUTH DAILY 28 capsule 3    diclofenac sodium (VOLTAREN) 1 % GEL Apply 4 g topically 4 times daily as needed for Pain 200 g 0    azithromycin (ZITHROMAX) 250 MG tablet       benzonatate (TESSALON) 100 MG capsule       Handicap Placard MISC by Does not apply route DISABLED PARKING PERMIT AUTHORIZATION  Routine     Qty-1    The patient has the following condition(s) which qualifies him/her for disabled parking: Walking severely limited due to orthopedic condition     Privilege Duration: Temporary for 3 months 1 each 0    ketotifen (ZADITOR) 0.025 % ophthalmic solution USE 1 DROP INTO BOTH EYES TWICE DAILY 10 mL 1    vitamin D (ERGOCALCIFEROL) 1.25 MG (68738 UT) CAPS capsule TAKE 1 CAPSULE BY MOUTH EVERY WEEK ON THURSDAYS 4 capsule 3    fluticasone (FLONASE) 50 MCG/ACT nasal spray INSTILL 2 SPRAYS INTO EACH NOSTRIL ONCE DAILY 16 g 3    miSOPROStol (CYTOTEC) 200 MCG tablet Take 200 mcg by mouth daily      senna-docusate (PERICOLACE) 8.6-50 MG per tablet Take 1 tablet by mouth daily as needed for Constipation (Patient taking differently: Take 1 tablet by mouth 2 times daily) 30 tablet 1    Multiple Vitamins-Minerals (CULTURELLE PROBIOTICS + MULTIV) CHEW Take 1 tablet by mouth daily 30 tablet 2    MI-ACID GAS RELIEF 80 MG chewable tablet CHEW 1 TABLET BY MOUTH FOUR TIMES DAILY AS NEEDED FOR FLATULENCE 120 tablet 5    CRANBERRY CONCENTRATE 500 MG CAPS TAKE 1 CAPSULE BY MOUTH TWICE DAILY 56 capsule 3    metoprolol succinate (TOPROL XL) 25 MG extended release tablet Take 25 mg by mouth daily       PREMARIN 0.625 MG/GM vaginal cream PLACE 2 GRAMS VAGINALLY TWICE A WEEK FOR 12 DOSES 30 g 1    pravastatin (PRAVACHOL) 40 MG tablet TAKE 1 TABLET BY MOUTH DAILY 28 tablet 3    EPINEPHrine (EPIPEN 2-VIOLET) 0.3 MG/0.3ML SOAJ injection Inject 0.3 mLs into the muscle once for 1 dose Use as directed for allergic reaction 0.3 mL 0    aspirin 325 MG EC tablet Take 1 tablet by mouth daily 42 tablet 0     No current facility-administered medications for this visit. ALLERGIES:    Allergies   Allergen Reactions    Shellfish Allergy     Shrimp (Diagnostic) Shortness Of Breath    Seasonal     Famotidine Other (See Comments)     Headaches  Headaches  Headaches  Headaches  Headaches    Gabapentin Nausea Only     Mood swings, nausea. Mood swings, nausea. Mood swings, nausea. Mood swings, nausea. Mood swings, nausea. Social History     Tobacco Use    Smoking status: Former     Packs/day: 2.00     Years: 0.50     Pack years: 1.00     Types: Cigarettes     Quit date:      Years since quittin.8    Smokeless tobacco: Never   Substance Use Topics    Alcohol use: Yes     Alcohol/week: 0.0 standard drinks     Comment: social      Body mass index is 36.24 kg/m². /84   Pulse 75   Temp 97.3 °F (36.3 °C)   Wt 191 lb 12.8 oz (87 kg)   SpO2 99%   BMI 36.24 kg/m²     Subjective:      HPI    65 y.o. female coming in today with 2 concerns. She states that the mass at her left lateral thigh area has increased. She states around a year ago it was may be a golf ball size and now it is quite large. She has pain when laying on it. She does not have any pain with any movement. She also needs to be cleared for surgery which is coming up on 2022 R gastrocnemius recession. The patient did have surgeries previously. She denies any neck pain she denies any loose teeth. She denies any recent bleedings. Review of Systems   Constitutional: Negative for fever and unexpected weight change. Pertinent items are noted in HPI. Objective:   Physical Exam  Constitutional: VS (see above). General appearance: normal development, habitus and attention, no deformities. No distress. Eyes: normal conjunctiva and lids. CAV: RRR, no RMG. No edema lower extremities. Pulmo: CTA bilateral, no CWR. Skin: no rashes, lesions or ulcers. Musculoskeletal: normal gait. Nails: no clubbing or cyanosis.   Physical Exam  Musculoskeletal: Legs:       Comments: Mass palpable around 14 cm in length       Psychiatric: alert and oriented to place, time and person. Normal mood and affect. Assessment:       Diagnosis Orders   1. Lipoma of left lower extremity  US EXTREMITY LEFT NON [address] [facility] [address] - Kamaljit DO Wm, Hernia and [address] [facility]      2. Plantar fasciitis of right foot            Plan:   Await results of the ultrasound as I believe this could be a lipoma. If this is indeed a lipoma a cell change/malignancy could be observed from the size of more than 15 cm. Needs to be taken care of. Appreciate the help of the general surgeon. Patient is cleared for surgery. Return in about 6 months (around 4/13/2023), or if symptoms worsen or fail to improve. Orders Placed This Encounter   Procedures    US EXTREMITY LEFT NON VASC LIMITED     This procedure can be scheduled via [employer]. Access your [employer] account by visiting [email or web address]. Standing Status:   Future     Standing Expiration Date:   10/13/2023    Bela Burgos DO, Hernia and [address] [place] [address]     Referral Priority:   Routine     Referral Type:   Eval and Treat     Referral Reason:   Specialty Services Required     Referred to Provider:   Sobia Cherry DO     Requested Specialty:   General Surgery     Number of Visits Requested:   1     No orders of the defined types were placed in this encounter. Call or return to clinic prn if these symptoms worsen or fail to improve as anticipated. I have reviewed the instructions with the patient, answering all questions to patient's satisfaction. Hayde Anderson received counseling on the following healthy behaviors: nutrition, exercise, and medication adherence  Reviewed prior labs and health maintenance. Continue current medications, diet and exercise. Discussed use, benefit, and side effects of prescribed medications. Barriers to medication compliance addressed.    Patient given educational materials - see patient instructions. All patient questions answered. Patient voiced understanding.       Electronically signed by Kacy Estes MD on 10/13/2022 at 12:32 PM       (Please note that portions of this note were completed with a voice recognition program. Efforts were made to edit the dictations but occasionally words are mis-transcribed.)

## 2022-10-19 ENCOUNTER — HOSPITAL ENCOUNTER (OUTPATIENT)
Dept: ULTRASOUND IMAGING | Age: 62
Discharge: HOME | End: 2022-10-21
Payer: COMMERCIAL

## 2022-10-19 DIAGNOSIS — D17.24 LIPOMA OF LEFT LOWER EXTREMITY: ICD-10-CM

## 2022-10-19 PROCEDURE — 76882 US LMTD JT/FCL EVL NVASC XTR: CPT

## 2022-10-20 DIAGNOSIS — M62.838 MUSCLE SPASM: ICD-10-CM

## 2022-10-20 DIAGNOSIS — E55.9 VITAMIN D DEFICIENCY: ICD-10-CM

## 2022-10-20 DIAGNOSIS — J30.2 SEASONAL ALLERGIES: ICD-10-CM

## 2022-10-20 DIAGNOSIS — F41.9 ANXIETY: ICD-10-CM

## 2022-10-20 RX ORDER — CHLORZOXAZONE 500 MG/1
TABLET ORAL
Qty: 28 TABLET | Refills: 3 | Status: SHIPPED | OUTPATIENT
Start: 2022-10-20

## 2022-10-20 RX ORDER — ALBUTEROL SULFATE 90 UG/1
AEROSOL, METERED RESPIRATORY (INHALATION)
Qty: 18 G | Refills: 3 | Status: SHIPPED | OUTPATIENT
Start: 2022-10-20

## 2022-10-20 RX ORDER — VENLAFAXINE HYDROCHLORIDE 75 MG/1
CAPSULE, EXTENDED RELEASE ORAL
Qty: 28 CAPSULE | Refills: 3 | Status: SHIPPED | OUTPATIENT
Start: 2022-10-20

## 2022-10-20 RX ORDER — ERGOCALCIFEROL 1.25 MG/1
CAPSULE ORAL
Qty: 4 CAPSULE | Refills: 3 | Status: SHIPPED | OUTPATIENT
Start: 2022-10-20

## 2022-10-21 ENCOUNTER — HOSPITAL ENCOUNTER (OUTPATIENT)
Dept: PREADMISSION TESTING | Age: 62
Discharge: HOME | End: 2022-10-25
Payer: COMMERCIAL

## 2022-10-21 VITALS
DIASTOLIC BLOOD PRESSURE: 83 MMHG | WEIGHT: 187.7 LBS | RESPIRATION RATE: 16 BRPM | BODY MASS INDEX: 33.26 KG/M2 | TEMPERATURE: 97.3 F | OXYGEN SATURATION: 97 % | SYSTOLIC BLOOD PRESSURE: 138 MMHG | HEART RATE: 83 BPM | HEIGHT: 63 IN

## 2022-10-21 LAB
ANION GAP SERPL CALCULATED.3IONS-SCNC: 11 MMOL/L (ref 9–17)
BUN BLDV-MCNC: 25 MG/DL (ref 8–23)
BUN/CREAT BLD: 40 (ref 9–20)
CALCIUM SERPL-MCNC: 9.3 MG/DL (ref 8.6–10.4)
CHLORIDE BLD-SCNC: 105 MMOL/L (ref 98–107)
CO2: 27 MMOL/L (ref 20–31)
CREAT SERPL-MCNC: 0.63 MG/DL (ref 0.5–0.9)
GFR SERPL CREATININE-BSD FRML MDRD: >60 ML/MIN/1.73M2
GLUCOSE BLD-MCNC: 162 MG/DL (ref 70–99)
HCT VFR BLD CALC: 42 % (ref 36.3–47.1)
HEMOGLOBIN: 13.4 G/DL (ref 11.9–15.1)
MCH RBC QN AUTO: 29.6 PG (ref 25.2–33.5)
MCHC RBC AUTO-ENTMCNC: 31.9 G/DL (ref 28.4–34.8)
MCV RBC AUTO: 92.7 FL (ref 82.6–102.9)
NRBC AUTOMATED: 0 PER 100 WBC
PDW BLD-RTO: 13.2 % (ref 11.8–14.4)
PLATELET # BLD: 242 K/UL (ref 138–453)
PMV BLD AUTO: 11 FL (ref 8.1–13.5)
POTASSIUM SERPL-SCNC: 3.8 MMOL/L (ref 3.7–5.3)
RBC # BLD: 4.53 M/UL (ref 3.95–5.11)
SODIUM BLD-SCNC: 143 MMOL/L (ref 135–144)
WBC # BLD: 5.3 K/UL (ref 3.5–11.3)

## 2022-10-21 PROCEDURE — 85027 COMPLETE CBC AUTOMATED: CPT

## 2022-10-21 PROCEDURE — 36415 COLL VENOUS BLD VENIPUNCTURE: CPT

## 2022-10-21 PROCEDURE — 80048 BASIC METABOLIC PNL TOTAL CA: CPT

## 2022-10-21 RX ORDER — ACETAMINOPHEN 500 MG
1000 TABLET ORAL ONCE
Status: CANCELLED | OUTPATIENT
Start: 2022-11-04

## 2022-10-21 ASSESSMENT — PAIN SCALES - GENERAL: PAINLEVEL_OUTOF10: 0

## 2022-10-21 NOTE — PRE-PROCEDURE INSTRUCTIONS
On the Day of Your Surgery, Friday, November 4, 2022, Please Arrive At 6 AM     Enter the hospital through the [facility], take the lobby elevators to the second floor and check in at the Surgery Registration desk. Continue to take your home medications as you normally do up to and including the night before surgery with the exception of blood thinning medications. Blood Thinning Medications:  Please stop prescription blood thinning medications such as Apixaban (Eliquis); Clopidogrel (Plavix); Dabigatran (Pradaxa); Prasugrel (Effient); Rivaroxaban (Xarelto); Ticagrelor (Brilinta); Warfarin (Coumadin) only as directed by your surgeon and/or the prescribing physician    Some common examples of other medications that can thin your blood are: Aspirin, Ibuprofen (Advil, Motrin), Naproxen (Aleve), Meloxicam (Mobic), Celecoxib (Celebrex), Fish Oil, many Herbal Supplements. These medications should usually be stopped at least 7 days prior to surgery. Please stop taking diclofenac, ibuprofen, and multivitamin 7 days prior to surgery (10/27/22). Tylenol is OK to take for pain the week prior to surgery. Failure to stop certain medications may interfere with your scheduled surgery. If you receive instructions from your surgeon regarding what medications to stop prior to surgery, please follow those specific instructions. Please take the following medication(s) the day of surgery with small sips of water:              Buspar, effexor, metoprolol, prilosec    Please use your inhaler(s) if needed and bring your inhaler(s) from home the day of surgery. Showering Before Surgery: You can play an important role in your own health by carefully washing before surgery. Shower the night before and the morning of surgery using the instructions below. If you are allergic to Chlorhexidine Gluconate (CHG) use antibacterial soap instead.     If you were given Chlorhexidine soap, please follow the instructions included with the soap to shower the night before and the morning of surgery. If you were not given Chlorhexidine, please shower or bathe the morning of surgery using an antibacterial soap. Please dress in clean clothing after showering. General information about Chlorhexidine Gluconate (CHG)   * It should not be used on hair, face, ears, genital area or skin that is not intact. * It should not be used if breast feeding. * It should not be used if you allergic to CHG. * See the bottle for additional information. Do Not apply any powder, deodorant, lotion/cream/oil, perfume/aftershave, cosmetics, or alcohol-based skin or hair products after showering. Do Not shave near the planned surgical site unless specifically instructed to.     1. Wash your hair using your normal shampoo and rinse. 2. Wash your face and genital area (privates) using your own shampoo and rinse. 3. Turn off the shower water and pour half of the bottle of CHG onto a clean washcloth. 4. Wash your body from neck down to toes. Pay special attention to your surgical site. 5. Leave the CHG on your skin for 5 minutes and rinse it off.   6. Pat dry with a clean towel, sleep in clean clothes and clean sheets. 7. Do not shave near the surgical site. 8. Repeat process the morning of surgery. CHG may cause dry skin, but should not cause redness, rash, or intense itching. If an suspect an allergic reaction, use your own soap and shampoo for the morning of surgery and report reaction to the pre-operative nurse. Additional Instructions:      1. If you are having any type of anesthesia, you are to have NOTHING to eat or drink after midnight the night before surgery. This includes no gum, hard candy, mints or water. The only exception to this is small sips of water to take the medications listed above. No smoking or chewing tobacco after midnight. No alcoholic beverages for 24 hours prior to surgery.   2. Brush your teeth but do not swallow any water. 3. If you wear glasses bring a case for them if you have one. No contacts should be worn the day of surgery. You may also bring your hearing aids. If you have dentures, most surgical procedures involving anesthesia will require that you remove them prior to surgery. 4. If you sleep with a CPAP or BiPAP machine at home and plan on staying in the hospital overnight after surgery, please bring your machine with you. 5. Do not wear any jewelry or body piercings the day of surgery. No nail polish on the operative extremity (arm/hand or leg/foot surgeries)   6. If you are staying overnight with us, you may bring a small bag of necessary personal items. 7. Please wear loose, comfortable clothing. If you are potentially going to have a cast, sling, brace or bulky dressing, make sure to wear clothing that will fit over it. 8. In case of illness - If you have cold or flu like symptoms (high fever, runny nose, sore throat, cough, etc.) rash, nausea, vomiting, loose stools, and/or recent contact with someone who has a contagious disease (chicken pox, measles, COVID-19, etc.). Please call your surgeon before coming to the hospital.    Transportation After Your Surgery/Procedure: If you are going home the same day of surgery you need someone to drive you home. Your  must be at least 25years of age. A taxi cab or other nonmedical public transportation is not acceptable unless you have someone to ride home in the vehicle with you. For your safety, someone must remain with you for the first 24 hours after your surgery if you receive anesthesia or medication. If you do not have someone to stay with you, your procedure may be cancelled. As a patient at [facility] you can expect quality medical and nursing care that is centered on you individual needs. Our goal is to make your surgical experience as comfortable as possible.     Any questions about preparing for your surgery please call [phone].      ____________________________   ____________________________  Signature (Patient)                                 Signature (Nurse)                     Date

## 2022-10-26 ENCOUNTER — HOSPITAL ENCOUNTER (OUTPATIENT)
Dept: SURGERY | Age: 62
Discharge: HOME | End: 2022-10-26
Payer: COMMERCIAL

## 2022-10-26 VITALS
DIASTOLIC BLOOD PRESSURE: 57 MMHG | WEIGHT: 188 LBS | BODY MASS INDEX: 33.31 KG/M2 | HEART RATE: 85 BPM | HEIGHT: 63 IN | SYSTOLIC BLOOD PRESSURE: 119 MMHG

## 2022-10-26 DIAGNOSIS — R22.42 HIP MASS, LEFT: Primary | ICD-10-CM

## 2022-10-26 PROCEDURE — 99202 OFFICE O/P NEW SF 15 MIN: CPT

## 2022-10-27 ENCOUNTER — OFFICE VISIT (OUTPATIENT)
Dept: ORTHOPEDIC SURGERY | Age: 62
End: 2022-10-27
Payer: COMMERCIAL

## 2022-10-27 VITALS — OXYGEN SATURATION: 100 % | BODY MASS INDEX: 33.31 KG/M2 | WEIGHT: 188 LBS | RESPIRATION RATE: 16 BRPM | HEIGHT: 63 IN

## 2022-10-27 DIAGNOSIS — M21.861 GASTROCNEMIUS EQUINUS OF RIGHT LOWER EXTREMITY: ICD-10-CM

## 2022-10-27 DIAGNOSIS — M72.2 PLANTAR FASCIITIS: Primary | ICD-10-CM

## 2022-10-27 PROCEDURE — G8484 FLU IMMUNIZE NO ADMIN: HCPCS | Performed by: ORTHOPAEDIC SURGERY

## 2022-10-27 PROCEDURE — G8427 DOCREV CUR MEDS BY ELIG CLIN: HCPCS | Performed by: ORTHOPAEDIC SURGERY

## 2022-10-27 PROCEDURE — G8417 CALC BMI ABV UP PARAM F/U: HCPCS | Performed by: ORTHOPAEDIC SURGERY

## 2022-10-27 PROCEDURE — 1036F TOBACCO NON-USER: CPT | Performed by: ORTHOPAEDIC SURGERY

## 2022-10-27 PROCEDURE — 99213 OFFICE O/P EST LOW 20 MIN: CPT | Performed by: ORTHOPAEDIC SURGERY

## 2022-10-27 PROCEDURE — 3017F COLORECTAL CA SCREEN DOC REV: CPT | Performed by: ORTHOPAEDIC SURGERY

## 2022-10-27 NOTE — PROGRESS NOTES
[address]  [facility]  [address]  [facility] [address]  Dept: [phone]    Ambulatory Orthopedic Follow Up Visit    Preoperative Diagnosis:   Left ankle instability with recurrent sprains  Left peroneal tendinopathy   Left gastrocnemius equinus contracture  Body mass index is 33.07 kg/m². Postoperative Diagnosis:   Same      Procedures Performed:  (3/4/2021)  Left ankle lateral ligamentous repair (with Arthrex fiber tack anchors x2 + internal brace)  Left peroneus brevis debridement  Left peroneus longus tenolysis  Left leg gastroc recession, performed through separate incision      CHIEF COMPLAINT:    Chief Complaint   Patient presents with    Ankle Pain     Right         HISTORY OF PRESENT ILLNESS:       She is a 65 y.o. female, seen again today in the office for evaluation of a new issue as above, which began atraumatically over 20 years ago  . At today's visit, she is using no brace/assistive device. History is obtained today from:   [x]  the patient     []  EMR     []  one family member/friend    []  multiple family members/friends    []  other:      Regarding the previous issue, she reports the previous problem is doing better. She denies any significant pain in her left ankle, and reports that her ankle is much stronger (she is not using a brace or assistive device for this either). INTERVAL HISTORY 6/23/2022:  She is seen again today in the office for evaluation of a new issue as above, which began around 3/15/2022 atraumatically  . At today's visit, she is using no brace/assistive device. History is obtained today from:   [x]  the patient     []  EMR     []  one family member/friend    []  multiple family members/friends    []  other:      INTERVAL HISTORY 10/4/2022:  She is seen again today in the office for follow up of a previous issue (as above).  Since being seen last, the patient is doing worse. At today's visit, she is not using a brace or assistive device. History is obtained today from:   [x]  the patient     []  EMR     []  one family member/friend    []  multiple family members/friends    []  other:      She reports that she recently went on vacation, and reports that since that time she has had an increase in pain in her right plantar heel. She reports that she was recently in the ER for her plantar heel pain. INTERVAL HISTORY 10/27/2022:  She is seen again today in the office for follow up of a previous issue (as above). Since being seen last, she reports the problem has not significantly improved. At today's visit, she is using no brace/assistive device. She is here today to discuss surgery, and reports that she wishes to proceed with surgery in the near future. She denies any other clinically significant changes in history. History is obtained today from:   [x]  the patient     []  EMR     []  one family member/friend    []  multiple family members/friends    []  other:        REVIEW OF SYSTEMS:   Musculoskeletal: See HPI for pertinent positives     Past Medical History:    She  has a past medical history of Arthritis, ASCUS with positive high risk HPV cervical (03/22/2016), Asthma, COVID-19 (2020), Depression, Diverticulitis, ESBL (extended spectrum beta-lactamase) producing bacteria infection (02/03/2019), GERD (gastroesophageal reflux disease), Hyperlipidemia, MRSA (methicillin resistant staph aureus) culture positive (04/18/2016), Rectocele, and Tachycardia. Past Surgical History:    She  has a past surgical history that includes Hysterectomy (1996); knee surgery (Left); Cystocopy (2/25/2015); Nerve Block (07/28/2017); Nerve Block (09/22/2017); Nerve Block (09/22/2017); Nerve Block (11/10/2017); Colonoscopy (N/A, 7/19/2018); Upper gastrointestinal endoscopy (1/30/2019); Knee arthroscopy (Left, 5/13/2019); and Ankle surgery (Left, 3/4/2021).      Current Medications:     Current Outpatient Medications:     albuterol sulfate HFA (PROVENTIL;VENTOLIN;PROAIR) 108 (90 Base) MCG/ACT inhaler, INAHLE 2 PUFFS BY MOUTH INTO THE LUNGS EVERY 6 HOURS AS NEEDED FOR WHEEZING, Disp: 18 g, Rfl: 3    venlafaxine (EFFEXOR XR) 75 MG extended release capsule, DTAKE 1 CAPSULE BY MOUTH DAILY, Disp: 28 capsule, Rfl: 3    vitamin D (ERGOCALCIFEROL) 1.25 MG (43996 UT) CAPS capsule, TAKE 1 CAPSULE BY MOUTH EVERY WEEK, Disp: 4 capsule, Rfl: 3    chlorzoxazone (PARAFON FORTE) 500 MG tablet, TAKE 1/2 TABLET BY MOUTH TWICE DAILY AS NEEDED, Disp: 28 tablet, Rfl: 3    omeprazole (PRILOSEC) 40 MG delayed release capsule, TAKE 1 CAPSULE BY MOUTH DAILY EVERY MORNING, Disp: 28 capsule, Rfl: 3    ibuprofen (ADVIL;MOTRIN) 800 MG tablet, Take 1 tablet by mouth 3 times daily as needed for Pain, Disp: 30 tablet, Rfl: 0    diclofenac (VOLTAREN) 50 MG EC tablet, TAKE 1 TABLET BY MOUTH TWICE DAILY, Disp: 56 tablet, Rfl: 2    busPIRone (BUSPAR) 15 MG tablet, TAKE 1 TABLET BY MOUTH TWICE DAILY, Disp: 56 tablet, Rfl: 3    pravastatin (PRAVACHOL) 40 MG tablet, TAKE 1 TABLET BY MOUTH DAILY, Disp: 28 tablet, Rfl: 3    lansoprazole (PREVACID) 30 MG delayed release capsule, Take 1 capsule by mouth in the morning., Disp: 30 capsule, Rfl: 3    EPINEPHrine (EPIPEN 2-VIOLET) 0.3 MG/0.3ML SOAJ injection, Inject 0.3 mLs into the muscle once for 1 dose Use as directed for allergic reaction, Disp: 0.3 mL, Rfl: 0    diclofenac sodium (VOLTAREN) 1 % GEL, Apply 4 g topically 4 times daily as needed for Pain, Disp: 200 g, Rfl: 0    Handicap Placard MISC, by Does not apply route DISABLED PARKING PERMIT AUTHORIZATION Routine   Qty-1  The patient has the following condition(s) which qualifies him/her for disabled parking: Walking severely limited due to orthopedic condition   Privilege Duration: Temporary for 3 months, Disp: 1 each, Rfl: 0    ketotifen (ZADITOR) 0.025 % ophthalmic solution, USE 1 DROP INTO BOTH EYES TWICE DAILY, Disp: 10 mL, Rfl: 1    fluticasone (FLONASE) 50 MCG/ACT nasal spray, INSTILL 2 SPRAYS INTO EACH NOSTRIL ONCE DAILY, Disp: 16 g, Rfl: 3    miSOPROStol (CYTOTEC) 200 MCG tablet, Take 200 mcg by mouth daily, Disp: , Rfl:     senna-docusate (PERICOLACE) 8.6-50 MG per tablet, Take 1 tablet by mouth daily as needed for Constipation (Patient taking differently: Take 1 tablet by mouth 2 times daily), Disp: 30 tablet, Rfl: 1    Multiple Vitamins-Minerals (CULTURELLE PROBIOTICS + MULTIV) CHEW, Take 1 tablet by mouth daily, Disp: 30 tablet, Rfl: 2    MI-ACID GAS RELIEF 80 MG chewable tablet, CHEW 1 TABLET BY MOUTH FOUR TIMES DAILY AS NEEDED FOR FLATULENCE, Disp: 120 tablet, Rfl: 5    CRANBERRY CONCENTRATE 500 MG CAPS, TAKE 1 CAPSULE BY MOUTH TWICE DAILY, Disp: 56 capsule, Rfl: 3    metoprolol succinate (TOPROL XL) 25 MG extended release tablet, Take 25 mg by mouth daily , Disp: , Rfl:      Allergies:    Shellfish allergy, Shrimp (diagnostic), Famotidine, and Gabapentin    Family History:  family history includes Colon Cancer in her mother; Crohn's Disease in her niece; Heart Attack in her brother; Heart Disease in her brother; High Blood Pressure in her father. Social History:   Social History     Occupational History    Not on file   Tobacco Use    Smoking status: Former     Packs/day: 2.00     Years: 0.50     Pack years: 1.00     Types: Cigarettes     Quit date:      Years since quittin.8    Smokeless tobacco: Never   Vaping Use    Vaping Use: Never used   Substance and Sexual Activity    Alcohol use:  Yes     Alcohol/week: 0.0 standard drinks     Comment: social    Drug use: No    Sexual activity: Not on file       OBJECTIVE:  Resp 16   Ht 5' 3\" (1.6 m)   Wt 188 lb (85.3 kg)   SpO2 100%   BMI 33.30 kg/m²    Psych: alert and oriented to person, time, and place  Cardio:  well perfused extremities  Resp:  normal respiratory effort  Skin:  no cyanosis  Hem/lymph:  no lymphedema  Neuro:  sensation to light touch unchanged since last visit  Musculoskeletal:    RLE:  Vascular: Limb well perfused, compartments soft/compressible. Skin: No erythema/ulcers. Intact. Neurovascular Status:  Grossly neurovascularly intact throughout   Tenderness to Palpation: Plantar heel  -Gastroc equinus  -Negative squeeze test of the calcaneus      (Previous exam)  LLE:  Incisions clean/dry/intact, no erythema/dehiscence/drainage  Sensation to light touch grossly intact throughout, but altered sensation on the dorsal aspect of her foot  Warm and well perfused  Grossly neurovascularly intact distally  No signs of infection  No calf swelling/tenderness  -Instability:  Talar tilt: Negative; Anterior drawer: Negative  -No peroneal subluxation/dislocation with dorsiflexion + eversion or with circumduction  Tenderness to Palpation:  anteromedial and anterolateral knee joint line          RADIOLOGY:   10/27/2022 No new radiology images today. Prior images reviewed for reference. FINDINGS:  Three weightbearing views (AP, Mortise, and Lateral) of the right ankle and three weightbearing views (AP, Oblique, Lateral) of the right foot were obtained in the office today and reviewed, revealing no acute fracture, dislocation, or radioopaque foreign body/tumor. The ankle mortise is maintained with no widening of the clear spaces. Overall alignment is satisfactory. Calcification of the plantar aspect of the calcaneus. IMPRESSION:  No acute fracture/dislocation. Electronically signed by Veronica Brunson MD            FINDINGS:  Four weightbearing views (AP, Tunnel, Lateral, and Sunrise) of the left knee were obtained in the office today and reviewed, revealing no acute fracture, dislocation, or radioopaque foreign body/tumor. Overall alignment is satisfactory. Tricompartmental degenerative changes of the knee with joint narrowing, sclerosis, and osteophytes. Retained hardware in the proximal tibia. IMPRESSION: No acute fracture/dislocation. Degenerative changes as above. Electronically signed by Geno Chirinos MD      ASSESSMENT AND PLAN:  Body mass index is 33.3 kg/m². She has a history of right foot pain secondary to plantar fasciitis with underlying gastroc equinus contracture (a right plantar fascial injection on 6/23/2022 helped her pain approximately 50%). She also has a history of left ankle instability, status post left ankle lateral ligamentous stabilization, with peroneal tendon tenolysis, and gastroc recession. 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 Notably, she has no relevant past medical history. We had a discussion today about the likely diagnosis and its natural history, physical exam and imaging findings, as well as various treatment options in detail. Surgically, we have discussed a right gastroc recession. We have discussed the anticipated postoperative course and relevant weightbearing restrictions/immobilization. She reports that she wishes to proceed with surgery in the near future. The following outside medical records/clearances were reviewed at this visit:  PCP Medical clearance and preop labs. We have discussed the risks of incomplete pain relief. Orders/referrals were placed as below at today's visit. We spoke about the risks/benefits/alternatives to a surgical intervention.  They understand that the risks of surgery may include but are not limited to pain, infection, bleeding, blood clot, damage to soft tissue/vessel/nerve, future surgery, scarring/stiffness, decreased strength/weakness, cosmetic deformity, neuroma/neuritis/phantom pains, delayed soft tissue/bone healing, nonunion, malunion, failure of hardware/fixation/surgery, iatrogenic fracture/dislocation, damage to bone/joint(s), worsening of condition, recurrence, limb length discrepancy, avascular necrosis of bone, neurovascular compromise/compartment syndrome, tourniquet complications, failure of surgery, dissatisfaction with outcome, loss of limb, stroke, heart attack, pulmonary embolus, mental status change, anesthesia risks/reaction, and even death. They expressed verbal understanding of the risks and wish to proceed with surgical intervention. All questions were answered. No guarantees were made or implied. Informed consent was obtained. We also had a discussion about the risk of blood clot and thromboembolic events. The patient understands that there is an increased risk with surgery/immobilization, and understands nothing will completely eliminate the risk of DVT/PE's, and that any prophylactic medication does not substitute for early mobilization. Given her risk profile, I have recommended the following strategy to decrease the risk of blood clots, and the patient agrees and wishes to proceed:  Aspirin 325 mg PO q Day. All questions were answered and the patient agrees with the above plan. The patient will return to clinic postoperatively. At the patient's next visit, depending on how the patient is doing and/or new imaging/labs results, we may consider the following options:    []  Orthotic (OTC)     []  Orthotic (custom)          []  Rocker bottom shoes     []  Brace (OTC)        []  Brace (custom)             []  CAM boot        []  Night splint         []  Heel cups        []  Strap      []  Toe sleeves/splints    []  PT:                     []  Wean out of immobilization   []  Advance activity       []  Topical               []  NSAIDs          []  Ronni         []  Referral:         []  Stress xrays       []  CT         []  MRI        []  Injection:         []  Consider OR      []  Pick OR date    No follow-ups on file. No orders of the defined types were placed in this encounter. No orders of the defined types were placed in this encounter.         Amy Moore MD  Orthopedic Surgery        Please excuse any typos/errors, as this note was created with the assistance of voice recognition software. While intending to generate a document that actually reflects the content of the visit, the document can still have some errors including those of syntax and sound-a-like substitutions which may escape proof reading. In such instances, actual meaning can be extrapolated by context.

## 2022-10-28 ENCOUNTER — HOSPITAL ENCOUNTER (INPATIENT)
Age: 62
LOS: 3 days | Discharge: HOME | DRG: 191 | End: 2022-11-01
Attending: EMERGENCY MEDICINE | Admitting: STUDENT IN AN ORGANIZED HEALTH CARE EDUCATION/TRAINING PROGRAM
Payer: COMMERCIAL

## 2022-10-28 ENCOUNTER — APPOINTMENT (OUTPATIENT)
Dept: GENERAL RADIOLOGY | Age: 62
DRG: 191 | End: 2022-10-28
Payer: COMMERCIAL

## 2022-10-28 DIAGNOSIS — R07.9 CHEST PAIN, UNSPECIFIED TYPE: Primary | ICD-10-CM

## 2022-10-28 DIAGNOSIS — M62.838 MUSCLE SPASM: ICD-10-CM

## 2022-10-28 LAB
ABSOLUTE EOS #: 0.18 K/UL (ref 0–0.44)
ABSOLUTE IMMATURE GRANULOCYTE: <0.03 K/UL (ref 0–0.3)
ABSOLUTE LYMPH #: 1.74 K/UL (ref 1.1–3.7)
ABSOLUTE MONO #: 0.54 K/UL (ref 0.1–1.2)
ANION GAP SERPL CALCULATED.3IONS-SCNC: 11 MMOL/L (ref 9–17)
BASOPHILS # BLD: 1 % (ref 0–2)
BASOPHILS ABSOLUTE: 0.06 K/UL (ref 0–0.2)
BUN BLDV-MCNC: 14 MG/DL (ref 8–23)
CALCIUM SERPL-MCNC: 9.3 MG/DL (ref 8.6–10.4)
CHLORIDE BLD-SCNC: 107 MMOL/L (ref 98–107)
CO2: 27 MMOL/L (ref 20–31)
CREAT SERPL-MCNC: 0.85 MG/DL (ref 0.5–0.9)
EOSINOPHILS RELATIVE PERCENT: 2 % (ref 1–4)
GFR SERPL CREATININE-BSD FRML MDRD: >60 ML/MIN/1.73M2
GLUCOSE BLD-MCNC: 103 MG/DL (ref 70–99)
HCT VFR BLD CALC: 42.6 % (ref 36.3–47.1)
HEMOGLOBIN: 13.9 G/DL (ref 11.9–15.1)
IMMATURE GRANULOCYTES: 0 %
LYMPHOCYTES # BLD: 21 % (ref 24–43)
MCH RBC QN AUTO: 30.2 PG (ref 25.2–33.5)
MCHC RBC AUTO-ENTMCNC: 32.6 G/DL (ref 28.4–34.8)
MCV RBC AUTO: 92.6 FL (ref 82.6–102.9)
MONOCYTES # BLD: 7 % (ref 3–12)
NRBC AUTOMATED: 0 PER 100 WBC
PDW BLD-RTO: 13.2 % (ref 11.8–14.4)
PLATELET # BLD: ABNORMAL K/UL (ref 138–453)
PLATELET, FLUORESCENCE: 290 K/UL (ref 138–453)
PLATELET, IMMATURE FRACTION: 7 % (ref 1.1–10.3)
POTASSIUM SERPL-SCNC: 4.4 MMOL/L (ref 3.7–5.3)
PRO-BNP: 53 PG/ML
RBC # BLD: 4.6 M/UL (ref 3.95–5.11)
SEG NEUTROPHILS: 69 % (ref 36–65)
SEGMENTED NEUTROPHILS ABSOLUTE COUNT: 5.65 K/UL (ref 1.5–8.1)
SODIUM BLD-SCNC: 145 MMOL/L (ref 135–144)
WBC # BLD: 8.2 K/UL (ref 3.5–11.3)

## 2022-10-28 PROCEDURE — 85055 RETICULATED PLATELET ASSAY: CPT

## 2022-10-28 PROCEDURE — 85025 COMPLETE CBC W/AUTO DIFF WBC: CPT

## 2022-10-28 PROCEDURE — 93005 ELECTROCARDIOGRAM TRACING: CPT | Performed by: STUDENT IN AN ORGANIZED HEALTH CARE EDUCATION/TRAINING PROGRAM

## 2022-10-28 PROCEDURE — 80048 BASIC METABOLIC PNL TOTAL CA: CPT

## 2022-10-28 PROCEDURE — 71046 X-RAY EXAM CHEST 2 VIEWS: CPT

## 2022-10-28 PROCEDURE — 84484 ASSAY OF TROPONIN QUANT: CPT

## 2022-10-28 PROCEDURE — 6370000000 HC RX 637 (ALT 250 FOR IP): Performed by: STUDENT IN AN ORGANIZED HEALTH CARE EDUCATION/TRAINING PROGRAM

## 2022-10-28 PROCEDURE — 83880 ASSAY OF NATRIURETIC PEPTIDE: CPT

## 2022-10-28 PROCEDURE — 99285 EMERGENCY DEPT VISIT HI MDM: CPT

## 2022-10-28 RX ORDER — ONDANSETRON 4 MG/1
4 TABLET, FILM COATED ORAL ONCE
Status: COMPLETED | OUTPATIENT
Start: 2022-10-28 | End: 2022-10-28

## 2022-10-28 RX ORDER — NITROGLYCERIN 0.4 MG/1
0.4 TABLET SUBLINGUAL ONCE
Status: COMPLETED | OUTPATIENT
Start: 2022-10-28 | End: 2022-10-28

## 2022-10-28 RX ADMIN — ONDANSETRON HYDROCHLORIDE 4 MG: 4 TABLET, FILM COATED ORAL at 23:17

## 2022-10-28 RX ADMIN — NITROGLYCERIN 0.4 MG: 0.4 TABLET SUBLINGUAL at 23:17

## 2022-10-28 ASSESSMENT — PAIN DESCRIPTION - FREQUENCY: FREQUENCY: CONTINUOUS

## 2022-10-28 ASSESSMENT — PAIN SCALES - GENERAL: PAINLEVEL_OUTOF10: 8

## 2022-10-28 ASSESSMENT — PAIN - FUNCTIONAL ASSESSMENT: PAIN_FUNCTIONAL_ASSESSMENT: 0-10

## 2022-10-28 ASSESSMENT — PAIN DESCRIPTION - DESCRIPTORS: DESCRIPTORS: PRESSURE;POUNDING

## 2022-10-29 PROBLEM — I20.0 UNSTABLE ANGINA (HCC): Status: ACTIVE | Noted: 2022-10-29

## 2022-10-29 LAB
EKG ATRIAL RATE: 102 BPM
EKG ATRIAL RATE: 70 BPM
EKG P AXIS: 52 DEGREES
EKG P AXIS: 72 DEGREES
EKG P-R INTERVAL: 140 MS
EKG P-R INTERVAL: 150 MS
EKG Q-T INTERVAL: 350 MS
EKG Q-T INTERVAL: 408 MS
EKG QRS DURATION: 72 MS
EKG QRS DURATION: 72 MS
EKG QTC CALCULATION (BAZETT): 440 MS
EKG QTC CALCULATION (BAZETT): 456 MS
EKG R AXIS: 20 DEGREES
EKG R AXIS: 37 DEGREES
EKG T AXIS: 37 DEGREES
EKG T AXIS: 69 DEGREES
EKG VENTRICULAR RATE: 102 BPM
EKG VENTRICULAR RATE: 70 BPM
SARS-COV-2, RAPID: NOT DETECTED
SPECIMEN DESCRIPTION: NORMAL
TROPONIN, HIGH SENSITIVITY: <6 NG/L (ref 0–14)
TROPONIN, HIGH SENSITIVITY: <6 NG/L (ref 0–14)

## 2022-10-29 PROCEDURE — 2580000003 HC RX 258: Performed by: STUDENT IN AN ORGANIZED HEALTH CARE EDUCATION/TRAINING PROGRAM

## 2022-10-29 PROCEDURE — 6370000000 HC RX 637 (ALT 250 FOR IP): Performed by: STUDENT IN AN ORGANIZED HEALTH CARE EDUCATION/TRAINING PROGRAM

## 2022-10-29 PROCEDURE — 2060000000 HC ICU INTERMEDIATE R&B

## 2022-10-29 PROCEDURE — 84484 ASSAY OF TROPONIN QUANT: CPT

## 2022-10-29 PROCEDURE — 93005 ELECTROCARDIOGRAM TRACING: CPT | Performed by: EMERGENCY MEDICINE

## 2022-10-29 PROCEDURE — 2500000003 HC RX 250 WO HCPCS: Performed by: STUDENT IN AN ORGANIZED HEALTH CARE EDUCATION/TRAINING PROGRAM

## 2022-10-29 PROCEDURE — 87635 SARS-COV-2 COVID-19 AMP PRB: CPT

## 2022-10-29 PROCEDURE — 99223 1ST HOSP IP/OBS HIGH 75: CPT | Performed by: FAMILY MEDICINE

## 2022-10-29 RX ORDER — KETOTIFEN FUMARATE 0.35 MG/ML
1 SOLUTION/ DROPS OPHTHALMIC DAILY
Status: DISCONTINUED | OUTPATIENT
Start: 2022-10-29 | End: 2022-11-01 | Stop reason: HOSPADM

## 2022-10-29 RX ORDER — POLYETHYLENE GLYCOL 3350 17 G/17G
17 POWDER, FOR SOLUTION ORAL DAILY PRN
Status: DISCONTINUED | OUTPATIENT
Start: 2022-10-29 | End: 2022-11-01 | Stop reason: HOSPADM

## 2022-10-29 RX ORDER — METOPROLOL SUCCINATE 25 MG/1
25 TABLET, EXTENDED RELEASE ORAL DAILY
Status: DISCONTINUED | OUTPATIENT
Start: 2022-10-29 | End: 2022-11-01 | Stop reason: HOSPADM

## 2022-10-29 RX ORDER — SODIUM CHLORIDE 9 MG/ML
INJECTION, SOLUTION INTRAVENOUS CONTINUOUS
Status: DISCONTINUED | OUTPATIENT
Start: 2022-10-29 | End: 2022-10-30

## 2022-10-29 RX ORDER — ATORVASTATIN CALCIUM 40 MG/1
40 TABLET, FILM COATED ORAL NIGHTLY
Status: DISCONTINUED | OUTPATIENT
Start: 2022-10-29 | End: 2022-11-01 | Stop reason: HOSPADM

## 2022-10-29 RX ORDER — VENLAFAXINE HYDROCHLORIDE 75 MG/1
75 CAPSULE, EXTENDED RELEASE ORAL
Status: DISCONTINUED | OUTPATIENT
Start: 2022-10-29 | End: 2022-11-01 | Stop reason: HOSPADM

## 2022-10-29 RX ORDER — ACETAMINOPHEN 325 MG/1
650 TABLET ORAL EVERY 6 HOURS PRN
Status: DISCONTINUED | OUTPATIENT
Start: 2022-10-29 | End: 2022-11-01 | Stop reason: HOSPADM

## 2022-10-29 RX ORDER — ONDANSETRON 2 MG/ML
4 INJECTION INTRAMUSCULAR; INTRAVENOUS EVERY 4 HOURS PRN
Status: DISCONTINUED | OUTPATIENT
Start: 2022-10-29 | End: 2022-11-01 | Stop reason: HOSPADM

## 2022-10-29 RX ORDER — MISOPROSTOL 100 UG/1
200 TABLET ORAL DAILY
Status: DISCONTINUED | OUTPATIENT
Start: 2022-10-29 | End: 2022-11-01 | Stop reason: HOSPADM

## 2022-10-29 RX ORDER — PRAVASTATIN SODIUM 20 MG
40 TABLET ORAL DAILY
Status: DISCONTINUED | OUTPATIENT
Start: 2022-10-29 | End: 2022-10-29

## 2022-10-29 RX ORDER — ALBUTEROL SULFATE 90 UG/1
2 AEROSOL, METERED RESPIRATORY (INHALATION) EVERY 6 HOURS PRN
Status: DISCONTINUED | OUTPATIENT
Start: 2022-10-29 | End: 2022-11-01 | Stop reason: HOSPADM

## 2022-10-29 RX ORDER — SODIUM CHLORIDE 0.9 % (FLUSH) 0.9 %
5-40 SYRINGE (ML) INJECTION EVERY 12 HOURS SCHEDULED
Status: DISCONTINUED | OUTPATIENT
Start: 2022-10-29 | End: 2022-11-01 | Stop reason: HOSPADM

## 2022-10-29 RX ORDER — SODIUM CHLORIDE 0.9 % (FLUSH) 0.9 %
5-40 SYRINGE (ML) INJECTION PRN
Status: DISCONTINUED | OUTPATIENT
Start: 2022-10-29 | End: 2022-11-01 | Stop reason: HOSPADM

## 2022-10-29 RX ORDER — POTASSIUM CHLORIDE 20 MEQ/1
40 TABLET, EXTENDED RELEASE ORAL PRN
Status: DISCONTINUED | OUTPATIENT
Start: 2022-10-29 | End: 2022-11-01 | Stop reason: HOSPADM

## 2022-10-29 RX ORDER — ASPIRIN 81 MG/1
81 TABLET, CHEWABLE ORAL DAILY
Status: DISCONTINUED | OUTPATIENT
Start: 2022-10-29 | End: 2022-11-01 | Stop reason: HOSPADM

## 2022-10-29 RX ORDER — SODIUM CHLORIDE 9 MG/ML
25 INJECTION, SOLUTION INTRAVENOUS PRN
Status: DISCONTINUED | OUTPATIENT
Start: 2022-10-29 | End: 2022-11-01 | Stop reason: HOSPADM

## 2022-10-29 RX ORDER — POTASSIUM CHLORIDE 7.45 MG/ML
10 INJECTION INTRAVENOUS PRN
Status: DISCONTINUED | OUTPATIENT
Start: 2022-10-29 | End: 2022-11-01 | Stop reason: HOSPADM

## 2022-10-29 RX ORDER — ACETAMINOPHEN 650 MG/1
650 SUPPOSITORY RECTAL EVERY 6 HOURS PRN
Status: DISCONTINUED | OUTPATIENT
Start: 2022-10-29 | End: 2022-11-01 | Stop reason: HOSPADM

## 2022-10-29 RX ORDER — NITROGLYCERIN 20 MG/100ML
5 INJECTION INTRAVENOUS CONTINUOUS
Status: DISCONTINUED | OUTPATIENT
Start: 2022-10-29 | End: 2022-11-01 | Stop reason: HOSPADM

## 2022-10-29 RX ORDER — BUSPIRONE HYDROCHLORIDE 15 MG/1
15 TABLET ORAL 2 TIMES DAILY
Status: DISCONTINUED | OUTPATIENT
Start: 2022-10-29 | End: 2022-11-01 | Stop reason: HOSPADM

## 2022-10-29 RX ORDER — PANTOPRAZOLE SODIUM 40 MG/1
40 TABLET, DELAYED RELEASE ORAL
Status: DISCONTINUED | OUTPATIENT
Start: 2022-10-29 | End: 2022-11-01 | Stop reason: HOSPADM

## 2022-10-29 RX ADMIN — SODIUM CHLORIDE: 9 INJECTION, SOLUTION INTRAVENOUS at 23:02

## 2022-10-29 RX ADMIN — PANTOPRAZOLE SODIUM 40 MG: 40 TABLET, DELAYED RELEASE ORAL at 08:04

## 2022-10-29 RX ADMIN — NITROGLYCERIN 5 MCG/MIN: 20 INJECTION INTRAVENOUS at 11:25

## 2022-10-29 RX ADMIN — DESMOPRESSIN ACETATE 40 MG: 0.2 TABLET ORAL at 21:20

## 2022-10-29 RX ADMIN — ACETAMINOPHEN 650 MG: 325 TABLET ORAL at 11:11

## 2022-10-29 RX ADMIN — PRAVASTATIN SODIUM 40 MG: 20 TABLET ORAL at 09:21

## 2022-10-29 RX ADMIN — METOPROLOL SUCCINATE 25 MG: 25 TABLET, FILM COATED, EXTENDED RELEASE ORAL at 11:18

## 2022-10-29 RX ADMIN — BUSPIRONE HYDROCHLORIDE 15 MG: 15 TABLET ORAL at 21:20

## 2022-10-29 RX ADMIN — SODIUM CHLORIDE: 9 INJECTION, SOLUTION INTRAVENOUS at 08:03

## 2022-10-29 RX ADMIN — BUSPIRONE HYDROCHLORIDE 15 MG: 15 TABLET ORAL at 09:21

## 2022-10-29 RX ADMIN — ACETAMINOPHEN 650 MG: 325 TABLET ORAL at 21:20

## 2022-10-29 RX ADMIN — VENLAFAXINE HYDROCHLORIDE 75 MG: 75 CAPSULE, EXTENDED RELEASE ORAL at 09:21

## 2022-10-29 RX ADMIN — ACETAMINOPHEN 650 MG: 325 TABLET ORAL at 05:08

## 2022-10-29 RX ADMIN — ASPIRIN 81 MG 81 MG: 81 TABLET ORAL at 13:14

## 2022-10-29 ASSESSMENT — PAIN DESCRIPTION - ONSET: ONSET: ON-GOING

## 2022-10-29 ASSESSMENT — PAIN SCALES - GENERAL
PAINLEVEL_OUTOF10: 3
PAINLEVEL_OUTOF10: 0
PAINLEVEL_OUTOF10: 0
PAINLEVEL_OUTOF10: 5
PAINLEVEL_OUTOF10: 3
PAINLEVEL_OUTOF10: 0
PAINLEVEL_OUTOF10: 0
PAINLEVEL_OUTOF10: 5

## 2022-10-29 ASSESSMENT — PAIN SCALES - WONG BAKER

## 2022-10-29 ASSESSMENT — ENCOUNTER SYMPTOMS
SORE THROAT: 0
ABDOMINAL PAIN: 0
NAUSEA: 1
SHORTNESS OF BREATH: 1
CHEST TIGHTNESS: 1
SHORTNESS OF BREATH: 0
COUGH: 0
NAUSEA: 0
TROUBLE SWALLOWING: 0
DIARRHEA: 0
CHEST TIGHTNESS: 0
BACK PAIN: 0
VOMITING: 0
ABDOMINAL DISTENTION: 0
BLOOD IN STOOL: 0
CONSTIPATION: 0
STRIDOR: 0
WHEEZING: 0

## 2022-10-29 ASSESSMENT — PAIN DESCRIPTION - LOCATION
LOCATION: HEAD
LOCATION: CHEST
LOCATION: CHEST

## 2022-10-29 ASSESSMENT — PAIN DESCRIPTION - DESCRIPTORS
DESCRIPTORS: ACHING
DESCRIPTORS: SHARP
DESCRIPTORS: SHARP

## 2022-10-29 ASSESSMENT — HEART SCORE
ECG: 1
ECG: 1
ECG: 0

## 2022-10-29 ASSESSMENT — PAIN DESCRIPTION - PAIN TYPE: TYPE: ACUTE PAIN

## 2022-10-29 ASSESSMENT — PAIN DESCRIPTION - ORIENTATION
ORIENTATION: MID
ORIENTATION: MID

## 2022-10-29 ASSESSMENT — PAIN - FUNCTIONAL ASSESSMENT
PAIN_FUNCTIONAL_ASSESSMENT: ACTIVITIES ARE NOT PREVENTED
PAIN_FUNCTIONAL_ASSESSMENT: ACTIVITIES ARE NOT PREVENTED

## 2022-10-29 ASSESSMENT — PAIN DESCRIPTION - FREQUENCY: FREQUENCY: INTERMITTENT

## 2022-10-29 NOTE — CONSULTS
[facility]    Consult / H&P               Today's Date: 10/29/2022  Patient Name: Trina Lind  Date of admission: 10/28/2022 10:42 PM  Patient's age: 65 y.o., 1960  Admission Dx: Chest pain [R07.9]    Reason for Consult: Cardiac work-up, chest pain    Requesting Physician: Nicholas Kelly MD    CHIEF COMPLAINT: Chest pain    History Obtained From:  patient, EMR    HISTORY OF PRESENT ILLNESS:      The patient is a 65 y.o. female who presents to the hospital with right-sided chest pain with radiation to her jaw. Patient stated that this pain started last night at 8:00, she was not exerting herself at the time. Patient stated that the pain was a pressure and was associated with difficulty breathing, nausea, diaphoresis and a feeling like her \"temples were going to explode\". She normally gets chest pain and does not think of it as any big deal normally, however this pain was much worse than before which brought her in to the ED. Cardiology was consulted for cardiac work-up in the setting of chest pain. Patient has a significant history of HLD, GERD, and tachycardia for which she takes metoprolol. Patient has a significant family history of CAD, mother passed away from massive heart attack and her brother had a heart attack at 39. Patient's last echo was 6/2/2018 which showed LVEF > 55%, no WMA, MVP with mild MR. Patient had a negative nuclear stress test in 2015. In the ED, patient was noted to be tachycardic and hypertensive. Troponins were negative x2 and EKG showed sinus tachycardia with possible LA enlargement. She was given a sublingual nitroglycerin tablet with improvement of her pain.       Past Medical History:   has a past medical history of Arthritis, ASCUS with positive high risk HPV cervical, Asthma, COVID-19, Depression, Diverticulitis, ESBL (extended spectrum beta-lactamase) producing bacteria infection, GERD (gastroesophageal reflux disease), Hyperlipidemia, MRSA (methicillin resistant staph aureus) culture positive, Rectocele, and Tachycardia. Past Surgical History:   has a past surgical history that includes Hysterectomy (1996); knee surgery (Left); Cystocopy (2/25/2015); Nerve Block (07/28/2017); Nerve Block (09/22/2017); Nerve Block (09/22/2017); Nerve Block (11/10/2017); Colonoscopy (N/A, 7/19/2018); Upper gastrointestinal endoscopy (1/30/2019); Knee arthroscopy (Left, 5/13/2019); and Ankle surgery (Left, 3/4/2021). Home Medications:    Prior to Admission medications    Medication Sig Start Date End Date Taking? Authorizing Provider   albuterol sulfate HFA (PROVENTIL;VENTOLIN;PROAIR) 108 (90 Base) MCG/ACT inhaler INAHLE 2 PUFFS BY MOUTH INTO THE LUNGS EVERY 6 HOURS AS NEEDED FOR WHEEZING 10/20/22   Yang Holbrook MD   venlafaxine (EFFEXOR XR) 75 MG extended release capsule DTAKE 1 CAPSULE BY MOUTH DAILY 10/20/22   Yang Holbrook MD   vitamin D (ERGOCALCIFEROL) 1.25 MG (25209 UT) CAPS capsule TAKE 1 CAPSULE BY MOUTH EVERY WEEK 10/20/22   Yang Holbrook MD   chlorzoxazone (PARAFON FORTE) 500 MG tablet TAKE 1/2 TABLET BY MOUTH TWICE DAILY AS NEEDED 10/20/22   Yang Holbrook MD   omeprazole (PRILOSEC) 40 MG delayed release capsule TAKE 1 CAPSULE BY MOUTH DAILY EVERY MORNING 9/26/22   Yang Holbrook MD   ibuprofen (ADVIL;MOTRIN) 800 MG tablet Take 1 tablet by mouth 3 times daily as needed for Pain 9/23/22   Mat Blank MD   diclofenac (VOLTAREN) 50 MG EC tablet TAKE 1 TABLET BY MOUTH TWICE DAILY 8/29/22   Yang Holbrook MD   busPIRone (BUSPAR) 15 MG tablet TAKE 1 TABLET BY MOUTH TWICE DAILY 8/29/22   Yang Holbrook MD   pravastatin (PRAVACHOL) 40 MG tablet TAKE 1 TABLET BY MOUTH DAILY 8/29/22 9/28/22  Yang Holbrook MD   lansoprazole (PREVACID) 30 MG delayed release capsule Take 1 capsule by mouth in the morning.  8/3/22   Yang Holbrook MD   EPINEPHrine (EPIPEN 2-VIOLET) 0.3 MG/0.3ML SOAJ injection Inject 0.3 mLs into the muscle once for 1 dose Use as directed for allergic reaction 8/3/22 8/3/22  Levon Banks MD   diclofenac sodium (VOLTAREN) 1 % GEL Apply 4 g topically 4 times daily as needed for Pain 6/23/22   Vicky Harris MD   Handicap [facility] [address] [facility] by Does not apply route DISABLED PARKING PERMIT AUTHORIZATION  Routine     Qty-1    The patient has the following condition(s) which qualifies him/her for disabled parking: Walking severely limited due to orthopedic condition     Privilege Duration: Temporary for 3 months 3/4/21   Vicky Harris MD   ketotifen (ZADITOR) 0.025 % ophthalmic solution USE 1 DROP INTO BOTH EYES TWICE DAILY 1/19/21   Levon Banks MD   fluticasone (FLONASE) 50 MCG/ACT nasal spray INSTILL 2 SPRAYS INTO EACH NOSTRIL ONCE DAILY 1/11/21   Levon Banks MD   miSOPROStol (CYTOTEC) 200 MCG tablet Take 200 mcg by mouth daily    Historical Provider, MD   senna-docusate (Arlina Aspen) 8.6-50 MG per tablet Take 1 tablet by mouth daily as needed for Constipation  Patient taking differently: Take 1 tablet by mouth 2 times daily 2/26/20   Vida Coelho MD   Multiple Vitamins-Minerals (CULTURELLE PROBIOTICS + MULTIV) CHEW Take 1 tablet by mouth daily 1/29/20   Vida Coelho MD   MI-ACID GAS RELIEF 80 MG chewable tablet CHEW 1 TABLET BY MOUTH FOUR TIMES DAILY AS NEEDED FOR FLATULENCE 11/19/19   Vida Coelho MD   CRANBERRY CONCENTRATE 500 MG CAPS TAKE 1 CAPSULE BY MOUTH TWICE DAILY 8/21/19   Levon Banks MD   metoprolol succinate (TOPROL XL) 25 MG extended release tablet Take 25 mg by mouth daily     Historical Provider, MD       Allergies:  Shellfish allergy, Shrimp (diagnostic), Famotidine, and Gabapentin    Social History:   reports that she quit smoking about 27 years ago. Her smoking use included cigarettes. She has a 1.00 pack-year smoking history. She has never used smokeless tobacco. She reports current alcohol use. She reports that she does not use drugs.      Family History: family history includes Colon Cancer in her mother; Crohn's Disease in her niece; Heart Attack in her brother; Heart Disease in her brother; High Blood Pressure in her father. No h/o sudden cardiac death. REVIEW OF SYSTEMS:    Constitutional: there has been no unanticipated weight loss. There's been No change in energy level, No change in activity level. Eyes: No visual changes or diplopia. No scleral icterus. ENT: No Headaches  Cardiovascular: as above  Respiratory: No previous pulmonary problems, No cough  Gastrointestinal: No abdominal pain. No change in bowel or bladder habits. Genitourinary: No dysuria, trouble voiding, or hematuria. Musculoskeletal:  No gait disturbance, No weakness or joint complaints. Integumentary: No rash or pruritis. Neurological: No headache, diplopia, change in muscle strength, numbness or tingling. No change in gait, balance, coordination, mood, affect, memory, mentation, behavior. Psychiatric: No anxiety, or depression. Endocrine: No temperature intolerance. No excessive thirst, fluid intake, or urination. No tremor. Hematologic/Lymphatic: No abnormal bruising or bleeding, blood clots or swollen lymph nodes. Allergic/Immunologic: No nasal congestion or hives. PHYSICAL EXAM:      /70   Pulse 72   Temp 97.5 °F (36.4 °C) (Oral)   Resp 16   Ht 5' 2\" (1.575 m)   Wt 186 lb 4.6 oz (84.5 kg)   SpO2 97%   BMI 34.07 kg/m²    Constitutional and General Appearance: alert, cooperative, no distress and appears stated age  HEENT: PERRL, no cervical lymphadenopathy. No masses palpable. Normal oral mucosa  Respiratory:  Normal excursion and expansion without use of accessory muscles  Resp Auscultation: Good respiratory effort. No for increased work of breathing. On auscultation: clear to auscultation bilaterally  Cardiovascular:   The apical impulse is not displaced  Heart tones are crisp and normal. regular S1 and S2.  Jugular venous pulsation Normal  The carotid upstroke is normal in amplitude and contour without delay or bruit  Peripheral pulses are symmetrical and full   Abdomen:   No masses or tenderness  Bowel sounds present  Extremities:   No Cyanosis or Clubbing   Lower extremity edema: No   Skin: Warm and dry  Neurological:  Alert and oriented. Moves all extremities well  No abnormalities of mood, affect, memory, mentation, or behavior are noted      Labs:     CBC:   Recent Labs     10/28/22  2307   WBC 8.2   HGB 13.9   HCT 42.6   PLT See Reflexed IPF Result     BMP:   Recent Labs     10/28/22  2307   *   K 4.4   CO2 27   BUN 14   CREATININE 0.85   LABGLOM >60   GLUCOSE 103*     BNP: No results for input(s): BNP in the last 72 hours. PT/INR: No results for input(s): PROTIME, INR in the last 72 hours. APTT:No results for input(s): APTT in the last 72 hours. CARDIAC ENZYMES:No results for input(s): CKTOTAL, CKMB, CKMBINDEX, TROPONINI in the last 72 hours. FASTING LIPID PANEL:  Lab Results   Component Value Date/Time    HDL 48 10/12/2020 09:30 AM    TRIG 167 10/12/2020 09:30 AM     LIVER PROFILE:No results for input(s): AST, ALT, LABALBU in the last 72 hours.       Patient Active Problem List   Diagnosis    Major depression    Rotator cuff disorder    Hyperlipidemia    Incontinence in female    Rectocele    Diverticulitis    Asthma    Hypokalemia    Cellulitis, face - left side, failed outpatient therapy    Hyperglycemia    Mild intermittent asthma without complication    Gastroesophageal reflux disease without esophagitis    Displacement of lumbar intervertebral disc without myelopathy    Chest pain    Dyspepsia    Acute medial meniscus tear of left knee    Dry eyes, bilateral    Insufficiency of tear film of both eyes    Instability of left ankle joint         DATA:    IMPRESSION:    Progressive angina  HLD  Tachycardia, on metoprolol  Family history of CAD  MVP w/ mild MR    RECOMMENDATIONS:  EKG and troponin reviewed, uncontributory  Cath      Will discuss with rounding attending for final recommendations. Camille Hilton MD  Cardiology Service  Internal Medicine Residency Program, PGY-1  [facility]       Attending Physician Statement:    I have discussed the care of  Noah Jason , including pertinent history and exam findings, with the Cardiology fellow/resident. I have seen and examined the patient and the key elements of all parts of the encounter have been performed by me. I agree with the assessment, plan and orders as documented by the fellow/resident, after I modified exam findings and plan of treatments, and the final version is my approved version of the assessment. Additional Comments: Progressive angina/unstable angina. ASA, IV NTG. On BB and statin. Need cardiac cath for risk stratification. Risk and benefits of cardiac catheterization were discussed in detail. Risk of bleeding, requiring blood transfusion, vascular complication requiring surgery, renal insufficieny with need of dialysis, CVA, MI, death and anesthesia complications including intubation were discussed. Patient agrees to proceed and verbalizes understanding.     Gianluca Foster MD

## 2022-10-29 NOTE — PROGRESS NOTES
[address]  CDU / OBSERVATION ENCOUNTER  ATTENDING NOTE       I performed a history and physical examination of the patient and discussed management with the resident or midlevel provider. I reviewed the resident or midlevel provider's note and agree with the documented findings and plan of care. Any areas of disagreement are noted on the chart. I was personally present for the key portions of any procedures. I have documented in the chart those procedures where I was not present during the key portions. I have reviewed the nurses notes. I agree with the chief complaint, past medical history, past surgical history, allergies, medications, social and family history as documented unless otherwise noted below. The Family history, social history, and ROS are effectively unchanged since admission unless noted elsewhere in the chart. Patient admitted for cardiology evaluation. Patient was seen by attending cardiologist who felt patient needed a catheterization. Patient with unstable angina. Patient with ongoing chest discomfort, anginal symptoms, diaphoresis, patient requiring admission and nitro drip. Cruciate cardiology involvement. We will moved to medicine service for nitro drip on monitored floor.   Patient currently improved on our evaluation but will need admission for more aggressive cardiac management    Nataliia Baires MD  Attending Emergency  Physician

## 2022-10-29 NOTE — H&P
factors include hyperlipidemia, obesity, family history. Evaluated by cardiology who felt  patient is high risk, started IV nitro drip and plan for [facility] by Monday  During my encounter patient seemed to be sitting comfortably in bed, at the moment she is rating her pain as 5/10. Denies any shortness of breath diaphoresis or any other concerning symptoms. She lives by herself with her pets. Notes, labs and vitals since admission reviewed    Past Medical History:     Past Medical History:   Diagnosis Date    Arthritis     ASCUS with positive high risk HPV cervical 03/22/2016    Asthma     COVID-19 2020    Mild per pt., no treatment    Depression     Diverticulitis     ESBL (extended spectrum beta-lactamase) producing bacteria infection 02/03/2019    E.  Coli urine    GERD (gastroesophageal reflux disease)     Hyperlipidemia     MRSA (methicillin resistant staph aureus) culture positive 04/18/2016    lip    Rectocele     Tachycardia     takes Metoprolol        Past Surgical History:     Past Surgical History:   Procedure Laterality Date    ANKLE SURGERY Left 3/4/2021    Left ankle lateral ligamentous repair, Left peroneus brevis debridement, Left peroneus longus tenolysis, Left leg gastroc recession, performed through separate incision  performed by Shant Mcclendon MD at [facility] 36 N/A 7/19/2018    COLONOSCOPY performed by Za Hernandez IV, DO at [facility]  2/25/2015    uro    HYSTERECTOMY (CERVIX STATUS UNKNOWN)  1996    RYAN, BSO performed at [facility] by Dr. Carmelo Souza, Also had some type of bladder lift at this time    KNEE ARTHROSCOPY Left 5/13/2019    KNEE ARTHROSCOPY performed by Rosilyn Oppenheim, MD at [address] Left     #1 - lateral release, #2 - arthroscopy with muscle alignment    NERVE BLOCK  07/28/2017    caudal #1  decadron 10mg    NERVE BLOCK  09/22/2017    cadal # 2, decadron 10 mg    NERVE BLOCK  09/22/2017    caudal epidural steroid block #2 decadron 10 mg NERVE BLOCK  11/10/2017    lumbar L4-5 epidural; depomedrol 80mg    UPPER GASTROINTESTINAL ENDOSCOPY  1/30/2019    EGD BIOPSY performed by Starr Amador MD at [facility]        Medications Prior to Admission:     Prior to Admission medications    Medication Sig Start Date End Date Taking? Authorizing Provider   albuterol sulfate HFA (PROVENTIL;VENTOLIN;PROAIR) 108 (90 Base) MCG/ACT inhaler INAHLE 2 PUFFS BY MOUTH INTO THE LUNGS EVERY 6 HOURS AS NEEDED FOR WHEEZING 10/20/22   Rashard Simpson MD   venlafaxine (EFFEXOR XR) 75 MG extended release capsule DTAKE 1 CAPSULE BY MOUTH DAILY 10/20/22   Rashard Simpson MD   vitamin D (ERGOCALCIFEROL) 1.25 MG (13214 UT) CAPS capsule TAKE 1 CAPSULE BY MOUTH EVERY WEEK 10/20/22   Rashard Simpson MD   chlorzoxazone (PARAFON FORTE) 500 MG tablet TAKE 1/2 TABLET BY MOUTH TWICE DAILY AS NEEDED 10/20/22   Rashard Simpson MD   omeprazole (PRILOSEC) 40 MG delayed release capsule TAKE 1 CAPSULE BY MOUTH DAILY EVERY MORNING 9/26/22   Rashard Simpson MD   ibuprofen (ADVIL;MOTRIN) 800 MG tablet Take 1 tablet by mouth 3 times daily as needed for Pain 9/23/22   Ricky Zarate MD   diclofenac (VOLTAREN) 50 MG EC tablet TAKE 1 TABLET BY MOUTH TWICE DAILY 8/29/22   Rashard Simpson MD   busPIRone (BUSPAR) 15 MG tablet TAKE 1 TABLET BY MOUTH TWICE DAILY 8/29/22   Rashard Simpson MD   pravastatin (PRAVACHOL) 40 MG tablet TAKE 1 TABLET BY MOUTH DAILY 8/29/22 9/28/22  Rashard Simpson MD   lansoprazole (PREVACID) 30 MG delayed release capsule Take 1 capsule by mouth in the morning.  8/3/22   Rashard Simpson MD   EPINEPHrine (EPIPEN 2-VIOLET) 0.3 MG/0.3ML SOAJ injection Inject 0.3 mLs into the muscle once for 1 dose Use as directed for allergic reaction 8/3/22 8/3/22  Rashard Simpson MD   diclofenac sodium (VOLTAREN) 1 % GEL Apply 4 g topically 4 times daily as needed for Pain 6/23/22   Stilwell MD Gini   Handicap Rommel MISC by Does not apply route DISABLED PARKING PERMIT AUTHORIZATION  Routine     Qty-1    The patient has the following condition(s) which qualifies him/her for disabled parking: Walking severely limited due to orthopedic condition     Privilege Duration: Temporary for 3 months 3/4/21   Sana Gardner MD   ketotifen (ZADITOR) 0.025 % ophthalmic solution USE 1 DROP INTO BOTH EYES TWICE DAILY 1/19/21   Cheryl Nath MD   fluticasone (FLONASE) 50 MCG/ACT nasal spray INSTILL 2 SPRAYS INTO EACH NOSTRIL ONCE DAILY 1/11/21   Cheryl Nath MD   miSOPROStol (CYTOTEC) 200 MCG tablet Take 200 mcg by mouth daily    Historical Provider, MD   senna-docusate (Janora [place]) 8.6-50 MG per tablet Take 1 tablet by mouth daily as needed for Constipation  Patient taking differently: Take 1 tablet by mouth 2 times daily 2/26/20   Brennan Walton MD   Multiple Vitamins-Minerals (CULTURELLE PROBIOTICS + MULTIV) CHEW Take 1 tablet by mouth daily 1/29/20   Brennan Walton MD   MI-ACID GAS RELIEF 80 MG chewable tablet CHEW 1 TABLET BY MOUTH FOUR TIMES DAILY AS NEEDED FOR FLATULENCE 11/19/19   Brennan Walton MD   CRANBERRY CONCENTRATE 500 MG CAPS TAKE 1 CAPSULE BY MOUTH TWICE DAILY 8/21/19   Cheryl Nath MD   metoprolol succinate (TOPROL XL) 25 MG extended release tablet Take 25 mg by mouth daily     Historical Provider, MD        Allergies:     Shellfish allergy, Shrimp (diagnostic), Famotidine, and Gabapentin    Social History:     Tobacco:    reports that she quit smoking about 27 years ago. Her smoking use included cigarettes. She has a 1.00 pack-year smoking history. She has never used smokeless tobacco.  Alcohol:      reports current alcohol use. Drug Use:  reports no history of drug use. Family History:     Family History   Problem Relation Age of Onset    Colon Cancer Mother     High Blood Pressure Father     Heart Attack Brother     Heart Disease Brother     Crohn's Disease Niece        Review of Systems:     Positive and Negative as described in HPI.     Review of Systems Constitutional:  Negative for chills, diaphoresis and fever. HENT:  Negative for congestion and hearing loss. Eyes:  Negative for visual disturbance. Respiratory:  Positive for chest tightness. Negative for cough, shortness of breath, wheezing and stridor. Cardiovascular:  Positive for chest pain. Negative for palpitations and leg swelling. Gastrointestinal:  Negative for abdominal pain, blood in stool, constipation, diarrhea, nausea and vomiting. Genitourinary:  Negative for difficulty urinating, dysuria and frequency. Musculoskeletal:  Negative for myalgias. Skin:  Negative for rash. Neurological:  Negative for dizziness, seizures, weakness, light-headedness, numbness and headaches. Psychiatric/Behavioral:  The patient is not nervous/anxious. All other systems reviewed and are negative. Physical Exam:   /74   Pulse 74   Temp 97.3 °F (36.3 °C)   Resp 16   Ht 5' 2\" (1.575 m)   Wt 186 lb 4.6 oz (84.5 kg)   SpO2 95%   BMI 34.07 kg/m²   Temp (24hrs), Av.7 °F (36.5 °C), Min:97.3 °F (36.3 °C), Max:98.2 °F (36.8 °C)    No results for input(s): POCGLU in the last 72 hours. No intake or output data in the 24 hours ending 10/29/22 1053    Physical Exam  Vitals and nursing note reviewed. Constitutional:       General: She is not in acute distress. Appearance: She is well-developed. She is obese. She is not diaphoretic. HENT:      Head: Normocephalic and atraumatic. Right Ear: Hearing normal.      Left Ear: Hearing normal.      Nose: Nose normal. No rhinorrhea. Eyes:      General: Lids are normal.      Extraocular Movements:      Right eye: Normal extraocular motion. Left eye: Normal extraocular motion. Conjunctiva/sclera: Conjunctivae normal.      Right eye: Right conjunctiva is not injected. Left eye: Left conjunctiva is not injected. Pupils: Pupils are equal, round, and reactive to light. Pupils are equal.      Right eye: Pupil is reactive. Left eye: Pupil is reactive. Neck:      Thyroid: No thyromegaly. Vascular: No carotid bruit. Trachea: Trachea and phonation normal. No tracheal deviation. Cardiovascular:      Rate and Rhythm: Normal rate and regular rhythm. Pulses: Normal pulses. Heart sounds: Normal heart sounds. No murmur heard. Pulmonary:      Effort: Pulmonary effort is normal. No respiratory distress. Breath sounds: Normal breath sounds. No stridor. No decreased breath sounds. Abdominal:      General: Bowel sounds are normal. There is no distension. Palpations: Abdomen is soft. There is no mass. Tenderness: There is no abdominal tenderness. There is no guarding. Musculoskeletal:         General: No tenderness. Cervical back: Neck supple. Skin:     General: Skin is warm and dry. Findings: No erythema, lesion or rash. Neurological:      Mental Status: She is alert and oriented to person, place, and time. She is not disoriented. Cranial Nerves: No cranial nerve deficit. Psychiatric:         Speech: Speech normal.         Behavior: Behavior normal. Behavior is cooperative.        Investigations:      Laboratory Testing:  Recent Results (from the past 24 hour(s))   EKG 12 Lead    Collection Time: 10/28/22 10:38 PM   Result Value Ref Range    Ventricular Rate 102 BPM    Atrial Rate 102 BPM    P-R Interval 150 ms    QRS Duration 72 ms    Q-T Interval 350 ms    QTc Calculation (Bazett) 456 ms    P Axis 72 degrees    R Axis 37 degrees    T Axis 69 degrees   CBC with Auto Differential    Collection Time: 10/28/22 11:07 PM   Result Value Ref Range    WBC 8.2 3.5 - 11.3 k/uL    RBC 4.60 3.95 - 5.11 m/uL    Hemoglobin 13.9 11.9 - 15.1 g/dL    Hematocrit 42.6 36.3 - 47.1 %    MCV 92.6 82.6 - 102.9 fL    MCH 30.2 25.2 - 33.5 pg    MCHC 32.6 28.4 - 34.8 g/dL    RDW 13.2 11.8 - 14.4 %    Platelets See Reflexed IPF Result 138 - 453 k/uL    NRBC Automated 0.0 0.0 per 100 WBC    Seg Neutrophils 69 (H) 36 - 65 %    Lymphocytes 21 (L) 24 - 43 %    Monocytes 7 3 - 12 %    Eosinophils % 2 1 - 4 %    Basophils 1 0 - 2 %    Immature Granulocytes 0 0 %    Segs Absolute 5.65 1.50 - 8.10 k/uL    Absolute Lymph # 1.74 1.10 - 3.70 k/uL    Absolute Mono # 0.54 0.10 - 1.20 k/uL    Absolute Eos # 0.18 0.00 - 0.44 k/uL    Basophils Absolute 0.06 0.00 - 0.20 k/uL    Absolute Immature Granulocyte <0.03 0.00 - 0.30 k/uL   Basic Metabolic Panel w/ Reflex to MG    Collection Time: 10/28/22 11:07 PM   Result Value Ref Range    Glucose 103 (H) 70 - 99 mg/dL    BUN 14 8 - 23 mg/dL    Creatinine 0.85 0.50 - 0.90 mg/dL    Est, Glom Filt Rate >60 >60 mL/min/1.73m2    Calcium 9.3 8.6 - 10.4 mg/dL    Sodium 145 (H) 135 - 144 mmol/L    Potassium 4.4 3.7 - 5.3 mmol/L    Chloride 107 98 - 107 mmol/L    CO2 27 20 - 31 mmol/L    Anion Gap 11 9 - 17 mmol/L   Brain Natriuretic Peptide    Collection Time: 10/28/22 11:07 PM   Result Value Ref Range    Pro-BNP 53 <300 pg/mL   Immature Platelet Fraction    Collection Time: 10/28/22 11:07 PM   Result Value Ref Range    Platelet, Immature Fraction 7.0 1.1 - 10.3 %    Platelet, Fluorescence 290 138 - 453 k/uL   Troponin    Collection Time: 10/28/22 11:07 PM   Result Value Ref Range    Troponin, High Sensitivity <6 0 - 14 ng/L   Troponin    Collection Time: 10/29/22  1:25 AM   Result Value Ref Range    Troponin, High Sensitivity <6 0 - 14 ng/L   COVID-19, Rapid    Collection Time: 10/29/22  2:12 AM    Specimen: Nasopharyngeal Swab   Result Value Ref Range    Specimen Description . NASOPHARYNGEAL SWAB     SARS-CoV-2, Rapid Not Detected Not Detected   EKG 12 Lead    Collection Time: 10/29/22  6:42 AM   Result Value Ref Range    Ventricular Rate 70 BPM    Atrial Rate 70 BPM    P-R Interval 140 ms    QRS Duration 72 ms    Q-T Interval 408 ms    QTc Calculation (Bazett) 440 ms    P Axis 52 degrees    R Axis 20 degrees    T Axis 37 degrees       Imaging/Diagnostics:  XR CHEST (2 VW)    Result Date: 10/28/2022  No acute process. Assessment :      Hospital Problems             Last Modified POA    * (Principal) Unstable angina ([address] 10/29/2022 Yes    Hyperlipidemia 10/29/2022 Yes    Gastroesophageal reflux disease without esophagitis 10/29/2022 Yes    Chest pain 10/29/2022 Yes       Plan:     Patient status inpatient in the Progressive Unit/Step down        Unstable angina: Typical story, multiple risk factors, no elevation in troponin, EKG with sinus tach , ACS protocol, nitroglycerin drip, cardiology evaluated and plan for [facility] by Monday    Essential hypertension: Continue home medication. Sinus tachycardia: Chronically on metoprolol continue calm. History of MVP. Hyperlipidemia: Continue statin. Gastroesophageal reflux disease without esophagitis         Consultations:   IP CONSULT TO CARDIOLOGY     Patient is admitted as inpatient status because of co-morbidities listed above, severity of signs and symptoms as outlined, requirement for current medical therapies and most importantly because of direct risk to patient if care not provided in a hospital setting. Expected length of stay > 48 hours.     Ariane Krishnan MD  10/29/2022  10:53 AM    Copy sent to Dr. Thuan Knight MD

## 2022-10-29 NOTE — H&P
[facility]  CDU / OBSERVATION ENCOUNTER  RESIDENT NOTE     Pt Name: Arabella Henry  MRN: [id number]  [place] 1960  Date of evaluation: 10/29/22  Patient's PCP is :  Letitia Rodriguez MD    CHIEF COMPLAINT       Chief Complaint   Patient presents with    Chest Pain         HISTORY OF PRESENT ILLNESS    Arabella Henry is a 65 y.o. female who presents with chest pain. The patient describes the pain as sudden onset without provocation, crushing, radiating into the jaw and temples. She states the pain was associated with diaphoresis and shortness of breath. The patient states that she has had similar episodes before however they were much less severe and not associated with dyspnea. Location/Symptom: Right-sided chest pain radiating into the jaw  Timing/Onset: Yesterday evening  Provocation: None this time, typically worse on exertion  Quality: Pressure/tightness  Radiation: Into the jaw and temples  Severity: 6 out of 10  Timing/Duration: Intermittent  Modifying Factors: Provoked by exertion    Patient has a family history of coronary artery disease, is a non-smoker, and is still experiencing chest pain. Patient does also he will have a history of acid reflux, however she states she is able to differentiate the pain between the two and states that this is not her typical reflux pain. Patient states that she is on metoprolol for tachycardia.     REVIEW OF SYSTEMS       General ROS - No fevers, No malaise   Ophthalmic ROS - No discharge, No changes in vision  ENT ROS -  No sore throat, No rhinorrhea,   Respiratory ROS - shortness of breath, no cough, no  wheezing  Cardiovascular ROS - chest pain, dyspnea on exertion  Gastrointestinal ROS - No abdominal pain, no nausea or vomiting, no change in bowel habits, no black or bloody stools  Genito-Urinary ROS - No dysuria, trouble voiding, or hematuria  Musculoskeletal ROS - No myalgias, No arthalgias  Neurological ROS - No headache, no bicarb-citric acid (EFFER-K) effervescent tablet 40 mEq, PRN   Or  potassium chloride 10 mEq/100 mL IVPB (Peripheral Line), PRN  ondansetron (ZOFRAN) injection 4 mg, Q4H PRN  polyethylene glycol (GLYCOLAX) packet 17 g, Daily PRN  acetaminophen (TYLENOL) tablet 650 mg, Q6H PRN   Or  acetaminophen (TYLENOL) suppository 650 mg, Q6H PRN  aspirin chewable tablet 81 mg, Daily  atorvastatin (LIPITOR) tablet 40 mg, Nightly  nitroGLYCERIN 50 mg in dextrose 5% 250 mL infusion, Continuous        All medication charted and reviewed. ALLERGIES     is allergic to shellfish allergy, shrimp (diagnostic), famotidine, and gabapentin. FAMILY HISTORY     She indicated that her mother is . She indicated that her father is alive. She indicated that her brother is alive. She indicated that her maternal grandmother is . She indicated that her maternal grandfather is . She indicated that her paternal grandmother is . She indicated that her paternal grandfather is . She indicated that her niece is alive. family history includes Colon Cancer in her mother; Crohn's Disease in her niece; Heart Attack in her brother; Heart Disease in her brother; High Blood Pressure in her father. The patient denies any pertinent family history. I have reviewed and agree with the family history entered. I have reviewed the Family History and it is not significant to the case    SOCIAL HISTORY      reports that she quit smoking about 27 years ago. Her smoking use included cigarettes. She has a 1.00 pack-year smoking history. She has never used smokeless tobacco. She reports current alcohol use. She reports that she does not use drugs. I have reviewed and agree with all Social.  There are no concerns for substance abuse/use. PHYSICAL EXAM     INITIAL VITALS:  height is 5' 2\" (1.575 m) and weight is 186 lb 4.6 oz (84.5 kg). Her temperature is 97.3 °F (36.3 °C).  Her blood pressure is 118/74 and her pulse is 74. Her respiration is 16 and oxygen saturation is 95%. CONSTITUTIONAL: AOx4, no apparent distress, appears stated age    HEAD: normocephalic, atraumatic   EYES: PERRLA, EOMI    ENT: moist mucous membranes, uvula midline   NECK: supple, symmetric   BACK: symmetric   LUNGS: clear to auscultation bilaterally   CARDIOVASCULAR: regular rate and rhythm, no murmurs, rubs or gallops   ABDOMEN: soft, non-tender, non-distended with normal active bowel sounds   NEUROLOGIC:  MAEx4, no focal sensory or motor deficits   MUSCULOSKELETAL: no clubbing, cyanosis or edema   SKIN: no rash or wounds       DIFFERENTIAL DIAGNOSIS/MDM:     ACS, PE, pericarditis, dissection, MSK pain, reflux were all considered. Work-up in the emergency department was negative for troponin elevations, ECG changes. No acute process on chest x-ray. Patient admitted for cardiology consult, was started on a GTN drip by cardiology and will go cardiac catheterization on Monday. DIAGNOSTIC RESULTS     EKG: All EKG's are interpreted by the Observation Physician who either signs or Co-signs this chart in the absence of a cardiologist.    EKG Interpretation    Interpreted by observation physician    Rhythm: normal sinus   Rate: normal  Axis: normal  Ectopy: none  Conduction: normal  ST Segments: no acute change  T Waves: no acute change  Q Waves: none    Clinical Impression: no acute changes    Amanda Webster MD      RADIOLOGY:   I directly visualized the following  images and reviewed the radiologist interpretations:    XR CHEST (2 VW)    Result Date: 10/28/2022  EXAMINATION: TWO XRAY VIEWS OF THE CHEST 10/28/2022 11:10 pm COMPARISON: July 15, 2022. HISTORY: ORDERING SYSTEM PROVIDED HISTORY: chest pain TECHNOLOGIST PROVIDED HISTORY: chest pain Reason for Exam: CP FINDINGS: Mild levoconvex scoliosis. The lungs are without acute focal process. There is no effusion or pneumothorax. The cardiomediastinal silhouette is without acute process.  The osseous structures are without acute process. No acute process. LABS:  I have reviewed and interpreted all available lab results. Labs Reviewed   CBC WITH AUTO DIFFERENTIAL - Abnormal; Notable for the following components:       Result Value    Seg Neutrophils 69 (*)     Lymphocytes 21 (*)     All other components within normal limits   BASIC METABOLIC PANEL W/ REFLEX TO MG FOR LOW K - Abnormal; Notable for the following components:    Glucose 103 (*)     Sodium 145 (*)     All other components within normal limits   COVID-19, RAPID   TROPONIN   BRAIN NATRIURETIC PEPTIDE   IMMATURE PLATELET FRACTION   TROPONIN         CDU IMPRESSION / PLAN      Hilda Villavicencio is a 65 y.o. female who presents with chest pain. Patient states that she has had similar episodes in the past which were brought on by exertion, however this episode was unprovoked, severe, and associated dyspnea. Cardiology  Initiated on GTN drip ongoing chest pain relieved by sublingual GTN  Will proceed to cardiac catheterization on Monday      Continue home medications and pain control  Adult diet now, n.p.o. as of midnight on 10/31  Monitor vitals, labs, and imaging    DISPO: Mid to [facility] for monitoring due to GTN infusion    CONSULTS:    IP CONSULT TO CARDIOLOGY  IP CONSULT TO HOSPITALIST    PROCEDURES:  Not indicated       PATIENT REFERRED TO:    No follow-up provider specified. --  Anjali Harrison MD   Emergency Medicine Resident    This dictation was generated by voice recognition computer software. Although all attempts are made to edit the dictation for accuracy, there may be errors in the transcription that are not intended.

## 2022-10-29 NOTE — ED NOTES
Pt arrived to ED ambulatory from triage. Pt c/o chest pain radiating to left jawbegan 2 hours ago. Pt placed on monitor and labs drawn, 20g IV to [address] [place] [address].  Call light in reach, no distress noted     Lorean Barthel, RN  10/28/22 [id number]

## 2022-10-29 NOTE — TRANSFER CENTER NOTE
CDU Transfer Summary        Patient:  Ashwin Espitia  YOB: 1960    MRN: [id number]   Acct: [de-identified]    Primary Care Physician: Fabiola Mckeon MD    Admit date:  10/28/2022 10:42 PM  Transfer date: 10/29/22      Transfer Diagnoses:   1) Unstable angina           Medication List        CONTINUE taking these medications      Handicap Placard Misc  by Does not apply route DISABLED PARKING PERMIT AUTHORIZATION  Routine     Qty-1    The patient has the following condition(s) which qualifies him/her for disabled parking: Walking severely limited due to orthopedic condition     Privilege Duration: Temporary for 3 months            ASK your doctor about these medications      albuterol sulfate  (90 Base) MCG/ACT inhaler  Commonly known as: PROVENTIL;VENTOLIN;PROAIR  INAHLE 2 PUFFS BY MOUTH INTO THE LUNGS EVERY 6 HOURS AS NEEDED FOR WHEEZING     busPIRone 15 MG tablet  Commonly known as: BUSPAR  TAKE 1 TABLET BY MOUTH TWICE DAILY     chlorzoxazone 500 MG tablet  Commonly known as: PARAFON FORTE  TAKE 1/2 TABLET BY MOUTH TWICE DAILY AS NEEDED     Cranberry Concentrate 500 MG Caps  Generic drug: Cranberry  TAKE 1 CAPSULE BY MOUTH TWICE DAILY     Culturelle Probiotics + Multiv Chew  Take 1 tablet by mouth daily     diclofenac 50 MG EC tablet  Commonly known as: VOLTAREN  TAKE 1 TABLET BY MOUTH TWICE DAILY     diclofenac sodium 1 % Gel  Commonly known as: VOLTAREN  Apply 4 g topically 4 times daily as needed for Pain     EPINEPHrine 0.3 MG/0.3ML Soaj injection  Commonly known as: EpiPen 2-Danny  Inject 0.3 mLs into the muscle once for 1 dose Use as directed for allergic reaction     fluticasone 50 MCG/ACT nasal spray  Commonly known as: FLONASE  INSTILL 2 SPRAYS INTO EACH NOSTRIL ONCE DAILY     ibuprofen 800 MG tablet  Commonly known as: ADVIL;MOTRIN  Take 1 tablet by mouth 3 times daily as needed for Pain     ketotifen 0.025 % ophthalmic solution  Commonly known as: ZADITOR  USE 1 DROP INTO BOTH EYES TWICE DAILY     lansoprazole 30 MG delayed release capsule  Commonly known as: PREVACID  Take 1 capsule by mouth in the morning. metoprolol succinate 25 MG extended release tablet  Commonly known as: TOPROL XL     Mi-Acid Gas Relief 80 MG chewable tablet  Generic drug: simethicone  CHEW 1 TABLET BY MOUTH FOUR TIMES DAILY AS NEEDED FOR FLATULENCE     miSOPROStol 200 MCG tablet  Commonly known as: CYTOTEC     omeprazole 40 MG delayed release capsule  Commonly known as: PRILOSEC  TAKE 1 CAPSULE BY MOUTH DAILY EVERY MORNING     pravastatin 40 MG tablet  Commonly known as: PRAVACHOL  TAKE 1 TABLET BY MOUTH DAILY     senna-docusate 8.6-50 MG per tablet  Commonly known as: PERICOLACE  Take 1 tablet by mouth daily as needed for Constipation     venlafaxine 75 MG extended release capsule  Commonly known as: EFFEXOR XR  DTAKE 1 CAPSULE BY MOUTH DAILY     vitamin D 1.25 MG (12191 UT) Caps capsule  Commonly known as: ERGOCALCIFEROL  TAKE 1 CAPSULE BY MOUTH EVERY WEEK              Diet:  Diet NPO  ADULT DIET; Regular  Diet NPO, advance as tolerated     Activity:  As tolerated    Consultants: IP CONSULT TO CARDIOLOGY  IP CONSULT TO HOSPITALIST    Procedures:  Not indicated     Diagnostic Test:   Results for orders placed or performed during the hospital encounter of 10/28/22   COVID-19, Rapid    Specimen: Nasopharyngeal Swab   Result Value Ref Range    Specimen Description . NASOPHARYNGEAL SWAB     SARS-CoV-2, Rapid Not Detected Not Detected   CBC with Auto Differential   Result Value Ref Range    WBC 8.2 3.5 - 11.3 k/uL    RBC 4.60 3.95 - 5.11 m/uL    Hemoglobin 13.9 11.9 - 15.1 g/dL    Hematocrit 42.6 36.3 - 47.1 %    MCV 92.6 82.6 - 102.9 fL    MCH 30.2 25.2 - 33.5 pg    MCHC 32.6 28.4 - 34.8 g/dL    RDW 13.2 11.8 - 14.4 %    Platelets See Reflexed IPF Result 138 - 453 k/uL    NRBC Automated 0.0 0.0 per 100 WBC    Seg Neutrophils 69 (H) 36 - 65 %    Lymphocytes 21 (L) 24 - 43 %    Monocytes 7 3 - 12 %    Eosinophils % 2 1 - 4 %    Basophils 1 0 - 2 %    Immature Granulocytes 0 0 %    Segs Absolute 5.65 1.50 - 8.10 k/uL    Absolute Lymph # 1.74 1.10 - 3.70 k/uL    Absolute Mono # 0.54 0.10 - 1.20 k/uL    Absolute Eos # 0.18 0.00 - 0.44 k/uL    Basophils Absolute 0.06 0.00 - 0.20 k/uL    Absolute Immature Granulocyte <0.03 0.00 - 0.30 k/uL   Basic Metabolic Panel w/ Reflex to MG   Result Value Ref Range    Glucose 103 (H) 70 - 99 mg/dL    BUN 14 8 - 23 mg/dL    Creatinine 0.85 0.50 - 0.90 mg/dL    Est, Glom Filt Rate >60 >60 mL/min/1.73m2    Calcium 9.3 8.6 - 10.4 mg/dL    Sodium 145 (H) 135 - 144 mmol/L    Potassium 4.4 3.7 - 5.3 mmol/L    Chloride 107 98 - 107 mmol/L    CO2 27 20 - 31 mmol/L    Anion Gap 11 9 - 17 mmol/L   Troponin   Result Value Ref Range    Troponin, High Sensitivity <6 0 - 14 ng/L   Brain Natriuretic Peptide   Result Value Ref Range    Pro-BNP 53 <300 pg/mL   Immature Platelet Fraction   Result Value Ref Range    Platelet, Immature Fraction 7.0 1.1 - 10.3 %    Platelet, Fluorescence 290 138 - 453 k/uL   Troponin   Result Value Ref Range    Troponin, High Sensitivity <6 0 - 14 ng/L   EKG 12 Lead   Result Value Ref Range    Ventricular Rate 102 BPM    Atrial Rate 102 BPM    P-R Interval 150 ms    QRS Duration 72 ms    Q-T Interval 350 ms    QTc Calculation (Bazett) 456 ms    P Axis 72 degrees    R Axis 37 degrees    T Axis 69 degrees   EKG 12 Lead   Result Value Ref Range    Ventricular Rate 70 BPM    Atrial Rate 70 BPM    P-R Interval 140 ms    QRS Duration 72 ms    Q-T Interval 408 ms    QTc Calculation (Bazett) 440 ms    P Axis 52 degrees    R Axis 20 degrees    T Axis 37 degrees     XR CHEST (2 VW)    Result Date: 10/28/2022  EXAMINATION: TWO XRAY VIEWS OF THE CHEST 10/28/2022 11:10 pm COMPARISON: July 15, 2022. HISTORY: ORDERING SYSTEM PROVIDED HISTORY: chest pain TECHNOLOGIST PROVIDED HISTORY: chest pain Reason for Exam: CP FINDINGS: Mild levoconvex scoliosis. The lungs are without acute focal process.   There is no effusion or pneumothorax. The cardiomediastinal silhouette is without acute process. The osseous structures are without acute process. No acute process. Physical Exam:    General appearance - NAD, AOx 3   Lungs -CTAB, no R/R/R  Heart - RRR, no M/R/G  Abdomen - Soft, NT/ND  Neurological:  MAEx4, No focal motor deficit, sensory loss  Extremities - Cap refil <2 sec in all ext., no edema  Skin -warm, dry      Hospital Course:  Clinical course has improved, labs and imaging reviewed. Fidencio Lowery originally presented to the hospital on 10/28/2022 10:42 PM with chest pain. At that time it was determined that She required further observation and admission for cardiology consult. Given her ongoing chest pain, the patient was initiated on a GTN drip by cardiology. She is also scheduled for cardiac catheterization on Monday. The patient is being admitted to inpatient service for observation due to the fact that she is on a GTN drip. Pt discussed directly with the admitting team    Disposition: Transfer  Condition: Good    Time Spent: 0 day      --  Pedro Garcia MD  Emergency Medicine Attending Physician    This dictation was generated by voice recognition computer software. Although all attempts are made to edit the dictation for accuracy, there may be errors in the transcription that are not intended.

## 2022-10-29 NOTE — CARE COORDINATION
10/29/22 1016   Service Assessment   Patient Orientation Alert and Oriented   Cognition Alert   History Provided By Patient   Primary Caregiver Self   Support Systems Children;Family Members   PCP Verified by CM Yes   Last Visit to PCP Within last 3 months   Prior Functional Level Independent in ADLs/IADLs   Current Functional Level Independent in ADLs/IADLs   Can patient return to prior living arrangement Yes   Ability to make needs known: Good   Family able to assist with home care needs: Yes   Social/Functional History   Lives With Alone   Type of Home Apartment   Home Layout One level   Home Access Stairs to enter without rails   Entrance Stairs - Number of Steps 3   Bathroom Shower/Tub Tub/Shower unit   [employer] Grab bars in shower; Shower chair   Receives Help From Zannel)   ADL Assistance Independent   Homemaking Assistance Independent   Homemaking Responsibilities Yes   Ambulation Assistance Independent   Transfer Assistance Independent   Active  Yes   Mode of Transportation Car   Occupation Full time employment   Discharge [facility] Prior To Admission None   Potential Assistance Needed N/A   DME Ordered? No   One/Two Story Residence One story   History of falls?  0   Home independently

## 2022-10-29 NOTE — ED PROVIDER NOTES
[facility]  Emergency Department Encounter  EmergencyMedicine Resident     Pt Jose Ortiz  MRN: [id number]  [place] 1960  Date of evaluation: 10/28/22  PCP:  Bonnetta Najjar, MD    CHIEF COMPLAINT       Chief Complaint   Patient presents with    Chest Pain       HISTORY OF PRESENT ILLNESS  (Location/Symptom, Timing/Onset, Context/Setting, Quality, Duration, Modifying Factors, Severity.)      Becca Ruth is a 65 y.o. female who presents with right-sided chest pain that started earlier today. Pain radiates  Into her jaw. Describes the pain as a tightness and pressure. Patient denies any cardiac history other than having a history of tachycardia for which she is on metoprolol. States that she had associated shortness of breath, nausea and diaphoresis. Has a history of hyperlipidemia, family history of CAD, not smoke. Currently has 6 out of 10 chest pain. Has improved since previous. Patient is not taking any medications for pain. PAST MEDICAL / SURGICAL / SOCIAL / FAMILY HISTORY      has a past medical history of Arthritis, ASCUS with positive high risk HPV cervical, Asthma, COVID-19, Depression, Diverticulitis, ESBL (extended spectrum beta-lactamase) producing bacteria infection, GERD (gastroesophageal reflux disease), Hyperlipidemia, MRSA (methicillin resistant staph aureus) culture positive, Rectocele, and Tachycardia. has a past surgical history that includes Hysterectomy (1996); knee surgery (Left); Cystocopy (2/25/2015); Nerve Block (07/28/2017); Nerve Block (09/22/2017); Nerve Block (09/22/2017); Nerve Block (11/10/2017); Colonoscopy (N/A, 7/19/2018); Upper gastrointestinal endoscopy (1/30/2019); Knee arthroscopy (Left, 5/13/2019); and Ankle surgery (Left, 3/4/2021).       Social History     Socioeconomic History    Marital status:      Spouse name: Not on file    Number of children: Not on file    Years of education: Not on file    Highest education level: Not on file   Occupational History    Not on file   Tobacco Use    Smoking status: Former     Packs/day: 2.00     Years: 0.50     Pack years: 1.00     Types: Cigarettes     Quit date: 18     Years since quittin.8    Smokeless tobacco: Never   Vaping Use    Vaping Use: Never used   Substance and Sexual Activity    Alcohol use: Yes     Alcohol/week: 0.0 standard drinks     Comment: social    Drug use: No    Sexual activity: Not on file   Other Topics Concern    Not on file   Social History Narrative    Not on file     Social Determinants of Health     Financial Resource Strain: Low Risk     Difficulty of Paying Living Expenses: Not hard at all   Food Insecurity: No Food Insecurity    Worried About Running Out of Food in the Last Year: Never true    Ran Out of Food in the Last Year: Never true   Transportation Needs: Not on file   Physical Activity: Not on file   Stress: Not on file   Social Connections: Not on file   Intimate Partner Violence: Not on file   Housing Stability: Not on file       Family History   Problem Relation Age of Onset    Colon Cancer Mother     High Blood Pressure Father     Heart Attack Brother     Heart Disease Brother     Crohn's Disease Niece        Allergies:  Shellfish allergy, Shrimp (diagnostic), Famotidine, and Gabapentin    Home Medications:  Prior to Admission medications    Medication Sig Start Date End Date Taking?  Authorizing Provider   albuterol sulfate HFA (PROVENTIL;VENTOLIN;PROAIR) 108 (90 Base) MCG/ACT inhaler INAHLE 2 PUFFS BY MOUTH INTO THE LUNGS EVERY 6 HOURS AS NEEDED FOR WHEEZING 10/20/22   Shadia Braswell MD   venlafaxine (EFFEXOR XR) 75 MG extended release capsule DTAKE 1 CAPSULE BY MOUTH DAILY 10/20/22   Shadia Braswell MD   vitamin D (ERGOCALCIFEROL) 1.25 MG (45198 UT) CAPS capsule TAKE 1 CAPSULE BY MOUTH EVERY WEEK 10/20/22   Shadia Braswell MD   chlorzoxazone (PARAFON FORTE) 500 MG tablet TAKE 1/2 TABLET BY MOUTH TWICE DAILY AS NEEDED 10/20/22 Whitney Deleon MD   omeprazole (PRILOSEC) 40 MG delayed release capsule TAKE 1 CAPSULE BY MOUTH DAILY EVERY MORNING 9/26/22   Whitney Deleon MD   ibuprofen (ADVIL;MOTRIN) 800 MG tablet Take 1 tablet by mouth 3 times daily as needed for Pain 9/23/22   Shalonda Macdonald MD   diclofenac (VOLTAREN) 50 MG EC tablet TAKE 1 TABLET BY MOUTH TWICE DAILY 8/29/22   Whitney Deleon MD   busPIRone (BUSPAR) 15 MG tablet TAKE 1 TABLET BY MOUTH TWICE DAILY 8/29/22   Whitney Deleon MD   pravastatin (PRAVACHOL) 40 MG tablet TAKE 1 TABLET BY MOUTH DAILY 8/29/22 9/28/22  Whitney Deleon MD   lansoprazole (PREVACID) 30 MG delayed release capsule Take 1 capsule by mouth in the morning.  8/3/22   Whitney Deleon MD   EPINEPHrine (EPIPEN 2-VIOLET) 0.3 MG/0.3ML SOAJ injection Inject 0.3 mLs into the muscle once for 1 dose Use as directed for allergic reaction 8/3/22 8/3/22  Whitney Deleon MD   diclofenac sodium (VOLTAREN) 1 % GEL Apply 4 g topically 4 times daily as needed for Pain 6/23/22   Mj Dumas MD   Handicap Placard MISC by Does not apply route DISABLED PARKING PERMIT AUTHORIZATION  Routine     Qty-1    The patient has the following condition(s) which qualifies him/her for disabled parking: Walking severely limited due to orthopedic condition     Privilege Duration: Temporary for 3 months 3/4/21   Mj Dumas MD   ketotifen (ZADITOR) 0.025 % ophthalmic solution USE 1 DROP INTO BOTH EYES TWICE DAILY 1/19/21   Whitney Deleon MD   fluticasone (FLONASE) 50 MCG/ACT nasal spray INSTILL 2 SPRAYS INTO EACH NOSTRIL ONCE DAILY 1/11/21   Whitney Deleon MD   miSOPROStol (CYTOTEC) 200 MCG tablet Take 200 mcg by mouth daily    Historical Provider, MD   senjusta-docusate ([place]) 8.6-50 MG per tablet Take 1 tablet by mouth daily as needed for Constipation  Patient taking differently: Take 1 tablet by mouth 2 times daily 2/26/20   Anna Terrazas MD   Multiple Vitamins-Minerals (CULTURELLE PROBIOTICS + MULTIV) CHEW Take 1 tablet by mouth daily 1/29/20   Jose J Conroy MD   MI-ACID GAS RELIEF 80 MG chewable tablet CHEW 1 TABLET BY MOUTH FOUR TIMES DAILY AS NEEDED FOR FLATULENCE 11/19/19   Jose J Conroy MD   CRANBERRY CONCENTRATE 500 MG CAPS TAKE 1 CAPSULE BY MOUTH TWICE DAILY 8/21/19   Beecher Shone, MD   metoprolol succinate (TOPROL XL) 25 MG extended release tablet Take 25 mg by mouth daily     Historical Provider, MD       REVIEW OF SYSTEMS    (2-9 systems for level 4, 10 or more for level 5)      Review of Systems   Constitutional:  Positive for diaphoresis. Negative for chills and fever. HENT:  Negative for congestion, postnasal drip, sore throat and trouble swallowing. Eyes:  Negative for visual disturbance. Respiratory:  Positive for shortness of breath. Negative for cough and chest tightness. Cardiovascular:  Positive for chest pain. Gastrointestinal:  Positive for nausea. Negative for abdominal distention, abdominal pain, constipation, diarrhea and vomiting. Genitourinary:  Negative for difficulty urinating, dysuria, flank pain and vaginal discharge. Musculoskeletal:  Negative for back pain and neck pain. Neurological:  Positive for dizziness. Negative for weakness, numbness and headaches. Psychiatric/Behavioral:  Negative for confusion. PHYSICAL EXAM   (up to 7 for level 4, 8 or more for level 5)      INITIAL VITALS:   /70   Pulse 72   Temp 97.5 °F (36.4 °C) (Oral)   Resp 16   Ht 5' 2\" (1.575 m)   Wt 186 lb 4.6 oz (84.5 kg)   SpO2 97%   BMI 34.07 kg/m²     Physical Exam  Constitutional:       Appearance: Normal appearance. HENT:      Head: Normocephalic and atraumatic. Right Ear: External ear normal.      Left Ear: External ear normal.   Eyes:      Extraocular Movements: Extraocular movements intact. Cardiovascular:      Rate and Rhythm: Normal rate. Pulses: Normal pulses. Pulmonary:      Effort: Pulmonary effort is normal.      Breath sounds: Normal breath sounds. Abdominal:      Palpations: Abdomen is soft. Tenderness: There is no abdominal tenderness. Musculoskeletal:         General: Normal range of motion. Cervical back: Normal range of motion. Neurological:      General: No focal deficit present. Mental Status: She is alert and oriented to person, place, and time. Psychiatric:         Mood and Affect: Mood normal.       DIFFERENTIAL  DIAGNOSIS     PLAN (LABS / IMAGING / EKG):  Orders Placed This Encounter   Procedures    COVID-19, Rapid    XR CHEST (2 VW)    CBC with Auto Differential    Basic Metabolic Panel w/ Reflex to MG    Troponin    Brain Natriuretic Peptide    Immature Platelet Fraction    Troponin    Diet NPO    ADULT DIET;  Regular    Vital signs per unit routine    Telemetry monitoring - 72 hour duration    Notify physician    Up as tolerated    Full Code    Inpatient consult to Cardiology    Initiate Oxygen Therapy Protocol    EKG 12 Lead    EKG 12 lead    Place in Observation Service    Place in Observation Service       MEDICATIONS ORDERED:  Orders Placed This Encounter   Medications    nitroGLYCERIN (NITROSTAT) SL tablet 0.4 mg    ondansetron (ZOFRAN) tablet 4 mg    albuterol sulfate HFA (PROVENTIL;VENTOLIN;PROAIR) 108 (90 Base) MCG/ACT inhaler 2 puff     Order Specific Question:   Initiate RT Bronchodilator Protocol     Answer:   Yes - Inpatient Protocol    busPIRone (BUSPAR) tablet 15 mg    ketotifen (ZADITOR) 0.025 % ophthalmic solution 1 drop    pantoprazole (PROTONIX) tablet 40 mg    metoprolol succinate (TOPROL XL) extended release tablet 25 mg    miSOPROStol (CYTOTEC) tablet 200 mcg    pravastatin (PRAVACHOL) tablet 40 mg    venlafaxine (EFFEXOR XR) extended release capsule 75 mg    0.9 % sodium chloride infusion    sodium chloride flush 0.9 % injection 5-40 mL    sodium chloride flush 0.9 % injection 5-40 mL    0.9 % sodium chloride infusion    OR Linked Order Group     potassium chloride (KLOR-CON M) extended release tablet 40 mEq     potassium bicarb-citric acid (EFFER-K) effervescent tablet 40 mEq     potassium chloride 10 mEq/100 mL IVPB (Peripheral Line)    ondansetron (ZOFRAN) injection 4 mg    polyethylene glycol (GLYCOLAX) packet 17 g    OR Linked Order Group     acetaminophen (TYLENOL) tablet 650 mg     acetaminophen (TYLENOL) suppository 650 mg       DDX: STEMI, NSTEMI, angina, pneumothorax, pneumonia    MDM: 65 y.o. female presents today with chest pain. Pain was worse before arrival, has improved. Cardiac work-up ordered. Cardiac work-up unremarkable patient has a heart score of 5. Will place patient in the observation for cardiac work-up. Patient is agreeable to the plan. Heart Score for chest pain patients  History: Highly Suspicious  ECG: Non-Specifc repolarization disturbance/LBTB/PM  Patient Age: > 39 and < 65 years  *Risk factors for Atherosclerotic disease: Hypercholesterolemia, Positive family History  Risk Factors: 1 or 2 risk factors  Troponin: < 1X normal limit  Heart Score Total: 5  [place] Coma Scale  Eye Opening: Spontaneous  Best Verbal Response: Oriented  Best Motor Response: Obeys commands  [place] Coma Scale Score: 15  DIAGNOSTIC RESULTS / EMERGENCY DEPARTMENT COURSE / MDM   LAB RESULTS:  Results for orders placed or performed during the hospital encounter of 10/28/22   COVID-19, Rapid    Specimen: Nasopharyngeal Swab   Result Value Ref Range    Specimen Description . NASOPHARYNGEAL SWAB     SARS-CoV-2, Rapid Not Detected Not Detected   CBC with Auto Differential   Result Value Ref Range    WBC 8.2 3.5 - 11.3 k/uL    RBC 4.60 3.95 - 5.11 m/uL    Hemoglobin 13.9 11.9 - 15.1 g/dL    Hematocrit 42.6 36.3 - 47.1 %    MCV 92.6 82.6 - 102.9 fL    MCH 30.2 25.2 - 33.5 pg    MCHC 32.6 28.4 - 34.8 g/dL    RDW 13.2 11.8 - 14.4 %    Platelets See Reflexed IPF Result 138 - 453 k/uL    NRBC Automated 0.0 0.0 per 100 WBC    Seg Neutrophils 69 (H) 36 - 65 %    Lymphocytes 21 (L) 24 - 43 %    Monocytes 7 3 - 12 %    Eosinophils % 2 1 - 4 %    Basophils 1 0 - 2 %    Immature Granulocytes 0 0 %    Segs Absolute 5.65 1.50 - 8.10 k/uL    Absolute Lymph # 1.74 1.10 - 3.70 k/uL    Absolute Mono # 0.54 0.10 - 1.20 k/uL    Absolute Eos # 0.18 0.00 - 0.44 k/uL    Basophils Absolute 0.06 0.00 - 0.20 k/uL    Absolute Immature Granulocyte <0.03 0.00 - 0.30 k/uL   Basic Metabolic Panel w/ Reflex to MG   Result Value Ref Range    Glucose 103 (H) 70 - 99 mg/dL    BUN 14 8 - 23 mg/dL    Creatinine 0.85 0.50 - 0.90 mg/dL    Est, Glom Filt Rate >60 >60 mL/min/1.73m2    Calcium 9.3 8.6 - 10.4 mg/dL    Sodium 145 (H) 135 - 144 mmol/L    Potassium 4.4 3.7 - 5.3 mmol/L    Chloride 107 98 - 107 mmol/L    CO2 27 20 - 31 mmol/L    Anion Gap 11 9 - 17 mmol/L   Troponin   Result Value Ref Range    Troponin, High Sensitivity <6 0 - 14 ng/L   Brain Natriuretic Peptide   Result Value Ref Range    Pro-BNP 53 <300 pg/mL   Immature Platelet Fraction   Result Value Ref Range    Platelet, Immature Fraction 7.0 1.1 - 10.3 %    Platelet, Fluorescence 290 138 - 453 k/uL   Troponin   Result Value Ref Range    Troponin, High Sensitivity <6 0 - 14 ng/L           RADIOLOGY:  XR CHEST (2 VW)   Final Result   No acute process. EMERGENCY DEPARTMENT COURSE:  ED Course as of 10/29/22 0543   Fri Oct 28, 2022   2304 R sided chest pain that started earlier today, associated with diaphoresis, shortness of breath and nausea. Radiated up her neck and into her jaw. Has no cardiac history, has a history of anxiety and tachycardia. Metoprolol at home. [SS]   2357 CXR unremarkable [SS]   Sat Oct 29, 2022   0018 Nausea improved [SS]   0018 Pro-BNP: 53 [SS]      ED Course User Index  [SS] Ricardo Bolanos MD        PROCEDURES:  None    CONSULTS:  IP CONSULT TO CARDIOLOGY    CRITICAL CARE:  Please see attending note    FINAL IMPRESSION      1.  Chest pain, unspecified type          DISPOSITION / PLAN     DISPOSITION Admitted 10/29/2022 01:50:00 AM      PATIENT REFERRED TO:  No follow-up provider specified.     DISCHARGE MEDICATIONS:  Current Discharge Medication List          Cristino Sacks, MD  Emergency Medicine Resident    (Please note that portions of thisnote were completed with a voice recognition program.  Efforts were made to edit the dictations but occasionally words are mis-transcribed.)       Cristino Sacks, MD  Resident  10/29/22 1135

## 2022-10-29 NOTE — PROGRESS NOTES
Iv to left A/C infiltrated on admission from ER. IV d/brian and pressure applied until bleeding completed and and dressing applied. No injury to iv area and catheter in tact after removal of iv catheter.

## 2022-10-29 NOTE — ED PROVIDER NOTES
[facility] ED  eMERGENCY dEPARTMENT eNCOUnter   Attending Attestation     Pt Name: Juni Askew  MRN: [id number]  Sergegfmarianne 1960  Date of evaluation: 10/29/22       Juni Askew is a 65 y.o. female who presents with Chest Pain      History: Pt present with chest pain, SOB, Radiation of strange sensation to the jaw and headache starting at 2000. Exam: HRRR, lungs CTABL, abdomen soft and non tender. Pt awake, alert, acting appropriately. Concern for ACS. Will get troponins and likely admit to observation for further evaluation and ACS ruleout. Heart score 4    I performed a history and physical examination of the patient and discussed management with the resident. I reviewed the residents note and agree with the documented findings and plan of care. Any areas of disagreement are noted on the chart. I was personally present for the key portions of any procedures. I have documented in the chart those procedures where I was not present during the key portions. I have personally reviewed all images and agree with the resident's interpretation. I have reviewed the emergency nurses triage note. I agree with the chief complaint, past medical history, past surgical history, allergies, medications, social and family history as documented unless otherwise noted below. Documentation of the HPI, Physical Exam and Medical Decision Making performed by medical students or scribes is based on my personal performance of the HPI, PE and MDM. For Phys Assistant/ Nurse Practitioner cases/documentation I have had a face to face evaluation of this patient and have completed at least one if not all key elements of the E/M (history, physical exam, and MDM). Additional findings are as noted. For APC cases I have personally evaluated and examined the patient in conjunction with the APC and agree with the treatment plan and disposition of the patient as recorded by the APC.     Gurmeet Meyer MD  Attending Emergency  Physician        Fran Tobias MD  10/29/22 3352

## 2022-10-30 PROBLEM — E66.09 CLASS 1 OBESITY DUE TO EXCESS CALORIES WITH SERIOUS COMORBIDITY AND BODY MASS INDEX (BMI) OF 34.0 TO 34.9 IN ADULT: Status: ACTIVE | Noted: 2022-10-30

## 2022-10-30 PROBLEM — E66.811 CLASS 1 OBESITY DUE TO EXCESS CALORIES WITH SERIOUS COMORBIDITY AND BODY MASS INDEX (BMI) OF 34.0 TO 34.9 IN ADULT: Status: ACTIVE | Noted: 2022-10-30

## 2022-10-30 PROCEDURE — 93005 ELECTROCARDIOGRAM TRACING: CPT | Performed by: STUDENT IN AN ORGANIZED HEALTH CARE EDUCATION/TRAINING PROGRAM

## 2022-10-30 PROCEDURE — 2060000000 HC ICU INTERMEDIATE R&B

## 2022-10-30 PROCEDURE — 6370000000 HC RX 637 (ALT 250 FOR IP): Performed by: STUDENT IN AN ORGANIZED HEALTH CARE EDUCATION/TRAINING PROGRAM

## 2022-10-30 PROCEDURE — 2580000003 HC RX 258: Performed by: STUDENT IN AN ORGANIZED HEALTH CARE EDUCATION/TRAINING PROGRAM

## 2022-10-30 PROCEDURE — 99232 SBSQ HOSP IP/OBS MODERATE 35: CPT | Performed by: STUDENT IN AN ORGANIZED HEALTH CARE EDUCATION/TRAINING PROGRAM

## 2022-10-30 RX ORDER — FLUTICASONE PROPIONATE 50 MCG
2 SPRAY, SUSPENSION (ML) NASAL DAILY
Status: DISCONTINUED | OUTPATIENT
Start: 2022-10-30 | End: 2022-11-01 | Stop reason: HOSPADM

## 2022-10-30 RX ADMIN — ASPIRIN 81 MG 81 MG: 81 TABLET ORAL at 09:05

## 2022-10-30 RX ADMIN — VENLAFAXINE HYDROCHLORIDE 75 MG: 75 CAPSULE, EXTENDED RELEASE ORAL at 09:05

## 2022-10-30 RX ADMIN — PANTOPRAZOLE SODIUM 40 MG: 40 TABLET, DELAYED RELEASE ORAL at 09:06

## 2022-10-30 RX ADMIN — FLUTICASONE PROPIONATE 2 SPRAY: 50 SPRAY, METERED NASAL at 10:58

## 2022-10-30 RX ADMIN — SODIUM CHLORIDE: 9 INJECTION, SOLUTION INTRAVENOUS at 07:06

## 2022-10-30 RX ADMIN — METOPROLOL SUCCINATE 25 MG: 25 TABLET, FILM COATED, EXTENDED RELEASE ORAL at 09:06

## 2022-10-30 RX ADMIN — BUSPIRONE HYDROCHLORIDE 15 MG: 15 TABLET ORAL at 20:47

## 2022-10-30 RX ADMIN — MISOPROSTOL 200 MCG: 100 TABLET ORAL at 09:06

## 2022-10-30 RX ADMIN — BUSPIRONE HYDROCHLORIDE 15 MG: 15 TABLET ORAL at 09:06

## 2022-10-30 RX ADMIN — DESMOPRESSIN ACETATE 40 MG: 0.2 TABLET ORAL at 20:47

## 2022-10-30 ASSESSMENT — ENCOUNTER SYMPTOMS
EYE ITCHING: 0
CHEST TIGHTNESS: 0
SHORTNESS OF BREATH: 1
ABDOMINAL DISTENTION: 0
COLOR CHANGE: 0
ABDOMINAL PAIN: 0
FACIAL SWELLING: 0
APNEA: 0
EYE DISCHARGE: 0
CONSTIPATION: 0
BACK PAIN: 0
DIARRHEA: 0

## 2022-10-30 ASSESSMENT — PAIN SCALES - WONG BAKER

## 2022-10-30 NOTE — PROGRESS NOTES
[facility]  Progress Note                   Date:   10/30/2022  Patient name: Becca Ruth  Date of admission:  10/28/2022 10:42 PM  MRN:   [id number]  YOB: 1960  PCP: Bonnetta Najjar, MD    Reason for Admission: Chest pain [R07.9]  Unstable angina ([address] [I20.0]    Subjective:       Clinical Changes /Abnormalities:Patient seen and examined. Denies chest pain or shortness of breath. Tele/vitals/labs reviewed . Discussed case with RN. Sinus rhythm on monitor     Review of Systems    Medications:   Scheduled Meds:   fluticasone  2 spray Each Nostril Daily    busPIRone  15 mg Oral BID    ketotifen  1 drop Both Eyes Daily    pantoprazole  40 mg Oral QAM AC    metoprolol succinate  25 mg Oral Daily    miSOPROStol  200 mcg Oral Daily    venlafaxine  75 mg Oral Daily with breakfast    sodium chloride flush  5-40 mL IntraVENous 2 times per day    aspirin  81 mg Oral Daily    atorvastatin  40 mg Oral Nightly     Continuous Infusions:   sodium chloride 125 mL/hr at 10/30/22 0706    sodium chloride      nitroGLYCERIN 5 mcg/min (10/29/22 1125)     CBC:   Recent Labs     10/28/22  2307   WBC 8.2   HGB 13.9   PLT See Reflexed IPF Result     BMP:    Recent Labs     10/28/22  2307   *   K 4.4      CO2 27   BUN 14   CREATININE 0.85   GLUCOSE 103*     Hepatic:No results for input(s): AST, ALT, ALB, BILITOT, ALKPHOS in the last 72 hours. Troponin:   Recent Labs     10/28/22  2307 10/29/22  0125   TROPHS <6 <6     BNP: No results for input(s): BNP in the last 72 hours. Lipids: No results for input(s): CHOL, HDL in the last 72 hours. Invalid input(s): LDLCALCU  INR: No results for input(s): INR in the last 72 hours. DATA:  last echo was 6/2/2018 which showed LVEF > 55%, no WMA, MVP with mild MR.   had a negative nuclear stress test in 2018.       Objective:   Vitals: BP (!) 117/58   Pulse 70   Temp 98.2 °F (36.8 °C) (Oral)   Resp 16   Ht 5' 2\" (1.575 m)   Wt 186 lb 4.6 oz 10/30/2022 at [address] [place] [address].  [phone]

## 2022-10-30 NOTE — PROGRESS NOTES
observation unit however was transferred due to progressive angina with plans for cardiac catheterization on 10/31/2022. Cardiology currently following patient. Review of Systems:     Review of Systems   Constitutional:  Negative for activity change, appetite change, chills and fever. HENT:  Negative for congestion, ear pain, facial swelling and mouth sores. Eyes:  Negative for discharge and itching. Respiratory:  Positive for shortness of breath. Negative for apnea and chest tightness. Cardiovascular:  Negative for chest pain and leg swelling. Gastrointestinal:  Negative for abdominal distention, abdominal pain, constipation and diarrhea. Endocrine: Negative for cold intolerance, polyphagia and polyuria. Genitourinary:  Negative for difficulty urinating, dysuria and flank pain. Musculoskeletal:  Negative for arthralgias, back pain and joint swelling. Skin:  Negative for color change and wound. Neurological:  Negative for dizziness, tremors, seizures, weakness, light-headedness and headaches. Psychiatric/Behavioral:  Negative for agitation, behavioral problems and self-injury. Medications: Allergies: Allergies   Allergen Reactions    Shellfish Allergy     Shrimp (Diagnostic) Shortness Of Breath    Famotidine Other (See Comments)     Headaches      Gabapentin Nausea Only     Mood swings, nausea.           Current Meds:   Scheduled Meds:    fluticasone  2 spray Each Nostril Daily    busPIRone  15 mg Oral BID    ketotifen  1 drop Both Eyes Daily    pantoprazole  40 mg Oral QAM AC    metoprolol succinate  25 mg Oral Daily    miSOPROStol  200 mcg Oral Daily    venlafaxine  75 mg Oral Daily with breakfast    sodium chloride flush  5-40 mL IntraVENous 2 times per day    aspirin  81 mg Oral Daily    atorvastatin  40 mg Oral Nightly     Continuous Infusions:    sodium chloride 125 mL/hr at 10/30/22 0706    sodium chloride      nitroGLYCERIN 5 mcg/min (10/29/22 1125)     PRN Meds: albuterol sulfate HFA, sodium chloride flush, sodium chloride, potassium chloride **OR** potassium alternative oral replacement **OR** potassium chloride, ondansetron, polyethylene glycol, acetaminophen **OR** acetaminophen    Data:     Past Medical History:   has a past medical history of Arthritis, ASCUS with positive high risk HPV cervical, Asthma, COVID-19, Depression, Diverticulitis, ESBL (extended spectrum beta-lactamase) producing bacteria infection, GERD (gastroesophageal reflux disease), Hyperlipidemia, MRSA (methicillin resistant staph aureus) culture positive, Rectocele, and Tachycardia. Social History:   reports that she quit smoking about 27 years ago. Her smoking use included cigarettes. She has a 1.00 pack-year smoking history. She has never used smokeless tobacco. She reports current alcohol use. She reports that she does not use drugs. Family History:   Family History   Problem Relation Age of Onset    Colon Cancer Mother     High Blood Pressure Father     Heart Attack Brother     Heart Disease Brother     Crohn's Disease Niece        Vitals:  /71   Pulse 77   Temp 98.8 °F (37.1 °C) (Oral)   Resp 10   Ht 5' 2\" (1.575 m)   Wt 186 lb 4.6 oz (84.5 kg)   SpO2 96%   BMI 34.07 kg/m²   Temp (24hrs), Av.2 °F (36.8 °C), Min:97.7 °F (36.5 °C), Max:98.8 °F (37.1 °C)    No results for input(s): POCGLU in the last 72 hours. I/O (24Hr):     Intake/Output Summary (Last 24 hours) at 10/30/2022 1008  Last data filed at 10/30/2022 0923  Gross per 24 hour   Intake 462 ml   Output --   Net 462 ml       Labs:  Hematology:  Recent Labs     10/28/22  2307   WBC 8.2   RBC 4.60   HGB 13.9   HCT 42.6   MCV 92.6   MCH 30.2   MCHC 32.6   RDW 13.2   PLT See Reflexed IPF Result     Chemistry:  Recent Labs     10/28/22  2307 10/29/22  0125   *  --    K 4.4  --      --    CO2 27  --    GLUCOSE 103*  --    BUN 14  --    CREATININE 0.85  --    ANIONGAP 11  --    LABGLOM >60  --    CALCIUM 9.3 --    PROBNP 53  --    TROPHS <6 <6   No results for input(s): PROT, LABALBU, LABA1C, S9XYIHF, N5NVLGQ, FT4, TSH, AST, ALT, LDH, GGT, ALKPHOS, LABGGT, BILITOT, BILIDIR, AMMONIA, AMYLASE, LIPASE, LACTATE, CHOL, HDL, LDLCHOLESTEROL, CHOLHDLRATIO, TRIG, VLDL, QZF99LS, PHENYTOIN, PHENYF, URICACID, POCGLU in the last 72 hours. ABG:No results found for: POCPH, PHART, PH, POCPCO2, GNG3YEK, PCO2, POCPO2, PO2ART, PO2, POCHCO3, JCX7MDM, HCO3, NBEA, PBEA, BEART, BE, THGBART, THB, YKI6ZER, WOIA6ZTZ, H8UKQHYG, O2SAT, FIO2  Lab Results   Component Value Date/Time    SPECIAL NOT REPORTED 12/04/2019 07:42 PM     Lab Results   Component Value Date/Time    CULTURE  12/04/2019 07:42 PM     PRESUMPTIVE ID: GARDNERELLA VAGINALIS MODERATE GROWTH    CULTURE NEGATIVE FOR NEISSERIA GONORRHOEAE 12/04/2019 07:42 PM    CULTURE NORMAL URO-GENITAL ANTONELLA 12/04/2019 07:42 PM       Radiology:  XR CHEST (2 VW)    Result Date: 10/28/2022  No acute process. Physical Examination:        Physical Exam  Constitutional:       General: She is not in acute distress. Appearance: She is obese. She is not ill-appearing. HENT:      Right Ear: External ear normal.      Left Ear: External ear normal.      Nose:      Comments: Rhinorrhea, nasal cannula in place     Mouth/Throat:      Mouth: Mucous membranes are moist.   Eyes:      Pupils: Pupils are equal, round, and reactive to light. Cardiovascular:      Rate and Rhythm: Normal rate and regular rhythm. Pulses: Normal pulses. Heart sounds: No murmur heard. Pulmonary:      Effort: Pulmonary effort is normal.      Breath sounds: No wheezing or rales. Abdominal:      General: Bowel sounds are normal. There is no distension. Palpations: Abdomen is soft. Musculoskeletal:      Cervical back: Neck supple. Right lower leg: No edema. Left lower leg: No edema. Skin:     General: Skin is dry. Capillary Refill: Capillary refill takes less than 2 seconds.       Coloration: Skin is pale. Neurological:      General: No focal deficit present. Mental Status: She is alert and oriented to person, place, and time. Psychiatric:         Mood and Affect: Mood normal.         Behavior: Behavior normal.       Assessment:        Hospital Problems             Last Modified POA    * (Principal) Unstable angina ([address] 10/29/2022 Yes    Class 1 obesity due to excess calories with serious comorbidity and body mass index (BMI) of 34.0 to 34.9 in adult 10/30/2022 Yes    Major depression 10/30/2022 Yes    Hyperlipidemia 10/29/2022 Yes    Mild intermittent asthma without complication 54/09/1261 Yes    Gastroesophageal reflux disease without esophagitis 10/29/2022 Yes    Chest pain 10/29/2022 Yes    Dry eyes, bilateral 10/30/2022 Yes       Plan:        Persistent unstable angina. Appreciate cardiology recommendations. Plan for cardiac catheterization 10/31/2022. N.p.o. at midnight. Wean down nitroglycerin drip. Aspirin 81 mg, Lipitor 40 mg, Toprol-XL 25 mg  Depression. Continue venlafaxine, BuSpar  GERD.   Follow-up with GI as outpatient continue Protonix and misoprostol  History of sinusitis continue Dana Ramirez MD  10/30/2022  10:08 AM

## 2022-10-31 ENCOUNTER — APPOINTMENT (OUTPATIENT)
Dept: CARDIAC CATH/INVASIVE PROCEDURES | Age: 62
DRG: 191 | End: 2022-10-31
Payer: COMMERCIAL

## 2022-10-31 LAB
EKG ATRIAL RATE: 70 BPM
EKG P AXIS: 75 DEGREES
EKG P-R INTERVAL: 154 MS
EKG Q-T INTERVAL: 410 MS
EKG QRS DURATION: 74 MS
EKG QTC CALCULATION (BAZETT): 442 MS
EKG R AXIS: 30 DEGREES
EKG T AXIS: 50 DEGREES
EKG VENTRICULAR RATE: 70 BPM

## 2022-10-31 PROCEDURE — 2709999900 HC NON-CHARGEABLE SUPPLY

## 2022-10-31 PROCEDURE — 2580000003 HC RX 258: Performed by: STUDENT IN AN ORGANIZED HEALTH CARE EDUCATION/TRAINING PROGRAM

## 2022-10-31 PROCEDURE — 7100000011 HC PHASE II RECOVERY - ADDTL 15 MIN

## 2022-10-31 PROCEDURE — 93458 L HRT ARTERY/VENTRICLE ANGIO: CPT

## 2022-10-31 PROCEDURE — 4A023N7 MEASUREMENT OF CARDIAC SAMPLING AND PRESSURE, LEFT HEART, PERCUTANEOUS APPROACH: ICD-10-PCS | Performed by: INTERNAL MEDICINE

## 2022-10-31 PROCEDURE — B2151ZZ FLUOROSCOPY OF LEFT HEART USING LOW OSMOLAR CONTRAST: ICD-10-PCS | Performed by: INTERNAL MEDICINE

## 2022-10-31 PROCEDURE — 2500000003 HC RX 250 WO HCPCS

## 2022-10-31 PROCEDURE — 6360000004 HC RX CONTRAST MEDICATION

## 2022-10-31 PROCEDURE — 6360000002 HC RX W HCPCS: Performed by: INTERNAL MEDICINE

## 2022-10-31 PROCEDURE — 6370000000 HC RX 637 (ALT 250 FOR IP): Performed by: STUDENT IN AN ORGANIZED HEALTH CARE EDUCATION/TRAINING PROGRAM

## 2022-10-31 PROCEDURE — B2111ZZ FLUOROSCOPY OF MULTIPLE CORONARY ARTERIES USING LOW OSMOLAR CONTRAST: ICD-10-PCS | Performed by: INTERNAL MEDICINE

## 2022-10-31 PROCEDURE — C1894 INTRO/SHEATH, NON-LASER: HCPCS

## 2022-10-31 PROCEDURE — 7100000010 HC PHASE II RECOVERY - FIRST 15 MIN

## 2022-10-31 PROCEDURE — 93010 ELECTROCARDIOGRAM REPORT: CPT | Performed by: INTERNAL MEDICINE

## 2022-10-31 PROCEDURE — 6360000002 HC RX W HCPCS

## 2022-10-31 PROCEDURE — 99232 SBSQ HOSP IP/OBS MODERATE 35: CPT | Performed by: STUDENT IN AN ORGANIZED HEALTH CARE EDUCATION/TRAINING PROGRAM

## 2022-10-31 PROCEDURE — 2060000000 HC ICU INTERMEDIATE R&B

## 2022-10-31 RX ORDER — ACETAMINOPHEN 325 MG/1
650 TABLET ORAL EVERY 4 HOURS PRN
Status: DISCONTINUED | OUTPATIENT
Start: 2022-10-31 | End: 2022-10-31 | Stop reason: SDUPTHER

## 2022-10-31 RX ORDER — METHYLPREDNISOLONE SODIUM SUCCINATE 125 MG/2ML
125 INJECTION, POWDER, LYOPHILIZED, FOR SOLUTION INTRAMUSCULAR; INTRAVENOUS ONCE
Status: COMPLETED | OUTPATIENT
Start: 2022-10-31 | End: 2022-10-31

## 2022-10-31 RX ORDER — SODIUM CHLORIDE 9 MG/ML
INJECTION, SOLUTION INTRAVENOUS PRN
Status: DISCONTINUED | OUTPATIENT
Start: 2022-10-31 | End: 2022-11-01 | Stop reason: HOSPADM

## 2022-10-31 RX ORDER — SODIUM CHLORIDE 0.9 % (FLUSH) 0.9 %
5-40 SYRINGE (ML) INJECTION PRN
Status: DISCONTINUED | OUTPATIENT
Start: 2022-10-31 | End: 2022-11-01 | Stop reason: HOSPADM

## 2022-10-31 RX ORDER — DIPHENHYDRAMINE HYDROCHLORIDE 50 MG/ML
50 INJECTION INTRAMUSCULAR; INTRAVENOUS ONCE
Status: COMPLETED | OUTPATIENT
Start: 2022-10-31 | End: 2022-10-31

## 2022-10-31 RX ORDER — SODIUM CHLORIDE 0.9 % (FLUSH) 0.9 %
5-40 SYRINGE (ML) INJECTION EVERY 12 HOURS SCHEDULED
Status: DISCONTINUED | OUTPATIENT
Start: 2022-10-31 | End: 2022-11-01 | Stop reason: HOSPADM

## 2022-10-31 RX ADMIN — BUSPIRONE HYDROCHLORIDE 15 MG: 15 TABLET ORAL at 08:28

## 2022-10-31 RX ADMIN — VENLAFAXINE HYDROCHLORIDE 75 MG: 75 CAPSULE, EXTENDED RELEASE ORAL at 08:28

## 2022-10-31 RX ADMIN — BUSPIRONE HYDROCHLORIDE 15 MG: 15 TABLET ORAL at 20:01

## 2022-10-31 RX ADMIN — SODIUM CHLORIDE, PRESERVATIVE FREE 10 ML: 5 INJECTION INTRAVENOUS at 08:28

## 2022-10-31 RX ADMIN — PANTOPRAZOLE SODIUM 40 MG: 40 TABLET, DELAYED RELEASE ORAL at 08:28

## 2022-10-31 RX ADMIN — FLUTICASONE PROPIONATE 2 SPRAY: 50 SPRAY, METERED NASAL at 08:28

## 2022-10-31 RX ADMIN — MISOPROSTOL 200 MCG: 100 TABLET ORAL at 08:28

## 2022-10-31 RX ADMIN — METHYLPREDNISOLONE SODIUM SUCCINATE 125 MG: 125 INJECTION, POWDER, FOR SOLUTION INTRAMUSCULAR; INTRAVENOUS at 13:12

## 2022-10-31 RX ADMIN — DESMOPRESSIN ACETATE 40 MG: 0.2 TABLET ORAL at 20:00

## 2022-10-31 RX ADMIN — DIPHENHYDRAMINE HYDROCHLORIDE 50 MG: 50 INJECTION, SOLUTION INTRAMUSCULAR; INTRAVENOUS at 13:13

## 2022-10-31 RX ADMIN — METOPROLOL SUCCINATE 25 MG: 25 TABLET, FILM COATED, EXTENDED RELEASE ORAL at 08:28

## 2022-10-31 RX ADMIN — SODIUM CHLORIDE, PRESERVATIVE FREE 10 ML: 5 INJECTION INTRAVENOUS at 20:01

## 2022-10-31 ASSESSMENT — ENCOUNTER SYMPTOMS
DIARRHEA: 0
EYE DISCHARGE: 0
FACIAL SWELLING: 0
CHEST TIGHTNESS: 0
ABDOMINAL DISTENTION: 0
ABDOMINAL PAIN: 0
APNEA: 0
CONSTIPATION: 0
COLOR CHANGE: 0
BACK PAIN: 0
SHORTNESS OF BREATH: 0
EYE ITCHING: 0

## 2022-10-31 ASSESSMENT — PAIN SCALES - WONG BAKER
WONGBAKER_NUMERICALRESPONSE: 0

## 2022-10-31 NOTE — CARE COORDINATION
[facility] ICU Quality Flow/Interdisciplinary Rounds Progress Note    Quality Flow Rounds held on October 31, 2022 at [address] Attending:  Bedside Nurse, , and Nursing Unit Leadership    Anticipated Discharge Date:       Anticipated Discharge Disposition: home    Readmission Risk              Risk of Unplanned Readmission:  10           Discussed patient goal for the day, patient clinical progression, and barriers to discharge.   The following Goal(s) of the Day/Commitment(s) have been identified:   cardiac cath today      [facility], RN  October 31, 2022

## 2022-10-31 NOTE — PROGRESS NOTES
[facility]  Office: [address], DO, Sanjay Patella, DO, Maria C Valdez, DO, Josefina Beltran Blood, DO, Flex Perkins MD, Caprice Bosch MD, Lesli Mar MD, Tejal Ortiz MD,  Zahraa Block MD, Jaswinder Pastor MD, Lashonda Ray, DO, Radha Jones MD,  Luanne Vitale MD, Glenna Nunes MD, Miller Murphy, DO, David Wallace MD, Refugio Cuevas MD, Cristal Huggins DO, Maryan Carranza MD, Ngozi Pa MD, Orlando Tripathi MD, Sarahi Lemus MD, Ana Perdomo DO, Angie Waters MD, Nat Moraes MD, Ritika Cortez, CNP,  Grey Titus, CNP, Kaylen Alvarez, CNP, Merwyn Cooks, CNP,  Codie Price, DNP, Anna Malik, CNP, Markos Cochran, CNP, Carlos Bermudez, CNP, Wero Gonzalez, CNP, Avi Snider, CNP, Addi Moreno PA-C, Yudelka Clemons, CNS, Layne Falcon, DNP, Martina Mejia, CNP, Mary Beth Masters, CNP, Uli Wetzel, CNP         [address]    Progress Note    10/31/2022    9:38 AM    Name:   Jh Moore  MRN:     [id number]     [place]:      [de-identified]   Room:   [address] [place] [address] Day:  2  Admit Date:  10/28/2022 10:42 PM    PCP:   Tang Nelson MD  Code Status:  Full Code    Subjective:     C/C:   Chief Complaint   Patient presents with    Chest Pain     Interval History Status: not changed. Patient seen and examined in bed. Doing better this morning. Rhinorrhea improved. Plan for cardiac cath today. Brief History:     Is a very pleasant 61-year-old female past medical history of depression, hyperlipidemia, intermittent asthma without complication originally presented to the hospital with right-sided chest pain and slight respiratory distress recently admitted for observation unit however was transferred due to progressive angina with plans for cardiac catheterization on 10/31/2022. Cardiology currently following patient.     Review of Systems:     Review of Systems   Constitutional: Arthritis, ASCUS with positive high risk HPV cervical, Asthma, COVID-19, Depression, Diverticulitis, ESBL (extended spectrum beta-lactamase) producing bacteria infection, GERD (gastroesophageal reflux disease), Hyperlipidemia, MRSA (methicillin resistant staph aureus) culture positive, Rectocele, and Tachycardia. Social History:   reports that she quit smoking about 27 years ago. Her smoking use included cigarettes. She has a 1.00 pack-year smoking history. She has never used smokeless tobacco. She reports current alcohol use. She reports that she does not use drugs. Family History:   Family History   Problem Relation Age of Onset    Colon Cancer Mother     High Blood Pressure Father     Heart Attack Brother     Heart Disease Brother     Crohn's Disease Niece        Vitals:  BP (!) 141/63   Pulse 74   Temp 97.7 °F (36.5 °C) (Oral)   Resp 18   Ht 5' 2\" (1.575 m)   Wt 186 lb 4.6 oz (84.5 kg)   SpO2 98%   BMI 34.07 kg/m²   Temp (24hrs), Av.9 °F (36.6 °C), Min:97.7 °F (36.5 °C), Max:98.2 °F (36.8 °C)    No results for input(s): POCGLU in the last 72 hours. I/O (24Hr):     Intake/Output Summary (Last 24 hours) at 10/31/2022 0938  Last data filed at 10/31/2022 0000  Gross per 24 hour   Intake 1324.04 ml   Output 1650 ml   Net -325.96 ml         Labs:  Hematology:  Recent Labs     10/28/22  2307   WBC 8.2   RBC 4.60   HGB 13.9   HCT 42.6   MCV 92.6   MCH 30.2   MCHC 32.6   RDW 13.2   PLT See Reflexed IPF Result       Chemistry:  Recent Labs     10/28/22  2307 10/29/22  0125   *  --    K 4.4  --      --    CO2 27  --    GLUCOSE 103*  --    BUN 14  --    CREATININE 0.85  --    ANIONGAP 11  --    LABGLOM >60  --    CALCIUM 9.3  --    PROBNP 53  --    TROPHS <6 <6     No results for input(s): PROT, LABALBU, LABA1C, L7LXMGA, O4OXWEF, FT4, TSH, AST, ALT, LDH, GGT, ALKPHOS, LABGGT, BILITOT, BILIDIR, AMMONIA, AMYLASE, LIPASE, LACTATE, CHOL, HDL, LDLCHOLESTEROL, CHOLHDLRATIO, TRIG, VLDL, AXD10FW, PHENYTOIN, PHENYF, URICACID, POCGLU in the last 72 hours. ABG:No results found for: POCPH, PHART, PH, POCPCO2, BOL8XEI, PCO2, POCPO2, PO2ART, PO2, POCHCO3, VLR9EUL, HCO3, NBEA, PBEA, BEART, BE, THGBART, THB, HJF6TWO, PEDL7TXU, R7GOCPCB, O2SAT, FIO2  Lab Results   Component Value Date/Time    SPECIAL NOT REPORTED 12/04/2019 07:42 PM     Lab Results   Component Value Date/Time    CULTURE  12/04/2019 07:42 PM     PRESUMPTIVE ID: GARDNERELLA VAGINALIS MODERATE GROWTH    CULTURE NEGATIVE FOR NEISSERIA GONORRHOEAE 12/04/2019 07:42 PM    CULTURE NORMAL URO-GENITAL ANTONELLA 12/04/2019 07:42 PM       Radiology:  XR CHEST (2 VW)    Result Date: 10/28/2022  No acute process. Physical Examination:        Physical Exam  Constitutional:       General: She is not in acute distress. Appearance: She is obese. She is not ill-appearing. HENT:      Right Ear: External ear normal.      Left Ear: External ear normal.      Nose:      Comments: Rhinorrhea, nasal cannula in place     Mouth/Throat:      Mouth: Mucous membranes are moist.   Eyes:      Pupils: Pupils are equal, round, and reactive to light. Cardiovascular:      Rate and Rhythm: Normal rate and regular rhythm. Pulses: Normal pulses. Heart sounds: No murmur heard. Pulmonary:      Effort: Pulmonary effort is normal.      Breath sounds: No wheezing or rales. Abdominal:      General: Bowel sounds are normal. There is no distension. Palpations: Abdomen is soft. Musculoskeletal:      Cervical back: Neck supple. Right lower leg: No edema. Left lower leg: No edema. Skin:     General: Skin is dry. Capillary Refill: Capillary refill takes less than 2 seconds. Coloration: Skin is pale. Neurological:      General: No focal deficit present. Mental Status: She is alert and oriented to person, place, and time.    Psychiatric:         Mood and Affect: Mood normal.         Behavior: Behavior normal.       Assessment:        Hospital Problems             Last Modified POA    * (Principal) Unstable angina ([facility] [address] 10/29/2022 Yes    Class 1 obesity due to excess calories with serious comorbidity and body mass index (BMI) of 34.0 to 34.9 in adult 10/30/2022 Yes    Major depression 10/30/2022 Yes    Hyperlipidemia 10/29/2022 Yes    Mild intermittent asthma without complication 85/53/5096 Yes    Gastroesophageal reflux disease without esophagitis 10/29/2022 Yes    Chest pain 10/29/2022 Yes    Dry eyes, bilateral 10/30/2022 Yes     Plan:        Persistent unstable angina. Appreciate cardiology recommendations. Plan for cardiac catheterization today. N.p.o. Wean down nitroglycerin drip. Aspirin 81 mg, Lipitor 40 mg, Toprol-XL 25 mg  Depression. Continue venlafaxine, BuSpar  GERD.   Follow-up with GI as outpatient continue Protonix and misoprostol  History of sinusitis continue Dana Bravo MD  10/31/2022  9:38 AM

## 2022-10-31 NOTE — FLOWSHEET NOTE
Pt returned from cath lab at [phone], connected to monitor and vitals taken. Rt radial dry and intact with vasc band in place. 1530 2 ml air removed from vasc band 8 ml remaining.     Covering for primary nurse while she is on break

## 2022-10-31 NOTE — PLAN OF CARE
Problem: Pain  Goal: Verbalizes/displays adequate comfort level or baseline comfort level  10/31/2022 0831 by Chad Sal RN  Outcome: Progressing  Flowsheets (Taken 10/30/2022 2000 by Daniela Daily, RN)  Verbalizes/displays adequate comfort level or baseline comfort level:   Encourage patient to monitor pain and request assistance   Assess pain using appropriate pain scale   Administer analgesics based on type and severity of pain and evaluate response   Implement non-pharmacological measures as appropriate and evaluate response   Consider cultural and social influences on pain and pain management   Notify Licensed Independent Practitioner if interventions unsuccessful or patient reports new pain  10/30/2022 1921 by Elsa Mendoza RN  Outcome: Progressing     Problem: Discharge Planning  Goal: Discharge to home or other facility with appropriate resources  10/31/2022 0831 by Chad Sal RN  Outcome: Progressing  Flowsheets (Taken 10/30/2022 2000 by Daniela Daily RN)  Discharge to home or other facility with appropriate resources:   Identify barriers to discharge with patient and caregiver   Arrange for needed discharge resources and transportation as appropriate   Identify discharge learning needs (meds, wound care, etc)   Arrange for interpreters to assist at discharge as needed   Refer to discharge planning if patient needs post-hospital services based on physician order or complex needs related to functional status, cognitive ability or social support system  10/30/2022 1921 by Elsa Mendoza RN  Outcome: Progressing     Problem: Infection - Adult  Goal: Absence of fever/infection during anticipated neutropenic period  Outcome: Progressing     Problem: Respiratory - Adult  Goal: Achieves optimal ventilation and oxygenation  Outcome: Progressing     Problem: Cardiovascular - Adult  Goal: Maintains optimal cardiac output and hemodynamic stability  Outcome: Progressing     Problem: ABCDS Injury Assessment  Goal: Absence of physical injury  Outcome: Progressing

## 2022-10-31 NOTE — OP NOTE
Hyperlipidemia     [] Cerebrovascular Disease   [] Prior MI       [] Peripheral Vascular disease   [] Prior PCI              [] Diabetes Mellitus    [] Left Main PCI. [] Currently on Dialysis. [] Prior CABG. [] Currently smoker. [] Cardiac Arrest outside of healthcare facility. [] Yes    [x] No        Witnessed     [] Yes   [] No     Arrest after arrival of EMS  [] Yes   [] No     [] Cardiac Arrest at other Facility. [] Yes   [x] No    Pre-Procedure Information. Heart Failure       [] Yes    [x] No        Class  [] I      [] II  [] III    [] IV. New Diagnosis    [] Yes  [] No    HF Type      [] Systolic   [] Diastolic          [] Unknown. Diagnostic Test:   EKG       [] Normal   [x] Abnormal    New antiarrhythmia medications:    [] Yes   [] No   New onset atrial fibrillation / Flutter     [] Yes   [] No   ECG Abnormalities:      [] V. Fib   [] Shannan V. Tach           [] NS V. T   [] New LBBB           [] T. Inv  []  ST dev > 0.5 mm         [] PVC's freq  [] PVC's infrequent    Stress Test Performed:      [] Yes    [x] No     Type:     [] Stress Echo   [] Exercise Stress Test (no imaging)      [] Stress Nuclear  [] Stress Imaging     Results   [] Negative   [] Positive        [] Indeterminate  [] Unavailable     If Positive/ Risk / Extent of Ischemia:       [] Low  [] Intermediate         [] High  [] Unavailable      Cardiac CTA Performed:     [] Yes    [x] No      Results   [] CAD   [] Non obstructive CAD      [] No CAD   [] Uncertain      [] Unknown   [] Structural Disease.      Pre Procedure Medications:   [x] Yes    [] No         [x] ASA   [] Beta Blockers      [] Nitrate   [] Ca Channel Blockers      [] Ranolazine   [] Statin       [] Plavix/Others antiplatelets      Electronically signed on 10/31/2022 at 2:36 PM by:    Maribel Hope MD  Fellow, [address]    I have reviewed the case / procedure with resident / fellow  I have examined the patient personally  Patient agree with treatment plan as discussed before, final arrangement based on my evaluation and exam.    Risk and benefit of procedure planned were explained in details. Procedure was performed by me personally, with all aspect of the procedure being done using standard protocol. Note was modified based on my own assessment and treatment.     Cristobal Szymanski MD  [facility]

## 2022-11-01 VITALS
BODY MASS INDEX: 34.28 KG/M2 | DIASTOLIC BLOOD PRESSURE: 61 MMHG | HEART RATE: 76 BPM | TEMPERATURE: 98.2 F | RESPIRATION RATE: 16 BRPM | HEIGHT: 62 IN | WEIGHT: 186.29 LBS | OXYGEN SATURATION: 92 % | SYSTOLIC BLOOD PRESSURE: 129 MMHG

## 2022-11-01 PROCEDURE — 99232 SBSQ HOSP IP/OBS MODERATE 35: CPT | Performed by: STUDENT IN AN ORGANIZED HEALTH CARE EDUCATION/TRAINING PROGRAM

## 2022-11-01 PROCEDURE — 6370000000 HC RX 637 (ALT 250 FOR IP): Performed by: STUDENT IN AN ORGANIZED HEALTH CARE EDUCATION/TRAINING PROGRAM

## 2022-11-01 PROCEDURE — 2580000003 HC RX 258: Performed by: STUDENT IN AN ORGANIZED HEALTH CARE EDUCATION/TRAINING PROGRAM

## 2022-11-01 RX ORDER — ATORVASTATIN CALCIUM 40 MG/1
40 TABLET, FILM COATED ORAL NIGHTLY
Qty: 30 TABLET | Refills: 3 | Status: SHIPPED | OUTPATIENT
Start: 2022-11-01

## 2022-11-01 RX ORDER — ASPIRIN 81 MG/1
81 TABLET, CHEWABLE ORAL DAILY
Qty: 30 TABLET | Refills: 3 | Status: ON HOLD
Start: 2022-11-02 | End: 2022-11-04 | Stop reason: HOSPADM

## 2022-11-01 RX ADMIN — MISOPROSTOL 200 MCG: 100 TABLET ORAL at 08:53

## 2022-11-01 RX ADMIN — PANTOPRAZOLE SODIUM 40 MG: 40 TABLET, DELAYED RELEASE ORAL at 05:54

## 2022-11-01 RX ADMIN — VENLAFAXINE HYDROCHLORIDE 75 MG: 75 CAPSULE, EXTENDED RELEASE ORAL at 08:53

## 2022-11-01 RX ADMIN — FLUTICASONE PROPIONATE 2 SPRAY: 50 SPRAY, METERED NASAL at 08:53

## 2022-11-01 RX ADMIN — METOPROLOL SUCCINATE 25 MG: 25 TABLET, FILM COATED, EXTENDED RELEASE ORAL at 08:53

## 2022-11-01 RX ADMIN — BUSPIRONE HYDROCHLORIDE 15 MG: 15 TABLET ORAL at 08:53

## 2022-11-01 RX ADMIN — ASPIRIN 81 MG 81 MG: 81 TABLET ORAL at 08:52

## 2022-11-01 RX ADMIN — SODIUM CHLORIDE, PRESERVATIVE FREE 10 ML: 5 INJECTION INTRAVENOUS at 09:02

## 2022-11-01 ASSESSMENT — ENCOUNTER SYMPTOMS
ABDOMINAL PAIN: 0
EYE DISCHARGE: 0
ABDOMINAL DISTENTION: 0
DIARRHEA: 0
CHEST TIGHTNESS: 0
APNEA: 0
SHORTNESS OF BREATH: 0
FACIAL SWELLING: 0
EYE ITCHING: 0
BACK PAIN: 0
COLOR CHANGE: 0
CONSTIPATION: 0

## 2022-11-01 ASSESSMENT — PAIN SCALES - GENERAL: PAINLEVEL_OUTOF10: 0

## 2022-11-01 NOTE — DISCHARGE SUMMARY
[facility]  Office: [address], DO, Aminataluca Galeana, DO, Bhupendra Phlegm, DO, Sasha Andre Blood, DO, Bessie Watson MD, Renny Douglass MD, Carrol Fields MD, Joann Parisi MD,  Apurva Hinkle MD, Dominick Nelson MD, Michelle Pérez, DO, Светлана Mcelroy MD,  Yash Alfaro, DO, Rachele Cast MD, Genaro Dixon MD, Armando Jose, DO, Rupa Mesa MD, Alma Guzman MD, Jose Jennings, DO, Harman Parisi MD, Carline Cruz MD, Rajani Melchor MD, Ethel Simpson MD, Roderick Moore DO, Manfred Worthington MD, Beatriz Christianson MD, Thea Matos, CNP,  Marilee Dykes, CNP, Ebony Christopher, CNP, Sean Orosco, CNP,  Shahzad Neri, [facility], Kris Patricia, CNP, Alyssa Brody, CNP, Shana Swan, CNP, Stefanie Yung, CNP, Antony Libman, CNP, Loreto Brothers PA-C, Preeti Rose, CNS, Jaky Waggoner, [facility], Janes Farfan, CNP, Sarahi Ramos, CNP, Maria Elena Birmingham, [facility]    Discharge Summary     Patient ID: Michael Nguyen  :  1960   MRN: [id number]     ACCOUNT:  [de-identified]   Patient's PCP: Jefry Sandra MD  Admit Date: 10/28/2022   Discharge Date: 2022     Length of Stay: 3  Code Status:  Full Code  Admitting Physician: Светлана Mcelroy MD  Discharge Physician: Светлана Mcelroy MD     Active Discharge Diagnoses:     Hospital Problem Lists:  Principal Problem:    Unstable angina [facility])  Active Problems:    Class 1 obesity due to excess calories with serious comorbidity and body mass index (BMI) of 34.0 to 34.9 in adult    Major depression    Hyperlipidemia    Mild intermittent asthma without complication    Gastroesophageal reflux disease without esophagitis    Chest pain    Dry eyes, bilateral  Resolved Problems:    * No resolved hospital problems.  *      Admission Condition:  stable     Discharged Condition: stable    Hospital Stay:     Hospital Course:  Michael Nguyen is a 65 y.o. female who was admitted for the management of   Unstable angina (HCC) , presented to ER with Chest Pain    Is a very pleasant 52-year-old female past medical history of depression, hyperlipidemia, intermittent asthma without complication originally presented to the hospital with right-sided chest pain and slight respiratory distress recently admitted for observation unit however was transferred due to progressive angina  underwent cardiac cathterization and no acute intervention was needed. Cardiac Arteries and Lesion Findings       LMCA: Normal 0% stenosis. LAD: Normal 0% stenosis. LCx: Normal 0% stenosis. RCA: Normal 0% stenosis. Small, non dominant      Coronary Tree        Dominance: Left        Conclusions        Procedure Summary        Non obstructive minimal CAD. Preserved LV systolic function        Recommendations        Medical treatments       Significant therapeutic interventions: see above    Significant Diagnostic Studies:   Labs / Micro:  CBC:   Lab Results   Component Value Date/Time    WBC 8.2 10/28/2022 11:07 PM    RBC 4.60 10/28/2022 11:07 PM    HGB 13.9 10/28/2022 11:07 PM    HCT 42.6 10/28/2022 11:07 PM    MCV 92.6 10/28/2022 11:07 PM    MCH 30.2 10/28/2022 11:07 PM    MCHC 32.6 10/28/2022 11:07 PM    RDW 13.2 10/28/2022 11:07 PM    PLT See Reflexed IPF Result 10/28/2022 11:07 PM     BMP:    Lab Results   Component Value Date/Time    GLUCOSE 103 10/28/2022 11:07 PM     10/28/2022 11:07 PM    K 4.4 10/28/2022 11:07 PM     10/28/2022 11:07 PM    CO2 27 10/28/2022 11:07 PM    ANIONGAP 11 10/28/2022 11:07 PM    BUN 14 10/28/2022 11:07 PM    CREATININE 0.85 10/28/2022 11:07 PM    BUNCRER 40 10/21/2022 10:55 AM    CALCIUM 9.3 10/28/2022 11:07 PM    LABGLOM >60 10/28/2022 11:07 PM    GFRAA >60 07/15/2022 01:54 AM    GFR      07/15/2022 01:54 AM        Radiology:  XR CHEST (2 VW)    Result Date: 10/28/2022  No acute process.        Consultations:    Consults:     Final Specialist Recommendations/Findings:   IP CONSULT TO CARDIOLOGY  IP CONSULT TO HOSPITALIST      The patient was seen and examined on day of discharge and this discharge summary is in conjunction with any daily progress note from day of discharge.     Discharge plan:     Disposition: Home    Physician Follow Up:     Levon Banks MD  [address], [address]  [phone]    Follow up      Levon Banks MD  [address] [facility], [address] [facility] [address]  [address]  [phone]    Follow up      [facility]  [facility] [place] [id number] [address] [phone]  Schedule an appointment as soon as possible for a visit       Requiring Further Evaluation/Follow Up POST HOSPITALIZATION/Incidental Findings: follow up PCP follow up cardiology    Diet: cardiac diet    Activity: As tolerated    Instructions to Patient: follow up PCP follow up cardiology    Discharge Medications:      Medication List        START taking these medications      aspirin 81 MG chewable tablet  Take 1 tablet by mouth daily  Start taking on: November 2, 2022     atorvastatin 40 MG tablet  Commonly known as: LIPITOR  Take 1 tablet by mouth nightly            CONTINUE taking these medications      albuterol sulfate  (90 Base) MCG/ACT inhaler  Commonly known as: PROVENTIL;VENTOLIN;PROAIR  INAHLE 2 PUFFS BY MOUTH INTO THE LUNGS EVERY 6 HOURS AS NEEDED FOR WHEEZING     busPIRone 15 MG tablet  Commonly known as: BUSPAR  TAKE 1 TABLET BY MOUTH TWICE DAILY     chlorzoxazone 500 MG tablet  Commonly known as: PARAFON FORTE  TAKE 1/2 TABLET BY MOUTH TWICE DAILY AS NEEDED     Cranberry Concentrate 500 MG Caps  Generic drug: Cranberry  TAKE 1 CAPSULE BY MOUTH TWICE DAILY     Culturelle Probiotics + Multiv Chew  Take 1 tablet by mouth daily     diclofenac 50 MG EC tablet  Commonly known as: VOLTAREN  TAKE 1 TABLET BY MOUTH TWICE DAILY     diclofenac sodium 1 % Gel  Commonly known as: VOLTAREN  Apply 4 g topically 4 times daily as needed for Pain     EPINEPHrine 0.3 MG/0.3ML Soaj injection  Commonly known as: EpiPen 2-Danny  Inject 0.3 mLs into the muscle once for 1 dose Use as directed for allergic reaction     fluticasone 50 MCG/ACT nasal spray  Commonly known as: FLONASE  INSTILL 2 SPRAYS INTO EACH NOSTRIL ONCE DAILY     Handicap Placard Misc  by Does not apply route DISABLED PARKING PERMIT AUTHORIZATION  Routine     Qty-1    The patient has the following condition(s) which qualifies him/her for disabled parking: Walking severely limited due to orthopedic condition     Privilege Duration: Temporary for 3 months     ibuprofen 800 MG tablet  Commonly known as: ADVIL;MOTRIN  Take 1 tablet by mouth 3 times daily as needed for Pain     ketotifen 0.025 % ophthalmic solution  Commonly known as: ZADITOR  USE 1 DROP INTO BOTH EYES TWICE DAILY     metoprolol succinate 25 MG extended release tablet  Commonly known as: TOPROL XL     Mi-Acid Gas Relief 80 MG chewable tablet  Generic drug: simethicone  CHEW 1 TABLET BY MOUTH FOUR TIMES DAILY AS NEEDED FOR FLATULENCE     miSOPROStol 200 MCG tablet  Commonly known as: CYTOTEC     omeprazole 40 MG delayed release capsule  Commonly known as: PRILOSEC  TAKE 1 CAPSULE BY MOUTH DAILY EVERY MORNING     venlafaxine 75 MG extended release capsule  Commonly known as: EFFEXOR XR  DTAKE 1 CAPSULE BY MOUTH DAILY     vitamin D 1.25 MG (27690 UT) Caps capsule  Commonly known as: ERGOCALCIFEROL  TAKE 1 CAPSULE BY MOUTH EVERY WEEK            STOP taking these medications      lansoprazole 30 MG delayed release capsule  Commonly known as: PREVACID     pravastatin 40 MG tablet  Commonly known as: PRAVACHOL            ASK your doctor about these medications      senna-docusate 8.6-50 MG per tablet  Commonly known as: PERICOLACE  Take 1 tablet by mouth daily as needed for Constipation               Where to Get Your Medications        These medications were sent to [address] [place] [address], [address] [facility] [address]  [address] [facility],  [address] [address]      Phone: [phone]   aspirin 81 MG chewable tablet  atorvastatin 40 MG tablet         No discharge procedures on file. Time Spent on discharge is  31 mins in patient examination, evaluation, counseling as well as medication reconciliation, prescriptions for required medications, discharge plan and follow up. Electronically signed by   Adelso Mcrae MD  11/1/2022  4:57 PM      Thank you Dr. Evelio Dorantes MD for the opportunity to be involved in this patient's care.

## 2022-11-01 NOTE — PLAN OF CARE
Problem: Pain  Goal: Verbalizes/displays adequate comfort level or baseline comfort level  11/1/2022 0816 by Maddi Rodriguez RN  Outcome: Progressing  11/1/2022 0649 by Brianna Bose RN  Outcome: Progressing     Problem: Discharge Planning  Goal: Discharge to home or other facility with appropriate resources  11/1/2022 0816 by Maddi Rodriguez RN  Outcome: Progressing  11/1/2022 0649 by Brianna Bose RN  Outcome: Progressing     Problem: Infection - Adult  Goal: Absence of fever/infection during anticipated neutropenic period  11/1/2022 0816 by Maddi Rodriguez RN  Outcome: Progressing  11/1/2022 0649 by Brianna Bose RN  Outcome: Progressing     Problem: Respiratory - Adult  Goal: Achieves optimal ventilation and oxygenation  11/1/2022 0816 by Maddi Rodriguez RN  Outcome: Progressing  11/1/2022 0649 by Brianna Bose RN  Outcome: Progressing     Problem: Cardiovascular - Adult  Goal: Maintains optimal cardiac output and hemodynamic stability  11/1/2022 0816 by Maddi Rodriguez RN  Outcome: Progressing  11/1/2022 0649 by Brianna Bose RN  Outcome: Progressing     Problem: ABCDS Injury Assessment  Goal: Absence of physical injury  11/1/2022 0816 by Maddi Rodriguez RN  Outcome: Progressing  11/1/2022 0649 by Brianna Bose RN  Outcome: Progressing

## 2022-11-01 NOTE — PLAN OF CARE
Problem: Pain  Goal: Verbalizes/displays adequate comfort level or baseline comfort level  11/1/2022 1052 by Myra Goldberg RN  Outcome: Completed  11/1/2022 0816 by Myra Goldberg RN  Outcome: Progressing  11/1/2022 0649 by Puja Worthington RN  Outcome: Progressing     Problem: Discharge Planning  Goal: Discharge to home or other facility with appropriate resources  11/1/2022 1052 by Myra Goldberg RN  Outcome: Completed  11/1/2022 0816 by Myra Goldberg RN  Outcome: Progressing  11/1/2022 0649 by Puja Worthington RN  Outcome: Progressing     Problem: Infection - Adult  Goal: Absence of fever/infection during anticipated neutropenic period  11/1/2022 1052 by Myra Goldberg RN  Outcome: Completed  11/1/2022 0816 by Myra Goldberg RN  Outcome: Progressing  11/1/2022 0649 by Puja Worthington RN  Outcome: Progressing     Problem: Respiratory - Adult  Goal: Achieves optimal ventilation and oxygenation  11/1/2022 1052 by Myra Goldberg RN  Outcome: Completed  11/1/2022 0816 by Myra Goldberg RN  Outcome: Progressing  11/1/2022 0649 by Puja Worthington RN  Outcome: Progressing     Problem: Cardiovascular - Adult  Goal: Maintains optimal cardiac output and hemodynamic stability  11/1/2022 1052 by Myra Goldberg RN  Outcome: Completed  11/1/2022 0816 by Myra Goldberg RN  Outcome: Progressing  11/1/2022 0649 by Puja Worthington RN  Outcome: Progressing     Problem: ABCDS Injury Assessment  Goal: Absence of physical injury  11/1/2022 1052 by Myra Goldberg RN  Outcome: Completed  11/1/2022 0816 by Myra Goldberg RN  Outcome: Progressing  11/1/2022 0649 by Puja Worthington RN  Outcome: Progressing

## 2022-11-01 NOTE — PROGRESS NOTES
[facility]  Progress Note                   Date:   11/1/2022  Patient name: Juni Askew  Date of admission:  10/28/2022 10:42 PM  MRN:   [id number]  YOB: 1960  PCP: Rashard Simpson MD    Reason for Admission: Chest pain [R07.9]  Unstable angina ([address] [I20.0]    Subjective:       Clinical Changes /Abnormalities: Patient seen and examined in room. She denies chest pain or shortness of breath. Tele/vitals/labs reviewed, SR. Discussed case with RN, patient to have ankle surgery Friday. Review of Systems    Medications:   Scheduled Meds:   sodium chloride flush  5-40 mL IntraVENous 2 times per day    fluticasone  2 spray Each Nostril Daily    busPIRone  15 mg Oral BID    ketotifen  1 drop Both Eyes Daily    pantoprazole  40 mg Oral QAM AC    metoprolol succinate  25 mg Oral Daily    miSOPROStol  200 mcg Oral Daily    venlafaxine  75 mg Oral Daily with breakfast    sodium chloride flush  5-40 mL IntraVENous 2 times per day    aspirin  81 mg Oral Daily    atorvastatin  40 mg Oral Nightly     Continuous Infusions:   sodium chloride      sodium chloride      nitroGLYCERIN 5 mcg/min (10/29/22 1125)     CBC:   No results for input(s): WBC, HGB, PLT in the last 72 hours. BMP:    No results for input(s): NA, K, CL, CO2, BUN, CREATININE, GLUCOSE in the last 72 hours. Hepatic:No results for input(s): AST, ALT, ALB, BILITOT, ALKPHOS in the last 72 hours. Troponin:   No results for input(s): TROPHS in the last 72 hours. BNP: No results for input(s): BNP in the last 72 hours. Lipids: No results for input(s): CHOL, HDL in the last 72 hours. Invalid input(s): LDLCALCU  INR: No results for input(s): INR in the last 72 hours. DATA:  last echo was 6/2/2018 which showed LVEF > 55%, no WMA, MVP with mild MR.   had a negative nuclear stress test in 2018. ECHO 10/31/22  Cardiac Arteries and Lesion Findings       LMCA: Normal 0% stenosis. LAD: Normal 0% stenosis.      LCx: Normal 0% stenosis. RCA: Normal 0% stenosis. Small, non dominant      Coronary Tree        Dominance: Left        Conclusions        Procedure Summary        Non obstructive minimal CAD. Preserved LV systolic function        Recommendations        Medical treatments     Objective:   Vitals: /70   Pulse 68   Temp 98 °F (36.7 °C) (Oral)   Resp 16   Ht 5' 2\" (1.575 m)   Wt 186 lb 4.6 oz (84.5 kg)   SpO2 93%   BMI 34.07 kg/m²   General appearance: alert and cooperative with exam  HEENT: Head: Normocephalic, no lesions, without obvious abnormality. Neck:no JVD, trachea midline, no adenopathy  Lungs: Clear to auscultation  Heart: Regular rate and rhythm, s1/s2 auscultated, no murmurs, SR  Abdomen: soft, non-tender, bowel sounds active  Extremities: no edema  Neurologic: not done        Assessment / Acute Cardiac Problems:   Progressive angina/unstable   HLD  Tachycardia, on metoprolol  Family history of CAD  MVP w/ mild MR    Patient Active Problem List:     Major depression     Rotator cuff disorder     Hyperlipidemia     Incontinence in female     Rectocele     Diverticulitis     Asthma     Hypokalemia     Cellulitis, face - left side, failed outpatient therapy     Hyperglycemia     Mild intermittent asthma without complication     Gastroesophageal reflux disease without esophagitis     Displacement of lumbar intervertebral disc without myelopathy     Chest pain     Dyspepsia     Acute medial meniscus tear of left knee     Dry eyes, bilateral     Insufficiency of tear film of both eyes     Instability of left ankle joint     Unstable angina (HCC)     Class 1 obesity due to excess calories with serious comorbidity and body mass index (BMI) of 34.0 to 34.9 in adult      Plan of Treatment:   Cardiac cath reviewed as above. Nonobstructive minimal CAD. Continue ASA, statin and BB. Artery site CDI, no signs/symptoms of infection, + pulse.  Discussed in detail with patient post cath POC including but not limited to medications, diet, exercise, right radial artery site care, and follow-up. Questions and concerns addressed. 2. OK for discharge home today. F/U in office in 1-3 weeks.      Electronically signed by SHAUN Alcantar NP on 11/1/2022 at 9:37 [facility].  [phone]

## 2022-11-01 NOTE — PLAN OF CARE
Problem: Pain  Goal: Verbalizes/displays adequate comfort level or baseline comfort level  Outcome: Progressing     Problem: Discharge Planning  Goal: Discharge to home or other facility with appropriate resources  Outcome: Progressing     Problem: Infection - Adult  Goal: Absence of fever/infection during anticipated neutropenic period  Outcome: Progressing     Problem: Respiratory - Adult  Goal: Achieves optimal ventilation and oxygenation  Outcome: Progressing     Problem: Cardiovascular - Adult  Goal: Maintains optimal cardiac output and hemodynamic stability  Outcome: Progressing     Problem: ABCDS Injury Assessment  Goal: Absence of physical injury  Outcome: Progressing

## 2022-11-01 NOTE — PROGRESS NOTES
[facility]  Office: [address], DO, Chapinnarayan Hough, DO, Shameka Glez, DO, Lenore Sonya Amaral, DO, Sumeet Rubalcava MD, Tyrone Bradshaw MD, Princess Jan MD, Marisel Sanchez MD,  Kiara Panchal MD, Kamille Dykes MD, Ladell Scheuermann, DO, Juarez Byers MD,  Julieth Jones MD, Alpesh Tamayo MD, Ian Pablo, DO, Vida Davila MD, Jose Juan Pittman MD, Mayelin Franco, DO, Robb Mcdowell MD, Brittanie Seo MD, Izabel Guardado MD, Ivon Judge MD, Jorge Dorsey DO, Sam Ward MD, Alize Griggs MD, Carina Molina, [facility],  Derrick Mcmahon, CNP, Isabel Lopez, CNP, Hortensia Dewitt, CNP,  Kaur Lewis, [facility], Jelly Fields, CNP, Ronda Mckeon, CNP, Katarzyna Rodriguez, CNP, Arleen Alpers, CNP, Suzi Smith, CNP, Jerene Oppenheim, PA-C, Kassi English, CNS, Demetra Bansal, [facility], Ho Toro, CNP, Brandon Cook, CNP, Maye Marc, CNP         [address]    Progress Note    11/1/2022    9:04 AM    Name:   Nikolas Clark  MRN:     [id number]     [place]:      [de-identified]   Room:   [address] [place] [address] [place], [place] [address] Day:  3  Admit Date:  10/28/2022 10:42 PM    PCP:   Joe Wallace MD  Code Status:  Full Code    Subjective:     C/C:   Chief Complaint   Patient presents with    Chest Pain     Interval History Status: not changed. Patient seen and examined in bed. Status post cath. From right wrist, no tenderness, no hematoma, good range of motion. Brief History:     Is a very pleasant 55-year-old female past medical history of depression, hyperlipidemia, intermittent asthma without complication originally presented to the hospital with right-sided chest pain and slight respiratory distress recently admitted for observation unit however was transferred due to progressive angina  underwent cardiac cathterization and no acute intervention was needed. Cardiac Arteries and Lesion Findings       LMCA: Normal 0% stenosis.      LAD: Normal 0% stenosis. LCx: Normal 0% stenosis. RCA: Normal 0% stenosis. Small, non dominant      Coronary Tree        Dominance: Left        Conclusions        Procedure Summary        Non obstructive minimal CAD. Preserved LV systolic function        Recommendations        Medical treatments     Review of Systems:     Review of Systems   Constitutional:  Negative for activity change, appetite change, chills and fever. HENT:  Negative for congestion, ear pain, facial swelling and mouth sores. Eyes:  Negative for discharge and itching. Respiratory:  Negative for apnea, chest tightness and shortness of breath. Cardiovascular:  Negative for chest pain and leg swelling. Gastrointestinal:  Negative for abdominal distention, abdominal pain, constipation and diarrhea. Endocrine: Negative for cold intolerance, polyphagia and polyuria. Genitourinary:  Negative for difficulty urinating, dysuria and flank pain. Musculoskeletal:  Negative for arthralgias, back pain and joint swelling. Skin:  Negative for color change and wound. Neurological:  Negative for dizziness, tremors, seizures, weakness, light-headedness and headaches. Psychiatric/Behavioral:  Negative for agitation, behavioral problems and self-injury. Medications: Allergies: Allergies   Allergen Reactions    Shellfish Allergy     Shrimp (Diagnostic) Shortness Of Breath    Famotidine Other (See Comments)     Headaches      Gabapentin Nausea Only     Mood swings, nausea.           Current Meds:   Scheduled Meds:    sodium chloride flush  5-40 mL IntraVENous 2 times per day    fluticasone  2 spray Each Nostril Daily    busPIRone  15 mg Oral BID    ketotifen  1 drop Both Eyes Daily    pantoprazole  40 mg Oral QAM AC    metoprolol succinate  25 mg Oral Daily    miSOPROStol  200 mcg Oral Daily    venlafaxine  75 mg Oral Daily with breakfast    sodium chloride flush  5-40 mL IntraVENous 2 times per day    aspirin  81 mg Oral Daily atorvastatin  40 mg Oral Nightly     Continuous Infusions:    sodium chloride      sodium chloride      nitroGLYCERIN 5 mcg/min (10/29/22 1125)     PRN Meds: sodium chloride flush, sodium chloride, albuterol sulfate HFA, sodium chloride flush, sodium chloride, potassium chloride **OR** potassium alternative oral replacement **OR** potassium chloride, ondansetron, polyethylene glycol, acetaminophen **OR** acetaminophen    Data:     Past Medical History:   has a past medical history of Arthritis, ASCUS with positive high risk HPV cervical, Asthma, COVID-19, Depression, Diverticulitis, ESBL (extended spectrum beta-lactamase) producing bacteria infection, GERD (gastroesophageal reflux disease), Hyperlipidemia, MRSA (methicillin resistant staph aureus) culture positive, Rectocele, and Tachycardia. Social History:   reports that she quit smoking about 27 years ago. Her smoking use included cigarettes. She has a 1.00 pack-year smoking history. She has never used smokeless tobacco. She reports current alcohol use. She reports that she does not use drugs. Family History:   Family History   Problem Relation Age of Onset    Colon Cancer Mother     High Blood Pressure Father     Heart Attack Brother     Heart Disease Brother     Crohn's Disease Niece        Vitals:  /70   Pulse 68   Temp 98 °F (36.7 °C) (Oral)   Resp 16   Ht 5' 2\" (1.575 m)   Wt 186 lb 4.6 oz (84.5 kg)   SpO2 93%   BMI 34.07 kg/m²   Temp (24hrs), Av.1 °F (36.7 °C), Min:97.5 °F (36.4 °C), Max:98.3 °F (36.8 °C)    No results for input(s): POCGLU in the last 72 hours. I/O (24Hr): No intake or output data in the 24 hours ending 22 0904      Labs:  Hematology:  No results for input(s): WBC, RBC, HGB, HCT, MCV, MCH, MCHC, RDW, PLT, MPV, SEDRATE, CRP, INR, DDIMER, IC3MCXLL, LABABSO in the last 72 hours.     Invalid input(s): PT    Chemistry:  No results for input(s): NA, K, CL, CO2, GLUCOSE, BUN, CREATININE, MG, ANIONGAP, LABGLOM, GFRAA, CALCIUM, CAION, PHOS, PSA, PROBNP, TROPHS, CKTOTAL, CKMB, CKMBINDEX, MYOGLOBIN, DIGOXIN, LACTACIDWB in the last 72 hours. No results for input(s): PROT, LABALBU, LABA1C, E9OKUYV, Z3UOHDC, FT4, TSH, AST, ALT, LDH, GGT, ALKPHOS, LABGGT, BILITOT, BILIDIR, AMMONIA, AMYLASE, LIPASE, LACTATE, CHOL, HDL, LDLCHOLESTEROL, CHOLHDLRATIO, TRIG, VLDL, RYL36FO, PHENYTOIN, PHENYF, URICACID, POCGLU in the last 72 hours. ABG:No results found for: POCPH, PHART, PH, POCPCO2, BEB1YTP, PCO2, POCPO2, PO2ART, PO2, POCHCO3, FLT6ILF, HCO3, NBEA, PBEA, BEART, BE, THGBART, THB, TSS0QHX, TMEH9GHL, Q9LLIOFU, O2SAT, FIO2  Lab Results   Component Value Date/Time    SPECIAL NOT REPORTED 12/04/2019 07:42 PM     Lab Results   Component Value Date/Time    CULTURE  12/04/2019 07:42 PM     PRESUMPTIVE ID: GARDNERELLA VAGINALIS MODERATE GROWTH    CULTURE NEGATIVE FOR NEISSERIA GONORRHOEAE 12/04/2019 07:42 PM    CULTURE NORMAL URO-GENITAL ANTONELLA 12/04/2019 07:42 PM       Radiology:  XR CHEST (2 VW)    Result Date: 10/28/2022  No acute process. Physical Examination:        Physical Exam  Constitutional:       General: She is not in acute distress. Appearance: She is obese. She is not ill-appearing. HENT:      Right Ear: External ear normal.      Left Ear: External ear normal.      Nose: No congestion or rhinorrhea. Mouth/Throat:      Mouth: Mucous membranes are moist.   Eyes:      Pupils: Pupils are equal, round, and reactive to light. Cardiovascular:      Rate and Rhythm: Normal rate and regular rhythm. Pulses: Normal pulses. Heart sounds: No murmur heard. Pulmonary:      Effort: Pulmonary effort is normal.      Breath sounds: No wheezing or rales. Abdominal:      General: Bowel sounds are normal. There is no distension. Palpations: Abdomen is soft. Musculoskeletal:      Cervical back: Neck supple. Right lower leg: No edema. Left lower leg: No edema. Skin:     General: Skin is dry.       Capillary

## 2022-11-02 ENCOUNTER — TELEPHONE (OUTPATIENT)
Dept: FAMILY MEDICINE CLINIC | Age: 62
End: 2022-11-02

## 2022-11-02 NOTE — TELEPHONE ENCOUNTER
[facility] Transitions Initial Follow Up Call    Outreach made within 2 business days of discharge: Yes    Patient: Noah Jason Patient : 1960   MRN: [id number]  Reason for Admission: There are no discharge diagnoses documented for the most recent discharge. Discharge Date: 22       Spoke with: Melani Schulte, patient will follow up with surg    [facility] [address] [facility]:  Was patient able to fill all prescriptions: Yes  Was patient instructed to bring all medications to the follow-up visit: Yes  Is patient taking all medications as directed in the discharge summary?  Yes  Does patient understand their discharge instructions: Yes  Does patient have questions or concerns that need addressed prior to 7-14 day follow up office visit: no    Scheduled appointment with PCP within 7-14 days    Follow Up  Future Appointments   Date Time Provider Elaine Stuart   2022 10:30 AM Lindon Baumgarten, MD Sports Med [facility]   12/15/2022  1:00 PM Lindon Baumgarten, MD Sports Med [facility]   2023 11:00 AM Lindon Baumgarten, [facility]

## 2022-11-03 ENCOUNTER — ANESTHESIA EVENT (OUTPATIENT)
Dept: OPERATING ROOM | Age: 62
End: 2022-11-03
Payer: COMMERCIAL

## 2022-11-04 ENCOUNTER — ANESTHESIA (OUTPATIENT)
Dept: OPERATING ROOM | Age: 62
End: 2022-11-04
Payer: COMMERCIAL

## 2022-11-04 ENCOUNTER — HOSPITAL ENCOUNTER (OUTPATIENT)
Age: 62
Setting detail: OUTPATIENT SURGERY
Discharge: HOME | End: 2022-11-04
Attending: ORTHOPAEDIC SURGERY | Admitting: ORTHOPAEDIC SURGERY
Payer: COMMERCIAL

## 2022-11-04 VITALS
TEMPERATURE: 97.2 F | RESPIRATION RATE: 15 BRPM | SYSTOLIC BLOOD PRESSURE: 125 MMHG | OXYGEN SATURATION: 98 % | BODY MASS INDEX: 33.2 KG/M2 | HEART RATE: 73 BPM | WEIGHT: 187.39 LBS | HEIGHT: 63 IN | DIASTOLIC BLOOD PRESSURE: 71 MMHG

## 2022-11-04 DIAGNOSIS — M21.861 GASTROCNEMIUS EQUINUS OF RIGHT LOWER EXTREMITY: Primary | ICD-10-CM

## 2022-11-04 PROBLEM — M62.461 GASTROCNEMIUS EQUINUS OF RIGHT LOWER EXTREMITY: Status: ACTIVE | Noted: 2022-11-04

## 2022-11-04 LAB
EKG ATRIAL RATE: 70 BPM
EKG P AXIS: 52 DEGREES
EKG P-R INTERVAL: 140 MS
EKG Q-T INTERVAL: 408 MS
EKG QRS DURATION: 72 MS
EKG QTC CALCULATION (BAZETT): 440 MS
EKG R AXIS: 20 DEGREES
EKG T AXIS: 37 DEGREES
EKG VENTRICULAR RATE: 70 BPM

## 2022-11-04 PROCEDURE — 3700000000 HC ANESTHESIA ATTENDED CARE: Performed by: ORTHOPAEDIC SURGERY

## 2022-11-04 PROCEDURE — 7100000010 HC PHASE II RECOVERY - FIRST 15 MIN: Performed by: ORTHOPAEDIC SURGERY

## 2022-11-04 PROCEDURE — 64447 NJX AA&/STRD FEMORAL NRV IMG: CPT | Performed by: ANESTHESIOLOGY

## 2022-11-04 PROCEDURE — 6360000002 HC RX W HCPCS: Performed by: ANESTHESIOLOGY

## 2022-11-04 PROCEDURE — 7100000000 HC PACU RECOVERY - FIRST 15 MIN: Performed by: ORTHOPAEDIC SURGERY

## 2022-11-04 PROCEDURE — 7100000001 HC PACU RECOVERY - ADDTL 15 MIN: Performed by: ORTHOPAEDIC SURGERY

## 2022-11-04 PROCEDURE — 2500000003 HC RX 250 WO HCPCS: Performed by: NURSE ANESTHETIST, CERTIFIED REGISTERED

## 2022-11-04 PROCEDURE — 6360000002 HC RX W HCPCS: Performed by: NURSE ANESTHETIST, CERTIFIED REGISTERED

## 2022-11-04 PROCEDURE — 93005 ELECTROCARDIOGRAM TRACING: CPT

## 2022-11-04 PROCEDURE — 2709999900 HC NON-CHARGEABLE SUPPLY: Performed by: ORTHOPAEDIC SURGERY

## 2022-11-04 PROCEDURE — 3600000002 HC SURGERY LEVEL 2 BASE: Performed by: ORTHOPAEDIC SURGERY

## 2022-11-04 PROCEDURE — 6370000000 HC RX 637 (ALT 250 FOR IP): Performed by: STUDENT IN AN ORGANIZED HEALTH CARE EDUCATION/TRAINING PROGRAM

## 2022-11-04 PROCEDURE — 27687 REVISION OF CALF TENDON: CPT | Performed by: ORTHOPAEDIC SURGERY

## 2022-11-04 PROCEDURE — 3600000012 HC SURGERY LEVEL 2 ADDTL 15MIN: Performed by: ORTHOPAEDIC SURGERY

## 2022-11-04 PROCEDURE — 7100000011 HC PHASE II RECOVERY - ADDTL 15 MIN: Performed by: ORTHOPAEDIC SURGERY

## 2022-11-04 PROCEDURE — 2580000003 HC RX 258: Performed by: ANESTHESIOLOGY

## 2022-11-04 PROCEDURE — 93010 ELECTROCARDIOGRAM REPORT: CPT | Performed by: INTERNAL MEDICINE

## 2022-11-04 PROCEDURE — 2500000003 HC RX 250 WO HCPCS: Performed by: ANESTHESIOLOGY

## 2022-11-04 PROCEDURE — 6370000000 HC RX 637 (ALT 250 FOR IP): Performed by: ORTHOPAEDIC SURGERY

## 2022-11-04 PROCEDURE — 3700000001 HC ADD 15 MINUTES (ANESTHESIA): Performed by: ORTHOPAEDIC SURGERY

## 2022-11-04 RX ORDER — SODIUM CHLORIDE, SODIUM LACTATE, POTASSIUM CHLORIDE, CALCIUM CHLORIDE 600; 310; 30; 20 MG/100ML; MG/100ML; MG/100ML; MG/100ML
INJECTION, SOLUTION INTRAVENOUS CONTINUOUS
Status: DISCONTINUED | OUTPATIENT
Start: 2022-11-04 | End: 2022-11-04 | Stop reason: HOSPADM

## 2022-11-04 RX ORDER — MIDAZOLAM HYDROCHLORIDE 1 MG/ML
2 INJECTION INTRAMUSCULAR; INTRAVENOUS ONCE
Status: COMPLETED | OUTPATIENT
Start: 2022-11-04 | End: 2022-11-04

## 2022-11-04 RX ORDER — ROPIVACAINE HYDROCHLORIDE 5 MG/ML
INJECTION, SOLUTION EPIDURAL; INFILTRATION; PERINEURAL
Status: COMPLETED | OUTPATIENT
Start: 2022-11-04 | End: 2022-11-04

## 2022-11-04 RX ORDER — SULFAMETHOXAZOLE AND TRIMETHOPRIM 800; 160 MG/1; MG/1
1 TABLET ORAL 2 TIMES DAILY
Qty: 10 TABLET | Refills: 0 | Status: SHIPPED | OUTPATIENT
Start: 2022-11-04 | End: 2022-11-09

## 2022-11-04 RX ORDER — ONDANSETRON 4 MG/1
4 TABLET, FILM COATED ORAL EVERY 8 HOURS PRN
Qty: 20 TABLET | Refills: 0 | Status: SHIPPED | OUTPATIENT
Start: 2022-11-04 | End: 2022-11-11

## 2022-11-04 RX ORDER — DEXAMETHASONE SODIUM PHOSPHATE 10 MG/ML
INJECTION, SOLUTION INTRAMUSCULAR; INTRAVENOUS PRN
Status: DISCONTINUED | OUTPATIENT
Start: 2022-11-04 | End: 2022-11-04 | Stop reason: SDUPTHER

## 2022-11-04 RX ORDER — PROPOFOL 10 MG/ML
INJECTION, EMULSION INTRAVENOUS PRN
Status: DISCONTINUED | OUTPATIENT
Start: 2022-11-04 | End: 2022-11-04 | Stop reason: SDUPTHER

## 2022-11-04 RX ORDER — OXYCODONE HYDROCHLORIDE AND ACETAMINOPHEN 5; 325 MG/1; MG/1
1 TABLET ORAL EVERY 6 HOURS PRN
Qty: 15 TABLET | Refills: 0 | Status: SHIPPED | OUTPATIENT
Start: 2022-11-04 | End: 2022-11-11

## 2022-11-04 RX ORDER — SODIUM CHLORIDE 0.9 % (FLUSH) 0.9 %
5-40 SYRINGE (ML) INJECTION PRN
Status: DISCONTINUED | OUTPATIENT
Start: 2022-11-04 | End: 2022-11-04 | Stop reason: HOSPADM

## 2022-11-04 RX ORDER — DOCUSATE SODIUM 100 MG/1
100 CAPSULE, LIQUID FILLED ORAL 2 TIMES DAILY PRN
Qty: 20 CAPSULE | Refills: 0 | Status: SHIPPED | OUTPATIENT
Start: 2022-11-04 | End: 2022-11-11

## 2022-11-04 RX ORDER — GINSENG 100 MG
CAPSULE ORAL PRN
Status: DISCONTINUED | OUTPATIENT
Start: 2022-11-04 | End: 2022-11-04 | Stop reason: ALTCHOICE

## 2022-11-04 RX ORDER — SODIUM CHLORIDE 9 MG/ML
INJECTION, SOLUTION INTRAVENOUS CONTINUOUS
Status: DISCONTINUED | OUTPATIENT
Start: 2022-11-04 | End: 2022-11-04 | Stop reason: HOSPADM

## 2022-11-04 RX ORDER — SODIUM CHLORIDE 0.9 % (FLUSH) 0.9 %
5-40 SYRINGE (ML) INJECTION EVERY 12 HOURS SCHEDULED
Status: DISCONTINUED | OUTPATIENT
Start: 2022-11-04 | End: 2022-11-04 | Stop reason: HOSPADM

## 2022-11-04 RX ORDER — ACETAMINOPHEN 500 MG
1000 TABLET ORAL ONCE
Status: COMPLETED | OUTPATIENT
Start: 2022-11-04 | End: 2022-11-04

## 2022-11-04 RX ORDER — ASPIRIN 325 MG
325 TABLET, DELAYED RELEASE (ENTERIC COATED) ORAL DAILY
Qty: 42 TABLET | Refills: 0 | Status: SHIPPED | OUTPATIENT
Start: 2022-11-04 | End: 2022-12-16

## 2022-11-04 RX ORDER — ONDANSETRON 2 MG/ML
INJECTION INTRAMUSCULAR; INTRAVENOUS PRN
Status: DISCONTINUED | OUTPATIENT
Start: 2022-11-04 | End: 2022-11-04 | Stop reason: SDUPTHER

## 2022-11-04 RX ORDER — ROCURONIUM BROMIDE 10 MG/ML
INJECTION, SOLUTION INTRAVENOUS PRN
Status: DISCONTINUED | OUTPATIENT
Start: 2022-11-04 | End: 2022-11-04 | Stop reason: SDUPTHER

## 2022-11-04 RX ORDER — LIDOCAINE HYDROCHLORIDE 10 MG/ML
1 INJECTION, SOLUTION EPIDURAL; INFILTRATION; INTRACAUDAL; PERINEURAL
Status: DISCONTINUED | OUTPATIENT
Start: 2022-11-04 | End: 2022-11-04 | Stop reason: HOSPADM

## 2022-11-04 RX ORDER — LIDOCAINE HYDROCHLORIDE 20 MG/ML
INJECTION, SOLUTION EPIDURAL; INFILTRATION; INTRACAUDAL; PERINEURAL PRN
Status: DISCONTINUED | OUTPATIENT
Start: 2022-11-04 | End: 2022-11-04 | Stop reason: SDUPTHER

## 2022-11-04 RX ORDER — BUPIVACAINE HYDROCHLORIDE 5 MG/ML
INJECTION, SOLUTION EPIDURAL; INTRACAUDAL
Status: COMPLETED | OUTPATIENT
Start: 2022-11-04 | End: 2022-11-04

## 2022-11-04 RX ORDER — SODIUM CHLORIDE 9 MG/ML
INJECTION, SOLUTION INTRAVENOUS PRN
Status: DISCONTINUED | OUTPATIENT
Start: 2022-11-04 | End: 2022-11-04 | Stop reason: HOSPADM

## 2022-11-04 RX ADMIN — ROPIVACAINE HYDROCHLORIDE 20 ML: 5 INJECTION, SOLUTION EPIDURAL; INFILTRATION; PERINEURAL at 09:55

## 2022-11-04 RX ADMIN — LIDOCAINE HYDROCHLORIDE 100 MG: 20 INJECTION, SOLUTION EPIDURAL; INFILTRATION; INTRACAUDAL; PERINEURAL at 10:42

## 2022-11-04 RX ADMIN — SUGAMMADEX 350 MG: 100 INJECTION, SOLUTION INTRAVENOUS at 11:24

## 2022-11-04 RX ADMIN — MIDAZOLAM HYDROCHLORIDE 1.5 MG: 1 INJECTION, SOLUTION INTRAMUSCULAR; INTRAVENOUS at 09:56

## 2022-11-04 RX ADMIN — ROCURONIUM BROMIDE 50 MG: 10 INJECTION, SOLUTION INTRAVENOUS at 10:42

## 2022-11-04 RX ADMIN — DEXAMETHASONE SODIUM PHOSPHATE 8 MG: 10 INJECTION INTRAMUSCULAR; INTRAVENOUS at 10:54

## 2022-11-04 RX ADMIN — BUPIVACAINE HYDROCHLORIDE 20 ML: 5 INJECTION, SOLUTION EPIDURAL; INTRACAUDAL; PERINEURAL at 09:55

## 2022-11-04 RX ADMIN — PROPOFOL 50 MG: 10 INJECTION, EMULSION INTRAVENOUS at 11:25

## 2022-11-04 RX ADMIN — ONDANSETRON 4 MG: 2 INJECTION, SOLUTION INTRAMUSCULAR; INTRAVENOUS at 11:00

## 2022-11-04 RX ADMIN — SODIUM CHLORIDE, POTASSIUM CHLORIDE, SODIUM LACTATE AND CALCIUM CHLORIDE: 600; 310; 30; 20 INJECTION, SOLUTION INTRAVENOUS at 08:21

## 2022-11-04 RX ADMIN — ACETAMINOPHEN 1000 MG: 500 TABLET ORAL at 08:27

## 2022-11-04 RX ADMIN — PROPOFOL 150 MG: 10 INJECTION, EMULSION INTRAVENOUS at 10:42

## 2022-11-04 ASSESSMENT — PAIN - FUNCTIONAL ASSESSMENT: PAIN_FUNCTIONAL_ASSESSMENT: 0-10

## 2022-11-04 ASSESSMENT — PAIN DESCRIPTION - DESCRIPTORS: DESCRIPTORS: STABBING

## 2022-11-04 ASSESSMENT — PAIN SCALES - GENERAL: PAINLEVEL_OUTOF10: 0

## 2022-11-04 NOTE — INTERVAL H&P NOTE
Interval H&P Note    Pt Name: Benedicto Ruiz  MRN: [id number]  YOB: 1960  Date of evaluation: 11/4/2022      [x] I have reviewed the Cardiac Consult Note by Dr Candelaria Goodman dated 10/29/22 for an Interval History and Physical note. [x] I have examined  Benedicto Ruiz, a 64 yo female who was recently hospitalized at [facility] for chest pain and evaluated by Cardiologist, Dr Enrique Alanis. The patient had an elective surgery scheduled for 11/422 by Dr Raissa Albarran and was cleared by her PCP Dr Juvencio Watkins for the procedure. During the hospitalization Dr Enrique Alanis did a cardiac catheterization that showed \"Nonobstructive minimal CAD with preserved LV systolic function. \" She was cleared by cardiology for her procedure and patient advised  \"Continue ASA, statin and BB. Artery site CDI, no signs/symptoms of infection, + pulse. Discussed in detail with patient post cath POC including but not limited to medications, diet, exercise, right radial artery site care, and follow-up. Questions and concerns addressed. Patient was discharged home\"    -I discussed her cardiac history preoperatively, and reviewed her cardiac clearance. No changes since her cardiac clearance/optimization was obtained, and she wishes to proceed as planned. The patient arrived for her scheduled RIGHT GASTROCNEMIUS RECESSION by Layne Ahuja MD FOR ACQUIRED EQUINUS DEFORMITY OF RIGHT FOOT [M21.6X1]. ( Dr Anastacio Huang 10/27/22 note below and in epic) She denies health changes since discharge, fever, chills, wheezing, cough, increased SOB, ESPINOZA, chest pain, open sores or wounds.  No DM  Last ASA  81mg 10/31/22 Voltaren 50mg and ibuprofen past month     Vital signs: /60   Pulse 69   Temp 97.3 °F (36.3 °C)   Resp 18   Ht 5' 3\" (1.6 m)   Wt 187 lb 6.3 oz (85 kg)   SpO2 97%   BMI 33.19 kg/m²     Allergies:  Shellfish allergy, Shrimp (diagnostic), Famotidine, and Gabapentin    Medications:    Prior to Admission medications    Medication Sig Start Date End Date Taking?  Authorizing Provider   aspirin 81 MG chewable tablet Take 1 tablet by mouth daily 11/2/22   Alisson Duran MD   atorvastatin (LIPITOR) 40 MG tablet Take 1 tablet by mouth nightly 11/1/22   Alisson Duran MD   albuterol sulfate HFA (PROVENTIL;VENTOLIN;PROAIR) 108 (90 Base) MCG/ACT inhaler INAHLE 2 PUFFS BY MOUTH INTO THE LUNGS EVERY 6 HOURS AS NEEDED FOR WHEEZING 10/20/22   Sonia Thakur MD   venlafaxine (EFFEXOR XR) 75 MG extended release capsule DTAKE 1 CAPSULE BY MOUTH DAILY 10/20/22   Sonia Thakur MD   vitamin D (ERGOCALCIFEROL) 1.25 MG (94693 UT) CAPS capsule TAKE 1 CAPSULE BY MOUTH EVERY WEEK 10/20/22   Sonia Thakur MD   chlorzoxazone (PARAFON FORTE) 500 MG tablet TAKE 1/2 TABLET BY MOUTH TWICE DAILY AS NEEDED 10/20/22   Sonia Thakur MD   omeprazole (PRILOSEC) 40 MG delayed release capsule TAKE 1 CAPSULE BY MOUTH DAILY EVERY MORNING 9/26/22   Sonia Thakur MD   ibuprofen (ADVIL;MOTRIN) 800 MG tablet Take 1 tablet by mouth 3 times daily as needed for Pain 9/23/22   Bret Chang MD   diclofenac (VOLTAREN) 50 MG EC tablet TAKE 1 TABLET BY MOUTH TWICE DAILY 8/29/22   Sonia Thakur MD   busPIRone (BUSPAR) 15 MG tablet TAKE 1 TABLET BY MOUTH TWICE DAILY 8/29/22   Sonia Thakur MD   EPINEPHrine (EPIPEN 2-VIOLET) 0.3 MG/0.3ML SOAJ injection Inject 0.3 mLs into the muscle once for 1 dose Use as directed for allergic reaction 8/3/22 8/3/22  Sonia Thakur MD   diclofenac sodium (VOLTAREN) 1 % GEL Apply 4 g topically 4 times daily as needed for Pain 6/23/22   Staci Santos MD   Handicap [facility] by Does not apply route DISABLED PARKING PERMIT AUTHORIZATION  Routine     Qty-1    The patient has the following condition(s) which qualifies him/her for disabled parking: Walking severely limited due to orthopedic condition     Privilege Duration: Temporary for 3 months 3/4/21   Staci Santos MD   ketotifen (ZADITOR) 0.025 % ophthalmic solution USE 1 DROP INTO BOTH EYES TWICE DAILY 1/19/21   Melba Leblanc MD   fluticasone (FLONASE) 50 MCG/ACT nasal spray INSTILL 2 SPRAYS INTO EACH NOSTRIL ONCE DAILY 1/11/21   Melba Leblanc MD   miSOPROStol (CYTOTEC) 200 MCG tablet Take 200 mcg by mouth daily    Historical Provider, MD   senna-docusate (Zach Mort) 8.6-50 MG per tablet Take 1 tablet by mouth daily as needed for Constipation  Patient taking differently: Take 1 tablet by mouth 2 times daily 2/26/20   Kylee Marks MD   Multiple Vitamins-Minerals (CULTURELLE PROBIOTICS + MULTIV) CHEW Take 1 tablet by mouth daily 1/29/20   Kylee Marks MD   MI-ACID GAS RELIEF 80 MG chewable tablet CHEW 1 TABLET BY MOUTH FOUR TIMES DAILY AS NEEDED FOR FLATULENCE 11/19/19   Kylee Marks MD   CRANBERRY CONCENTRATE 500 MG CAPS TAKE 1 CAPSULE BY MOUTH TWICE DAILY 8/21/19   Melba Leblanc MD   metoprolol succinate (TOPROL XL) 25 MG extended release tablet Take 25 mg by mouth daily     Historical Provider, MD         This is a 65 y.o.obese female who is pleasant, cooperative, alert and oriented x3, in no acute distress. Glasses     Heart: Heart sounds are normal.  -110 tachycardic rate irregular rhythm with frequent ectopy Patient anxious  Dr Honorio Coombs aware and evaluated  No murmur, gallop or rub. Lungs: Normal respiratory effort with equal expansion, good air exchange, unlabored and clear to auscultation without wheezes or rales bilaterally   Abdomen: soft, round, nontender, nondistended with bowel sounds.    Labs:  Recent Labs     10/28/22  2307   HGB 13.9   HCT 42.6   WBC 8.2   MCV 92.6   PLT See Reflexed IPF Result   *   K 4.4      CO2 27   BUN 14   CREATININE 0.85   GLUCOSE 103*       Recent Labs     10/29/22  1146   COVID19 Not Detected       Fabi Yi APRN - CNP  Electronically signed 11/4/2022 at 9:26 AM      Avni Forbes MD   Physician   Specialty:  Orthopedic Surgery   Progress Notes      Signed   Encounter Date:  10/27/2022          Related encounter: Office Visit from 10/27/2022 in [address] [place] [address] and [address] [facility] AND SPORTS MEDICINE  [address] [facility] [address]  Dept: [phone]     Ambulatory Orthopedic Follow Up Visit     Preoperative Diagnosis:   Left ankle instability with recurrent sprains  Left peroneal tendinopathy   Left gastrocnemius equinus contracture  Body mass index is 33.07 kg/m². Postoperative Diagnosis:   Same      Procedures Performed:  (3/4/2021)  Left ankle lateral ligamentous repair (with Arthrex fiber tack anchors x2 + internal brace)  Left peroneus brevis debridement  Left peroneus longus tenolysis  Left leg gastroc recession, performed through separate incision        CHIEF COMPLAINT:         Chief Complaint   Patient presents with    Ankle Pain       Right          HISTORY OF PRESENT ILLNESS:        She is a 65 y.o. female, seen again today in the office for evaluation of a new issue as above, which began atraumatically over 20 years ago  . At today's visit, she is using no brace/assistive device. History is obtained today from:   [x]  the patient     []  EMR     []  one family member/friend    []  multiple family members/friends    []  other:       Regarding the previous issue, she reports the previous problem is doing better. She denies any significant pain in her left ankle, and reports that her ankle is much stronger (she is not using a brace or assistive device for this either). INTERVAL HISTORY 6/23/2022:  She is seen again today in the office for evaluation of a new issue as above, which began around 3/15/2022 atraumatically  . At today's visit, she is using no brace/assistive device.       History is obtained today from:   [x]  the patient     []  EMR     []  one family member/friend    []  multiple family members/friends    []  other:       INTERVAL HISTORY 10/4/2022:  She is seen again today in the office for follow up of a previous issue (as above). Since being seen last, the patient is doing worse. At today's visit, she is not using a brace or assistive device. History is obtained today from:   [x]  the patient     []  EMR     []  one family member/friend    []  multiple family members/friends    []  other:       She reports that she recently went on vacation, and reports that since that time she has had an increase in pain in her right plantar heel. She reports that she was recently in the ER for her plantar heel pain. INTERVAL HISTORY 10/27/2022:  She is seen again today in the office for follow up of a previous issue (as above). Since being seen last, she reports the problem has not significantly improved. At today's visit, she is using no brace/assistive device. She is here today to discuss surgery, and reports that she wishes to proceed with surgery in the near future. She denies any other clinically significant changes in history. History is obtained today from:   [x]  the patient     []  EMR     []  one family member/friend    []  multiple family members/friends    []  other:          REVIEW OF SYSTEMS:   Musculoskeletal: See HPI for pertinent positives     Past Medical History:    She   Past Medical History in prose (no negatives)    has a past medical history of Arthritis, ASCUS with positive high risk HPV cervical (03/22/2016), Asthma, COVID-19 (2020), Depression, Diverticulitis, ESBL (extended spectrum beta-lactamase) producing bacteria infection (02/03/2019), GERD (gastroesophageal reflux disease), Hyperlipidemia, MRSA (methicillin resistant staph aureus) culture positive (04/18/2016), Rectocele, and Tachycardia. Past Surgical History:    She  has a past surgical history that includes Hysterectomy (1996); knee surgery (Left); Cystocopy (2/25/2015); Nerve Block (07/28/2017); Nerve Block (09/22/2017); Nerve Block (09/22/2017);  Nerve Block (11/10/2017); Colonoscopy (N/A, 7/19/2018); Upper gastrointestinal endoscopy (1/30/2019); Knee arthroscopy (Left, 5/13/2019); and Ankle surgery (Left, 3/4/2021).       Current Medications:     Current Medication      Current Outpatient Medications:     albuterol sulfate HFA (PROVENTIL;VENTOLIN;PROAIR) 108 (90 Base) MCG/ACT inhaler, INAHLE 2 PUFFS BY MOUTH INTO THE LUNGS EVERY 6 HOURS AS NEEDED FOR WHEEZING, Disp: 18 g, Rfl: 3    venlafaxine (EFFEXOR XR) 75 MG extended release capsule, DTAKE 1 CAPSULE BY MOUTH DAILY, Disp: 28 capsule, Rfl: 3    vitamin D (ERGOCALCIFEROL) 1.25 MG (71655 UT) CAPS capsule, TAKE 1 CAPSULE BY MOUTH EVERY WEEK, Disp: 4 capsule, Rfl: 3    chlorzoxazone (PARAFON FORTE) 500 MG tablet, TAKE 1/2 TABLET BY MOUTH TWICE DAILY AS NEEDED, Disp: 28 tablet, Rfl: 3    omeprazole (PRILOSEC) 40 MG delayed release capsule, TAKE 1 CAPSULE BY MOUTH DAILY EVERY MORNING, Disp: 28 capsule, Rfl: 3    ibuprofen (ADVIL;MOTRIN) 800 MG tablet, Take 1 tablet by mouth 3 times daily as needed for Pain, Disp: 30 tablet, Rfl: 0    diclofenac (VOLTAREN) 50 MG EC tablet, TAKE 1 TABLET BY MOUTH TWICE DAILY, Disp: 56 tablet, Rfl: 2    busPIRone (BUSPAR) 15 MG tablet, TAKE 1 TABLET BY MOUTH TWICE DAILY, Disp: 56 tablet, Rfl: 3    pravastatin (PRAVACHOL) 40 MG tablet, TAKE 1 TABLET BY MOUTH DAILY, Disp: 28 tablet, Rfl: 3    lansoprazole (PREVACID) 30 MG delayed release capsule, Take 1 capsule by mouth in the morning., Disp: 30 capsule, Rfl: 3    EPINEPHrine (EPIPEN 2-VIOLET) 0.3 MG/0.3ML SOAJ injection, Inject 0.3 mLs into the muscle once for 1 dose Use as directed for allergic reaction, Disp: 0.3 mL, Rfl: 0    diclofenac sodium (VOLTAREN) 1 % GEL, Apply 4 g topically 4 times daily as needed for Pain, Disp: 200 g, Rfl: 0    Handicap Placard MISC, by Does not apply route DISABLED PARKING PERMIT AUTHORIZATION Routine   Qty-1  The patient has the following condition(s) which qualifies him/her for disabled parking: Walking severely limited due to orthopedic condition   Privilege Duration: Temporary for 3 months, Disp: 1 each, Rfl: 0    ketotifen (ZADITOR) 0.025 % ophthalmic solution, USE 1 DROP INTO BOTH EYES TWICE DAILY, Disp: 10 mL, Rfl: 1    fluticasone (FLONASE) 50 MCG/ACT nasal spray, INSTILL 2 SPRAYS INTO EACH NOSTRIL ONCE DAILY, Disp: 16 g, Rfl: 3    miSOPROStol (CYTOTEC) 200 MCG tablet, Take 200 mcg by mouth daily, Disp: , Rfl:     senna-docusate (PERICOLACE) 8.6-50 MG per tablet, Take 1 tablet by mouth daily as needed for Constipation (Patient taking differently: Take 1 tablet by mouth 2 times daily), Disp: 30 tablet, Rfl: 1    Multiple Vitamins-Minerals (CULTURELLE PROBIOTICS + MULTIV) CHEW, Take 1 tablet by mouth daily, Disp: 30 tablet, Rfl: 2    MI-ACID GAS RELIEF 80 MG chewable tablet, CHEW 1 TABLET BY MOUTH FOUR TIMES DAILY AS NEEDED FOR FLATULENCE, Disp: 120 tablet, Rfl: 5    CRANBERRY CONCENTRATE 500 MG CAPS, TAKE 1 CAPSULE BY MOUTH TWICE DAILY, Disp: 56 capsule, Rfl: 3    metoprolol succinate (TOPROL XL) 25 MG extended release tablet, Take 25 mg by mouth daily , Disp: , Rfl:          Allergies:    Shellfish allergy, Shrimp (diagnostic), Famotidine, and Gabapentin     Family History:  family history includes Colon Cancer in her mother; Crohn's Disease in her niece; Heart Attack in her brother; Heart Disease in her brother; High Blood Pressure in her father. Social History:   Social History            Occupational History    Not on file   Tobacco Use    Smoking status: Former       Packs/day: 2.00       Years: 0.50       Pack years: 1.00       Types: Cigarettes       Quit date:        Years since quittin.8    Smokeless tobacco: Never   Vaping Use    Vaping Use: Never used   Substance and Sexual Activity    Alcohol use:  Yes       Alcohol/week: 0.0 standard drinks       Comment: social    Drug use: No    Sexual activity: Not on file         OBJECTIVE:  Resp 16   Ht 5' 3\" (1.6 m)   Wt 188 lb (85.3 kg)   SpO2 100%   BMI 33.30 kg/m²    Psych: alert and oriented to person, time, and place  Cardio:  well perfused extremities  Resp:  normal respiratory effort  Skin:  no cyanosis  Hem/lymph:  no lymphedema  Neuro:  sensation to light touch unchanged since last visit  Musculoskeletal:    RLE:  Vascular: Limb well perfused, compartments soft/compressible. Skin: No erythema/ulcers. Intact. Neurovascular Status:  Grossly neurovascularly intact throughout   Tenderness to Palpation: Plantar heel  -Gastroc equinus  -Negative squeeze test of the calcaneus        (Previous exam)  LLE:  Incisions clean/dry/intact, no erythema/dehiscence/drainage  Sensation to light touch grossly intact throughout, but altered sensation on the dorsal aspect of her foot  Warm and well perfused  Grossly neurovascularly intact distally  No signs of infection  No calf swelling/tenderness  -Instability:  Talar tilt: Negative; Anterior drawer: Negative  -No peroneal subluxation/dislocation with dorsiflexion + eversion or with circumduction  Tenderness to Palpation:  anteromedial and anterolateral knee joint line              RADIOLOGY:   10/27/2022 No new radiology images today. Prior images reviewed for reference. FINDINGS:  Three weightbearing views (AP, Mortise, and Lateral) of the right ankle and three weightbearing views (AP, Oblique, Lateral) of the right foot were obtained in the office today and reviewed, revealing no acute fracture, dislocation, or radioopaque foreign body/tumor. The ankle mortise is maintained with no widening of the clear spaces. Overall alignment is satisfactory. Calcification of the plantar aspect of the calcaneus. IMPRESSION:  No acute fracture/dislocation.      Electronically signed by Nuris Daugherty MD                 FINDINGS:  Four weightbearing views (AP, Tunnel, Lateral, and Sunrise) of the left knee were obtained in the office today and reviewed, revealing no acute fracture, dislocation, or radioopaque foreign body/tumor. Overall alignment is satisfactory. Tricompartmental degenerative changes of the knee with joint narrowing, sclerosis, and osteophytes. Retained hardware in the proximal tibia. IMPRESSION: No acute fracture/dislocation. Degenerative changes as above. Electronically signed by Mj Dumas MD        ASSESSMENT AND PLAN:  Body mass index is 33.3 kg/m². She has a history of right foot pain secondary to plantar fasciitis with underlying gastroc equinus contracture (a right plantar fascial injection on 6/23/2022 helped her pain approximately 50%). She also has a history of left ankle instability, status post left ankle lateral ligamentous stabilization, with peroneal tendon tenolysis, and gastroc recession. 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 Notably, she has no relevant past medical history. We had a discussion today about the likely diagnosis and its natural history, physical exam and imaging findings, as well as various treatment options in detail. Surgically, we have discussed a right gastroc recession. We have discussed the anticipated postoperative course and relevant weightbearing restrictions/immobilization. She reports that she wishes to proceed with surgery in the near future. The following outside medical records/clearances were reviewed at this visit:  PCP Medical clearance and preop labs. We have discussed the risks of incomplete pain relief. Orders/referrals were placed as below at today's visit. We spoke about the risks/benefits/alternatives to a surgical intervention.  They understand that the risks of surgery may include but are not limited to pain, infection, bleeding, blood clot, damage to soft tissue/vessel/nerve, future surgery, scarring/stiffness, decreased strength/weakness, cosmetic deformity, neuroma/neuritis/phantom pains, delayed soft tissue/bone healing, nonunion, malunion, failure of hardware/fixation/surgery, iatrogenic fracture/dislocation, damage to bone/joint(s), worsening of condition, recurrence, limb length discrepancy, avascular necrosis of bone, neurovascular compromise/compartment syndrome, tourniquet complications, failure of surgery, dissatisfaction with outcome, loss of limb, stroke, heart attack, pulmonary embolus, mental status change, anesthesia risks/reaction, and even death. They expressed verbal understanding of the risks and wish to proceed with surgical intervention. All questions were answered. No guarantees were made or implied. Informed consent was obtained. We also had a discussion about the risk of blood clot and thromboembolic events. The patient understands that there is an increased risk with surgery/immobilization, and understands nothing will completely eliminate the risk of DVT/PE's, and that any prophylactic medication does not substitute for early mobilization. Given her risk profile, I have recommended the following strategy to decrease the risk of blood clots, and the patient agrees and wishes to proceed:  Aspirin 325 mg PO q Day. All questions were answered and the patient agrees with the above plan. The patient will return to clinic postoperatively.                 At the patient's next visit, depending on how the patient is doing and/or new imaging/labs results, we may consider the following options:    []  Orthotic (OTC)     []  Orthotic (custom)          []  Rocker bottom shoes     []  Brace (OTC)        []  Brace (custom)             []  CAM boot        []  Night splint         []  Heel cups        []  Strap      []  Toe sleeves/splints    []  PT:                     []  Wean out of immobilization   []  Advance activity       []  Topical               []  NSAIDs          []  Ronni         []  Referral:         []  Stress xrays       []  CT         []  MRI        []  Injection:         [] Consider OR      []  Pick OR date     No follow-ups on file. Encounter Medications    No orders of the defined types were placed in this encounter. No orders of the defined types were placed in this encounter. Lorene Gilbert MD  Orthopedic Surgery           Please excuse any typos/errors, as this note was created with the assistance of voice recognition software. While intending to generate a document that actually reflects the content of the visit, the document can still have some errors including those of syntax and sound-a-like substitutions which may escape proof reading. In such instances, actual meaning can be extrapolated by context.

## 2022-11-04 NOTE — DISCHARGE INSTRUCTIONS
[facility] OR  [facility]  [place] [address]  Dept: [phone]  Loc: [phone]    Dr. Franci Chang Post Operative Instructions (Lower Extremity)      Fluids and Diet:    Begin with clear liquids, broth, dry toast, and crackers. If not nauseated, then resume your regular pre-operative diet when you feel ready. Medications: Take your prescriptions as directed. You may resume your regularly scheduled medications (unless otherwise directed). If any side effects or adverse reactions occur, discontinue the medication and contact your doctor. Review the patient drug information that is provided before you take any medication. If you have a fracture or a surgery that involves placing hardware (screws, plates, joint replacement), avoid the routine use of NSAIDs for about 6 months if possible (due to a risk of delayed bone healing). Ambulation and Activity:    You are advised to go directly home from the hospital.  You may not put any weight on the operative foot (unless otherwise directed). Avoid stairs if possible. Do not lift or move heavy objects. Do not drive until cleared by your physician. Bandage and Wound Care Instructions:    Keep splint/dressing clean and dry. Do not shower or bathe the operative extremity. Do not remove the splint/dressing (unless otherwise directed); it will be removed at your first postoperative office visit at about 2 weeks. Do not attempt to put anything between the splint or dressing and your skin; some itching is normal.  If the overlying ace wrap feels too tight, you may gently loosen it. Call the office if you have any questions or concerns. Ice and Elevation:    Elevate operative extremity as often as possible to reduce swelling and discomfort during the first 2 weeks. For the first 2 weeks, keep it elevated almost around the clock, and don't leave it not elevated for longer than 15 minutes at a time.   Elevate with about 2-3 pillows underneath the calf (about 4-8\" above the heart if laying flat, but NOT HIGHER), avoiding direct pressure on the back of the heel. Ice: Apply ice bag over the splint/dressing for 15-20 minutes (but NOT LONGER) every  minutes while awake as needed to help with swelling and pain control. Special Instructions-- seek medical attention immediately if you develop any of the following (call your doctor and/or head to an Emergency Department right away):  Fever over 100 degrees by mouth. Pain not relieved by medication ordered. Swelling, increased redness, warmth, or hardness around operative area. Numb, tingling or cold toes. Toe(s) become white or bluish. Bandage becomes wet, soiled, or blood soaked (small amount of bleeding may be normal). Increased or progressive drainage from surgical area. Chest pain, shortness of breath. Follow up instructions: You will need to follow up with Dr. Kev Ruiz in about 14 days. Call when you get home to confirm your appointment. If you have any questions, please contact Isaura , or Jair Garcia [phone].       Gina Silva MD  Orthopedic Surgery      [facility]  [facility] [address], [address]  [phone]    [facility] and [address]  [place]  [phone]    [facility] [address]  [address] [place] [address], [address] [place] [place] [address]  [phone]

## 2022-11-04 NOTE — ANESTHESIA PRE PROCEDURE
Department of Anesthesiology  Preprocedure Note       Name:  Mike Edwards   Age:  65 y.o.  :  1960                                          MRN:  [id number]         Date:  2022      Surgeon: Gladis Foster):  Timmy Mae MD    Procedure: Procedure(s):  RIGHT GASTROCNEMIUS RECESSION    Medications prior to admission:   Prior to Admission medications    Medication Sig Start Date End Date Taking?  Authorizing Provider   aspirin 81 MG chewable tablet Take 1 tablet by mouth daily 22   Melene Boast, MD   atorvastatin (LIPITOR) 40 MG tablet Take 1 tablet by mouth nightly 22   Melene Boast, MD   albuterol sulfate HFA (PROVENTIL;VENTOLIN;PROAIR) 108 (90 Base) MCG/ACT inhaler INAHLE 2 PUFFS BY MOUTH INTO THE LUNGS EVERY 6 HOURS AS NEEDED FOR WHEEZING 10/20/22   Teddy Delong MD   venlafaxine (EFFEXOR XR) 75 MG extended release capsule DTAKE 1 CAPSULE BY MOUTH DAILY 10/20/22   Teddy Delong MD   vitamin D (ERGOCALCIFEROL) 1.25 MG (03443 UT) CAPS capsule TAKE 1 CAPSULE BY MOUTH EVERY WEEK 10/20/22   Teddy Delong MD   chlorzoxazone (PARAFON FORTE) 500 MG tablet TAKE 1/2 TABLET BY MOUTH TWICE DAILY AS NEEDED 10/20/22   Teddy Delong MD   omeprazole (PRILOSEC) 40 MG delayed release capsule TAKE 1 CAPSULE BY MOUTH DAILY EVERY MORNING 22   Teddy Delong MD   ibuprofen (ADVIL;MOTRIN) 800 MG tablet Take 1 tablet by mouth 3 times daily as needed for Pain 22   Kenyon Madrid MD   diclofenac (VOLTAREN) 50 MG EC tablet TAKE 1 TABLET BY MOUTH TWICE DAILY 22   Teddy Delong MD   busPIRone (BUSPAR) 15 MG tablet TAKE 1 TABLET BY MOUTH TWICE DAILY 22   Teddy Delong MD   EPINEPHrine (EPIPEN 2-VIOLET) 0.3 MG/0.3ML SOAJ injection Inject 0.3 mLs into the muscle once for 1 dose Use as directed for allergic reaction 8/3/22 8/3/22  Teddy Delong MD   diclofenac sodium (VOLTAREN) 1 % GEL Apply 4 g topically 4 times daily as needed for Pain 22   Timmy Mae MD Handicap Placard MISC by Does not apply route DISABLED PARKING PERMIT AUTHORIZATION  Routine     Qty-1    The patient has the following condition(s) which qualifies him/her for disabled parking: Walking severely limited due to orthopedic condition     Privilege Duration: Temporary for 3 months 3/4/21   Avni Forbes MD   ketotifen (ZADITOR) 0.025 % ophthalmic solution USE 1 DROP INTO BOTH EYES TWICE DAILY 1/19/21   Melba Leblanc MD   fluticasone (FLONASE) 50 MCG/ACT nasal spray INSTILL 2 SPRAYS INTO EACH NOSTRIL ONCE DAILY 1/11/21   Melba Leblanc MD   miSOPROStol (CYTOTEC) 200 MCG tablet Take 200 mcg by mouth daily    Historical Provider, MD   senjusta-docusate (Zach Mort) 8.6-50 MG per tablet Take 1 tablet by mouth daily as needed for Constipation  Patient taking differently: Take 1 tablet by mouth 2 times daily 2/26/20   Kylee Marks MD   Multiple Vitamins-Minerals (CULTURELLE PROBIOTICS + MULTIV) CHEW Take 1 tablet by mouth daily 1/29/20   Kylee Marks MD   MI-ACID GAS RELIEF 80 MG chewable tablet CHEW 1 TABLET BY MOUTH FOUR TIMES DAILY AS NEEDED FOR FLATULENCE 11/19/19   Kylee Marks MD   CRANBERRY CONCENTRATE 500 MG CAPS TAKE 1 CAPSULE BY MOUTH TWICE DAILY 8/21/19   Melba Leblanc MD   metoprolol succinate (TOPROL XL) 25 MG extended release tablet Take 25 mg by mouth daily     Historical Provider, MD       Current medications:    No current facility-administered medications for this visit. No current outpatient medications on file.      Facility-Administered Medications Ordered in Other Visits   Medication Dose Route Frequency Provider Last Rate Last Admin    lidocaine PF 1 % injection 1 mL  1 mL IntraDERmal Once PRN Valerie Chen DO        0.9 % sodium chloride infusion   IntraVENous Continuous Edson Gurrola DO        lactated ringers infusion   IntraVENous Continuous Valerie Chen  mL/hr at 11/04/22 0821 New Bag at 11/04/22 0821    sodium chloride flush 0.9 % injection 5-40 mL  5-40 mL IntraVENous 2 times per day Edson Gurrola, DO        sodium chloride flush 0.9 % injection 5-40 mL  5-40 mL IntraVENous PRN Derral Square, DO        0.9 % sodium chloride infusion   IntraVENous PRN Derral Square, DO        ceFAZolin (ANCEF) 2000 mg in dextrose 5 % 50 mL IVPB  2,000 mg IntraVENous Once Le Mcelroy MD        midazolam (VERSED) injection 2 mg  2 mg IntraVENous Once Marbella Macdonald MD           Allergies: Allergies   Allergen Reactions    Shellfish Allergy     Shrimp (Diagnostic) Shortness Of Breath    Famotidine Other (See Comments)     Headaches      Gabapentin Nausea Only     Mood swings, nausea. Problem List:    Patient Active Problem List   Diagnosis Code    Major depression F32.9    Rotator cuff disorder M67.919    Hyperlipidemia E78.5    Incontinence in female R32    Rectocele N81.6    Diverticulitis K57.92    Asthma J45.909    Hypokalemia E87.6    Cellulitis, face - left side, failed outpatient therapy L03. 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  Coli urine    GERD (gastroesophageal reflux disease)     Hyperlipidemia     MRSA (methicillin resistant staph aureus) culture positive 2016    lip    Rectocele     Tachycardia     takes Metoprolol       Past Surgical History:        Procedure Laterality Date    ANKLE SURGERY Left 3/4/2021    Left ankle lateral ligamentous repair, Left peroneus brevis debridement, Left peroneus longus tenolysis, Left leg gastroc recession, performed through separate incision  performed by Layne Ahuja MD at [facility] N/A 2018    COLONOSCOPY performed by Randa Krishnamurthy DO at [address] [place] [address]  2015    uro    HYSTERECTOMY (CERVIX STATUS UNKNOWN)      RYAN, BSO performed at [facility] by Dr. Shreya Lyons, Also had some type of bladder lift at this time    KNEE ARTHROSCOPY Left 2019    KNEE ARTHROSCOPY performed [facility] Jeffrey Hough MD at [facility]  Left     #1 - lateral release, #2 - arthroscopy with muscle alignment    NERVE BLOCK  2017    caudal #1  decadron 10mg    NERVE BLOCK  2017    cadal # 2, decadron 10 mg    NERVE BLOCK  2017    caudal epidural steroid block #2 decadron 10 mg    NERVE BLOCK  11/10/2017    lumbar L4-5 epidural; depomedrol 80mg    UPPER GASTROINTESTINAL ENDOSCOPY  2019    EGD BIOPSY performed by Adithya Mcintosh MD at [place] [address] History:    Social History     Tobacco Use    Smoking status: Former     Packs/day: 2.00     Years: 0.50     Pack years: 1.00     Types: Cigarettes     Quit date:      Years since quittin.8    Smokeless tobacco: Never   Substance Use Topics    Alcohol use: Yes     Alcohol/week: 0.0 standard drinks     Comment: social                                Counseling given: Not Answered      Vital Signs (Current): There were no vitals filed for this visit.                                            BP Readings from Last 3 Encounters:   22 117/60   22 129/61   10/26/22 (!) 119/57 NPO Status:                                                                                 BMI:   Wt Readings from Last 3 Encounters:   11/04/22 187 lb 6.3 oz (85 kg)   10/29/22 186 lb 4.6 oz (84.5 kg)   10/27/22 188 lb (85.3 kg)     There is no height or weight on file to calculate BMI.    CBC:   Lab Results   Component Value Date/Time    WBC 8.2 10/28/2022 11:07 PM    RBC 4.60 10/28/2022 11:07 PM    HGB 13.9 10/28/2022 11:07 PM    HCT 42.6 10/28/2022 11:07 PM    MCV 92.6 10/28/2022 11:07 PM    RDW 13.2 10/28/2022 11:07 PM    PLT See Reflexed IPF Result 10/28/2022 11:07 PM       CMP:   Lab Results   Component Value Date/Time     10/28/2022 11:07 PM    K 4.4 10/28/2022 11:07 PM     10/28/2022 11:07 PM    CO2 27 10/28/2022 11:07 PM    BUN 14 10/28/2022 11:07 PM    CREATININE 0.85 10/28/2022 11:07 PM    GFRAA >60 07/15/2022 01:54 AM    LABGLOM >60 10/28/2022 11:07 PM    GLUCOSE 103 10/28/2022 11:07 PM    PROT 6.3 07/15/2022 01:54 AM    CALCIUM 9.3 10/28/2022 11:07 PM    BILITOT 0.13 07/15/2022 01:54 AM    ALKPHOS 79 07/15/2022 01:54 AM    AST 18 07/15/2022 01:54 AM    ALT 25 07/15/2022 01:54 AM       POC Tests: No results for input(s): POCGLU, POCNA, POCK, POCCL, POCBUN, POCHEMO, POCHCT in the last 72 hours.     Coags: No results found for: PROTIME, INR, APTT    HCG (If Applicable): No results found for: PREGTESTUR, PREGSERUM, HCG, HCGQUANT     ABGs: No results found for: PHART, PO2ART, PPQ4IAN, GDS7DSI, BEART, Y4BQHRHD     Type & Screen (If Applicable):  No results found for: LABABO, LABRH    Drug/Infectious Status (If Applicable):  Lab Results   Component Value Date/Time    HEPCAB NONREACTIVE 12/18/2019 09:22 AM       COVID-19 Screening (If Applicable):   Lab Results   Component Value Date/Time    COVID19 Not Detected 10/29/2022 02:12 AM    COVID19 Not Detected 03/01/2021 02:10 PM    COVID19 Detected 08/07/2020 10:01 PM         Anesthesia Evaluation   no history of anesthetic complications: Airway: Mallampati: II  TM distance: >3 FB   Neck ROM: full  Mouth opening: > = 3 FB   Dental:          Pulmonary:   (+) asthma:                            Cardiovascular:    (+) angina:, hyperlipidemia               ROS comment: 10/31/22   Angiographic Findings      Cardiac Arteries and Lesion Findings     LMCA: Normal 0% stenosis.     LAD: Normal 0% stenosis.     LCx: Normal 0% stenosis.     RCA: Normal 0% stenosis. Small, non dominant      Coronary Tree      Dominance: Left     LV Analysis  LV function assessed as:Normal.     Neuro/Psych:   (+) psychiatric history:depression/anxiety             GI/Hepatic/Renal:        (-) GERD       Endo/Other:                     Abdominal:             Vascular: Other Findings: Placement date 21; Placement time 9986; Preoxygenation Yes; Technique Video laryngoscopy; Type Cuffed; Tube size 7 mm; Laryngoscope GlideScope; Blade size 3; Location Oral; Grade view 2a; Insertion attempts 2; Atraumatic Yes       Department of Anesthesiology  Preprocedure Note       Name:  Romero Henry   Age:  65 y.o.  :  1960                                          MRN:  [id number]         Date:  2022      Surgeon: Nikki Chang):  Rhett Burks MD    Procedure: Procedure(s):  RIGHT GASTROCNEMIUS RECESSION    Medications prior to admission:   Prior to Admission medications    Medication Sig Start Date End Date Taking?  Authorizing Provider   aspirin 81 MG chewable tablet Take 1 tablet by mouth daily 22   Taya Stewart MD   atorvastatin (LIPITOR) 40 MG tablet Take 1 tablet by mouth nightly 22   Taya Stewart MD   albuterol sulfate HFA (PROVENTIL;VENTOLIN;PROAIR) 108 (90 Base) MCG/ACT inhaler INAHLE 2 PUFFS BY MOUTH INTO THE LUNGS EVERY 6 HOURS AS NEEDED FOR WHEEZING 10/20/22   Yang Holbrook MD   venlafaxine (EFFEXOR XR) 75 MG extended release capsule DTAKE 1 CAPSULE BY MOUTH DAILY 10/20/22   Yang Holbrook MD   vitamin D (ERGOCALCIFEROL) 1.25 MG (17645 UT) CAPS capsule TAKE 1 CAPSULE BY MOUTH EVERY WEEK 10/20/22   Kacy Estes MD   chlorzoxazone (PARAFON FORTE) 500 MG tablet TAKE 1/2 TABLET BY MOUTH TWICE DAILY AS NEEDED 10/20/22   Kacy Estes MD   omeprazole (PRILOSEC) 40 MG delayed release capsule TAKE 1 CAPSULE BY MOUTH DAILY EVERY MORNING 9/26/22   Kacy Estes MD   ibuprofen (ADVIL;MOTRIN) 800 MG tablet Take 1 tablet by mouth 3 times daily as needed for Pain 9/23/22   Franci Oquendo MD   diclofenac (VOLTAREN) 50 MG EC tablet TAKE 1 TABLET BY MOUTH TWICE DAILY 8/29/22   Kacy Estes MD   busPIRone (BUSPAR) 15 MG tablet TAKE 1 TABLET BY MOUTH TWICE DAILY 8/29/22   Kacy Estes MD   EPINEPHrine (EPIPEN 2-VIOLET) 0.3 MG/0.3ML SOAJ injection Inject 0.3 mLs into the muscle once for 1 dose Use as directed for allergic reaction 8/3/22 8/3/22  Kacy Estes MD   diclofenac sodium (VOLTAREN) 1 % GEL Apply 4 g topically 4 times daily as needed for Pain 6/23/22   Zay Lopez MD   Handicap Placard MISC by Does not apply route DISABLED PARKING PERMIT AUTHORIZATION  Routine     Qty-1    The patient has the following condition(s) which qualifies him/her for disabled parking: Walking severely limited due to orthopedic condition     Privilege Duration: Temporary for 3 months 3/4/21   Zay Lopez MD   ketotifen (ZADITOR) 0.025 % ophthalmic solution USE 1 DROP INTO BOTH EYES TWICE DAILY 1/19/21   Kacy Estes MD   fluticasone (FLONASE) 50 MCG/ACT nasal spray INSTILL 2 SPRAYS INTO EACH NOSTRIL ONCE DAILY 1/11/21   Kacy Estes MD   miSOPROStol (CYTOTEC) 200 MCG tablet Take 200 mcg by mouth daily    Historical Provider, MD   senjusta-docusate (Kirk Shutter) 8.6-50 MG per tablet Take 1 tablet by mouth daily as needed for Constipation  Patient taking differently: Take 1 tablet by mouth 2 times daily 2/26/20   Buckley Duane, MD   Multiple Vitamins-Minerals (CULTURELLE PROBIOTICS + MULTIV) CHEW Take 1 tablet by mouth daily 1/29/20   Delta Raven Perez MD   MI-ACID GAS RELIEF 80 MG chewable tablet CHEW 1 TABLET BY MOUTH FOUR TIMES DAILY AS NEEDED FOR FLATULENCE 11/19/19   Nirmala Barney MD   CRANBERRY CONCENTRATE 500 MG CAPS TAKE 1 CAPSULE BY MOUTH TWICE DAILY 8/21/19   Joe Quinn MD   metoprolol succinate (TOPROL XL) 25 MG extended release tablet Take 25 mg by mouth daily     Historical Provider, MD       Current medications:    No current facility-administered medications for this visit. No current outpatient medications on file. Facility-Administered Medications Ordered in Other Visits   Medication Dose Route Frequency Provider Last Rate Last Admin    lidocaine PF 1 % injection 1 mL  1 mL IntraDERmal Once PRN Gabriella Guera, DO        0.9 % sodium chloride infusion   IntraVENous Continuous Edsontatum Gurrola, DO        lactated ringers infusion   IntraVENous Continuous Gabriella Guera,  mL/hr at 11/04/22 0821 New Bag at 11/04/22 0821    sodium chloride flush 0.9 % injection 5-40 mL  5-40 mL IntraVENous 2 times per day Edson Gurrola, DO        sodium chloride flush 0.9 % injection 5-40 mL  5-40 mL IntraVENous PRN Gabriella Guera, DO        0.9 % sodium chloride infusion   IntraVENous PRN Gabriella Guera, DO        ceFAZolin (ANCEF) 2000 mg in dextrose 5 % 50 mL IVPB  2,000 mg IntraVENous Once Nessa Durham MD        midazolam (VERSED) injection 2 mg  2 mg IntraVENous Once Gopal Esquivel MD           Allergies: Allergies   Allergen Reactions    Shellfish Allergy     Shrimp (Diagnostic) Shortness Of Breath    Famotidine Other (See Comments)     Headaches      Gabapentin Nausea Only     Mood swings, nausea.           Problem List:    Patient Active Problem List   Diagnosis Code    Major depression F32.9    Rotator cuff disorder M67.919    Hyperlipidemia E78.5    Incontinence in female R32    Rectocele N81.6    Diverticulitis K57.92    Asthma J45.909    Hypokalemia E87.6    Cellulitis, face - left side, failed outpatient therapy L03. 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  Coli urine    GERD (gastroesophageal reflux disease)     Hyperlipidemia     MRSA (methicillin resistant staph aureus) culture positive 04/18/2016    lip    Rectocele     Tachycardia     takes Metoprolol       Past Surgical History:        Procedure Laterality Date    ANKLE SURGERY Left 3/4/2021    Left ankle lateral ligamentous repair, Left peroneus brevis debridement, Left peroneus longus tenolysis, Left leg gastroc recession, performed through separate incision  performed by Lindon Baumgarten, MD at [place] [address] 7/19/2018    COLONOSCOPY performed by Da Spain DO at [place] [address]  2/25/2015    uro    HYSTERECTOMY (CERVIX STATUS UNKNOWN)  1996    RYAN, BSO performed at [facility] by Dr. Yarely Brady, Also had some type of bladder lift at this time    KNEE ARTHROSCOPY Left 5/13/2019    KNEE ARTHROSCOPY performed by Kate Lion MD at [place] [address] Left     #1 - lateral release, #2 - arthroscopy with muscle alignment    NERVE BLOCK  2017    caudal #1  decadron 10mg    NERVE BLOCK  2017    cadal # 2, decadron 10 mg    NERVE BLOCK  2017    caudal epidural steroid block #2 decadron 10 mg    NERVE BLOCK  11/10/2017    lumbar L4-5 epidural; depomedrol 80mg    UPPER GASTROINTESTINAL ENDOSCOPY  2019    EGD BIOPSY performed by Cynthia Gregory MD at [place] [address] History:    Social History     Tobacco Use    Smoking status: Former     Packs/day: 2.00     Years: 0.50     Pack years: 1.00     Types: Cigarettes     Quit date:      Years since quittin.8    Smokeless tobacco: Never   Substance Use Topics    Alcohol use: Yes     Alcohol/week: 0.0 standard drinks     Comment: social                                Counseling given: Not Answered      Vital Signs (Current): There were no vitals filed for this visit.                                            BP Readings from Last 3 Encounters:   22 117/60   22 129/61   10/26/22 (!) 119/57       NPO Status:                                                                                 BMI:   Wt Readings from Last 3 Encounters:   22 187 lb 6.3 oz (85 kg)   10/29/22 186 lb 4.6 oz (84.5 kg)   10/27/22 188 lb (85.3 kg)     There is no height or weight on file to calculate BMI.    CBC:   Lab Results   Component Value Date/Time    WBC 8.2 10/28/2022 11:07 PM    RBC 4.60 10/28/2022 11:07 PM    HGB 13.9 10/28/2022 11:07 PM    HCT 42.6 10/28/2022 11:07 PM    MCV 92.6 10/28/2022 11:07 PM    RDW 13.2 10/28/2022 11:07 PM    PLT See Reflexed IPF Result 10/28/2022 11:07 PM       CMP:   Lab Results   Component Value Date/Time     10/28/2022 11:07 PM    K 4.4 10/28/2022 11:07 PM     10/28/2022 11:07 PM    CO2 27 10/28/2022 11:07 PM    BUN 14 10/28/2022 11:07 PM    CREATININE 0.85 10/28/2022 11:07 PM    GFRAA >60 07/15/2022 01:54 AM    LABGLOM >60 10/28/2022 11:07 PM    GLUCOSE 103 10/28/2022 11:07 PM    PROT 6.3 07/15/2022 01:54 AM    CALCIUM 9.3 10/28/2022 11:07 PM    BILITOT 0.13 07/15/2022 01:54 AM    ALKPHOS 79 07/15/2022 01:54 AM    AST 18 07/15/2022 01:54 AM    ALT 25 07/15/2022 01:54 AM       POC Tests: No results for input(s): POCGLU, POCNA, POCK, POCCL, POCBUN, POCHEMO, POCHCT in the last 72 hours. Coags: No results found for: PROTIME, INR, APTT    HCG (If Applicable): No results found for: PREGTESTUR, PREGSERUM, HCG, HCGQUANT     ABGs: No results found for: PHART, PO2ART, KTR6GHW, RXV1DNO, BEART, [id number]     Type & Screen (If Applicable):  No results found for: LABABO, LABRH    Drug/Infectious Status (If Applicable):  Lab Results   Component Value Date/Time    HEPCAB NONREACTIVE 12/18/2019 09:22 AM       COVID-19 Screening (If Applicable):   Lab Results   Component Value Date/Time    COVID19 Not Detected 10/29/2022 02:12 AM    COVID19 Not Detected 03/01/2021 02:10 PM    COVID19 Detected 08/07/2020 10:01 PM         Anesthesia Evaluation    Airway: Mallampati: I  TM distance: >3 FB   Neck ROM: full  Mouth opening: > = 3 FB Dental:          Pulmonary:                              Cardiovascular:                      Neuro/Psych:               GI/Hepatic/Renal:             Endo/Other:                     Abdominal:           Vascular:                                        Anesthesia Plan      general     ASA 2             Anesthetic plan and risks discussed with patient. Grayson Gordon MD   11/4/2022               Anesthesia Plan      general and regional     ASA 2     (Glide)      MIPS: Postoperative opioids intended and Prophylactic antiemetics administered. Anesthetic plan and risks discussed with patient.         Attending anesthesiologist reviewed and agrees with Preprocedure content                Grayson Gordon MD   11/4/2022

## 2022-11-04 NOTE — OP NOTE
[address] [address]  Dept: [phone]: [phone]      Orthopedic Surgery Operative Report      Patient: Trina Lind      MRN#: [id number]     YOB: 1960      Date of Admission: 11/4/2022    Attending Surgeon: Clarita Talbot M.D.   PCP: Tirso Stacy MD      Preoperative Diagnosis:   Right gastrocnemius equinus contracture  Right foot plantar fasciitis  Body mass index is 33.19 kg/m². Postoperative Diagnosis:   same    Procedures Performed:  (11/4/2022)  Right leg gastroc recession      Implants:    * No implants in log *      Attending Surgeon:     Bernabe Jackson MD      Assistant Surgeon:    Resident: Kendall May DO      Anesthesia:    General      Staff:  Surgeon(s):  Trice Frias MD  Scrub Person First: Ry Goldberg  Anesthesia: Erasmo Linton MD      Estimated Blood Loss:    Minimal      Complications:    None      Specimen:    * No specimens in log *      History:    Ms. Trina Lind is a 65 y.o. female who was seen recently for the above problem. She has a history of right foot pain secondary to plantar fasciitis with underlying gastroc equinus contracture (a right plantar fascial injection on 6/23/2022 helped her pain approximately 50%). She also has a history of left ankle instability, status post left ankle lateral ligamentous stabilization, with peroneal tendon tenolysis, and gastroc recession (on 3/4/2021). 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 Notably, she has no relevant past medical history. She is currently medically stable and appropriate for the planned procedure. I have spoken to the patient about the risks/benefits/alternatives to a surgical intervention, she expressed verbal understanding of these risks, and informed consent was obtained. All questions were answered.  No guarantees were made or implied. I have answered all questions, and she wishes to proceed with surgical intervention. Operative Note:    The patient was identified in the preoperative holding area by name, MRN, and . The surgical site was verified and marked according to AAOS guidelines. The patient was taken to the operating room and placed on the operating table in a supine position. After achieving adequate sedation by the anesthesia team, the patient was then carefully positioned in a supine position with all bony prominences well padded. A tourniquet was placed on the ipsilateral thigh, and then the operative extremity was prepped and draped in the usual sterile fashion. A timeout was held in which the patient's identity, surgical site, procedure, allergies, and antibiotics were verified, and all in the room were in agreement, and thus the procedure began. The tourniquet was in place but not inflated. The procedure began with a gastroc release by making an approximately 6cm long incision at the gastrocnemius-soleus myotendinous junction about one fingerbreadth posterior to the edge of the posteromedial tibia, with the knee extended. Vessels were cauterized and the fascia was opened in line with the skin incision to expose the interval between the gastroc and the soleus, which was carefully developed with blunt dissection. Anterior dissection was carried out above the gastroc aponeurosis from medial to lateral, tunneling across to ensure the Sural nerve was not adherent. A long flat retractor was placed above the gastroc fascia to carefully protect the nerve, and then the gastroc aponeurosis was cut with a long handled scalpel under direct visualization. The contracture was released and the ends of the fascia were noted to separate instantly with substantial retraction from the tension.  The wound was irrigated and closed in a layered fashion, closing the fascia first, followed by the skin.      The foot and toes were all noted to be well perfused. The ankle joint moved freely without crepitus. Copious sterile irrigation was used to rinse the operative sites, and a layered closure was performed using 0 vicryl for deep closure, followed by 2-0 vicryl and 3-0 nylon, providing appropriate tension to the skin. The skin was cleaned and gently dried. Steri-Strips were then applied, and anti-bacterial ointment was applied on top of that, with Adaptic and fluffs. A sterile layer of cast padding was then applied. A soft dressing with ace wraps was then placed, and the leg was placed into a CAM boot. The anesthesia team then began to perform the postoperative regional block. The patient was aroused from anesthesia without complication, and gently transferred to the bed and then transported to the postoperative area in a stable condition. The patient was noted to have tolerated the procedure well without complication. Postoperative Plan:         Precautions: Protected weight bearing x 2 weeks anticipated on the Right lower extremity   -             []  Physical Therapy/Home exercises       Immobilization:      []  Splint/Cast  [x]  CAM boot []  Comfortable shoe    []  Other:      DVT ppx:   [x]  Early mobilization       [x]  Medication as prescribed (Aspirin 325 mg PO q Day)      []  No chemical ppx needed; mechanical only   -    Pain control:  Medication as prescribed (dosing and quantity) indicated for acute postoperative pain control   -    Special concerns:       []        [x]  Avoid strenuous activity/pain provoking maneuvers and high-impact repetitive exercises    -    Disposition:   PACU      The patient has been instructed to follow up in the office. Multiple attempts were made to reach the patient's family (her granddaughter), however, she was not able to be reached.            Lorene Gilbert MD  Orthopedic Surgery

## 2022-11-04 NOTE — PROGRESS NOTES
I spoke to the patient before heading to the OR, and the operative site was identified, verified and marked. The procedure was verified. We have reviewed the risks, benefits, and alternatives to the procedure. No guarantees were made. I have also reviewed the history and physical. There are no significant changes in medical history. All questions were answered and they wish to proceed as planned.      Electronically signed by Staci Santos MD

## 2022-11-04 NOTE — ANESTHESIA POSTPROCEDURE EVALUATION
Department of Anesthesiology  Postprocedure Note    Patient: Liz Coleman  MRN: [id number]  YOB: 1960  Date of evaluation: 11/4/2022      Procedure Summary     Date: 11/04/22 Room / Location: [address] [place] [address]    Anesthesia Start: 1036 Anesthesia Stop: 6592    Procedure: RIGHT GASTROCNEMIUS RECESSION (Right: Leg Lower) Diagnosis:       Acquired equinus deformity of right foot      (Acquired equinus deformity of right foot [M21.6X1])    Surgeons: Tirso Ray MD Responsible Provider: Da Masters MD    Anesthesia Type: general, regional ASA Status: 2          Anesthesia Type: No value filed.     Dane Phase I: Dane Score: 9    Dane Phase II: Dane Score: 10      Anesthesia Post Evaluation

## 2022-11-04 NOTE — ANESTHESIA PROCEDURE NOTES
Peripheral Block    Patient location during procedure: pre-op  Reason for block: procedure for pain, post-op pain management and at surgeon's request  Start time: 11/4/2022 9:55 AM  End time: 11/4/2022 10:05 AM  Staffing  Performed: anesthesiologist   Anesthesiologist: John Ramos MD  Preanesthetic Checklist  Completed: patient identified, IV checked, site marked, risks and benefits discussed, surgical/procedural consents, equipment checked, pre-op evaluation, timeout performed, anesthesia consent given, oxygen available, monitors applied/VS acknowledged, fire risk safety assessment completed and verbalized and blood product R/B/A discussed and consented  Peripheral Block   Patient position: supine  Prep: ChloraPrep  Provider prep: sterile gloves and mask  Patient monitoring: cardiac monitor, continuous pulse ox, continuous capnometry, frequent blood pressure checks, oxygen, IV access and responsive to questions  Block type: Femoral and Sciatic  Laterality: right  Injection technique: single-shot  Guidance: ultrasound guided  Local infiltration: lidocaine  Infiltration strength: 2 %  Local infiltration: lidocaine  Dose: 3 mL    Needle   Needle type: pencil-tip   Needle gauge: 20 G  Needle localization: ultrasound guidance  Assessment   Injection assessment: negative aspiration for heme, no paresthesia on injection, local visualized surrounding nerve on ultrasound and no intravascular symptoms  Paresthesia pain: none  Slow fractionated injection: yes  Hemodynamics: stable  Real-time US image taken/store: yes  Outcomes: patient tolerated procedure well and uncomplicated    Additional Notes  Preprocedure ready drake [id number]  Medications Administered  bupivacaine (PF) 0.5 % - Perineural   20 mL - 11/4/2022 9:55:00 AM  ropivacaine (NAROPIN) injection 0.5% - Perineural   20 mL - 11/4/2022 9:55:00 AM

## 2022-11-05 ENCOUNTER — CLINICAL DOCUMENTATION (OUTPATIENT)
Dept: ORTHOPEDIC SURGERY | Age: 62
End: 2022-11-05

## 2022-11-07 LAB
EKG ATRIAL RATE: 82 BPM
EKG P AXIS: 76 DEGREES
EKG P-R INTERVAL: 142 MS
EKG Q-T INTERVAL: 380 MS
EKG QRS DURATION: 78 MS
EKG QTC CALCULATION (BAZETT): 443 MS
EKG R AXIS: 45 DEGREES
EKG T AXIS: 60 DEGREES
EKG VENTRICULAR RATE: 82 BPM

## 2022-11-09 RX ORDER — DOCUSATE SODIUM 100 MG/1
CAPSULE, LIQUID FILLED ORAL
Qty: 20 CAPSULE | Refills: 0 | OUTPATIENT
Start: 2022-11-09

## 2022-11-09 RX ORDER — ONDANSETRON 4 MG/1
4 TABLET, FILM COATED ORAL EVERY 8 HOURS PRN
Qty: 20 TABLET | Refills: 0 | OUTPATIENT
Start: 2022-11-09 | End: 2022-11-16

## 2022-11-09 RX ORDER — ASPIRIN 325 MG
325 TABLET, DELAYED RELEASE (ENTERIC COATED) ORAL DAILY
Qty: 42 TABLET | Refills: 0 | OUTPATIENT
Start: 2022-11-09 | End: 2022-12-21

## 2022-11-17 ENCOUNTER — OFFICE VISIT (OUTPATIENT)
Dept: ORTHOPEDIC SURGERY | Age: 62
End: 2022-11-17

## 2022-11-17 VITALS — BODY MASS INDEX: 33.13 KG/M2 | RESPIRATION RATE: 16 BRPM | OXYGEN SATURATION: 100 % | HEIGHT: 63 IN | WEIGHT: 187 LBS

## 2022-11-17 DIAGNOSIS — M72.2 PLANTAR FASCIITIS: Primary | ICD-10-CM

## 2022-11-17 PROCEDURE — 99024 POSTOP FOLLOW-UP VISIT: CPT | Performed by: ORTHOPAEDIC SURGERY

## 2022-11-17 NOTE — PROGRESS NOTES
[address] [facility] SP[facility]TS [facility]INE  [address]  [facility] [address]  Dept: [phone]    Ambulatory Orthopedic Postoperative Visit     Preoperative Diagnosis:   Right gastrocnemius equinus contracture  Right foot plantar fasciitis  Body mass index is 33.19 kg/m². Postoperative Diagnosis:   same     Procedures Performed:  (11/4/2022)  Right leg gastroc recession         SUBJECTIVE:     The patient returns post op from the above stated procedure. Reports doing well overall, reports improved pain, denies wound drainage/issues, fevers/chills/night sweats, chest pain, shortness of breath. OBJECTIVE:  Resp 16   Ht 5' 3\" (1.6 m)   Wt 187 lb (84.8 kg)   SpO2 100%   BMI 33.13 kg/m²    NAD, resting comfortably  Incisions clean/dry/intact, no erythema/dehiscence/drainage  Sensation to light touch grossly intact throughout  Warm and well perfused  Grossly neurovascularly intact distally  No signs of infection  No unexpected calf swelling/tenderness      RADIOLOGY:   11/17/2022 No new radiology images today. Prior images reviewed for reference. ASSESSMENT AND PLAN:     2 weeks s/p above, doing well overall      She has a history of right foot pain secondary to plantar fasciitis with underlying gastroc equinus contracture (a right plantar fascial injection on 6/23/2022 helped her pain approximately 50%). She also has a history of left ankle instability, status post left ankle lateral ligamentous stabilization, with peroneal tendon tenolysis, and gastroc recession (on 3/4/2021). 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 Notably, she has no relevant past medical history.          [x]  Sutures/staples were removed and steri-strips applied          Precautions:               -Weightbearing as tolerated in the cam boot [x]  Physical Therapy/Home exercises        Immobilization:      []  Splint/Cast                      [x]  CAM boot --we discussed sleeping in the cam boot, and keeping the knee straight as often as possible []  Comfortable shoe    []  Other:                 DVT ppx:   [x]  Early mobilization             [x]  Medication as prescribed (Aspirin 325 mg PO q Day)                   []  No chemical ppx needed; mechanical only              -     Pain control:  Medication as prescribed (dosing and quantity) indicated for acute postoperative pain control              -     Special concerns:       []         [x]  Avoid strenuous activity/pain provoking maneuvers and high-impact repetitive exercises    All questions were answered and the patient agrees with the above plan. The patient will return to clinic in 4 weeks without x-rays         No follow-ups on file. No orders of the defined types were placed in this encounter. No orders of the defined types were placed in this encounter. Caleb Mason MD  Orthopedic Surgery        Please excuse any typos/errors, as this note was created with the assistance of voice recognition software. While intending to generate a document that actually reflects the content of the visit, the document can still have some errors including those of syntax and sound-a-like substitutions which may escape proof reading. In such instances, actual meaning can be extrapolated by context.

## 2022-11-17 NOTE — LETTER
[address] [place] [address] and Sports [address] [address]  Phone: [phone]  Fax: [phone]    Nuris Daugherty MD        November 17, 2022     Patient: Norma Segal   YOB: 1960   Date of Visit: 11/17/2022       To Whom It May Concern: It is my medical opinion that Mak Saba remain off work for the next 6 weeks. She will have a follow up appointment on 12/15/2022 at which time she will be reevaluated. Approximate return to work on 12/29/2022. If you have any questions or concerns, please don't hesitate to call.     Sincerely,    The office of Dr. Edward Walls MD

## 2022-12-07 ENCOUNTER — HOSPITAL ENCOUNTER (OUTPATIENT)
Dept: PHYSICAL THERAPY | Age: 62
Setting detail: THERAPIES SERIES
Discharge: HOME | End: 2022-12-07
Payer: COMMERCIAL

## 2022-12-07 PROCEDURE — 97161 PT EVAL LOW COMPLEX 20 MIN: CPT

## 2022-12-07 NOTE — CONSULTS
[x] [facility]) [facility] &  Therapy  [address]  [phone]  F: [phone] [] [address]  [facility] [address]   [address]  P: [phone]  F: [phone] [] [address] &  Therapy  [address]  P: [phone]  F: [phone] [] [address]  P: [phone]  F: [phone] [] [address]  [facility]   [address]   [place]: [phone]  F: [phone]      Physical Therapy Lower Extremity Evaluation    Date:  2022  Patient: Becca Ruth  : 1960  MRN: [id number]  Physician: Katia Vann MD    Insurance:  [employer] after [employer])  Medical Diagnosis: Plantar fasciitis (M72.2 [ICD-10-CM])    Rehab Codes: M25.571, M25.671, M62.81, R26.2  Onset date: 22  Next 's appt.: 12/15/22    Subjective:   CC: R heel pain, calf pain (muscle spasms), Difficulty walking even short distances, uses knee scooter for longer distances, difficulty with all ADL's due to surgery resulting in limited mobility  HPI: (onset date): Pt underwent right gastroc recession on 22 due to longstanding history of plantar fascitis pain. Pt not currently wearing CAM boot stating that she was told to wear it to bed. Per order, pt is WBAT in CAM boot. Therapist contacted Dr. Blankenship Agent over [facility] and was informed that she can be WBAT in regular shoe.        PMHx: [] Unremarkable [] Diabetes [] HTN  [] Pacemaker   [] MI/Heart Problems [] Cancer [x] Arthritis [] Other:              [x] Refer to full medical chart  In EPIC       Comorbidities:   [] Obesity [] Dialysis  [] N/A   [x] Asthma/COPD [] Dementia [] Other:   [] Stroke [] Sleep apnea [] Other:   [] Vascular disease [] Rheumatic disease [] Other:     Tests: [x] X-Ray: [] MRI:  [] Other:     Medications: [x] Refer to full medical record [] None [] Other:  Allergies:      [x] Refer to full medical record [] None [] Other:    Function:  Hand Dominance  [] Right  [] Left  Patient lives with: Alone   In what type of home [x]  One story   [] Two story   [] Split level   Number of stairs to enter 2, no railings   With handrail on the []  Right to enter   [] Left to enter   Bathroom has a []  Tub only  [] Tub/shower combo   [x] Walk in shower    [x]  Grab bars   Washing machine is on []  Main level   [] Second level   [] Basement   Employer [employer]   Job Status []  Normal duty   [] Light duty   [x] Off due to condition    []  Retired   [] Not employed   [] Disability  [] Other:  [x]  Return to work: 12/23/22   Work activities/duties - 8-16 hour shifts       ADL/IADL Previous level of function Current level of function Who currently assists the patient with task   Bathing  [x] Independent  [] Assist [x] Independent  [] Assist    Dress/grooming [x] Independent  [] Assist [x] Independent  [] Assist    Transfer/mobility [x] Independent  [] Assist [x] Independent  [] Assist    Feeding [x] Independent[]  [] Assist  Independent  [] Assist    Toileting [x] Independent  [] Assist [x] Independent  [] Assist    Housekeeping [x] Independent  [] Assist [] Independent  [x] Assist    Grocery shop/meal prep [x] Independent  [] Assist [] Independent  [x] Assist      Pain:  [x] Yes  [] No Location: R foot Pain Rating: (0-10 scale) 5/10 on average  Pain altered Tx:  [] Yes  [x] No  Action:    Symptoms:  [x] Improving [] Worsening [] Same  Better:  [] AM    [] PM    [] Sit    [] Rise/Sit    []Stand    [] Walk    [] Lying    [x] Other: pain medication  Worse: [] AM    [] PM    [] Sit    [] Rise/Sit    [x]Stand    [] Walk    [x] Lying    [] Bend                      [] Valsalva    [] Other:  Sleep: [] OK    [x] Disturbed    Objective:    ROM  ° A/P STRENGTH TESTS (+/-) Left Right Not Tested    Left Right Left Right Ant. Drawer   []   Knee Flex     Apleys Dist.   []   Ext  0   Hip Scouring   []   Ankle DF  -18/-15  4+ ZINAs   []   PF  46  4 Piriformis   []   INV  15  4 Jrs   []   EVER  10  4 Talor Tilt   []   Big toe Flex  35   Pat-Fem Grind   []   Big toe Ext  75             OBSERVATION No Deficit Deficit Not Tested Comments   Posture       Palpation [] [x] []    Sensation [] [x] []    Edema [x] [] []    Neurological [] [] []    Patellar Mobility [] [] [x]    Patellar Orientation [] [] [x]    Gait [] [x] [] Analysis:Uses knee scooter for community distances; Without device, step to gait pattern with decreased stance time on the right, no heel strike, antalgic gait         FUNCTION Normal Difficult Unable   Sitting [x] [] []   Standing [] [x] []   Ambulation [] [x] []   Groom/Dress [] [x] []   Lift/Carry [] [x] []   Stairs [] [x] []   Bending [] [x] []   Squat [] [] [x]   Kneel [] [x] []     Functional Test: LEFS Score: 71% functionally impaired     Comments:    Assessment:  Patient would benefit from skilled physical therapy services in order to: address right ankle pain, stiffness, and weakness as well as functional impairments including difficulty walking. Problem list, as detailed above:   [x] ? Pain     [x] ? ROM    [x] ? Strength    [x] ? Function:   [x] ? Balance  [] Edema  [] Postural Deviations  [x] Gait Deviations  [] Other     STG: (to be met in 10 treatments)  ? Pain:Decrease right ankle pain to 3/10  ? ROM: Increase right ankle DF to neutral both actively and passively, Increase inversion to 20°, Eversion to 15°   ? Strength: Increase right ankle strength to grossly 4+/5  ? Function: Improve LEFS to 61% functional impairment  Patient to be independent with home exercise program as demonstrated by performance with correct form without cues.   Demonstrate Knowledge of fall prevention  LTG: (to be met in 18 treatments)  Pt will be able to walk at least 350 feet without antalgic gait and with LRD  Pt will be able to go up and down stairs with little difficulty  Improve LEFS to 51% functional impairment                    Patient goals: Hopefully pain gone. Rehab Potential:  [x] Good  [] Fair  [] Poor   Suggested Professional Referral:  [x] No  [] Yes:  Barriers to Goal Achievement:  [x] No  [] Yes:  Domestic Concerns:  [x] No  [] Yes:    Pt. Education:  [x] Plans/Goals, Risks/Benefits discussed  [x] Home exercise program--Discussed PT POC, exercises listed below given to the pt on a handout for HEP    Method of Education: [x] Verbal  [x] Demo  [x] Written  Comprehension of Education:  [x] Verbalizes understanding. [x] Demonstrates understanding. [] Needs Review. [] Demonstrates/verbalizes understanding of HEP/Ed previously given. Treatment Plan:  [x] Therapeutic Exercise   00817  [] Iontophoresis: 4 mg/mL Dexamethasone Sodium Phosphate  mAmin  14075   [x] Therapeutic Activity  93450 [x] Vasopneumatic cold with compression  71901    [x] Gait Training   43074 [] Ultrasound   07226   [] Neuromuscular Re-education  17623 [] Electrical Stimulation Unattended  60346   [x] Manual Therapy  24099 [] Electrical Stimulation Attended  41614   [x] Instruction in HEP  [] Lumbar/Cervical Traction  69285   [] Aquatic Therapy   18110 [x] Cold/hotpack    [] Massage   05068      [] Dry Needling, 1 or 2 muscles  17002   [] Biofeedback, first 15 minutes   84469  [] Biofeedback, additional 15 minutes   28752 [] Dry Needling, 3 or more muscles  26704     []  Medication allergies reviewed for use of    Dexamethasone Sodium Phosphate 4mg/ml     with iontophoresis treatments. Pt is not allergic.     Frequency:  2 x/week for 18 visits        Todays Treatment:  Modalities:   Precautions: WBAT in regular shoe  Exercises:  Exercise Reps/ Time Weight/ Level Comments   Long sitting gastroc stretch 3-5x 30\" Gentle stretch, knee straight   Supine gastroc stretch 3-5x 30\" Knee straight, invert and dorsiflex   Ankle AROM 20xea DF/PF, Inv/Ev   Ankle circles 20xea           Seated:      BAPS      Rockerboard      Seated heel slides into DF      H/T raises                  Long-sitting tband 4-way ankle            Weight-shifts to improve WB tolerance            Nustep            Other:    Specific Instructions for next treatment: Right ankle ROM and begin gentle strengthening, gradual weight-bearing to improve tolerance, gait training      Evaluation Complexity:  History (Personal factors, comorbidities) [] 0 [x] 1-2 [] 3+   Exam (limitations, restrictions) [x] 1-2 [] 3 [] 4+   Clinical presentation (progression) [x] Stable [] Evolving  [] Unstable   Decision Making [x] Low [] Moderate [] High    [x] Low Complexity [] Moderate Complexity [] High Complexity       Treatment Charges: Mins Units   [x] Evaluation       [x]  Low       []  Moderate       []  High 20 1   []  Modalities     [x]  Ther Exercise 7 0   []  Manual Therapy     []  Ther Activities     []  Aquatics     []  Vasocompression     []  Other       TOTAL TREATMENT TIME: 27    Time in:1:15pm   Time Out: 1:50pm    Electronically signed by: Halle Warner PT        Physician Signature:________________________________Date:__________________  By signing above or cosigning this note, I have reviewed this plan of care and certify a need for medically necessary rehabilitation services.      *PLEASE SIGN ABOVE AND FAX BACK ALL PAGES*

## 2022-12-09 NOTE — PRE-CERTIFICATION NOTE
[x] [facility]  [facility]  [address]  [phone]  F: [phone] [] [address]  [place] [address]   [address]  P: [phone]  F: [phone] [] [place]  [address] [facility]  P: [phone]  F: [phone] [] [address]  [facility]   [address]   [place]: [phone]  F: [phone]  [x] [facility]   Outpatient Occupational Therapy  [address] [facility] [address]: [phone]  F: [phone]          Therapy Pre-certification Note      12/9/2022    Lmia Grow  1960   [id number]      Insurance approval was received for Physical Therapy from [facility] on 12/09/2022. Approval was received for multiple units*  from 12/12/2022 to 02/28/2023. Authorization number [id number]. Patient was contacted and scheduled on 12/14/2022    [id number]  Approved for  160 Units.       Electronically signed by Deja Buck on 12/9/2022 at 2:58 PM

## 2022-12-14 ENCOUNTER — HOSPITAL ENCOUNTER (OUTPATIENT)
Dept: PHYSICAL THERAPY | Age: 62
Setting detail: THERAPIES SERIES
Discharge: HOME | End: 2022-12-14
Payer: COMMERCIAL

## 2022-12-14 NOTE — FLOWSHEET NOTE
[] [facility] ([facility]) [facility] &  Therapy  [address]    [phone]  F: [phone]   [] [address]  [facility] 36   [address]  P: [phone]  F: [phone]  [] [address] &  Therapy  [address]  P: [phone]  F: [phone] [] [address]  P: [phone]  F: [phone]  [] [address]  [facility]   [address]   [place]: [phone]  F: [phone]   [] [place] [address] [facility] [address]  [place]: [phone]   F: [phone]     Physical Therapy Cancel/No Show note    Date: 2022  Patient: Becca Ruth  : 1960  MRN: [id number]    Cancels/No Shows to date:     For today's appointment patient:    [x]  Cancelled    [x] Rescheduled appointment    [] No-show     Reason given by patient:    []  Patient ill    []  Conflicting appointment    [] No transportation      [] Conflict with work    [] No reason given    [] Weather related    [] STEEM-64    [x] Other:      Comments:  Patient called to reschedule for Friday.       [x] Next appointment was confirmed    Electronically signed by: Santiago Garay PTA

## 2022-12-15 ENCOUNTER — OFFICE VISIT (OUTPATIENT)
Dept: ORTHOPEDIC SURGERY | Age: 62
End: 2022-12-15

## 2022-12-15 VITALS — BODY MASS INDEX: 33.13 KG/M2 | WEIGHT: 187 LBS | RESPIRATION RATE: 12 BRPM | HEIGHT: 63 IN

## 2022-12-15 DIAGNOSIS — M72.2 PLANTAR FASCIITIS: Primary | ICD-10-CM

## 2022-12-15 PROCEDURE — 99024 POSTOP FOLLOW-UP VISIT: CPT | Performed by: ORTHOPAEDIC SURGERY

## 2022-12-15 NOTE — LETTER
[address] [place] [address] and Sports Medicine  [place] [address]  Phone: [phone]  Fax: [address], [place]        December 15, 2022     Patient: Lima Blake   YOB: 1960   Date of Visit: 12/15/2022       To Whom It May Concern:     Caity Dobson can return to work full duty with no restrictions. If you have any questions or concerns, please don't hesitate to call.     Sincerely,    The office of Dr. Lydia Sainz MD

## 2022-12-15 NOTE — PROGRESS NOTES
[address]  AND SPORTS MEDICINE  [address]  [facility] [address]  Dept: [phone]    Ambulatory Orthopedic Postoperative Visit     Preoperative Diagnosis:   Right gastrocnemius equinus contracture  Right foot plantar fasciitis  Body mass index is 33.19 kg/m². Postoperative Diagnosis:   same     Procedures Performed:  (11/4/2022)  Right leg gastroc recession         SUBJECTIVE:     The patient returns post op from the above stated procedure. Reports doing well overall, reports improved pain, denies wound drainage/issues, fevers/chills/night sweats, chest pain, shortness of breath. She is ambulating today in regular shoes without a brace or assistive device. She reports she still gets some intermittent pain in her plantar/posterior heel. OBJECTIVE:    Resp 12   Ht 5' 3\" (1.6 m)   Wt 187 lb (84.8 kg)   BMI 33.13 kg/m²    NAD, resting comfortably  Incisions clean/dry/intact, no erythema/dehiscence/drainage  Sensation to light touch grossly intact throughout  Warm and well perfused  Grossly neurovascularly intact distally  No signs of infection  No unexpected calf swelling/tenderness  -No significant periincisional tenderness  -No tenderness at the plantar heel      RADIOLOGY:   12/15/2022 No new radiology images today. Prior images reviewed for reference. ASSESSMENT AND PLAN:     6 weeks s/p above, doing well overall      She has a history of right foot pain secondary to plantar fasciitis with underlying gastroc equinus contracture (a right plantar fascial injection on 6/23/2022 helped her pain approximately 50%). She also has a history of left ankle instability, status post left ankle lateral ligamentous stabilization, with peroneal tendon tenolysis, and gastroc recession (on 3/4/2021).      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 Notably, she has no relevant past medical history. Precautions:               -Weightbearing as tolerated in the cam boot, may wean out of the boot as tolerated             [x]  Physical Therapy/Home exercises   --calf stretching, gait training     Immobilization:      []  Splint/Cast                      []  CAM boot   [x]  Comfortable shoe    []  Other:                 DVT ppx:   [x]  Early mobilization             []  Medication as prescribed (Aspirin 325 mg PO q Day)                   [x]  No chemical ppx needed; mechanical only              -     Pain control:  Medication as prescribed (dosing and quantity) indicated for acute postoperative pain control              -     Special concerns:       []         [x]  Avoid strenuous activity/pain provoking maneuvers and high-impact repetitive exercises    -I recommended that she use heel cups for another 6 weeks when ambulatory. We discussed the possibility of future plantar fascial release, depending on her clinical course. -She was provided a note for work, allowing her to return to work full duty without restriction    All questions were answered and the patient agrees with the above plan. The patient will return to clinic in 6 weeks without x-rays         No follow-ups on file. No orders of the defined types were placed in this encounter. No orders of the defined types were placed in this encounter. Elvira Gale MD  Orthopedic Surgery        Please excuse any typos/errors, as this note was created with the assistance of voice recognition software. While intending to generate a document that actually reflects the content of the visit, the document can still have some errors including those of syntax and sound-a-like substitutions which may escape proof reading. In such instances, actual meaning can be extrapolated by context.

## 2022-12-16 ENCOUNTER — HOSPITAL ENCOUNTER (OUTPATIENT)
Dept: PHYSICAL THERAPY | Age: 62
Setting detail: THERAPIES SERIES
Discharge: HOME | End: 2022-12-16
Payer: COMMERCIAL

## 2022-12-16 PROCEDURE — 97016 VASOPNEUMATIC DEVICE THERAPY: CPT

## 2022-12-16 PROCEDURE — 97110 THERAPEUTIC EXERCISES: CPT

## 2022-12-16 NOTE — FLOWSHEET NOTE
[] [facility] CENTER &  Therapy  [address]  [phone]  F: [phone] [] [address]  [facility] [place] [facility]"" 36   [address]  P: [phone]  F: [phone] [] [address]  [address] [facility] [address]  P: [phone]  F: [phone] [] [address]  P: [phone]  F: [phone] [] [address]  [facility]   [address]   [place]: [phone]  F: [phone]      Physical Therapy Daily Treatment Note    Date:  2022  Patient Name:  Pedro Singh    :  1960  MRN: [id number]  Physician: Uche Booth MD                          Insurance:  Bonner Calico after al)  Medical Diagnosis: Plantar fasciitis (M72.2 [ICD-10-CM])                Rehab Codes: M25.571, M25.671, M62.81, R26.2  Onset date: 22               Next Dr's appt. : 23  Visit# / total visits: ; Progress note for Medicare patient due at visit 10     Cancels/No Shows: 1/0    Subjective:    Pain:  [] Yes  [x] No Location: right heel, calf N/A Pain Rating: (0-10 scale) 0/10  Pain altered Tx:  [x] No  [] Yes  Action:  Comments:Patient has no complaints of pain upon entering clinic. Reports that today is first day back to work. Has ability to sit if needed.     Objective:  Modalities: vasocompression, LE's on wedge, right foot, 15 min, 34° min pressure  Precautions:  Exercises:  Exercise Reps/ Time Weight/ Level Comments   Long sitting gastroc stretch 3x30\"  Gentle stretch, knee straight   Supine gastroc stretch 3x30\"  Knee straight, invert and dorsiflex   Ankle rom 20x     Ankle circles 20x  CW, CCW         Seated:      BAPS 10x L1 DF, PF, INV, EV   rockerboard 10x L1    Heelslides into DF 10x           Long-sitting tband 4-way ankle 10x lime Weight shifts to improve WB tolerance                  NuStep 5min L1                                                                                  Other:      Treatment Charges: Mins Units   [x]  Modalities 15 1   [x]  Ther Exercise 40 3   []  Manual Therapy     []  Ther Activities     []  Aquatics     []  Vasocompression     []  Other     Total Treatment time 55 4       Assessment: [x] Progressing toward goals. Able to progress therapeutic ex as above with good tolerance for increased ROM and strengthening with good tolerance. No complaints of pain throughout. Progress vasocompression to moderate pressure per patient. [] No change. [] Other:  [x] Patient would continue to benefit from skilled physical therapy services in order to:  address right ankle pain, stiffness, and weakness as well as functional impairments including difficulty walking. STG: (to be met in 10 treatments)  ? Pain:Decrease right ankle pain to 3/10  ? ROM: Increase right ankle DF to neutral both actively and passively, Increase inversion to 20°, Eversion to 15°   ? Strength: Increase right ankle strength to grossly 4+/5  ? Function: Improve LEFS to 61% functional impairment  Patient to be independent with home exercise program as demonstrated by performance with correct form without cues. Demonstrate Knowledge of fall prevention  LTG: (to be met in 18 treatments)  Pt will be able to walk at least 350 feet without antalgic gait and with LRD  Pt will be able to go up and down stairs with little difficulty  Improve LEFS to 51% functional impairment                     Patient goals: Hopefully pain gone. Pt. Education:  [x] Yes  [] No  [x] Reviewed Prior HEP/Ed  Method of Education: [x] Verbal  [x] Demo  [x] Written  Comprehension of Education:  Theraband given   [x] Verbalizes understanding. [] Demonstrates understanding. [] Needs review. [] Demonstrates/verbalizes HEP/Ed previously given.      Plan: [x] Continue current frequency toward long and short term goals.     [x] Specific Instructions for subsequent treatments: Right ankle ROM and begin gentle strengthening, gradual weight-bearing to improve tolerance, gait training      Time In:1000            Time Out: 1055    Electronically signed by:  Johnna Saleem PTA

## 2022-12-21 ENCOUNTER — HOSPITAL ENCOUNTER (OUTPATIENT)
Dept: PHYSICAL THERAPY | Age: 62
Setting detail: THERAPIES SERIES
Discharge: HOME | End: 2022-12-21
Payer: COMMERCIAL

## 2022-12-21 NOTE — FLOWSHEET NOTE
[x] [facility] ([facility]) [facility] &  Therapy  [address]    [phone]  F: [phone]   [] [address]  [facility] 36   [address]  P: [phone]  F: [phone]  [] [address] &  Therapy  [address]  P: [phone]  F: [phone] [] [address]  P: [phone]  F: [phone]  [] [address]  [facility]   [address]   [place]: [phone]  F: [phone]   [] [place] [address] [facility] [address]  [place]: [phone]   F: [phone]     Physical Therapy Cancel/No Show note    Date: 2022  Patient: Inge Leonard  : 1960  MRN: [id number]    Cancels/No Shows to date:     For today's appointment patient:    [x]  Cancelled    [] Rescheduled appointment    [] No-show     Reason given by patient:    []  Patient ill    []  Conflicting appointment    [] No transportation      [] Conflict with work    [] No reason given    [] Weather related    [] COVID-19    [x] Other:      Comments:  to tired and sore, started back to work and PT.        [x] Next appointment was confirmed 22    Electronically signed by: Erika Mg PTA

## 2022-12-22 ENCOUNTER — HOSPITAL ENCOUNTER (OUTPATIENT)
Dept: PHYSICAL THERAPY | Age: 62
Setting detail: THERAPIES SERIES
Discharge: HOME | End: 2022-12-22
Payer: COMMERCIAL

## 2022-12-22 DIAGNOSIS — F41.9 ANXIETY: ICD-10-CM

## 2022-12-22 DIAGNOSIS — M72.2 PLANTAR FASCIITIS: ICD-10-CM

## 2022-12-22 PROCEDURE — 97110 THERAPEUTIC EXERCISES: CPT

## 2022-12-22 PROCEDURE — 97016 VASOPNEUMATIC DEVICE THERAPY: CPT

## 2022-12-22 PROCEDURE — 97140 MANUAL THERAPY 1/> REGIONS: CPT

## 2022-12-22 RX ORDER — BUSPIRONE HYDROCHLORIDE 15 MG/1
TABLET ORAL
Qty: 56 TABLET | Refills: 3 | Status: SHIPPED | OUTPATIENT
Start: 2022-12-22

## 2022-12-22 NOTE — FLOWSHEET NOTE
[x] [facility] &  Therapy  [address]  [phone]  F: [phone] [] [address]  [facility] 36   [address]  P: [phone]  F: [phone] [] [address]  [address]  P: [phone]  F: [phone] [] [address]  P: [phone]  F: [phone] [] [address]  [facility]   [address]   [place]: [phone]  F: [phone]      Physical Therapy Daily Treatment Note    Date:  2022  Patient Name:  Michael Nguyen    :  1960  MRN: [id number]  Physician: Boykin Burkitt, MD                          Insurance:  Chyna Brady Approval was received for multiple units*    from 2022 to 2023. Authorization number [id number].  [address] [place] [id number]  [id number]  [id number]  [id number]  Approved for  160 Units. Medical Diagnosis: Plantar fasciitis (M72.2 [ICD-10-CM])                Rehab Codes: M25.571, M25.671, M62.81, R26.2  Onset date: 22               Next 's appt. : 23  Visit# / total visits: 3/18; Progress note for Medicare patient due at visit 10     Cancels/No Shows: 2/0    Subjective:    Pain:  [x] Yes  [] No Location: right heel, calf  Pain Rating: (0-10 scale) 5/10  Pain altered Tx:  [x] No  [] Yes  Action:  Comments:  Patient arrives with increased pain in the calf. Working more hours on her feet.       Objective:  Modalities: vasocompression, LE's on wedge, right 1/5 boot, 15 min, 34° min pressure  Precautions:  Exercises:  Exercise Reps/ Time Weight/ Level Comments   NuStep 5' Level 4          Standing   Added 12.22   Incline:  Gastroc                Soleus  3x30\"                       Long sitting gastroc stretch 3x30\"  Gentle stretch, knee straight   Supine gastroc stretch 3x30\"  Knee straight, invert and dorsiflex   Ankle rom 20x     Ankle circles 20x  CW, CCW   DOMS ADD           Seated:      BAPS 20x L1 DF, PF, INV, EV   rockerboard 20x L1    Heelslides into DF 20x           Long-sitting tband 4-way ankle 10x lime                Manual:   12 min  Roller stick in prone to gastroc  MFR to plantar fascia   Other:      Treatment Charges: Mins Units   []  Modalities     [x]  Ther Exercise 31 2   [x]  Manual Therapy 12 1   []  Ther Activities     []  Aquatics     [x]  Vasocompression 15 1   []  Other     Total Treatment time 58 4       Assessment: [x] Progressing toward goals. Initiated gastroc and soleous stretch on incline for tissue extensibility. Increased reps with BAPS, Rockerboard and heel slides. Difficulty with CW/CCW motions. Added manual therapy to gastroc complex and plantar surface to decrease pain, followed by vaso compression. [] No change. [] Other:  [x] Patient would continue to benefit from skilled physical therapy services in order to:  address right ankle pain, stiffness, and weakness as well as functional impairments including difficulty walking. STG: (to be met in 10 treatments)  ? Pain:Decrease right ankle pain to 3/10  ? ROM: Increase right ankle DF to neutral both actively and passively, Increase inversion to 20°, Eversion to 15°   ? Strength: Increase right ankle strength to grossly 4+/5  ? Function: Improve LEFS to 61% functional impairment  Patient to be independent with home exercise program as demonstrated by performance with correct form without cues. Demonstrate Knowledge of fall prevention  LTG: (to be met in 18 treatments)  Pt will be able to walk at least 350 feet without antalgic gait and with LRD  Pt will be able to go up and down stairs with little difficulty  Improve LEFS to 51% functional impairment                     Patient goals: Hopefully pain gone.      Pt. Education:  [x] Yes  [] No  [x] Reviewed Prior HEP/Ed  Method of Education: [x] Verbal  [x] Demo  [] Written  Comprehension of Education:  T  [x] Verbalizes understanding. [x] Demonstrates understanding. [] Needs review. [x] Demonstrates/verbalizes HEP/Ed previously given. Plan: [x] Continue current frequency toward long and short term goals.     [x] Specific Instructions for subsequent treatments: Right ankle ROM and begin gentle strengthening, gradual weight-bearing to improve tolerance, gait training      Time In: 0900           Time Out: 1003    Electronically signed by:  Alia Case PTA

## 2022-12-27 ENCOUNTER — HOSPITAL ENCOUNTER (OUTPATIENT)
Dept: PHYSICAL THERAPY | Age: 62
Setting detail: THERAPIES SERIES
Discharge: HOME | End: 2022-12-27
Payer: COMMERCIAL

## 2022-12-27 NOTE — FLOWSHEET NOTE
[x] [address]  - [facility] TWELVESTEP [facility] &  Therapy  [address]    [phone]  F: [phone]   [] [address] [facility] 36   [address]  P: [phone]  F: [phone]  [] [address]  [address] [facility]  P: [phone]  F: [phone] [] [address]  P: [phone]  F: [phone]  [] [address] [facility]   [address]   [place]: [phone]  F: [phone]   [] [address]  [place]: [phone]   F: [phone]     Physical Therapy Cancel/No Show note    Date: 2022  Patient: Carolyn Lucia  : 1960  MRN: [id number]    Cancels/No Shows to date:     For today's appointment patient:    [x]  Cancelled    [] Rescheduled appointment    [] No-show     Reason given by patient:    [x]  Patient ill    []  Conflicting appointment    [] No transportation      [] Conflict with work    [] No reason given    [] Weather related    [] [id number]    [] Other:      Comments:      [x] Next appointment was previously confirmed    Electronically signed by: Scarlett Villaseñor PTA

## 2022-12-30 ENCOUNTER — HOSPITAL ENCOUNTER (OUTPATIENT)
Dept: PHYSICAL THERAPY | Age: 62
Setting detail: THERAPIES SERIES
End: 2022-12-30
Payer: COMMERCIAL

## 2023-01-03 ENCOUNTER — HOSPITAL ENCOUNTER (OUTPATIENT)
Dept: PHYSICAL THERAPY | Age: 63
Setting detail: THERAPIES SERIES
Discharge: HOME | End: 2023-01-03
Payer: COMMERCIAL

## 2023-01-03 PROCEDURE — 97110 THERAPEUTIC EXERCISES: CPT

## 2023-01-03 PROCEDURE — 97016 VASOPNEUMATIC DEVICE THERAPY: CPT

## 2023-01-03 NOTE — FLOWSHEET NOTE
[x] [facility]) [facility] CENTER &  Therapy  [address]  [phone]  F: [phone] [] [address]  [facility]   [address]  P: [phone]  F: [phone] [] [address]  [address]  P: [phone]  F: [phone] [] [address]  P: [phone]  F: [phone] [] [address]  [facility]   [address]   [place]: [phone]  F: [phone]      Physical Therapy Daily Treatment Note    Date:  1/3/2023  Patient Name:  Bradford Mena    :  1960  MRN: [id number]  Physician: Emmy Gaming MD                          Insurance:  [facility] Approval was received for multiple units*    from 2022 to 2023. Authorization number [id number].  [id number]  [id number]  [id number]  [id number]  Approved for  160 Units. Medical Diagnosis: Plantar fasciitis (M72.2 [ICD-10-CM])                Rehab Codes: M25.571, M25.671, M62.81, R26.2  Onset date: 22               Next Dr's appt. : 23  Visit# / total visits: ; Progress note for Medicare patient due at visit 10     Cancels/No Shows: 4/0    Subjective:    Pain:  [x] Yes  [] No Location: right heel, calf  Pain Rating: (0-10 scale) 3/10  Pain altered Tx:  [x] No  [] Yes  Action:  Comments:  Patient reports pain in the bottom of the heel. Worked 6 hrs yesterday on her feet.       Objective:  Modalities: vasocompression, LE's on wedge, right 1/5 boot, 15 min, 34° min pressure  Precautions:  Exercises:  Exercise Reps/ Time Weight/ Level Comments   NuStep 5' Level 4          Standing   Added 12.22   Incline:  Gastroc                Soleus  3x30\"                       Long sitting gastroc stretch 3x30\"  Gentle stretch, knee straight   Supine gastroc stretch 3x30\"  Knee straight, invert and dorsiflex   Ankle rom 20x     Ankle circles 20x  CW, CCW   DOMS 1 min           Seated:      BAPS 20x L1 DF, PF, INV, EV   rockerboard 20x L1    Heelslides into DF 20x           Long-sitting tband 4-way ankle 10x lime    Toe curls 20x     Toe extension 20x     Toe yoga 10x ea     Manual:   5 min  Roller stick in prone to gastroc  MFR to plantar fascia   Other:      Treatment Charges: Mins Units   []  Modalities     [x]  Ther Exercise 37 3   [x]  Manual Therapy 5 0   []  Ther Activities     []  Aquatics     [x]  Vasocompression 15 1   []  Other     Total Treatment time 57 4       Assessment: [x] Progressing toward goals.  Added DOMS, toe curls/extension and toe yoga to improve plantar fascia and intrinsic foot muscles.   Ended with roller stick to gastroc and vaso compression to decrease pain.        [] No change.     [] Other:  [x] Patient would continue to benefit from skilled physical therapy services in order to:  address right ankle pain, stiffness, and weakness as well as functional impairments including difficulty walking.    STG: (to be met in 10 treatments)  ? Pain:Decrease right ankle pain to 3/10  ? ROM: Increase right ankle DF to neutral both actively and passively, Increase inversion to 20°, Eversion to 15°   ? Strength: Increase right ankle strength to grossly 4+/5  ? Function: Improve LEFS to 61% functional impairment  Patient to be independent with home exercise program as demonstrated by performance with correct form without cues.  Demonstrate Knowledge of fall prevention    LTG: (to be met in 18 treatments)  Pt will be able to walk at least 350 feet without antalgic gait and with LRD  Pt will be able to go up and down stairs with little difficulty  Improve LEFS to 51% functional impairment                     Patient goals: Hopefully pain gone.     Pt. Education:  [x] Yes  [] No  [x] Reviewed Prior HEP/Ed  Method of Education: [x] Verbal  [x] Demo  [] Written  Comprehension of  Education:    [x] Verbalizes understanding. [x] Demonstrates understanding. [] Needs review. [x] Demonstrates/verbalizes HEP/Ed previously given. Plan: [x] Continue current frequency toward long and short term goals.     [x] Specific Instructions for subsequent treatments: add standing exercises, balance      Time In: 0902           Time Out: 1004    Electronically signed by:  Judith Alas PTA

## 2023-01-05 ENCOUNTER — HOSPITAL ENCOUNTER (OUTPATIENT)
Dept: PHYSICAL THERAPY | Age: 63
Setting detail: THERAPIES SERIES
Discharge: HOME | End: 2023-01-05
Payer: COMMERCIAL

## 2023-01-05 PROCEDURE — 97110 THERAPEUTIC EXERCISES: CPT

## 2023-01-05 NOTE — FLOWSHEET NOTE
[x] [facility]) - [facility] &  Therapy  [address]  [phone]  F: [phone] [] [address]  [facility]   [address]  P: [phone]  F: [phone] [] [address]  [address]  P: [phone]  F: [phone] [] [address]  P: [phone]  F: [phone] [] [address]  [facility]   [address]   [place]: [phone]  F: [phone]      Physical Therapy Daily Treatment Note    Date:  2023  Patient Name:  Shiloh Schmitz    :  1960  MRN: [id number]  Physician: Camille Vital MD                          Insurance:   Approval was received for multiple units*    from 2022 to 2023. Authorization number [id number].  [address] [facility]  [id number]  [id number]  Approved for  160 Units. Medical Diagnosis: Plantar fasciitis (M72.2 [ICD-10-CM])                Rehab Codes: M25.571, M25.671, M62.81, R26.2  Onset date: 22               Next Dr's appt. : 23  Visit# / total visits: ; Progress note for Medicare patient due at visit 10     Cancels/No Shows: 4/0    Subjective:    Pain:  [x] Yes  [] No Location: right heel, calf  Pain Rating: (0-10 scale) 3-4/10  Pain altered Tx:  [x] No  [] Yes  Action:  Comments: States pain is 3-4/10 upon arrival this date. Has been busy at work cleaning.       Objective:  Modalities: vasocompression, LE's on wedge, right 1/5 boot, 15 min, 34° min pressure--HELD in error this date  Precautions:  Exercises:  Exercise Reps/ Time Weight/ Level Comments   NuStep 5' Level 4          Standing      Incline:  Gastroc                Soleus  3x30\"                       Long sitting gastroc stretch 3x30\"  Gentle stretch, knee straight   Supine gastroc stretch 3x30\" Knee straight, invert and dorsiflex   Ankle rom 20x     Ankle circles 20x  CW, CCW   DOMS 1 min           Seated:      BAPS   20x L2 DF, PF, INV, EV   rockerboard 20x L1    Heelslides into DF  20x           Long-sitting tband 4-way ankle 10x lime    Toe curls   20x     Toe extension  20x     Toe yoga   10x ea     Manual:  5 min  Roller stick in prone to gastroc  MFR to plantar fascia   Other:      Treatment Charges: Mins Units   []  Modalities     [x]  Ther Exercise 34 3   [x]  Manual Therapy 5 0   []  Ther Activities     []  Aquatics     []  Vasocompression     []  Other     Total Treatment time 39 mins        Assessment: [x] Progressing toward goals. Continued exercises per log this date with cues for exercise techniques and progressions with good carryover. Continued roller stick to decrease gastroc tightness with good releases. [] No change. [] Other:  [x] Patient would continue to benefit from skilled physical therapy services in order to:  address right ankle pain, stiffness, and weakness as well as functional impairments including difficulty walking. STG: (to be met in 10 treatments)  ? Pain:Decrease right ankle pain to 3/10  ? ROM: Increase right ankle DF to neutral both actively and passively, Increase inversion to 20°, Eversion to 15°   ? Strength: Increase right ankle strength to grossly 4+/5  ? Function: Improve LEFS to 61% functional impairment  Patient to be independent with home exercise program as demonstrated by performance with correct form without cues. Demonstrate Knowledge of fall prevention    LTG: (to be met in 18 treatments)  Pt will be able to walk at least 350 feet without antalgic gait and with LRD  Pt will be able to go up and down stairs with little difficulty  Improve LEFS to 51% functional impairment                     Patient goals: Hopefully pain gone.      Pt. Education:  [x] Yes  [] No  [x] Reviewed Prior HEP/Ed  Method of Education: [x] Verbal  [x] Demo  [] Written  Comprehension of Education:    [x] Verbalizes understanding. [x] Demonstrates understanding. [] Needs review. [x] Demonstrates/verbalizes HEP/Ed previously given. Plan: [x] Continue current frequency toward long and short term goals.     [x] Specific Instructions for subsequent treatments: add standing exercises, balance      Time In: 0903           Time Out: 6192    Electronically signed by:  Hima Lawrence PTA

## 2023-01-07 ENCOUNTER — APPOINTMENT (OUTPATIENT)
Dept: CT IMAGING | Age: 63
End: 2023-01-07
Payer: COMMERCIAL

## 2023-01-07 ENCOUNTER — HOSPITAL ENCOUNTER (EMERGENCY)
Age: 63
Discharge: HOME | End: 2023-01-07
Attending: EMERGENCY MEDICINE
Payer: COMMERCIAL

## 2023-01-07 ENCOUNTER — APPOINTMENT (OUTPATIENT)
Dept: GENERAL RADIOLOGY | Age: 63
End: 2023-01-07
Payer: COMMERCIAL

## 2023-01-07 VITALS
SYSTOLIC BLOOD PRESSURE: 113 MMHG | BODY MASS INDEX: 33.13 KG/M2 | WEIGHT: 187 LBS | RESPIRATION RATE: 16 BRPM | OXYGEN SATURATION: 96 % | TEMPERATURE: 97.3 F | DIASTOLIC BLOOD PRESSURE: 69 MMHG | HEART RATE: 79 BPM

## 2023-01-07 DIAGNOSIS — R10.84 GENERALIZED ABDOMINAL PAIN: Primary | ICD-10-CM

## 2023-01-07 LAB
ABSOLUTE EOS #: 0.25 K/UL (ref 0–0.44)
ABSOLUTE IMMATURE GRANULOCYTE: <0.03 K/UL (ref 0–0.3)
ABSOLUTE LYMPH #: 1.53 K/UL (ref 1.1–3.7)
ABSOLUTE MONO #: 0.46 K/UL (ref 0.1–1.2)
ALBUMIN SERPL-MCNC: 4 G/DL (ref 3.5–5.2)
ALBUMIN/GLOBULIN RATIO: 2 (ref 1–2.5)
ALP BLD-CCNC: 82 U/L (ref 35–104)
ALT SERPL-CCNC: 23 U/L (ref 5–33)
ANION GAP SERPL CALCULATED.3IONS-SCNC: 11 MMOL/L (ref 9–17)
AST SERPL-CCNC: 17 U/L
BASOPHILS # BLD: 1 % (ref 0–2)
BASOPHILS ABSOLUTE: 0.05 K/UL (ref 0–0.2)
BILIRUB SERPL-MCNC: 0.3 MG/DL (ref 0.3–1.2)
BILIRUBIN DIRECT: 0.1 MG/DL
BILIRUBIN, INDIRECT: 0.2 MG/DL (ref 0–1)
BUN BLDV-MCNC: 17 MG/DL (ref 8–23)
CALCIUM SERPL-MCNC: 9 MG/DL (ref 8.6–10.4)
CHLORIDE BLD-SCNC: 106 MMOL/L (ref 98–107)
CO2: 25 MMOL/L (ref 20–31)
CREAT SERPL-MCNC: 0.66 MG/DL (ref 0.5–0.9)
EOSINOPHILS RELATIVE PERCENT: 3 % (ref 1–4)
GFR SERPL CREATININE-BSD FRML MDRD: >60 ML/MIN/1.73M2
GLUCOSE BLD-MCNC: 114 MG/DL (ref 70–99)
HCT VFR BLD CALC: 37.2 % (ref 36.3–47.1)
HEMOGLOBIN: 12.7 G/DL (ref 11.9–15.1)
IMMATURE GRANULOCYTES: 0 %
LIPASE: 35 U/L (ref 13–60)
LYMPHOCYTES # BLD: 19 % (ref 24–43)
MCH RBC QN AUTO: 30.5 PG (ref 25.2–33.5)
MCHC RBC AUTO-ENTMCNC: 34.1 G/DL (ref 28.4–34.8)
MCV RBC AUTO: 89.2 FL (ref 82.6–102.9)
MONOCYTES # BLD: 6 % (ref 3–12)
NRBC AUTOMATED: 0 PER 100 WBC
PDW BLD-RTO: 13 % (ref 11.8–14.4)
PLATELET # BLD: 253 K/UL (ref 138–453)
PMV BLD AUTO: 12.1 FL (ref 8.1–13.5)
POTASSIUM SERPL-SCNC: 3.8 MMOL/L (ref 3.7–5.3)
RBC # BLD: 4.17 M/UL (ref 3.95–5.11)
SEG NEUTROPHILS: 71 % (ref 36–65)
SEGMENTED NEUTROPHILS ABSOLUTE COUNT: 5.65 K/UL (ref 1.5–8.1)
SODIUM BLD-SCNC: 142 MMOL/L (ref 135–144)
TOTAL PROTEIN: 6 G/DL (ref 6.4–8.3)
TROPONIN, HIGH SENSITIVITY: 8 NG/L (ref 0–14)
TROPONIN, HIGH SENSITIVITY: <6 NG/L (ref 0–14)
WBC # BLD: 8 K/UL (ref 3.5–11.3)

## 2023-01-07 PROCEDURE — 6370000000 HC RX 637 (ALT 250 FOR IP)

## 2023-01-07 PROCEDURE — 6360000002 HC RX W HCPCS

## 2023-01-07 PROCEDURE — 80076 HEPATIC FUNCTION PANEL: CPT

## 2023-01-07 PROCEDURE — 84484 ASSAY OF TROPONIN QUANT: CPT

## 2023-01-07 PROCEDURE — 99285 EMERGENCY DEPT VISIT HI MDM: CPT

## 2023-01-07 PROCEDURE — 93005 ELECTROCARDIOGRAM TRACING: CPT

## 2023-01-07 PROCEDURE — 85025 COMPLETE CBC W/AUTO DIFF WBC: CPT

## 2023-01-07 PROCEDURE — 71045 X-RAY EXAM CHEST 1 VIEW: CPT

## 2023-01-07 PROCEDURE — 96374 THER/PROPH/DIAG INJ IV PUSH: CPT

## 2023-01-07 PROCEDURE — 83690 ASSAY OF LIPASE: CPT

## 2023-01-07 PROCEDURE — 74177 CT ABD & PELVIS W/CONTRAST: CPT

## 2023-01-07 PROCEDURE — 80048 BASIC METABOLIC PNL TOTAL CA: CPT

## 2023-01-07 PROCEDURE — 6360000004 HC RX CONTRAST MEDICATION

## 2023-01-07 RX ORDER — ONDANSETRON 4 MG/1
4 TABLET, FILM COATED ORAL EVERY 8 HOURS PRN
Qty: 20 TABLET | Refills: 0 | Status: SHIPPED | OUTPATIENT
Start: 2023-01-07

## 2023-01-07 RX ORDER — DICYCLOMINE HYDROCHLORIDE 10 MG/1
10 CAPSULE ORAL 4 TIMES DAILY
Qty: 30 CAPSULE | Refills: 0 | Status: SHIPPED | OUTPATIENT
Start: 2023-01-07

## 2023-01-07 RX ORDER — ONDANSETRON 2 MG/ML
4 INJECTION INTRAMUSCULAR; INTRAVENOUS ONCE
Status: COMPLETED | OUTPATIENT
Start: 2023-01-07 | End: 2023-01-07

## 2023-01-07 RX ORDER — DICYCLOMINE HYDROCHLORIDE 10 MG/1
10 CAPSULE ORAL ONCE
Status: COMPLETED | OUTPATIENT
Start: 2023-01-07 | End: 2023-01-07

## 2023-01-07 RX ADMIN — IOPAMIDOL 75 ML: 755 INJECTION, SOLUTION INTRAVENOUS at 20:22

## 2023-01-07 RX ADMIN — DICYCLOMINE HYDROCHLORIDE 10 MG: 10 CAPSULE ORAL at 18:26

## 2023-01-07 RX ADMIN — ONDANSETRON 4 MG: 2 INJECTION INTRAMUSCULAR; INTRAVENOUS at 18:30

## 2023-01-07 ASSESSMENT — PAIN DESCRIPTION - DESCRIPTORS: DESCRIPTORS: CRAMPING

## 2023-01-07 ASSESSMENT — PAIN DESCRIPTION - LOCATION: LOCATION: ABDOMEN

## 2023-01-07 ASSESSMENT — PAIN - FUNCTIONAL ASSESSMENT: PAIN_FUNCTIONAL_ASSESSMENT: 0-10

## 2023-01-07 ASSESSMENT — PAIN SCALES - GENERAL: PAINLEVEL_OUTOF10: 10

## 2023-01-07 ASSESSMENT — PAIN DESCRIPTION - ORIENTATION: ORIENTATION: LOWER

## 2023-01-07 NOTE — ED PROVIDER NOTES
[facility] ED  Emergency Department Encounter  Emergency Medicine Resident     Pt oTan Chavez  MRN: [id number]  [place] 1960  Date of evaluation: 1/7/23  PCP:  Teddy Delong MD      [address] [place] [address]       Chief Complaint   Patient presents with    Abdominal Cramping     Lower abd pain       HISTORY OF PRESENT ILLNESS  (Location/Symptom, Timing/Onset, Context/Setting, Quality, Duration, Modifying Factors, Severity.)      Mike Edwards is a 65 y.o. female who presents with several hours of abdominal cramping. Patient states that she was walking in the store today this afternoon when she began to have lower abdominal cramping that is sharp in nature. She states that she tried to lay down however the pain did not get better, in fact it got worse. Patient states that her whole abdomen is painful however it is worse just below the bellybutton. She denies any vomiting however does states she is nauseous. She states she had a bowel movement which made the pain slightly better and she noticed that her bowel movement was softer than normal however not diarrhea. She denies any blood in the stool or black stool. She states she has a history of diverticulosis, still has her gallbladder and appendix. Patient states she has a past medical history of asthma, anxiety, depression, GERD. PAST MEDICAL / SURGICAL / SOCIAL / FAMILY HISTORY      has a past medical history of Arthritis, ASCUS with positive high risk HPV cervical, Asthma, COVID-19, Depression, Diverticulitis, ESBL (extended spectrum beta-lactamase) producing bacteria infection, GERD (gastroesophageal reflux disease), Hyperlipidemia, MRSA (methicillin resistant staph aureus) culture positive, Rectocele, and Tachycardia. has a past surgical history that includes Hysterectomy (1996); knee surgery (Left); Cystocopy (2/25/2015); Nerve Block (07/28/2017); Nerve Block (09/22/2017); Nerve Block (09/22/2017);  Nerve Block (11/10/2017); Colonoscopy (N/A, 2018); Upper gastrointestinal endoscopy (2019); Knee arthroscopy (Left, 2019); Ankle surgery (Left, 3/4/2021); and Gastrocnemius Recession (Right, 2022). Social History     Socioeconomic History    Marital status:      Spouse name: Not on file    Number of children: Not on file    Years of education: Not on file    Highest education level: Not on file   Occupational History    Not on file   Tobacco Use    Smoking status: Former     Packs/day: 2.00     Years: 0.50     Pack years: 1.00     Types: Cigarettes     Quit date: 18     Years since quittin.0    Smokeless tobacco: Never   Vaping Use    Vaping Use: Never used   Substance and Sexual Activity    Alcohol use: Yes     Alcohol/week: 0.0 standard drinks     Comment: social    Drug use: No    Sexual activity: Not on file   Other Topics Concern    Not on file   Social History Narrative    Not on file     Social Determinants of Health     Financial Resource Strain: Not on file   Food Insecurity: Not on file   Transportation Needs: Not on file   Physical Activity: Not on file   Stress: Not on file   Social Connections: Not on file   Intimate Partner Violence: Not on file   Housing Stability: Not on file       Family History   Problem Relation Age of Onset    Colon Cancer Mother     High Blood Pressure Father     Heart Attack Brother     Heart Disease Brother     Crohn's Disease Niece        Allergies:  Shellfish allergy, Shrimp (diagnostic), Famotidine, and Gabapentin    Home Medications:  Prior to Admission medications    Medication Sig Start Date End Date Taking?  Authorizing Provider   dicyclomine (BENTYL) 10 MG capsule Take 1 capsule by mouth 4 times daily 23  Yes Cora Adams MD   ondansetron [facility]) 4 MG tablet Take 1 tablet by mouth every 8 hours as needed for Nausea 23  Yes Cora Adams MD   diclofenac sodium (VOLTAREN) 1 % GEL Apply 4 g topically 4 times daily as needed for Pain 12/23/22   Nata Garcia MD   diclofenac (VOLTAREN) 50 MG EC tablet TAKE 1 TABLET BY MOUTH TWICE DAILY 12/22/22   Joe Wallace MD   busPIRone (BUSPAR) 15 MG tablet TAKE 1 TABLET BY MOUTH TWICE DAILY 12/22/22   Joe Wallace MD   aspirin 325 MG EC tablet Take 1 tablet by mouth daily 11/4/22 12/16/22  Nata Garcia MD   atorvastatin (LIPITOR) 40 MG tablet Take 1 tablet by mouth nightly 11/1/22   Juarez Byers MD   albuterol sulfate HFA (PROVENTIL;VENTOLIN;PROAIR) 108 (90 Base) MCG/ACT inhaler INAHLE 2 PUFFS BY MOUTH INTO THE LUNGS EVERY 6 HOURS AS NEEDED FOR WHEEZING 10/20/22   Joe Wallace MD   venlafaxine (EFFEXOR XR) 75 MG extended release capsule DTAKE 1 CAPSULE BY MOUTH DAILY 10/20/22   Joe Wallace MD   vitamin D (ERGOCALCIFEROL) 1.25 MG (73045 UT) CAPS capsule TAKE 1 CAPSULE BY MOUTH EVERY WEEK 10/20/22   Joe Wallace MD   chlorzoxazone (PARAFON FORTE) 500 MG tablet TAKE 1/2 TABLET BY MOUTH TWICE DAILY AS NEEDED 10/20/22   Joe Wallace MD   omeprazole (PRILOSEC) 40 MG delayed release capsule TAKE 1 CAPSULE BY MOUTH DAILY EVERY MORNING 9/26/22   Joe Wallace MD   ibuprofen (ADVIL;MOTRIN) 800 MG tablet Take 1 tablet by mouth 3 times daily as needed for Pain 9/23/22   Taylor Tinoco MD   EPINEPHrine (EPIPEN 2-VIOLET) 0.3 MG/0.3ML SOAJ injection Inject 0.3 mLs into the muscle once for 1 dose Use as directed for allergic reaction 8/3/22 8/3/22  Joe Wallace MD   diclofenac sodium (VOLTAREN) 1 % GEL Apply 4 g topically 4 times daily as needed for Pain 6/23/22   Nata Garcia MD   Handicap [employer] [address] [facility] by Does not apply route DISABLED PARKING PERMIT AUTHORIZATION  Routine     Qty-1    The patient has the following condition(s) which qualifies him/her for disabled parking: Walking severely limited due to orthopedic condition     Privilege Duration: Temporary for 3 months 3/4/21   Nata Garcia MD   ketotifen (ZADITOR) 0.025 % ophthalmic solution USE 1 DROP INTO BOTH EYES TWICE DAILY 1/19/21   Tang Nelson MD   fluticasone (FLONASE) 50 MCG/ACT nasal spray INSTILL 2 SPRAYS INTO EACH NOSTRIL ONCE DAILY 1/11/21   Tang Nelson MD   miSOPROStol (CYTOTEC) 200 MCG tablet Take 200 mcg by mouth daily    Historical Provider, MD   Multiple Vitamins-Minerals (CULTURELLE PROBIOTICS + MULTIV) CHEW Take 1 tablet by mouth daily 1/29/20   Wilmer Woodruff MD   MI-ACID GAS RELIEF 80 MG chewable tablet CHEW 1 TABLET BY MOUTH FOUR TIMES DAILY AS NEEDED FOR FLATULENCE 11/19/19   Wilmer Woodruff MD   CRANBERRY CONCENTRATE 500 MG CAPS TAKE 1 CAPSULE BY MOUTH TWICE DAILY 8/21/19   Tang Nelson MD   metoprolol succinate (TOPROL XL) 25 MG extended release tablet Take 25 mg by mouth daily     Historical Provider, MD       REVIEW OF SYSTEMS    (2-9 systems for level 4, 10 or more for level 5)      Review of Systems   Constitutional:  Negative for chills, fatigue and fever. HENT:  Negative for congestion, rhinorrhea and sore throat. Respiratory:  Negative for cough, shortness of breath and wheezing. Cardiovascular:  Negative for chest pain and palpitations. Gastrointestinal:  Positive for abdominal pain and nausea. Negative for constipation, diarrhea and vomiting. Genitourinary:  Negative for difficulty urinating, frequency and urgency. Musculoskeletal:  Negative for arthralgias and myalgias. Neurological:  Negative for dizziness, syncope, weakness and headaches. PHYSICAL EXAM   (up to 7 for level 4, 8 or more for level 5)      INITIAL VITALS:   /69   Pulse 79   Temp 97.3 °F (36.3 °C) (Oral)   Resp 16   Wt 187 lb (84.8 kg)   SpO2 96%   BMI 33.13 kg/m²     Physical Exam  Constitutional:       Appearance: Normal appearance. She is obese. HENT:      Head: Normocephalic and atraumatic. Mouth/Throat:      Mouth: Mucous membranes are moist.      Pharynx: Oropharynx is clear. Eyes:      Extraocular Movements: Extraocular movements intact.       Pupils: Pupils are equal, round, and reactive to light. Cardiovascular:      Rate and Rhythm: Normal rate and regular rhythm. Pulses: Normal pulses. Heart sounds: Normal heart sounds. Pulmonary:      Effort: Pulmonary effort is normal. No respiratory distress. Breath sounds: Normal breath sounds. Abdominal:      General: Abdomen is flat. Palpations: Abdomen is soft. Tenderness: There is abdominal tenderness. Musculoskeletal:         General: No tenderness. Normal range of motion. Cervical back: Normal range of motion and neck supple. No tenderness. Skin:     General: Skin is warm and dry. Capillary Refill: Capillary refill takes less than 2 seconds. Neurological:      General: No focal deficit present. Mental Status: She is alert and oriented to person, place, and time. Mental status is at baseline. Psychiatric:         Mood and Affect: Mood normal.         Behavior: Behavior normal.       DIFFERENTIAL  DIAGNOSIS     PLAN (LABS / IMAGING / EKG):  Orders Placed This Encounter   Procedures    XR CHEST PORTABLE    CT ABDOMEN PELVIS W IV CONTRAST Additional Contrast? None    CBC with Auto Differential    Basic Metabolic Panel    Lipase    Hepatic Function Panel    Troponin    Troponin    EKG 12 Lead       MEDICATIONS ORDERED:  Orders Placed This Encounter   Medications    dicyclomine (BENTYL) capsule 10 mg    ondansetron (ZOFRAN) injection 4 mg    iopamidol (ISOVUE-370) 76 % injection 75 mL    dicyclomine (BENTYL) 10 MG capsule     Sig: Take 1 capsule by mouth 4 times daily     Dispense:  30 capsule     Refill:  0    ondansetron (ZOFRAN) 4 MG tablet     Sig: Take 1 tablet by mouth every 8 hours as needed for Nausea     Dispense:  20 tablet     Refill:  0       DDX: Diverticulitis, viral GI illness, cholecystitis, appendicitis    MDM: Patient is a 78-year-old female with past medical history of GERD and diverticulosis who presents with generalized abdominal cramping.   Patient is GCS 15, nontoxic in, no acute stress, speaking full sentences, able to ambulate under her own power. Patient is afebrile, normotensive, nontachycardic, satting 96% on room air. Lungs are clear to auscultation bilaterally. Abdomen is soft and and mildly tender to palpation generalized, particularly in the periumbilical area. Peripheral pulses are 2+ throughout. Review shows patient does have history of diverticulosis, follows with GI, has not had colonoscopy in numerous years according to the patient. Plan is to pain laboratory work-up including CBC, BMP, lipase, troponin, EKG, chest x-ray, hepatic function panel, CT abdomen pelvis with contrast.  If work-up is unremarkable patient may have viral GI illness however we will rule out appendicitis, cholecystitis, diverticulitis, intra-abdominal infection, or and  atypical chest pain.     DIAGNOSTIC RESULTS / EMERGENCY DEPARTMENT COURSE / MDM   LAB RESULTS:  Results for orders placed or performed during the hospital encounter of 01/07/23   CBC with Auto Differential   Result Value Ref Range    WBC 8.0 3.5 - 11.3 k/uL    RBC 4.17 3.95 - 5.11 m/uL    Hemoglobin 12.7 11.9 - 15.1 g/dL    Hematocrit 37.2 36.3 - 47.1 %    MCV 89.2 82.6 - 102.9 fL    MCH 30.5 25.2 - 33.5 pg    MCHC 34.1 28.4 - 34.8 g/dL    RDW 13.0 11.8 - 14.4 %    Platelets 707 557 - 090 k/uL    MPV 12.1 8.1 - 13.5 fL    NRBC Automated 0.0 0.0 per 100 WBC    Seg Neutrophils 71 (H) 36 - 65 %    Lymphocytes 19 (L) 24 - 43 %    Monocytes 6 3 - 12 %    Eosinophils % 3 1 - 4 %    Basophils 1 0 - 2 %    Immature Granulocytes 0 0 %    Segs Absolute 5.65 1.50 - 8.10 k/uL    Absolute Lymph # 1.53 1.10 - 3.70 k/uL    Absolute Mono # 0.46 0.10 - 1.20 k/uL    Absolute Eos # 0.25 0.00 - 0.44 k/uL    Basophils Absolute 0.05 0.00 - 0.20 k/uL    Absolute Immature Granulocyte <0.03 0.00 - 0.30 k/uL   Basic Metabolic Panel   Result Value Ref Range    Glucose 114 (H) 70 - 99 mg/dL    BUN 17 8 - 23 mg/dL    Creatinine 0.66 0.50 - 0.90 mg/dL    Est, Glom Filt Rate >60 >60 mL/min/1.73m2    Calcium 9.0 8.6 - 10.4 mg/dL    Sodium 142 135 - 144 mmol/L    Potassium 3.8 3.7 - 5.3 mmol/L    Chloride 106 98 - 107 mmol/L    CO2 25 20 - 31 mmol/L    Anion Gap 11 9 - 17 mmol/L   Lipase   Result Value Ref Range    Lipase 35 13 - 60 U/L   Hepatic Function Panel   Result Value Ref Range    Albumin 4.0 3.5 - 5.2 g/dL    Alkaline Phosphatase 82 35 - 104 U/L    ALT 23 5 - 33 U/L    AST 17 <32 U/L    Total Bilirubin 0.3 0.3 - 1.2 mg/dL    Bilirubin, Direct 0.1 <0.3 mg/dL    Bilirubin, Indirect 0.2 0.0 - 1.0 mg/dL    Total Protein 6.0 (L) 6.4 - 8.3 g/dL    Albumin/Globulin Ratio 2.0 1.0 - 2.5   Troponin   Result Value Ref Range    Troponin, High Sensitivity 8 0 - 14 ng/L   Troponin   Result Value Ref Range    Troponin, High Sensitivity <6 0 - 14 ng/L         RADIOLOGY:  CT ABDOMEN PELVIS W IV CONTRAST Additional Contrast? None   Final Result   Hepatomegaly with mild steatosis. Stable tiny right renal stone. Colonic   diverticulosis without acute diverticulitis. XR CHEST PORTABLE   Final Result   No acute abnormality. EKG      All EKG's are interpreted by the Emergency Department Physician who either signs or Co-signs this chart in the absence of a cardiologist.    EMERGENCY DEPARTMENT COURSE:      ED Course as of 01/07/23 2324   Sat Jan 07, 2023   2130 CT abdomen pelvis is unremarkable. Patient improved after Zofran and Bentyl. Laboratory work-up is otherwise unremarkable. After discussion patient we have decided to discharge patient home with Bentyl, Zofran, return precautions, follow-up with PCP patient is agreeable with plan. [AK]      ED Course User Index  [AK] Jeremy Horton MD       No notes of [facility] Admission Criteria type on file. PROCEDURES:      CONSULTS:  None    CRITICAL CARE:        FINAL IMPRESSION      1.  Generalized abdominal pain          DISPOSITION / PLAN     DISPOSITION Decision To Discharge 01/07/2023 09:43:43 PM      PATIENT REFERRED TO:  Mike Alejo MD  [address] [place] [address],  [address] [address]344    Schedule an appointment as soon as possible for a visit in 1 day      DISCHARGE MEDICATIONS:  Discharge Medication List as of 1/7/2023  9:46 PM        START taking these medications    Details   dicyclomine (BENTYL) 10 MG capsule Take 1 capsule by mouth 4 times daily, Disp-30 capsule, R-0Print      ondansetron (ZOFRAN) 4 MG tablet Take 1 tablet by mouth every 8 hours as needed for Nausea, Disp-20 tablet, R-0Print             Kendal Julian MD  Emergency Medicine Resident    (Please note that portions of thisnote were completed with a voice recognition program.  Efforts were made to edit the dictations but occasionally words are mis-transcribed.)        eKndal Julian MD  Resident  01/08/23 8404

## 2023-01-07 NOTE — ED PROVIDER NOTES
[facility] [address] ED     Emergency Department     Faculty Attestation        I performed a history and physical examination of the patient and discussed management with the resident. I reviewed the residents note and agree with the documented findings and plan of care. Any areas of disagreement are noted on the chart. I was personally present for the key portions of any procedures. I have documented in the chart those procedures where I was not present during the key portions. I have reviewed the emergency nurses triage note. I agree with the chief complaint, past medical history, past surgical history, allergies, medications, social and family history as documented unless otherwise noted below. For mid-level providers such as nurse practitioners as well as physicians assistants:    I have personally seen and evaluated the patient. I find the patient's history and physical exam are consistent with NP/PA documentation. I agree with the care provided, treatment rendered, disposition, & follow-up plan. Additional findings are as noted.     Vital Signs: BP (!) 149/86   Pulse 85   Temp 97.3 °F (36.3 °C) (Oral)   Resp 16   Wt 187 lb (84.8 kg)   SpO2 98%   BMI 33.13 kg/m²   PCP:  Tang Nelson MD    Pertinent Comments:           Critical Care  None          Randa Christine MD    Attending Emergency Medicine Physician            Toyin Heller MD  01/07/23 9508

## 2023-01-07 NOTE — ED NOTES
Pt to ED for lower abdominal pain and cramping that started earlier this morning. PT also reports nausea and loose stools x1 say. Patient alert and oriented x4, talking in complete sentences. Respirations even and unlabored. Blood work obtained.  Call light in reach, all needs met at this time     Gustavo Ortiz RN  01/07/23 5257

## 2023-01-08 ASSESSMENT — ENCOUNTER SYMPTOMS
NAUSEA: 1
DIARRHEA: 0
SHORTNESS OF BREATH: 0
VOMITING: 0
RHINORRHEA: 0
SORE THROAT: 0
WHEEZING: 0
CONSTIPATION: 0
COUGH: 0
ABDOMINAL PAIN: 1

## 2023-01-08 NOTE — ED NOTES
Pt resting comfortably, respirations even and unlabored.  Call light in reach, all needs met at this time     Sharon Ruiz RN  01/07/23 2000

## 2023-01-08 NOTE — DISCHARGE INSTRUCTIONS
You are seen today in the emergency department for your abdominal pain. We examined you, obtain laboratory work and obtain a CT scan of your abdomen. Our laboratory work and the CT scan did not show any new significant abnormalities. The CT scan did show that you continue to have diverticulosis as you reported. We feel you are safe for discharge home. He was likely have a viral illness causing abdominal pain. Please return to the emergency department immediately if you develop any new or worsening symptoms including worsening abdominal pain, nausea, vomiting, diarrhea, weakness, loss of conscious, fever, chills, blood in the stool, chest pain, shortness of breath, headache, loss conscious, or any other concerns. Please call your PCP and schedule appointment the next 24 to 48 hours for follow-up.

## 2023-01-09 LAB
EKG ATRIAL RATE: 86 BPM
EKG P AXIS: 72 DEGREES
EKG P-R INTERVAL: 132 MS
EKG Q-T INTERVAL: 370 MS
EKG QRS DURATION: 76 MS
EKG QTC CALCULATION (BAZETT): 442 MS
EKG R AXIS: 30 DEGREES
EKG T AXIS: 69 DEGREES
EKG VENTRICULAR RATE: 86 BPM

## 2023-01-12 ENCOUNTER — HOSPITAL ENCOUNTER (OUTPATIENT)
Dept: PHYSICAL THERAPY | Age: 63
Setting detail: THERAPIES SERIES
Discharge: HOME | End: 2023-01-12
Payer: COMMERCIAL

## 2023-01-12 NOTE — FLOWSHEET NOTE
[x] [facility]) [facility] &  Therapy  [address]    [phone]  F: [phone]   [] [address]  [facility] [address]   [address]  P: [phone]  F: [phone]  [] [address] &  Therapy  [address]  P: [phone]  F: [phone] [] [address]  P: [phone]  F: [phone]  [] [address]  [facility]   [address]   [place]: [phone]  F: [phone]   [] [place] [address] [facility] [address]  [place]: [phone]   F: [phone]     Physical Therapy Cancel/No Show note    Date: 2023  Patient: Tony Charlton  : 1960  MRN: [id number]    Cancels/No Shows to date:     For today's appointment patient:    []  Cancelled    [] Rescheduled appointment    [x] No-show     Reason given by patient:    []  Patient ill    []  Conflicting appointment    [] No transportation      [] Conflict with work    [] No reason given    [] Weather related    [] COVID-19    [x] Other: Called and left message for patient about missed appointments this date. Asking patient to call back and confirm next appointment.       Comments:      [x] Next appointment was confirmed on VM    Electronically signed by: Tamica Cordova PTA

## 2023-01-13 DIAGNOSIS — K21.9 GASTROESOPHAGEAL REFLUX DISEASE, UNSPECIFIED WHETHER ESOPHAGITIS PRESENT: ICD-10-CM

## 2023-01-13 RX ORDER — OMEPRAZOLE 40 MG/1
CAPSULE, DELAYED RELEASE ORAL
Qty: 28 CAPSULE | Refills: 3 | Status: SHIPPED | OUTPATIENT
Start: 2023-01-13

## 2023-01-13 NOTE — TELEPHONE ENCOUNTER
Thomas Ogden is calling to request a refill on the following medication(s):    Last Visit Date (If Applicable):  88/5/1283    Next Visit Date:    1/19/2023    Medication Request:  Requested Prescriptions     Pending Prescriptions Disp Refills    omeprazole (PRILOSEC) 40 MG delayed release capsule [Pharmacy Med Name: Omeprazole 40 MG Oral Capsule Delayed Release] 28 capsule 3     Sig: TAKE 1 CAPSULE BY MOUTH DAILY EVERY MORNING

## 2023-01-19 ENCOUNTER — OFFICE VISIT (OUTPATIENT)
Dept: FAMILY MEDICINE CLINIC | Age: 63
End: 2023-01-19
Payer: COMMERCIAL

## 2023-01-19 ENCOUNTER — HOSPITAL ENCOUNTER (OUTPATIENT)
Dept: PHYSICAL THERAPY | Age: 63
Setting detail: THERAPIES SERIES
Discharge: HOME | End: 2023-01-19
Payer: COMMERCIAL

## 2023-01-19 VITALS
BODY MASS INDEX: 33.73 KG/M2 | WEIGHT: 190.4 LBS | TEMPERATURE: 97.2 F | SYSTOLIC BLOOD PRESSURE: 125 MMHG | DIASTOLIC BLOOD PRESSURE: 74 MMHG | HEART RATE: 94 BPM | OXYGEN SATURATION: 98 %

## 2023-01-19 DIAGNOSIS — R06.81 APNEIC EPISODE: Primary | ICD-10-CM

## 2023-01-19 DIAGNOSIS — R06.83 SNORING: ICD-10-CM

## 2023-01-19 DIAGNOSIS — Z12.31 ENCOUNTER FOR SCREENING MAMMOGRAM FOR MALIGNANT NEOPLASM OF BREAST: ICD-10-CM

## 2023-01-19 PROCEDURE — G8417 CALC BMI ABV UP PARAM F/U: HCPCS | Performed by: FAMILY MEDICINE

## 2023-01-19 PROCEDURE — 3017F COLORECTAL CA SCREEN DOC REV: CPT | Performed by: FAMILY MEDICINE

## 2023-01-19 PROCEDURE — 1036F TOBACCO NON-USER: CPT | Performed by: FAMILY MEDICINE

## 2023-01-19 PROCEDURE — 99213 OFFICE O/P EST LOW 20 MIN: CPT | Performed by: FAMILY MEDICINE

## 2023-01-19 PROCEDURE — G8427 DOCREV CUR MEDS BY ELIG CLIN: HCPCS | Performed by: FAMILY MEDICINE

## 2023-01-19 PROCEDURE — G8484 FLU IMMUNIZE NO ADMIN: HCPCS | Performed by: FAMILY MEDICINE

## 2023-01-19 RX ORDER — ZOSTER VACCINE RECOMBINANT, ADJUVANTED 50 MCG/0.5
0.5 KIT INTRAMUSCULAR SEE ADMIN INSTRUCTIONS
Qty: 0.5 ML | Refills: 1 | Status: SHIPPED | OUTPATIENT
Start: 2023-01-19 | End: 2023-07-18

## 2023-01-19 SDOH — ECONOMIC STABILITY: FOOD INSECURITY: WITHIN THE PAST 12 MONTHS, YOU WORRIED THAT YOUR FOOD WOULD RUN OUT BEFORE YOU GOT MONEY TO BUY MORE.: NEVER TRUE

## 2023-01-19 SDOH — ECONOMIC STABILITY: FOOD INSECURITY: WITHIN THE PAST 12 MONTHS, THE FOOD YOU BOUGHT JUST DIDN'T LAST AND YOU DIDN'T HAVE MONEY TO GET MORE.: NEVER TRUE

## 2023-01-19 ASSESSMENT — PATIENT HEALTH QUESTIONNAIRE - PHQ9
3. TROUBLE FALLING OR STAYING ASLEEP: 0
SUM OF ALL RESPONSES TO PHQ QUESTIONS 1-9: 1
1. LITTLE INTEREST OR PLEASURE IN DOING THINGS: 0
6. FEELING BAD ABOUT YOURSELF - OR THAT YOU ARE A FAILURE OR HAVE LET YOURSELF OR YOUR FAMILY DOWN: 0
SUM OF ALL RESPONSES TO PHQ9 QUESTIONS 1 & 2: 1
10. IF YOU CHECKED OFF ANY PROBLEMS, HOW DIFFICULT HAVE THESE PROBLEMS MADE IT FOR YOU TO DO YOUR WORK, TAKE CARE OF THINGS AT HOME, OR GET ALONG WITH OTHER PEOPLE: 0
8. MOVING OR SPEAKING SO SLOWLY THAT OTHER PEOPLE COULD HAVE NOTICED. OR THE OPPOSITE, BEING SO FIGETY OR RESTLESS THAT YOU HAVE BEEN MOVING AROUND A LOT MORE THAN USUAL: 0
SUM OF ALL RESPONSES TO PHQ QUESTIONS 1-9: 1
4. FEELING TIRED OR HAVING LITTLE ENERGY: 0
7. TROUBLE CONCENTRATING ON THINGS, SUCH AS READING THE NEWSPAPER OR WATCHING TELEVISION: 0
9. THOUGHTS THAT YOU WOULD BE BETTER OFF DEAD, OR OF HURTING YOURSELF: 0
SUM OF ALL RESPONSES TO PHQ QUESTIONS 1-9: 1
2. FEELING DOWN, DEPRESSED OR HOPELESS: 1
5. POOR APPETITE OR OVEREATING: 0
SUM OF ALL RESPONSES TO PHQ QUESTIONS 1-9: 1

## 2023-01-19 ASSESSMENT — SOCIAL DETERMINANTS OF HEALTH (SDOH): HOW HARD IS IT FOR YOU TO PAY FOR THE VERY BASICS LIKE FOOD, HOUSING, MEDICAL CARE, AND HEATING?: NOT HARD AT ALL

## 2023-01-19 NOTE — FLOWSHEET NOTE
[x] [facility]) [facility] &  Therapy  [address]    [phone]  F: [phone]   [] [address]  [facility] [place] [address]   [address]  P: [phone]  F: [phone]  [] [address]  [address]  P: [phone]  F: [phone] [] [address]  P: [phone]  F: [phone]  [] [address]  [facility]   [address]   [place]: [phone]  F: [phone]   [] [place] [id number] [facility] [address]  [place]: [phone]   F: [phone]     Physical Therapy Cancel/No Show note    Date: 2023  Patient: Carri Montejo  : 1960  MRN: [id number]    Cancels/No Shows to date: 5/3    For today's appointment patient:    [x]  Cancelled    [] Rescheduled appointment    [] No-show     Reason given by patient:    []  Patient ill    []  Conflicting appointment    [] No transportation      [] Conflict with work    [] No reason given    [] Weather related    [] [id number]    [x] Other:  Patient called 23 noting she has some issues at work and will call back to reschedule when she is ready   Comments:      [] Next appointment was confirmed    Electronically signed by: Matheus Garcia PTA

## 2023-01-19 NOTE — PROGRESS NOTES
General FM note    Charlie Cr is a 65 y.o. female who presents today for follow up on her  medical conditions as noted below. Charlie Cr is c/o of   Chief Complaint   Patient presents with    Sleep Apnea     referral    Breathing Problem     referral       Patient Active Problem List:     Major depression     Rotator cuff disorder     Hyperlipidemia     Incontinence in female     Rectocele     Diverticulitis     Asthma     Hypokalemia     Cellulitis, face - left side, failed outpatient therapy     Hyperglycemia     Mild intermittent asthma without complication     Gastroesophageal reflux disease without esophagitis     Displacement of lumbar intervertebral disc without myelopathy     Chest pain     Dyspepsia     Acute medial meniscus tear of left knee     Dry eyes, bilateral     Insufficiency of tear film of both eyes     Instability of left ankle joint     Unstable angina (HCC)     Class 1 obesity due to excess calories with serious comorbidity and body mass index (BMI) of 34.0 to 34.9 in adult     Gastrocnemius equinus of right lower extremity     Past Medical History:   Diagnosis Date    Arthritis     ASCUS with positive high risk HPV cervical 03/22/2016    Asthma     COVID-19 2020    Mild per pt., no treatment    Depression     Diverticulitis     ESBL (extended spectrum beta-lactamase) producing bacteria infection 02/03/2019    E.  Coli urine    GERD (gastroesophageal reflux disease)     Hyperlipidemia     MRSA (methicillin resistant staph aureus) culture positive 04/18/2016    lip    Rectocele     Tachycardia     takes Metoprolol      Past Surgical History:   Procedure Laterality Date    ANKLE SURGERY Left 3/4/2021    Left ankle lateral ligamentous repair, Left peroneus brevis debridement, Left peroneus longus tenolysis, Left leg gastroc recession, performed through separate incision  performed by Valorie Rehman MD at [address] N/A 7/19/2018    COLONOSCOPY performed by Ivelisse Glover Uma Sawant IV, DO at [address]  2/25/2015    uro    GASTROCNEMIUS RECESSION Right 11/4/2022    RIGHT GASTROCNEMIUS RECESSION performed by Fabiana Her MD at [place] [address] ([address] [place] [address])  [facility]    RYAN, BSO performed at [facility] by Dr. Miguel De Jesus, Also had some type of bladder lift at this time    KNEE ARTHROSCOPY Left 5/13/2019    KNEE ARTHROSCOPY performed by Marcelino Borges MD at [place] [address] Left     #1 - lateral release, #2 - arthroscopy with muscle alignment    NERVE BLOCK  07/28/2017    caudal #1  decadron 10mg    NERVE BLOCK  09/22/2017    cadal # 2, decadron 10 mg    NERVE BLOCK  09/22/2017    caudal epidural steroid block #2 decadron 10 mg    NERVE BLOCK  11/10/2017    lumbar L4-5 epidural; depomedrol 80mg    UPPER GASTROINTESTINAL ENDOSCOPY  1/30/2019    EGD BIOPSY performed by Dereck Gibbs MD at [address] [place] [address] History   Problem Relation Age of Onset    Colon Cancer Mother     High Blood Pressure Father     Heart Attack Brother     Heart Disease Brother     Crohn's Disease Niece      Current Outpatient Medications   Medication Sig Dispense Refill    zoster recombinant adjuvanted vaccine (SHINGRIX) 50 MCG/0.5ML SUSR injection Inject 0.5 mLs into the muscle See Admin Instructions 1 dose now and repeat in 2-6 months 0.5 mL 1    omeprazole (PRILOSEC) 40 MG delayed release capsule TAKE 1 CAPSULE BY MOUTH DAILY EVERY MORNING 28 capsule 3    dicyclomine (BENTYL) 10 MG capsule Take 1 capsule by mouth 4 times daily 30 capsule 0    ondansetron (ZOFRAN) 4 MG tablet Take 1 tablet by mouth every 8 hours as needed for Nausea 20 tablet 0    diclofenac sodium (VOLTAREN) 1 % GEL Apply 4 g topically 4 times daily as needed for Pain 200 g 0    diclofenac (VOLTAREN) 50 MG EC tablet TAKE 1 TABLET BY MOUTH TWICE DAILY 56 tablet 2    busPIRone (BUSPAR) 15 MG tablet TAKE 1 TABLET BY MOUTH TWICE DAILY 56 tablet 3    atorvastatin (LIPITOR) 40 MG tablet Take 1 tablet by mouth nightly 30 tablet 3    albuterol sulfate HFA (PROVENTIL;VENTOLIN;PROAIR) 108 (90 Base) MCG/ACT inhaler INAHLE 2 PUFFS BY MOUTH INTO THE LUNGS EVERY 6 HOURS AS NEEDED FOR WHEEZING 18 g 3    venlafaxine (EFFEXOR XR) 75 MG extended release capsule DTAKE 1 CAPSULE BY MOUTH DAILY 28 capsule 3    vitamin D (ERGOCALCIFEROL) 1.25 MG (55540 UT) CAPS capsule TAKE 1 CAPSULE BY MOUTH EVERY WEEK 4 capsule 3    chlorzoxazone (PARAFON FORTE) 500 MG tablet TAKE 1/2 TABLET BY MOUTH TWICE DAILY AS NEEDED 28 tablet 3    ibuprofen (ADVIL;MOTRIN) 800 MG tablet Take 1 tablet by mouth 3 times daily as needed for Pain 30 tablet 0    diclofenac sodium (VOLTAREN) 1 % GEL Apply 4 g topically 4 times daily as needed for Pain 200 g 0    Handicap Placard MISC by Does not apply route DISABLED PARKING PERMIT AUTHORIZATION  Routine     Qty-1    The patient has the following condition(s) which qualifies him/her for disabled parking: Walking severely limited due to orthopedic condition     Privilege Duration: Temporary for 3 months 1 each 0    ketotifen (ZADITOR) 0.025 % ophthalmic solution USE 1 DROP INTO BOTH EYES TWICE DAILY 10 mL 1    fluticasone (FLONASE) 50 MCG/ACT nasal spray INSTILL 2 SPRAYS INTO EACH NOSTRIL ONCE DAILY 16 g 3    miSOPROStol (CYTOTEC) 200 MCG tablet Take 200 mcg by mouth daily      Multiple Vitamins-Minerals (CULTURELLE PROBIOTICS + MULTIV) CHEW Take 1 tablet by mouth daily 30 tablet 2    MI-ACID GAS RELIEF 80 MG chewable tablet CHEW 1 TABLET BY MOUTH FOUR TIMES DAILY AS NEEDED FOR FLATULENCE 120 tablet 5    CRANBERRY CONCENTRATE 500 MG CAPS TAKE 1 CAPSULE BY MOUTH TWICE DAILY 56 capsule 3    metoprolol succinate (TOPROL XL) 25 MG extended release tablet Take 25 mg by mouth daily       aspirin 325 MG EC tablet Take 1 tablet by mouth daily 42 tablet 0    EPINEPHrine (EPIPEN 2-VIOLET) 0.3 MG/0.3ML SOAJ injection Inject 0.3 mLs into the muscle once for 1 dose Use as directed for allergic reaction 0.3 mL 0     No current facility-administered medications for this visit. ALLERGIES:    Allergies   Allergen Reactions    Shellfish Allergy     Shrimp (Diagnostic) Shortness Of Breath    Famotidine Other (See Comments)     Headaches      Gabapentin Nausea Only     Mood swings, nausea. Social History     Tobacco Use    Smoking status: Former     Packs/day: 2.00     Years: 0.50     Pack years: 1.00     Types: Cigarettes     Quit date:      Years since quittin.0    Smokeless tobacco: Never   Substance Use Topics    Alcohol use: Yes     Alcohol/week: 0.0 standard drinks     Comment: social      Body mass index is 33.73 kg/m². /74   Pulse 94   Temp 97.2 °F (36.2 °C)   Wt 190 lb 6.4 oz (86.4 kg)   SpO2 98%   BMI 33.73 kg/m²     Subjective:      HPI    65 y.o. female was in the hospital where her O2 dropped quite a bit. Even at home she stops breathing and she talks in her sleep, tosses and turning. For some year. granddaughter states that she is very afraid that her grandmother could stop breathing she says when she listens to her sometimes the grandmother does not breathe at all. Review of Systems   Constitutional: Negative for fever and unexpected weight change. Pertinent items are noted in HPI. Objective:   Physical Exam  Constitutional: VS (see above). General appearance: normal development, habitus and attention, no deformities. No distress. Eyes: normal conjunctiva and lids. CAV: RRR, no RMG. No edema lower extremities. Pulmo: CTA bilateral, no CWR. Skin: no rashes, lesions or ulcers. Musculoskeletal: normal gait. Nails: no clubbing or cyanosis. Psychiatric: alert and oriented to place, time and person. Normal mood and affect. Assessment:       Diagnosis Orders   1. Apneic episode  Roverto Coronado MD, Pulmonology, [place]      2. Snoring  Roverto Coronado MD, Pulmonology, [place]      3.  Encounter for screening mammogram for malignant neoplasm of breast  SESAR DIGITAL SCREEN W OR WO CAD BILATERAL          Plan:   Patient will see a sleep specialist appreciate the help. Return in about 3 months (around 4/19/2023), or if symptoms worsen or fail to improve, for [facility] [place][note 2]. Orders Placed This Encounter   Procedures    SESAR DIGITAL SCREEN W OR WO CAD BILATERAL     Standing Status:   Future     Standing Expiration Date:   5/19/2023     Scheduling Instructions:      May perform additional studies at the discretion of the radiologist: Breast US, breast MRI, imaging guided biopsy, cyst aspiration or MBI. Wilbert Urrutia MD, Pulmonology, [facility]     Referral Priority:   Routine     Referral Type:   Eval and Treat     Referral Reason:   Specialty Services Required     Referred to Provider:   Erling Heimlich, MD     Requested Specialty:   Pulmonology     Number of Visits Requested:   1     Orders Placed This Encounter   Medications    zoster recombinant adjuvanted vaccine [facility]) 50 MCG/0.5ML SUSR injection     Sig: Inject 0.5 mLs into the muscle See Admin Instructions 1 dose now and repeat in 2-6 months     Dispense:  0.5 mL     Refill:  1         Call or return to clinic prn if these symptoms worsen or fail to improve as anticipated. I have reviewed the instructions with the patient, answering all questions to patient's satisfaction. Leobardo Edmond received counseling on the following healthy behaviors: nutrition, exercise, and medication adherence  Reviewed prior labs and health maintenance. Continue current medications, diet and exercise. Discussed use, benefit, and side effects of prescribed medications. Barriers to medication compliance addressed. Patient given educational materials - see patient instructions. All patient questions answered. Patient voiced understanding.       Electronically signed by Emmy Barry MD on 1/19/2023 at 4:20 PM       (Please note that portions of this note were completed with a voice recognition program. Efforts were made to edit the dictations but occasionally words are mis-transcribed.)

## 2023-02-01 ENCOUNTER — HOSPITAL ENCOUNTER (OUTPATIENT)
Dept: MAMMOGRAPHY | Age: 63
Discharge: HOME | End: 2023-02-03
Payer: COMMERCIAL

## 2023-02-01 DIAGNOSIS — Z12.31 ENCOUNTER FOR SCREENING MAMMOGRAM FOR MALIGNANT NEOPLASM OF BREAST: ICD-10-CM

## 2023-02-01 PROCEDURE — 77063 BREAST TOMOSYNTHESIS BI: CPT

## 2023-02-07 ENCOUNTER — OFFICE VISIT (OUTPATIENT)
Dept: ORTHOPEDIC SURGERY | Age: 63
End: 2023-02-07

## 2023-02-07 VITALS — HEIGHT: 63 IN | RESPIRATION RATE: 16 BRPM | BODY MASS INDEX: 33.66 KG/M2 | OXYGEN SATURATION: 100 % | WEIGHT: 190 LBS

## 2023-02-07 DIAGNOSIS — M79.671 BILATERAL FOOT PAIN: ICD-10-CM

## 2023-02-07 DIAGNOSIS — M72.2 PLANTAR FASCIITIS: Primary | ICD-10-CM

## 2023-02-07 DIAGNOSIS — M79.672 BILATERAL FOOT PAIN: ICD-10-CM

## 2023-02-07 PROCEDURE — 99024 POSTOP FOLLOW-UP VISIT: CPT | Performed by: ORTHOPAEDIC SURGERY

## 2023-02-07 RX ORDER — NICOTINE POLACRILEX 4 MG/1
20 GUM, CHEWING ORAL DAILY
Qty: 20 TABLET | Refills: 1 | Status: SHIPPED | OUTPATIENT
Start: 2023-02-07 | End: 2023-02-27

## 2023-02-07 RX ORDER — NAPROXEN 500 MG/1
500 TABLET ORAL 2 TIMES DAILY PRN
Qty: 60 TABLET | Refills: 0 | Status: SHIPPED | OUTPATIENT
Start: 2023-02-07

## 2023-02-07 NOTE — PROGRESS NOTES
[address]  AND SPORTS MEDICINE  [address]  [facility] [address]  Dept: [phone]    Ambulatory Orthopedic Postoperative Visit     Preoperative Diagnosis:   Right gastrocnemius equinus contracture  Right foot plantar fasciitis  Body mass index is 33.19 kg/m². Postoperative Diagnosis:   same     Procedures Performed:  (11/4/2022)  Right leg gastroc recession         SUBJECTIVE:     The patient returns post op from the above stated procedure. Reports doing well overall, denies wound drainage/issues, fevers/chills/night sweats, chest pain, shortness of breath. She is ambulating today in regular shoes without a brace or assistive device. At today's visit, she does report that she has intermittently had days where her plantar heel pain was worse than before surgery, as well as some days where her pain is not present. She does report that she has been back to work working 68 hours on her feet, and reports that she has been unable to wear her heel cups (due to tolerance), and reports that she is unable to get into physical therapy due to her work hours. OBJECTIVE:    Resp 16   Ht 5' 3\" (1.6 m)   Wt 190 lb (86.2 kg)   SpO2 100%   BMI 33.66 kg/m²    NAD, resting comfortably  Incisions clean/dry/intact, no erythema/dehiscence/drainage  Sensation to light touch grossly intact throughout  Warm and well perfused  Grossly neurovascularly intact distally  No signs of infection  No unexpected calf swelling/tenderness  -No significant periincisional tenderness  -Mild tenderness at the plantar heel  -Recurrent gastroc equinus      RADIOLOGY:   2/7/2023 No new radiology images today. Prior images reviewed for reference. ASSESSMENT AND PLAN:     13 weeks s/p above, doing well overall.   Her postoperative course has been complicated by not going to physical therapy, with a recurrent component of gastroc equinus.  -The patient was again referred to physical therapy for gastroc stretching, and we discussed the importance/significance of attending. The patient was also referred to prosthetics and orthotics to obtain custom bilateral orthotic inserts. At today's visit, she was ordered oral NSAIDs as below to be used as needed, and we discussed the appropriate risks. The patient was provided teaching material on this today, and will avoid using multiple NSAIDs at the same time. We again discussed a possible plantar fascial release. She has a history of right foot pain secondary to plantar fasciitis with underlying gastroc equinus contracture (a right plantar fascial injection on 6/23/2022 helped her pain approximately 50%). She also has a history of left ankle instability, status post left ankle lateral ligamentous stabilization, with peroneal tendon tenolysis, and gastroc recession (on 3/4/2021). 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 Notably, she has no relevant past medical history. Precautions:               -Weightbearing as tolerated             [x]  Physical Therapy/Home exercises   --calf stretching, gait training     Immobilization:      []  Splint/Cast                      []  CAM boot   [x]  Comfortable shoe    []  Other:                 DVT ppx:   [x]  Early mobilization             []  Medication as prescribed (Aspirin 325 mg PO q Day)                   [x]  No chemical ppx needed; mechanical only              -     Pain control:  Medication as prescribed (dosing and quantity) indicated for acute postoperative pain control              -     Special concerns:       []         [x]  Avoid strenuous activity/pain provoking maneuvers and high-impact repetitive exercises      -In order to know exactly how to proceed with treatment, an MRI was ordered today to evaluate the plantar fascia.  This is medically necessary to evaluate the structures in this area, for both diagnosis and treatment.       -She was previously provided a note for work, allowing her to return to work full duty without restriction      All questions were answered and the patient agrees with the above plan. The patient will return to clinic after the MRI has been obtained. At her next visit, we will review her MRI, may consider a repeat right foot injection versus surgery as above. No follow-ups on file. No orders of the defined types were placed in this encounter. No orders of the defined types were placed in this encounter. Justin Fuentes MD  Orthopedic Surgery        Please excuse any typos/errors, as this note was created with the assistance of voice recognition software. While intending to generate a document that actually reflects the content of the visit, the document can still have some errors including those of syntax and sound-a-like substitutions which may escape proof reading. In such instances, actual meaning can be extrapolated by context.

## 2023-02-08 DIAGNOSIS — M62.838 MUSCLE SPASM: ICD-10-CM

## 2023-02-08 DIAGNOSIS — J30.2 SEASONAL ALLERGIES: ICD-10-CM

## 2023-02-08 DIAGNOSIS — E55.9 VITAMIN D DEFICIENCY: ICD-10-CM

## 2023-02-08 DIAGNOSIS — F41.9 ANXIETY: ICD-10-CM

## 2023-02-09 RX ORDER — ALBUTEROL SULFATE 90 UG/1
AEROSOL, METERED RESPIRATORY (INHALATION)
Qty: 18 G | Refills: 3 | Status: SHIPPED | OUTPATIENT
Start: 2023-02-09

## 2023-02-09 RX ORDER — ASPIRIN 325 MG
325 TABLET, DELAYED RELEASE (ENTERIC COATED) ORAL DAILY
Qty: 42 TABLET | Refills: 0 | Status: SHIPPED | OUTPATIENT
Start: 2023-02-09 | End: 2023-03-23

## 2023-02-09 RX ORDER — CHLORZOXAZONE 500 MG/1
TABLET ORAL
Qty: 28 TABLET | Refills: 3 | Status: SHIPPED | OUTPATIENT
Start: 2023-02-09

## 2023-02-09 RX ORDER — ATORVASTATIN CALCIUM 40 MG/1
40 TABLET, FILM COATED ORAL NIGHTLY
Qty: 30 TABLET | Refills: 3 | Status: SHIPPED | OUTPATIENT
Start: 2023-02-09

## 2023-02-09 RX ORDER — ERGOCALCIFEROL 1.25 MG/1
CAPSULE ORAL
Qty: 4 CAPSULE | Refills: 3 | Status: SHIPPED | OUTPATIENT
Start: 2023-02-09

## 2023-02-09 RX ORDER — VENLAFAXINE HYDROCHLORIDE 75 MG/1
CAPSULE, EXTENDED RELEASE ORAL
Qty: 28 CAPSULE | Refills: 3 | Status: SHIPPED | OUTPATIENT
Start: 2023-02-09

## 2023-02-09 NOTE — TELEPHONE ENCOUNTER
Request for .   Aspirin   Atorvastatin       Next Visit Date:  Future Appointments   Date Time Provider Elaine Slateri   4/21/2023  9:00 AM Humberto Lerner MD Resp Spec [address] Maintenance   Topic Date Due    COVID-19 Vaccine (1) Never done    Pneumococcal 0-64 years Vaccine (1 - PCV) Never done    Shingles vaccine (1 of 2) Never done    Lipids  10/12/2021    Flu vaccine (1) Never done    Diabetes screen  01/14/2024    Depression Monitoring  01/19/2024    Breast cancer screen  02/01/2025    DTaP/Tdap/Td vaccine (2 - Td or Tdap) 06/07/2025    Colorectal Cancer Screen  07/19/2028    Hepatitis C screen  Completed    HIV screen  Completed    Hepatitis A vaccine  Aged Out    Hib vaccine  Aged Out    Meningococcal (ACWY) vaccine  Aged Out       Hemoglobin A1C (%)   Date Value   01/14/2021 5.6             ( goal A1C is < 7)   No results found for: LABMICR  LDL Cholesterol (mg/dL)   Date Value   10/12/2020 112       (goal LDL is <100)   AST (U/L)   Date Value   01/07/2023 17     ALT (U/L)   Date Value   01/07/2023 23     BUN (mg/dL)   Date Value   01/07/2023 17     BP Readings from Last 3 Encounters:   01/19/23 125/74   01/07/23 113/69   11/04/22 125/71          (goal 120/80)    All Future Testing planned in [facility]  Lab Frequency Next Occurrence   SESAR DIGITAL SCREEN W OR WO CAD BILATERAL Once 08/03/2022   MRI ANKLE RIGHT WO CONTRAST Once 02/14/2023         Patient Active Problem List:     Major depression     Rotator cuff disorder     Hyperlipidemia     Incontinence in female     Rectocele     Diverticulitis     Asthma     Hypokalemia     Cellulitis, face - left side, failed outpatient therapy     Hyperglycemia     Mild intermittent asthma without complication     Gastroesophageal reflux disease without esophagitis     Displacement of lumbar intervertebral disc without myelopathy     Chest pain     Dyspepsia     Acute medial meniscus tear of left knee     Dry eyes, bilateral     Insufficiency of tear film of both eyes     Instability of left ankle joint     Unstable angina (HCC)     Class 1 obesity due to excess calories with serious comorbidity and body mass index (BMI) of 34.0 to 34.9 in adult     Gastrocnemius equinus of right lower extremity

## 2023-03-08 ENCOUNTER — HOSPITAL ENCOUNTER (OUTPATIENT)
Dept: PHYSICAL THERAPY | Age: 63
Setting detail: THERAPIES SERIES
Discharge: HOME | End: 2023-03-08
Payer: COMMERCIAL

## 2023-03-08 PROCEDURE — 97161 PT EVAL LOW COMPLEX 20 MIN: CPT

## 2023-03-08 PROCEDURE — 97164 PT RE-EVAL EST PLAN CARE: CPT

## 2023-03-08 NOTE — PRE-CERTIFICATION NOTE
[x] [facility]) - [facility] &  Therapy  [address]  [phone]  F: [phone] [] [address]  [facility] [address]   [address]  P: [phone]  F: [phone] [] [place]  [address] [facility]  P: [phone]  F: [phone] [] [address]  [facility]   [address]   [place]: [phone]  F: [phone]  [] [facility]   Outpatient Occupational Therapy  [address] [facility] [address]: [phone]  F: [phone]          Therapy Pre-certification Note      3/8/2023    Cathy Stewart  1960   [id number]      Insurance approval was received for Physical Therapy from [employer] on 3/8/23. Approval was received from 3/13/23 to 4/28/23. Authorization number [id number].    28162, 60 units  90227, 40 units  12791, 20 units  94537, 20 units  56942, 20 units  55507, 20 units  [id number] -- VOID    Patient was contacted to be scheduled and message was left to call and schedule.       Electronically signed by Tomas Negron PT on 3/8/2023 at 12:44 PM

## 2023-03-08 NOTE — PROGRESS NOTES
[x] [facility]) [facility] &  Therapy  [address]  [phone]  F: [phone] [] [address]  [facility]   [address]  P: [phone]  F: [phone] [] [place]  [address]  P: [phone]  F: [phone] [] [email or web address]  P: [phone]  F: [phone] [] [address]  [facility]   [address]   [place]: [phone]  F: [phone]      Physical Therapy Progress Note    Date: 3/8/2023      Patient: Ruth Ann Zapata  : 1960  MRN: [id number]  Physician: Dr. Pearson Breed: Raffi Gunderson after eval   Diagnosis: Plantar Fasciitis   Onset Date: 23    Next Dr. Tsai Tahir: Not scheduled  Total visits attended:1 (restarting 3/8/23)  Cancels/No shows: 0/0 (restarting 3/8/23)  //Date range of services: initial after after surgery 23 to 3/8/23      Subjective:  Pain:  [x] Yes  [] No    Location: Right foot  Pain Rating: (0-10 scale) 5+/10  Pain altered Tx:  [x] No  [] Yes  Action:  Comments:    Primary c/o is pain, right in the bottom of the heel, on the right side. Intermittent. When standing on foot for over 6 hours. Gets new shoes about every 3 months, has gotten new shoes since surgery. No falls. Days that she cannot walk, keeps foot elevated in bed all day. Those days happen if she works a double shift at work. This occurs maybe once a month. And it is all because of the heel pain, she cannot put weight on it. If tries to stand on toes, then it pulls up her calf. No issues sleeping in relation to the heel. No issues with carrying items. Mornings are better, pain increases as the day goes on. Elevate, ice, volaten, CBD roll on. Has a stool at work.      Objective:  Test Measurements/Function:  Objective:               ROM 12/7/23  ° A/P STRENGTH 12/7/23 Right ROM  3/8/23 Left ROM 3/8/23 Strength right 3/8/23      Left Right Left Right       Knee Flex               Ext   0           Ankle DF   -18/-15   4+ 8 0 4+    PF   46   4 45 54 4+    INV   15   4 38 50 4+    EVER   10   4 15 30 4+    Big toe Flex   35     30 50     Big toe Ext   75     78 43       Right DF with knee flex 18 degrees, left 5   Right hamstring flexibility lacking 24, left lack 24    ROM above is correctly documented. LEFS score of 45% functionally impaired    Procedures Performed:  (11/4/2022)  Right leg gastroc recession    Assessment:  STG: (to be met in 10 treatments)  ? Pain:Decrease right heel pain to 2/10 after standing/walking 8 hours  ? ROM: Increase right ankle AROM to: PF 50 degrees, inversion 45 degrees, eversion 45 degrees. ? Strength: Patient able to complete 10 reps of single leg heel raise without fatigue with right leg  ? Function: Improve LEFS to 30% functional impairment  Patient to be independent with home exercise program as demonstrated by performance with correct form without cues. Bilateral hamstring flexibility improve to lacking 15 degrees or less. Patient goals: Hopefully pain gone.      Treatment Plan:  [x] Therapeutic Exercise   70819  [x] Iontophoresis: 4 mg/mL Dexamethasone Sodium Phosphate  mAmin  91316   [x] Therapeutic Activity  30749 [x] Vasopneumatic cold with compression  53338    [x] Gait Training   17710 [x] Ultrasound   05317   [] Neuromuscular Re-education  55064 [] Electrical Stimulation Unattended  52103   [x] Manual Therapy  84470 [] Electrical Stimulation Attended  62253   [] Instruction in HEP  [] Lumbar/Cervical Traction  32731   [] Aquatic Therapy   78549 [] Cold/hotpack    [] Massage   64165      [] Dry Needling, 1 or 2 muscles  70709   [] Biofeedback, first 15 minutes   03835  [] Biofeedback, additional 15 minutes   07449 [x] Dry Needling, 3 or more muscles  20561       Patient Status:     [x] Continue per initial plan of care. [] Additional visits necessary. [x] Other:    Specific Instructions for next treatment: Right ankle stretching, right calf stretching, bilateral hamstring stretches. Modalities for pain control. Requested Frequency/Duration: 2 times per week for 10 treatments. I would like to add Iontophoresis treatments for this patient. We are submitting this prescription for your signature and return. We have been informed by the [address] that in order to use Iontophoresis in Physical Therapy, the prescription must have the dosages included. We apologize for the inconvenience. [x] Iontophoresis with 4 mg/ml Dexamethasone Sodium Phosphate    [] Dosage: 24-120mAmin     [x]  Medication allergies reviewed for use of    Dexamethasone Sodium Phosphate 4mg/ml     with iontophoresis treatments. Pt is not allergic. Requested Frequency/Duration: 2 times per week for 6 treatments. Electronically signed by Humaira Downey PT on 3/8/2023 at 10:01 AM      If you have any questions or concerns, please don't hesitate to call. Thank you for your referral.    Physician Signature:________________________________Date:__________________  By signing above or cosigning this note, I have reviewed this plan of care and certify a need for medically necessary rehabilitation services.      *PLEASE SIGN ABOVE AND FAX BACK ALL PAGES*

## 2023-03-14 RX ORDER — ASPIRIN 325 MG
325 TABLET, DELAYED RELEASE (ENTERIC COATED) ORAL DAILY
Qty: 28 TABLET | OUTPATIENT
Start: 2023-03-14 | End: 2023-04-25

## 2023-03-15 RX ORDER — ASPIRIN 325 MG
325 TABLET, DELAYED RELEASE (ENTERIC COATED) ORAL DAILY
Qty: 42 TABLET | Refills: 0 | Status: SHIPPED | OUTPATIENT
Start: 2023-03-15 | End: 2023-04-26

## 2023-03-15 NOTE — TELEPHONE ENCOUNTER
Eva Hill is calling to request a refill on the following medication(s):    Last Visit Date (If Applicable):  1/15/2130    Next Visit Date:    Visit date not found    Medication Request:  Requested Prescriptions     Pending Prescriptions Disp Refills    aspirin 325 MG EC tablet 42 tablet 0     Sig: Take 1 tablet by mouth daily

## 2023-04-20 ENCOUNTER — HOSPITAL ENCOUNTER (OUTPATIENT)
Dept: PHYSICAL THERAPY | Age: 63
Setting detail: THERAPIES SERIES
Discharge: HOME | End: 2023-04-20
Payer: COMMERCIAL

## 2023-04-20 PROCEDURE — 97140 MANUAL THERAPY 1/> REGIONS: CPT

## 2023-04-20 PROCEDURE — 97033 APP MDLTY 1+IONTPHRSIS EA 15: CPT

## 2023-04-20 PROCEDURE — 97110 THERAPEUTIC EXERCISES: CPT

## 2023-04-20 NOTE — FLOWSHEET NOTE
[] Other:    Ultrasound: ______W/cm2 x  ______min              [] 1MHz   [] 3Mhz                      Duty Factor: [] 100%  [] 50%   [] 20%                  Add: [] Lidex   [] Stim    [] Gel pad       Massage:    [] Soft Tissue   [] Cross Friction                      [] Ice ____min    Iontophoresis:  IOGEL:  [] 1.5ml   [] 2.5ml   [] 3.5ml                                            [x] 1.0ml   [] 1.3ml                 Dosage:  [] 24 mA/min   [] 40 mA/min    [x]  80 mA/min \"On-the-Go\" 4 hour Patch                 Channels:   [x] 1    [] 2                [x] 4mg/ml Dexamethasone Sodium Phosphate Dosage 24-80 mAmin                [] Acetic Acid       [] Other     [x] Drug allergies reviewed    __JP__Initials           __03/08/2023___Date    Traction:   [] Prone   [] Supine   X _______min               [] Lumbar x ____lbs      [] Cervical x _____lbs               [] Continuous     [] Intermittent  -   On:_____x Off:_____   1st, 2nd Other:  Ex, Manual          Precautions:  Exercises:  Exercise Reps/ Time Weight/ Level Comments   NuStep 5' Level 2          Standing      Incline:  Gastroc                Soleus  3x30\"                       Long sitting gastroc stretch [id number]  Gentle stretch, knee straight   Supine gastroc stretch   Knee straight, invert and dorsiflex   Ankle rom 20x  PF/DF, inver/ever   Ankle circles 20x  CW, CCW               Seated:      HS stretch  3x30\"  On stool, added 4/20, Pt reprots feels in calf, kane w/cues to relax ankle/PF ankle    BAPS   20x L2 DF, PF, INV, EV   rockerboard 20x L1 PF/DF, med/lat   Heelslides into DF  20x 5sec          Long-sitting tband 4-way ankle -- lime    Toe curls   20x     Toe extension  20x     Toe yoga   10x ea     Manual:  8 min  Roller stick in prone to gastroc  MFR to plantar fascia   Other:      Treatment Charges: Mins Units   [x]  Modalities Ionto      [x]  Ther Exercise 37 2   [x]  Manual Therapy 8 1   []  Ther Activities     []  Aquatics     []  Vasocompression

## 2023-04-21 ENCOUNTER — OFFICE VISIT (OUTPATIENT)
Dept: PULMONOLOGY | Age: 63
End: 2023-04-21

## 2023-04-21 VITALS
RESPIRATION RATE: 16 BRPM | HEART RATE: 75 BPM | BODY MASS INDEX: 32.43 KG/M2 | WEIGHT: 183 LBS | HEIGHT: 63 IN | SYSTOLIC BLOOD PRESSURE: 138 MMHG | DIASTOLIC BLOOD PRESSURE: 84 MMHG | OXYGEN SATURATION: 98 %

## 2023-04-21 DIAGNOSIS — G47.33 OSA (OBSTRUCTIVE SLEEP APNEA): Primary | ICD-10-CM

## 2023-04-21 DIAGNOSIS — J45.20 MILD INTERMITTENT ASTHMA WITHOUT COMPLICATION: ICD-10-CM

## 2023-04-21 NOTE — PATIENT INSTRUCTIONS
[facility]@        YOU ARE BEING REFERRED TO:    MARTA MITCHELL (a [facility])    [address] [address] [address], [place]    Main Phone Number: [phone]    Phone number for scheduling sleep study: [phone]      Please contact the above numbers to schedule your sleep study. Once you have completed the FIRST NIGHT you may be asked to return for a SECOND NIGHT if necessary. After the SECOND NIGHT the sleep center will order a CPAP/BiPAP machine for you. An order for the machine will be sent to a durable medical equipment company ([employer]). The sleep center will provide you with the [employer] companies information. Once you have your machine please contact our office to schedule a follow up appointment. Your follow up will be scheduled for a date 30-90 days after you have received your machine. At that follow up visit we will need to have a compliance download from your DME company. Please contact your [employer] company to have the compliance download faxed to our office at   [phone] for your follow up appointment. IF YOU HAVE ANY QUESTIONS ABOUT YOUR SLEEP STUDY   PLEASE CONTACT THE SLEEP CENTER.

## 2023-04-21 NOTE — PROGRESS NOTES
REASON FOR THE
CRITERIA NOT MET     SMOKING LESS THAN 30 PY  []      AGE LESS THAN 55 or GREATER 77 YEARS  []      QUIT SMOKING 15 YEARS OR GREATER   []      RECENT CT WITH IN 11 MONTHS    []      LIFE EXPECTANCY < 5 YEARS   []      SIGNS  AND SYMPTOMS OF LUNG CANCER   []         Immunization   Immunization History   Administered Date(s) Administered    TDaP, ADACEL (age 10y-63y), [address] (age 10y+), IM, 0.5mL 06/07/2015    Tetanus 01/01/2000        Pneumococcal Vaccine     [] Up to date    [x] Indicated   [] Refused  [] Contraindicated       Influenza Vaccine   [] Up to date    [x] Indicated   [] Refused  [] Contraindicated          PAST MEDICAL HISTORY:       Diagnosis Date    Arthritis     ASCUS with positive high risk HPV cervical 03/22/2016    Asthma     COVID-19 2020    Mild per pt., no treatment    Depression     Diverticulitis     ESBL (extended spectrum beta-lactamase) producing bacteria infection 02/03/2019    E.  Coli urine    GERD (gastroesophageal reflux disease)     Hyperlipidemia     MRSA (methicillin resistant staph aureus) culture positive 04/18/2016    lip    Rectocele     Tachycardia     takes Metoprolol         Family History:       Problem Relation Age of Onset    Colon Cancer Mother     High Blood Pressure Father     Heart Attack Brother     Heart Disease Brother     Crohn's Disease Niece        SURGICAL HISTORY:   Past Surgical History:   Procedure Laterality Date    ANKLE SURGERY Left 03/04/2021    Left ankle lateral ligamentous repair, Left peroneus brevis debridement, Left peroneus longus tenolysis, Left leg gastroc recession, performed through separate incision  performed by Diallo Gong MD at [facility] 36 N/A 07/19/2018    COLONOSCOPY performed by Shira Cr DO at [address]  02/25/2015    uro    GASTROCNEMIUS RECESSION Right 11/04/2022    RIGHT GASTROCNEMIUS RECESSION performed by Diallo Gong MD at [address] [place] [address] ([address] [place] [address])  [facility]

## 2023-04-24 ENCOUNTER — HOSPITAL ENCOUNTER (OUTPATIENT)
Dept: PHYSICAL THERAPY | Age: 63
Setting detail: THERAPIES SERIES
Discharge: HOME | End: 2023-04-24
Payer: COMMERCIAL

## 2023-04-24 NOTE — FLOWSHEET NOTE
[x] [facility] ([facility]) [facility] &  Therapy  [address]    [phone]  F: [phone]   [] [address]  [facility] 36   [address]  P: [phone]  F: [phone]  [] [address] &  Therapy  [address]  P: [phone]  F: [phone] [] [address]  P: [phone]  F: [phone]  [] [address] [facility]   [address]   [place]: [phone]  F: [phone]   [] [address]  [place]: [phone]   F: [phone]     Physical Therapy Cancel/No Show note    Date: 2023  Patient: Norma Segal  : 1960  MRN: [id number]    Cancels/No Shows to date: 6/3    For today's appointment patient:    [x]  Cancelled    [] Rescheduled appointment    [] No-show     Reason given by patient:    []  Patient ill    []  Conflicting appointment    [] No transportation      [x] Conflict with work    [] No reason given    [] Weather related    [] COVID-19    [] Other:     Comments:      [x] Next appointment was confirmed previously    Electronically signed by: Moises Cardenas, PT

## 2023-04-24 NOTE — PRE-CERTIFICATION NOTE
[x] [facility] &  Therapy  [address]  [phone]  F: [phone] [] [address] [facility]  Kl\[facility] [place] [facility]\"" 36   [address]  P: [phone]  F: [phone] [] [place]  [address] [facility]  P: [phone]  F: [phone] [] [address]  [facility]   [address]   [place]: [phone]  F: [phone]  [] Elaine Davy   Outpatient Occupational Therapy  [address] [facility] [address] E: [phone]  F: [phone]          Therapy Pre-certification Note      4/24/2023    Sarai Marin  1960   [id number]      Insurance approval was received for Physical Therapy from [place] on 4/24/23. Approval was received from 5/1/23 to 6/30/23.   Authorization number [id number].    28979, 72 units  72271, 48 units  99090, 48 units  25558, 48 units  00202, 24 units  35725, 24 units      Electronically signed by Fransisca Millan PT on 4/24/2023 at 7:49 AM

## 2023-04-27 ENCOUNTER — HOSPITAL ENCOUNTER (OUTPATIENT)
Dept: PHYSICAL THERAPY | Age: 63
Setting detail: THERAPIES SERIES
Discharge: HOME | End: 2023-04-27
Payer: COMMERCIAL

## 2023-04-27 PROCEDURE — 97110 THERAPEUTIC EXERCISES: CPT

## 2023-04-27 PROCEDURE — 97140 MANUAL THERAPY 1/> REGIONS: CPT

## 2023-04-27 NOTE — FLOWSHEET NOTE
Aquatics     []  Vasocompression     [x]  Other- Ionto  5 0   Total Treatment time 48 mins 3       Assessment: [x] Progressing toward goals. Minimal progressions with addition of standing heel raises within tolerance with no onset of pain or symptoms this date. Patient demonstrates good recall for majority of program, minimal verbal cues required throughout with good carryover. Initiated hypervolt to Keira-Aida as well as mild TPR with excellent tolerance and good relief post. Continue with ionto application to R heel with verbalized understanding of wear time and to remove if any symptom onset. [] No change. [] Other:  [x] Patient would continue to benefit from skilled physical therapy services in order to: address right ankle pain, stiffness, and weakness as well as functional impairments including difficulty walking. STG: (to be met in 10 treatments)  ? Pain:Decrease right heel pain to 2/10 after standing/walking 8 hours  ? ROM: Increase right ankle AROM to: PF 50 degrees, inversion 45 degrees, eversion 45 degrees. ? Strength: Patient able to complete 10 reps of single leg heel raise without fatigue with right leg  ? Function: Improve LEFS to 30% functional impairment  Patient to be independent with home exercise program as demonstrated by performance with correct form without cues. Bilateral hamstring flexibility improve to lacking 15 degrees or less. Patient goals: Hopefully pain gone. Pt. Education:  [x] Yes  [] No  [x] Reviewed Prior HEP/Ed  Method of Education: [x] Verbal  [x] Demo  [] Written  Comprehension of Education:    [x] Verbalizes understanding. [x] Demonstrates understanding. [] Needs review. [x] Demonstrates/verbalizes HEP/Ed previously given. Plan: [x] Continue current frequency toward long and short term goals.     [x] Specific Instructions for subsequent treatments: monitor response to ionto, progress calf stretches, ankle ex per Pt

## 2023-05-01 ENCOUNTER — HOSPITAL ENCOUNTER (OUTPATIENT)
Dept: PHYSICAL THERAPY | Age: 63
Setting detail: THERAPIES SERIES
Discharge: HOME | End: 2023-05-01
Payer: COMMERCIAL

## 2023-05-01 PROCEDURE — 97110 THERAPEUTIC EXERCISES: CPT

## 2023-05-01 PROCEDURE — 97140 MANUAL THERAPY 1/> REGIONS: CPT

## 2023-05-01 NOTE — FLOWSHEET NOTE
[x] [facility] &  Therapy  [address]  [phone]  F: [phone]         Physical Therapy Daily Treatment Note    Date:  2023  Patient Name:  Lea Preston    :  1960  MRN: [id number]  Physician: James Bradley MD                            Insurance:  [employer] Approval was received for multiple units from 2023 to 2023. Authorization number [id number].  [id number] Ex Approved for 60 Units. 16766 There Activ Approved for 40 Units. 89568 Gait Approved for 20 Units. 52 [facility] for 20 Units. 39301 Ionto 20 units  57085  Manual 20 units    Medical Diagnosis: Plantar fasciitis (M72.2 [ICD-10-CM]); R Equinus contracture            Rehab Codes: M25.571, M25.671, M62.81, R26.2  Onset date: 22               Next 's appt. : 23  Visit# / total visits: 4/10; Re-Check goals due at visit 10     Cancels/No Shows: 4/0    Subjective:    Pain:  [x] Yes  [] No Location: Right heel, calf  Pain Rating: (0-10 scale) 5/10  Pain altered Tx:  [x] No  [] Yes  Action:  Comments: Pt reports ionto is helping, and hypervolt last Rx helped also. Ionto on heel last Rx was good.       Objective:  Modalities: vasocompression, LE's on wedge, right 1/5 boot, 15 min, 34° min pressure--HELD   Treatment Location  Left      Right                          Position   Calf, heel []          [x]  [x] Supine    [] Prone   [] Side lying  [] Sitting          Treatment Modality    Hot Pack:    [] Large   [] Medium    [] Cervical     _____ min    Cold Pack   [] Large   [] Medium    [] Cervical     _____ min   last Vasocompression    34_° temp    _low_pressure     __15___ min    Electrical Stim:    [] IFC     [] MFAC      [] HVPC                          Protocol:_______  _____X_____min                          #Channels:  [] 1        [] 2       [] Other:    Ultrasound: ______W/cm2 x  ______min              [] 1MHz   [] 3Mhz                      Duty Factor:

## 2023-05-04 ENCOUNTER — HOSPITAL ENCOUNTER (OUTPATIENT)
Dept: PHYSICAL THERAPY | Age: 63
Setting detail: THERAPIES SERIES
Discharge: HOME | End: 2023-05-04
Payer: COMMERCIAL

## 2023-05-04 NOTE — FLOWSHEET NOTE
[x] [facility]) [facility] &  Therapy  [address]    [phone]  F: [phone]   [] [address]  [facility] 36   [address]  P: [phone]  F: [phone]  [] [address]  [address]  P: [phone]  F: [phone] [] [address]  P: [phone]  F: [phone]  [] [address] [facility]   [address]   [place]: [phone]  F: [phone]   [] [facility]  [address] [facility] [address]  [place]: [phone]   F: [phone]     Physical Therapy Cancel/No Show note    Date: 2023  Patient: Sean Lerma  : 1960  MRN: [id number]    Cancels/No Shows to date:  ( since restarting therapy)    For today's appointment patient:    [x]  Cancelled    [] Rescheduled appointment    [] No-show     Reason given by patient:    []  Patient ill    []  Conflicting appointment    [] No transportation      [] Conflict with work    [] No reason given    [] Weather related    [] COVID-19    [x] Other:     Comments: Patient states overslept and unable to make it today      [x] Next appointment was confirmed previously    Electronically signed by: Julee Deleon PTA

## 2023-05-08 ENCOUNTER — HOSPITAL ENCOUNTER (OUTPATIENT)
Dept: PHYSICAL THERAPY | Age: 63
Setting detail: THERAPIES SERIES
Discharge: HOME | End: 2023-05-08
Payer: COMMERCIAL

## 2023-05-08 DIAGNOSIS — F41.9 ANXIETY: ICD-10-CM

## 2023-05-08 DIAGNOSIS — K21.9 GASTROESOPHAGEAL REFLUX DISEASE, UNSPECIFIED WHETHER ESOPHAGITIS PRESENT: ICD-10-CM

## 2023-05-08 PROCEDURE — 97140 MANUAL THERAPY 1/> REGIONS: CPT

## 2023-05-08 PROCEDURE — 97110 THERAPEUTIC EXERCISES: CPT

## 2023-05-08 RX ORDER — BUSPIRONE HYDROCHLORIDE 15 MG/1
TABLET ORAL
Qty: 56 TABLET | Refills: 3 | Status: SHIPPED | OUTPATIENT
Start: 2023-05-08

## 2023-05-08 RX ORDER — ATORVASTATIN CALCIUM 40 MG/1
40 TABLET, FILM COATED ORAL NIGHTLY
Qty: 28 TABLET | Refills: 1 | Status: SHIPPED | OUTPATIENT
Start: 2023-05-08

## 2023-05-08 RX ORDER — OMEPRAZOLE 40 MG/1
CAPSULE, DELAYED RELEASE ORAL
Qty: 28 CAPSULE | Refills: 3 | Status: SHIPPED | OUTPATIENT
Start: 2023-05-08

## 2023-05-08 NOTE — FLOWSHEET NOTE
[x] [address]  [address]  [phone]  F: [phone]     Physical Therapy Daily Treatment Note    Date:  2023  Patient Name:  Mich Hernandez      :  1960    MRN: [id number]  Physician: Romelia Hamilton MD                            Insurance:  [facility] Approval was received for multiple units from 2023 to 2023. Authorization number [id number].  [id number] Ex Approved for 60 Units. 30945 There Activ Approved for 40 Units. 83423 Gait Approved for 20 Units. 52 [facility] for 20 Units. 92588 Ionto 20 units  45774  Manual 20 units    Medical Diagnosis: Plantar fasciitis (M72.2 [ICD-10-CM]); R Equinus contracture            Rehab Codes: M25.571, M25.671, M62.81, R26.2  Onset date: 22               Next Dr's appt.: 23 MRI    Visit# / total visits: 5/10; Re-Check goals due at visit 10       Cancels/No Shows: 4/0    Subjective:    Pain:  [x] Yes  [] No Location: Right heel, calf    Pain Rating: (0-10 scale) 5/10  Pain altered Tx:  [x] No  [] Yes  Action:  Comments: patient reports her leg/foot feels very stiff today and is unsure why. Continued pain in heel.      Objective:  Modalities:   Treatment Location  Left      Right                          Position   Calf, heel []          [x]  [x] Supine    [] Prone   [] Side lying  [] Sitting          Treatment Modality    Hot Pack:    [] Large   [] Medium    [] Cervical     _____ min    Cold Pack   [] Large   [] Medium    [] Cervical     _____ min   last Vasocompression    34_° temp    _low_pressure     __15___ min    Electrical Stim:    [] IFC     [] MFAC      [] HVPC                          Protocol:_______  _____X_____min                          #Channels:  [] 1        [] 2       [] Other:    Ultrasound: ______W/cm2 x  ______min              [] 1MHz   [] 3Mhz                      Duty Factor: [] 100%  [] 50%   [] 20%                  Add: [] Lidex   [] Stim    []

## 2023-05-10 NOTE — FLOWSHEET NOTE
[x] [facility]) - [facility] &  Therapy  [address]    [phone]  F: [phone]   [] [address]  [facility]   [address]  P: [phone]  F: [phone]  [] [address] &  Therapy  [address]  P: [phone]  F: [phone] [] [address]  P: [phone]  F: [phone]  [] [address]  [facility]   [address]   [place]: [phone]  F: [phone]   [] [place] [address] [facility] [address]  [place]: [phone]   F: [phone]     Physical Therapy Cancel/No Show note    Date: 2021  Patient: Olivier Breaux  : 1960  MRN: [id number]    Cancels/No Shows to date:     For today's appointment patient:    [x]  Cancelled    [] Rescheduled appointment    [] No-show     Reason given by patient:    []  Patient ill    []  Conflicting appointment    [] No transportation      [] Conflict with work    [] No reason given    [] Weather related    [] COVID-19    [x] Other:   Received call this AM for job interview.    Comments:        [x] Next appointment was confirmed    Electronically signed by: Anup Lorenzo PTA
What Type Of Note Output Would You Prefer (Optional)?: Standard Output
Hpi Title: Evaluation of Skin Lesions
How Severe Are Your Spot(S)?: mild
Family Member: Sister

## 2023-05-11 ENCOUNTER — HOSPITAL ENCOUNTER (OUTPATIENT)
Dept: PHYSICAL THERAPY | Age: 63
Setting detail: THERAPIES SERIES
Discharge: HOME | End: 2023-05-11
Payer: COMMERCIAL

## 2023-05-11 PROCEDURE — 97140 MANUAL THERAPY 1/> REGIONS: CPT

## 2023-05-11 PROCEDURE — 97110 THERAPEUTIC EXERCISES: CPT

## 2023-05-11 NOTE — FLOWSHEET NOTE
[x] [facility] &  Therapy  [address]  [phone]  F: [phone]     Physical Therapy Daily Treatment Note    Date:  2023  Patient Name:  Byron Martines      :  1960    MRN: [id number]  Physician: Bobbi Worthington MD                            Insurance:  [facility] Approval was received for multiple units from 2023 to 2023. Authorization number [id number].  [id number] Ex Approved for 60 Units. 89405 There Activ Approved for 40 Units. 75964 Gait Approved for 20 Units. 52 [facility] for 20 Units. 78391 Ionto 20 units  85618  Manual 20 units    Medical Diagnosis: Plantar fasciitis (M72.2 [ICD-10-CM]); R Equinus contracture            Rehab Codes: M25.571, M25.671, M62.81, R26.2  Onset date: 22               Next 's appt.: 23 MRI    Visit# / total visits: 6/10; Re-Check goals due at visit 10       Cancels/No Shows: 4/0    Subjective:    Pain:  [x] Yes  [] No Location: Right heel, calf    Pain Rating: (0-10 scale) 5/10  Pain altered Tx:  [x] No  [] Yes  Action:  Comments:Reports pain is about the same overall, still mostly located in her R heel.      Objective:  Modalities:   Treatment Location  Left      Right                          Position   Calf, heel []          [x]  [x] Supine    [] Prone   [] Side lying  [] Sitting          Treatment Modality    Hot Pack:    [] Large   [] Medium    [] Cervical     _____ min    Cold Pack   [] Large   [] Medium    [] Cervical     _____ min   last Vasocompression    34_° temp    _low_pressure     __15___ min    Electrical Stim:    [] IFC     [] MFAC      [] HVPC                          Protocol:_______  _____X_____min                          #Channels:  [] 1        [] 2       [] Other:    Ultrasound: ______W/cm2 x  ______min              [] 1MHz   [] 3Mhz                      Duty Factor: [] 100%  [] 50%   [] 20%                  Add: [] Lidex   [] Stim    [] Gel pad

## 2023-05-16 ENCOUNTER — HOSPITAL ENCOUNTER (OUTPATIENT)
Dept: PHYSICAL THERAPY | Age: 63
Setting detail: THERAPIES SERIES
Discharge: HOME | End: 2023-05-16
Payer: COMMERCIAL

## 2023-05-16 ENCOUNTER — HOSPITAL ENCOUNTER (OUTPATIENT)
Dept: MRI IMAGING | Age: 63
Discharge: HOME | End: 2023-05-18
Payer: COMMERCIAL

## 2023-05-16 DIAGNOSIS — M72.2 PLANTAR FASCIITIS: ICD-10-CM

## 2023-05-16 PROCEDURE — 73721 MRI JNT OF LWR EXTRE W/O DYE: CPT

## 2023-05-16 PROCEDURE — 97140 MANUAL THERAPY 1/> REGIONS: CPT

## 2023-05-16 PROCEDURE — 97110 THERAPEUTIC EXERCISES: CPT

## 2023-05-16 NOTE — FLOWSHEET NOTE
[x] [facility]  [address]  [phone]  F: [phone]     Physical Therapy Daily Treatment Note    Date:  2023  Patient Name:  Ashwin Espitia      :  1960    MRN: [id number]  Physician: Олег Stringer MD                            Insurance:  [facility] Approval was received for multiple units from 2023 to 2023. Authorization number [id number].  [id number] Ex Approved for 60 Units. 04206 [employer] Approved for 40 Units. 50339 Gait Approved for 20 Units. 52 [facility] for 20 Units. 42146 Ionto 20 units  21813  Manual 20 units    Medical Diagnosis: Plantar fasciitis (M72.2 [ICD-10-CM]); R Equinus contracture            Rehab Codes: M25.571, M25.671, M62.81, R26.2  Onset date: 22               Next 's appt.: 23 MRI    Visit# / total visits: 7/10; Re-Check goals due at visit 10       Cancels/No Shows: 4/0    Subjective:    Pain:  [x] Yes  [] No Location: Right heel, calf    Pain Rating: (0-10 scale) 5/10  Pain altered Tx:  [x] No  [] Yes  Action:  Comments: States tolerated manual interventions well last time. Was a little sore after the fact, but did help. Reports just some general soreness at arrival since trying to walk quickly up here.      Objective:  Modalities:   Treatment Location  Left      Right                          Position   Calf, heel []          [x]  [x] Supine    [] Prone   [] Side lying  [] Sitting          Treatment Modality    Hot Pack:    [] Large   [] Medium    [] Cervical     _____ min    Cold Pack   [] Large   [] Medium    [] Cervical     _____ min   last Vasocompression    34_° temp    _low_pressure     __15___ min    Electrical Stim:    [] IFC     [] MFAC      [] HVPC                          Protocol:_______  _____X_____min                          #Channels:  [] 1        [] 2       [] Other:    Ultrasound: ______W/cm2 x  ______min              [] 1MHz   [] 3Mhz

## 2023-05-19 ENCOUNTER — HOSPITAL ENCOUNTER (OUTPATIENT)
Dept: PHYSICAL THERAPY | Age: 63
Setting detail: THERAPIES SERIES
Discharge: HOME | End: 2023-05-19
Payer: COMMERCIAL

## 2023-05-19 PROCEDURE — 97140 MANUAL THERAPY 1/> REGIONS: CPT

## 2023-05-19 PROCEDURE — 97110 THERAPEUTIC EXERCISES: CPT

## 2023-05-19 NOTE — FLOWSHEET NOTE
Duty Factor: [] 100%  [] 50%   [] 20%                  Add: [] Lidex   [] Stim    [] Gel pad       Massage:    [] Soft Tissue   [] Cross Friction                      [] Ice ____min   Last  Iontophoresis:  IOGEL:  [] 1.5ml   [] 2.5ml   [] 3.5ml                                            [x] 1.0ml   [] 1.3ml                 Dosage:  [] 24 mA/min   [] 40 mA/min    [x]  80 mA/min \"On-the-Go\" 4 hour Patch                 Channels:   [x] 1    [] 2                [x] 4mg/ml Dexamethasone Sodium Phosphate Dosage 24-80 mAmin                [] Acetic Acid       [] Other     [x] Drug allergies reviewed    __JP__Initials           __03/08/2023___Date    Traction:   [] Prone   [] Supine   X _______min               [] Lumbar x ____lbs      [] Cervical x _____lbs               [] Continuous     [] Intermittent  -   On:_____x Off:_____   1st, 2nd Other:  Ex, Manual          Precautions:  Exercises:  Exercise Reps/ Time Weight/ Level Comments    NuStep 5' Level 2  X          Standing       Incline:  Gastroc                Soleus  5x30\"  Progressed reps 5/1 X   Heel raises 20x   X   DF Raises 20x  Added 5/8/23 X          Long sitting gastroc stretch [id number]  Gentle stretch, knee straight    Supine gastroc stretch 5x 30sec  Knee straight, invert and dorsiflex; after manual     Ankle rom 20x  PF/DF, inver/ever Prone following manual 5/16    Ankle circles 20x  CW, CCW Prone following manual 5/16                  Seated:       HS stretch  3x30\"  On stool, Pt reports feels in calf, kane w/cues to relax ankle/PF ankle     BAPS   20x L2 DF, PF, INV, EV, cw, ccw   Following manual 5/16    rockerboard 20x ea L1 PF/DF, med/lat  Standing 5/19/23 X   Heelslides into DF  20x 5sec Cues to keep heel flat           Long-sitting tband 4-way ankle -- lime     Toe curls   20x      Toe extension  20x      Toe yoga   20x ea  Great Toe Extension, 2nd-5th toe extension, IV lifts, EV lifts, towel scrunches    Rolls on cardboard roll, rolls on tennis

## 2023-05-22 ENCOUNTER — HOSPITAL ENCOUNTER (OUTPATIENT)
Dept: PHYSICAL THERAPY | Age: 63
Setting detail: THERAPIES SERIES
Discharge: HOME | End: 2023-05-22
Payer: COMMERCIAL

## 2023-05-22 ENCOUNTER — HOSPITAL ENCOUNTER (OUTPATIENT)
Dept: SLEEP CENTER | Age: 63
Discharge: HOME | End: 2023-05-24
Payer: COMMERCIAL

## 2023-05-22 DIAGNOSIS — G47.33 OSA (OBSTRUCTIVE SLEEP APNEA): ICD-10-CM

## 2023-05-22 PROCEDURE — 95810 POLYSOM 6/> YRS 4/> PARAM: CPT

## 2023-05-22 NOTE — FLOWSHEET NOTE
[x] [facility]  [address]    [phone]  F: [phone]     Physical Therapy Cancel/No Show note    Date: 2023  Patient: Lea Preston  : 1960  MRN: [id number]    Cancels/No Shows to date: 20 ( since restarting therapy)    For today's appointment patient:    [x]  Cancelled    [] Rescheduled appointment    [] No-show     Reason given by patient:    []  Patient ill    []  Conflicting appointment    [] No transportation      [] Conflict with work    [x] No reason given    [] Weather related    [] [id number]    [] Other:     Comments:      [x] Next appointment was confirmed    Electronically signed by: Elisa Sawyer PTA

## 2023-05-23 VITALS — WEIGHT: 183 LBS | HEIGHT: 63 IN | BODY MASS INDEX: 32.43 KG/M2

## 2023-06-05 DIAGNOSIS — M62.838 MUSCLE SPASM: ICD-10-CM

## 2023-06-05 DIAGNOSIS — E55.9 VITAMIN D DEFICIENCY: ICD-10-CM

## 2023-06-05 DIAGNOSIS — J30.2 SEASONAL ALLERGIES: ICD-10-CM

## 2023-06-05 DIAGNOSIS — F41.9 ANXIETY: ICD-10-CM

## 2023-06-05 RX ORDER — ERGOCALCIFEROL 1.25 MG/1
CAPSULE ORAL
Qty: 4 CAPSULE | Refills: 3 | Status: SHIPPED | OUTPATIENT
Start: 2023-06-05

## 2023-06-05 RX ORDER — CHLORZOXAZONE 500 MG/1
TABLET ORAL
Qty: 28 TABLET | Refills: 3 | Status: SHIPPED | OUTPATIENT
Start: 2023-06-05

## 2023-06-05 RX ORDER — ALBUTEROL SULFATE 90 UG/1
AEROSOL, METERED RESPIRATORY (INHALATION)
Qty: 18 G | Refills: 3 | Status: SHIPPED | OUTPATIENT
Start: 2023-06-05

## 2023-06-05 RX ORDER — VENLAFAXINE HYDROCHLORIDE 75 MG/1
CAPSULE, EXTENDED RELEASE ORAL
Qty: 28 CAPSULE | Refills: 3 | Status: SHIPPED | OUTPATIENT
Start: 2023-06-05

## 2023-06-05 NOTE — TELEPHONE ENCOUNTER
Liz Coleman is calling to request a refill on the following medication(s):    Last Visit Date (If Applicable):  2/05/4985    Next Visit Date:    Visit date not found    Medication Request:  Requested Prescriptions     Pending Prescriptions Disp Refills    albuterol sulfate HFA (VENTOLIN HFA) 108 (90 Base) MCG/ACT inhaler [Pharmacy Med Name: Ventolin  (90 Base) MCG/ACT Inhalation Aerosol Solution (Albuterol Sulfate HFA)] 18 g 3     Sig: INAHLE 2 PUFFS BY MOUTH INTO THE LUNGS EVERY 6 HOURS AS NEEDED FOR WHEEZING    venlafaxine (EFFEXOR XR) 75 MG extended release capsule [Pharmacy Med Name: Venlafaxine HCl ER 75 MG Oral Capsule Extended Release 24 Hour (Effexor XR)] 28 capsule 3     Sig: TAKE 1 CAPSULE BY MOUTH DAILY    chlorzoxazone (PARAFON FORTE) 500 MG tablet [Pharmacy Med Name: Chlorzoxazone 500 MG Oral Tablet] 28 tablet 3     Sig: TAKE 1/2 TABLET BY MOUTH TWICE DAILY AS NEEDED    vitamin D (ERGOCALCIFEROL) 1.25 MG (84547 UT) CAPS capsule [Pharmacy Med Name: Vitamin D (Ergocalciferol) 1.25 MG (06986 UT) Oral Capsule (Drisdol)] 4 capsule 3     Sig: TAKE 1 CAPSULE BY MOUTH EVERY WEEK

## 2023-06-06 LAB — STATUS: NORMAL

## 2023-06-19 ENCOUNTER — OFFICE VISIT (OUTPATIENT)
Dept: SURGERY | Age: 63
End: 2023-06-19
Payer: COMMERCIAL

## 2023-06-19 VITALS
BODY MASS INDEX: 32.43 KG/M2 | SYSTOLIC BLOOD PRESSURE: 133 MMHG | WEIGHT: 183 LBS | DIASTOLIC BLOOD PRESSURE: 75 MMHG | HEIGHT: 63 IN | OXYGEN SATURATION: 100 % | HEART RATE: 95 BPM | RESPIRATION RATE: 16 BRPM

## 2023-06-19 DIAGNOSIS — E65: Primary | ICD-10-CM

## 2023-06-19 PROCEDURE — 3017F COLORECTAL CA SCREEN DOC REV: CPT | Performed by: SURGERY

## 2023-06-19 PROCEDURE — G8427 DOCREV CUR MEDS BY ELIG CLIN: HCPCS | Performed by: SURGERY

## 2023-06-19 PROCEDURE — 1036F TOBACCO NON-USER: CPT | Performed by: SURGERY

## 2023-06-19 PROCEDURE — 99203 OFFICE O/P NEW LOW 30 MIN: CPT | Performed by: SURGERY

## 2023-06-19 PROCEDURE — G8417 CALC BMI ABV UP PARAM F/U: HCPCS | Performed by: SURGERY

## 2023-06-19 NOTE — PROGRESS NOTES
Pulse: 95   Resp: 16   SpO2: 100%       General Appearance:  awake, alert, no acute distress, well developed, well nourished   Skin:  collection of non-discrete subcutaneous adipose tissue with normal overlying skin of the left trochanteric region extending posteriorly onto the buttock, nontender. Head/face:  NCAT, face symmetrical  Eyes:  PERRL, no evidence of conjunctivitis or ptosis bilaterally  Ears:  External ears and canals grossly normal, no evidence of otorrhea. Nose/Sinuses:  Nares normal. Septum midline. Mucosa normal. No external drainage noted. Mouth/Neck:  Mucosa moist.  No external oral lesions. Trachea midline. No visible masses. Lungs:  Normal chest expansion, unlabored breathing without accessory muscle use. No audible rales, rhonchi, or wheezing. Cardiovascular: S1S2. No evidence of JVD. No evidence of pulsatile masses in abdomen  Abdomen:  Soft, non-tender, no organomegaly, no masses. Musculoskeletal: No evidence of bony/muscular deformities, trauma, atrophy of either left/right upper/lower extremity. No evidence of digital clubbing or cyanosis. Neurologic:  CN 2-12 grossly intact without obvious deficits. Grossly normal sensation in all extremities. Psychiatric: appropriate judgement and insight, appropriate recall of recent and remote memory, no evidence of depression/anxiety/agitation      DIAGNOSES:   Diagnosis Orders   1. Localized adiposity of buttocks            PLAN:  At this time I would recommend referral to Dr. Chapis Mckeon for surgical evaluation, clinically this area is inconsistent with lipoma and pt may be better served with the Plastic Surgery service. All questions were answered, pt is agreeable to this plan.         Medical Decision Making: low complexity    Electronically signed by Adelaida Marie DO on 6/19/2023 at 2:43 PM

## 2023-07-11 ENCOUNTER — HOSPITAL ENCOUNTER (OUTPATIENT)
Dept: SLEEP CENTER | Age: 63
Discharge: HOME | End: 2023-07-13
Payer: COMMERCIAL

## 2023-07-11 VITALS — HEIGHT: 63 IN | WEIGHT: 183 LBS | BODY MASS INDEX: 32.43 KG/M2

## 2023-07-11 DIAGNOSIS — G47.33 OSA (OBSTRUCTIVE SLEEP APNEA): ICD-10-CM

## 2023-07-11 PROCEDURE — 95811 POLYSOM 6/>YRS CPAP 4/> PARM: CPT

## 2023-07-17 ENCOUNTER — HOSPITAL ENCOUNTER (EMERGENCY)
Age: 63
Discharge: HOME | End: 2023-07-17
Attending: EMERGENCY MEDICINE
Payer: COMMERCIAL

## 2023-07-17 ENCOUNTER — APPOINTMENT (OUTPATIENT)
Dept: CT IMAGING | Age: 63
End: 2023-07-17
Payer: COMMERCIAL

## 2023-07-17 VITALS
DIASTOLIC BLOOD PRESSURE: 66 MMHG | HEART RATE: 70 BPM | BODY MASS INDEX: 32.42 KG/M2 | OXYGEN SATURATION: 93 % | HEIGHT: 63 IN | TEMPERATURE: 98.1 F | SYSTOLIC BLOOD PRESSURE: 132 MMHG | RESPIRATION RATE: 18 BRPM

## 2023-07-17 DIAGNOSIS — K57.32 DIVERTICULITIS OF COLON: Primary | ICD-10-CM

## 2023-07-17 LAB
ALBUMIN SERPL-MCNC: 4.2 G/DL (ref 3.5–5.2)
ALBUMIN/GLOB SERPL: 2 {RATIO} (ref 1–2.5)
ALP SERPL-CCNC: 96 U/L (ref 35–104)
ALT SERPL-CCNC: 26 U/L (ref 5–33)
ANION GAP SERPL CALCULATED.3IONS-SCNC: 12 MMOL/L (ref 9–17)
AST SERPL-CCNC: 19 U/L
BASOPHILS # BLD: 0.06 K/UL (ref 0–0.2)
BASOPHILS NFR BLD: 1 % (ref 0–2)
BILIRUB SERPL-MCNC: 0.3 MG/DL (ref 0.3–1.2)
BILIRUB UR QL STRIP: NEGATIVE
BUN SERPL-MCNC: 11 MG/DL (ref 8–23)
CALCIUM SERPL-MCNC: 8.9 MG/DL (ref 8.6–10.4)
CHLORIDE SERPL-SCNC: 106 MMOL/L (ref 98–107)
CLARITY UR: CLEAR
CO2 SERPL-SCNC: 23 MMOL/L (ref 20–31)
COLOR UR: YELLOW
COMMENT: NORMAL
CREAT SERPL-MCNC: 0.5 MG/DL (ref 0.5–0.9)
EOSINOPHIL # BLD: 0.16 K/UL (ref 0–0.44)
EOSINOPHILS RELATIVE PERCENT: 3 % (ref 1–4)
ERYTHROCYTE [DISTWIDTH] IN BLOOD BY AUTOMATED COUNT: 12.7 % (ref 11.8–14.4)
GFR SERPL CREATININE-BSD FRML MDRD: >60 ML/MIN/1.73M2
GLUCOSE SERPL-MCNC: 114 MG/DL (ref 70–99)
GLUCOSE UR STRIP-MCNC: NEGATIVE MG/DL
HCT VFR BLD AUTO: 40.5 % (ref 36.3–47.1)
HGB BLD-MCNC: 13.7 G/DL (ref 11.9–15.1)
HGB UR QL STRIP.AUTO: NEGATIVE
IMM GRANULOCYTES # BLD AUTO: <0.03 K/UL (ref 0–0.3)
IMM GRANULOCYTES NFR BLD: 0 %
KETONES UR STRIP-MCNC: NEGATIVE MG/DL
LACTIC ACID, WHOLE BLOOD: 1.6 MMOL/L (ref 0.7–2.1)
LEUKOCYTE ESTERASE UR QL STRIP: NEGATIVE
LIPASE SERPL-CCNC: 29 U/L (ref 13–60)
LYMPHOCYTES # BLD: 30 % (ref 24–43)
LYMPHOCYTES NFR BLD: 1.58 K/UL (ref 1.1–3.7)
MCH RBC QN AUTO: 29.7 PG (ref 25.2–33.5)
MCHC RBC AUTO-ENTMCNC: 33.8 G/DL (ref 28.4–34.8)
MCV RBC AUTO: 87.9 FL (ref 82.6–102.9)
MONOCYTES NFR BLD: 0.32 K/UL (ref 0.1–1.2)
MONOCYTES NFR BLD: 6 % (ref 3–12)
NEUTROPHILS NFR BLD: 60 % (ref 36–65)
NEUTS SEG NFR BLD: 3.16 K/UL (ref 1.5–8.1)
NITRITE UR QL STRIP: NEGATIVE
NRBC BLD-RTO: 0 PER 100 WBC
PH UR STRIP: 6.5 [PH] (ref 5–8)
PLATELET # BLD AUTO: 245 K/UL (ref 138–453)
PMV BLD AUTO: 11.8 FL (ref 8.1–13.5)
POTASSIUM SERPL-SCNC: 3.6 MMOL/L (ref 3.7–5.3)
PROT SERPL-MCNC: 6.3 G/DL (ref 6.4–8.3)
PROT UR STRIP-MCNC: NEGATIVE MG/DL
RBC # BLD AUTO: 4.61 M/UL (ref 3.95–5.11)
SODIUM SERPL-SCNC: 141 MMOL/L (ref 135–144)
SP GR UR STRIP: 1.02 (ref 1–1.03)
TROPONIN I SERPL HS-MCNC: <6 NG/L (ref 0–14)
UROBILINOGEN UR STRIP-ACNC: NORMAL EU/DL (ref 0–1)
WBC OTHER # BLD: 5.3 K/UL (ref 3.5–11.3)

## 2023-07-17 PROCEDURE — 96375 TX/PRO/DX INJ NEW DRUG ADDON: CPT

## 2023-07-17 PROCEDURE — 74177 CT ABD & PELVIS W/CONTRAST: CPT

## 2023-07-17 PROCEDURE — 85027 COMPLETE CBC AUTOMATED: CPT

## 2023-07-17 PROCEDURE — 6370000000 HC RX 637 (ALT 250 FOR IP): Performed by: STUDENT IN AN ORGANIZED HEALTH CARE EDUCATION/TRAINING PROGRAM

## 2023-07-17 PROCEDURE — 83605 ASSAY OF LACTIC ACID: CPT

## 2023-07-17 PROCEDURE — 83690 ASSAY OF LIPASE: CPT

## 2023-07-17 PROCEDURE — 6360000004 HC RX CONTRAST MEDICATION

## 2023-07-17 PROCEDURE — 2580000003 HC RX 258

## 2023-07-17 PROCEDURE — 80053 COMPREHEN METABOLIC PANEL: CPT

## 2023-07-17 PROCEDURE — 6370000000 HC RX 637 (ALT 250 FOR IP)

## 2023-07-17 PROCEDURE — 93005 ELECTROCARDIOGRAM TRACING: CPT

## 2023-07-17 PROCEDURE — 99285 EMERGENCY DEPT VISIT HI MDM: CPT

## 2023-07-17 PROCEDURE — 84484 ASSAY OF TROPONIN QUANT: CPT

## 2023-07-17 PROCEDURE — 81003 URINALYSIS AUTO W/O SCOPE: CPT

## 2023-07-17 PROCEDURE — 96374 THER/PROPH/DIAG INJ IV PUSH: CPT

## 2023-07-17 PROCEDURE — 6360000002 HC RX W HCPCS

## 2023-07-17 RX ORDER — SODIUM CHLORIDE 0.9 % (FLUSH) 0.9 %
3 SYRINGE (ML) INJECTION EVERY 8 HOURS
Status: DISCONTINUED | OUTPATIENT
Start: 2023-07-17 | End: 2023-07-17 | Stop reason: HOSPADM

## 2023-07-17 RX ORDER — FENTANYL CITRATE 50 UG/ML
25 INJECTION, SOLUTION INTRAMUSCULAR; INTRAVENOUS ONCE
Status: DISCONTINUED | OUTPATIENT
Start: 2023-07-17 | End: 2023-07-17 | Stop reason: HOSPADM

## 2023-07-17 RX ORDER — METRONIDAZOLE 500 MG/1
500 TABLET ORAL 3 TIMES DAILY
Qty: 21 TABLET | Refills: 0 | Status: SHIPPED | OUTPATIENT
Start: 2023-07-17 | End: 2023-07-24

## 2023-07-17 RX ORDER — FENTANYL CITRATE 50 UG/ML
25 INJECTION, SOLUTION INTRAMUSCULAR; INTRAVENOUS ONCE
Status: COMPLETED | OUTPATIENT
Start: 2023-07-17 | End: 2023-07-17

## 2023-07-17 RX ORDER — ONDANSETRON 2 MG/ML
4 INJECTION INTRAMUSCULAR; INTRAVENOUS ONCE
Status: COMPLETED | OUTPATIENT
Start: 2023-07-17 | End: 2023-07-17

## 2023-07-17 RX ORDER — OXYCODONE HYDROCHLORIDE AND ACETAMINOPHEN 5; 325 MG/1; MG/1
1 TABLET ORAL EVERY 6 HOURS PRN
Qty: 12 TABLET | Refills: 0 | Status: SHIPPED | OUTPATIENT
Start: 2023-07-17 | End: 2023-07-20

## 2023-07-17 RX ORDER — 0.9 % SODIUM CHLORIDE 0.9 %
1000 INTRAVENOUS SOLUTION INTRAVENOUS ONCE
Status: COMPLETED | OUTPATIENT
Start: 2023-07-17 | End: 2023-07-17

## 2023-07-17 RX ORDER — CIPROFLOXACIN 500 MG/1
500 TABLET, FILM COATED ORAL 2 TIMES DAILY
Qty: 14 TABLET | Refills: 0 | Status: SHIPPED | OUTPATIENT
Start: 2023-07-17 | End: 2023-07-24

## 2023-07-17 RX ORDER — CIPROFLOXACIN 500 MG/1
500 TABLET, FILM COATED ORAL ONCE
Status: COMPLETED | OUTPATIENT
Start: 2023-07-17 | End: 2023-07-17

## 2023-07-17 RX ORDER — METRONIDAZOLE 500 MG/1
500 TABLET ORAL ONCE
Status: COMPLETED | OUTPATIENT
Start: 2023-07-17 | End: 2023-07-17

## 2023-07-17 RX ORDER — ONDANSETRON 4 MG/1
4 TABLET, ORALLY DISINTEGRATING ORAL ONCE
Status: COMPLETED | OUTPATIENT
Start: 2023-07-17 | End: 2023-07-17

## 2023-07-17 RX ADMIN — ONDANSETRON 4 MG: 2 INJECTION INTRAMUSCULAR; INTRAVENOUS at 18:45

## 2023-07-17 RX ADMIN — FENTANYL CITRATE 25 MCG: 50 INJECTION, SOLUTION INTRAMUSCULAR; INTRAVENOUS at 17:00

## 2023-07-17 RX ADMIN — SODIUM CHLORIDE, PRESERVATIVE FREE 3 ML: 5 INJECTION INTRAVENOUS at 16:50

## 2023-07-17 RX ADMIN — IOPAMIDOL 75 ML: 755 INJECTION, SOLUTION INTRAVENOUS at 17:59

## 2023-07-17 RX ADMIN — CIPROFLOXACIN 500 MG: 500 TABLET, FILM COATED ORAL at 20:33

## 2023-07-17 RX ADMIN — METRONIDAZOLE 500 MG: 500 TABLET ORAL at 20:33

## 2023-07-17 RX ADMIN — ONDANSETRON 4 MG: 4 TABLET, ORALLY DISINTEGRATING ORAL at 17:00

## 2023-07-17 RX ADMIN — SODIUM CHLORIDE 1000 ML: 9 INJECTION, SOLUTION INTRAVENOUS at 16:58

## 2023-07-17 ASSESSMENT — PAIN DESCRIPTION - LOCATION
LOCATION: ABDOMEN
LOCATION: ABDOMEN

## 2023-07-17 ASSESSMENT — PAIN SCALES - GENERAL
PAINLEVEL_OUTOF10: 7
PAINLEVEL_OUTOF10: 5
PAINLEVEL_OUTOF10: 7

## 2023-07-17 ASSESSMENT — PAIN DESCRIPTION - ORIENTATION: ORIENTATION: LEFT

## 2023-07-17 ASSESSMENT — PAIN DESCRIPTION - DESCRIPTORS: DESCRIPTORS: PRESSURE;STABBING

## 2023-07-17 ASSESSMENT — PAIN - FUNCTIONAL ASSESSMENT
PAIN_FUNCTIONAL_ASSESSMENT: 0-10
PAIN_FUNCTIONAL_ASSESSMENT: 0-10

## 2023-07-17 NOTE — ED NOTES
Report given to ELOD[facility].      Marilin Ramirez RN  07/17/23 1912

## 2023-07-17 NOTE — ED PROVIDER NOTES
[facility] ED  Emergency Department Encounter  Emergency Medicine Resident     Pt Wendy Luque  MRN: [id number]  [address] 1960  Date of evaluation: 7/22/23  PCP:  Leonardo Matias MD  Note Started: 6:30 PM EDT      CHIEF COMPLAINT       Chief Complaint   Patient presents with    Abdominal Pain     LLQ x 3days        HISTORY OF PRESENT ILLNESS  (Location/Symptom, Timing/Onset, Context/Setting, Quality, Duration, Modifying Factors, Severity.)      Renato Hernandez is a 59 y.o. female with a history of diverticulitis presenting to the ED with left lower abdominal pain, described as a 7/10 pressure, that began 4 days ago. She also complains of associated nausea and lightheadedness. The pain slightly improves when laying on her left side, otherwise it has been present constantly. She reports a single episode of diarrhea 3 days ago and has not had a bowel movement since, she also does not recall passing gas since onset of symptoms. She has not taken anything at home for the pain. She denies fever, chills, dysuria, hematuria, flank pain, chest pain, or any other symptoms at this time. PAST MEDICAL / SURGICAL / SOCIAL / FAMILY HISTORY      has a past medical history of Arthritis, ASCUS with positive high risk HPV cervical, Asthma, COVID-19, Depression, Diverticulitis, ESBL (extended spectrum beta-lactamase) producing bacteria infection, GERD (gastroesophageal reflux disease), Hyperlipidemia, MRSA (methicillin resistant staph aureus) culture positive, Rectocele, and Tachycardia. has a past surgical history that includes Hysterectomy (1996); knee surgery (Left); Cystocopy (02/25/2015); Nerve Block (07/28/2017); Nerve Block (09/22/2017); Nerve Block (09/22/2017); Nerve Block (11/10/2017); Colonoscopy (N/A, 07/19/2018); Upper gastrointestinal endoscopy (01/30/2019); Knee arthroscopy (Left, 05/13/2019); Ankle surgery (Left, 03/04/2021);  Gastrocnemius Recession (Right, 11/04/2022);

## 2023-07-17 NOTE — ED NOTES
Report received from [facility]     Patient resting on stretcher, NAD  RR even and non-labored  Bed locked and in lowest position  Call light within reach         [facility], RN  07/17/23 1929

## 2023-07-17 NOTE — ED PROVIDER NOTES
[address] [place] [address] ED  Emergency Department  Emergency Medicine Resident Sign-out     Care of Venkatesh Edwards was assumed from Dr. Hannah Ogden and is being seen for Abdominal Pain (LLQ x 3days )  . The patient's initial evaluation and plan have been discussed with the prior provider who initially evaluated the patient. EMERGENCY DEPARTMENT COURSE / MEDICAL DECISION MAKING:       MEDICATIONS GIVEN:  Orders Placed This Encounter   Medications    sodium chloride flush 0.9 % injection 3 mL    ondansetron (ZOFRAN-ODT) disintegrating tablet 4 mg    0.9 % sodium chloride bolus    fentaNYL (SUBLIMAZE) injection 25 mcg    iopamidol (ISOVUE-370) 76 % injection 75 mL    fentaNYL (SUBLIMAZE) injection 25 mcg    ondansetron (ZOFRAN) injection 4 mg    ciprofloxacin (CIPRO) tablet 500 mg     Order Specific Question:   Antimicrobial Indications     Answer:   Intra-Abdominal Infection    metroNIDAZOLE (FLAGYL) tablet 500 mg     Order Specific Question:   Antimicrobial Indications     Answer:   Intra-Abdominal Infection    ciprofloxacin (CIPRO) 500 MG tablet     Sig: Take 1 tablet by mouth 2 times daily for 7 days     Dispense:  14 tablet     Refill:  0    metroNIDAZOLE (FLAGYL) 500 MG tablet     Sig: Take 1 tablet by mouth 3 times daily for 7 days     Dispense:  21 tablet     Refill:  0    oxyCODONE-acetaminophen (PERCOCET) 5-325 MG per tablet     Sig: Take 1 tablet by mouth every 6 hours as needed for Pain for up to 3 days. Intended supply: 3 days.  Take lowest dose possible to manage pain Max Daily Amount: 4 tablets     Dispense:  12 tablet     Refill:  0       LABS / RADIOLOGY:     Labs Reviewed   COMPREHENSIVE METABOLIC PANEL - Abnormal; Notable for the following components:       Result Value    Glucose 114 (*)     Potassium 3.6 (*)     Total Protein 6.3 (*)     All other components within normal limits   CBC WITH AUTO DIFFERENTIAL   LIPASE   TROPONIN   URINALYSIS WITH REFLEX TO CULTURE   LACTIC ACID       No

## 2023-07-17 NOTE — ED TRIAGE NOTES
Pt presents to the ED with c/o LLQ abdominal pain, nausea, and bloating/constipation. Pt is a&ox4. Pt states she has hx of diverticulitis. Pt ambulated to room.  VVS.

## 2023-07-18 NOTE — DISCHARGE INSTRUCTIONS
Your evaluated due to abdominal pain and nausea. We completed a CT of your abdomen that showed diverticulitis in the lower segment of your colon. We prescribed you antibiotics that you should take for the entirety of the course. We also prescribed you a short course of pain medication that you should use sparingly. Please see your primary care physician or your GI specialist within the next week for follow-up. Please return to the ED if you develop any rectal bleeding, worsening abdominal pain, fever, chills, nausea, vomiting, inability to tolerate oral intake, chest pain, shortness of breath or for any other concern.

## 2023-07-19 LAB
EKG ATRIAL RATE: 77 BPM
EKG P AXIS: 27 DEGREES
EKG P-R INTERVAL: 130 MS
EKG Q-T INTERVAL: 378 MS
EKG QRS DURATION: 76 MS
EKG QTC CALCULATION (BAZETT): 427 MS
EKG R AXIS: 3 DEGREES
EKG T AXIS: 32 DEGREES
EKG VENTRICULAR RATE: 77 BPM

## 2023-07-19 PROCEDURE — 93010 ELECTROCARDIOGRAM REPORT: CPT | Performed by: INTERNAL MEDICINE

## 2023-07-24 DIAGNOSIS — F41.9 ANXIETY: ICD-10-CM

## 2023-07-25 RX ORDER — ASPIRIN 325 MG
TABLET, DELAYED RELEASE (ENTERIC COATED) ORAL
Qty: 28 TABLET | Refills: 3 | Status: SHIPPED | OUTPATIENT
Start: 2023-07-25

## 2023-07-25 RX ORDER — ATORVASTATIN CALCIUM 40 MG/1
40 TABLET, FILM COATED ORAL NIGHTLY
Qty: 28 TABLET | Refills: 1 | Status: SHIPPED | OUTPATIENT
Start: 2023-07-25

## 2023-07-25 RX ORDER — BUSPIRONE HYDROCHLORIDE 15 MG/1
TABLET ORAL
Qty: 56 TABLET | Refills: 3 | Status: SHIPPED | OUTPATIENT
Start: 2023-07-25

## 2023-07-26 LAB — STATUS: NORMAL

## 2023-08-15 ENCOUNTER — OFFICE VISIT (OUTPATIENT)
Dept: PULMONOLOGY | Age: 63
End: 2023-08-15
Payer: COMMERCIAL

## 2023-08-15 VITALS
SYSTOLIC BLOOD PRESSURE: 129 MMHG | HEIGHT: 61 IN | RESPIRATION RATE: 16 BRPM | BODY MASS INDEX: 33.99 KG/M2 | HEART RATE: 70 BPM | DIASTOLIC BLOOD PRESSURE: 82 MMHG | WEIGHT: 180 LBS | OXYGEN SATURATION: 97 %

## 2023-08-15 DIAGNOSIS — G47.52 REM BEHAVIORAL DISORDER: ICD-10-CM

## 2023-08-15 DIAGNOSIS — J45.20 MILD INTERMITTENT ASTHMA WITHOUT COMPLICATION: ICD-10-CM

## 2023-08-15 DIAGNOSIS — G47.33 OSA (OBSTRUCTIVE SLEEP APNEA): Primary | ICD-10-CM

## 2023-08-15 PROCEDURE — 94726 PLETHYSMOGRAPHY LUNG VOLUMES: CPT | Performed by: INTERNAL MEDICINE

## 2023-08-15 PROCEDURE — G8427 DOCREV CUR MEDS BY ELIG CLIN: HCPCS | Performed by: INTERNAL MEDICINE

## 2023-08-15 PROCEDURE — 94375 RESPIRATORY FLOW VOLUME LOOP: CPT | Performed by: INTERNAL MEDICINE

## 2023-08-15 PROCEDURE — 1036F TOBACCO NON-USER: CPT | Performed by: INTERNAL MEDICINE

## 2023-08-15 PROCEDURE — 99214 OFFICE O/P EST MOD 30 MIN: CPT | Performed by: INTERNAL MEDICINE

## 2023-08-15 PROCEDURE — G8417 CALC BMI ABV UP PARAM F/U: HCPCS | Performed by: INTERNAL MEDICINE

## 2023-08-15 PROCEDURE — 94729 DIFFUSING CAPACITY: CPT | Performed by: INTERNAL MEDICINE

## 2023-08-15 PROCEDURE — 3017F COLORECTAL CA SCREEN DOC REV: CPT | Performed by: INTERNAL MEDICINE

## 2023-08-15 RX ORDER — CLONAZEPAM 0.5 MG/1
0.5 TABLET ORAL NIGHTLY
Qty: 30 TABLET | Refills: 1 | Status: SHIPPED | OUTPATIENT
Start: 2023-08-15 | End: 2023-09-14

## 2023-08-15 ASSESSMENT — SLEEP AND FATIGUE QUESTIONNAIRES
ESS TOTAL SCORE: 12
HOW LIKELY ARE YOU TO NOD OFF OR FALL ASLEEP WHILE SITTING INACTIVE IN A PUBLIC PLACE: 0
HOW LIKELY ARE YOU TO NOD OFF OR FALL ASLEEP WHILE WATCHING TV: 3
HOW LIKELY ARE YOU TO NOD OFF OR FALL ASLEEP WHILE SITTING QUIETLY AFTER LUNCH WITHOUT ALCOHOL: 3
HOW LIKELY ARE YOU TO NOD OFF OR FALL ASLEEP WHILE SITTING AND TALKING TO SOMEONE: 0
HOW LIKELY ARE YOU TO NOD OFF OR FALL ASLEEP WHILE SITTING AND READING: 2
HOW LIKELY ARE YOU TO NOD OFF OR FALL ASLEEP IN A CAR, WHILE STOPPED FOR A FEW MINUTES IN TRAFFIC: 0
HOW LIKELY ARE YOU TO NOD OFF OR FALL ASLEEP WHILE LYING DOWN TO REST IN THE AFTERNOON WHEN CIRCUMSTANCES PERMIT: 2
HOW LIKELY ARE YOU TO NOD OFF OR FALL ASLEEP WHEN YOU ARE A PASSENGER IN A CAR FOR AN HOUR WITHOUT A BREAK: 2

## 2023-08-16 NOTE — PROGRESS NOTES
Patient performed PFT with good effort and understanding
Pulmonary function study dictated by Dr. Chrisandra Holter    Patient Sarah Silverman  MR number [id number]  \Date of study 8/15/2023  Clinical diagnosis dyspnea    Ventilatory function revealed normal vital capacity and FEV1 FEV1 FVC ratio is normal mid flows are normal lung capacities are normal diffusion capacity is normal flow volume loops are normal    Impression    Pulmonary function study is within normal limits a clinical correlation should be obtained dictated by Dr. Chrisandra Holter
rashes or lesions       Cervical, supraclavicular not enlarged or matted or tender      CNII-XII intact, normal strength 5/5 . Sensation grossly normal  and reflexes normal 2+  throughout     Clubbing No  Lower ext edema absent  Upper ext edema absent         Musculoskeletal no synovitis. No joint swelling or tenderness        CBC: No results for input(s): WBC, HGB, PLT in the last 72 hours. BMP:  No results for input(s): NA, K, CL, CO2, BUN, CREATININE, GLUCOSE in the last 72 hours. Hepatic: No results for input(s): AST, ALT, ALB, BILITOT, ALKPHOS in the last 72 hours. Amylase:   Lab Results   Component Value Date/Time    AMYLASE 633 10/09/2019 03:27 PM     Lipase:   Lab Results   Component Value Date/Time    LIPASE 29 07/17/2023 04:51 PM     Troponin: No results for input(s): TROPONINI in the last 72 hours. BNP: No results for input(s): BNP in the last 72 hours. Lipids: No results for input(s): CHOL, HDL in the last 72 hours. Invalid input(s): LDLCALCU  ABGs: No results found for: PHART, PO2ART, GNL9MTA  INR: No results for input(s): INR in the last 72 hours. Thyroid:   Lab Results   Component Value Date/Time    TSH 1.61 10/12/2020 09:30 AM     Urinalysis: No results for input(s): BACTERIA, BLOODU, CLARITYU, COLORU, PHUR, PROTEINU, RBCUA, SPECGRAV, BILIRUBINUR, NITRU, WBCUA, LEUKOCYTESUR, GLUCOSEU in the last 72 hours. Cultures:-  No cultures    CXR  Chest x-ray done in late 22 was normal      CT Scans  No CT scan    Echo    No echo but have had a cardiac cath done recently which was unremarkable. IMPRESSION:    Visit Diagnoses         Codes    FÉLIX (obstructive sleep apnea)    -  Primary G47.33    REM behavioral disorder     G47.52        Asthma  Obesity  Hypertension  REM behavior disorder  : Shift work sleep disorder           PLAN:   Patient has obstructive sleep apnea syndrome that responded very well to the use of BiPAP.   She is awaiting for the delivery of the BiPAP machine so that

## 2023-08-21 DIAGNOSIS — K21.9 GASTROESOPHAGEAL REFLUX DISEASE, UNSPECIFIED WHETHER ESOPHAGITIS PRESENT: ICD-10-CM

## 2023-08-21 RX ORDER — OMEPRAZOLE 40 MG/1
CAPSULE, DELAYED RELEASE ORAL
Qty: 28 CAPSULE | Refills: 3 | OUTPATIENT
Start: 2023-08-21

## 2023-08-21 RX ORDER — ATORVASTATIN CALCIUM 40 MG/1
40 TABLET, FILM COATED ORAL NIGHTLY
Qty: 28 TABLET | Refills: 1 | OUTPATIENT
Start: 2023-08-21

## 2023-09-12 DIAGNOSIS — J30.2 SEASONAL ALLERGIES: ICD-10-CM

## 2023-09-12 DIAGNOSIS — E55.9 VITAMIN D DEFICIENCY: ICD-10-CM

## 2023-09-12 DIAGNOSIS — M62.838 MUSCLE SPASM: ICD-10-CM

## 2023-09-12 DIAGNOSIS — F41.9 ANXIETY: ICD-10-CM

## 2023-09-12 RX ORDER — VENLAFAXINE HYDROCHLORIDE 75 MG/1
CAPSULE, EXTENDED RELEASE ORAL
Qty: 28 CAPSULE | Refills: 3 | Status: SHIPPED | OUTPATIENT
Start: 2023-09-12

## 2023-09-12 RX ORDER — CHLORZOXAZONE 500 MG/1
TABLET ORAL
Qty: 28 TABLET | Refills: 3 | Status: SHIPPED | OUTPATIENT
Start: 2023-09-12

## 2023-09-12 RX ORDER — ERGOCALCIFEROL 1.25 MG/1
CAPSULE ORAL
Qty: 4 CAPSULE | Refills: 3 | Status: SHIPPED | OUTPATIENT
Start: 2023-09-12

## 2023-09-12 RX ORDER — ALBUTEROL SULFATE 90 UG/1
AEROSOL, METERED RESPIRATORY (INHALATION)
Qty: 18 G | Refills: 3 | Status: SHIPPED | OUTPATIENT
Start: 2023-09-12

## 2023-09-22 DIAGNOSIS — K21.9 GASTROESOPHAGEAL REFLUX DISEASE, UNSPECIFIED WHETHER ESOPHAGITIS PRESENT: ICD-10-CM

## 2023-09-22 RX ORDER — OMEPRAZOLE 40 MG/1
CAPSULE, DELAYED RELEASE ORAL
Qty: 28 CAPSULE | Refills: 3 | OUTPATIENT
Start: 2023-09-22

## 2023-09-22 RX ORDER — ATORVASTATIN CALCIUM 40 MG/1
40 TABLET, FILM COATED ORAL NIGHTLY
Qty: 28 TABLET | Refills: 1 | OUTPATIENT
Start: 2023-09-22

## 2023-10-02 NOTE — TELEPHONE ENCOUNTER
LOV 04/24/19 A-T Advancement Flap Text: The defect edges were debeveled with a #15 scalpel blade.  Given the location of the defect, shape of the defect and the proximity to free margins an A-T advancement flap was deemed most appropriate.  Using a sterile surgical marker, an appropriate advancement flap was drawn incorporating the defect and placing the expected incisions within the relaxed skin tension lines where possible.    The area thus outlined was incised deep to adipose tissue with a #15 scalpel blade.  The skin margins were undermined to an appropriate distance in all directions utilizing iris scissors.

## 2023-10-04 ENCOUNTER — OFFICE VISIT (OUTPATIENT)
Dept: FAMILY MEDICINE CLINIC | Age: 63
End: 2023-10-04
Payer: COMMERCIAL

## 2023-10-04 VITALS
BODY MASS INDEX: 34.5 KG/M2 | HEART RATE: 81 BPM | DIASTOLIC BLOOD PRESSURE: 82 MMHG | SYSTOLIC BLOOD PRESSURE: 134 MMHG | OXYGEN SATURATION: 97 % | TEMPERATURE: 97.3 F | WEIGHT: 182.6 LBS

## 2023-10-04 DIAGNOSIS — K21.9 GASTROESOPHAGEAL REFLUX DISEASE, UNSPECIFIED WHETHER ESOPHAGITIS PRESENT: ICD-10-CM

## 2023-10-04 DIAGNOSIS — E78.49 OTHER HYPERLIPIDEMIA: Primary | ICD-10-CM

## 2023-10-04 PROCEDURE — 1036F TOBACCO NON-USER: CPT | Performed by: FAMILY MEDICINE

## 2023-10-04 PROCEDURE — 99213 OFFICE O/P EST LOW 20 MIN: CPT | Performed by: FAMILY MEDICINE

## 2023-10-04 PROCEDURE — G8417 CALC BMI ABV UP PARAM F/U: HCPCS | Performed by: FAMILY MEDICINE

## 2023-10-04 PROCEDURE — 3017F COLORECTAL CA SCREEN DOC REV: CPT | Performed by: FAMILY MEDICINE

## 2023-10-04 PROCEDURE — G8427 DOCREV CUR MEDS BY ELIG CLIN: HCPCS | Performed by: FAMILY MEDICINE

## 2023-10-04 PROCEDURE — G8484 FLU IMMUNIZE NO ADMIN: HCPCS | Performed by: FAMILY MEDICINE

## 2023-10-04 RX ORDER — OMEPRAZOLE 40 MG/1
CAPSULE, DELAYED RELEASE ORAL
Qty: 28 CAPSULE | Refills: 3 | Status: SHIPPED | OUTPATIENT
Start: 2023-10-04

## 2023-10-04 RX ORDER — ATORVASTATIN CALCIUM 40 MG/1
40 TABLET, FILM COATED ORAL NIGHTLY
Qty: 28 TABLET | Refills: 1 | Status: SHIPPED | OUTPATIENT
Start: 2023-10-04

## 2023-10-04 SDOH — ECONOMIC STABILITY: INCOME INSECURITY: HOW HARD IS IT FOR YOU TO PAY FOR THE VERY BASICS LIKE FOOD, HOUSING, MEDICAL CARE, AND HEATING?: NOT HARD AT ALL

## 2023-10-04 SDOH — ECONOMIC STABILITY: FOOD INSECURITY: WITHIN THE PAST 12 MONTHS, YOU WORRIED THAT YOUR FOOD WOULD RUN OUT BEFORE YOU GOT MONEY TO BUY MORE.: NEVER TRUE

## 2023-10-04 SDOH — ECONOMIC STABILITY: HOUSING INSECURITY
IN THE LAST 12 MONTHS, WAS THERE A TIME WHEN YOU DID NOT HAVE A STEADY PLACE TO SLEEP OR SLEPT IN A SHELTER (INCLUDING NOW)?: NO

## 2023-10-04 SDOH — ECONOMIC STABILITY: FOOD INSECURITY: WITHIN THE PAST 12 MONTHS, THE FOOD YOU BOUGHT JUST DIDN'T LAST AND YOU DIDN'T HAVE MONEY TO GET MORE.: NEVER TRUE

## 2023-10-04 ASSESSMENT — PATIENT HEALTH QUESTIONNAIRE - PHQ9
7. TROUBLE CONCENTRATING ON THINGS, SUCH AS READING THE NEWSPAPER OR WATCHING TELEVISION: 0
3. TROUBLE FALLING OR STAYING ASLEEP: 0
1. LITTLE INTEREST OR PLEASURE IN DOING THINGS: 0
9. THOUGHTS THAT YOU WOULD BE BETTER OFF DEAD, OR OF HURTING YOURSELF: 0
SUM OF ALL RESPONSES TO PHQ QUESTIONS 1-9: 2
5. POOR APPETITE OR OVEREATING: 0
4. FEELING TIRED OR HAVING LITTLE ENERGY: 0
8. MOVING OR SPEAKING SO SLOWLY THAT OTHER PEOPLE COULD HAVE NOTICED. OR THE OPPOSITE, BEING SO FIGETY OR RESTLESS THAT YOU HAVE BEEN MOVING AROUND A LOT MORE THAN USUAL: 0
2. FEELING DOWN, DEPRESSED OR HOPELESS: 2
SUM OF ALL RESPONSES TO PHQ9 QUESTIONS 1 & 2: 2
SUM OF ALL RESPONSES TO PHQ QUESTIONS 1-9: 2
6. FEELING BAD ABOUT YOURSELF - OR THAT YOU ARE A FAILURE OR HAVE LET YOURSELF OR YOUR FAMILY DOWN: 0
10. IF YOU CHECKED OFF ANY PROBLEMS, HOW DIFFICULT HAVE THESE PROBLEMS MADE IT FOR YOU TO DO YOUR WORK, TAKE CARE OF THINGS AT HOME, OR GET ALONG WITH OTHER PEOPLE: 0

## 2023-10-10 DIAGNOSIS — G47.52 REM BEHAVIORAL DISORDER: ICD-10-CM

## 2023-10-10 NOTE — TELEPHONE ENCOUNTER
LAST VISIT: 8/15/23  NEXT VISIT: 11/14/23    Per last dictation patient is on this medication. Please sign for refill if ok. Thank you.

## 2023-10-12 RX ORDER — CLONAZEPAM 0.5 MG/1
0.5 TABLET ORAL NIGHTLY
Qty: 30 TABLET | Refills: 1 | Status: SHIPPED | OUTPATIENT
Start: 2023-10-12 | End: 2023-12-11

## 2023-10-21 DIAGNOSIS — E78.49 OTHER HYPERLIPIDEMIA: ICD-10-CM

## 2023-10-23 RX ORDER — ASPIRIN 325 MG
TABLET, DELAYED RELEASE (ENTERIC COATED) ORAL
Qty: 28 TABLET | Refills: 3 | Status: SHIPPED | OUTPATIENT
Start: 2023-10-23

## 2023-10-23 RX ORDER — ATORVASTATIN CALCIUM 40 MG/1
40 TABLET, FILM COATED ORAL NIGHTLY
Qty: 28 TABLET | Refills: 1 | Status: SHIPPED | OUTPATIENT
Start: 2023-10-23

## 2023-11-10 ENCOUNTER — APPOINTMENT (OUTPATIENT)
Dept: GENERAL RADIOLOGY | Age: 63
End: 2023-11-10
Payer: COMMERCIAL

## 2023-11-10 ENCOUNTER — APPOINTMENT (OUTPATIENT)
Dept: CT IMAGING | Age: 63
End: 2023-11-10
Payer: COMMERCIAL

## 2023-11-10 ENCOUNTER — HOSPITAL ENCOUNTER (EMERGENCY)
Age: 63
Discharge: HOME | End: 2023-11-10
Attending: EMERGENCY MEDICINE
Payer: COMMERCIAL

## 2023-11-10 VITALS
SYSTOLIC BLOOD PRESSURE: 132 MMHG | RESPIRATION RATE: 18 BRPM | HEART RATE: 95 BPM | DIASTOLIC BLOOD PRESSURE: 78 MMHG | TEMPERATURE: 98.3 F | OXYGEN SATURATION: 99 %

## 2023-11-10 DIAGNOSIS — K57.32 DIVERTICULITIS OF SIGMOID COLON: Primary | ICD-10-CM

## 2023-11-10 LAB
ALBUMIN SERPL-MCNC: 4.4 G/DL (ref 3.5–5.2)
ALBUMIN/GLOB SERPL: 1.3 {RATIO} (ref 1–2.5)
ALP SERPL-CCNC: 124 U/L (ref 35–104)
ALT SERPL-CCNC: 15 U/L (ref 5–33)
ANION GAP SERPL CALCULATED.3IONS-SCNC: 11 MMOL/L (ref 9–17)
AST SERPL-CCNC: 19 U/L
BACTERIA URNS QL MICRO: ABNORMAL
BASOPHILS # BLD: 0.07 K/UL (ref 0–0.2)
BASOPHILS NFR BLD: 1 % (ref 0–2)
BILIRUB SERPL-MCNC: 0.6 MG/DL (ref 0.3–1.2)
BILIRUB UR QL STRIP: NEGATIVE
BUN SERPL-MCNC: 9 MG/DL (ref 8–23)
CALCIUM SERPL-MCNC: 9.9 MG/DL (ref 8.6–10.4)
CASTS #/AREA URNS LPF: ABNORMAL /LPF (ref 0–8)
CHLORIDE SERPL-SCNC: 101 MMOL/L (ref 98–107)
CLARITY UR: ABNORMAL
CO2 SERPL-SCNC: 28 MMOL/L (ref 20–31)
COLOR UR: YELLOW
CREAT SERPL-MCNC: 0.5 MG/DL (ref 0.5–0.9)
EOSINOPHIL # BLD: 0.21 K/UL (ref 0–0.44)
EOSINOPHILS RELATIVE PERCENT: 2 % (ref 1–4)
EPI CELLS #/AREA URNS HPF: ABNORMAL /HPF (ref 0–5)
ERYTHROCYTE [DISTWIDTH] IN BLOOD BY AUTOMATED COUNT: 12.7 % (ref 11.8–14.4)
GFR SERPL CREATININE-BSD FRML MDRD: >60 ML/MIN/1.73M2
GLUCOSE SERPL-MCNC: 116 MG/DL (ref 70–99)
GLUCOSE UR STRIP-MCNC: NEGATIVE MG/DL
HCT VFR BLD AUTO: 45.4 % (ref 36.3–47.1)
HGB BLD-MCNC: 14.8 G/DL (ref 11.9–15.1)
HGB UR QL STRIP.AUTO: NEGATIVE
IMM GRANULOCYTES # BLD AUTO: 0.04 K/UL (ref 0–0.3)
IMM GRANULOCYTES NFR BLD: 0 %
KETONES UR STRIP-MCNC: NEGATIVE MG/DL
LEUKOCYTE ESTERASE UR QL STRIP: ABNORMAL
LIPASE SERPL-CCNC: 24 U/L (ref 13–60)
LYMPHOCYTES NFR BLD: 1.58 K/UL (ref 1.1–3.7)
LYMPHOCYTES RELATIVE PERCENT: 13 % (ref 24–43)
MCH RBC QN AUTO: 29 PG (ref 25.2–33.5)
MCHC RBC AUTO-ENTMCNC: 32.6 G/DL (ref 28.4–34.8)
MCV RBC AUTO: 89 FL (ref 82.6–102.9)
MONOCYTES NFR BLD: 0.68 K/UL (ref 0.1–1.2)
MONOCYTES NFR BLD: 6 % (ref 3–12)
NEUTROPHILS NFR BLD: 78 % (ref 36–65)
NEUTS SEG NFR BLD: 9.25 K/UL (ref 1.5–8.1)
NITRITE UR QL STRIP: NEGATIVE
NRBC BLD-RTO: 0 PER 100 WBC
PH UR STRIP: 7 [PH] (ref 5–8)
PLATELET # BLD AUTO: 272 K/UL (ref 138–453)
PMV BLD AUTO: 11.3 FL (ref 8.1–13.5)
POTASSIUM SERPL-SCNC: 4.1 MMOL/L (ref 3.7–5.3)
PROT SERPL-MCNC: 7.8 G/DL (ref 6.4–8.3)
PROT UR STRIP-MCNC: NEGATIVE MG/DL
RBC # BLD AUTO: 5.1 M/UL (ref 3.95–5.11)
RBC #/AREA URNS HPF: ABNORMAL /HPF (ref 0–4)
SODIUM SERPL-SCNC: 140 MMOL/L (ref 135–144)
SP GR UR STRIP: 1.01 (ref 1–1.03)
UROBILINOGEN UR STRIP-ACNC: NORMAL EU/DL (ref 0–1)
WBC #/AREA URNS HPF: ABNORMAL /HPF (ref 0–5)
WBC OTHER # BLD: 11.8 K/UL (ref 3.5–11.3)

## 2023-11-10 PROCEDURE — 83690 ASSAY OF LIPASE: CPT

## 2023-11-10 PROCEDURE — 74018 RADEX ABDOMEN 1 VIEW: CPT

## 2023-11-10 PROCEDURE — 85025 COMPLETE CBC W/AUTO DIFF WBC: CPT

## 2023-11-10 PROCEDURE — 6360000004 HC RX CONTRAST MEDICATION

## 2023-11-10 PROCEDURE — 74177 CT ABD & PELVIS W/CONTRAST: CPT

## 2023-11-10 PROCEDURE — 87086 URINE CULTURE/COLONY COUNT: CPT

## 2023-11-10 PROCEDURE — 99285 EMERGENCY DEPT VISIT HI MDM: CPT

## 2023-11-10 PROCEDURE — 81001 URINALYSIS AUTO W/SCOPE: CPT

## 2023-11-10 PROCEDURE — 80053 COMPREHEN METABOLIC PANEL: CPT

## 2023-11-10 RX ORDER — DOCUSATE SODIUM 100 MG/1
100 CAPSULE, LIQUID FILLED ORAL 2 TIMES DAILY
Qty: 60 CAPSULE | Refills: 0 | Status: SHIPPED | OUTPATIENT
Start: 2023-11-10 | End: 2023-12-10

## 2023-11-10 RX ORDER — CIPROFLOXACIN 500 MG/1
500 TABLET, FILM COATED ORAL 2 TIMES DAILY
Qty: 20 TABLET | Refills: 0 | Status: SHIPPED | OUTPATIENT
Start: 2023-11-10 | End: 2023-11-20

## 2023-11-10 RX ORDER — ONDANSETRON 4 MG/1
4 TABLET, ORALLY DISINTEGRATING ORAL 3 TIMES DAILY PRN
Qty: 10 TABLET | Refills: 0 | Status: SHIPPED | OUTPATIENT
Start: 2023-11-10 | End: 2023-11-15

## 2023-11-10 RX ORDER — METRONIDAZOLE 500 MG/1
500 TABLET ORAL 3 TIMES DAILY
Qty: 30 TABLET | Refills: 0 | Status: SHIPPED | OUTPATIENT
Start: 2023-11-10 | End: 2023-11-20

## 2023-11-10 RX ADMIN — IOPAMIDOL 75 ML: 755 INJECTION, SOLUTION INTRAVENOUS at 14:33

## 2023-11-10 ASSESSMENT — ENCOUNTER SYMPTOMS
SHORTNESS OF BREATH: 0
BACK PAIN: 1
ABDOMINAL PAIN: 1
VOMITING: 0
ABDOMINAL DISTENTION: 1
ANAL BLEEDING: 0
COUGH: 0
BLOOD IN STOOL: 0
NAUSEA: 1
DIARRHEA: 0
CONSTIPATION: 1

## 2023-11-10 ASSESSMENT — PAIN - FUNCTIONAL ASSESSMENT: PAIN_FUNCTIONAL_ASSESSMENT: 0-10

## 2023-11-10 ASSESSMENT — PAIN DESCRIPTION - LOCATION: LOCATION: ABDOMEN

## 2023-11-10 ASSESSMENT — PAIN SCALES - GENERAL: PAINLEVEL_OUTOF10: 8

## 2023-11-10 NOTE — ED PROVIDER NOTES
[address] HANDOFF       Handoff taken on the following patient from prior Attending Physician: Dr. Franki Suarez  Pt Name: Marilyn Dasia  PCP:  Joana Sin MD    Attestation  I was available and discussed any additional care issues that arose and coordinated the management plans with the resident(s) caring for the patient during my duty period. Any areas of disagreement with resident's documentation of care or procedures are noted on the chart. I was personally present for the key portions of any/all procedures during my duty period. I have documented in the chart those procedures where I was not present during the key portions. CHIEF COMPLAINT       Chief Complaint   Patient presents with    Abdominal Pain         CURRENT MEDICATIONS     Previous Medications  Previous Medications    ALBUTEROL SULFATE HFA (VENTOLIN HFA) 108 (90 BASE) MCG/ACT INHALER    INAHLE 2 PUFFS BY MOUTH INTO THE LUNGS EVERY 6 HOURS AS NEEDED FOR WHEEZING    ASPIRIN 325 MG EC TABLET    TAKE 1 TABLET BY MOUTH DAILY    ATORVASTATIN (LIPITOR) 40 MG TABLET    TAKE 1 TABLET BY MOUTH NIGHTLY    BUSPIRONE (BUSPAR) 15 MG TABLET    TAKE 1 TABLET BY MOUTH TWICE DAILY    CHLORZOXAZONE (PARAFON FORTE) 500 MG TABLET    TAKE 1/2 TABLET BY MOUTH TWICE DAILY AS NEEDED    CLONAZEPAM (KLONOPIN) 0.5 MG TABLET    Take 1 tablet by mouth nightly for 60 days. at bedtime.  Max Daily Amount: 0.5 mg    CRANBERRY CONCENTRATE 500 MG CAPS    TAKE 1 CAPSULE BY MOUTH TWICE DAILY    DICLOFENAC (VOLTAREN) 50 MG EC TABLET    TAKE 1 TABLET BY MOUTH TWICE DAILY    DICLOFENAC SODIUM (VOLTAREN) 1 % GEL    Apply 4 g topically 4 times daily as needed for Pain    DICLOFENAC SODIUM (VOLTAREN) 1 % GEL    Apply 4 g topically 4 times daily as needed for Pain    DICYCLOMINE (BENTYL) 10 MG CAPSULE    Take 1 capsule by mouth 4 times daily    EPINEPHRINE (EPIPEN 2-VIOLET) 0.3 MG/0.3ML SOAJ INJECTION    Inject 0.3 mLs into the muscle once for 1 dose Use CT ABDOMEN PELVIS W IV CONTRAST Additional Contrast? Radiologist Recommendation   Final Result   Acute sigmoid diverticulitis without perforation or abscess         XR ABDOMEN (KUB) (SINGLE AP VIEW)   Final Result   Nonspecific, nonobstructive bowel gas pattern. Moderate retained stool. LABS:  Labs Reviewed   CBC WITH AUTO DIFFERENTIAL - Abnormal; Notable for the following components:       Result Value    WBC 11.8 (*)     Neutrophils % 78 (*)     Lymphocytes % 13 (*)     Neutrophils Absolute 9.25 (*)     All other components within normal limits   COMPREHENSIVE METABOLIC PANEL - Abnormal; Notable for the following components:    Glucose 116 (*)     Alkaline Phosphatase 124 (*)     All other components within normal limits   URINALYSIS WITH REFLEX TO CULTURE - Abnormal; Notable for the following components:    Turbidity UA Cloudy (*)     Leukocyte Esterase, Urine TRACE (*)     All other components within normal limits   MICROSCOPIC URINALYSIS - Abnormal; Notable for the following components:    Bacteria, UA FEW (*)     All other components within normal limits   CULTURE, URINE   LIPASE           PLAN/ TASKS OUTSTANDING     Likely diverticulitis. Also has UTI. Plan for treatment. CT pending.        (Please note that portions of this note were completed with a voice recognition program.  Efforts were made to edit the dictations but occasionally words are mis-transcribed.)    Arabella Silva MD, MD,   Attending Emergency Physician       Arabella Silva MD  11/10/23 1450

## 2023-11-10 NOTE — ED NOTES
Pt taken to [facility], [address], [place]  11/10/23 2940

## 2023-11-10 NOTE — ED PROVIDER NOTES
[facility] ED  Emergency Department Encounter  EmergencyMedicine Resident     Pt Leonardo Phan  MRN: [id number]  [address] 1960  Date of evaluation: 11/10/23  PCP:  Marv Veliz MD      CHIEF COMPLAINT       Chief Complaint   Patient presents with    Abdominal Pain       HISTORY OF PRESENT ILLNESS  (Location/Symptom, Timing/Onset, Context/Setting, Quality, Duration, Modifying Factors, Severity.)      Valentino Clerk is a 59 y.o. female who presents with lower abdominal pain and back pain onset last night. Progressively getting worse. Constant background pain of 8/10 and goes up to 10/10 when its worse. Has not had bowel movement in 2 days. She usually goes up to 1 to 2 weeks without bowel movements. Patient has not eaten today which is not new. She passes gas. Denies vomiting, fever, tenesmus, dysuria. Medical history significant for diverticulitis/diverticulosis, bilateral punctate nonobstructing nephrolithiasis on CT abdomen July 2023, hepatic steatosis, gerd. Total hysterectomy and oophorectomy due to strong family history of uterine and rectal cancers. PAST MEDICAL / SURGICAL / SOCIAL / FAMILY HISTORY      has a past medical history of Arthritis, ASCUS with positive high risk HPV cervical, Asthma, COVID-19, Depression, Diverticulitis, ESBL (extended spectrum beta-lactamase) producing bacteria infection, GERD (gastroesophageal reflux disease), Hyperlipidemia, MRSA (methicillin resistant staph aureus) culture positive, Rectocele, and Tachycardia. has a past surgical history that includes Hysterectomy (1996); knee surgery (Left); Cystocopy (02/25/2015); Nerve Block (07/28/2017); Nerve Block (09/22/2017); Nerve Block (09/22/2017); Nerve Block (11/10/2017); Colonoscopy (N/A, 07/19/2018); Upper gastrointestinal endoscopy (01/30/2019); Knee arthroscopy (Left, 05/13/2019); Ankle surgery (Left, 03/04/2021);  Gastrocnemius Recession (Right, 11/04/2022); and Ovary mmol/L    CO2 28 20 - 31 mmol/L    Anion Gap 11 9 - 17 mmol/L    Glucose 116 (H) 70 - 99 mg/dL    BUN 9 8 - 23 mg/dL    Creatinine 0.5 0.5 - 0.9 mg/dL    Est, Glom Filt Rate >60 >60 mL/min/1.73m2    Calcium 9.9 8.6 - 10.4 mg/dL    Total Protein 7.8 6.4 - 8.3 g/dL    Albumin 4.4 3.5 - 5.2 g/dL    Albumin/Globulin Ratio 1.3 1.0 - 2.5    Total Bilirubin 0.6 0.3 - 1.2 mg/dL    Alkaline Phosphatase 124 (H) 35 - 104 U/L    ALT 15 5 - 33 U/L    AST 19 <32 U/L   Lipase   Result Value Ref Range    Lipase 24 13 - 60 U/L   Urinalysis with Reflex to Culture    Specimen: Urine   Result Value Ref Range    Color, UA Yellow Yellow    Turbidity UA Cloudy (A) Clear    Glucose, Ur NEGATIVE NEGATIVE mg/dL    Bilirubin Urine NEGATIVE NEGATIVE    Ketones, Urine NEGATIVE NEGATIVE mg/dL    Specific Gravity, UA 1.012 1.005 - 1.030    Urine Hgb NEGATIVE NEGATIVE    pH, UA 7.0 5.0 - 8.0    Protein, UA NEGATIVE NEGATIVE mg/dL    Urobilinogen, Urine Normal 0.0 - 1.0 EU/dL    Nitrite, Urine NEGATIVE NEGATIVE    Leukocyte Esterase, Urine TRACE (A) NEGATIVE   Microscopic Urinalysis   Result Value Ref Range    WBC, UA 10 TO 20 0 - 5 /HPF    RBC, UA 2 TO 5 0 - 4 /HPF    Casts UA  0 - 8 /LPF     0 TO 2 HYALINE Reference range defined for non-centrifuged specimen. Epithelial Cells UA 10 TO 20 0 - 5 /HPF    Bacteria, UA FEW (A) None         RADIOLOGY:  CT ABDOMEN PELVIS W IV CONTRAST Additional Contrast? Radiologist Recommendation   Final Result   Acute sigmoid diverticulitis without perforation or abscess         XR ABDOMEN (KUB) (SINGLE AP VIEW)   Final Result   Nonspecific, nonobstructive bowel gas pattern. Moderate retained stool. IMPRESSION: Acute sigmoid diverticulitis      EMERGENCY DEPARTMENT COURSE:  ED Course as of 11/10/23 1733   Fri Nov 10, 2023   1712 Imaging and labs reviewed. Acute sigmoid diverticulitis. Few bacteria in urine with trace leukocyte esterase. Mild leukocytosis 11.8.   We will treat patient with Cipro

## 2023-11-10 NOTE — ED TRIAGE NOTES
Pt. Presents to the ED via triage with c/o abdominal pain. Pt. States this pain started yesterday. Pt. Has a history of diverticulosis but states this pain feels different from her normal pain. Pt. States her last BM was 2 days ago and it was normal. Pt. Denies vomiting but states she is nauseous. Pt. Is resting in bed with personal items and call light within reach. Will continue with plan of care.

## 2023-11-10 NOTE — DISCHARGE INSTRUCTIONS
Take Cipro and metronidazole as prescribed  Use Colace and other laxatives to help prevent constipation  Encourage liquid diet for first 2 days then advance diet as tolerated  Use Zofran if any vomiting  Please return if you have any worsening abdominal pain, intractable vomiting, and fever.   Please follow-up with your PCP as needed

## 2023-11-11 LAB
MICROORGANISM SPEC CULT: NORMAL
SPECIMEN DESCRIPTION: NORMAL

## 2023-11-14 DIAGNOSIS — F41.9 ANXIETY: ICD-10-CM

## 2023-11-14 DIAGNOSIS — G47.52 REM BEHAVIORAL DISORDER: ICD-10-CM

## 2023-11-14 RX ORDER — BUSPIRONE HYDROCHLORIDE 15 MG/1
TABLET ORAL
Qty: 56 TABLET | Refills: 3 | OUTPATIENT
Start: 2023-11-14

## 2023-11-14 RX ORDER — DOCUSATE SODIUM 100 MG/1
100 CAPSULE, LIQUID FILLED ORAL 2 TIMES DAILY
Qty: 60 CAPSULE | Refills: 0 | OUTPATIENT
Start: 2023-11-14

## 2023-11-14 NOTE — TELEPHONE ENCOUNTER
LAST VISIT: 8/15/23  NEXT VISIT: 2/22/24 (patient cancelled last appointment)     Per last dictation patient is on this medication. Please sign for refill if ok. Thank you.

## 2023-11-20 RX ORDER — CLONAZEPAM 0.5 MG/1
0.5 TABLET ORAL NIGHTLY
Qty: 30 TABLET | Refills: 3 | Status: SHIPPED | OUTPATIENT
Start: 2023-11-20 | End: 2024-03-20

## 2023-12-12 DIAGNOSIS — F41.9 ANXIETY: ICD-10-CM

## 2023-12-13 RX ORDER — BUSPIRONE HYDROCHLORIDE 15 MG/1
TABLET ORAL
Qty: 56 TABLET | Refills: 3 | OUTPATIENT
Start: 2023-12-13

## 2023-12-14 NOTE — TELEPHONE ENCOUNTER
Priscila Stubbs is calling to request a refill on the following medication(s):    Last Visit Date (If Applicable):  10/4/2023    Next Visit Date:    Visit date not found    Medication Request:  Requested Prescriptions     Refused Prescriptions Disp Refills    busPIRone (BUSPAR) 15 MG tablet [Pharmacy Med Name: busPIRone HCl 15 MG Oral Tablet] 56 tablet 3     Sig: TAKE 1 TABLET BY MOUTH TWICE DAILY     Refused By: BEHLMER, MARCIA     Reason for Refusal: Not the prescriber of this medication

## 2024-01-11 DIAGNOSIS — K21.9 GASTROESOPHAGEAL REFLUX DISEASE, UNSPECIFIED WHETHER ESOPHAGITIS PRESENT: ICD-10-CM

## 2024-01-11 DIAGNOSIS — E55.9 VITAMIN D DEFICIENCY: ICD-10-CM

## 2024-01-11 DIAGNOSIS — M62.838 MUSCLE SPASM: ICD-10-CM

## 2024-01-11 DIAGNOSIS — F41.9 ANXIETY: ICD-10-CM

## 2024-01-11 DIAGNOSIS — E78.49 OTHER HYPERLIPIDEMIA: ICD-10-CM

## 2024-01-11 RX ORDER — CHLORZOXAZONE 500 MG/1
TABLET ORAL
Qty: 28 TABLET | Refills: 1 | Status: SHIPPED | OUTPATIENT
Start: 2024-01-11

## 2024-01-11 RX ORDER — VENLAFAXINE HYDROCHLORIDE 75 MG/1
CAPSULE, EXTENDED RELEASE ORAL
Qty: 28 CAPSULE | Refills: 1 | Status: SHIPPED | OUTPATIENT
Start: 2024-01-11

## 2024-01-11 RX ORDER — OMEPRAZOLE 40 MG/1
CAPSULE, DELAYED RELEASE ORAL
Qty: 28 CAPSULE | Refills: 1 | Status: SHIPPED | OUTPATIENT
Start: 2024-01-11

## 2024-01-11 RX ORDER — ATORVASTATIN CALCIUM 40 MG/1
40 TABLET, FILM COATED ORAL NIGHTLY
Qty: 28 TABLET | Refills: 1 | Status: SHIPPED | OUTPATIENT
Start: 2024-01-11

## 2024-01-11 RX ORDER — ERGOCALCIFEROL 1.25 MG/1
CAPSULE ORAL
Qty: 4 CAPSULE | Refills: 1 | Status: SHIPPED | OUTPATIENT
Start: 2024-01-11

## 2024-02-14 NOTE — PROGRESS NOTES
Subjective:      Patient ID: Priscila Stubbs is a 63 y.o. female.     Patient must see physician at next appointment    HPI  Patient here for follow-up for FÉLIX, REM and mild intermittent asthma. Last seen in office per Dr. Simmons in August 2023    Compliance report reviewed from 11/21/2023 through 2/18/2024.  8 out of 90 days used, only 7 days for more than 4 hours putting her at 8% compliant.  Her residual AHI is 5.7.  Patient states that she has not been using her machine because she feels that she is having headaches from it.  Patient educated that untreated sleep apnea symptoms include headache.  Encouraged her to be compliant with her machine due to the risks of untreated sleep apnea.      Medications:   Albuterol HFA- spring and fall.         Smoking status:  Former smoker. Quit 40 years ago. Social smoker less than 1 pack per week.     Occupational status/pets:  Works at [employer]- worked in other fast [employer]. Has a dog and cat at home, No birds or turtles. No indoor hot tubs or saunas.     PRIOR WORKUP:  PFT:  PFT 8/15/2023: FVC is 2.68, 102% of predicted, FEV1 of 2.17, 102% of predicted, FEV1/FVC ratio is 81, 100% of predicted.  RV is 1.41, 88% of predicted, TLC is 4.09, 96% of predicted.  DSP is 17.86, 100% of predicted.  Final impression: Normal PFT.    CT Imaging:      Sleep Study:  Titration study 7/11/2023: Patient was titrated on to a final setting of BiPAP, IPAP of 18 cm H2O and EPAP of 14 cm H2O.  At this setting patient had 1 central apnea and no hypopneas for residual AHI of 0.9 and a minimum SpO2 of 94%.    Diagnostic sleep study 5/23/2023: Patient had 79 apneas and 189 hypopneas with an AHI for the entire night of 28.4.  AHI was 30.9 during NREM sleep compared to an AHI of 10.4 during REM sleep.  Minimum SpO2 was 79% and patient had a total of 32.4 minutes with an SpO2 of less than 89%.  Patient had no leg movements throughout the night and a PLM index of 0.0 with no associated

## 2024-02-19 ENCOUNTER — OFFICE VISIT (OUTPATIENT)
Dept: PULMONOLOGY | Age: 64
End: 2024-02-19
Payer: COMMERCIAL

## 2024-02-19 VITALS
HEIGHT: 63 IN | SYSTOLIC BLOOD PRESSURE: 138 MMHG | BODY MASS INDEX: 32.35 KG/M2 | HEART RATE: 77 BPM | DIASTOLIC BLOOD PRESSURE: 82 MMHG | OXYGEN SATURATION: 95 %

## 2024-02-19 DIAGNOSIS — G47.52 REM BEHAVIORAL DISORDER: ICD-10-CM

## 2024-02-19 DIAGNOSIS — G47.33 OSA (OBSTRUCTIVE SLEEP APNEA): Primary | ICD-10-CM

## 2024-02-19 DIAGNOSIS — J45.20 MILD INTERMITTENT ASTHMA WITHOUT COMPLICATION: ICD-10-CM

## 2024-02-19 DIAGNOSIS — E66.9 OBESITY (BMI 30-39.9): ICD-10-CM

## 2024-02-19 PROCEDURE — 99214 OFFICE O/P EST MOD 30 MIN: CPT | Performed by: NURSE PRACTITIONER

## 2024-02-19 ASSESSMENT — ENCOUNTER SYMPTOMS
GASTROINTESTINAL NEGATIVE: 1
ALLERGIC/IMMUNOLOGIC NEGATIVE: 1
EYES NEGATIVE: 1

## 2024-03-07 DIAGNOSIS — E78.49 OTHER HYPERLIPIDEMIA: ICD-10-CM

## 2024-03-07 DIAGNOSIS — M62.838 MUSCLE SPASM: ICD-10-CM

## 2024-03-07 DIAGNOSIS — F41.9 ANXIETY: ICD-10-CM

## 2024-03-07 DIAGNOSIS — E55.9 VITAMIN D DEFICIENCY: ICD-10-CM

## 2024-03-07 DIAGNOSIS — K21.9 GASTROESOPHAGEAL REFLUX DISEASE, UNSPECIFIED WHETHER ESOPHAGITIS PRESENT: ICD-10-CM

## 2024-03-08 RX ORDER — VENLAFAXINE HYDROCHLORIDE 75 MG/1
CAPSULE, EXTENDED RELEASE ORAL
Qty: 28 CAPSULE | Refills: 1 | Status: SHIPPED | OUTPATIENT
Start: 2024-03-08

## 2024-03-08 RX ORDER — ASPIRIN 325 MG
TABLET, DELAYED RELEASE (ENTERIC COATED) ORAL
Qty: 28 TABLET | Refills: 3 | Status: SHIPPED | OUTPATIENT
Start: 2024-03-08

## 2024-03-08 RX ORDER — ATORVASTATIN CALCIUM 40 MG/1
40 TABLET, FILM COATED ORAL NIGHTLY
Qty: 28 TABLET | Refills: 1 | Status: SHIPPED | OUTPATIENT
Start: 2024-03-08

## 2024-03-08 RX ORDER — ERGOCALCIFEROL 1.25 MG/1
CAPSULE ORAL
Qty: 4 CAPSULE | Refills: 1 | Status: SHIPPED | OUTPATIENT
Start: 2024-03-08

## 2024-03-08 RX ORDER — OMEPRAZOLE 40 MG/1
CAPSULE, DELAYED RELEASE ORAL
Qty: 28 CAPSULE | Refills: 1 | Status: SHIPPED | OUTPATIENT
Start: 2024-03-08

## 2024-03-08 RX ORDER — CHLORZOXAZONE 500 MG/1
TABLET ORAL
Qty: 28 TABLET | Refills: 1 | Status: SHIPPED | OUTPATIENT
Start: 2024-03-08

## 2024-03-23 ENCOUNTER — APPOINTMENT (OUTPATIENT)
Dept: GENERAL RADIOLOGY | Age: 64
End: 2024-03-23
Payer: COMMERCIAL

## 2024-03-23 ENCOUNTER — APPOINTMENT (OUTPATIENT)
Dept: CT IMAGING | Age: 64
End: 2024-03-23
Payer: COMMERCIAL

## 2024-03-23 ENCOUNTER — HOSPITAL ENCOUNTER (EMERGENCY)
Age: 64
Discharge: HOME | End: 2024-03-23
Attending: EMERGENCY MEDICINE
Payer: COMMERCIAL

## 2024-03-23 VITALS
WEIGHT: 183.86 LBS | DIASTOLIC BLOOD PRESSURE: 72 MMHG | TEMPERATURE: 98.8 F | HEIGHT: 61 IN | OXYGEN SATURATION: 99 % | BODY MASS INDEX: 34.71 KG/M2 | RESPIRATION RATE: 15 BRPM | SYSTOLIC BLOOD PRESSURE: 120 MMHG | HEART RATE: 72 BPM

## 2024-03-23 DIAGNOSIS — R10.32 LEFT LOWER QUADRANT ABDOMINAL PAIN: Primary | ICD-10-CM

## 2024-03-23 DIAGNOSIS — K57.90 DIVERTICULOSIS: ICD-10-CM

## 2024-03-23 LAB
ALBUMIN SERPL-MCNC: 4.4 G/DL (ref 3.5–5.2)
ALBUMIN/GLOB SERPL: 2 {RATIO} (ref 1–2.5)
ALP SERPL-CCNC: 81 U/L (ref 35–104)
ALT SERPL-CCNC: 25 U/L (ref 10–35)
ANION GAP SERPL CALCULATED.3IONS-SCNC: 10 MMOL/L (ref 9–16)
AST SERPL-CCNC: 21 U/L (ref 10–35)
BACTERIA URNS QL MICRO: NORMAL
BILIRUB SERPL-MCNC: 0.2 MG/DL (ref 0–1.2)
BILIRUB UR QL STRIP: NEGATIVE
BUN SERPL-MCNC: 17 MG/DL (ref 8–23)
CALCIUM SERPL-MCNC: 9 MG/DL (ref 8.6–10.4)
CASTS #/AREA URNS LPF: NORMAL /LPF (ref 0–8)
CHLORIDE SERPL-SCNC: 104 MMOL/L (ref 98–107)
CLARITY UR: ABNORMAL
CO2 SERPL-SCNC: 27 MMOL/L (ref 20–31)
COLOR UR: YELLOW
CREAT SERPL-MCNC: 0.6 MG/DL (ref 0.5–0.9)
EPI CELLS #/AREA URNS HPF: NORMAL /HPF (ref 0–5)
ERYTHROCYTE [DISTWIDTH] IN BLOOD BY AUTOMATED COUNT: 13.2 % (ref 11.8–14.4)
GFR SERPL CREATININE-BSD FRML MDRD: >60 ML/MIN/1.73M2
GLUCOSE SERPL-MCNC: 95 MG/DL (ref 74–99)
GLUCOSE UR STRIP-MCNC: NEGATIVE MG/DL
HCT VFR BLD AUTO: 38.5 % (ref 36.3–47.1)
HGB BLD-MCNC: 12.8 G/DL (ref 11.9–15.1)
HGB UR QL STRIP.AUTO: NEGATIVE
KETONES UR STRIP-MCNC: NEGATIVE MG/DL
LACTIC ACID, WHOLE BLOOD: 1.1 MMOL/L (ref 0.7–2.1)
LEUKOCYTE ESTERASE UR QL STRIP: ABNORMAL
LIPASE SERPL-CCNC: 31 U/L (ref 13–60)
MCH RBC QN AUTO: 29.7 PG (ref 25.2–33.5)
MCHC RBC AUTO-ENTMCNC: 33.2 G/DL (ref 28.4–34.8)
MCV RBC AUTO: 89.3 FL (ref 82.6–102.9)
NITRITE UR QL STRIP: NEGATIVE
NRBC BLD-RTO: 0 PER 100 WBC
PH UR STRIP: 6.5 [PH] (ref 5–8)
PLATELET # BLD AUTO: 241 K/UL (ref 138–453)
PMV BLD AUTO: 11.5 FL (ref 8.1–13.5)
POTASSIUM SERPL-SCNC: 3.8 MMOL/L (ref 3.7–5.3)
PROT SERPL-MCNC: 6.5 G/DL (ref 6.6–8.7)
PROT UR STRIP-MCNC: NEGATIVE MG/DL
RBC # BLD AUTO: 4.31 M/UL (ref 3.95–5.11)
RBC #/AREA URNS HPF: NORMAL /HPF (ref 0–4)
SODIUM SERPL-SCNC: 141 MMOL/L (ref 136–145)
SP GR UR STRIP: 1.01 (ref 1–1.03)
TROPONIN I SERPL HS-MCNC: <6 NG/L (ref 0–14)
UROBILINOGEN UR STRIP-ACNC: NORMAL EU/DL (ref 0–1)
WBC #/AREA URNS HPF: NORMAL /HPF (ref 0–5)
WBC OTHER # BLD: 5.8 K/UL (ref 3.5–11.3)

## 2024-03-23 PROCEDURE — 74177 CT ABD & PELVIS W/CONTRAST: CPT

## 2024-03-23 PROCEDURE — 80053 COMPREHEN METABOLIC PANEL: CPT

## 2024-03-23 PROCEDURE — 83605 ASSAY OF LACTIC ACID: CPT

## 2024-03-23 PROCEDURE — 71045 X-RAY EXAM CHEST 1 VIEW: CPT

## 2024-03-23 PROCEDURE — 84484 ASSAY OF TROPONIN QUANT: CPT

## 2024-03-23 PROCEDURE — 99285 EMERGENCY DEPT VISIT HI MDM: CPT

## 2024-03-23 PROCEDURE — 93005 ELECTROCARDIOGRAM TRACING: CPT

## 2024-03-23 PROCEDURE — 85027 COMPLETE CBC AUTOMATED: CPT

## 2024-03-23 PROCEDURE — 6360000002 HC RX W HCPCS

## 2024-03-23 PROCEDURE — 96374 THER/PROPH/DIAG INJ IV PUSH: CPT

## 2024-03-23 PROCEDURE — 81001 URINALYSIS AUTO W/SCOPE: CPT

## 2024-03-23 PROCEDURE — 6370000000 HC RX 637 (ALT 250 FOR IP)

## 2024-03-23 PROCEDURE — 96375 TX/PRO/DX INJ NEW DRUG ADDON: CPT

## 2024-03-23 PROCEDURE — 6360000004 HC RX CONTRAST MEDICATION

## 2024-03-23 PROCEDURE — 83690 ASSAY OF LIPASE: CPT

## 2024-03-23 RX ORDER — AMOXICILLIN AND CLAVULANATE POTASSIUM 875; 125 MG/1; MG/1
1 TABLET, FILM COATED ORAL ONCE
Status: COMPLETED | OUTPATIENT
Start: 2024-03-23 | End: 2024-03-23

## 2024-03-23 RX ORDER — ONDANSETRON 2 MG/ML
4 INJECTION INTRAMUSCULAR; INTRAVENOUS ONCE
Status: COMPLETED | OUTPATIENT
Start: 2024-03-23 | End: 2024-03-23

## 2024-03-23 RX ORDER — AMOXICILLIN AND CLAVULANATE POTASSIUM 875; 125 MG/1; MG/1
1 TABLET, FILM COATED ORAL 2 TIMES DAILY
Qty: 20 TABLET | Refills: 0 | Status: SHIPPED | OUTPATIENT
Start: 2024-03-23 | End: 2024-04-02

## 2024-03-23 RX ORDER — MORPHINE SULFATE 4 MG/ML
4 INJECTION INTRAVENOUS ONCE
Status: COMPLETED | OUTPATIENT
Start: 2024-03-23 | End: 2024-03-23

## 2024-03-23 RX ADMIN — AMOXICILLIN AND CLAVULANATE POTASSIUM 1 TABLET: 875; 125 TABLET, FILM COATED ORAL at 20:32

## 2024-03-23 RX ADMIN — IOPAMIDOL 75 ML: 755 INJECTION, SOLUTION INTRAVENOUS at 18:55

## 2024-03-23 RX ADMIN — ONDANSETRON 4 MG: 2 INJECTION INTRAMUSCULAR; INTRAVENOUS at 17:34

## 2024-03-23 RX ADMIN — MORPHINE SULFATE 4 MG: 4 INJECTION INTRAVENOUS at 17:35

## 2024-03-23 ASSESSMENT — ENCOUNTER SYMPTOMS
CONSTIPATION: 1
NAUSEA: 1
ABDOMINAL PAIN: 1
DIARRHEA: 1
VOMITING: 0
SHORTNESS OF BREATH: 0

## 2024-03-23 ASSESSMENT — PAIN SCALES - GENERAL
PAINLEVEL_OUTOF10: 10
PAINLEVEL_OUTOF10: 8

## 2024-03-23 ASSESSMENT — PAIN - FUNCTIONAL ASSESSMENT: PAIN_FUNCTIONAL_ASSESSMENT: 0-10

## 2024-03-23 ASSESSMENT — PAIN DESCRIPTION - DESCRIPTORS: DESCRIPTORS: PRESSURE

## 2024-03-23 ASSESSMENT — PAIN DESCRIPTION - LOCATION
LOCATION: ABDOMEN
LOCATION: ABDOMEN

## 2024-03-23 NOTE — ED PROVIDER NOTES
[facility] ED  Emergency Department  Emergency Medicine Resident Sign-out     Care of Priscila Stubbs was assumed from Dr. Fuentes and is being seen for Abdominal Pain  .  The patient's initial evaluation and plan have been discussed with the prior provider who initially evaluated the patient.     EMERGENCY DEPARTMENT COURSE / MEDICAL DECISION MAKING:       MEDICATIONS GIVEN:  Orders Placed This Encounter   Medications    ondansetron (ZOFRAN) injection 4 mg    morphine injection 4 mg       LABS / RADIOLOGY:     Labs Reviewed   COMPREHENSIVE METABOLIC PANEL - Abnormal; Notable for the following components:       Result Value    Total Protein 6.5 (*)     All other components within normal limits   CBC   LIPASE   LACTIC ACID   TROPONIN   URINALYSIS WITH REFLEX TO CULTURE       XR CHEST PORTABLE    Result Date: 3/23/2024  EXAMINATION: ONE XRAY VIEW OF THE CHEST 3/23/2024 5:43 pm COMPARISON: None. HISTORY: ORDERING SYSTEM PROVIDED HISTORY: cp TECHNOLOGIST PROVIDED HISTORY: cp FINDINGS: The lungs appear clear. The heart and mediastinal structures are unremarkable. Bony thorax appears normal. Visualized upper abdomen is unremarkable.     No abnormalities noted.       RECENT VITALS:     Temp: 98.8 °F (37.1 °C),  Pulse: 71, Respirations: 20, BP: (!) 182/93, SpO2: 99 %      This patient is a 64 y.o. Female with hx of diverticulitis and nephrolithiasis. Pain in the LLQ and CT imaging. No issues with Bms. Chronic constipration. No hematochezia. Labs within normal range.       ED Course as of 03/23/24 2202   Sat Mar 23, 2024   1817 Signed out to Dr. Guzmán, pending CT abdomen pelvis [AK]   1850 WBC: 5.8  CBC shows no leukocytosis or indications of anemia or vitamin deficiency based on MCV. [AS]   1850 Lipase: 31  Medications of pancreatitis. [AS]   1851 Lactic Acid, Whole Blood: 1.1 [AS]   1851 Troponin, High Sensitivity: <6  No indication of acute cardiac ischemia. [AS]   1851 Sodium: 141  CMP shows no

## 2024-03-23 NOTE — ED PROVIDER NOTES
I performed a history and physical examination of the patient and discussed management with the resident. I reviewed the resident’s note and agree with the documented findings and plan of care. Any areas of disagreement are noted on the chart. I was personally present for the key portions of any procedures. I have documented in the chart those procedures where I was not present during the key portions. I have reviewed the emergency nurses triage note. I agree with the chief complaint, past medical history, past surgical history, allergies, medications, social and family history as documented unless otherwise noted below. Documentation of the HPI, Physical Exam and Medical Decision Making performed by medical students or scribes is based on my personal performance of the HPI, PE and MDM.   For Phys Assistant/ Nurse Practitioner cases/documentation I have personally evaluated this patient and have completed at least one if not all key elements of the E/M (history, physical exam, and MDM). I find the patient's history and physical exam are consistent with the NP/PA documentation. I agree with the care provided, treatment rendered, disposition and followup plan.   Additional findings are as noted.    Chan Akhtar MD  Attending Emergency  Physician        This patient presented to the emergency room with complaints of abdominal discomfort which began in the left lower quadrant early yesterday morning and appeared to improve today but then recurred this afternoon.  She reports episode of diarrhea yesterday without melena or hematochezia.  She has chills but no documented fever.  No emesis.  No dysuria, hematuria, frequency, urgency.  No chest pain or difficulty breathing.  Past medical history is significant for diverticular disease and she indicates that her current symptoms appear to be consistent with those she is experienced with her diverticular disease in the past.  No prior surgeries.  No vaginal discharge or  bleeding.   Awake, alert, cooperative, responsive. Speech fluent, normal comprehension.  Lungs clear bilaterally.  No rales, rhonchi, wheezes, stridor, retractions.  Cardiac-S1S2, RRR, no murmur, rub, or gallop.  Abdomen soft, nondistended, mild diffuse tenderness and moderate tenderness in the left lower quadrant without guarding or rebound.  No organomegaly, mass, bruit noted.  Normal bowel sounds.  Sclerae are anicteric and conjunctivae are normal.  Impression: Abdominal pain with concern for possible diverticular disease.  Plan: We will establish IV access and provide symptomatic treatment as well as check labs and CT of the abdomen and final determination will proceed after the workup is complete.       Chan Akhtar MD  03/23/24 9981      EKG Interpretation    Interpreted by me    Rhythm: normal sinus with sinus arrhythmia.  Rate: 66  Axis: normal  Ectopy: none  Conduction: normal  ST Segments: no acute change  T Waves: no acute change  Q Waves: none    Clinical Impression: no acute changes and normal EKG. no clinically significant morphologic changes noted compared to prior tracing dated 7 January 2023.  Previous tracing dated 17 July 2023 appears to be consistent with lead misplacement when compared with these 2 tracings.       Chan Akhtar MD  03/23/24 1954      I reviewed the patient's lab results as well as the CT scan of the abdomen and the radiologist interpretation of same.  There are no clinically significant or diagnostic findings noted on labs.  Lipase is normal.  CBC is normal.  CT scan does not demonstrate any obvious findings consistent with diverticulitis however I suspect that the patient actually may be experiencing the very early symptoms of diverticulitis.  Impression: Early diverticulitis.  Plan: Patient will be discharged with instructions to follow-up with her primary care provider at the next available opportunity to contact Dr. Bella on Monday to make that arrangement.  Will

## 2024-03-23 NOTE — ED NOTES
Report given to Cherelle CONDON  No change in patient status  Continues to rest quietly    Patient up to bathroom.    Ambulated without any difficulties  Returned to room.  Will continue to monitor    The following labs were labeled with appropriate pt sticker and tubed to lab:     [] Blue     [] Lavender   [] on ice  [] Green/yellow  [] Green/black [] on ice  [] Grey  [] on ice  [] Yellow  [] Red  [] Pink  [] Type/ Screen  [] ABG  [] VBG    [] COVID-19 swab    [] Rapid  [] PCR  [] Flu swab  [] Peds Viral Panel     [x] Urine Sample  [] Fecal Sample  [] Pelvic Cultures  [] Blood Cultures  [] X 2  [] STREP Cultures  [] Wound Cultures

## 2024-03-23 NOTE — ED NOTES
Received pt from TAURUS Portillo.   Pt is resting with eyes open, NAD.  Pt's vital signs updated.   Plan of care on going.

## 2024-03-23 NOTE — ED PROVIDER NOTES
[facility] ED  Emergency Department Encounter  Emergency Medicine Resident     Pt Name:Priscila Stubbs  MRN: [id number]  Birthdate 1960  Date of evaluation: 3/23/24  PCP:  Luiza Bella MD  Note Started: 5:56 PM EDT      CHIEF COMPLAINT       Chief Complaint   Patient presents with    Abdominal Pain       HISTORY OF PRESENT ILLNESS  (Location/Symptom, Timing/Onset, Context/Setting, Quality, Duration, Modifying Factors, Severity.)      Priscila Stubbs is a 64 y.o. female who presents with abdominal pain.  Patient states she has left lower quadrant abdominal pain which feels like her typical diverticulitis symptoms.  She states this started this afternoon approximately noon.  Denies any fever, chills, chest pain, shortness of breath associated.  She does state that she had an episode of diarrhea yesterday which is uncommon for her as she is typically constipated.  Denies any vomiting, hematemesis, melena, hematochezia.    PAST MEDICAL / SURGICAL / SOCIAL / FAMILY HISTORY      has a past medical history of Arthritis, ASCUS with positive high risk HPV cervical, Asthma, COVID-19, Depression, Diverticulitis, ESBL (extended spectrum beta-lactamase) producing bacteria infection, GERD (gastroesophageal reflux disease), Hyperlipidemia, MRSA (methicillin resistant staph aureus) culture positive, Rectocele, and Tachycardia.     has a past surgical history that includes Hysterectomy (1996); knee surgery (Left); Cystocopy (02/25/2015); Nerve Block (07/28/2017); Nerve Block (09/22/2017); Nerve Block (09/22/2017); Nerve Block (11/10/2017); Colonoscopy (N/A, 07/19/2018); Upper gastrointestinal endoscopy (01/30/2019); Knee arthroscopy (Left, 05/13/2019); Ankle surgery (Left, 03/04/2021); Gastrocnemius Recession (Right, 11/04/2022); and Ovary removal.      Social History     Socioeconomic History    Marital status:      Spouse name: Not on file    Number of children: Not on file    Years of  (ERGOCALCIFEROL) 1.25 MG (68939 UT) CAPS capsule TAKE 1 CAPSULE BY MOUTH EVERY WEEK 3/8/24   Luiza Bella MD   chlorzoxazone (PARAFON FORTE) 500 MG tablet TAKE 1/2 TABLET BY MOUTH TWICE DAILY AS NEEDED 3/8/24   Luiza Bella MD   venlafaxine (EFFEXOR XR) 75 MG extended release capsule TAKE 1 CAPSULE BY MOUTH DAILY 3/8/24   Luiza Bella MD   atorvastatin (LIPITOR) 40 MG tablet TAKE 1 TABLET BY MOUTH NIGHTLY 3/8/24   Luiza Bella MD   aspirin 325 MG EC tablet TAKE 1 TABLET BY MOUTH DAILY 3/8/24   Luiza Bella MD   diclofenac (VOLTAREN) 50 MG EC tablet TAKE 1 TABLET BY MOUTH TWICE DAILY 2/12/24   Luiza Bella MD   metoprolol succinate (TOPROL XL) 25 MG extended release tablet TAKE 1/2 TABLET BY MOUTH DAILY 1/11/24   John Macario MD   busPIRone (BUSPAR) 15 MG tablet Take 15 mg by mouth 2 times daily 12/22/23   Jihan Lopez DO   clonazePAM (KLONOPIN) 0.5 MG tablet Take 1 tablet by mouth nightly for 121 days. at bedtime. Max Daily Amount: 0.5 mg 11/20/23 3/20/24  Shaw Simmons MD   albuterol sulfate HFA (VENTOLIN HFA) 108 (90 Base) MCG/ACT inhaler INAHLE 2 PUFFS BY MOUTH INTO THE LUNGS EVERY 6 HOURS AS NEEDED FOR WHEEZING 9/12/23   Luiza Bella MD   naproxen (EC NAPROSYN) 500 MG EC tablet Take 1 tablet by mouth 2 times daily as needed for Pain 2/7/23   Pasha Weinstein MD   omeprazole 20 MG EC tablet Take 1 tablet by mouth daily for 20 days Take on empty stomach 30 minutes before meals 2/7/23 2/27/23  Pasha Weinstein MD   dicyclomine (BENTYL) 10 MG capsule Take 1 capsule by mouth 4 times daily 1/7/23   Manohar Fuentes MD   diclofenac sodium (VOLTAREN) 1 % GEL Apply 4 g topically 4 times daily as needed for Pain 12/23/22   Pasha Weinstein MD   ibuprofen (ADVIL;MOTRIN) 800 MG tablet Take 1 tablet by mouth 3 times daily as needed for Pain 9/23/22   Carlitos Segal MD   EPINEPHrine (EPIPEN 2-VIOLET) 0.3 MG/0.3ML SOAJ injection Inject 0.3 mLs into the muscle once for 1 dose

## 2024-03-24 LAB
EKG ATRIAL RATE: 66 BPM
EKG P AXIS: 62 DEGREES
EKG P-R INTERVAL: 160 MS
EKG Q-T INTERVAL: 396 MS
EKG QRS DURATION: 72 MS
EKG QTC CALCULATION (BAZETT): 415 MS
EKG R AXIS: 46 DEGREES
EKG T AXIS: 57 DEGREES
EKG VENTRICULAR RATE: 66 BPM

## 2024-03-24 NOTE — PROGRESS NOTES
[facility]  PROGRESS NOTE    Shift date: 3/23/2024  Shift day: Saturday   Shift # 2    Room # 02/02   Name: Priscila Stubbs                Pentecostalism:    Place of Nondenominational:     Referral: Routine Visit    Admit Date & Time: 3/23/2024  5:11 PM    Assessment:  Priscila Stubbs is a 64 y.o. female in the hospital. Patient appeared calm and coping during  visit.      Intervention:  Writer introduced self and title as . Patient appeared receptive to  visit and engaged in conversation.  provided a supportive presence through active listening and words of affirmation.    Outcome:  Patient appeared receptive to listening presence.    Plan:  Chaplains will remain available to offer spiritual and emotional support as needed.      Electronically signed by Chaplain Karlee, on 3/23/2024 at 9:08 PM.  \[facility]\"" Health  [facility]  [phone]

## 2024-03-24 NOTE — DISCHARGE INSTRUCTIONS
-You were seen and evaluated by emergency medicine physicians at [facility].    -Please follow-up with your primary care physician and/or with the referrals to specialist.    -You were diagnosed with: Left lower quadrant pain, diverticulosis    -Imaging and labs showed no acute abnormality.  Given your prior history of diverticulitis and reported similar symptoms, you are likely in the early stages of a diverticulitis flare.  As such you will be discharged with a prescription antibiotics to prevent any worsening of your condition.  -You are given a dose of the antibiotic here in the emergency department.  A prescription has been sent electronically to your pharmacy.  Please take as prescribed and to completion  -A referral to gastroenterology has been included in your discharge paperwork.  Please call to establish care    -Please return to the Emergency Department if you are experiencing the following symptoms acutely: Worsening abdominal pain, acute abdominal discomfort, distention of your abdomen, change in your bowel movements (diarrhea/constipation), bloody diarrhea, dark tarry stools, headache, fever, chills, nausea, vomiting, chest pain, shortness of breath, abdominal pain, change with urination, change with bowel movements, change in your skin/hair/nail, weakness, fatigue, altered mental status and/or any change from baseline health.    -Thank you for coming to [facility].

## 2024-03-25 PROCEDURE — 93010 ELECTROCARDIOGRAM REPORT: CPT | Performed by: INTERNAL MEDICINE

## 2024-04-08 DIAGNOSIS — G47.52 REM BEHAVIORAL DISORDER: ICD-10-CM

## 2024-04-08 DIAGNOSIS — F41.9 ANXIETY: ICD-10-CM

## 2024-04-08 DIAGNOSIS — M62.838 MUSCLE SPASM: ICD-10-CM

## 2024-04-08 DIAGNOSIS — E55.9 VITAMIN D DEFICIENCY: ICD-10-CM

## 2024-04-08 DIAGNOSIS — E78.49 OTHER HYPERLIPIDEMIA: ICD-10-CM

## 2024-04-08 DIAGNOSIS — K21.9 GASTROESOPHAGEAL REFLUX DISEASE, UNSPECIFIED WHETHER ESOPHAGITIS PRESENT: ICD-10-CM

## 2024-04-08 NOTE — TELEPHONE ENCOUNTER
Priscila Stubbs is calling to request a refill on the following medication(s):    Last Visit Date (If Applicable):  Visit date not found    Next Visit Date:    Visit date not found    Medication Request:  Requested Prescriptions     Pending Prescriptions Disp Refills    busPIRone (BUSPAR) 15 MG tablet [Pharmacy Med Name: busPIRone HCl 15 MG Oral Tablet] 56 tablet 3     Sig: TAKE 1 TABLET BY MOUTH TWICE DAILY

## 2024-04-08 NOTE — TELEPHONE ENCOUNTER
Priscila Stubbs is calling to request a refill on the following medication(s):    Last Visit Date (If Applicable):  10/4/2023    Next Visit Date:    Visit date not found    Medication Request:  Requested Prescriptions     Pending Prescriptions Disp Refills    atorvastatin (LIPITOR) 40 MG tablet [Pharmacy Med Name: Atorvastatin Calcium 40 MG Oral Tablet (Lipitor)] 28 tablet 1     Sig: TAKE 1 TABLET BY MOUTH NIGHTLY    venlafaxine (EFFEXOR XR) 75 MG extended release capsule [Pharmacy Med Name: Venlafaxine HCl ER 75 MG Oral Capsule Extended Release 24 Hour (Effexor XR)] 28 capsule 1     Sig: TAKE 1 CAPSULE BY MOUTH DAILY    chlorzoxazone (PARAFON FORTE) 500 MG tablet [Pharmacy Med Name: Chlorzoxazone 500 MG Oral Tablet] 28 tablet 1     Sig: TAKE 1/2 TABLET BY MOUTH TWICE DAILY AS NEEDED    vitamin D (ERGOCALCIFEROL) 1.25 MG (43437 UT) CAPS capsule [Pharmacy Med Name: Vitamin D (Ergocalciferol) 1.25 MG (57684 UT) Oral Capsule (Drisdol)] 4 capsule 1     Sig: TAKE 1 CAPSULE BY MOUTH EVERY WEEK    omeprazole (PRILOSEC) 40 MG delayed release capsule [Pharmacy Med Name: Omeprazole 40 MG Oral Capsule Delayed Release] 28 capsule 1     Sig: TAKE 1 CAPSULE BY MOUTH EVERY MORNING    diclofenac (VOLTAREN) 50 MG EC tablet [Pharmacy Med Name: Diclofenac Sodium 50 MG Oral Tablet Delayed Release] 56 tablet 2     Sig: TAKE 1 TABLET BY MOUTH TWICE DAILY

## 2024-04-09 ENCOUNTER — APPOINTMENT (OUTPATIENT)
Dept: GENERAL RADIOLOGY | Age: 64
End: 2024-04-09
Payer: COMMERCIAL

## 2024-04-09 ENCOUNTER — HOSPITAL ENCOUNTER (EMERGENCY)
Age: 64
Discharge: HOME | End: 2024-04-09
Attending: EMERGENCY MEDICINE
Payer: COMMERCIAL

## 2024-04-09 ENCOUNTER — APPOINTMENT (OUTPATIENT)
Dept: CT IMAGING | Age: 64
End: 2024-04-09
Payer: COMMERCIAL

## 2024-04-09 VITALS
RESPIRATION RATE: 16 BRPM | SYSTOLIC BLOOD PRESSURE: 116 MMHG | HEART RATE: 80 BPM | DIASTOLIC BLOOD PRESSURE: 68 MMHG | TEMPERATURE: 97.5 F | OXYGEN SATURATION: 95 %
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
## 2024-04-09 PROCEDURE — 99284 EMERGENCY DEPT VISIT MOD MDM: CPT

## 2024-04-09 PROCEDURE — 70450 CT HEAD/BRAIN W/O DYE: CPT

## 2024-04-09 PROCEDURE — 73562 X-RAY EXAM OF KNEE 3: CPT

## 2024-04-09 PROCEDURE — 73502 X-RAY EXAM HIP UNI 2-3 VIEWS: CPT

## 2024-04-09 PROCEDURE — 72125 CT NECK SPINE W/O DYE: CPT

## 2024-04-09 PROCEDURE — 6370000000 HC RX 637 (ALT 250 FOR IP): Performed by: STUDENT IN AN ORGANIZED HEALTH CARE EDUCATION/TRAINING PROGRAM

## 2024-04-09 PROCEDURE — 73080 X-RAY EXAM OF ELBOW: CPT

## 2024-04-09 RX ORDER — BUSPIRONE HYDROCHLORIDE 15 MG/1
15 TABLET ORAL 2 TIMES DAILY
Qty: 56 TABLET | Refills: 3 | Status: SHIPPED | OUTPATIENT
Start: 2024-04-09

## 2024-04-09 RX ORDER — OMEPRAZOLE 40 MG/1
CAPSULE, DELAYED RELEASE ORAL
Qty: 28 CAPSULE | Refills: 1 | Status: SHIPPED | OUTPATIENT
Start: 2024-04-09

## 2024-04-09 RX ORDER — ACETAMINOPHEN 500 MG
1000 TABLET ORAL ONCE
Status: COMPLETED | OUTPATIENT
Start: 2024-04-09 | End: 2024-04-09

## 2024-04-09 RX ORDER — LIDOCAINE 4 G/G
1 PATCH TOPICAL DAILY
Qty: 7 PATCH | Refills: 0 | Status: SHIPPED | OUTPATIENT
Start: 2024-04-09 | End: 2024-04-16

## 2024-04-09 RX ORDER — ERGOCALCIFEROL 1.25 MG/1
CAPSULE ORAL
Qty: 4 CAPSULE | Refills: 1 | Status: SHIPPED | OUTPATIENT
Start: 2024-04-09

## 2024-04-09 RX ORDER — ACETAMINOPHEN 500 MG
1000 TABLET ORAL EVERY 8 HOURS PRN
Qty: 30 TABLET | Refills: 0 | Status: SHIPPED | OUTPATIENT
Start: 2024-04-09

## 2024-04-09 RX ORDER — ATORVASTATIN CALCIUM 40 MG/1
40 TABLET, FILM COATED ORAL NIGHTLY
Qty: 28 TABLET | Refills: 1 | Status: SHIPPED | OUTPATIENT
Start: 2024-04-09

## 2024-04-09 RX ORDER — VENLAFAXINE HYDROCHLORIDE 75 MG/1
CAPSULE, EXTENDED RELEASE ORAL
Qty: 28 CAPSULE | Refills: 1 | Status: SHIPPED | OUTPATIENT
Start: 2024-04-09

## 2024-04-09 RX ORDER — CHLORZOXAZONE 500 MG/1
TABLET ORAL
Qty: 28 TABLET | Refills: 1 | Status: SHIPPED | OUTPATIENT
Start: 2024-04-09

## 2024-04-09 RX ADMIN — ACETAMINOPHEN 1000 MG: 500 TABLET ORAL at 17:53

## 2024-04-09 ASSESSMENT — PAIN - FUNCTIONAL ASSESSMENT: PAIN_FUNCTIONAL_ASSESSMENT: 0-10

## 2024-04-09 ASSESSMENT — PAIN SCALES - GENERAL
PAINLEVEL_OUTOF10: 8
PAINLEVEL_OUTOF10: 8

## 2024-04-09 ASSESSMENT — PAIN DESCRIPTION - LOCATION
LOCATION: ARM;KNEE
LOCATION: ARM;BACK;HIP;LEG

## 2024-04-09 ASSESSMENT — PAIN DESCRIPTION - ORIENTATION: ORIENTATION: RIGHT

## 2024-04-09 ASSESSMENT — PAIN DESCRIPTION - DESCRIPTORS: DESCRIPTORS: ACHING

## 2024-04-09 NOTE — ED NOTES
Report received from Christopher CONDON. All questions addressed. Patient resting comfortably on stretcher at this time, no distress noted. Respirations even and unlabored. Call light within reach.

## 2024-04-09 NOTE — DISCHARGE INSTRUCTIONS
You have been seen in the emergency department today due to concern for multiple body aches secondary to falling.  Your CT scans and your x-rays were all negative for any acute fractures or injury.  We would recommend extreme caution when walking her dog as there is high risk for injury with recurrent falls.  If you note that you are developing any concerning symptoms including but not limited to headache, blurred vision, nausea, vomiting, diarrhea, numbness, tingling, changing color or temperature of an extremity, return to emergency department immediately for reassessment.

## 2024-04-09 NOTE — ED PROVIDER NOTES
[facility] ED     Emergency Department     Faculty Attestation    I performed a history and physical examination of the patient and discussed management with the resident. I reviewed the resident’s note and agree with the documented findings and plan of care. Any areas of disagreement are noted on the chart. I was personally present for the key portions of any procedures. I have documented in the chart those procedures where I was not present during the key portions. I have reviewed the emergency nurses triage note. I agree with the chief complaint, past medical history, past surgical history, allergies, medications, social and family history as documented unless otherwise noted below. For Physician Assistant/ Nurse Practitioner cases/documentation I have personally evaluated this patient and have completed at least one if not all key elements of the E/M (history, physical exam, and MDM). Additional findings are as noted.    Note Started: 5:43 PM EDT    Patient here after 2 falls in the last 2 days both times was walking her dog tripped over her dog.  Unsure if she hit her head but did state her glasses fell off and does have neck pain.  Complains of right elbow right hip and right knee pain.  Has been ambulatory since.  On aspirin no anticoagulation.  On exam awake alert and oriented appropriate does have minimal midline cervical much more paraspinal tenderness.  Normal pupils normal speech.  Abrasions to the right elbow right knee.  Strong for extremity pulses.  Will image probable discharge      Critical Care     none    Chan Pastrana MD, FACEP, FAAEM  Attending Emergency  Physician           Chan Pastrana MD  04/09/24 1443    
Extraocular movements intact.      Pupils: Pupils are equal, round, and reactive to light.   Cardiovascular:      Rate and Rhythm: Normal rate and regular rhythm.      Heart sounds: Normal heart sounds. No murmur heard.  Pulmonary:      Effort: Pulmonary effort is normal.      Breath sounds: Normal breath sounds.   Abdominal:      Palpations: Abdomen is soft.      Tenderness: There is no abdominal tenderness.   Musculoskeletal:      Cervical back: Normal range of motion.      Comments: There is no overt midline C-spine tenderness palpation, though there is some paraspinal muscle tenderness.  The cranium is atraumatic  Bilateral radial pulses and DP pulses intact  There is right elbow tenderness palpation; right elbow abrasion present  Bruising to bilateral knees  Skin is intact  Normal range of motion of the upper shoulders, elbows, wrists, hips, knees, ankles  Normal sensation distally bilaterally in the upper and lower extremities   Skin:     General: Skin is warm and dry.      Findings: No rash.   Neurological:      General: No focal deficit present.      Mental Status: She is alert.           DDX/DIAGNOSTIC RESULTS / EMERGENCY DEPARTMENT COURSE / MDM     Medical Decision Making  Amount and/or Complexity of Data Reviewed  Radiology: ordered.    Risk  OTC drugs.        EKG      All EKG's are interpreted by the Emergency Department Physician who either signs or Co-signs this chart in the absence of a cardiologist.    EMERGENCY DEPARTMENT COURSE:  This patient presents emergency department for multiple arthralgias after multiple falls over the last several days secondary to her dog pulling her down.  Will obtain head CT scan and C-spine.  Low suspicion for injury, however she is unable to recall the exact details of the injuries which may be concerning for head trauma.  Obtain plain films of the right elbow, right hip, bilateral knees.  Reassess.    7:47 PM  Patient reassessed.  She feels well enough to go home.  
clear

## 2024-04-09 NOTE — ED TRIAGE NOTES
Pt tripped over dog on Sunday falling unto right arm outstretched, and then tripped over dog/sidewalk again 4/08/2024 landing on right knee, denies LOC or hitting head either fall    OB Discharge Instructions

## 2024-04-09 NOTE — TELEPHONE ENCOUNTER
LAST VISIT: 2/19/24 with KLARISSA Woodruff  NEXT VISIT: 8/20/24    No mention of this medication in KLARISSA Woodruff's last dictation but you have prescribed in the past. Please sign for refill if ok. Thank you.

## 2024-04-11 RX ORDER — CLONAZEPAM 0.5 MG/1
0.5 TABLET ORAL NIGHTLY
Qty: 30 TABLET | Refills: 4 | Status: SHIPPED | OUTPATIENT
Start: 2024-04-11 | End: 2024-09-11

## 2024-04-17 ENCOUNTER — OFFICE VISIT (OUTPATIENT)
Dept: GASTROENTEROLOGY | Age: 64
End: 2024-04-17
Payer: COMMERCIAL

## 2024-04-17 VITALS
BODY MASS INDEX: 34.01 KG/M2 | SYSTOLIC BLOOD PRESSURE: 113 MMHG | WEIGHT: 180 LBS | TEMPERATURE: 97.2 F | DIASTOLIC BLOOD PRESSURE: 65 MMHG

## 2024-04-17 DIAGNOSIS — K21.9 GASTROESOPHAGEAL REFLUX DISEASE, UNSPECIFIED WHETHER ESOPHAGITIS PRESENT: ICD-10-CM

## 2024-04-17 DIAGNOSIS — K58.2 IRRITABLE BOWEL SYNDROME WITH BOTH CONSTIPATION AND DIARRHEA: ICD-10-CM

## 2024-04-17 DIAGNOSIS — Z87.19 HISTORY OF DIVERTICULITIS: Primary | ICD-10-CM

## 2024-04-17 DIAGNOSIS — R10.32 ABDOMINAL PAIN, LEFT LOWER QUADRANT: ICD-10-CM

## 2024-04-17 DIAGNOSIS — K76.0 FATTY LIVER: ICD-10-CM

## 2024-04-17 PROCEDURE — 99204 OFFICE O/P NEW MOD 45 MIN: CPT | Performed by: INTERNAL MEDICINE

## 2024-04-17 ASSESSMENT — ENCOUNTER SYMPTOMS
ABDOMINAL PAIN: 1
BLOOD IN STOOL: 0
NAUSEA: 1
TROUBLE SWALLOWING: 0
DIARRHEA: 1
ABDOMINAL DISTENTION: 0
CHOKING: 0
CONSTIPATION: 1
SORE THROAT: 0
WHEEZING: 0
VOICE CHANGE: 0
ANAL BLEEDING: 0
COUGH: 0
VOMITING: 0
RECTAL PAIN: 0
SHORTNESS OF BREATH: 0

## 2024-04-17 NOTE — TELEPHONE ENCOUNTER
Procedure scheduled/Dr DUGLAS Batista  Procedure: colon 45 min  Dx: abdominal pain, left lower quadrant / IBS with constipation and diarrhea  Date: 9-30-24  Time: 11:30 a.m.  Hospital: [facility]   Bowel Prep instructions given: Niurka  In office/via phone: office   Clearance needed: no

## 2024-04-17 NOTE — PROGRESS NOTES
iterative reconstruction, and/or weight based adjustment of the mA/kV was utilized to reduce the radiation dose to as low as reasonably achievable. COMPARISON: 11/10/2023 HISTORY: ORDERING SYSTEM PROVIDED HISTORY: LLQ abd pain TECHNOLOGIST PROVIDED HISTORY: LLQ abd pain Decision Support Exception - unselect if not a suspected or confirmed emergency medical condition->Emergency Medical Condition (MA) Reason for Exam: LLQ abd pain FINDINGS: Lower Chest: Lung bases are clear. Organs: Liver is normal in size with decreased density.  Stable cyst along the periphery of the right hepatic lobe.  No focal masses identified. No evidence of intrahepatic ductal dilatation.    Spleen is normal size.  The gallbladder is unremarkable.  Both adrenal glands are normal.  Pancreas is normal in appearance. Punctate nonobstructing right renal stone.  Stable cortical right renal cyst..  The kidneys are otherwise normal in size and attenuation without evidence of hydronephrosis. GI/Bowel: The visualized bowel and mesentery show no mass lesions. Mild to moderate colonic diverticulosis. No evidence of diverticulitis.  Normal appendix.  Small hiatal hernia Pelvis: No intrapelvic mass is identified.  Bladder and rectum are intact. Peritoneum/Retroperitoneum: No free fluid. No lymphadenopathy. No evidence of pneumoperitoneum. Bones/Soft Tissues: Small fat containing umbilical hernia.  Degenerative changes seen in the visualized spine .  No acute bony abnormalities.     No acute intra-abdominal or intrapelvic abnormalities are noted. Small nonobstructing right renal stone Fatty infiltration of the liver     XR CHEST PORTABLE    Result Date: 3/23/2024  EXAMINATION: ONE XRAY VIEW OF THE CHEST 3/23/2024 5:43 pm COMPARISON: None. HISTORY: ORDERING SYSTEM PROVIDED HISTORY: cp TECHNOLOGIST PROVIDED HISTORY: cp FINDINGS: The lungs appear clear. The heart and mediastinal structures are unremarkable. Bony thorax appears normal. Visualized upper abdomen

## 2024-04-18 RX ORDER — POLYETHYLENE GLYCOL 3350, SODIUM SULFATE ANHYDROUS, SODIUM BICARBONATE, SODIUM CHLORIDE, POTASSIUM CHLORIDE 236; 22.74; 6.74; 5.86; 2.97 G/4L; G/4L; G/4L; G/4L; G/4L
4 POWDER, FOR SOLUTION ORAL ONCE
Qty: 4000 ML | Refills: 0 | Status: SHIPPED | OUTPATIENT
Start: 2024-04-18 | End: 2024-04-18

## 2024-06-03 DIAGNOSIS — M62.838 MUSCLE SPASM: ICD-10-CM

## 2024-06-03 DIAGNOSIS — K21.9 GASTROESOPHAGEAL REFLUX DISEASE, UNSPECIFIED WHETHER ESOPHAGITIS PRESENT: ICD-10-CM

## 2024-06-03 DIAGNOSIS — E55.9 VITAMIN D DEFICIENCY: ICD-10-CM

## 2024-06-03 DIAGNOSIS — E78.49 OTHER HYPERLIPIDEMIA: ICD-10-CM

## 2024-06-03 DIAGNOSIS — F41.9 ANXIETY: ICD-10-CM

## 2024-06-03 RX ORDER — ASPIRIN 325 MG
TABLET, DELAYED RELEASE (ENTERIC COATED) ORAL
Qty: 28 TABLET | Refills: 3 | Status: SHIPPED | OUTPATIENT
Start: 2024-06-03

## 2024-06-03 RX ORDER — ATORVASTATIN CALCIUM 40 MG/1
40 TABLET, FILM COATED ORAL NIGHTLY
Qty: 28 TABLET | Refills: 1 | Status: SHIPPED | OUTPATIENT
Start: 2024-06-03

## 2024-06-03 RX ORDER — ERGOCALCIFEROL 1.25 MG/1
CAPSULE ORAL
Qty: 4 CAPSULE | Refills: 1 | Status: SHIPPED | OUTPATIENT
Start: 2024-06-03

## 2024-06-03 RX ORDER — OMEPRAZOLE 40 MG/1
CAPSULE, DELAYED RELEASE ORAL
Qty: 28 CAPSULE | Refills: 1 | Status: SHIPPED | OUTPATIENT
Start: 2024-06-03

## 2024-06-03 RX ORDER — VENLAFAXINE HYDROCHLORIDE 75 MG/1
CAPSULE, EXTENDED RELEASE ORAL
Qty: 28 CAPSULE | Refills: 1 | Status: SHIPPED | OUTPATIENT
Start: 2024-06-03

## 2024-06-03 RX ORDER — CHLORZOXAZONE 500 MG/1
TABLET ORAL
Qty: 28 TABLET | Refills: 1 | Status: SHIPPED | OUTPATIENT
Start: 2024-06-03

## 2024-06-29 DIAGNOSIS — F41.9 ANXIETY: ICD-10-CM

## 2024-07-01 RX ORDER — BUSPIRONE HYDROCHLORIDE 15 MG/1
15 TABLET ORAL 2 TIMES DAILY
Qty: 56 TABLET | Refills: 3 | Status: SHIPPED | OUTPATIENT
Start: 2024-07-01

## 2024-07-13 ENCOUNTER — HOSPITAL ENCOUNTER (INPATIENT)
Age: 64
LOS: 3 days | Discharge: HOME | DRG: 309 | End: 2024-07-16
Attending: EMERGENCY MEDICINE | Admitting: STUDENT IN AN ORGANIZED HEALTH CARE EDUCATION/TRAINING PROGRAM
Payer: COMMERCIAL

## 2024-07-13 ENCOUNTER — APPOINTMENT (OUTPATIENT)
Dept: GENERAL RADIOLOGY | Age: 64
DRG: 309 | End: 2024-07-13
Payer: COMMERCIAL

## 2024-07-13 ENCOUNTER — APPOINTMENT (OUTPATIENT)
Dept: CT IMAGING | Age: 64
DRG: 309 | End: 2024-07-13
Payer: COMMERCIAL

## 2024-07-13 ENCOUNTER — APPOINTMENT (OUTPATIENT)
Dept: VASCULAR LAB | Age: 64
DRG: 309 | End: 2024-07-13
Payer: COMMERCIAL

## 2024-07-13 DIAGNOSIS — I48.91 ATRIAL FIBRILLATION WITH RVR (HCC): ICD-10-CM

## 2024-07-13 DIAGNOSIS — I48.91 ATRIAL FIBRILLATION, UNSPECIFIED TYPE (HCC): ICD-10-CM

## 2024-07-13 DIAGNOSIS — R07.9 CHEST PAIN, UNSPECIFIED TYPE: Primary | ICD-10-CM

## 2024-07-13 PROBLEM — F41.1 GAD (GENERALIZED ANXIETY DISORDER): Status: ACTIVE | Noted: 2024-07-13

## 2024-07-13 LAB
ALBUMIN SERPL-MCNC: 4.3 G/DL (ref 3.5–5.2)
ALBUMIN/GLOB SERPL: 2 {RATIO} (ref 1–2.5)
ALP SERPL-CCNC: 87 U/L (ref 35–104)
ALT SERPL-CCNC: 18 U/L (ref 10–35)
ANION GAP SERPL CALCULATED.3IONS-SCNC: 13 MMOL/L (ref 9–16)
ANTI-XA UNFRAC HEPARIN: <0.1 IU/L
AST SERPL-CCNC: 20 U/L (ref 10–35)
BASOPHILS # BLD: 0.09 K/UL (ref 0–0.2)
BASOPHILS NFR BLD: 1 % (ref 0–2)
BILIRUB SERPL-MCNC: 0.2 MG/DL (ref 0–1.2)
BNP SERPL-MCNC: 76 PG/ML (ref 0–300)
BUN SERPL-MCNC: 22 MG/DL (ref 8–23)
CALCIUM SERPL-MCNC: 9 MG/DL (ref 8.6–10.4)
CHLORIDE SERPL-SCNC: 109 MMOL/L (ref 98–107)
CO2 SERPL-SCNC: 21 MMOL/L (ref 20–31)
CREAT SERPL-MCNC: 0.9 MG/DL (ref 0.5–0.9)
EOSINOPHIL # BLD: 0.22 K/UL (ref 0–0.44)
EOSINOPHILS RELATIVE PERCENT: 3 % (ref 1–4)
ERYTHROCYTE [DISTWIDTH] IN BLOOD BY AUTOMATED COUNT: 12.6 % (ref 11.8–14.4)
GFR, ESTIMATED: 69 ML/MIN/1.73M2
GLUCOSE SERPL-MCNC: 124 MG/DL (ref 74–99)
HCT VFR BLD AUTO: 42.9 % (ref 36.3–47.1)
HGB BLD-MCNC: 14.1 G/DL (ref 11.9–15.1)
IMM GRANULOCYTES # BLD AUTO: <0.03 K/UL (ref 0–0.3)
IMM GRANULOCYTES NFR BLD: 0 %
INR PPP: 1
LYMPHOCYTES NFR BLD: 2.31 K/UL (ref 1.1–3.7)
LYMPHOCYTES RELATIVE PERCENT: 32 % (ref 24–43)
MCH RBC QN AUTO: 29.6 PG (ref 25.2–33.5)
MCHC RBC AUTO-ENTMCNC: 32.9 G/DL (ref 28.4–34.8)
MCV RBC AUTO: 90.1 FL (ref 82.6–102.9)
MONOCYTES NFR BLD: 0.45 K/UL (ref 0.1–1.2)
MONOCYTES NFR BLD: 6 % (ref 3–12)
NEUTROPHILS NFR BLD: 58 % (ref 36–65)
NEUTS SEG NFR BLD: 4.21 K/UL (ref 1.5–8.1)
NRBC BLD-RTO: 0 PER 100 WBC
PARTIAL THROMBOPLASTIN TIME: 23.9 SEC (ref 23–36.5)
PLATELET # BLD AUTO: 256 K/UL (ref 138–453)
PMV BLD AUTO: 12.2 FL (ref 8.1–13.5)
POTASSIUM SERPL-SCNC: 3.8 MMOL/L (ref 3.7–5.3)
PROT SERPL-MCNC: 6.6 G/DL (ref 6.6–8.7)
PROTHROMBIN TIME: 13.2 SEC (ref 11.7–14.9)
RBC # BLD AUTO: 4.76 M/UL (ref 3.95–5.11)
SODIUM SERPL-SCNC: 143 MMOL/L (ref 136–145)
TROPONIN I SERPL HS-MCNC: 6 NG/L (ref 0–14)
TROPONIN I SERPL HS-MCNC: <6 NG/L (ref 0–14)
TROPONIN I SERPL HS-MCNC: <6 NG/L (ref 0–14)
TSH SERPL DL<=0.05 MIU/L-ACNC: 1.72 UIU/ML (ref 0.27–4.2)
WBC OTHER # BLD: 7.3 K/UL (ref 3.5–11.3)

## 2024-07-13 PROCEDURE — 6360000002 HC RX W HCPCS: Performed by: EMERGENCY MEDICINE

## 2024-07-13 PROCEDURE — 6360000002 HC RX W HCPCS: Performed by: STUDENT IN AN ORGANIZED HEALTH CARE EDUCATION/TRAINING PROGRAM

## 2024-07-13 PROCEDURE — 85610 PROTHROMBIN TIME: CPT

## 2024-07-13 PROCEDURE — 93970 EXTREMITY STUDY: CPT | Performed by: SURGERY

## 2024-07-13 PROCEDURE — 93005 ELECTROCARDIOGRAM TRACING: CPT | Performed by: STUDENT IN AN ORGANIZED HEALTH CARE EDUCATION/TRAINING PROGRAM

## 2024-07-13 PROCEDURE — 1200000000 HC SEMI PRIVATE

## 2024-07-13 PROCEDURE — 84443 ASSAY THYROID STIM HORMONE: CPT

## 2024-07-13 PROCEDURE — 80053 COMPREHEN METABOLIC PANEL: CPT

## 2024-07-13 PROCEDURE — 99223 1ST HOSP IP/OBS HIGH 75: CPT | Performed by: PHYSICIAN ASSISTANT

## 2024-07-13 PROCEDURE — 85730 THROMBOPLASTIN TIME PARTIAL: CPT

## 2024-07-13 PROCEDURE — 6360000004 HC RX CONTRAST MEDICATION: Performed by: STUDENT IN AN ORGANIZED HEALTH CARE EDUCATION/TRAINING PROGRAM

## 2024-07-13 PROCEDURE — 83880 ASSAY OF NATRIURETIC PEPTIDE: CPT

## 2024-07-13 PROCEDURE — 99285 EMERGENCY DEPT VISIT HI MDM: CPT

## 2024-07-13 PROCEDURE — 85025 COMPLETE CBC W/AUTO DIFF WBC: CPT

## 2024-07-13 PROCEDURE — 85520 HEPARIN ASSAY: CPT

## 2024-07-13 PROCEDURE — 2500000003 HC RX 250 WO HCPCS: Performed by: STUDENT IN AN ORGANIZED HEALTH CARE EDUCATION/TRAINING PROGRAM

## 2024-07-13 PROCEDURE — 71045 X-RAY EXAM CHEST 1 VIEW: CPT

## 2024-07-13 PROCEDURE — 96375 TX/PRO/DX INJ NEW DRUG ADDON: CPT

## 2024-07-13 PROCEDURE — 84484 ASSAY OF TROPONIN QUANT: CPT

## 2024-07-13 PROCEDURE — 96374 THER/PROPH/DIAG INJ IV PUSH: CPT

## 2024-07-13 PROCEDURE — 93005 ELECTROCARDIOGRAM TRACING: CPT | Performed by: NURSE PRACTITIONER

## 2024-07-13 PROCEDURE — 93970 EXTREMITY STUDY: CPT

## 2024-07-13 PROCEDURE — 6370000000 HC RX 637 (ALT 250 FOR IP): Performed by: PHYSICIAN ASSISTANT

## 2024-07-13 PROCEDURE — 71260 CT THORAX DX C+: CPT

## 2024-07-13 RX ORDER — MAGNESIUM SULFATE IN WATER 40 MG/ML
2000 INJECTION, SOLUTION INTRAVENOUS PRN
Status: DISCONTINUED | OUTPATIENT
Start: 2024-07-13 | End: 2024-07-16 | Stop reason: HOSPADM

## 2024-07-13 RX ORDER — LIDOCAINE HYDROCHLORIDE 10 MG/ML
INJECTION, SOLUTION INFILTRATION; PERINEURAL
Status: DISPENSED
Start: 2024-07-13 | End: 2024-07-14

## 2024-07-13 RX ORDER — SODIUM CHLORIDE 9 MG/ML
INJECTION, SOLUTION INTRAVENOUS PRN
Status: DISCONTINUED | OUTPATIENT
Start: 2024-07-13 | End: 2024-07-16 | Stop reason: HOSPADM

## 2024-07-13 RX ORDER — HEPARIN SODIUM 1000 [USP'U]/ML
4000 INJECTION, SOLUTION INTRAVENOUS; SUBCUTANEOUS PRN
Status: DISCONTINUED | OUTPATIENT
Start: 2024-07-13 | End: 2024-07-16 | Stop reason: HOSPADM

## 2024-07-13 RX ORDER — ATORVASTATIN CALCIUM 40 MG/1
40 TABLET, FILM COATED ORAL NIGHTLY
Status: DISCONTINUED | OUTPATIENT
Start: 2024-07-13 | End: 2024-07-16 | Stop reason: HOSPADM

## 2024-07-13 RX ORDER — HEPARIN SODIUM 10000 [USP'U]/100ML
5-30 INJECTION, SOLUTION INTRAVENOUS CONTINUOUS
Status: DISCONTINUED | OUTPATIENT
Start: 2024-07-13 | End: 2024-07-16 | Stop reason: HOSPADM

## 2024-07-13 RX ORDER — HEPARIN SODIUM 1000 [USP'U]/ML
2000 INJECTION, SOLUTION INTRAVENOUS; SUBCUTANEOUS PRN
Status: DISCONTINUED | OUTPATIENT
Start: 2024-07-13 | End: 2024-07-16 | Stop reason: HOSPADM

## 2024-07-13 RX ORDER — CLONAZEPAM 0.5 MG/1
0.5 TABLET ORAL NIGHTLY PRN
Status: DISCONTINUED | OUTPATIENT
Start: 2024-07-13 | End: 2024-07-16 | Stop reason: HOSPADM

## 2024-07-13 RX ORDER — HEPARIN SODIUM 1000 [USP'U]/ML
4000 INJECTION, SOLUTION INTRAVENOUS; SUBCUTANEOUS ONCE
Status: COMPLETED | OUTPATIENT
Start: 2024-07-13 | End: 2024-07-13

## 2024-07-13 RX ORDER — METOPROLOL TARTRATE 1 MG/ML
5 INJECTION, SOLUTION INTRAVENOUS EVERY 5 MIN PRN
Status: DISCONTINUED | OUTPATIENT
Start: 2024-07-13 | End: 2024-07-16 | Stop reason: HOSPADM

## 2024-07-13 RX ORDER — BUSPIRONE HYDROCHLORIDE 15 MG/1
15 TABLET ORAL 2 TIMES DAILY
Status: DISCONTINUED | OUTPATIENT
Start: 2024-07-13 | End: 2024-07-16 | Stop reason: HOSPADM

## 2024-07-13 RX ORDER — ONDANSETRON 2 MG/ML
4 INJECTION INTRAMUSCULAR; INTRAVENOUS ONCE
Status: COMPLETED | OUTPATIENT
Start: 2024-07-13 | End: 2024-07-13

## 2024-07-13 RX ORDER — POTASSIUM CHLORIDE 7.45 MG/ML
10 INJECTION INTRAVENOUS PRN
Status: DISCONTINUED | OUTPATIENT
Start: 2024-07-13 | End: 2024-07-16 | Stop reason: HOSPADM

## 2024-07-13 RX ORDER — VENLAFAXINE HYDROCHLORIDE 75 MG/1
75 CAPSULE, EXTENDED RELEASE ORAL DAILY
Status: DISCONTINUED | OUTPATIENT
Start: 2024-07-14 | End: 2024-07-16 | Stop reason: HOSPADM

## 2024-07-13 RX ORDER — DICYCLOMINE HYDROCHLORIDE 10 MG/1
10 CAPSULE ORAL 4 TIMES DAILY
Status: DISCONTINUED | OUTPATIENT
Start: 2024-07-13 | End: 2024-07-16 | Stop reason: HOSPADM

## 2024-07-13 RX ORDER — POLYETHYLENE GLYCOL 3350 17 G/17G
17 POWDER, FOR SOLUTION ORAL DAILY PRN
Status: DISCONTINUED | OUTPATIENT
Start: 2024-07-13 | End: 2024-07-16 | Stop reason: HOSPADM

## 2024-07-13 RX ORDER — ALBUTEROL SULFATE 90 UG/1
2 AEROSOL, METERED RESPIRATORY (INHALATION) EVERY 6 HOURS PRN
Status: DISCONTINUED | OUTPATIENT
Start: 2024-07-13 | End: 2024-07-16 | Stop reason: HOSPADM

## 2024-07-13 RX ORDER — SODIUM CHLORIDE 0.9 % (FLUSH) 0.9 %
5-40 SYRINGE (ML) INJECTION PRN
Status: DISCONTINUED | OUTPATIENT
Start: 2024-07-13 | End: 2024-07-16 | Stop reason: HOSPADM

## 2024-07-13 RX ORDER — ONDANSETRON 2 MG/ML
4 INJECTION INTRAMUSCULAR; INTRAVENOUS EVERY 6 HOURS PRN
Status: DISCONTINUED | OUTPATIENT
Start: 2024-07-13 | End: 2024-07-16 | Stop reason: HOSPADM

## 2024-07-13 RX ORDER — ASPIRIN 325 MG
325 TABLET, DELAYED RELEASE (ENTERIC COATED) ORAL DAILY
Status: DISCONTINUED | OUTPATIENT
Start: 2024-07-14 | End: 2024-07-16 | Stop reason: HOSPADM

## 2024-07-13 RX ORDER — PANTOPRAZOLE SODIUM 40 MG/1
40 TABLET, DELAYED RELEASE ORAL DAILY
Status: DISCONTINUED | OUTPATIENT
Start: 2024-07-14 | End: 2024-07-16 | Stop reason: HOSPADM

## 2024-07-13 RX ORDER — KETOTIFEN FUMARATE 0.35 MG/ML
1 SOLUTION/ DROPS OPHTHALMIC 2 TIMES DAILY
Status: DISCONTINUED | OUTPATIENT
Start: 2024-07-13 | End: 2024-07-16 | Stop reason: HOSPADM

## 2024-07-13 RX ORDER — ACETAMINOPHEN 650 MG/1
650 SUPPOSITORY RECTAL EVERY 6 HOURS PRN
Status: DISCONTINUED | OUTPATIENT
Start: 2024-07-13 | End: 2024-07-16 | Stop reason: HOSPADM

## 2024-07-13 RX ORDER — SODIUM CHLORIDE 0.9 % (FLUSH) 0.9 %
5-40 SYRINGE (ML) INJECTION EVERY 12 HOURS SCHEDULED
Status: DISCONTINUED | OUTPATIENT
Start: 2024-07-13 | End: 2024-07-16 | Stop reason: HOSPADM

## 2024-07-13 RX ORDER — ONDANSETRON 4 MG/1
4 TABLET, ORALLY DISINTEGRATING ORAL EVERY 8 HOURS PRN
Status: DISCONTINUED | OUTPATIENT
Start: 2024-07-13 | End: 2024-07-16 | Stop reason: HOSPADM

## 2024-07-13 RX ORDER — METOPROLOL SUCCINATE 25 MG/1
25 TABLET, EXTENDED RELEASE ORAL DAILY
Status: DISCONTINUED | OUTPATIENT
Start: 2024-07-14 | End: 2024-07-14

## 2024-07-13 RX ORDER — POTASSIUM CHLORIDE 20 MEQ/1
40 TABLET, EXTENDED RELEASE ORAL PRN
Status: DISCONTINUED | OUTPATIENT
Start: 2024-07-13 | End: 2024-07-16 | Stop reason: HOSPADM

## 2024-07-13 RX ORDER — ACETAMINOPHEN 325 MG/1
650 TABLET ORAL EVERY 6 HOURS PRN
Status: DISCONTINUED | OUTPATIENT
Start: 2024-07-13 | End: 2024-07-16 | Stop reason: HOSPADM

## 2024-07-13 RX ADMIN — HEPARIN SODIUM 12 UNITS/KG/HR: 10000 INJECTION, SOLUTION INTRAVENOUS at 19:31

## 2024-07-13 RX ADMIN — BUSPIRONE HYDROCHLORIDE 15 MG: 15 TABLET ORAL at 23:21

## 2024-07-13 RX ADMIN — ONDANSETRON 4 MG: 2 INJECTION INTRAMUSCULAR; INTRAVENOUS at 15:04

## 2024-07-13 RX ADMIN — METOPROLOL TARTRATE 5 MG: 5 INJECTION INTRAVENOUS at 14:59

## 2024-07-13 RX ADMIN — CLONAZEPAM 0.5 MG: 0.5 TABLET ORAL at 23:21

## 2024-07-13 RX ADMIN — IOPAMIDOL 75 ML: 755 INJECTION, SOLUTION INTRAVENOUS at 17:03

## 2024-07-13 RX ADMIN — DICYCLOMINE HYDROCHLORIDE 10 MG: 10 CAPSULE ORAL at 23:21

## 2024-07-13 RX ADMIN — DESMOPRESSIN ACETATE 40 MG: 0.2 TABLET ORAL at 23:21

## 2024-07-13 RX ADMIN — HEPARIN SODIUM 4000 UNITS: 1000 INJECTION INTRAVENOUS; SUBCUTANEOUS at 19:27

## 2024-07-13 ASSESSMENT — LIFESTYLE VARIABLES: HOW OFTEN DO YOU HAVE A DRINK CONTAINING ALCOHOL: NEVER

## 2024-07-13 ASSESSMENT — PAIN SCALES - GENERAL: PAINLEVEL_OUTOF10: 2

## 2024-07-13 NOTE — ED PROVIDER NOTES
[facility]     Emergency Department     Faculty Attestation    I performed a history and physical examination of the patient and discussed management with the resident. I reviewed the resident´s note and agree with the documented findings and plan of care. Any areas of disagreement are noted on the chart. I was personally present for the key portions of any procedures. I have documented in the chart those procedures where I was not present during the key portions. I have reviewed the emergency nurses triage note. I agree with the chief complaint, past medical history, past surgical history, allergies, medications, social and family history as documented unless otherwise noted below. For Physician Assistant/ Nurse Practitioner cases/documentation I have personally evaluated this patient and have completed at least one if not all key elements of the E/M (history, physical exam, and MDM). Additional findings are as noted.    CRITICAL CARE: There was a high probability of clinically significant/life threatening deterioration in this patient's condition which required my urgent intervention.  Total critical care time was 30 minutes.  This excludes any time for separately reportable procedures.        Praful Grove MD  07/13/24 1531      Atrial fibrillation with RVR, right calf pain without swelling, chest clear.       EKG Interpretation    Interpreted by emergency department physician    Rhythm: Atrial fibrillation  Rate: 125  Axis: normal/2  Ectopy: Atrial fibrillation  Conduction: Atrial fibrillation  ST Segments: Nonspecific changes  T Waves: no acute change  Q Waves: none    Clinical Impression: Abnormal EKG    LEÓN Marsh John A, MD  07/13/24 1530    
     [facility] ED  Emergency Department  Emergency Medicine Resident Turn-Over     Note Started: 5:13 PM EDT    Care of Priscila Stubbs was assumed from Dr. Park and is being seen for Chest Pain, Shortness of Breath, and Dizziness  .  The patient's initial evaluation and plan have been discussed with the prior provider who initially evaluated the patient.     EMERGENCY DEPARTMENT COURSE / MEDICAL DECISION MAKING:       MEDICATIONS GIVEN:  Orders Placed This Encounter   Medications    metoprolol (LOPRESSOR) injection 5 mg    ondansetron (ZOFRAN) injection 4 mg    lidocaine 1 % injection     KRISTI HUNTER: cabinet override    iopamidol (ISOVUE-370) 76 % injection 75 mL       LABS / RADIOLOGY:     Labs Reviewed   COMPREHENSIVE METABOLIC PANEL - Abnormal; Notable for the following components:       Result Value    Chloride 109 (*)     Glucose 124 (*)     All other components within normal limits   CBC WITH AUTO DIFFERENTIAL   TROPONIN   TROPONIN   TROPONIN       XR CHEST PORTABLE    Result Date: 7/13/2024  EXAMINATION: ONE XRAY VIEW OF THE CHEST 7/13/2024 3:16 pm COMPARISON: 23 March 2024 HISTORY: ORDERING SYSTEM PROVIDED HISTORY: chest pain, sob TECHNOLOGIST PROVIDED HISTORY: chest pain, sob FINDINGS: AP portable view of the chest time stamped at 1514 hours demonstrates overlying cardiac monitoring electrodes.  Heart size is normal.  No vascular congestion, focal consolidation, effusion, or pneumothorax is noted.  Osseous and mediastinal structures are age-appropriate.     No acute cardiopulmonary process.       RECENT VITALS:     Temp: 97.2 °F (36.2 °C),  Pulse: 80, Respirations: (!) 9, BP: 107/73, SpO2: 96 %    This patient is a 64 y.o. Female with chest pain, shortness of breath, lightheaded feeling intermittent over the last few days.  Patient denies any significant past medical history.  On arrival, patient noted to be in A-fib RVR.  Initially was A-fib with controlled rate, however she was 
completed with a voice recognition program.  Efforts were made to edit the dictations but occasionally words are mis-transcribed.)

## 2024-07-13 NOTE — ED NOTES
Patient presents to ED for evaluation of chest pain. Patient reports waking up this morning and having left sternal chest pain since waking up. Patient reports intermittent feelings of shortness of breath and lightheadedness/dizziness as if she is going to pass out. Patient has hx depression and anxiety, is on medications effexor and buspar for these. No missed doses. Does not think her current symptoms are from anxiety. Patient takes metoprolol for hx high HR in the past. Patient reports some right calf pain for the past couple days.  Patient is alert and oriented x4, answering questions appropriately. Respirations even and unlabored. Patient changed into gown, placed on full cardiac monitor, BP cuff, and pulse ox. EKG completed. IV established. Call light within reach. Will continue to monitor.

## 2024-07-13 NOTE — H&P
[facility]  Office: [phone]  Javid Valles DO, Anup Levy DO, Michael Ramirez DO, Dustin Amaral DO, Lamonte Benavides MD, Jeniffer Espitia MD, Cindi Batista MD, Radha Rivera MD,  Gen Donovan MD, Deep Croft MD, Luis F Quintero MD,  Avi Hernandez DO, Selena Dean MD, Andrea Barth MD, Chan Valles DO, Iram Loredo MD,  Cabrera Ambrose DO, Erika Rodriguez MD, Malinda Castaneda MD, Daay Portillo MD, Adrián Berry MD,  Soto Navarro MD, Dakota Callejas MD, Latricia Arnold MD, Stefany Barney MD, William Sawyer MD, Rajat Abernathy MD, Santy Boyce DO, Lalit oMhr DO, Christa Cardona MD,  Maynor Garcia MD, Shirley Waterhouse, CNP,  Chiquita Kaiser CNP, Carlitos Schmitt, CNP,  Kaila Cardona, TON, Clarita Silva, CNP, Mayelin Shahid, CNP, Kaila Berman CNP, Katharine Lott, CNP, Merlene Ragsdale, CNP, Becky Tolbert, PA-C, Coni Calix PA-C, Ashley Edward, CNP, Jacqueline Jacob, CNS, Meghan Frost, CNP, Lynda Tan, CNP, Tracy Schwab, CNP         [facility]   IN-PATIENT SERVICE   [facility]    HISTORY AND PHYSICAL EXAMINATION            Date:   7/13/2024  Patient name:  Priscila Stubbs  Date of admission:  7/13/2024  2:35 PM  MRN:   [id number]  Account:  [id number]  YOB: 1960  PCP:    Luiza Bella MD  Room:   24/24  Code Status:    Prior    Chief Complaint:     Chief Complaint   Patient presents with    Chest Pain    Shortness of Breath    Dizziness       History Obtained From:     patient, electronic medical record    History of Present Illness:     Priscila Stubbs is a 64 y.o. Non- / non  female who presents with Chest Pain, Shortness of Breath, and Dizziness and is admitted to the hospital for the management of New onset atrial fibrillation (HCC).  Patient has a history significant for depression, anxiety, hyperlipidemia, GERD, FÉLIX, and asthma.    Patient reports left-sided chest pain that started this morning around

## 2024-07-13 NOTE — ED NOTES
Patient had multiple intermittent episodes of HR in 140-160s. EKG was completed, continuous and one-time. Patient placed on lifepak. Patient found to be in afib with RVR. Metoprolol IV administered with improvement. Patient feeling flush, shaky. Cold cloth placed on forehead per request. Writer remains at bedside

## 2024-07-13 NOTE — ED NOTES
Patient resting comfortably and in no acute distress. Respirations even and nonlabored. Patient denies any needs at this time. Bed in lowest position. Call light within reach. Will continue to monitor.   no

## 2024-07-14 LAB
ANION GAP SERPL CALCULATED.3IONS-SCNC: 11 MMOL/L (ref 9–16)
ANTI-XA UNFRAC HEPARIN: 0.36 IU/L
ANTI-XA UNFRAC HEPARIN: 0.37 IU/L
ANTI-XA UNFRAC HEPARIN: 0.5 IU/L
BASOPHILS # BLD: 0.06 K/UL (ref 0–0.2)
BASOPHILS NFR BLD: 1 % (ref 0–2)
BUN SERPL-MCNC: 16 MG/DL (ref 8–23)
CALCIUM SERPL-MCNC: 8.7 MG/DL (ref 8.6–10.4)
CHLORIDE SERPL-SCNC: 107 MMOL/L (ref 98–107)
CO2 SERPL-SCNC: 25 MMOL/L (ref 20–31)
CREAT SERPL-MCNC: 0.7 MG/DL (ref 0.5–0.9)
EOSINOPHIL # BLD: 0.24 K/UL (ref 0–0.44)
EOSINOPHILS RELATIVE PERCENT: 5 % (ref 1–4)
ERYTHROCYTE [DISTWIDTH] IN BLOOD BY AUTOMATED COUNT: 13 % (ref 11.8–14.4)
GFR, ESTIMATED: >90 ML/MIN/1.73M2
GLUCOSE SERPL-MCNC: 98 MG/DL (ref 74–99)
HCT VFR BLD AUTO: 42.5 % (ref 36.3–47.1)
HGB BLD-MCNC: 12.7 G/DL (ref 11.9–15.1)
IMM GRANULOCYTES # BLD AUTO: <0.03 K/UL (ref 0–0.3)
IMM GRANULOCYTES NFR BLD: 0 %
LYMPHOCYTES NFR BLD: 2.26 K/UL (ref 1.1–3.7)
LYMPHOCYTES RELATIVE PERCENT: 43 % (ref 24–43)
MCH RBC QN AUTO: 30 PG (ref 25.2–33.5)
MCHC RBC AUTO-ENTMCNC: 29.9 G/DL (ref 28.4–34.8)
MCV RBC AUTO: 100.5 FL (ref 82.6–102.9)
MONOCYTES NFR BLD: 0.34 K/UL (ref 0.1–1.2)
MONOCYTES NFR BLD: 7 % (ref 3–12)
NEUTROPHILS NFR BLD: 44 % (ref 36–65)
NEUTS SEG NFR BLD: 2.3 K/UL (ref 1.5–8.1)
NRBC BLD-RTO: 0 PER 100 WBC
PLATELET # BLD AUTO: 161 K/UL (ref 138–453)
PMV BLD AUTO: 11.8 FL (ref 8.1–13.5)
POTASSIUM SERPL-SCNC: 3.9 MMOL/L (ref 3.7–5.3)
RBC # BLD AUTO: 4.23 M/UL (ref 3.95–5.11)
SODIUM SERPL-SCNC: 143 MMOL/L (ref 136–145)
WBC OTHER # BLD: 5.2 K/UL (ref 3.5–11.3)

## 2024-07-14 PROCEDURE — 85025 COMPLETE CBC W/AUTO DIFF WBC: CPT

## 2024-07-14 PROCEDURE — 99223 1ST HOSP IP/OBS HIGH 75: CPT | Performed by: INTERNAL MEDICINE

## 2024-07-14 PROCEDURE — 99232 SBSQ HOSP IP/OBS MODERATE 35: CPT | Performed by: STUDENT IN AN ORGANIZED HEALTH CARE EDUCATION/TRAINING PROGRAM

## 2024-07-14 PROCEDURE — 6370000000 HC RX 637 (ALT 250 FOR IP): Performed by: PHYSICIAN ASSISTANT

## 2024-07-14 PROCEDURE — 1200000000 HC SEMI PRIVATE

## 2024-07-14 PROCEDURE — 6370000000 HC RX 637 (ALT 250 FOR IP): Performed by: STUDENT IN AN ORGANIZED HEALTH CARE EDUCATION/TRAINING PROGRAM

## 2024-07-14 PROCEDURE — 6360000002 HC RX W HCPCS: Performed by: EMERGENCY MEDICINE

## 2024-07-14 PROCEDURE — 80048 BASIC METABOLIC PNL TOTAL CA: CPT

## 2024-07-14 PROCEDURE — 36415 COLL VENOUS BLD VENIPUNCTURE: CPT

## 2024-07-14 PROCEDURE — 6370000000 HC RX 637 (ALT 250 FOR IP)

## 2024-07-14 PROCEDURE — 85520 HEPARIN ASSAY: CPT

## 2024-07-14 PROCEDURE — 2580000003 HC RX 258: Performed by: PHYSICIAN ASSISTANT

## 2024-07-14 RX ORDER — FLECAINIDE ACETATE 50 MG/1
50 TABLET ORAL 2 TIMES DAILY
Status: DISCONTINUED | OUTPATIENT
Start: 2024-07-14 | End: 2024-07-16 | Stop reason: HOSPADM

## 2024-07-14 RX ORDER — METOPROLOL SUCCINATE 25 MG/1
50 TABLET, EXTENDED RELEASE ORAL 2 TIMES DAILY
Status: DISCONTINUED | OUTPATIENT
Start: 2024-07-14 | End: 2024-07-16 | Stop reason: HOSPADM

## 2024-07-14 RX ORDER — TETRAHYDROZOLINE HCL 0.05 %
1 DROPS OPHTHALMIC (EYE) EVERY 4 HOURS PRN
Status: DISCONTINUED | OUTPATIENT
Start: 2024-07-14 | End: 2024-07-16 | Stop reason: HOSPADM

## 2024-07-14 RX ADMIN — FLECAINIDE ACETATE 50 MG: 50 TABLET ORAL at 21:26

## 2024-07-14 RX ADMIN — BUSPIRONE HYDROCHLORIDE 15 MG: 15 TABLET ORAL at 09:03

## 2024-07-14 RX ADMIN — SODIUM CHLORIDE, PRESERVATIVE FREE 10 ML: 5 INJECTION INTRAVENOUS at 09:04

## 2024-07-14 RX ADMIN — DICYCLOMINE HYDROCHLORIDE 10 MG: 10 CAPSULE ORAL at 14:20

## 2024-07-14 RX ADMIN — VENLAFAXINE HYDROCHLORIDE 75 MG: 75 CAPSULE, EXTENDED RELEASE ORAL at 09:03

## 2024-07-14 RX ADMIN — DICYCLOMINE HYDROCHLORIDE 10 MG: 10 CAPSULE ORAL at 21:26

## 2024-07-14 RX ADMIN — ASPIRIN 325 MG: 325 TABLET, COATED ORAL at 09:03

## 2024-07-14 RX ADMIN — DICYCLOMINE HYDROCHLORIDE 10 MG: 10 CAPSULE ORAL at 09:03

## 2024-07-14 RX ADMIN — DICYCLOMINE HYDROCHLORIDE 10 MG: 10 CAPSULE ORAL at 18:23

## 2024-07-14 RX ADMIN — CLONAZEPAM 0.5 MG: 0.5 TABLET ORAL at 21:26

## 2024-07-14 RX ADMIN — METOPROLOL SUCCINATE 50 MG: 25 TABLET, EXTENDED RELEASE ORAL at 21:25

## 2024-07-14 RX ADMIN — ACETAMINOPHEN 650 MG: 325 TABLET ORAL at 14:21

## 2024-07-14 RX ADMIN — DESMOPRESSIN ACETATE 40 MG: 0.2 TABLET ORAL at 21:26

## 2024-07-14 RX ADMIN — HEPARIN SODIUM 12 UNITS/KG/HR: 10000 INJECTION, SOLUTION INTRAVENOUS at 18:27

## 2024-07-14 RX ADMIN — FLECAINIDE ACETATE 50 MG: 50 TABLET ORAL at 14:15

## 2024-07-14 RX ADMIN — PANTOPRAZOLE SODIUM 40 MG: 40 TABLET, DELAYED RELEASE ORAL at 09:03

## 2024-07-14 RX ADMIN — BUSPIRONE HYDROCHLORIDE 15 MG: 15 TABLET ORAL at 21:25

## 2024-07-14 RX ADMIN — Medication 1 DROP: at 14:13

## 2024-07-14 RX ADMIN — METOPROLOL SUCCINATE 25 MG: 25 TABLET, EXTENDED RELEASE ORAL at 09:03

## 2024-07-14 ASSESSMENT — PAIN SCALES - GENERAL
PAINLEVEL_OUTOF10: 0
PAINLEVEL_OUTOF10: 8
PAINLEVEL_OUTOF10: 0

## 2024-07-14 ASSESSMENT — PAIN DESCRIPTION - LOCATION: LOCATION: HEAD

## 2024-07-14 ASSESSMENT — PAIN - FUNCTIONAL ASSESSMENT: PAIN_FUNCTIONAL_ASSESSMENT: PREVENTS OR INTERFERES SOME ACTIVE ACTIVITIES AND ADLS

## 2024-07-14 ASSESSMENT — PAIN DESCRIPTION - DESCRIPTORS: DESCRIPTORS: ACHING

## 2024-07-14 ASSESSMENT — PAIN DESCRIPTION - ORIENTATION: ORIENTATION: RIGHT;LEFT

## 2024-07-14 NOTE — CONSULTS
[facility]    Consult / H&P               Today's Date: 7/14/2024  Patient Name: Priscila Stubbs  Date of admission: 7/13/2024  2:35 PM  Patient's age: 64 y.o., 1960  Admission Dx: New onset atrial fibrillation (HCC) [I48.91]  Atrial fibrillation with RVR (HCC) [I48.91]  Chest pain, unspecified type [R07.9]    Requesting Physician: Rajat Abernathy MD    Cardiac Evaluation Reason:  New onset A. Fib with RVR    History Obtained From: patient and chart review     History of Present Illness:    This patient 64 y.o. years old female with past medical history given below.  She presented to the ED with a chief complaint of chest pain, shortness of breath and dizziness.    As per chart review, patient reported left-sided chest pain started in the morning around 10 AM associated with shortness of breath, palpitations and dizziness.  Patient said that she felt lightheaded and felt like she was about to pass out.  She reported similar kind of episode roughly 3 to 4 years ago with prior cardiac workup being negative.  She does have a history of anxiety but feels like that this was not contributing to her symptoms.    In the ED, she was hemodynamically stable.  She had an episode of A-fib with RVR and she received 1 dose of IV metoprolol with improvement.  Subsequent lab workup was negative for any troponin elevation.  She was found to have elevated D-dimer levels and underwent CT PE which was negative for any acute PE.  Venous duplex of bilateral lower extremity was negative for any acute venous thrombosis.  Patient was started on IV heparin drip with a concern of atrial fibrillation.    Cardiology is consulted for the single episode of atrial fibrillation with rapid ventricular response.    Past Medical History:   has a past medical history of Arthritis, ASCUS with positive high risk HPV cervical, Asthma, COVID-19, Depression, Diverticulitis, ESBL (extended spectrum beta-lactamase) producing bacteria

## 2024-07-14 NOTE — CARE COORDINATION
transportation at discharge:      Financial    Payor: [employer] / Plan: [employer] [place] / Product Type: *No Product type* /     Does insurance require precert for SNF: Yes    Potential assistance Purchasing Medications: No  Meds-to-Beds request:        [facility] - [place], [place] - [address] P [phone] - F [phone]  [address]  [facility] [address]  Phone: [phone] Fax: [phone]      Notes:    Factors facilitating achievement of predicted outcomes: Family support    Barriers to discharge: Medical complications    Additional Case Management Notes: home independently    The Plan for Transition of Care is related to the following treatment goals of New onset atrial fibrillation (HCC) [I48.91]  Atrial fibrillation with RVR (HCC) [I48.91]  Chest pain, unspecified type [R07.9]    IF APPLICABLE: The Patient and/or patient representative Priscila and her family were provided with a choice of provider and agrees with the discharge plan. Freedom of choice list with basic dialogue that supports the patient's individualized plan of care/goals and shares the quality data associated with the providers was provided to:     Patient Representative Name:       The Patient and/or Patient Representative Agree with the Discharge Plan?      BETTINA JUNG RN  Case Management Department  Ph: [phone] Fax:

## 2024-07-14 NOTE — ED NOTES
ED to inpatient nurses report      Chief Complaint:  Chief Complaint   Patient presents with    Chest Pain    Shortness of Breath    Dizziness     Present to ED from: Patient presents to ED for evaluation of chest pain. Patient reports waking up this morning and having left sternal chest pain since waking up. Patient reports intermittent feelings of shortness of breath and lightheadedness/dizziness as if she is going to pass out. Patient has hx depression and anxiety, is on medications effexor and buspar for these. No missed doses. Does not think her current symptoms are from anxiety. Patient takes metoprolol for hx high HR in the past. Patient reports some right calf pain for the past couple days.     MOA:     LOC: alert and orientated to name, place, date  Mobility: Independent  Oxygen Baseline: room air      Current needs required:   Pending ED orders:   Present condition: resting in bed. RR non labored and even.      Why did the patient come to the ED? Chest pain and sob    What is the plan? Admit   Any procedures or intervention occur? EKG, ct, meds, labs   Any safety concerns??     Mental Status:       Psych Assessment:   Psychosocial  Psychosocial (WDL): Within Defined Limits  Vital signs   Vitals:    07/13/24 1530 07/13/24 1545 07/13/24 1855 07/13/24 1915   BP: 120/67 107/73  130/70   Pulse: 80 80  73   Resp: 16 (!) 9  14   Temp:       TempSrc:       SpO2: 97% 96%  97%   Weight:   81.6 kg (180 lb)         Vitals:  Patient Vitals for the past 24 hrs:   BP Temp Temp src Pulse Resp SpO2 Weight   07/13/24 1915 130/70 -- -- 73 14 97 % --   07/13/24 1855 -- -- -- -- -- -- 81.6 kg (180 lb)   07/13/24 1545 107/73 -- -- 80 (!) 9 96 % --   07/13/24 1530 120/67 -- -- 80 16 97 % --   07/13/24 1515 115/69 -- -- 82 14 -- --   07/13/24 1514 -- -- -- -- -- 99 % --   07/13/24 1500 122/83 -- -- 97 13 96 % --   07/13/24 1459 126/87 -- -- 98 16 98 % --   07/13/24 1453 -- -- -- (!) 149 24 97 % --   07/13/24 1452 -- -- -- (!) 147

## 2024-07-14 NOTE — PLAN OF CARE
Problem: Pain  Goal: Verbalizes/displays adequate comfort level or baseline comfort level  7/14/2024 0538 by Dixie Hurley RN  Outcome: Progressing

## 2024-07-15 ENCOUNTER — APPOINTMENT (OUTPATIENT)
Age: 64
DRG: 309 | End: 2024-07-15
Attending: STUDENT IN AN ORGANIZED HEALTH CARE EDUCATION/TRAINING PROGRAM
Payer: COMMERCIAL

## 2024-07-15 LAB
ANION GAP SERPL CALCULATED.3IONS-SCNC: 9 MMOL/L (ref 9–16)
ANTI-XA UNFRAC HEPARIN: 0.39 IU/L
BASOPHILS # BLD: 0.07 K/UL (ref 0–0.2)
BASOPHILS NFR BLD: 1 % (ref 0–2)
BUN SERPL-MCNC: 15 MG/DL (ref 8–23)
CALCIUM SERPL-MCNC: 9.2 MG/DL (ref 8.6–10.4)
CHLORIDE SERPL-SCNC: 107 MMOL/L (ref 98–107)
CO2 SERPL-SCNC: 26 MMOL/L (ref 20–31)
CREAT SERPL-MCNC: 0.8 MG/DL (ref 0.5–0.9)
ECHO AO ROOT DIAM: 3.3 CM
ECHO AO ROOT INDEX: 1.85 CM/M2
ECHO AV AREA VTI: 2.2 CM2
ECHO AV AREA/BSA VTI: 1.2 CM2/M2
ECHO AV MEAN GRADIENT: 2 MMHG
ECHO AV MEAN VELOCITY: 0.6 M/S
ECHO AV PEAK GRADIENT: 4 MMHG
ECHO AV PEAK VELOCITY: 0.1 M/S
ECHO AV VELOCITY RATIO: 8
ECHO AV VTI: 23.7 CM
ECHO BSA: 1.86 M2
ECHO EST RA PRESSURE: 3 MMHG
ECHO LA DIAMETER INDEX: 1.63 CM/M2
ECHO LA DIAMETER: 2.9 CM
ECHO LA TO AORTIC ROOT RATIO: 0.88
ECHO LA VOL A-L A2C: 45 ML (ref 22–52)
ECHO LA VOL A-L A4C: 29 ML (ref 22–52)
ECHO LA VOLUME INDEX A-L A2C: 25 ML/M2 (ref 16–34)
ECHO LA VOLUME INDEX A-L A4C: 16 ML/M2 (ref 16–34)
ECHO LV E' LATERAL VELOCITY: 9 CM/S
ECHO LV E' SEPTAL VELOCITY: 9 CM/S
ECHO LV EDV A2C: 40 ML
ECHO LV EDV A4C: 45 ML
ECHO LV EDV INDEX A4C: 25 ML/M2
ECHO LV EDV NDEX A2C: 22 ML/M2
ECHO LV ESV A2C: 18 ML
ECHO LV ESV A4C: 21 ML
ECHO LV ESV INDEX A2C: 10 ML/M2
ECHO LV ESV INDEX A4C: 12 ML/M2
ECHO LV FRACTIONAL SHORTENING: 15 % (ref 28–44)
ECHO LV INTERNAL DIMENSION DIASTOLE INDEX: 2.58 CM/M2
ECHO LV INTERNAL DIMENSION DIASTOLIC: 4.6 CM (ref 3.9–5.3)
ECHO LV INTERNAL DIMENSION SYSTOLIC INDEX: 2.19 CM/M2
ECHO LV INTERNAL DIMENSION SYSTOLIC: 3.9 CM
ECHO LV IVSD: 0.9 CM (ref 0.6–0.9)
ECHO LV MASS 2D: 127.7 G (ref 67–162)
ECHO LV MASS INDEX 2D: 71.7 G/M2 (ref 43–95)
ECHO LV POSTERIOR WALL DIASTOLIC: 0.8 CM (ref 0.6–0.9)
ECHO LV RELATIVE WALL THICKNESS RATIO: 0.35
ECHO LVOT AV VTI INDEX: 0.71
ECHO LVOT MEAN GRADIENT: 1 MMHG
ECHO LVOT PEAK GRADIENT: 3 MMHG
ECHO LVOT PEAK VELOCITY: 0.8 M/S
ECHO LVOT VTI: 16.8 CM
ECHO MV A VELOCITY: 0.92 M/S
ECHO MV E DECELERATION TIME (DT): 215 MS
ECHO MV E VELOCITY: 0.92 M/S
ECHO MV E/A RATIO: 1
ECHO MV E/E' LATERAL: 10.22
ECHO MV E/E' RATIO (AVERAGED): 10.22
ECHO MV E/E' SEPTAL: 10.22
ECHO MV LVOT VTI INDEX: 2.24
ECHO MV MEAN GRADIENT: 1 MMHG
ECHO MV MEAN VELOCITY: 0.6 M/S
ECHO MV PEAK GRADIENT: 4 MMHG
ECHO MV VTI: 37.6 CM
ECHO PV MAX VELOCITY: 0.7 M/S
ECHO PV MEAN GRADIENT: 2 MMHG
ECHO RIGHT VENTRICULAR SYSTOLIC PRESSURE (RVSP): 23 MMHG
ECHO TV REGURGITANT MAX VELOCITY: 2.21 M/S
ECHO TV REGURGITANT PEAK GRADIENT: 20 MMHG
EKG ATRIAL RATE: 147 BPM
EKG ATRIAL RATE: 147 BPM
EKG ATRIAL RATE: 61 BPM
EKG ATRIAL RATE: 61 BPM
EKG ATRIAL RATE: 63 BPM
EKG ATRIAL RATE: 69 BPM
EKG P AXIS: 60 DEGREES
EKG P AXIS: 67 DEGREES
EKG P AXIS: 67 DEGREES
EKG P AXIS: 71 DEGREES
EKG P-R INTERVAL: 146 MS
EKG P-R INTERVAL: 156 MS
EKG Q-T INTERVAL: 284 MS
EKG Q-T INTERVAL: 308 MS
EKG Q-T INTERVAL: 416 MS
EKG Q-T INTERVAL: 424 MS
EKG Q-T INTERVAL: 424 MS
EKG Q-T INTERVAL: 432 MS
EKG QRS DURATION: 70 MS
EKG QRS DURATION: 74 MS
EKG QRS DURATION: 74 MS
EKG QRS DURATION: 80 MS
EKG QTC CALCULATION (BAZETT): 409 MS
EKG QTC CALCULATION (BAZETT): 426 MS
EKG QTC CALCULATION (BAZETT): 426 MS
EKG QTC CALCULATION (BAZETT): 442 MS
EKG QTC CALCULATION (BAZETT): 445 MS
EKG QTC CALCULATION (BAZETT): 476 MS
EKG R AXIS: 2 DEGREES
EKG R AXIS: 2 DEGREES
EKG R AXIS: 21 DEGREES
EKG R AXIS: 21 DEGREES
EKG R AXIS: 29 DEGREES
EKG R AXIS: 5 DEGREES
EKG T AXIS: 43 DEGREES
EKG T AXIS: 43 DEGREES
EKG T AXIS: 45 DEGREES
EKG T AXIS: 53 DEGREES
EKG T AXIS: 66 DEGREES
EKG T AXIS: 70 DEGREES
EKG VENTRICULAR RATE: 125 BPM
EKG VENTRICULAR RATE: 144 BPM
EKG VENTRICULAR RATE: 61 BPM
EKG VENTRICULAR RATE: 61 BPM
EKG VENTRICULAR RATE: 63 BPM
EKG VENTRICULAR RATE: 69 BPM
EOSINOPHIL # BLD: 0.24 K/UL (ref 0–0.44)
EOSINOPHILS RELATIVE PERCENT: 4 % (ref 1–4)
ERYTHROCYTE [DISTWIDTH] IN BLOOD BY AUTOMATED COUNT: 12.8 % (ref 11.8–14.4)
GFR, ESTIMATED: 80 ML/MIN/1.73M2
GLUCOSE SERPL-MCNC: 124 MG/DL (ref 74–99)
HCT VFR BLD AUTO: 41.1 % (ref 36.3–47.1)
HGB BLD-MCNC: 13 G/DL (ref 11.9–15.1)
IMM GRANULOCYTES # BLD AUTO: <0.03 K/UL (ref 0–0.3)
IMM GRANULOCYTES NFR BLD: 0 %
LYMPHOCYTES NFR BLD: 2.39 K/UL (ref 1.1–3.7)
LYMPHOCYTES RELATIVE PERCENT: 39 % (ref 24–43)
MCH RBC QN AUTO: 29.8 PG (ref 25.2–33.5)
MCHC RBC AUTO-ENTMCNC: 31.6 G/DL (ref 28.4–34.8)
MCV RBC AUTO: 94.3 FL (ref 82.6–102.9)
MONOCYTES NFR BLD: 0.38 K/UL (ref 0.1–1.2)
MONOCYTES NFR BLD: 6 % (ref 3–12)
NEUTROPHILS NFR BLD: 50 % (ref 36–65)
NEUTS SEG NFR BLD: 3.1 K/UL (ref 1.5–8.1)
NRBC BLD-RTO: 0 PER 100 WBC
PLATELET # BLD AUTO: 193 K/UL (ref 138–453)
PMV BLD AUTO: 12 FL (ref 8.1–13.5)
POTASSIUM SERPL-SCNC: 4 MMOL/L (ref 3.7–5.3)
RBC # BLD AUTO: 4.36 M/UL (ref 3.95–5.11)
SODIUM SERPL-SCNC: 142 MMOL/L (ref 136–145)
WBC OTHER # BLD: 6.2 K/UL (ref 3.5–11.3)

## 2024-07-15 PROCEDURE — 85025 COMPLETE CBC W/AUTO DIFF WBC: CPT

## 2024-07-15 PROCEDURE — 99232 SBSQ HOSP IP/OBS MODERATE 35: CPT | Performed by: STUDENT IN AN ORGANIZED HEALTH CARE EDUCATION/TRAINING PROGRAM

## 2024-07-15 PROCEDURE — 85520 HEPARIN ASSAY: CPT

## 2024-07-15 PROCEDURE — 1200000000 HC SEMI PRIVATE

## 2024-07-15 PROCEDURE — 6370000000 HC RX 637 (ALT 250 FOR IP): Performed by: NURSE PRACTITIONER

## 2024-07-15 PROCEDURE — 93005 ELECTROCARDIOGRAM TRACING: CPT | Performed by: STUDENT IN AN ORGANIZED HEALTH CARE EDUCATION/TRAINING PROGRAM

## 2024-07-15 PROCEDURE — 6360000002 HC RX W HCPCS: Performed by: EMERGENCY MEDICINE

## 2024-07-15 PROCEDURE — 93306 TTE W/DOPPLER COMPLETE: CPT

## 2024-07-15 PROCEDURE — 6370000000 HC RX 637 (ALT 250 FOR IP)

## 2024-07-15 PROCEDURE — 36415 COLL VENOUS BLD VENIPUNCTURE: CPT

## 2024-07-15 PROCEDURE — 80048 BASIC METABOLIC PNL TOTAL CA: CPT

## 2024-07-15 PROCEDURE — 93306 TTE W/DOPPLER COMPLETE: CPT | Performed by: INTERNAL MEDICINE

## 2024-07-15 PROCEDURE — 2580000003 HC RX 258: Performed by: PHYSICIAN ASSISTANT

## 2024-07-15 PROCEDURE — 99233 SBSQ HOSP IP/OBS HIGH 50: CPT | Performed by: NURSE PRACTITIONER

## 2024-07-15 PROCEDURE — 6370000000 HC RX 637 (ALT 250 FOR IP): Performed by: PHYSICIAN ASSISTANT

## 2024-07-15 RX ORDER — HYDROCORTISONE 25 MG/G
CREAM TOPICAL 2 TIMES DAILY
Status: DISCONTINUED | OUTPATIENT
Start: 2024-07-15 | End: 2024-07-16 | Stop reason: HOSPADM

## 2024-07-15 RX ADMIN — DESMOPRESSIN ACETATE 40 MG: 0.2 TABLET ORAL at 21:24

## 2024-07-15 RX ADMIN — PANTOPRAZOLE SODIUM 40 MG: 40 TABLET, DELAYED RELEASE ORAL at 08:28

## 2024-07-15 RX ADMIN — ACETAMINOPHEN 650 MG: 325 TABLET ORAL at 12:11

## 2024-07-15 RX ADMIN — BUSPIRONE HYDROCHLORIDE 15 MG: 15 TABLET ORAL at 08:28

## 2024-07-15 RX ADMIN — HEPARIN SODIUM 12 UNITS/KG/HR: 10000 INJECTION, SOLUTION INTRAVENOUS at 21:22

## 2024-07-15 RX ADMIN — Medication 1 DROP: at 21:28

## 2024-07-15 RX ADMIN — DICYCLOMINE HYDROCHLORIDE 10 MG: 10 CAPSULE ORAL at 17:53

## 2024-07-15 RX ADMIN — CLONAZEPAM 0.5 MG: 0.5 TABLET ORAL at 21:29

## 2024-07-15 RX ADMIN — HYDROCORTISONE 2.5%: 25 CREAM TOPICAL at 09:10

## 2024-07-15 RX ADMIN — HYDROCORTISONE 2.5%: 25 CREAM TOPICAL at 21:25

## 2024-07-15 RX ADMIN — DICYCLOMINE HYDROCHLORIDE 10 MG: 10 CAPSULE ORAL at 12:43

## 2024-07-15 RX ADMIN — METOPROLOL SUCCINATE 50 MG: 25 TABLET, EXTENDED RELEASE ORAL at 08:28

## 2024-07-15 RX ADMIN — ASPIRIN 325 MG: 325 TABLET, COATED ORAL at 08:28

## 2024-07-15 RX ADMIN — FLECAINIDE ACETATE 50 MG: 50 TABLET ORAL at 08:28

## 2024-07-15 RX ADMIN — METOPROLOL SUCCINATE 50 MG: 25 TABLET, EXTENDED RELEASE ORAL at 21:23

## 2024-07-15 RX ADMIN — DICYCLOMINE HYDROCHLORIDE 10 MG: 10 CAPSULE ORAL at 21:24

## 2024-07-15 RX ADMIN — FLECAINIDE ACETATE 50 MG: 50 TABLET ORAL at 21:24

## 2024-07-15 RX ADMIN — BUSPIRONE HYDROCHLORIDE 15 MG: 15 TABLET ORAL at 21:23

## 2024-07-15 RX ADMIN — SODIUM CHLORIDE, PRESERVATIVE FREE 10 ML: 5 INJECTION INTRAVENOUS at 08:27

## 2024-07-15 RX ADMIN — VENLAFAXINE HYDROCHLORIDE 75 MG: 75 CAPSULE, EXTENDED RELEASE ORAL at 08:28

## 2024-07-15 RX ADMIN — DICYCLOMINE HYDROCHLORIDE 10 MG: 10 CAPSULE ORAL at 08:28

## 2024-07-15 ASSESSMENT — PAIN DESCRIPTION - LOCATION: LOCATION: HEAD

## 2024-07-15 ASSESSMENT — PAIN - FUNCTIONAL ASSESSMENT: PAIN_FUNCTIONAL_ASSESSMENT: ACTIVITIES ARE NOT PREVENTED

## 2024-07-15 ASSESSMENT — PAIN SCALES - GENERAL
PAINLEVEL_OUTOF10: 3
PAINLEVEL_OUTOF10: 3

## 2024-07-15 ASSESSMENT — PAIN DESCRIPTION - ORIENTATION: ORIENTATION: MID

## 2024-07-15 ASSESSMENT — PAIN DESCRIPTION - DESCRIPTORS: DESCRIPTORS: ACHING

## 2024-07-15 NOTE — PLAN OF CARE
Problem: Discharge Planning  Goal: Discharge to home or other facility with appropriate resources  7/15/2024 1601 by Sherri Chirinos, RN  Outcome: Progressing  Flowsheets (Taken 7/15/2024 0805)  Discharge to home or other facility with appropriate resources: Identify barriers to discharge with patient and caregiver  7/15/2024 0529 by Dixie Hurley, RN  Outcome: Progressing     Problem: Pain  Goal: Verbalizes/displays adequate comfort level or baseline comfort level  Outcome: Progressing     Problem: ABCDS Injury Assessment  Goal: Absence of physical injury  Outcome: Progressing

## 2024-07-16 VITALS
SYSTOLIC BLOOD PRESSURE: 118 MMHG | HEART RATE: 63 BPM | WEIGHT: 180 LBS | OXYGEN SATURATION: 96 % | RESPIRATION RATE: 18 BRPM | HEIGHT: 60 IN | BODY MASS INDEX: 35.34 KG/M2 | DIASTOLIC BLOOD PRESSURE: 65 MMHG | TEMPERATURE: 98.1 F

## 2024-07-16 LAB
ANION GAP SERPL CALCULATED.3IONS-SCNC: 8 MMOL/L (ref 9–16)
ANTI-XA UNFRAC HEPARIN: 0.33 IU/L
BASOPHILS # BLD: 0.06 K/UL (ref 0–0.2)
BASOPHILS NFR BLD: 1 % (ref 0–2)
BUN SERPL-MCNC: 11 MG/DL (ref 8–23)
CALCIUM SERPL-MCNC: 9.1 MG/DL (ref 8.6–10.4)
CHLORIDE SERPL-SCNC: 108 MMOL/L (ref 98–107)
CO2 SERPL-SCNC: 29 MMOL/L (ref 20–31)
CREAT SERPL-MCNC: 0.8 MG/DL (ref 0.5–0.9)
EOSINOPHIL # BLD: 0.27 K/UL (ref 0–0.44)
EOSINOPHILS RELATIVE PERCENT: 5 % (ref 1–4)
ERYTHROCYTE [DISTWIDTH] IN BLOOD BY AUTOMATED COUNT: 12.9 % (ref 11.8–14.4)
GFR, ESTIMATED: 82 ML/MIN/1.73M2
GLUCOSE SERPL-MCNC: 107 MG/DL (ref 74–99)
HCT VFR BLD AUTO: 40.6 % (ref 36.3–47.1)
HGB BLD-MCNC: 13.2 G/DL (ref 11.9–15.1)
IMM GRANULOCYTES # BLD AUTO: <0.03 K/UL (ref 0–0.3)
IMM GRANULOCYTES NFR BLD: 0 %
LYMPHOCYTES NFR BLD: 1.94 K/UL (ref 1.1–3.7)
LYMPHOCYTES RELATIVE PERCENT: 33 % (ref 24–43)
MCH RBC QN AUTO: 29.6 PG (ref 25.2–33.5)
MCHC RBC AUTO-ENTMCNC: 32.5 G/DL (ref 28.4–34.8)
MCV RBC AUTO: 91 FL (ref 82.6–102.9)
MONOCYTES NFR BLD: 0.35 K/UL (ref 0.1–1.2)
MONOCYTES NFR BLD: 6 % (ref 3–12)
NEUTROPHILS NFR BLD: 55 % (ref 36–65)
NEUTS SEG NFR BLD: 3.22 K/UL (ref 1.5–8.1)
NRBC BLD-RTO: 0 PER 100 WBC
PLATELET # BLD AUTO: 193 K/UL (ref 138–453)
PMV BLD AUTO: 12.2 FL (ref 8.1–13.5)
POTASSIUM SERPL-SCNC: 4.1 MMOL/L (ref 3.7–5.3)
RBC # BLD AUTO: 4.46 M/UL (ref 3.95–5.11)
SODIUM SERPL-SCNC: 145 MMOL/L (ref 136–145)
WBC OTHER # BLD: 5.9 K/UL (ref 3.5–11.3)

## 2024-07-16 PROCEDURE — 85520 HEPARIN ASSAY: CPT

## 2024-07-16 PROCEDURE — 80048 BASIC METABOLIC PNL TOTAL CA: CPT

## 2024-07-16 PROCEDURE — 6370000000 HC RX 637 (ALT 250 FOR IP)

## 2024-07-16 PROCEDURE — 99232 SBSQ HOSP IP/OBS MODERATE 35: CPT | Performed by: STUDENT IN AN ORGANIZED HEALTH CARE EDUCATION/TRAINING PROGRAM

## 2024-07-16 PROCEDURE — 85025 COMPLETE CBC W/AUTO DIFF WBC: CPT

## 2024-07-16 PROCEDURE — 2580000003 HC RX 258: Performed by: PHYSICIAN ASSISTANT

## 2024-07-16 PROCEDURE — 6370000000 HC RX 637 (ALT 250 FOR IP): Performed by: PHYSICIAN ASSISTANT

## 2024-07-16 PROCEDURE — 99233 SBSQ HOSP IP/OBS HIGH 50: CPT | Performed by: SURGERY

## 2024-07-16 PROCEDURE — 36415 COLL VENOUS BLD VENIPUNCTURE: CPT

## 2024-07-16 RX ORDER — FLECAINIDE ACETATE 50 MG/1
50 TABLET ORAL 2 TIMES DAILY
Qty: 60 TABLET | Refills: 3 | Status: SHIPPED | OUTPATIENT
Start: 2024-07-16

## 2024-07-16 RX ORDER — METOPROLOL SUCCINATE 50 MG/1
50 TABLET, EXTENDED RELEASE ORAL 2 TIMES DAILY
Qty: 30 TABLET | Refills: 3 | Status: SHIPPED | OUTPATIENT
Start: 2024-07-16

## 2024-07-16 RX ADMIN — DICYCLOMINE HYDROCHLORIDE 10 MG: 10 CAPSULE ORAL at 13:04

## 2024-07-16 RX ADMIN — HYDROCORTISONE 2.5%: 25 CREAM TOPICAL at 08:26

## 2024-07-16 RX ADMIN — METOPROLOL SUCCINATE 50 MG: 25 TABLET, EXTENDED RELEASE ORAL at 09:33

## 2024-07-16 RX ADMIN — VENLAFAXINE HYDROCHLORIDE 75 MG: 75 CAPSULE, EXTENDED RELEASE ORAL at 08:28

## 2024-07-16 RX ADMIN — PANTOPRAZOLE SODIUM 40 MG: 40 TABLET, DELAYED RELEASE ORAL at 08:28

## 2024-07-16 RX ADMIN — DICYCLOMINE HYDROCHLORIDE 10 MG: 10 CAPSULE ORAL at 08:28

## 2024-07-16 RX ADMIN — ASPIRIN 325 MG: 325 TABLET, COATED ORAL at 08:28

## 2024-07-16 RX ADMIN — BUSPIRONE HYDROCHLORIDE 15 MG: 15 TABLET ORAL at 08:28

## 2024-07-16 RX ADMIN — SODIUM CHLORIDE, PRESERVATIVE FREE 10 ML: 5 INJECTION INTRAVENOUS at 08:25

## 2024-07-16 RX ADMIN — FLECAINIDE ACETATE 50 MG: 50 TABLET ORAL at 08:28

## 2024-07-16 NOTE — FLOWSHEET NOTE
07/16/24 1639   Discharge Event   Discharged with Documented Belongings Yes   Departure Mode Family member   Mobility at Departure Ambulatory   Discharged to Private Residence   Time of Discharge 7501

## 2024-07-16 NOTE — PLAN OF CARE
Problem: Discharge Planning  Goal: Discharge to home or other facility with appropriate resources  7/16/2024 0429 by Dixie Hurley, RN  Outcome: Progressing  7/15/2024 1601 by Sherri Chirinos, RN  Outcome: Progressing  Flowsheets (Taken 7/15/2024 0805)  Discharge to home or other facility with appropriate resources: Identify barriers to discharge with patient and caregiver     Problem: Pain  Goal: Verbalizes/displays adequate comfort level or baseline comfort level  7/16/2024 0429 by Dixie Hurley, RN  Outcome: Progressing  7/15/2024 1601 by Sherri Cihrinos, RN  Outcome: Progressing

## 2024-07-16 NOTE — FLOWSHEET NOTE
07/16/24 1639   AVS Reviewed   AVS & discharge instructions reviewed with patient and/or representative? Yes   Reviewed instructions with Patient   Level of Understanding Questions answered;Teach back completed;Verbalized understanding

## 2024-07-16 NOTE — PROGRESS NOTES
Anti xa .33 no change in heparin gtt  
CLINICAL PHARMACY NOTE: MEDS TO BEDS    Total # of Prescriptions Filled: 3   The following medications were delivered to the patient:  Flecainide 50mg  Eliquis 5mg  Metoprolol succinate 50mg    Additional Documentation: completed drop off to room 329 on 7/16/24 at 4:30pm by shelley monet. Copay was 24.20 and paid by angelina  
History:   has a past medical history of Arthritis, ASCUS with positive high risk HPV cervical, Asthma, COVID-19, Depression, Diverticulitis, ESBL (extended spectrum beta-lactamase) producing bacteria infection, GERD (gastroesophageal reflux disease), Hyperlipidemia, MRSA (methicillin resistant staph aureus) culture positive, Rectocele, and Tachycardia.    Social History:   reports that she quit smoking about 29 years ago. Her smoking use included cigarettes. She started smoking about 30 years ago. She has a 1.0 pack-year smoking history. She has never used smokeless tobacco. She reports current alcohol use. She reports that she does not use drugs.     Family History:   Family History   Problem Relation Age of Onset    Colon Cancer Mother     High Blood Pressure Father     Heart Attack Brother     Heart Disease Brother     Crohn's Disease Niece        Vitals:  /68   Pulse 64   Temp 97.8 °F (36.6 °C) (Oral)   Resp 18   Ht 1.524 m (5')   Wt 81.6 kg (180 lb)   SpO2 95%   BMI 35.15 kg/m²   Temp (24hrs), Av °F (36.7 °C), Min:97.8 °F (36.6 °C), Max:98.4 °F (36.9 °C)    No results for input(s): \"POCGLU\" in the last 72 hours.    I/O (24Hr):    Intake/Output Summary (Last 24 hours) at 7/15/2024 1051  Last data filed at 2024 1800  Gross per 24 hour   Intake 1950 ml   Output --   Net 1950 ml       Labs:  Hematology:  Recent Labs     24  1514 24  1943 24  0658 07/15/24  0627   WBC 7.3  --  5.2 6.2   RBC 4.76  --  4.23 4.36   HGB 14.1  --  12.7 13.0   HCT 42.9  --  42.5 41.1   MCV 90.1  --  100.5 94.3   MCH 29.6  --  30.0 29.8   MCHC 32.9  --  29.9 31.6   RDW 12.6  --  13.0 12.8     --  161 193   MPV 12.2  --  11.8 12.0   INR  --  1.0  --   --        Chemistry:  Recent Labs     24  1514 24  1626 24  1724 24  0658 07/15/24  0627     --   --  143 142   K 3.8  --   --  3.9 4.0   *  --   --  107 107   CO2 21  --   --  25 26   GLUCOSE 124*  --   --  98 
alternative oral replacement **OR** potassium chloride, magnesium sulfate, ondansetron **OR** ondansetron, polyethylene glycol, acetaminophen **OR** acetaminophen, albuterol sulfate HFA, clonazePAM    Data:     Past Medical History:   has a past medical history of Arthritis, ASCUS with positive high risk HPV cervical, Asthma, COVID-19, Depression, Diverticulitis, ESBL (extended spectrum beta-lactamase) producing bacteria infection, GERD (gastroesophageal reflux disease), Hyperlipidemia, MRSA (methicillin resistant staph aureus) culture positive, Rectocele, and Tachycardia.    Social History:   reports that she quit smoking about 29 years ago. Her smoking use included cigarettes. She started smoking about 30 years ago. She has a 1.0 pack-year smoking history. She has never used smokeless tobacco. She reports current alcohol use. She reports that she does not use drugs.     Family History:   Family History   Problem Relation Age of Onset    Colon Cancer Mother     High Blood Pressure Father     Heart Attack Brother     Heart Disease Brother     Crohn's Disease Niece        Vitals:  /65   Pulse 63   Temp 98.1 °F (36.7 °C) (Oral)   Resp 18   Ht 1.524 m (5')   Wt 81.6 kg (180 lb)   SpO2 96%   BMI 35.15 kg/m²   Temp (24hrs), Av.5 °F (36.9 °C), Min:98.1 °F (36.7 °C), Max:98.9 °F (37.2 °C)    No results for input(s): \"POCGLU\" in the last 72 hours.    I/O (24Hr):  No intake or output data in the 24 hours ending 24 1032      Labs:  Hematology:  Recent Labs     24  1943 24  0658 07/15/24  0627 24  0731   WBC  --  5.2 6.2 5.9   RBC  --  4.23 4.36 4.46   HGB  --  12.7 13.0 13.2   HCT  --  42.5 41.1 40.6   MCV  --  100.5 94.3 91.0   MCH  --  30.0 29.8 29.6   MCHC  --  29.9 31.6 32.5   RDW  --  13.0 12.8 12.9   PLT  --  161 193 193   MPV  --  11.8 12.0 12.2   INR 1.0  --   --   --        Chemistry:  Recent Labs     24  1514 24  1626 24  1724 24  0658 07/15/24  0627 
diarrhea     Abdominal pain, left lower quadrant     History of diverticulitis     Fatty liver     New onset atrial fibrillation (HCC)     DAYAMI (generalized anxiety disorder)      Plan of Treatment:   Stable. Remains SR. Continue PO BB and Flecainide.   Echo pending. Further ischemic work-up pending findings. If low risk will be OK to switch Heparin gtt to Eliquis 5mg PO BID  Continue asa & statin  OP sleep study recommended  Further work-up pending echo    Electronically signed by Alise Willingham, SHAUN - CNP on 7/15/2024 at 11:34 AM  [facility]  [phone]          
medial meniscus tear of left knee     Dry eyes, bilateral     Insufficiency of tear film of both eyes     Instability of left ankle joint     Unstable angina (HCC)     Class 1 obesity due to excess calories with serious comorbidity and body mass index (BMI) of 34.0 to 34.9 in adult     Gastrocnemius equinus of right lower extremity     Irritable bowel syndrome with both constipation and diarrhea     Abdominal pain, left lower quadrant     History of diverticulitis     Fatty liver     New onset atrial fibrillation (HCC)     DAYAMI (generalized anxiety disorder)      Plan of Treatment:   Stable. Remains SR. Continue PO BB and Flecainide.    Heparin gtt to Eliquis 5mg PO BID  Continue asa & statin  Sleep apnea - hasn't been using cpap but stated she will use it from now on   Ok to discharge from cardiology standpoint, follow up with us in 2 weeks      Electronically signed by SHAUN Ferguson NP on 7/16/2024 at 2:36 PM  [facility]  [phone]          
\"PCO2\", \"POCPO2\", \"PO2ART\", \"PO2\", \"POCHCO3\", \"ACY6QVM\", \"HCO3\", \"NBEA\", \"PBEA\", \"BEART\", \"BE\", \"THGBART\", \"THB\", \"VZL6UIL\", \"ITXL5CXF\", \"M5WEGLFE\", \"O2SAT\", \"FIO2\"  Lab Results   Component Value Date/Time    SPECIAL NOT REPORTED 12/04/2019 07:42 PM     Lab Results   Component Value Date/Time    CULTURE NO SIGNIFICANT GROWTH 11/10/2023 01:52 PM       Radiology:  CT CHEST PULMONARY EMBOLISM W CONTRAST    Result Date: 7/13/2024  No evidence of pulmonary embolism or acute pulmonary abnormality.     XR CHEST PORTABLE    Result Date: 7/13/2024  No acute cardiopulmonary process.       Physical Examination:        General appearance:  alert, cooperative and no distress  Mental Status:  oriented to person, place and time and normal affect  Lungs:  clear to auscultation bilaterally, normal effort  Heart:  regular rate and rhythm, no murmur  Abdomen:  soft, nontender, nondistended, normal bowel sounds, no masses, hepatomegaly, splenomegaly  Extremities:  no edema, redness, tenderness in the calves  Skin:  no gross lesions, rashes, induration    Assessment:        Hospital Problems             Last Modified POA    * (Principal) New onset atrial fibrillation (HCC) 7/13/2024 Yes    Hyperlipidemia 7/13/2024 Yes    Asthma 7/13/2024 Yes    Gastroesophageal reflux disease 7/13/2024 Yes    Dry eyes, bilateral 7/13/2024 Yes    DAYAMI (generalized anxiety disorder) 7/13/2024 Yes       Plan:        New onset atrial fibrillation  Given IV Metoprolol in ED due to -150  Currently rate controlled  Continue Metoprolol to 25 mg   Continue heparin drip for now  CHADS-VASc score 1  ECHO ordered   Will need Holter on discharge      Depression/anxiety  Continue Buspar and Effexor     Hyperlipidemia  Continue Lipitor     GERD  Continue Protonix     FÉLIX  BiPAP nightly as needed     Asthma  Albuterol PRN    Rajat Abernathy MD  7/14/2024  7:42 AM     
clear

## 2024-07-16 NOTE — PLAN OF CARE
Problem: Discharge Planning  Goal: Discharge to home or other facility with appropriate resources  7/16/2024 1501 by Sherri Chirinos, RN  Outcome: Completed  7/16/2024 0429 by Dixie Hurley RN  Outcome: Progressing     Problem: Pain  Goal: Verbalizes/displays adequate comfort level or baseline comfort level  7/16/2024 1501 by Sherri Chirinos, RN  Outcome: Completed  7/16/2024 0429 by Dixie Hurley RN  Outcome: Progressing     Problem: ABCDS Injury Assessment  Goal: Absence of physical injury  Outcome: Completed

## 2024-07-17 NOTE — DISCHARGE SUMMARY
[facility]  Office: [phone]  Javid Valles DO, Anup Levy DO, Michael Ramirez DO, Dustin Amaral DO, Lamonte Benavides MD, Jeniffer Espitia MD, Cindi Batista MD, Radha Rivera MD,  Gen Donovan MD, Deep Croft MD, Luis F Quintero MD,  Avi Hernandez DO, Selena Dean MD, Andrea Barth MD, Chan Valles DO, Iram Loredo MD,  Cabrera Ambrose DO, Erika Rodriguez MD, Malinda Castaneda MD, Daya Portillo MD, Adrián Berry MD,  Soto Navarro MD, Dakota Callejas MD, Latricia Arnold MD, Stefany Barney MD, William Sawyer MD, Rajat Abernathy MD, Santy Boyce DO, Lalit Mohr DO, Christa Cardona MD,  Maynor Garcia MD, Shirley Waterhouse, CNP,  Chiquita Kaiser CNP, Carlitos Schmitt, CNP,  Kaila Cardona, DNP, Clarita Silva, CNP, Mayelin Shahid, CNP, Katharine Lott CNP, Merlene Ragsdale, CNP, Becky Tolbert, PA-C, Coni Calix PA-C, Florecita Garner, CNP, Shira Calvo, CNP, Tomer Bridges, CNP, Ashley Edward, CNP, Kaila Berman, CNP, Jacqueline Jacob, CNS, Meghan Frost, CNP, Lynda Tan CNP, Tracy Schwab, CNP         [facility]   IN-PATIENT SERVICE   [facility]    Discharge Summary     Patient ID: Priscila Stubbs  :  1960   MRN: [id number]     ACCOUNT:  [id number]   Patient's PCP: Luiza Bella MD  Admit Date: 2024   Discharge Date: 2024     Length of Stay: 3  Code Status:  Prior  Admitting Physician: Rajat Abernathy MD  Discharge Physician: Rajat Abernathy MD     Active Discharge Diagnoses:     Hospital Problem Lists:  Principal Problem:    Atrial fibrillation with RVR (HCC)  Active Problems:    Hyperlipidemia    Asthma    Gastroesophageal reflux disease    Dry eyes, bilateral    DAYAMI (generalized anxiety disorder)  Resolved Problems:    * No resolved hospital problems. *      Admission Condition:  fair     Discharged Condition: good    Hospital Stay:     Hospital Course:  Priscila Stubbs is a 64 y.o. female  who presented with Chest Pain,

## 2024-07-29 DIAGNOSIS — E78.49 OTHER HYPERLIPIDEMIA: ICD-10-CM

## 2024-07-29 DIAGNOSIS — M62.838 MUSCLE SPASM: ICD-10-CM

## 2024-07-29 DIAGNOSIS — F41.9 ANXIETY: ICD-10-CM

## 2024-07-29 DIAGNOSIS — K21.9 GASTROESOPHAGEAL REFLUX DISEASE, UNSPECIFIED WHETHER ESOPHAGITIS PRESENT: ICD-10-CM

## 2024-07-29 DIAGNOSIS — E55.9 VITAMIN D DEFICIENCY: ICD-10-CM

## 2024-07-29 RX ORDER — ERGOCALCIFEROL 1.25 MG/1
CAPSULE ORAL
Qty: 4 CAPSULE | Refills: 1 | Status: SHIPPED | OUTPATIENT
Start: 2024-07-29

## 2024-07-29 RX ORDER — OMEPRAZOLE 40 MG/1
CAPSULE, DELAYED RELEASE ORAL
Qty: 28 CAPSULE | Refills: 1 | Status: SHIPPED | OUTPATIENT
Start: 2024-07-29

## 2024-07-29 RX ORDER — VENLAFAXINE HYDROCHLORIDE 75 MG/1
CAPSULE, EXTENDED RELEASE ORAL
Qty: 28 CAPSULE | Refills: 1 | Status: SHIPPED | OUTPATIENT
Start: 2024-07-29

## 2024-07-29 RX ORDER — CHLORZOXAZONE 500 MG/1
TABLET ORAL
Qty: 28 TABLET | Refills: 1 | Status: SHIPPED | OUTPATIENT
Start: 2024-07-29

## 2024-07-29 RX ORDER — ATORVASTATIN CALCIUM 40 MG/1
40 TABLET, FILM COATED ORAL NIGHTLY
Qty: 28 TABLET | Refills: 1 | Status: SHIPPED | OUTPATIENT
Start: 2024-07-29

## 2024-07-29 NOTE — TELEPHONE ENCOUNTER
Priscila Stubbs is calling to request a refill on the following medication(s):    Last Visit Date (If Applicable):  10/4/2023    Next Visit Date:    Visit date not found    Medication Request:  Requested Prescriptions     Pending Prescriptions Disp Refills    atorvastatin (LIPITOR) 40 MG tablet [Pharmacy Med Name: Atorvastatin Calcium 40 MG Oral Tablet (Lipitor)] 28 tablet 1     Sig: TAKE 1 TABLET BY MOUTH NIGHTLY    venlafaxine (EFFEXOR XR) 75 MG extended release capsule [Pharmacy Med Name: Venlafaxine HCl ER 75 MG Oral Capsule Extended Release 24 Hour (Effexor XR)] 28 capsule 1     Sig: TAKE 1 CAPSULE BY MOUTH DAILY    chlorzoxazone (PARAFON FORTE) 500 MG tablet [Pharmacy Med Name: Chlorzoxazone 500 MG Oral Tablet] 28 tablet 1     Sig: TAKE 1/2 TABLET BY MOUTH TWICE DAILY AS NEEDED

## 2024-07-29 NOTE — TELEPHONE ENCOUNTER
Priscila Stubbs is calling to request a refill on the following medication(s):    Last Visit Date (If Applicable):  10/4/2023    Next Visit Date:    Visit date not found    Medication Request:  Requested Prescriptions     Pending Prescriptions Disp Refills    omeprazole (PRILOSEC) 40 MG delayed release capsule [Pharmacy Med Name: Omeprazole 40 MG Oral Capsule Delayed Release] 28 capsule 1     Sig: TAKE 1 CAPSULE BY MOUTH EVERY MORNING

## 2024-09-23 ENCOUNTER — HOSPITAL ENCOUNTER (EMERGENCY)
Age: 64
Discharge: HOME | End: 2024-09-23
Attending: EMERGENCY MEDICINE
Payer: COMMERCIAL

## 2024-09-23 ENCOUNTER — APPOINTMENT (OUTPATIENT)
Dept: GENERAL RADIOLOGY | Age: 64
End: 2024-09-23
Payer: COMMERCIAL

## 2024-09-23 VITALS
TEMPERATURE: 97.3 F | RESPIRATION RATE: 16 BRPM | HEART RATE: 66 BPM | DIASTOLIC BLOOD PRESSURE: 76 MMHG | BODY MASS INDEX: 35.15 KG/M2 | OXYGEN SATURATION: 98 % | SYSTOLIC BLOOD PRESSURE: 128 MMHG | WEIGHT: 180 LBS

## 2024-09-23 DIAGNOSIS — S60.00XA CONTUSION OF LEFT HAND INCLUDING FINGERS, INITIAL ENCOUNTER: Primary | ICD-10-CM

## 2024-09-23 DIAGNOSIS — S60.222A CONTUSION OF LEFT HAND INCLUDING FINGERS, INITIAL ENCOUNTER: Primary | ICD-10-CM

## 2024-09-23 LAB
BASOPHILS # BLD: 0.07 K/UL (ref 0–0.2)
BASOPHILS NFR BLD: 1 % (ref 0–2)
EOSINOPHIL # BLD: 0.32 K/UL (ref 0–0.44)
EOSINOPHILS RELATIVE PERCENT: 5 % (ref 1–4)
ERYTHROCYTE [DISTWIDTH] IN BLOOD BY AUTOMATED COUNT: 13.2 % (ref 11.8–14.4)
HCT VFR BLD AUTO: 38.9 % (ref 36.3–47.1)
HGB BLD-MCNC: 12.4 G/DL (ref 11.9–15.1)
IMM GRANULOCYTES # BLD AUTO: <0.03 K/UL (ref 0–0.3)
IMM GRANULOCYTES NFR BLD: 0 %
LYMPHOCYTES NFR BLD: 2.6 K/UL (ref 1.1–3.7)
LYMPHOCYTES RELATIVE PERCENT: 40 % (ref 24–43)
MCH RBC QN AUTO: 29.2 PG (ref 25.2–33.5)
MCHC RBC AUTO-ENTMCNC: 31.9 G/DL (ref 28.4–34.8)
MCV RBC AUTO: 91.5 FL (ref 82.6–102.9)
MONOCYTES NFR BLD: 0.42 K/UL (ref 0.1–1.2)
MONOCYTES NFR BLD: 7 % (ref 3–12)
NEUTROPHILS NFR BLD: 47 % (ref 36–65)
NEUTS SEG NFR BLD: 3.04 K/UL (ref 1.5–8.1)
NRBC BLD-RTO: 0 PER 100 WBC
PLATELET # BLD AUTO: 234 K/UL (ref 138–453)
PMV BLD AUTO: 11.3 FL (ref 8.1–13.5)
RBC # BLD AUTO: 4.25 M/UL (ref 3.95–5.11)
WBC OTHER # BLD: 6.5 K/UL (ref 3.5–11.3)

## 2024-09-23 PROCEDURE — 99284 EMERGENCY DEPT VISIT MOD MDM: CPT

## 2024-09-23 PROCEDURE — 73130 X-RAY EXAM OF HAND: CPT

## 2024-09-23 PROCEDURE — 85025 COMPLETE CBC W/AUTO DIFF WBC: CPT

## 2024-09-23 ASSESSMENT — ENCOUNTER SYMPTOMS
EYES NEGATIVE: 1
CHEST TIGHTNESS: 0
COLOR CHANGE: 1
DIARRHEA: 0
NAUSEA: 0
COUGH: 0
SHORTNESS OF BREATH: 0
ABDOMINAL PAIN: 0
CONSTIPATION: 0
VOMITING: 0

## 2024-09-23 ASSESSMENT — PAIN - FUNCTIONAL ASSESSMENT: PAIN_FUNCTIONAL_ASSESSMENT: 0-10

## 2024-09-23 ASSESSMENT — PAIN DESCRIPTION - ORIENTATION: ORIENTATION: LEFT

## 2024-09-23 ASSESSMENT — PAIN SCALES - GENERAL: PAINLEVEL_OUTOF10: 5

## 2024-09-23 ASSESSMENT — PAIN DESCRIPTION - LOCATION: LOCATION: FINGER (COMMENT WHICH ONE)

## 2024-09-24 DIAGNOSIS — E55.9 VITAMIN D DEFICIENCY: ICD-10-CM

## 2024-09-24 DIAGNOSIS — E78.49 OTHER HYPERLIPIDEMIA: ICD-10-CM

## 2024-09-24 DIAGNOSIS — F41.9 ANXIETY: ICD-10-CM

## 2024-09-24 DIAGNOSIS — K21.9 GASTROESOPHAGEAL REFLUX DISEASE, UNSPECIFIED WHETHER ESOPHAGITIS PRESENT: ICD-10-CM

## 2024-09-24 DIAGNOSIS — M62.838 MUSCLE SPASM: ICD-10-CM

## 2024-09-25 RX ORDER — CHLORZOXAZONE 500 MG/1
TABLET ORAL
Qty: 28 TABLET | Refills: 1 | Status: SHIPPED | OUTPATIENT
Start: 2024-09-25

## 2024-09-25 RX ORDER — APIXABAN 5 MG/1
5 TABLET, FILM COATED ORAL 2 TIMES DAILY
Qty: 56 TABLET | OUTPATIENT
Start: 2024-09-25

## 2024-09-25 RX ORDER — OMEPRAZOLE 40 MG/1
CAPSULE, DELAYED RELEASE ORAL
Qty: 28 CAPSULE | Refills: 1 | Status: SHIPPED | OUTPATIENT
Start: 2024-09-25

## 2024-09-25 RX ORDER — VENLAFAXINE HYDROCHLORIDE 75 MG/1
CAPSULE, EXTENDED RELEASE ORAL
Qty: 28 CAPSULE | Refills: 1 | Status: SHIPPED | OUTPATIENT
Start: 2024-09-25

## 2024-09-25 RX ORDER — ERGOCALCIFEROL 1.25 MG/1
CAPSULE, LIQUID FILLED ORAL
Qty: 4 CAPSULE | Refills: 1 | Status: SHIPPED | OUTPATIENT
Start: 2024-09-25

## 2024-09-25 RX ORDER — ATORVASTATIN CALCIUM 40 MG/1
40 TABLET, FILM COATED ORAL NIGHTLY
Qty: 28 TABLET | Refills: 1 | Status: SHIPPED | OUTPATIENT
Start: 2024-09-25

## 2024-09-25 RX ORDER — ASPIRIN 325 MG
TABLET, DELAYED RELEASE (ENTERIC COATED) ORAL
Qty: 28 TABLET | Refills: 3 | Status: SHIPPED | OUTPATIENT
Start: 2024-09-25

## 2024-10-02 RX ORDER — APIXABAN 5 MG/1
5 TABLET, FILM COATED ORAL 2 TIMES DAILY
Qty: 56 TABLET | OUTPATIENT
Start: 2024-10-02

## 2024-10-22 DIAGNOSIS — G47.52 REM BEHAVIORAL DISORDER: ICD-10-CM

## 2024-10-22 RX ORDER — CLONAZEPAM 0.5 MG/1
0.5 TABLET ORAL NIGHTLY
Qty: 30 TABLET | OUTPATIENT
Start: 2024-10-22

## 2024-10-22 NOTE — TELEPHONE ENCOUNTER
Last visit: 2/19/24- Sweetie  Next Visit: no follow up scheduled.     Refill request for Klonopin. Last script was sent on 4/11/24 for 30 day supply and 4 refills. Please review pended script and advise.

## 2024-10-22 NOTE — TELEPHONE ENCOUNTER
I have not prescribed this medication for the patient, on my las note I did not mention it, Dr. Simmons prescribed in interim. Patient does not have a follow up appt she will need to schedule. In the interim patient will need to follow up with her PCP as I am not clear why it is prescribed. Her titration study in 2003 did not report significant PLM at 3/7 per hour and arousal index of 0.2

## 2024-10-23 RX ORDER — FLECAINIDE ACETATE 50 MG/1
50 TABLET ORAL 2 TIMES DAILY
Qty: 56 TABLET | OUTPATIENT
Start: 2024-10-23

## 2024-10-29 RX ORDER — FLECAINIDE ACETATE 50 MG/1
50 TABLET ORAL 2 TIMES DAILY
Qty: 60 TABLET | Refills: 0 | Status: SHIPPED | OUTPATIENT
Start: 2024-10-29 | End: 2024-12-19 | Stop reason: SDUPTHER

## 2024-10-29 NOTE — TELEPHONE ENCOUNTER
Priscila Stubbs is calling to request a refill on the following medication(s):    Last Visit Date (If Applicable):  10/4/2023    Next Visit Date:    Visit date not found    Medication Request:  Requested Prescriptions     Pending Prescriptions Disp Refills    flecainide (TAMBOCOR) 50 MG tablet 60 tablet 3     Sig: Take 1 tablet by mouth 2 times daily

## 2024-11-11 ENCOUNTER — TELEPHONE (OUTPATIENT)
Dept: FAMILY MEDICINE CLINIC | Age: 64
End: 2024-11-11

## 2024-11-18 DIAGNOSIS — F41.9 ANXIETY: ICD-10-CM

## 2024-11-18 RX ORDER — BUSPIRONE HYDROCHLORIDE 15 MG/1
15 TABLET ORAL 2 TIMES DAILY
Qty: 56 TABLET | Refills: 3 | OUTPATIENT
Start: 2024-11-18

## 2024-11-18 RX ORDER — FLECAINIDE ACETATE 50 MG/1
50 TABLET ORAL 2 TIMES DAILY
Qty: 56 TABLET | Refills: 0 | OUTPATIENT
Start: 2024-11-18

## 2024-11-18 NOTE — TELEPHONE ENCOUNTER
Priscila Stubbs is calling to request a refill on the following medication(s):    Last Visit Date (If Applicable):  11/8/2024    Next Visit Date:    Visit date not found    Medication Request:  Requested Prescriptions     Pending Prescriptions Disp Refills    busPIRone (BUSPAR) 15 MG tablet [Pharmacy Med Name: busPIRone HCl 15 MG Oral Tablet] 56 tablet 3     Sig: TAKE 1 TABLET BY MOUTH TWICE DAILY    flecainide (TAMBOCOR) 50 MG tablet [Pharmacy Med Name: Flecainide Acetate 50 MG Oral Tablet] 56 tablet 0     Sig: TAKE 1 TABLET BY MOUTH TWICE DAILY

## 2024-12-17 DIAGNOSIS — E78.49 OTHER HYPERLIPIDEMIA: ICD-10-CM

## 2024-12-17 DIAGNOSIS — E55.9 VITAMIN D DEFICIENCY: ICD-10-CM

## 2024-12-17 DIAGNOSIS — K21.9 GASTROESOPHAGEAL REFLUX DISEASE, UNSPECIFIED WHETHER ESOPHAGITIS PRESENT: ICD-10-CM

## 2024-12-17 DIAGNOSIS — M62.838 MUSCLE SPASM: ICD-10-CM

## 2024-12-17 DIAGNOSIS — F41.9 ANXIETY: ICD-10-CM

## 2024-12-17 RX ORDER — ATORVASTATIN CALCIUM 40 MG/1
40 TABLET, FILM COATED ORAL NIGHTLY
Qty: 28 TABLET | Refills: 1 | Status: SHIPPED | OUTPATIENT
Start: 2024-12-17

## 2024-12-17 RX ORDER — CHLORZOXAZONE 500 MG/1
TABLET ORAL
Qty: 28 TABLET | Refills: 1 | Status: SHIPPED | OUTPATIENT
Start: 2024-12-17

## 2024-12-17 RX ORDER — ERGOCALCIFEROL 1.25 MG/1
CAPSULE, LIQUID FILLED ORAL
Qty: 4 CAPSULE | Refills: 1 | Status: SHIPPED | OUTPATIENT
Start: 2024-12-17

## 2024-12-17 RX ORDER — VENLAFAXINE HYDROCHLORIDE 75 MG/1
CAPSULE, EXTENDED RELEASE ORAL
Qty: 28 CAPSULE | Refills: 1 | Status: SHIPPED | OUTPATIENT
Start: 2024-12-17

## 2024-12-17 RX ORDER — OMEPRAZOLE 40 MG/1
CAPSULE, DELAYED RELEASE ORAL
Qty: 28 CAPSULE | Refills: 1 | Status: SHIPPED | OUTPATIENT
Start: 2024-12-17

## 2024-12-18 ENCOUNTER — OFFICE VISIT (OUTPATIENT)
Dept: FAMILY MEDICINE CLINIC | Age: 64
End: 2024-12-18
Payer: COMMERCIAL

## 2024-12-18 VITALS
WEIGHT: 177.2 LBS | SYSTOLIC BLOOD PRESSURE: 123 MMHG | OXYGEN SATURATION: 98 % | HEART RATE: 65 BPM | DIASTOLIC BLOOD PRESSURE: 71 MMHG | BODY MASS INDEX: 34.61 KG/M2 | TEMPERATURE: 97.2 F

## 2024-12-18 DIAGNOSIS — Z13.1 DIABETES MELLITUS SCREENING: ICD-10-CM

## 2024-12-18 DIAGNOSIS — Z23 NEED FOR PNEUMOCOCCAL 20-VALENT CONJUGATE VACCINATION: ICD-10-CM

## 2024-12-18 DIAGNOSIS — G47.52 REM BEHAVIORAL DISORDER: ICD-10-CM

## 2024-12-18 DIAGNOSIS — F41.9 ANXIETY: Primary | ICD-10-CM

## 2024-12-18 DIAGNOSIS — Z23 NEED FOR SHINGLES VACCINE: ICD-10-CM

## 2024-12-18 DIAGNOSIS — E78.5 HYPERLIPIDEMIA, UNSPECIFIED HYPERLIPIDEMIA TYPE: ICD-10-CM

## 2024-12-18 DIAGNOSIS — Z12.31 ENCOUNTER FOR SCREENING MAMMOGRAM FOR MALIGNANT NEOPLASM OF BREAST: ICD-10-CM

## 2024-12-18 DIAGNOSIS — Z78.0 ASYMPTOMATIC MENOPAUSE: ICD-10-CM

## 2024-12-18 PROCEDURE — 99213 OFFICE O/P EST LOW 20 MIN: CPT | Performed by: FAMILY MEDICINE

## 2024-12-18 RX ORDER — BUSPIRONE HYDROCHLORIDE 15 MG/1
15 TABLET ORAL 2 TIMES DAILY
Qty: 56 TABLET | Refills: 3 | Status: SHIPPED | OUTPATIENT
Start: 2024-12-18

## 2024-12-18 RX ORDER — CLONAZEPAM 0.5 MG/1
0.5 TABLET ORAL NIGHTLY
Qty: 30 TABLET | Refills: 4 | Status: CANCELLED | OUTPATIENT
Start: 2024-12-18 | End: 2025-05-20

## 2024-12-18 RX ORDER — ZOSTER VACCINE RECOMBINANT, ADJUVANTED 50 MCG/0.5
0.5 KIT INTRAMUSCULAR SEE ADMIN INSTRUCTIONS
Qty: 0.5 ML | Refills: 0 | Status: SHIPPED | OUTPATIENT
Start: 2024-12-18 | End: 2025-06-16

## 2024-12-18 RX ORDER — FLUTICASONE PROPIONATE 50 MCG
2 SPRAY, SUSPENSION (ML) NASAL DAILY
Qty: 16 G | Refills: 3 | Status: SHIPPED | OUTPATIENT
Start: 2024-12-18

## 2024-12-18 NOTE — PROGRESS NOTES
sleep.    MEDICATIONS  clonazepam    IMMUNIZATIONS  She has not received the influenza vaccine. She was placed on a waiting list for the shingles vaccine at her pharmacy but did not follow up when she was not contacted.    Review of Systems   Constitutional: Negative for fever and unexpected weight change.   Pertinent items are noted in HPI.    Objective:   Physical Exam  Constitutional: VS (see above).   General appearance: normal development, habitus and attention, no deformities. No distress.  Eyes: normal conjunctiva and lids.  CAV: RRR, no RMG. No edema lower extremities.  Pulmo: CTA bilateral, no CWR.  Skin: no rashes, lesions or ulcers.  Musculoskeletal: normal gait. Nails: no clubbing or cyanosis.  Psychiatric: alert and oriented to place, time and person. Normal mood and affect.    Assessment:       Diagnosis Orders   1. Anxiety  busPIRone (BUSPAR) 15 MG tablet      2. REM behavioral disorder        3. Diabetes mellitus screening  Hemoglobin A1C      4. Encounter for screening mammogram for malignant neoplasm of breast  [facility] WALTER DIGITAL SCREEN BILATERAL      5. Asymptomatic menopause  DEXA BONE DENSITY 2 SITES      6. Need for shingles vaccine  zoster recombinant adjuvanted vaccine (SHINGRIX) 50 MCG/0.5ML SUSR injection      7. Need for pneumococcal 20-valent conjugate vaccination  pneumococcal 20-valent conjugat (PREVNAR) 0.5 ML BEN inj      8. Hyperlipidemia, unspecified hyperlipidemia type  Lipid Panel    Comprehensive Metabolic Panel          Plan:   Continue current care.     Return in about 6 months (around 6/18/2025), or if symptoms worsen or fail to improve.  Orders Placed This Encounter   Procedures    [facility] WALTER DIGITAL SCREEN BILATERAL     Standing Status:   Future     Standing Expiration Date:   2/18/2026     Scheduling Instructions:      May perform additional studies at the discretion of the radiologist: Breast US, breast MRI, imaging guided biopsy, cyst aspiration or MBI.    DEXA BONE DENSITY

## 2024-12-20 RX ORDER — FLECAINIDE ACETATE 50 MG/1
50 TABLET ORAL 2 TIMES DAILY
Qty: 60 TABLET | Refills: 1 | Status: SHIPPED | OUTPATIENT
Start: 2024-12-20

## 2024-12-30 ENCOUNTER — APPOINTMENT (OUTPATIENT)
Dept: GENERAL RADIOLOGY | Age: 64
End: 2024-12-30
Payer: COMMERCIAL

## 2024-12-30 ENCOUNTER — HOSPITAL ENCOUNTER (EMERGENCY)
Age: 64
Discharge: HOME | End: 2024-12-30
Attending: EMERGENCY MEDICINE
Payer: COMMERCIAL

## 2024-12-30 VITALS
TEMPERATURE: 99.1 F | HEART RATE: 88 BPM | BODY MASS INDEX: 32.58 KG/M2 | HEIGHT: 62 IN | WEIGHT: 177.03 LBS | SYSTOLIC BLOOD PRESSURE: 108 MMHG | RESPIRATION RATE: 17 BRPM | DIASTOLIC BLOOD PRESSURE: 83 MMHG | OXYGEN SATURATION: 96 %

## 2024-12-30 DIAGNOSIS — U07.1 COVID-19: Primary | ICD-10-CM

## 2024-12-30 LAB
FLUAV AG SPEC QL: NEGATIVE
FLUBV AG SPEC QL: NEGATIVE
SARS-COV-2 RDRP RESP QL NAA+PROBE: DETECTED
SPECIMEN DESCRIPTION: ABNORMAL

## 2024-12-30 PROCEDURE — 87804 INFLUENZA ASSAY W/OPTIC: CPT

## 2024-12-30 PROCEDURE — 71046 X-RAY EXAM CHEST 2 VIEWS: CPT

## 2024-12-30 PROCEDURE — 6370000000 HC RX 637 (ALT 250 FOR IP)

## 2024-12-30 PROCEDURE — 99284 EMERGENCY DEPT VISIT MOD MDM: CPT

## 2024-12-30 PROCEDURE — 87635 SARS-COV-2 COVID-19 AMP PRB: CPT

## 2024-12-30 RX ORDER — GUAIFENESIN 600 MG/1
600 TABLET, EXTENDED RELEASE ORAL ONCE
Status: COMPLETED | OUTPATIENT
Start: 2024-12-30 | End: 2024-12-30

## 2024-12-30 RX ORDER — PREDNISONE 20 MG/1
60 TABLET ORAL ONCE
Status: COMPLETED | OUTPATIENT
Start: 2024-12-30 | End: 2024-12-30

## 2024-12-30 RX ORDER — PREDNISONE 50 MG/1
50 TABLET ORAL DAILY
Qty: 4 TABLET | Refills: 0 | Status: SHIPPED | OUTPATIENT
Start: 2024-12-30 | End: 2025-01-03

## 2024-12-30 RX ORDER — ACETAMINOPHEN 500 MG
1000 TABLET ORAL ONCE
Status: COMPLETED | OUTPATIENT
Start: 2024-12-30 | End: 2024-12-30

## 2024-12-30 RX ORDER — GUAIFENESIN 600 MG/1
600 TABLET, EXTENDED RELEASE ORAL 2 TIMES DAILY
Qty: 30 TABLET | Refills: 0 | Status: SHIPPED | OUTPATIENT
Start: 2024-12-30 | End: 2025-01-14

## 2024-12-30 RX ADMIN — PREDNISONE 60 MG: 20 TABLET ORAL at 08:07

## 2024-12-30 RX ADMIN — ACETAMINOPHEN 1000 MG: 500 TABLET ORAL at 08:06

## 2024-12-30 RX ADMIN — GUAIFENESIN 600 MG: 600 TABLET, MULTILAYER, EXTENDED RELEASE ORAL at 08:07

## 2024-12-30 ASSESSMENT — PAIN - FUNCTIONAL ASSESSMENT: PAIN_FUNCTIONAL_ASSESSMENT: 0-10

## 2024-12-30 ASSESSMENT — PAIN SCALES - GENERAL: PAINLEVEL_OUTOF10: 10

## 2024-12-30 ASSESSMENT — PAIN DESCRIPTION - LOCATION: LOCATION: GENERALIZED

## 2024-12-30 NOTE — ED PROVIDER NOTES
[facility] EMERGENCY DEPARTMENT     Emergency Department     Faculty Attestation    I performed a history and physical examination of the patient and discussed management with the resident. I reviewed the resident’s note and agree with the documented findings and plan of care. Any areas of disagreement are noted on the chart. I was personally present for the key portions of any procedures. I have documented in the chart those procedures where I was not present during the key portions. I have reviewed the emergency nurses triage note. I agree with the chief complaint, past medical history, past surgical history, allergies, medications, social and family history as documented unless otherwise noted below. For Physician Assistant/ Nurse Practitioner cases/documentation I have personally evaluated this patient and have completed at least one if not all key elements of the E/M (history, physical exam, and MDM). Additional findings are as noted.    8:18 AM EST    Patient presents with cough, congestion, left ear pain, body aches that she has had for the last 2 days.  She denies any vomiting or diarrhea.  She says she took a little Tylenol yesterday but has otherwise not been taking anything for her symptoms.  On exam, patient is resting comfortably in the bed.  She is not respiratory distress.  There is scattered rhonchi on auscultation of lungs bilaterally.  The left tympanic membrane does appear erythematous.  Will check COVID and flu swabs and chest x-ray and reassess      Kaila Borges MD  Attending Emergency  Physician        
or vomiting.  Has been using NyQuil and DayQuil at home which has been minimally relieving her symptoms.  No chest pain or shortness of breath aside from when she coughs.  On exam, patient is resting comfortably in the cot.  In no acute distress.  She is not using any accessory muscles, she is speaking in full sentences and is not in acute respiratory distress.  She does have significant amount of nasal congestion.  Neck is supple.  Nonrigid.  No cervical adenopathy.  Patient has somewhat diminished breath sounds in the bases bilaterally, no wheezing, no stridor.  Left TM is erythematous.  But not bulging or purulent.  Will plan to obtain COVID, flu swabs, chest x-ray, reassessment.  Symptomatic care.      EMERGENCY DEPARTMENT COURSE:    ED Course as of 12/30/24 1843   Mon Dec 30, 2024   0835 SARS-CoV-2, Rapid(!): DETECTED [MO]   0900 Patient reevaluated, updated on her results.  Patient somewhat better after the Mucinex, she states that she feels comfortable to be discharged home.  Patient informed of return precautions and she expressed understanding.  She is going to follow-up with her PCP after being in the emergency department. [MO]      ED Course User Index  [MO] Halley Durand MD       CONSULTS:  None    CRITICAL CARE:  There was significant risk of life threatening deterioration of patient's condition requiring my direct management. Critical care time 0 minutes, excluding any documented procedures.    FINAL IMPRESSION      1. COVID-19          DISPOSITION / PLAN     DISPOSITION Decision To Discharge 12/30/2024 09:34:33 AM   DISPOSITION CONDITION Stable           PATIENT REFERRED TO:  [facility] Emergency Department  [facility] [place] [address]  [phone]  Go to   As needed, If symptoms worsen      DISCHARGE MEDICATIONS:  Discharge Medication List as of 12/30/2024  9:34 AM        START taking these medications    Details   guaiFENesin (MUCINEX) 600 MG extended release tablet Take 1 tablet

## 2024-12-30 NOTE — DISCHARGE INSTRUCTIONS
You were seen in the emergency department today for cough, congestion, shortness of breath.  You are found to have COVID-19.  Your chest x-ray did not show any sign of pneumonia.  I will discharge you with a prescription for a nasal decongestant medication.  You can continue using your other over-the-counter medications including DayQuil, NyQuil, Tylenol and ibuprofen.  Your symptoms unfortunately will last for about another week.  If for some reason you develop any worsening symptoms, you can contact your family doctor or return to the emergency department.

## 2025-01-14 DIAGNOSIS — K21.9 GASTROESOPHAGEAL REFLUX DISEASE, UNSPECIFIED WHETHER ESOPHAGITIS PRESENT: ICD-10-CM

## 2025-01-14 DIAGNOSIS — E78.49 OTHER HYPERLIPIDEMIA: ICD-10-CM

## 2025-01-14 DIAGNOSIS — F41.9 ANXIETY: ICD-10-CM

## 2025-01-14 DIAGNOSIS — E55.9 VITAMIN D DEFICIENCY: ICD-10-CM

## 2025-01-14 DIAGNOSIS — M62.838 MUSCLE SPASM: ICD-10-CM

## 2025-01-15 RX ORDER — ERGOCALCIFEROL 1.25 MG/1
CAPSULE, LIQUID FILLED ORAL
Qty: 4 CAPSULE | Refills: 1 | Status: SHIPPED | OUTPATIENT
Start: 2025-01-15

## 2025-01-15 RX ORDER — ATORVASTATIN CALCIUM 40 MG/1
40 TABLET, FILM COATED ORAL NIGHTLY
Qty: 28 TABLET | Refills: 1 | Status: SHIPPED | OUTPATIENT
Start: 2025-01-15

## 2025-01-15 RX ORDER — OMEPRAZOLE 40 MG/1
CAPSULE, DELAYED RELEASE ORAL
Qty: 28 CAPSULE | Refills: 1 | Status: SHIPPED | OUTPATIENT
Start: 2025-01-15

## 2025-01-15 RX ORDER — VENLAFAXINE HYDROCHLORIDE 75 MG/1
CAPSULE, EXTENDED RELEASE ORAL
Qty: 28 CAPSULE | Refills: 1 | Status: SHIPPED | OUTPATIENT
Start: 2025-01-15

## 2025-01-15 RX ORDER — FLECAINIDE ACETATE 50 MG/1
50 TABLET ORAL 2 TIMES DAILY
Qty: 56 TABLET | Refills: 1 | Status: SHIPPED | OUTPATIENT
Start: 2025-01-15

## 2025-01-15 RX ORDER — CHLORZOXAZONE 500 MG/1
TABLET ORAL
Qty: 28 TABLET | Refills: 1 | Status: SHIPPED | OUTPATIENT
Start: 2025-01-15

## 2025-01-15 RX ORDER — ASPIRIN 325 MG
TABLET, DELAYED RELEASE (ENTERIC COATED) ORAL
Qty: 28 TABLET | Refills: 3 | Status: SHIPPED | OUTPATIENT
Start: 2025-01-15

## 2025-03-06 ENCOUNTER — APPOINTMENT (OUTPATIENT)
Dept: GENERAL RADIOLOGY | Age: 65
End: 2025-03-06
Payer: COMMERCIAL

## 2025-03-06 ENCOUNTER — HOSPITAL ENCOUNTER (INPATIENT)
Age: 65
LOS: 1 days | Discharge: HOME | End: 2025-03-07
Attending: EMERGENCY MEDICINE | Admitting: STUDENT IN AN ORGANIZED HEALTH CARE EDUCATION/TRAINING PROGRAM
Payer: COMMERCIAL

## 2025-03-06 DIAGNOSIS — N76.4 LABIAL ABSCESS: ICD-10-CM

## 2025-03-06 DIAGNOSIS — I48.91 ATRIAL FIBRILLATION WITH RVR (HCC): Primary | ICD-10-CM

## 2025-03-06 LAB
ANION GAP SERPL CALCULATED.3IONS-SCNC: 13 MMOL/L (ref 9–16)
BASOPHILS # BLD: 0.06 K/UL (ref 0–0.2)
BASOPHILS NFR BLD: 1 % (ref 0–2)
BUN SERPL-MCNC: 11 MG/DL (ref 8–23)
CALCIUM SERPL-MCNC: 9 MG/DL (ref 8.6–10.4)
CHLORIDE SERPL-SCNC: 108 MMOL/L (ref 98–107)
CO2 SERPL-SCNC: 25 MMOL/L (ref 20–31)
CREAT SERPL-MCNC: 0.7 MG/DL (ref 0.6–0.9)
EOSINOPHIL # BLD: 0.25 K/UL (ref 0–0.44)
EOSINOPHILS RELATIVE PERCENT: 3 % (ref 1–4)
ERYTHROCYTE [DISTWIDTH] IN BLOOD BY AUTOMATED COUNT: 13.4 % (ref 11.8–14.4)
GFR, ESTIMATED: >90 ML/MIN/1.73M2
GLUCOSE SERPL-MCNC: 158 MG/DL (ref 74–99)
HCT VFR BLD AUTO: 39.8 % (ref 36.3–47.1)
HGB BLD-MCNC: 13.2 G/DL (ref 11.9–15.1)
IMM GRANULOCYTES # BLD AUTO: 0.03 K/UL (ref 0–0.3)
IMM GRANULOCYTES NFR BLD: 0 %
LYMPHOCYTES NFR BLD: 2.15 K/UL (ref 1.1–3.7)
LYMPHOCYTES RELATIVE PERCENT: 24 % (ref 24–43)
MCH RBC QN AUTO: 28.5 PG (ref 25.2–33.5)
MCHC RBC AUTO-ENTMCNC: 33.2 G/DL (ref 28.4–34.8)
MCV RBC AUTO: 86 FL (ref 82.6–102.9)
MONOCYTES NFR BLD: 0.6 K/UL (ref 0.1–1.2)
MONOCYTES NFR BLD: 7 % (ref 3–12)
NEUTROPHILS NFR BLD: 65 % (ref 36–65)
NEUTS SEG NFR BLD: 5.87 K/UL (ref 1.5–8.1)
NRBC BLD-RTO: 0 PER 100 WBC
PLATELET # BLD AUTO: 253 K/UL (ref 138–453)
PMV BLD AUTO: 12.2 FL (ref 8.1–13.5)
POTASSIUM SERPL-SCNC: 3.3 MMOL/L (ref 3.7–5.3)
RBC # BLD AUTO: 4.63 M/UL (ref 3.95–5.11)
SODIUM SERPL-SCNC: 146 MMOL/L (ref 136–145)
TROPONIN I SERPL HS-MCNC: 13 NG/L (ref 0–14)
TSH SERPL DL<=0.05 MIU/L-ACNC: 3.39 UIU/ML (ref 0.27–4.2)
WBC OTHER # BLD: 9 K/UL (ref 3.5–11.3)

## 2025-03-06 PROCEDURE — 87804 INFLUENZA ASSAY W/OPTIC: CPT

## 2025-03-06 PROCEDURE — 80048 BASIC METABOLIC PNL TOTAL CA: CPT

## 2025-03-06 PROCEDURE — 99285 EMERGENCY DEPT VISIT HI MDM: CPT

## 2025-03-06 PROCEDURE — 84443 ASSAY THYROID STIM HORMONE: CPT

## 2025-03-06 PROCEDURE — 85025 COMPLETE CBC W/AUTO DIFF WBC: CPT

## 2025-03-06 PROCEDURE — 71046 X-RAY EXAM CHEST 2 VIEWS: CPT

## 2025-03-06 PROCEDURE — 84484 ASSAY OF TROPONIN QUANT: CPT

## 2025-03-06 PROCEDURE — 93005 ELECTROCARDIOGRAM TRACING: CPT

## 2025-03-06 RX ORDER — POTASSIUM CHLORIDE 1500 MG/1
40 TABLET, EXTENDED RELEASE ORAL ONCE
Status: COMPLETED | OUTPATIENT
Start: 2025-03-07 | End: 2025-03-07

## 2025-03-07 VITALS
OXYGEN SATURATION: 91 % | WEIGHT: 175 LBS | RESPIRATION RATE: 17 BRPM | TEMPERATURE: 98.5 F | HEART RATE: 76 BPM | SYSTOLIC BLOOD PRESSURE: 131 MMHG | BODY MASS INDEX: 32.01 KG/M2 | DIASTOLIC BLOOD PRESSURE: 68 MMHG

## 2025-03-07 PROBLEM — I48.91 A-FIB (HCC): Status: ACTIVE | Noted: 2025-03-07

## 2025-03-07 PROBLEM — N76.4 LABIAL ABSCESS: Status: ACTIVE | Noted: 2025-03-07

## 2025-03-07 LAB
ANION GAP SERPL CALCULATED.3IONS-SCNC: 13 MMOL/L (ref 9–16)
B PARAP IS1001 DNA NPH QL NAA+NON-PROBE: NOT DETECTED
B PERT DNA SPEC QL NAA+PROBE: NOT DETECTED
BACTERIA URNS QL MICRO: ABNORMAL
BASOPHILS # BLD: 0.03 K/UL (ref 0–0.2)
BASOPHILS NFR BLD: 0 % (ref 0–2)
BILIRUB UR QL STRIP: NEGATIVE
BUN SERPL-MCNC: 10 MG/DL (ref 8–23)
C PNEUM DNA NPH QL NAA+NON-PROBE: NOT DETECTED
CALCIUM SERPL-MCNC: 9.2 MG/DL (ref 8.6–10.4)
CASTS #/AREA URNS LPF: ABNORMAL /LPF (ref 0–8)
CHLORIDE SERPL-SCNC: 110 MMOL/L (ref 98–107)
CLARITY UR: ABNORMAL
CO2 SERPL-SCNC: 19 MMOL/L (ref 20–31)
COLOR UR: YELLOW
CREAT SERPL-MCNC: 0.7 MG/DL (ref 0.6–0.9)
EKG ATRIAL RATE: 100 BPM
EKG P AXIS: 60 DEGREES
EKG P-R INTERVAL: 136 MS
EKG Q-T INTERVAL: 342 MS
EKG QRS DURATION: 74 MS
EKG QTC CALCULATION (BAZETT): 441 MS
EKG R AXIS: 37 DEGREES
EKG T AXIS: 64 DEGREES
EKG VENTRICULAR RATE: 100 BPM
EOSINOPHIL # BLD: <0.03 K/UL (ref 0–0.44)
EOSINOPHILS RELATIVE PERCENT: 0 % (ref 1–4)
EPI CELLS #/AREA URNS HPF: ABNORMAL /HPF (ref 0–5)
ERYTHROCYTE [DISTWIDTH] IN BLOOD BY AUTOMATED COUNT: 13.6 % (ref 11.8–14.4)
EST. AVERAGE GLUCOSE BLD GHB EST-MCNC: 117 MG/DL
FLUAV AG SPEC QL: NEGATIVE
FLUAV RNA NPH QL NAA+NON-PROBE: NOT DETECTED
FLUBV AG SPEC QL: NEGATIVE
FLUBV RNA NPH QL NAA+NON-PROBE: NOT DETECTED
GFR, ESTIMATED: >90 ML/MIN/1.73M2
GLUCOSE SERPL-MCNC: 228 MG/DL (ref 74–99)
GLUCOSE UR STRIP-MCNC: NEGATIVE MG/DL
HADV DNA NPH QL NAA+NON-PROBE: NOT DETECTED
HBA1C MFR BLD: 5.7 % (ref 4–6)
HCOV 229E RNA NPH QL NAA+NON-PROBE: NOT DETECTED
HCOV HKU1 RNA NPH QL NAA+NON-PROBE: NOT DETECTED
HCOV NL63 RNA NPH QL NAA+NON-PROBE: NOT DETECTED
HCOV OC43 RNA NPH QL NAA+NON-PROBE: NOT DETECTED
HCT VFR BLD AUTO: 37.3 % (ref 36.3–47.1)
HGB BLD-MCNC: 12.2 G/DL (ref 11.9–15.1)
HGB UR QL STRIP.AUTO: NEGATIVE
HMPV RNA NPH QL NAA+NON-PROBE: NOT DETECTED
HPIV1 RNA NPH QL NAA+NON-PROBE: NOT DETECTED
HPIV2 RNA NPH QL NAA+NON-PROBE: NOT DETECTED
HPIV3 RNA NPH QL NAA+NON-PROBE: NOT DETECTED
HPIV4 RNA NPH QL NAA+NON-PROBE: NOT DETECTED
IMM GRANULOCYTES # BLD AUTO: <0.03 K/UL (ref 0–0.3)
IMM GRANULOCYTES NFR BLD: 0 %
KETONES UR STRIP-MCNC: NEGATIVE MG/DL
LEUKOCYTE ESTERASE UR QL STRIP: ABNORMAL
LYMPHOCYTES NFR BLD: 1.09 K/UL (ref 1.1–3.7)
LYMPHOCYTES RELATIVE PERCENT: 14 % (ref 24–43)
M PNEUMO DNA NPH QL NAA+NON-PROBE: NOT DETECTED
MAGNESIUM SERPL-MCNC: 1.8 MG/DL (ref 1.6–2.4)
MCH RBC QN AUTO: 28.5 PG (ref 25.2–33.5)
MCHC RBC AUTO-ENTMCNC: 32.7 G/DL (ref 28.4–34.8)
MCV RBC AUTO: 87.1 FL (ref 82.6–102.9)
MONOCYTES NFR BLD: 0.15 K/UL (ref 0.1–1.2)
MONOCYTES NFR BLD: 2 % (ref 3–12)
NEUTROPHILS NFR BLD: 84 % (ref 36–65)
NEUTS SEG NFR BLD: 6.57 K/UL (ref 1.5–8.1)
NITRITE UR QL STRIP: NEGATIVE
NRBC BLD-RTO: 0 PER 100 WBC
PH UR STRIP: 7 [PH] (ref 5–8)
PLATELET # BLD AUTO: 250 K/UL (ref 138–453)
PMV BLD AUTO: 12 FL (ref 8.1–13.5)
POTASSIUM SERPL-SCNC: 4 MMOL/L (ref 3.7–5.3)
PROT UR STRIP-MCNC: NEGATIVE MG/DL
RBC # BLD AUTO: 4.28 M/UL (ref 3.95–5.11)
RBC #/AREA URNS HPF: ABNORMAL /HPF (ref 0–4)
RSV RNA NPH QL NAA+NON-PROBE: NOT DETECTED
RV+EV RNA NPH QL NAA+NON-PROBE: NOT DETECTED
SARS-COV-2 RDRP RESP QL NAA+PROBE: NOT DETECTED
SARS-COV-2 RNA NPH QL NAA+NON-PROBE: NOT DETECTED
SODIUM SERPL-SCNC: 142 MMOL/L (ref 136–145)
SP GR UR STRIP: 1.02 (ref 1–1.03)
SPECIMEN DESCRIPTION: NORMAL
SPECIMEN DESCRIPTION: NORMAL
TROPONIN I SERPL HS-MCNC: 9 NG/L (ref 0–14)
TSH SERPL DL<=0.05 MIU/L-ACNC: 0.77 UIU/ML (ref 0.27–4.2)
UROBILINOGEN UR STRIP-ACNC: ABNORMAL EU/DL (ref 0–1)
WBC #/AREA URNS HPF: ABNORMAL /HPF (ref 0–5)
WBC OTHER # BLD: 7.9 K/UL (ref 3.5–11.3)

## 2025-03-07 PROCEDURE — 99223 1ST HOSP IP/OBS HIGH 75: CPT | Performed by: INTERNAL MEDICINE

## 2025-03-07 PROCEDURE — 81001 URINALYSIS AUTO W/SCOPE: CPT

## 2025-03-07 PROCEDURE — 83036 HEMOGLOBIN GLYCOSYLATED A1C: CPT

## 2025-03-07 PROCEDURE — 87635 SARS-COV-2 COVID-19 AMP PRB: CPT

## 2025-03-07 PROCEDURE — 6370000000 HC RX 637 (ALT 250 FOR IP)

## 2025-03-07 PROCEDURE — 0202U NFCT DS 22 TRGT SARS-COV-2: CPT

## 2025-03-07 PROCEDURE — 2580000003 HC RX 258

## 2025-03-07 PROCEDURE — 6360000002 HC RX W HCPCS

## 2025-03-07 PROCEDURE — 84484 ASSAY OF TROPONIN QUANT: CPT

## 2025-03-07 PROCEDURE — 83735 ASSAY OF MAGNESIUM: CPT

## 2025-03-07 PROCEDURE — 85025 COMPLETE CBC W/AUTO DIFF WBC: CPT

## 2025-03-07 PROCEDURE — 80048 BASIC METABOLIC PNL TOTAL CA: CPT

## 2025-03-07 PROCEDURE — 84443 ASSAY THYROID STIM HORMONE: CPT

## 2025-03-07 PROCEDURE — 1200000000 HC SEMI PRIVATE

## 2025-03-07 PROCEDURE — 93010 ELECTROCARDIOGRAM REPORT: CPT | Performed by: INTERNAL MEDICINE

## 2025-03-07 PROCEDURE — 96375 TX/PRO/DX INJ NEW DRUG ADDON: CPT

## 2025-03-07 PROCEDURE — 6370000000 HC RX 637 (ALT 250 FOR IP): Performed by: NURSE PRACTITIONER

## 2025-03-07 PROCEDURE — 99222 1ST HOSP IP/OBS MODERATE 55: CPT | Performed by: NURSE PRACTITIONER

## 2025-03-07 PROCEDURE — 2500000003 HC RX 250 WO HCPCS: Performed by: NURSE PRACTITIONER

## 2025-03-07 PROCEDURE — 96365 THER/PROPH/DIAG IV INF INIT: CPT

## 2025-03-07 RX ORDER — BUSPIRONE HYDROCHLORIDE 15 MG/1
15 TABLET ORAL 2 TIMES DAILY
Status: DISCONTINUED | OUTPATIENT
Start: 2025-03-07 | End: 2025-03-07 | Stop reason: HOSPADM

## 2025-03-07 RX ORDER — FLECAINIDE ACETATE 50 MG/1
50 TABLET ORAL 2 TIMES DAILY
Status: DISCONTINUED | OUTPATIENT
Start: 2025-03-07 | End: 2025-03-07

## 2025-03-07 RX ORDER — ALBUTEROL SULFATE 90 UG/1
2 INHALANT RESPIRATORY (INHALATION) EVERY 6 HOURS PRN
Status: DISCONTINUED | OUTPATIENT
Start: 2025-03-07 | End: 2025-03-07 | Stop reason: HOSPADM

## 2025-03-07 RX ORDER — SULFAMETHOXAZOLE AND TRIMETHOPRIM 800; 160 MG/1; MG/1
1 TABLET ORAL 2 TIMES DAILY
Qty: 14 TABLET | Refills: 0 | Status: SHIPPED | OUTPATIENT
Start: 2025-03-07 | End: 2025-03-14

## 2025-03-07 RX ORDER — ASPIRIN 81 MG/1
81 TABLET ORAL DAILY
Status: DISCONTINUED | OUTPATIENT
Start: 2025-03-08 | End: 2025-03-07 | Stop reason: HOSPADM

## 2025-03-07 RX ORDER — FLUTICASONE PROPIONATE 50 MCG
2 SPRAY, SUSPENSION (ML) NASAL DAILY PRN
Status: DISCONTINUED | OUTPATIENT
Start: 2025-03-07 | End: 2025-03-07 | Stop reason: HOSPADM

## 2025-03-07 RX ORDER — MAGNESIUM SULFATE IN WATER 40 MG/ML
2000 INJECTION, SOLUTION INTRAVENOUS PRN
Status: DISCONTINUED | OUTPATIENT
Start: 2025-03-07 | End: 2025-03-07 | Stop reason: HOSPADM

## 2025-03-07 RX ORDER — PANTOPRAZOLE SODIUM 40 MG/1
40 TABLET, DELAYED RELEASE ORAL
Status: DISCONTINUED | OUTPATIENT
Start: 2025-03-07 | End: 2025-03-07 | Stop reason: HOSPADM

## 2025-03-07 RX ORDER — ATORVASTATIN CALCIUM 80 MG/1
40 TABLET, FILM COATED ORAL NIGHTLY
Status: DISCONTINUED | OUTPATIENT
Start: 2025-03-07 | End: 2025-03-07 | Stop reason: HOSPADM

## 2025-03-07 RX ORDER — ONDANSETRON 4 MG/1
4 TABLET, ORALLY DISINTEGRATING ORAL EVERY 8 HOURS PRN
Status: DISCONTINUED | OUTPATIENT
Start: 2025-03-07 | End: 2025-03-07 | Stop reason: HOSPADM

## 2025-03-07 RX ORDER — METOPROLOL SUCCINATE 25 MG/1
50 TABLET, EXTENDED RELEASE ORAL 2 TIMES DAILY
Status: DISCONTINUED | OUTPATIENT
Start: 2025-03-07 | End: 2025-03-07 | Stop reason: HOSPADM

## 2025-03-07 RX ORDER — POTASSIUM CHLORIDE 1500 MG/1
40 TABLET, EXTENDED RELEASE ORAL PRN
Status: DISCONTINUED | OUTPATIENT
Start: 2025-03-07 | End: 2025-03-07 | Stop reason: HOSPADM

## 2025-03-07 RX ORDER — ASPIRIN 325 MG
325 TABLET, DELAYED RELEASE (ENTERIC COATED) ORAL DAILY
Status: DISCONTINUED | OUTPATIENT
Start: 2025-03-07 | End: 2025-03-07

## 2025-03-07 RX ORDER — FLECAINIDE ACETATE 100 MG/1
100 TABLET ORAL 2 TIMES DAILY
Qty: 60 TABLET | Refills: 3 | Status: SHIPPED | OUTPATIENT
Start: 2025-03-07

## 2025-03-07 RX ORDER — ONDANSETRON 2 MG/ML
4 INJECTION INTRAMUSCULAR; INTRAVENOUS EVERY 6 HOURS PRN
Status: DISCONTINUED | OUTPATIENT
Start: 2025-03-07 | End: 2025-03-07 | Stop reason: HOSPADM

## 2025-03-07 RX ORDER — SODIUM CHLORIDE 0.9 % (FLUSH) 0.9 %
5-40 SYRINGE (ML) INJECTION EVERY 12 HOURS SCHEDULED
Status: DISCONTINUED | OUTPATIENT
Start: 2025-03-07 | End: 2025-03-07 | Stop reason: HOSPADM

## 2025-03-07 RX ORDER — POLYETHYLENE GLYCOL 3350 17 G/17G
17 POWDER, FOR SOLUTION ORAL DAILY PRN
Status: DISCONTINUED | OUTPATIENT
Start: 2025-03-07 | End: 2025-03-07 | Stop reason: HOSPADM

## 2025-03-07 RX ORDER — ACETAMINOPHEN 325 MG/1
650 TABLET ORAL EVERY 6 HOURS PRN
Status: DISCONTINUED | OUTPATIENT
Start: 2025-03-07 | End: 2025-03-07 | Stop reason: HOSPADM

## 2025-03-07 RX ORDER — FLECAINIDE ACETATE 100 MG/1
100 TABLET ORAL 2 TIMES DAILY
Status: DISCONTINUED | OUTPATIENT
Start: 2025-03-07 | End: 2025-03-07 | Stop reason: HOSPADM

## 2025-03-07 RX ORDER — ACETAMINOPHEN 650 MG/1
650 SUPPOSITORY RECTAL EVERY 6 HOURS PRN
Status: DISCONTINUED | OUTPATIENT
Start: 2025-03-07 | End: 2025-03-07 | Stop reason: HOSPADM

## 2025-03-07 RX ORDER — POTASSIUM CHLORIDE 7.45 MG/ML
10 INJECTION INTRAVENOUS PRN
Status: DISCONTINUED | OUTPATIENT
Start: 2025-03-07 | End: 2025-03-07 | Stop reason: HOSPADM

## 2025-03-07 RX ORDER — VENLAFAXINE HYDROCHLORIDE 75 MG/1
75 CAPSULE, EXTENDED RELEASE ORAL DAILY
Status: DISCONTINUED | OUTPATIENT
Start: 2025-03-07 | End: 2025-03-07 | Stop reason: HOSPADM

## 2025-03-07 RX ORDER — SODIUM CHLORIDE 0.9 % (FLUSH) 0.9 %
5-40 SYRINGE (ML) INJECTION PRN
Status: DISCONTINUED | OUTPATIENT
Start: 2025-03-07 | End: 2025-03-07 | Stop reason: HOSPADM

## 2025-03-07 RX ORDER — SODIUM CHLORIDE 9 MG/ML
INJECTION, SOLUTION INTRAVENOUS PRN
Status: DISCONTINUED | OUTPATIENT
Start: 2025-03-07 | End: 2025-03-07 | Stop reason: HOSPADM

## 2025-03-07 RX ADMIN — BUSPIRONE HYDROCHLORIDE 15 MG: 15 TABLET ORAL at 08:44

## 2025-03-07 RX ADMIN — METOPROLOL SUCCINATE 50 MG: 25 TABLET, EXTENDED RELEASE ORAL at 08:55

## 2025-03-07 RX ADMIN — PANTOPRAZOLE SODIUM 40 MG: 40 TABLET, DELAYED RELEASE ORAL at 08:44

## 2025-03-07 RX ADMIN — APIXABAN 5 MG: 5 TABLET, FILM COATED ORAL at 08:44

## 2025-03-07 RX ADMIN — PIPERACILLIN AND TAZOBACTAM 3375 MG: 3; .375 INJECTION, POWDER, LYOPHILIZED, FOR SOLUTION INTRAVENOUS at 08:56

## 2025-03-07 RX ADMIN — SODIUM CHLORIDE, PRESERVATIVE FREE 10 ML: 5 INJECTION INTRAVENOUS at 09:21

## 2025-03-07 RX ADMIN — POTASSIUM CHLORIDE 40 MEQ: 1500 TABLET, EXTENDED RELEASE ORAL at 00:46

## 2025-03-07 RX ADMIN — PIPERACILLIN AND TAZOBACTAM 3375 MG: 3; .375 INJECTION, POWDER, LYOPHILIZED, FOR SOLUTION INTRAVENOUS at 01:52

## 2025-03-07 RX ADMIN — VENLAFAXINE HYDROCHLORIDE 75 MG: 75 CAPSULE, EXTENDED RELEASE ORAL at 10:44

## 2025-03-07 RX ADMIN — VANCOMYCIN HYDROCHLORIDE 1000 MG: 1 INJECTION, POWDER, LYOPHILIZED, FOR SOLUTION INTRAVENOUS at 03:01

## 2025-03-07 RX ADMIN — ASPIRIN 325 MG: 325 TABLET, COATED ORAL at 08:44

## 2025-03-07 RX ADMIN — FLECAINIDE ACETATE 50 MG: 50 TABLET ORAL at 08:44

## 2025-03-07 ASSESSMENT — ENCOUNTER SYMPTOMS
COUGH: 0
ABDOMINAL DISTENTION: 0
PHOTOPHOBIA: 0
RHINORRHEA: 0
WHEEZING: 0
COUGH: 1
SORE THROAT: 0
SHORTNESS OF BREATH: 0
VOMITING: 0
DIARRHEA: 0
NAUSEA: 0
BACK PAIN: 0
ABDOMINAL PAIN: 0

## 2025-03-07 NOTE — ED NOTES
ED to inpatient nurses report      Chief Complaint:  Chief Complaint   Patient presents with    Palpitations    Shortness of Breath     Present to ED from: home    MOA:     LOC: alert and orientated to name, place, date  Mobility: Independent  Oxygen Baseline: RA    Current needs required: IV abx  Pending ED orders: none  Present condition: stable    Why did the patient come to the ED?     Pt presents to the ER via EMS. Pt at home when she felt her heart racing. Pt states she felt SOB. Pt denies any chest pain or diaphoresis. Pt's heart rate on EMS arrival was in 170's. Pt received cardizem by EMS. Pt on arrival heart rate in low 100's, 110's. Pt states SOB has resolved now. Pt has hx of a-fib, pt takes eliquis. Pt states cough recently and started antibiotics today for cyst in her genital area. Pt placed on bedside cardiac monitoring, line established, labs drawn. EKG taken.      What is the plan? admission  Any procedures or intervention occur? X-ray  Any safety concerns?? none    Mental Status:  Level of Consciousness: Alert (0)    Psych Assessment:   Psychosocial  Psychosocial (WDL): Within Defined Limits  Vital signs   Vitals:    03/06/25 2312 03/06/25 2332 03/07/25 0130 03/07/25 0230   BP: (!) 119/55 115/72  122/70   Pulse: 98 (!) 104  70   Resp: 15 16  20   Temp:       TempSrc:       SpO2: 96% 97%  93%   Weight:   79.4 kg (175 lb)         Vitals:  Patient Vitals for the past 24 hrs:   BP Temp Temp src Pulse Resp SpO2 Weight   03/07/25 0230 122/70 -- -- 70 20 93 % --   03/07/25 0130 -- -- -- -- -- -- 79.4 kg (175 lb)   03/06/25 2332 115/72 -- -- (!) 104 16 97 % --   03/06/25 2312 (!) 119/55 -- -- 98 15 96 % --   03/06/25 2310 -- -- -- (!) 101 -- -- --   03/06/25 2300 123/63 98.5 °F (36.9 °C) Oral (!) 101 15 97 % --      Visit Vitals  /70   Pulse 70   Temp 98.5 °F (36.9 °C) (Oral)   Resp 20   Wt 79.4 kg (175 lb)   SpO2 93%   BMI 32.01 kg/m²        LDAs:   Peripheral IV 03/06/25 Left;Ventral Hand (Active)

## 2025-03-07 NOTE — CONSULTS
[facility]    Consult               Today's Date: 3/7/2025  Patient Name: Priscila Stubbs  Date of admission: 3/6/2025 10:57 PM  Patient's age: 65 y.o., 1960  Admission Dx: A-fib (HCC) [I48.91]    Requesting Physician: Luis F Quintero MD    Cardiac Evaluation Reason:  AF with RVR - resolved     History Obtained From: patient and chart review     History of Present Illness:    Priscila Stubbs is a 65 y.o. female who presents with palpitations and shortness of breath.  Patient is known to have history of atrial fibrillation and is on flecainide and Lopressor as well as Eliquis.  She informs that she last had palpitations a year ago.  Palpitations started yesterday and at 6 PM and patient says that it felt like a fluttering/thumping in her wristwatch was feeling the heart rate to be around 150s.  She was in atrial fibrillation/a flutter when the EMS arrived and started on Cardizem.  She had reverted to sinus rhythm by the time she arrived in ER.  Patient denies experiencing dizziness, chest pain, sweating, nausea/vomiting or syncope.  She had headache yesterday but that is improving now.  She has been compliant with her medications.     Patient started to have cough and shortness of breath on Monday.  She had an a walk-in clinic where she was given a course of doxycycline and methylprednisolone.  She is bringing up yellowish phlegm.  She is unsure of experiencing fever.  She developed swelling over her right follow-up on Tuesday.  Can have reviewed in the ER suggesting IV antibiotics - Zosyn and vancomycin.        Past Medical History:   has a past medical history of Arthritis, ASCUS with positive high risk HPV cervical, Asthma, COVID-19, Depression, Diverticulitis, ESBL (extended spectrum beta-lactamase) producing bacteria infection, GERD (gastroesophageal reflux disease), Hyperlipidemia, MRSA (methicillin resistant staph aureus) culture positive, Rectocele, and Tachycardia.    Past  and S2.  No JVD  Peripheral pulses are symmetrical and full   Abdomen:   Soft, non tender   Bowel sounds present  Extremities:  No Le edema or cyanosis   Neurological:  Deferred     LABS:   CBC:   Recent Labs     03/06/25  2309   WBC 9.0   HGB 13.2   HCT 39.8        BMP:   Recent Labs     03/06/25  2309   *   K 3.3*   CO2 25   BUN 11   CREATININE 0.7   LABGLOM >90   GLUCOSE 158*     BNP: No results for input(s): \"BNP\" in the last 72 hours.  PT/INR: No results for input(s): \"PROTIME\", \"INR\" in the last 72 hours.  APTT:No results for input(s): \"APTT\" in the last 72 hours.  CARDIAC ENZYMES:No results for input(s): \"CKTOTAL\", \"CKMB\", \"CKMBINDEX\", \"TROPONINI\" in the last 72 hours.    DATA:    EKG:   Normal sinus rhythm - tachy at 100/min, no acute ST-T wave changes     TROPONIN:  Troponin 13 and 9     ECHO:   July 2024      Left Ventricle: Normal left ventricular systolic function with a visually estimated EF of 55 - 60%. Left ventricle size is normal. Normal wall thickness. Normal wall motion. Normal diastolic function.    Aortic Valve: Trileaflet valve.    Mitral Valve: Mildly thickened leaflets. Mild regurgitation.    Tricuspid Valve: Mild regurgitation. Normal RVSP. The estimated RVSP is 23 mmHg.    Image quality is adequate.    Stress Test:   Nov 2018 - normal study     Cardiac Angiography:   October 2022     Electronically signed by Amy Burch(Performing Physician) on   10/31/2022 14:52   ----------------------------------------------------------------      Angiographic Findings      Cardiac Arteries and Lesion Findings     LMCA: Normal 0% stenosis.     LAD: Normal 0% stenosis.     LCx: Normal 0% stenosis.     RCA: Normal 0% stenosis.Small, non dominant      Coronary Tree      Dominance: Left     LV Analysis  LV function assessed as:Normal.       ASSESSMENT:  Episode of AF/flutter with RVR - resolved - known AF   Hypokalemia and hypernatremia   URI symptoms - known asthma ?flu   Right vulva swelling

## 2025-03-07 NOTE — ED NOTES
Pt presents to the ER via EMS. Pt at home when she felt her heart racing. Pt states she felt SOB. Pt denies any chest pain or diaphoresis. Pt's heart rate on EMS arrival was in 170's. Pt received cardizem by EMS. Pt on arrival heart rate in low 100's, 110's. Pt states SOB has resolved now. Pt has hx of a-fib, pt takes eliquis. Pt states cough recently and started antibiotics today for cyst in her genital area. Pt placed on bedside cardiac monitoring, line established, labs drawn. EKG taken.

## 2025-03-07 NOTE — ED PROVIDER NOTES
[facility]     Emergency Department     Faculty Attestation    I performed a history and physical examination of the patient and discussed management with the resident. I have reviewed and agree with the resident’s findings including all diagnostic interpretations, and treatment plans as written. Any areas of disagreement are noted on the chart. I was personally present for the key portions of any procedures. I have documented in the chart those procedures where I was not present during the key portions. I have reviewed the emergency nurses triage note. I agree with the chief complaint, past medical history, past surgical history, allergies, medications, social and family history as documented unless otherwise noted below. Documentation of the HPI, Physical Exam and Medical Decision Making performed by clif is based on my personal performance of the HPI, PE and MDM. For Physician Assistant/ Nurse Practitioner cases/documentation I have personally evaluated this patient and have completed at least one if not all key elements of the E/M (history, physical exam, and MDM). Additional findings are as noted.    Note Started: 11:14 PM EST     64 yo F palpitation, no cp, + sob, no fever, no diaphoresis, no nausea,   + cough  PE hr100, gcs 15 radial pulse = d pedal pulse =   --afib, admit, gyn saw pt for labial abscess,   EKG Interpretation    Interpreted by me  Normal sinus, hr 100, no ischemia, normal axis, QTc 441    CRITICAL CARE: There was a high probability of clinically significant/life threatening deterioration in this patient's condition which required my urgent intervention.  Total critical care time was 0 minutes.  This excludes any time for separately reportable procedures.       Joe Roberts DO  03/06/25 [id number]       Joe Patterson DO  03/07/25 0195

## 2025-03-07 NOTE — CARE COORDINATION
Case Management Assessment  Initial Evaluation    Date/Time of Evaluation: 3/7/2025 12:01 PM  Assessment Completed by: BETTINA JUNG RN    If patient is discharged prior to next notation, then this note serves as note for discharge by case management.    Patient Name: Priscila Stubbs                   YOB: 1960  Diagnosis: A-fib (HCC) [I48.91]                   Date / Time: 3/6/2025 10:57 PM    Patient Admission Status: Inpatient   Readmission Risk (Low < 19, Mod (19-27), High > 27): Readmission Risk Score: 11.9    Current PCP: Luiza Bella MD  PCP verified by CM? Yes    Chart Reviewed: Yes      History Provided by: Patient  Patient Orientation: Alert and Oriented    Patient Cognition: Alert    Hospitalization in the last 30 days (Readmission):  No    If yes, Readmission Assessment in CM Navigator will be completed.    Advance Directives:      Code Status: Full Code   Patient's Primary Decision Maker is:        Discharge Planning:    Patient lives with: Alone Type of Home: Apartment  Primary Care Giver: Self  Patient Support Systems include: Children   Current Financial resources:    Current community resources:    Current services prior to admission: C-pap            Current DME:              Type of Home Care services:       ADLS  Prior functional level: Independent in ADLs/IADLs  Current functional level: Independent in ADLs/IADLs    PT AM-PAC:   /24  OT AM-PAC:   /24    Family can provide assistance at DC: No  Would you like Case Management to discuss the discharge plan with any other family members/significant others, and if so, who? No  Plans to Return to Present Housing: Yes  Other Identified Issues/Barriers to RETURNING to current housing: none  Potential Assistance needed at discharge: N/A            Potential DME:    Patient expects to discharge to: Apartment  Plan for transportation at discharge:      Financial    [place] / Product Type: *No Product type* /      Does insurance require precert for SNF: Yes    Potential assistance Purchasing Medications: No  Meds-to-Beds request:        [facility]  [place], [place] - [address] [phone] - F [phone]  [address] [place] [address]  Phone: [phone] Fax: [phone]      Notes:    Factors facilitating achievement of predicted outcomes: Family support    Barriers to discharge: Medical complications    Additional Case Management Notes: home independently    The Plan for Transition of Care is related to the following treatment goals of A-fib (HCC) [I48.91]    IF APPLICABLE: The Patient and/or patient representative Priscila and her family were provided with a choice of provider and agrees with the discharge plan. Freedom of choice list with basic dialogue that supports the patient's individualized plan of care/goals and shares the quality data associated with the providers was provided to:     Patient Representative Name:       The Patient and/or Patient Representative Agree with the Discharge Plan?      BETTINA JUNG RN  Case Management Department  Ph:  Fax:

## 2025-03-07 NOTE — DISCHARGE SUMMARY
[facility]  Office: [phone]  Javid Valles DO, Anpu Levy DO, Michael Ramirez DO, Dustin Amaral DO, Lamonte Benavides MD, Jeniffer Espitia MD, Cindi Batista MD, Radha Rivera MD,  Gen Donovan MD, Deep Croft MD, Luis F Quintero MD,  Avi Hernandez DO, Selena Dean MD, Andrea Barth MD, Chan Valles DO, Iram Loredo MD,  Cabrera Ambrose DO, Erika Rodriguez MD, Malinda Castaneda MD, Daya Portillo MD, Adrián Berry MD,  Soto Navarro MD, Dakota Callejas MD, Latricia Arnold MD, Stefany Barney MD, William Sawyer MD, Rajat Abernathy MD, Carlitos Edwards DO, Maynor Garcia MD, Avi Fong MD, Mohsin Reza, MD, Shirley Waterhouse, CNP,  Chiquita Kaiser CNP, Carlitos Schmitt, CNP,  Kaila Cardona, TON, Clarita Silva, CNP, Mayelin Shahid, MALINDA, Katharine Lott CNP, Merlene Ragsdale CNP, ALEYDA Jon-KIRSTY, Florecita Garner, CNP, Tomer Bridges, CNP,  Little Schmidt, CNP, Sherri Tse, CNP, Ashley Edward, CNP,  Jacqueline Jacob, CNS, Kaila Berman CNP, Lynda Tan CNP,   Jhoana Recio, CNP         [facility]   IN-PATIENT SERVICE   [facility]    Discharge Summary     Patient ID: Priscila Stubbs  :  1960   MRN: [id number]     ACCOUNT:  [id number]   Patient's PCP: Luiza Bella MD  Admit Date: 3/6/2025   Discharge Date: 3/7/2025     Length of Stay: 0  Code Status:  Full Code  Admitting Physician: Luis F Quintero MD  Discharge Physician: SHAUN COELHO - KLARISSA     Active Discharge Diagnoses:     Hospital Problem Lists:  Principal Problem:    A-fib (HCC)  Active Problems:    Labial abscess  Resolved Problems:    * No resolved hospital problems. *      Admission Condition:  fair     Discharged Condition: stable    Hospital Stay:     Hospital Course:     Priscila Stubbs is a 65 y.o. Non- / non  female who presents with Palpitations and Shortness of Breath   and is admitted to the hospital for the management of A-fib (HCC).     This

## 2025-03-07 NOTE — ED PROVIDER NOTES
[facility] EMERGENCY DEPARTMENT  Emergency Department Encounter  Emergency Medicine Resident     Pt Name:Priscila Stubbs  MRN: [id number]  Birthdate 1960  Date of evaluation: 3/6/25  PCP:  Luiza Bella MD  Note Started: 11:07 PM EST      CHIEF COMPLAINT       Chief Complaint   Patient presents with    Palpitations    Shortness of Breath       HISTORY OF PRESENT ILLNESS  (Location/Symptom, Timing/Onset, Context/Setting, Quality, Duration, Modifying Factors, Severity.)      Priscila Stubbs is a 65 y.o. female who presents with palpitations and shortness of breath.  Patient reports that earlier today she felt short of breath and looked at her smart watch and noted that her heart rate was in the 150s.  Patient has a history of A-fib and reports that she is compliant with her medications.  She reports that she has been having a cough since Monday and was seen at a walk-in clinic earlier today was started on doxycycline and methylprednisone.  She denies any chest pain diaphoresis, nausea, vomiting, abdominal pain.  She reports that her cough is nonproductive.  She denies any fever or chills.  She also noticed a lump on her right vulva since Tuesday.  She reports that it has been stable in size not getting any bigger or smaller.  Denies any discharge from the lump.  She reports that she never had similar lumps in the past.  She denies any dysuria, frequency, urgency, headaches, nausea, vomiting, numbness or tingling of her extremities.    PAST MEDICAL / SURGICAL / SOCIAL / FAMILY HISTORY      has a past medical history of Arthritis, ASCUS with positive high risk HPV cervical, Asthma, COVID-19, Depression, Diverticulitis, ESBL (extended spectrum beta-lactamase) producing bacteria infection, GERD (gastroesophageal reflux disease), Hyperlipidemia, MRSA (methicillin resistant staph aureus) culture positive, Rectocele, and Tachycardia.     has a past surgical history that includes Hysterectomy (1996);  in the Last Year: No     Number of Times Moved in the Last Year: Not on file     Homeless in the Last Year: No       Family History   Problem Relation Age of Onset    Colon Cancer Mother     High Blood Pressure Father     Heart Attack Brother     Heart Disease Brother     Crohn's Disease Niece        Allergies:  Shellfish allergy, Shrimp (diagnostic), Famotidine, and Gabapentin    Home Medications:  Prior to Admission medications    Medication Sig Start Date End Date Taking? Authorizing Provider   sulfamethoxazole-trimethoprim (BACTRIM DS;SEPTRA DS) 800-160 MG per tablet Take 1 tablet by mouth 2 times daily for 7 days 3/7/25 3/14/25 Yes Taqueria Buenrostro MD   apixaban (ELIQUIS) 5 MG TABS tablet Take 1 tablet by mouth 2 times daily 1/24/25   Larry Ramírez APRN - NP   dabigatran (PRADAXA) 150 MG capsule Take 1 capsule by mouth 2 times daily 1/22/25   Larry Ramírez APRN - NP   diclofenac (VOLTAREN) 50 MG EC tablet TAKE 1 TABLET BY MOUTH TWICE DAILY 1/16/25   Luiza Bella MD   vitamin D (ERGOCALCIFEROL) 1.25 MG (45145 UT) CAPS capsule TAKE 1 CAPSULE BY MOUTH EVERY WEEK 1/15/25   Luiza Bella MD   omeprazole (PRILOSEC) 40 MG delayed release capsule TAKE 1 CAPSULE BY MOUTH EVERY MORNING 1/15/25   Luiza Bella MD   atorvastatin (LIPITOR) 40 MG tablet TAKE 1 TABLET BY MOUTH NIGHTLY 1/15/25   Luiza Bella MD   venlafaxine (EFFEXOR XR) 75 MG extended release capsule TAKE 1 CAPSULE BY MOUTH DAILY 1/15/25   Luiza Bella MD   chlorzoxazone (PARAFON FORTE) 500 MG tablet TAKE 1/2 TABLET BY MOUTH TWICE DAILY AS NEEDED 1/15/25   Luiza Bella MD   aspirin 325 MG EC tablet TAKE 1 TABLET BY MOUTH DAILY 1/15/25   Luiza Bella MD   flecainide (TAMBOCOR) 50 MG tablet TAKE 1 TABLET BY MOUTH TWICE DAILY 1/15/25   Luiza Bella MD   busPIRone (BUSPAR) 15 MG tablet Take 15 mg by mouth 2 times daily 12/18/24   Luiza Bella MD   fluticasone (FLONASE) 50 MCG/ACT nasal spray 2 sprays by Each

## 2025-03-07 NOTE — PROGRESS NOTES
[facility]   Pharmacy Pharmacokinetic Monitoring Service - Vancomycin     Priscila Stubbs is a 65 y.o. female starting on vancomycin therapy for SSTI. Pharmacy consulted by Dr. Buenrostro for monitoring and adjustment.    Target Concentration: Goal AUC/HOLA 400-600 mg*hr/L    Additional Antimicrobials: Zosyn    Pertinent Laboratory Values:   Wt Readings from Last 1 Encounters:   03/07/25 79.4 kg (175 lb)     Temp Readings from Last 1 Encounters:   03/06/25 98.5 °F (36.9 °C) (Oral)     Estimated Creatinine Clearance: 79 mL/min (based on SCr of 0.7 mg/dL).  Recent Labs     03/06/25  2309   CREATININE 0.7   BUN 11   WBC 9.0     Procalcitonin: n/a    Pertinent Cultures:  Culture Date Source Results        MRSA Nasal Swab: N/A. Non-respiratory infection.    Plan:  Dosing recommendations based on Bayesian software  Start vancomycin 1000mg IVPB every 12 hours  Anticipated AUC of 485 and trough concentration of 11.2 at steady state  Renal labs as indicated   Vancomycin concentration ordered for  @    Pharmacy will continue to monitor patient and adjust therapy as indicated    Thank you for the consult,  Glenn Engle RPH  3/7/2025 2:36 AM

## 2025-03-07 NOTE — H&P
decadron 10mg    NERVE BLOCK  09/22/2017    cadal # 2, decadron 10 mg    NERVE BLOCK  09/22/2017    caudal epidural steroid block #2 decadron 10 mg    NERVE BLOCK  11/10/2017    lumbar L4-5 epidural; depomedrol 80mg    OVARY REMOVAL      UPPER GASTROINTESTINAL ENDOSCOPY  01/30/2019    EGD BIOPSY performed by Delta Batista MD at [facility] OR        Medications Prior to Admission:     Prior to Admission medications    Medication Sig Start Date End Date Taking? Authorizing Provider   sulfamethoxazole-trimethoprim (BACTRIM DS;SEPTRA DS) 800-160 MG per tablet Take 1 tablet by mouth 2 times daily for 7 days 3/7/25 3/14/25 Yes Taqueria Buenrostro MD   flecainide (TAMBOCOR) 100 MG tablet Take 1 tablet by mouth 2 times daily 3/7/25  Yes Luis F Quintero MD   apixaban (ELIQUIS) 5 MG TABS tablet Take 1 tablet by mouth 2 times daily 1/24/25   Larry Ramírez K, APRN - NP   diclofenac (VOLTAREN) 50 MG EC tablet TAKE 1 TABLET BY MOUTH TWICE DAILY 1/16/25   Luiza Bella MD   vitamin D (ERGOCALCIFEROL) 1.25 MG (46943 UT) CAPS capsule TAKE 1 CAPSULE BY MOUTH EVERY WEEK 1/15/25   Luiza Bella MD   omeprazole (PRILOSEC) 40 MG delayed release capsule TAKE 1 CAPSULE BY MOUTH EVERY MORNING 1/15/25   Luiza Bella MD   atorvastatin (LIPITOR) 40 MG tablet TAKE 1 TABLET BY MOUTH NIGHTLY 1/15/25   Luiza Bella MD   venlafaxine (EFFEXOR XR) 75 MG extended release capsule TAKE 1 CAPSULE BY MOUTH DAILY 1/15/25   Luiza Bella MD   chlorzoxazone (PARAFON FORTE) 500 MG tablet TAKE 1/2 TABLET BY MOUTH TWICE DAILY AS NEEDED 1/15/25   Luiza Bella MD   aspirin 325 MG EC tablet TAKE 1 TABLET BY MOUTH DAILY 1/15/25   Luiza Bella MD   busPIRone (BUSPAR) 15 MG tablet Take 15 mg by mouth 2 times daily 12/18/24   Luiza Bella MD   fluticasone (FLONASE) 50 MCG/ACT nasal spray 2 sprays by Each Nostril route daily 12/18/24   Luiza Bella MD   zoster recombinant adjuvanted vaccine (SHINGRIX) 50 MCG/0.5ML SUSR injection  Inject 0.5 mLs into the muscle See Admin Instructions 1 dose now and repeat in 2-6 months 12/18/24 6/16/25  Luiza Bella MD   metoprolol succinate (TOPROL XL) 50 MG extended release tablet TAKE 1 TABLET BY MOUTH TWICE DAILY 10/23/24   Randy Noland DO   acetaminophen (TYLENOL) 500 MG tablet Take 2 tablets by mouth every 8 hours as needed for Pain 4/9/24   Santiago Vuong MD   albuterol sulfate HFA (VENTOLIN HFA) 108 (90 Base) MCG/ACT inhaler INAHLE 2 PUFFS BY MOUTH INTO THE LUNGS EVERY 6 HOURS AS NEEDED FOR WHEEZING 9/12/23   Luiza Bella MD   diclofenac sodium (VOLTAREN) 1 % GEL Apply 4 g topically 4 times daily as needed for Pain 12/23/22   Pasha Weinstein MD   EPINEPHrine (EPIPEN 2-VIOLET) 0.3 MG/0.3ML SOAJ injection Inject 0.3 mLs into the muscle once for 1 dose Use as directed for allergic reaction 8/3/22 8/3/22  Luiza Bella MD   diclofenac sodium (VOLTAREN) 1 % GEL Apply 4 g topically 4 times daily as needed for Pain 6/23/22   Pasha Weinstein MD   Handicap Placard MISC by Does not apply route DISABLED PARKING PERMIT AUTHORIZATION  Routine     Qty-1    The patient has the following condition(s) which qualifies him/her for disabled parking: Walking severely limited due to orthopedic condition     Privilege Duration: Temporary for 3 months 3/4/21   Pasha Weinstein MD   Multiple Vitamins-Minerals (CULTURELLE PROBIOTICS + MULTIV) CHEW Take 1 tablet by mouth daily 1/29/20   Delta Batista MD   CRANBERRY CONCENTRATE 500 MG CAPS TAKE 1 CAPSULE BY MOUTH TWICE DAILY 8/21/19   Luiza Bella MD        Allergies:     Shellfish allergy, Shrimp (diagnostic), Famotidine, and Gabapentin    Social History:     Tobacco:    reports that she quit smoking about 30 years ago. Her smoking use included cigarettes. She started smoking about 30 years ago. She has a 1 pack-year smoking history. She has never used smokeless tobacco.  Alcohol:      reports current alcohol use.  Drug Use:  reports no history of

## 2025-03-07 NOTE — ED NOTES
g, Refills: 3    Associated Diagnoses: Seasonal allergies      omeprazole 20 MG EC tablet Take 1 tablet by mouth daily for 20 days Take on empty stomach 30 minutes before meals  Qty: 20 tablet, Refills: 1    Associated Diagnoses: Plantar fasciitis      dicyclomine (BENTYL) 10 MG capsule Take 1 capsule by mouth 4 times daily  Qty: 30 capsule, Refills: 0      !! diclofenac sodium (VOLTAREN) 1 % GEL Apply 4 g topically 4 times daily as needed for Pain  Qty: 200 g, Refills: 0      EPINEPHrine (EPIPEN 2-VIOLET) 0.3 MG/0.3ML SOAJ injection Inject 0.3 mLs into the muscle once for 1 dose Use as directed for allergic reaction  Qty: 0.3 mL, Refills: 0      !! diclofenac sodium (VOLTAREN) 1 % GEL Apply 4 g topically 4 times daily as needed for Pain  Qty: 200 g, Refills: 0    Associated Diagnoses: Plantar fasciitis      Handicap Placard MISC by Does not apply route DISABLED PARKING PERMIT AUTHORIZATION  Routine     Qty-1    The patient has the following condition(s) which qualifies him/her for disabled parking: Walking severely limited due to orthopedic condition     Privilege Duration: Temporary for 3 months  Qty: 1 each, Refills: 0    Associated Diagnoses: Instability of left ankle joint; Peroneal tendinitis of left lower extremity      miSOPROStol (CYTOTEC) 200 MCG tablet Take 1 tablet by mouth daily      Multiple Vitamins-Minerals (CULTURELLE PROBIOTICS + MULTIV) CHEW Take 1 tablet by mouth daily  Qty: 30 tablet, Refills: 2      MI-ACID GAS RELIEF 80 MG chewable tablet CHEW 1 TABLET BY MOUTH FOUR TIMES DAILY AS NEEDED FOR FLATULENCE  Qty: 120 tablet, Refills: 5      CRANBERRY CONCENTRATE 500 MG CAPS TAKE 1 CAPSULE BY MOUTH TWICE DAILY  Qty: 56 capsule, Refills: 3       !! - Potential duplicate medications found. Please discuss with provider.        Orders Placed This Encounter   Medications    potassium chloride (KLOR-CON M) extended release tablet 40 mEq    piperacillin-tazobactam (ZOSYN) 3,375 mg in sodium chloride 0.9 % 50  decadron 10 mg    NERVE BLOCK  09/22/2017    caudal epidural steroid block #2 decadron 10 mg    NERVE BLOCK  11/10/2017    lumbar L4-5 epidural; depomedrol 80mg    OVARY REMOVAL      UPPER GASTROINTESTINAL ENDOSCOPY  01/30/2019    EGD BIOPSY performed by Delta Batista MD at [email or web address] OR       PAST MEDICAL HISTORY       Past Medical History:   Diagnosis Date    Arthritis     ASCUS with positive high risk HPV cervical 03/22/2016    Asthma     COVID-19 2020    Mild per pt., no treatment    Depression     Diverticulitis     ESBL (extended spectrum beta-lactamase) producing bacteria infection 02/03/2019    E. Coli urine    GERD (gastroesophageal reflux disease)     Hyperlipidemia     MRSA (methicillin resistant staph aureus) culture positive 04/18/2016    lip    Rectocele     Tachycardia     takes Metoprolol       Labs:  Labs Reviewed   BASIC METABOLIC PANEL - Abnormal; Notable for the following components:       Result Value    Sodium 146 (*)     Potassium 3.3 (*)     Chloride 108 (*)     Glucose 158 (*)     All other components within normal limits   URINALYSIS WITH MICROSCOPIC - Abnormal; Notable for the following components:    Turbidity UA Cloudy (*)     Leukocyte Esterase, Urine SMALL (*)     Bacteria, UA MANY (*)     All other components within normal limits   COVID-19, RAPID   RAPID INFLUENZA A/B ANTIGENS   CBC WITH AUTO DIFFERENTIAL   TROPONIN   TROPONIN   TSH REFLEX TO FT4       Electronically signed by Jayant Marsh RN on 3/7/2025 at 6:10 AM

## 2025-03-07 NOTE — CONSULTS
Hemoglobin 13.2 11.9 - 15.1 g/dL    Hematocrit 39.8 36.3 - 47.1 %    MCV 86.0 82.6 - 102.9 fL    MCH 28.5 25.2 - 33.5 pg    MCHC 33.2 28.4 - 34.8 g/dL    RDW 13.4 11.8 - 14.4 %    Platelets 253 138 - 453 k/uL    MPV 12.2 8.1 - 13.5 fL    NRBC Automated 0.0 0.0 per 100 WBC    Neutrophils % 65 36 - 65 %    Lymphocytes % 24 24 - 43 %    Monocytes % 7 3 - 12 %    Eosinophils % 3 1 - 4 %    Basophils % 1 0 - 2 %    Immature Granulocytes % 0 0 %    Neutrophils Absolute 5.87 1.50 - 8.10 k/uL    Lymphocytes Absolute 2.15 1.10 - 3.70 k/uL    Monocytes Absolute 0.60 0.10 - 1.20 k/uL    Eosinophils Absolute 0.25 0.00 - 0.44 k/uL    Basophils Absolute 0.06 0.00 - 0.20 k/uL    Immature Granulocytes Absolute 0.03 0.00 - 0.30 k/uL   Basic Metabolic Panel   Result Value Ref Range    Sodium 146 (H) 136 - 145 mmol/L    Potassium 3.3 (L) 3.7 - 5.3 mmol/L    Chloride 108 (H) 98 - 107 mmol/L    CO2 25 20 - 31 mmol/L    Anion Gap 13 9 - 16 mmol/L    Glucose 158 (H) 74 - 99 mg/dL    BUN 11 8 - 23 mg/dL    Creatinine 0.7 0.6 - 0.9 mg/dL    Est, Glom Filt Rate >90 >60 mL/min/1.73m2    Calcium 9.0 8.6 - 10.4 mg/dL   Troponin   Result Value Ref Range    Troponin, High Sensitivity 13 0 - 14 ng/L   Troponin   Result Value Ref Range    Troponin, High Sensitivity 9 0 - 14 ng/L   TSH reflex to FT4   Result Value Ref Range    TSH 3.39 0.27 - 4.20 uIU/mL   Urinalysis with Microscopic   Result Value Ref Range    Color, UA Yellow Yellow    Turbidity UA Cloudy (A) Clear    Glucose, Ur NEGATIVE NEGATIVE mg/dL    Bilirubin, Urine NEGATIVE NEGATIVE    Ketones, Urine NEGATIVE NEGATIVE mg/dL    Specific Gravity, UA 1.017 1.005 - 1.030    Urine Hgb NEGATIVE NEGATIVE    pH, Urine 7.0 5.0 - 8.0    Protein, UA NEGATIVE NEGATIVE mg/dL    Urobilinogen, Urine ELEVATED 0.0 - 1.0 EU/dL    Nitrite, Urine NEGATIVE NEGATIVE    Leukocyte Esterase, Urine SMALL (A) NEGATIVE    WBC, UA 20 TO 50 0 - 5 /HPF    RBC, UA 2 TO 5 0 - 4 /HPF    Casts UA  0 - 8 /LPF     2 TO 5  HYALINE Reference range defined for non-centrifuged specimen.    Epithelial Cells, UA 10 TO 20 0 - 5 /HPF    Bacteria, UA MANY (A) None     DIAGNOSTICS:  No results found.    ASSESSMENT & PLAN:    Priscila Stubbs is a 65 y.o. female with A fib and right labial abscess   - Afebrile, vitals stable   - Pelvic exam: see physical exam findings above   - Patient reports presence of abscess since Tuesday. Denies ever having previous groin abscess   - No signs of systemic infection   - Discussed bedside I&D + antibiotics under local anesthesia given size vs trial of IV antibiotics to assess for improvement in pain, overlying cellulitis and size of lesion without immediate I&D. Patient electing to trial IV antibiotics first and if improved symptoms, then can defer I&D procedure   - Since being admitted for A fib management per medicine, will plan for IV Vanc given history of MRSA colonization and Zosyn for antibiotic coverage. Will reassess clinical status over the next 24 hours to determine if she will benefit from an I&D procedure. Outpatient Bactrim sent to home pharmacy for continued antibiotic therapy on discharge   - Further management of co-morbidities per primary team. GYN will continue to follow    Patient Active Problem List    Diagnosis Date Noted    Gastrocnemius equinus of right lower extremity 11/04/2022     Priority: Medium    Class 1 obesity due to excess calories with serious comorbidity and body mass index (BMI) of 34.0 to 34.9 in adult 10/30/2022     Priority: Medium    Unstable angina (HCC) 10/29/2022     Priority: Medium    A-fib (HCC) 03/07/2025    Atrial fibrillation with RVR (HCC) 07/13/2024    DAYAMI (generalized anxiety disorder) 07/13/2024    Irritable bowel syndrome with both constipation and diarrhea 04/17/2024    Abdominal pain, left lower quadrant 04/17/2024    History of diverticulitis 04/17/2024    Fatty liver 04/17/2024    Instability of left ankle joint     Acute medial meniscus tear of left

## 2025-03-07 NOTE — ED NOTES
Pt ambulated to restroom with steady, even gait. Writer walking with pt as she stated she felt \"somewhat dizzy\".

## 2025-03-10 ENCOUNTER — TELEPHONE (OUTPATIENT)
Dept: FAMILY MEDICINE CLINIC | Age: 65
End: 2025-03-10

## 2025-03-10 NOTE — TELEPHONE ENCOUNTER
Care Transitions Initial Follow Up Call    Outreach made within 2 business days of discharge: Yes    Patient: Priscila Stubbs Patient : 1960   MRN: [id number]  Reason for Admission: atrial fibrillation with rvr   Discharge Date: 3/7/25       Spoke with: patient     Discharge department/facility: [facility] Interactive Patient Contact:  Was patient able to fill all prescriptions:   Was patient instructed to bring all medications to the follow-up visit:   Is patient taking all medications as directed in the discharge summary?   Does patient understand their discharge instructions:   Does patient have questions or concerns that need addressed prior to 7-14 day follow up office visit:     Additional needs identified to be addressed with provider  Declined at this time for an appointment is going to follow up with speciality,              Scheduled appointment with PCP within 7-14 days    Follow Up  Future Appointments   Date Time Provider Department Center   3/12/2025  2:30 PM Larry Ramírez, SHAUN - NP [facility] Howell MA

## 2025-03-12 NOTE — FLOWSHEET NOTE
L1 Ant/Post, Lat   SLS            Seated       Long sit calf stretch 3x30\"     4 way ankle 15x Blue  Long siting   Ankle ABCs 1x   Elevated on stool    Towel scrunch 10x5 scrunch     Heel raises 2x10                                         Other:  Manual:      Treatment Charges: Mins Units   []  Modalities      []  Ther Exercise 40 3   []  Manual Therapy     []  Ther Activities     []  Aquatics     [x]  Vasocompression 15 1   []  Other     Total Treatment time 55 4       Assessment: [x] Progressing toward goals. Progressed program with addition of rockerboard for continued ankle ROM and strengthening with only minimal difficulty. Also added seated heel raises to reduce weightbearing through L ankle, minimal pain through L ankle with heel raises, subsides with rest. Issued written HEP for 4 way ankle as well as blue theraband with good verbal understanding for use. [] No change. [x] Other:    [x] Patient would continue to benefit from skilled physical therapy services in order to: decrease shoulder pain, increase ROM in ER/IR, increase strength and function. ANKLE  STG: (to be met in 8 treatments)  1. ? Pain: No more than 4/10 max pain with prolonged standing/ambulation at home. 2. ? ROM: L ankle AROM to the following degrees to indicate improving joint mobility post injury:   a. DF: 10 deg; eversion/inversion:20/35deg, respectively  3. ? Strength: At least 4+/5 all planes of L ankle without pain during testing for improving stability at ankle in weightbearing  4. ? Balance: Pt will be able to tolerate SLS on level ground with LLE for at least 30 sec and no UE assist with no more than 5 instances of instability evidenced by med/lat forefoot elevation. 5. ? Function: Pt will be able to tolerate 2 hours of consecutive standing/ambulation with no more than minimal pain reported. 6. Independent with Home Exercise Programs  7.  Demonstrate knowledge of fall prevention.     LTG: (to be met in 12 treatments)     8. ? Pain: No pain reported with all ADL/IADLs including prolonged standing at home and in community. 9. ? ROM: L ankle AROM symmetrical to R ankle with no pain at end range  10. ? Strength: At least 5-/5 all planes of L ankle without pain during testing to indicate further improved stability at ankle in weightbearing  11. ? Balance: Pt will be able to tolerate SLS on foam with LLE for at least 30 sec and no UE assist with no more than 5 instances of instability evidenced by med/lat forefoot elevation. 12. ? Function:  a. Pt will be able to tolerate unlimited standing/ambulation times without pain in ankle. b. Pt will be able to squat/stoop to ground without pain in ankle reported. c. Pt will be able to ascend/descend stairs reciprocally without UE assist and no pain reported in ankle.                    Patient goals: \"no more pain\"         Rehab Potential:  [x]? Good  []? Fair  []? Poor    Suggested Professional Referral:  [x]? No  []? Yes:  Barriers to Goal Achievement[de-identified]  [x]? No  []? Yes:  Domestic Concerns:  [x]? No  []? Yes:    Pt. Education:  [x] Yes  [] No  [] Reviewed Prior HEP/Ed  Method of Education: [x] Verbal  [x] Demo  [x] Written  Comprehension of Education:  [x] Verbalizes understanding. [x] Demonstrates understanding. [] Needs review. [x] Demonstrates/verbalizes HEP/Ed previously given. 12/11/20: 4 way ankle and blue tband    Plan: [x] Continue current frequency toward long and short term goals towards ankle goals.      [x] Specific Instructions for subsequent treatments: fall prevention, SLS         ANKLE   - Gait train: weightshifts lateral/ant with UE assist, progressing to no UE assist  - SLS training  - TG squats/calf raises    Frequency:  2 x/week for 12 visits -- ANKLE      Time In: 200 pm            Time Out: 255 pm     Electronically signed by:  Latasha Nair PTA No

## 2025-03-25 DIAGNOSIS — M62.838 MUSCLE SPASM: ICD-10-CM

## 2025-03-25 DIAGNOSIS — K21.9 GASTROESOPHAGEAL REFLUX DISEASE, UNSPECIFIED WHETHER ESOPHAGITIS PRESENT: ICD-10-CM

## 2025-03-25 DIAGNOSIS — E78.49 OTHER HYPERLIPIDEMIA: ICD-10-CM

## 2025-03-25 DIAGNOSIS — F41.9 ANXIETY: ICD-10-CM

## 2025-03-25 DIAGNOSIS — E55.9 VITAMIN D DEFICIENCY: ICD-10-CM

## 2025-03-25 RX ORDER — VENLAFAXINE HYDROCHLORIDE 75 MG/1
75 CAPSULE, EXTENDED RELEASE ORAL DAILY
Qty: 28 CAPSULE | Refills: 1 | Status: SHIPPED | OUTPATIENT
Start: 2025-03-25

## 2025-03-25 RX ORDER — CHLORZOXAZONE 500 MG/1
TABLET ORAL
Qty: 28 TABLET | Refills: 1 | Status: SHIPPED | OUTPATIENT
Start: 2025-03-25

## 2025-03-25 RX ORDER — ERGOCALCIFEROL 1.25 MG/1
50000 CAPSULE, LIQUID FILLED ORAL WEEKLY
Qty: 4 CAPSULE | Refills: 1 | Status: SHIPPED | OUTPATIENT
Start: 2025-03-25

## 2025-03-25 RX ORDER — OMEPRAZOLE 40 MG/1
40 CAPSULE, DELAYED RELEASE ORAL EVERY MORNING
Qty: 28 CAPSULE | Refills: 1 | Status: SHIPPED | OUTPATIENT
Start: 2025-03-25

## 2025-03-25 RX ORDER — BUSPIRONE HYDROCHLORIDE 15 MG/1
15 TABLET ORAL 2 TIMES DAILY
Qty: 56 TABLET | Refills: 5 | Status: SHIPPED | OUTPATIENT
Start: 2025-03-25

## 2025-03-25 RX ORDER — ATORVASTATIN CALCIUM 40 MG/1
40 TABLET, FILM COATED ORAL NIGHTLY
Qty: 28 TABLET | Refills: 1 | Status: SHIPPED | OUTPATIENT
Start: 2025-03-25

## 2025-04-24 RX ORDER — FLUTICASONE PROPIONATE 50 MCG
2 SPRAY, SUSPENSION (ML) NASAL DAILY
Qty: 16 G | Refills: 3 | Status: SHIPPED | OUTPATIENT
Start: 2025-04-24

## 2025-05-19 ENCOUNTER — HOSPITAL ENCOUNTER (OUTPATIENT)
Age: 65
Setting detail: OBSERVATION
Discharge: HOME | End: 2025-05-20
Attending: EMERGENCY MEDICINE | Admitting: EMERGENCY MEDICINE
Payer: COMMERCIAL

## 2025-05-19 ENCOUNTER — APPOINTMENT (OUTPATIENT)
Dept: GENERAL RADIOLOGY | Age: 65
End: 2025-05-19
Payer: COMMERCIAL

## 2025-05-19 DIAGNOSIS — I48.92 ATRIAL FLUTTER, UNSPECIFIED TYPE (HCC): Primary | ICD-10-CM

## 2025-05-19 DIAGNOSIS — R94.31 ABNORMAL ELECTROCARDIOGRAPHY: ICD-10-CM

## 2025-05-19 DIAGNOSIS — R06.02 SHORTNESS OF BREATH: ICD-10-CM

## 2025-05-19 DIAGNOSIS — R07.9 CHEST PAIN, UNSPECIFIED TYPE: ICD-10-CM

## 2025-05-19 LAB
ANION GAP SERPL CALCULATED.3IONS-SCNC: 11 MMOL/L (ref 9–16)
BASOPHILS # BLD: 0.07 K/UL (ref 0–0.2)
BASOPHILS NFR BLD: 1 % (ref 0–2)
BNP SERPL-MCNC: 767 PG/ML (ref 0–125)
BUN SERPL-MCNC: 16 MG/DL (ref 8–23)
CALCIUM SERPL-MCNC: 9.8 MG/DL (ref 8.6–10.4)
CHLORIDE SERPL-SCNC: 103 MMOL/L (ref 98–107)
CO2 SERPL-SCNC: 27 MMOL/L (ref 20–31)
CREAT SERPL-MCNC: 1 MG/DL (ref 0.6–0.9)
D DIMER PPP FEU-MCNC: <0.27 UG/ML FEU (ref 0–0.57)
EOSINOPHIL # BLD: 0.27 K/UL (ref 0–0.44)
EOSINOPHILS RELATIVE PERCENT: 3 % (ref 1–4)
ERYTHROCYTE [DISTWIDTH] IN BLOOD BY AUTOMATED COUNT: 13.3 % (ref 11.8–14.4)
GFR, ESTIMATED: 63 ML/MIN/1.73M2
GLUCOSE SERPL-MCNC: 93 MG/DL (ref 74–99)
HCT VFR BLD AUTO: 45 % (ref 36.3–47.1)
HGB BLD-MCNC: 15.1 G/DL (ref 11.9–15.1)
IMM GRANULOCYTES # BLD AUTO: <0.03 K/UL (ref 0–0.3)
IMM GRANULOCYTES NFR BLD: 0 %
INR PPP: 1.1
LYMPHOCYTES NFR BLD: 2.44 K/UL (ref 1.1–3.7)
LYMPHOCYTES RELATIVE PERCENT: 29 % (ref 24–43)
MAGNESIUM SERPL-MCNC: 2 MG/DL (ref 1.6–2.4)
MCH RBC QN AUTO: 29.7 PG (ref 25.2–33.5)
MCHC RBC AUTO-ENTMCNC: 33.6 G/DL (ref 28.4–34.8)
MCV RBC AUTO: 88.6 FL (ref 82.6–102.9)
MONOCYTES NFR BLD: 0.53 K/UL (ref 0.1–1.2)
MONOCYTES NFR BLD: 6 % (ref 3–12)
NEUTROPHILS NFR BLD: 61 % (ref 36–65)
NEUTS SEG NFR BLD: 5 K/UL (ref 1.5–8.1)
NRBC BLD-RTO: 0 PER 100 WBC
PLATELET # BLD AUTO: 320 K/UL (ref 138–453)
PMV BLD AUTO: 11.8 FL (ref 8.1–13.5)
POTASSIUM SERPL-SCNC: 3.8 MMOL/L (ref 3.7–5.3)
PROTHROMBIN TIME: 14.6 SEC (ref 11.7–14.9)
RBC # BLD AUTO: 5.08 M/UL (ref 3.95–5.11)
SODIUM SERPL-SCNC: 141 MMOL/L (ref 136–145)
TROPONIN I SERPL HS-MCNC: <6 NG/L (ref 0–14)
TROPONIN I SERPL HS-MCNC: <6 NG/L (ref 0–14)
TSH SERPL DL<=0.05 MIU/L-ACNC: 1.95 UIU/ML (ref 0.27–4.2)
WBC OTHER # BLD: 8.3 K/UL (ref 3.5–11.3)

## 2025-05-19 PROCEDURE — 96375 TX/PRO/DX INJ NEW DRUG ADDON: CPT

## 2025-05-19 PROCEDURE — 84443 ASSAY THYROID STIM HORMONE: CPT

## 2025-05-19 PROCEDURE — 84484 ASSAY OF TROPONIN QUANT: CPT

## 2025-05-19 PROCEDURE — 83880 ASSAY OF NATRIURETIC PEPTIDE: CPT

## 2025-05-19 PROCEDURE — G0378 HOSPITAL OBSERVATION PER HR: HCPCS

## 2025-05-19 PROCEDURE — 71046 X-RAY EXAM CHEST 2 VIEWS: CPT

## 2025-05-19 PROCEDURE — 99285 EMERGENCY DEPT VISIT HI MDM: CPT

## 2025-05-19 PROCEDURE — 6360000002 HC RX W HCPCS: Performed by: STUDENT IN AN ORGANIZED HEALTH CARE EDUCATION/TRAINING PROGRAM

## 2025-05-19 PROCEDURE — 85379 FIBRIN DEGRADATION QUANT: CPT

## 2025-05-19 PROCEDURE — 96374 THER/PROPH/DIAG INJ IV PUSH: CPT

## 2025-05-19 PROCEDURE — 85025 COMPLETE CBC W/AUTO DIFF WBC: CPT

## 2025-05-19 PROCEDURE — 93005 ELECTROCARDIOGRAM TRACING: CPT | Performed by: EMERGENCY MEDICINE

## 2025-05-19 PROCEDURE — 80048 BASIC METABOLIC PNL TOTAL CA: CPT

## 2025-05-19 PROCEDURE — 83735 ASSAY OF MAGNESIUM: CPT

## 2025-05-19 PROCEDURE — 85610 PROTHROMBIN TIME: CPT

## 2025-05-19 RX ORDER — ONDANSETRON 4 MG/1
4 TABLET, ORALLY DISINTEGRATING ORAL EVERY 8 HOURS PRN
Status: DISCONTINUED | OUTPATIENT
Start: 2025-05-19 | End: 2025-05-20 | Stop reason: HOSPADM

## 2025-05-19 RX ORDER — ONDANSETRON 2 MG/ML
4 INJECTION INTRAMUSCULAR; INTRAVENOUS EVERY 6 HOURS PRN
Status: DISCONTINUED | OUTPATIENT
Start: 2025-05-19 | End: 2025-05-20 | Stop reason: HOSPADM

## 2025-05-19 RX ORDER — SODIUM CHLORIDE 0.9 % (FLUSH) 0.9 %
5-40 SYRINGE (ML) INJECTION PRN
Status: DISCONTINUED | OUTPATIENT
Start: 2025-05-19 | End: 2025-05-20 | Stop reason: HOSPADM

## 2025-05-19 RX ORDER — POLYETHYLENE GLYCOL 3350 17 G/17G
17 POWDER, FOR SOLUTION ORAL DAILY PRN
Status: DISCONTINUED | OUTPATIENT
Start: 2025-05-19 | End: 2025-05-20 | Stop reason: HOSPADM

## 2025-05-19 RX ORDER — POTASSIUM CHLORIDE 7.45 MG/ML
10 INJECTION INTRAVENOUS PRN
Status: DISCONTINUED | OUTPATIENT
Start: 2025-05-19 | End: 2025-05-20 | Stop reason: HOSPADM

## 2025-05-19 RX ORDER — ACETAMINOPHEN 325 MG/1
650 TABLET ORAL EVERY 6 HOURS PRN
Status: DISCONTINUED | OUTPATIENT
Start: 2025-05-19 | End: 2025-05-20 | Stop reason: HOSPADM

## 2025-05-19 RX ORDER — SODIUM CHLORIDE 9 MG/ML
INJECTION, SOLUTION INTRAVENOUS PRN
Status: DISCONTINUED | OUTPATIENT
Start: 2025-05-19 | End: 2025-05-20 | Stop reason: HOSPADM

## 2025-05-19 RX ORDER — SODIUM CHLORIDE 0.9 % (FLUSH) 0.9 %
5-40 SYRINGE (ML) INJECTION EVERY 12 HOURS SCHEDULED
Status: DISCONTINUED | OUTPATIENT
Start: 2025-05-19 | End: 2025-05-20 | Stop reason: HOSPADM

## 2025-05-19 RX ORDER — FENTANYL CITRATE 50 UG/ML
50 INJECTION, SOLUTION INTRAMUSCULAR; INTRAVENOUS ONCE
Status: COMPLETED | OUTPATIENT
Start: 2025-05-19 | End: 2025-05-19

## 2025-05-19 RX ORDER — ONDANSETRON 2 MG/ML
4 INJECTION INTRAMUSCULAR; INTRAVENOUS ONCE
Status: COMPLETED | OUTPATIENT
Start: 2025-05-19 | End: 2025-05-19

## 2025-05-19 RX ORDER — ACETAMINOPHEN 650 MG/1
650 SUPPOSITORY RECTAL EVERY 6 HOURS PRN
Status: DISCONTINUED | OUTPATIENT
Start: 2025-05-19 | End: 2025-05-20 | Stop reason: HOSPADM

## 2025-05-19 RX ORDER — POTASSIUM CHLORIDE 1500 MG/1
40 TABLET, EXTENDED RELEASE ORAL PRN
Status: DISCONTINUED | OUTPATIENT
Start: 2025-05-19 | End: 2025-05-20 | Stop reason: HOSPADM

## 2025-05-19 RX ORDER — MAGNESIUM SULFATE IN WATER 40 MG/ML
2000 INJECTION, SOLUTION INTRAVENOUS PRN
Status: DISCONTINUED | OUTPATIENT
Start: 2025-05-19 | End: 2025-05-20 | Stop reason: HOSPADM

## 2025-05-19 RX ADMIN — ONDANSETRON 4 MG: 2 INJECTION, SOLUTION INTRAMUSCULAR; INTRAVENOUS at 19:39

## 2025-05-19 RX ADMIN — FENTANYL CITRATE 50 MCG: 50 INJECTION INTRAMUSCULAR; INTRAVENOUS at 19:39

## 2025-05-19 ASSESSMENT — PAIN SCALES - GENERAL: PAINLEVEL_OUTOF10: 6

## 2025-05-19 NOTE — ED NOTES
Patient arrived to the ED with c/o chest pain, irregular heart rate and nausea. Patient states she has a history of a-fib, take meds and is compliant with meds. Patient states about 3 days ago she started to feel her heart rate increase, causing her to feel nauseous and mid sternal chest pain. Patient has stable vitals. Patient ambulatory without difficulty. Patient placed on bedside monitor. Patient resting comfortably in bed with call light in reach.

## 2025-05-19 NOTE — ED PROVIDER NOTES
[facility] EMERGENCY DEPARTMENT     Emergency Department     Faculty Attestation        I performed a history and physical examination of the patient and discussed management with the resident. I reviewed the resident’s note and agree with the documented findings and plan of care. Any areas of disagreement are noted on the chart. I was personally present for the key portions of any procedures. I have documented in the chart those procedures where I was not present during the key portions. I have reviewed the emergency nurses triage note. I agree with the chief complaint, past medical history, past surgical history, allergies, medications, social and family history as documented unless otherwise noted below.  For Physician Assistant/ Nurse Practitioner cases/documentation I have personally evaluated this patient and have completed at least one if not all key elements of the E/M (history, physical exam, and MDM). Additional findings are as noted.      Vital Signs: BP: 125/84  Pulse: (!) 104  Respirations: 16  Temp: 98.2 °F (36.8 °C) SpO2: 96 %  PCP:  Luiza Bella MD  Note Started: 5/19/25, 7:51 PM EDT    Pertinent Comments:     Patient is a 65-year-old female with history of atrial flutter intermittently anticoagulated initially on Eliquis and 2 weeks ago changed to Pradaxa.   Now having some chest discomfort centrally without pleuritic component but has noticed increased fluttering feeling with some mild RVR in relation to atrial flutter with variable block.   Some shortness of breath associated as well.   Denies any vomiting or diarrhea or abdominal pain.   No blood in the stool or black tarry stools either and denies any URI symptoms recently.    On exam lungs are clear to auscultation bilaterally with midline trachea heart irregularly irregular.   Abdomen is soft/nontender.   No swelling in the lower extremities or calf pain and strong DP pulses palpated

## 2025-05-19 NOTE — ED PROVIDER NOTES
[facility] EMERGENCY DEPARTMENT  Emergency Department Encounter  Emergency Medicine Resident     Pt Name:Priscila Stubbs  MRN: [id number]  Birthdate 1960  Date of evaluation: 5/19/25  PCP:  Luiza Bella MD  Note Started: 7:20 PM EDT       CHIEF COMPLAINT       Chief Complaint   Patient presents with    Irregular Heart Beat    Chest Pain    Nausea       HISTORY OF PRESENT ILLNESS  (Location/Symptom, Timing/Onset, Context/Setting, Quality, Duration, Modifying Factors, Severity.)      Priscila Stubbs is a 65 y.o. female past medical history of atrial fibrillation, hyperlipidemia, presenting for assessment due to irregular heartbeat and chest pain.  Symptom onset 2 days ago.  She reports that it is intermittent.  It is substernal.  Nonradiating.  It is associated with intermittent dizziness, shortness of breath, and nausea.  She is not having any fevers chills cough vomiting diarrhea, abdominal pain, lower extremity edema, dysuria.  Patient states that she does take all prescribed medications.  She takes flecainide, metoprolol, and Pradaxa.  Was previously on Eliquis but swapped to Pradaxa due to insurance/financial constraints.       PAST MEDICAL / SURGICAL / SOCIAL / FAMILY HISTORY      has a past medical history of Arthritis, ASCUS with positive high risk HPV cervical, Asthma, COVID-19, Depression, Diverticulitis, ESBL (extended spectrum beta-lactamase) producing bacteria infection, GERD (gastroesophageal reflux disease), Hyperlipidemia, MRSA (methicillin resistant staph aureus) culture positive, Rectocele, and Tachycardia.        has a past surgical history that includes Hysterectomy (1996); knee surgery (Left); Cystocopy (02/25/2015); Nerve Block (07/28/2017); Nerve Block (09/22/2017); Nerve Block (09/22/2017); Nerve Block (11/10/2017); Colonoscopy (N/A, 07/19/2018); Upper gastrointestinal endoscopy (01/30/2019); Knee arthroscopy (Left, 05/13/2019); Ankle surgery (Left, 03/04/2021);

## 2025-05-20 ENCOUNTER — APPOINTMENT (OUTPATIENT)
Dept: NUCLEAR MEDICINE | Age: 65
End: 2025-05-20
Payer: COMMERCIAL

## 2025-05-20 ENCOUNTER — HOSPITAL ENCOUNTER (OUTPATIENT)
Age: 65
Setting detail: OBSERVATION
Discharge: HOME | End: 2025-05-22
Payer: COMMERCIAL

## 2025-05-20 VITALS
BODY MASS INDEX: 33.27 KG/M2 | DIASTOLIC BLOOD PRESSURE: 62 MMHG | HEIGHT: 62 IN | SYSTOLIC BLOOD PRESSURE: 118 MMHG | HEART RATE: 65 BPM | OXYGEN SATURATION: 99 % | TEMPERATURE: 97.7 F | RESPIRATION RATE: 18 BRPM | WEIGHT: 180.78 LBS

## 2025-05-20 VITALS — SYSTOLIC BLOOD PRESSURE: 144 MMHG | RESPIRATION RATE: 16 BRPM | HEART RATE: 61 BPM | DIASTOLIC BLOOD PRESSURE: 86 MMHG

## 2025-05-20 DIAGNOSIS — R94.31 ABNORMAL ELECTROCARDIOGRAPHY: Primary | ICD-10-CM

## 2025-05-20 DIAGNOSIS — E55.9 VITAMIN D DEFICIENCY: ICD-10-CM

## 2025-05-20 DIAGNOSIS — F41.9 ANXIETY: ICD-10-CM

## 2025-05-20 DIAGNOSIS — M62.838 MUSCLE SPASM: ICD-10-CM

## 2025-05-20 DIAGNOSIS — E78.49 OTHER HYPERLIPIDEMIA: ICD-10-CM

## 2025-05-20 DIAGNOSIS — K21.9 GASTROESOPHAGEAL REFLUX DISEASE, UNSPECIFIED WHETHER ESOPHAGITIS PRESENT: ICD-10-CM

## 2025-05-20 PROBLEM — I48.92 ATRIAL FLUTTER (HCC): Status: ACTIVE | Noted: 2025-05-20

## 2025-05-20 LAB
ECHO BSA: 1.89 M2
EKG ATRIAL RATE: 278 BPM
EKG ATRIAL RATE: 58 BPM
EKG P AXIS: 68 DEGREES
EKG P-R INTERVAL: 166 MS
EKG Q-T INTERVAL: 352 MS
EKG Q-T INTERVAL: 474 MS
EKG QRS DURATION: 90 MS
EKG QRS DURATION: 92 MS
EKG QTC CALCULATION (BAZETT): 444 MS
EKG QTC CALCULATION (BAZETT): 465 MS
EKG R AXIS: -15 DEGREES
EKG R AXIS: 6 DEGREES
EKG T AXIS: 49 DEGREES
EKG T AXIS: 71 DEGREES
EKG VENTRICULAR RATE: 58 BPM
EKG VENTRICULAR RATE: 96 BPM
NUC STRESS EJECTION FRACTION: 67 %
STRESS BASELINE DIAS BP: 86 MMHG
STRESS BASELINE HR: 58 BPM
STRESS BASELINE SYS BP: 144 MMHG
STRESS ESTIMATED WORKLOAD: 1 METS
STRESS PEAK DIAS BP: 89 MMHG
STRESS PEAK SYS BP: 147 MMHG
STRESS PERCENT HR ACHIEVED: 58 %
STRESS POST PEAK HR: 90 BPM
STRESS RATE PRESSURE PRODUCT: NORMAL BPM*MMHG
STRESS ST DEPRESSION: 0 MM
STRESS TARGET HR: 156 BPM
TID: 1.08

## 2025-05-20 PROCEDURE — G0378 HOSPITAL OBSERVATION PER HR: HCPCS

## 2025-05-20 PROCEDURE — 78452 HT MUSCLE IMAGE SPECT MULT: CPT

## 2025-05-20 PROCEDURE — 93018 CV STRESS TEST I&R ONLY: CPT | Performed by: INTERNAL MEDICINE

## 2025-05-20 PROCEDURE — 6370000000 HC RX 637 (ALT 250 FOR IP): Performed by: STUDENT IN AN ORGANIZED HEALTH CARE EDUCATION/TRAINING PROGRAM

## 2025-05-20 PROCEDURE — 3430000000 HC RX DIAGNOSTIC RADIOPHARMACEUTICAL: Performed by: STUDENT IN AN ORGANIZED HEALTH CARE EDUCATION/TRAINING PROGRAM

## 2025-05-20 PROCEDURE — 99222 1ST HOSP IP/OBS MODERATE 55: CPT | Performed by: STUDENT IN AN ORGANIZED HEALTH CARE EDUCATION/TRAINING PROGRAM

## 2025-05-20 PROCEDURE — 6360000002 HC RX W HCPCS: Performed by: STUDENT IN AN ORGANIZED HEALTH CARE EDUCATION/TRAINING PROGRAM

## 2025-05-20 PROCEDURE — 96376 TX/PRO/DX INJ SAME DRUG ADON: CPT

## 2025-05-20 PROCEDURE — 2500000003 HC RX 250 WO HCPCS: Performed by: STUDENT IN AN ORGANIZED HEALTH CARE EDUCATION/TRAINING PROGRAM

## 2025-05-20 PROCEDURE — 93017 CV STRESS TEST TRACING ONLY: CPT

## 2025-05-20 PROCEDURE — 6360000002 HC RX W HCPCS

## 2025-05-20 PROCEDURE — 93010 ELECTROCARDIOGRAM REPORT: CPT | Performed by: INTERNAL MEDICINE

## 2025-05-20 PROCEDURE — 96375 TX/PRO/DX INJ NEW DRUG ADDON: CPT

## 2025-05-20 PROCEDURE — A9500 TC99M SESTAMIBI: HCPCS | Performed by: STUDENT IN AN ORGANIZED HEALTH CARE EDUCATION/TRAINING PROGRAM

## 2025-05-20 PROCEDURE — 93016 CV STRESS TEST SUPVJ ONLY: CPT | Performed by: INTERNAL MEDICINE

## 2025-05-20 PROCEDURE — 93005 ELECTROCARDIOGRAM TRACING: CPT | Performed by: EMERGENCY MEDICINE

## 2025-05-20 RX ORDER — CHLORZOXAZONE 500 MG/1
TABLET ORAL
Qty: 28 TABLET | Refills: 1 | Status: SHIPPED | OUTPATIENT
Start: 2025-05-20

## 2025-05-20 RX ORDER — METOPROLOL SUCCINATE 25 MG/1
50 TABLET, EXTENDED RELEASE ORAL 2 TIMES DAILY
Status: DISCONTINUED | OUTPATIENT
Start: 2025-05-20 | End: 2025-05-20 | Stop reason: HOSPADM

## 2025-05-20 RX ORDER — OMEPRAZOLE 40 MG/1
40 CAPSULE, DELAYED RELEASE ORAL EVERY MORNING
Qty: 28 CAPSULE | Refills: 1 | Status: SHIPPED | OUTPATIENT
Start: 2025-05-20

## 2025-05-20 RX ORDER — REGADENOSON 0.08 MG/ML
0.4 INJECTION, SOLUTION INTRAVENOUS
Status: COMPLETED | OUTPATIENT
Start: 2025-05-20 | End: 2025-05-20

## 2025-05-20 RX ORDER — SODIUM CHLORIDE 0.9 % (FLUSH) 0.9 %
5-40 SYRINGE (ML) INJECTION PRN
Status: CANCELLED | OUTPATIENT
Start: 2025-05-20 | End: 2025-05-20

## 2025-05-20 RX ORDER — SODIUM CHLORIDE 9 MG/ML
500 INJECTION, SOLUTION INTRAVENOUS CONTINUOUS PRN
Status: DISCONTINUED | OUTPATIENT
Start: 2025-05-20 | End: 2025-05-20

## 2025-05-20 RX ORDER — SODIUM CHLORIDE 9 MG/ML
500 INJECTION, SOLUTION INTRAVENOUS CONTINUOUS PRN
Status: CANCELLED | OUTPATIENT
Start: 2025-05-20 | End: 2025-05-20

## 2025-05-20 RX ORDER — METOPROLOL TARTRATE 1 MG/ML
5 INJECTION, SOLUTION INTRAVENOUS EVERY 5 MIN PRN
Status: DISCONTINUED | OUTPATIENT
Start: 2025-05-20 | End: 2025-05-20

## 2025-05-20 RX ORDER — ALBUTEROL SULFATE 90 UG/1
2 INHALANT RESPIRATORY (INHALATION) PRN
Status: CANCELLED | OUTPATIENT
Start: 2025-05-20 | End: 2025-05-20

## 2025-05-20 RX ORDER — ALBUTEROL SULFATE 90 UG/1
2 INHALANT RESPIRATORY (INHALATION) PRN
Status: DISCONTINUED | OUTPATIENT
Start: 2025-05-20 | End: 2025-05-20

## 2025-05-20 RX ORDER — PANTOPRAZOLE SODIUM 40 MG/1
40 TABLET, DELAYED RELEASE ORAL
Status: DISCONTINUED | OUTPATIENT
Start: 2025-05-20 | End: 2025-05-20 | Stop reason: HOSPADM

## 2025-05-20 RX ORDER — NITROGLYCERIN 0.4 MG/1
0.4 TABLET SUBLINGUAL EVERY 5 MIN PRN
Status: DISCONTINUED | OUTPATIENT
Start: 2025-05-20 | End: 2025-05-20

## 2025-05-20 RX ORDER — SODIUM CHLORIDE 0.9 % (FLUSH) 0.9 %
10 SYRINGE (ML) INJECTION PRN
Status: DISCONTINUED | OUTPATIENT
Start: 2025-05-20 | End: 2025-05-20 | Stop reason: HOSPADM

## 2025-05-20 RX ORDER — ATROPINE SULFATE 0.1 MG/ML
0.5 INJECTION INTRAVENOUS EVERY 5 MIN PRN
Status: DISCONTINUED | OUTPATIENT
Start: 2025-05-20 | End: 2025-05-20

## 2025-05-20 RX ORDER — FLECAINIDE ACETATE 100 MG/1
100 TABLET ORAL 2 TIMES DAILY
Status: DISCONTINUED | OUTPATIENT
Start: 2025-05-20 | End: 2025-05-20 | Stop reason: HOSPADM

## 2025-05-20 RX ORDER — AMINOPHYLLINE 25 MG/ML
50 INJECTION, SOLUTION INTRAVENOUS PRN
Status: DISCONTINUED | OUTPATIENT
Start: 2025-05-20 | End: 2025-05-20

## 2025-05-20 RX ORDER — VENLAFAXINE HYDROCHLORIDE 75 MG/1
75 CAPSULE, EXTENDED RELEASE ORAL DAILY
Qty: 28 CAPSULE | Refills: 1 | Status: SHIPPED | OUTPATIENT
Start: 2025-05-20

## 2025-05-20 RX ORDER — BUSPIRONE HYDROCHLORIDE 15 MG/1
15 TABLET ORAL 2 TIMES DAILY
Status: DISCONTINUED | OUTPATIENT
Start: 2025-05-20 | End: 2025-05-20 | Stop reason: HOSPADM

## 2025-05-20 RX ORDER — SODIUM CHLORIDE 0.9 % (FLUSH) 0.9 %
5-40 SYRINGE (ML) INJECTION PRN
Status: DISCONTINUED | OUTPATIENT
Start: 2025-05-20 | End: 2025-05-20

## 2025-05-20 RX ORDER — TETRAKIS(2-METHOXYISOBUTYLISOCYANIDE)COPPER(I) TETRAFLUOROBORATE 1 MG/ML
15.5 INJECTION, POWDER, LYOPHILIZED, FOR SOLUTION INTRAVENOUS
Status: COMPLETED | OUTPATIENT
Start: 2025-05-20 | End: 2025-05-20

## 2025-05-20 RX ORDER — ERGOCALCIFEROL 1.25 MG/1
CAPSULE, LIQUID FILLED ORAL
Qty: 4 CAPSULE | Refills: 1 | Status: SHIPPED | OUTPATIENT
Start: 2025-05-20

## 2025-05-20 RX ORDER — TETRAKIS(2-METHOXYISOBUTYLISOCYANIDE)COPPER(I) TETRAFLUOROBORATE 1 MG/ML
38.3 INJECTION, POWDER, LYOPHILIZED, FOR SOLUTION INTRAVENOUS
Status: COMPLETED | OUTPATIENT
Start: 2025-05-20 | End: 2025-05-20

## 2025-05-20 RX ORDER — NITROGLYCERIN 0.4 MG/1
0.4 TABLET SUBLINGUAL EVERY 5 MIN PRN
Status: CANCELLED | OUTPATIENT
Start: 2025-05-20 | End: 2025-05-20

## 2025-05-20 RX ORDER — ATORVASTATIN CALCIUM 40 MG/1
40 TABLET, FILM COATED ORAL NIGHTLY
Status: DISCONTINUED | OUTPATIENT
Start: 2025-05-20 | End: 2025-05-20 | Stop reason: HOSPADM

## 2025-05-20 RX ORDER — AMINOPHYLLINE 25 MG/ML
50 INJECTION, SOLUTION INTRAVENOUS PRN
Status: CANCELLED | OUTPATIENT
Start: 2025-05-20 | End: 2025-05-20

## 2025-05-20 RX ORDER — SENNOSIDES 8.6 MG
325 CAPSULE ORAL DAILY
Qty: 28 TABLET | Refills: 3 | Status: SHIPPED | OUTPATIENT
Start: 2025-05-20

## 2025-05-20 RX ORDER — ATORVASTATIN CALCIUM 40 MG/1
40 TABLET, FILM COATED ORAL NIGHTLY
Qty: 28 TABLET | Refills: 1 | Status: SHIPPED | OUTPATIENT
Start: 2025-05-20

## 2025-05-20 RX ORDER — ATROPINE SULFATE 0.1 MG/ML
0.5 INJECTION INTRAVENOUS EVERY 5 MIN PRN
Status: CANCELLED | OUTPATIENT
Start: 2025-05-20 | End: 2025-05-20

## 2025-05-20 RX ORDER — METOPROLOL TARTRATE 1 MG/ML
5 INJECTION, SOLUTION INTRAVENOUS EVERY 5 MIN PRN
Status: CANCELLED | OUTPATIENT
Start: 2025-05-20 | End: 2025-05-20

## 2025-05-20 RX ORDER — KETOROLAC TROMETHAMINE 15 MG/ML
15 INJECTION, SOLUTION INTRAMUSCULAR; INTRAVENOUS ONCE
Status: COMPLETED | OUTPATIENT
Start: 2025-05-20 | End: 2025-05-20

## 2025-05-20 RX ORDER — DABIGATRAN ETEXILATE 150 MG/1
150 CAPSULE ORAL 2 TIMES DAILY
Status: DISCONTINUED | OUTPATIENT
Start: 2025-05-20 | End: 2025-05-20 | Stop reason: HOSPADM

## 2025-05-20 RX ORDER — SENNOSIDES 8.6 MG
325 CAPSULE ORAL DAILY
Status: DISCONTINUED | OUTPATIENT
Start: 2025-05-20 | End: 2025-05-20 | Stop reason: HOSPADM

## 2025-05-20 RX ORDER — REGADENOSON 0.08 MG/ML
0.4 INJECTION, SOLUTION INTRAVENOUS
Status: CANCELLED | OUTPATIENT
Start: 2025-05-20

## 2025-05-20 RX ORDER — VENLAFAXINE HYDROCHLORIDE 75 MG/1
75 CAPSULE, EXTENDED RELEASE ORAL DAILY
Status: DISCONTINUED | OUTPATIENT
Start: 2025-05-20 | End: 2025-05-20 | Stop reason: HOSPADM

## 2025-05-20 RX ADMIN — PANTOPRAZOLE SODIUM 40 MG: 40 TABLET, DELAYED RELEASE ORAL at 14:20

## 2025-05-20 RX ADMIN — BUSPIRONE HYDROCHLORIDE 15 MG: 15 TABLET ORAL at 14:21

## 2025-05-20 RX ADMIN — SODIUM CHLORIDE, PRESERVATIVE FREE 10 ML: 5 INJECTION INTRAVENOUS at 09:45

## 2025-05-20 RX ADMIN — REGADENOSON 0.4 MG: 0.08 INJECTION, SOLUTION INTRAVENOUS at 11:24

## 2025-05-20 RX ADMIN — FLECAINIDE ACETATE 100 MG: 100 TABLET ORAL at 14:20

## 2025-05-20 RX ADMIN — DABIGATRAN ETEXILATE MESYLATE 150 MG: 150 CAPSULE ORAL at 14:20

## 2025-05-20 RX ADMIN — ATORVASTATIN CALCIUM 40 MG: 40 TABLET, FILM COATED ORAL at 01:47

## 2025-05-20 RX ADMIN — ACETAMINOPHEN 650 MG: 325 TABLET ORAL at 14:20

## 2025-05-20 RX ADMIN — FLECAINIDE ACETATE 100 MG: 100 TABLET ORAL at 01:47

## 2025-05-20 RX ADMIN — SODIUM CHLORIDE, PRESERVATIVE FREE 10 ML: 5 INJECTION INTRAVENOUS at 11:05

## 2025-05-20 RX ADMIN — TETRAKIS(2-METHOXYISOBUTYLISOCYANIDE)COPPER(I) TETRAFLUOROBORATE 15.5 MILLICURIE: 1 INJECTION, POWDER, LYOPHILIZED, FOR SOLUTION INTRAVENOUS at 09:45

## 2025-05-20 RX ADMIN — DABIGATRAN ETEXILATE MESYLATE 150 MG: 150 CAPSULE ORAL at 01:47

## 2025-05-20 RX ADMIN — METOPROLOL SUCCINATE 50 MG: 25 TABLET, EXTENDED RELEASE ORAL at 14:20

## 2025-05-20 RX ADMIN — TETRAKIS(2-METHOXYISOBUTYLISOCYANIDE)COPPER(I) TETRAFLUOROBORATE 38.3 MILLICURIE: 1 INJECTION, POWDER, LYOPHILIZED, FOR SOLUTION INTRAVENOUS at 11:23

## 2025-05-20 RX ADMIN — SODIUM CHLORIDE, PRESERVATIVE FREE 10 ML: 5 INJECTION INTRAVENOUS at 14:21

## 2025-05-20 RX ADMIN — SODIUM CHLORIDE, PRESERVATIVE FREE 10 ML: 5 INJECTION INTRAVENOUS at 09:00

## 2025-05-20 RX ADMIN — ASPIRIN 325 MG: 325 TABLET, COATED ORAL at 14:20

## 2025-05-20 RX ADMIN — KETOROLAC TROMETHAMINE 15 MG: 15 INJECTION, SOLUTION INTRAMUSCULAR; INTRAVENOUS at 14:19

## 2025-05-20 RX ADMIN — SODIUM CHLORIDE, PRESERVATIVE FREE 10 ML: 5 INJECTION INTRAVENOUS at 11:28

## 2025-05-20 RX ADMIN — BUSPIRONE HYDROCHLORIDE 15 MG: 15 TABLET ORAL at 01:47

## 2025-05-20 RX ADMIN — VENLAFAXINE HYDROCHLORIDE 75 MG: 75 CAPSULE, EXTENDED RELEASE ORAL at 14:20

## 2025-05-20 RX ADMIN — SODIUM CHLORIDE, PRESERVATIVE FREE 10 ML: 5 INJECTION INTRAVENOUS at 11:23

## 2025-05-20 RX ADMIN — METOPROLOL SUCCINATE 50 MG: 25 TABLET, EXTENDED RELEASE ORAL at 01:47

## 2025-05-20 ASSESSMENT — ENCOUNTER SYMPTOMS
SHORTNESS OF BREATH: 1
VOMITING: 0
ABDOMINAL PAIN: 0
DIARRHEA: 0
NAUSEA: 1

## 2025-05-20 ASSESSMENT — PAIN SCALES - GENERAL
PAINLEVEL_OUTOF10: 3
PAINLEVEL_OUTOF10: 3

## 2025-05-20 ASSESSMENT — PAIN DESCRIPTION - LOCATION: LOCATION: HEAD

## 2025-05-20 NOTE — CARE COORDINATION
Case Management Assessment  Initial Evaluation    Date/Time of Evaluation: 5/20/2025 2:32 PM  Assessment Completed by: BETTINA JUNG RN    If patient is discharged prior to next notation, then this note serves as note for discharge by case management.    Patient Name: Priscila Stubbs                   YOB: 1960  Diagnosis: Chest pain [R07.9]  Atrial flutter, unspecified type (HCC) [I48.92]  Chest pain, unspecified type [R07.9]                   Date / Time: 5/19/2025  7:20 PM    Patient Admission Status: Observation   Readmission Risk (Low < 19, Mod (19-27), High > 27): Readmission Risk Score: 12.1    Current PCP: Luiza Bella MD  PCP verified by CM? Yes    Chart Reviewed: Yes      History Provided by: Patient  Patient Orientation: Alert and Oriented    Patient Cognition: Alert    Hospitalization in the last 30 days (Readmission):  No    If yes, Readmission Assessment in CM Navigator will be completed.    Advance Directives:      Code Status: Full Code   Patient's Primary Decision Maker is:        Discharge Planning:    Patient lives with: Family Members Type of Home: Apartment  Primary Care Giver: Self  Patient Support Systems include: Family Members   Current Financial resources:    Current community resources:    Current services prior to admission: C-pap            Current DME:              Type of Home Care services:  None    ADLS  Prior functional level: Independent in ADLs/IADLs  Current functional level: Independent in ADLs/IADLs    PT AM-PAC:   /24  OT AM-PAC:   /24    Family can provide assistance at DC: No  Would you like Case Management to discuss the discharge plan with any other family members/significant others, and if so, who? No  Plans to Return to Present Housing: Yes  Other Identified Issues/Barriers to RETURNING to current housing: none  Potential Assistance needed at discharge: N/A, Meals On Wheels            Potential DME:    Patient expects to discharge to:

## 2025-05-20 NOTE — DISCHARGE INSTRUCTIONS
You were evaluated for your chest pain. At this time, your stress test is low risk. Please continue to take your medications as prescribed. Follow up with cardiology for reevaluation.     Return to the emergency department if you develop any new or worsening symptoms such as chest pain, shortness of breath, weakness, numbness, speech abnormality, or other concerning symptoms.

## 2025-05-20 NOTE — PROGRESS NOTES
CDU Daily Progress Note  Attending Physician       Pt Name: Priscila Stubbs  MRN: [id number]  Birthdate 1960  Date of evaluation: 5/20/25    I performed a history and physical examination of the patient and discussed management with the resident. I reviewed the resident’s note and agree with the documented findings and plan of care. Any areas of disagreement are noted on the chart. I was personally present for the key portions of any procedures. I have documented in the chart those procedures where I was not present during the key portions. I have reviewed the emergency nurses triage note. I agree with the chief complaint, past medical history, past surgical history, allergies, medications, social and family history as documented unless otherwise noted below. Documentation of the HPI, Physical Exam and Medical Decision Making performed by medical students or scribes is based on my personal performance of the HPI, PE and MDM. For Physician Assistant/ Nurse Practitioner cases/documentation I have personally evaluated this patient and have completed at least one if not all key elements of the E/M (history, physical exam, and MDM). Additional findings are as noted.    The Family History, Social History and Review of Systems are unchanged from the previous day. No significant events overnight.  This patient was placed in the observation unit for reevaluation for possible admission to the hospital.     Patient is currently in stress testing and we are awaiting that to finish.  She relates her chest pain that brought her in is resolved now.  Appreciate cardiology input.    Trista Mcqueen MD,    Attending Physician  Critical Decision Unit

## 2025-05-20 NOTE — H&P
[facility]  CDU / OBSERVATION ENCOUNTER  Physician NOTE     Pt Name: Priscila Stubbs  MRN: [id number]  Birthdate 1960  Date of evaluation: 5/20/25  Patient's PCP is :  Luiza Bella MD    CHIEF COMPLAINT       Chief Complaint   Patient presents with    Irregular Heart Beat    Chest Pain    Nausea         HISTORY OF PRESENT ILLNESS   Priscila Stubbs is a 65 y.o. female who presented to the ED with c/o intermittent chest pain and heart palpitations which began 2 days prior to arrival.  Patient does have a history of atrial fibrillation and states she can feel when she is going in and out of it.  She has been taking her Pradaxa, flecainide, and metoprolol as prescribed without missing any doses.  While in the ED, patient was found to be in atrial flutter with variable AV block.  She was ultimately admitted to the observation service for cardiology evaluation.    Location/Symptom: Chest pain  Timing/Onset: 2 days prior to arrival  Provocation: N/A  Quality: Sharp  Radiation: N/A  Severity: Moderate  Timing/Duration: Intermittent  Modifying Factors: N/A    History was obtained in part through review of the ED chart. When possible, a direct discussion was had with ED nurses, residents, and attendings  REVIEW OF SYSTEMS     Review of Systems   Constitutional:  Negative for fever.   HENT:  Negative for congestion.    Respiratory:  Positive for shortness of breath.    Cardiovascular:  Positive for chest pain and palpitations.   Gastrointestinal:  Positive for nausea. Negative for abdominal pain, diarrhea and vomiting.   Neurological:  Positive for dizziness. Negative for headaches.         (PQRS) Advance directives on face sheet per hospital policy. No change unless specifically mentioned in chart    PAST MEDICAL HISTORY    has a past medical history of Arthritis, ASCUS with positive high risk HPV cervical, Asthma, COVID-19, Depression, Diverticulitis, ESBL (extended spectrum beta-lactamase)

## 2025-05-20 NOTE — CONSULTS
[facility]   Consult Note         Today's Date: 5/20/2025  Patient Name: Priscila Stubbs  Date of admission: 5/19/2025  7:20 PM  Patient's age: 65 y.o., 1960  Admission Dx: Chest pain [R07.9]  Atrial flutter, unspecified type (HCC) [I48.92]  Chest pain, unspecified type [R07.9]    Reason for Consult:  Cardiac evaluation    Requesting Physician: Johnnie Wu MD    REASON FOR CONSULT:  Atrial Flutter    History Obtained From:  Patient, chart, staff, records    HISTORY OF PRESENT ILLNESS:      The patient is a 65 y.o. female with a history of atrial fibrillation and hyperlipidemia who is admitted to the hospital for atrial flutter and chest pain. Patient reports chest pain which gradually developed over the last few days described as a sensation of palpitations in the middle of her chest. She notes associated light headedness, shortness of breath, nausea without vomiting, and diaphoresis. She states that over the last month she has experienced increasing episodes of palpitations. Work up in the ED revealed EKG showing atrial flutter with rate of 96 bpm. Troponin negative x 2, Pro BNP of 767, and negative D-dimer.     Patient is currently feeling well and is in normal sinus rhythm. Denies dizziness, light headedness, nausea, vomiting, abdominal pain, weakness, numbness, syncope. There are no other complaints or modifying factors at this time.     Past Medical History:    Past Medical History:   Diagnosis Date    Arthritis     ASCUS with positive high risk HPV cervical 03/22/2016    Asthma     COVID-19 2020    Mild per pt., no treatment    Depression     Diverticulitis     ESBL (extended spectrum beta-lactamase) producing bacteria infection 02/03/2019    E. Coli urine    GERD (gastroesophageal reflux disease)     Hyperlipidemia     MRSA (methicillin resistant staph aureus) culture positive 04/18/2016    lip    Rectocele     Tachycardia     takes Metoprolol       Past Surgical History:   has a

## 2025-05-20 NOTE — DISCHARGE SUMMARY
CDU Discharge Summary        Patient:  Priscila Stubbs  YOB: 1960    MRN: [id number]   Acct: [id number]    Primary Care Physician: uLiza Bella MD    Admit date:  5/19/2025  7:20 PM  Discharge date: 05/20/25      Discharge Diagnoses:     Patient had chest pain onset due to atrial flutter.  Treated with medical management .  Patient's symptoms are stable with the plan to discharge home.    Follow-up:  Follow up with cardiology, PCP. Please return to the emergency room with any new or worsening symptoms.    Stressed to patient the importance of following up with primary care doctor for further workup/management of symptoms.  Pt verbalizes understanding and agrees with plan.    Discharge Medications:           Medication List        CHANGE how you take these medications      vitamin D 1.25 MG (63860 UT) Caps capsule  Commonly known as: ERGOCALCIFEROL  TAKE ONE CAPSULE BY MOUTH EVERY WEEK  What changed: See the new instructions.            CONTINUE taking these medications      acetaminophen 500 MG tablet  Commonly known as: TYLENOL  Take 2 tablets by mouth every 8 hours as needed for Pain     albuterol sulfate  (90 Base) MCG/ACT inhaler  Commonly known as: Ventolin HFA  INAHLE 2 PUFFS BY MOUTH INTO THE LUNGS EVERY 6 HOURS AS NEEDED FOR WHEEZING     aspirin 325 MG EC tablet  TAKE 1 TABLET BY MOUTH DAILY     atorvastatin 40 MG tablet  Commonly known as: LIPITOR  TAKE ONE TABLET BY MOUTH NIGHTLY     busPIRone 15 MG tablet  Commonly known as: BUSPAR  TAKE 1 TABLET BY MOUTH TWICE DAILY     chlorzoxazone 500 MG tablet  Commonly known as: PARAFON FORTE  TAKE 1/2 TABLET BY MOUTH TWICE DAILY AS NEEDED     Cranberry Concentrate 500 MG Caps  Generic drug: Cranberry  TAKE 1 CAPSULE BY MOUTH TWICE DAILY     Culturelle Probiotics + Multiv Chew  Take 1 tablet by mouth daily     dabigatran 150 MG capsule  Commonly known as: Pradaxa  Take 1 capsule by mouth 2 times daily     diclofenac 50 MG EC

## 2025-05-20 NOTE — ED NOTES
ED to inpatient nurses report      Chief Complaint:  Chief Complaint   Patient presents with    Irregular Heart Beat    Chest Pain    Nausea     Present to ED from: Home    MOA:     LOC: alert and orientated to name, place, date  Mobility: Independent  Oxygen Baseline: room air    Current needs required: none   Pending ED orders: no  Present condition: stable     Why did the patient come to the ED? A-fib/flutter  What is the plan? Admit for cardiology consult   Any procedures or intervention occur? no  Any safety concerns??    Mental Status:       Psych Assessment:   Psychosocial  Psychosocial (WDL): Within Defined Limits  Vital signs   Vitals:    05/19/25 1913 05/19/25 1926   BP: 125/84    Pulse: (!) 104    Resp: 16    Temp: 98.2 °F (36.8 °C)    SpO2: 96%    Weight:  82 kg (180 lb 12.4 oz)   Height:  1.575 m (5' 2\")        Vitals:  Patient Vitals for the past 24 hrs:   BP Temp Pulse Resp SpO2 Height Weight   05/19/25 1926 -- -- -- -- -- 1.575 m (5' 2\") 82 kg (180 lb 12.4 oz)   05/19/25 1913 125/84 98.2 °F (36.8 °C) (!) 104 16 96 % -- --      Visit Vitals  /84   Pulse (!) 104   Temp 98.2 °F (36.8 °C)   Resp 16   Ht 1.575 m (5' 2\")   Wt 82 kg (180 lb 12.4 oz)   SpO2 96%   BMI 33.06 kg/m²        LDAs:   Peripheral IV 05/19/25 Right Antecubital (Active)       Ambulatory Status:  Presents to emergency department  because of falls (Syncope, seizure, or loss of consciousness): No, Age > 70: No, Altered Mental Status, Intoxication with alcohol or substance confusion (Disorientation, impaired judgment, poor safety awaremess, or inability to follow instructions): No, Impaired Mobility: Ambulates or transfers with assistive devices or assistance; Unable to ambulate or transer.: No, Nursing Judgement: No    Diagnosis:  DISPOSITION Admitted 05/19/2025 09:44:03 PM   Final diagnoses:   Atrial flutter, unspecified type (HCC)   Chest pain, unspecified type        Consults:  None     Treatment Team:   Treatment Team:

## 2025-05-20 NOTE — PLAN OF CARE
Problem: Discharge Planning  Goal: Discharge to home or other facility with appropriate resources  Outcome: Progressing  Flowsheets (Taken 5/20/2025 0230)  Discharge to home or other facility with appropriate resources:   Identify barriers to discharge with patient and caregiver   Identify discharge learning needs (meds, wound care, etc)   Refer to discharge planning if patient needs post-hospital services based on physician order or complex needs related to functional status, cognitive ability or social support system     Problem: Safety - Adult  Goal: Free from fall injury  Outcome: Progressing  Flowsheets (Taken 5/20/2025 0230)  Free From Fall Injury:   Instruct family/caregiver on patient safety   Based on caregiver fall risk screen, instruct family/caregiver to ask for assistance with transferring infant if caregiver noted to have fall risk factors     Problem: ABCDS Injury Assessment  Goal: Absence of physical injury  Outcome: Progressing  Flowsheets (Taken 5/20/2025 0230)  Absence of Physical Injury: Implement safety measures based on patient assessment

## 2025-05-21 ENCOUNTER — TELEPHONE (OUTPATIENT)
Dept: FAMILY MEDICINE CLINIC | Age: 65
End: 2025-05-21

## 2025-05-21 NOTE — TELEPHONE ENCOUNTER
Care Transitions Initial Follow Up Call    Outreach made within 2 business days of discharge: Yes    Patient: Priscila Stubbs Patient : 1960   MRN: [id number]  Reason for Admission: chest pain   Discharge Date: 25       Spoke with:     Discharge department/facility: [facility] Interactive Patient Contact:  Was patient able to fill all prescriptions:   Was patient instructed to bring all medications to the follow-up visit:   Is patient taking all medications as directed in the discharge summary?   Does patient understand their discharge instructions:   Does patient have questions or concerns that need addressed prior to 7-14 day follow up office visit:     Additional needs identified to be addressed with provider  Follow up with speciality.              Scheduled appointment with PCP within 7-14 days    Follow Up  Future Appointments   Date Time Provider Department Center   9/10/2025  1:15 PM Larry Ramírez, [facility] - [facility] Howell MA

## 2025-06-17 NOTE — TELEPHONE ENCOUNTER
Priscila Stubbs is calling to request a refill on the following medication(s):    Last Visit Date (If Applicable):  12/18/2024    Next Visit Date:    Visit date not found    Medication Request:  Requested Prescriptions     Pending Prescriptions Disp Refills    diclofenac (VOLTAREN) 50 MG EC tablet [Pharmacy Med Name: diclofenac sodium 50 mg tablet,delayed release] 56 tablet 2     Sig: TAKE ONE TABLET BY MOUTH TWICE DAILY

## 2025-07-15 DIAGNOSIS — K21.9 GASTROESOPHAGEAL REFLUX DISEASE, UNSPECIFIED WHETHER ESOPHAGITIS PRESENT: ICD-10-CM

## 2025-07-15 DIAGNOSIS — M62.838 MUSCLE SPASM: ICD-10-CM

## 2025-07-15 DIAGNOSIS — F41.9 ANXIETY: ICD-10-CM

## 2025-07-15 DIAGNOSIS — E78.49 OTHER HYPERLIPIDEMIA: ICD-10-CM

## 2025-07-15 DIAGNOSIS — E55.9 VITAMIN D DEFICIENCY: ICD-10-CM

## 2025-07-15 RX ORDER — VENLAFAXINE HYDROCHLORIDE 75 MG/1
75 CAPSULE, EXTENDED RELEASE ORAL DAILY
Qty: 28 CAPSULE | Refills: 1 | Status: SHIPPED | OUTPATIENT
Start: 2025-07-15

## 2025-07-15 RX ORDER — CHLORZOXAZONE 500 MG/1
TABLET ORAL
Qty: 28 TABLET | Refills: 1 | Status: SHIPPED | OUTPATIENT
Start: 2025-07-15

## 2025-07-15 RX ORDER — ERGOCALCIFEROL 1.25 MG/1
CAPSULE, LIQUID FILLED ORAL
Qty: 4 CAPSULE | Refills: 1 | Status: SHIPPED | OUTPATIENT
Start: 2025-07-15

## 2025-07-15 RX ORDER — OMEPRAZOLE 40 MG/1
40 CAPSULE, DELAYED RELEASE ORAL EVERY MORNING
Qty: 28 CAPSULE | Refills: 1 | Status: SHIPPED | OUTPATIENT
Start: 2025-07-15

## 2025-07-15 RX ORDER — ATORVASTATIN CALCIUM 40 MG/1
40 TABLET, FILM COATED ORAL NIGHTLY
Qty: 28 TABLET | Refills: 1 | Status: SHIPPED | OUTPATIENT
Start: 2025-07-15

## (undated) DEVICE — GLOVE ORANGE PI 7   MSG9070

## (undated) DEVICE — GLOVE ORANGE PI 7 1/2   MSG9075

## (undated) DEVICE — SUTURE VCRL SZ 2-0 L27IN ABSRB UD L26MM CT-2 1/2 CIR J269H

## (undated) DEVICE — CHLORAPREP 26ML ORANGE

## (undated) DEVICE — DRESSING PETRO W3XL8IN OIL EMUL N ADH GZ KNIT IMPREG CELOS

## (undated) DEVICE — CUP MED 1OZ CLR POLYPR FEED GRAD W/O LID

## (undated) DEVICE — BLOCK BITE 60FR RUBBER ADLT DENTAL

## (undated) DEVICE — SMARTGOWN SURGICAL GOWN, 3XL, LONG: Brand: CONVERTORS

## (undated) DEVICE — DRAPE,EXTREMITY,89X128,STERILE: Brand: MEDLINE

## (undated) DEVICE — 3M™ STERI-DRAPE™ INCISE DRAPE 1050 (60CM X 45CM): Brand: STERI-DRAPE™

## (undated) DEVICE — DRAPE,U/ SHT,SPLIT,PLAS,STERIL: Brand: MEDLINE

## (undated) DEVICE — GAUZE,SPONGE,FLUFF,6"X6.75",STRL,5/TRAY: Brand: MEDLINE

## (undated) DEVICE — DRAPE,REIN 53X77,STERILE: Brand: MEDLINE

## (undated) DEVICE — CO2 CANNULA,SUPERSOFT, ADLT,7'O2,7'CO2: Brand: MEDLINE

## (undated) DEVICE — GARMENT,MEDLINE,DVT,INT,CALF,MED, GEN2: Brand: MEDLINE

## (undated) DEVICE — C-ARM: Brand: UNBRANDED

## (undated) DEVICE — INTENDED FOR TISSUE SEPARATION, AND OTHER PROCEDURES THAT REQUIRE A SHARP SURGICAL BLADE TO PUNCTURE OR CUT.: Brand: BARD-PARKER ® CARBON RIB-BACK BLADES

## (undated) DEVICE — 3M™ STERI-DRAPE™ U-DRAPE 1015: Brand: STERI-DRAPE™

## (undated) DEVICE — YANKAUER,FLEXIBLE HANDLE,REGLR CAPACITY: Brand: MEDLINE INDUSTRIES, INC.

## (undated) DEVICE — SUTURE NONABSORBABLE MONOFILAMENT 3-0 PS-1 18 IN BLK ETHILON 1663H

## (undated) DEVICE — APPLICATOR MEDICATED 26 CC SOLUTION HI LT ORNG CHLORAPREP

## (undated) DEVICE — BNDG,ELSTC,MATRIX,STRL,6"X5YD,LF,HOOK&LP: Brand: MEDLINE

## (undated) DEVICE — BANDAGE COBAN 4 IN COMPR W4INXL5YD FOAM COHESIVE QUIK STK SELF ADH SFT

## (undated) DEVICE — Device

## (undated) DEVICE — BLANKET WRM W29.9XL79.1IN UP BODY FORC AIR MISTRAL-AIR

## (undated) DEVICE — GAUZE,SPONGE,4"X4",16PLY,STRL,LF,10/TRAY: Brand: MEDLINE

## (undated) DEVICE — C-ARMOR C-ARM EQUIPMENT COVERS CLEAR STERILE UNIVERSAL FIT 12 PER CASE: Brand: C-ARMOR

## (undated) DEVICE — SMALL TEAR CROSS CUT RASP (11.0 X 5.0MM)

## (undated) DEVICE — SUTURE ETHBND EXCEL SZ 0 L30IN NONABSORBABLE GRN L26MM CT-2 X412H

## (undated) DEVICE — Device: Brand: DEFENDO VALVE AND CONNECTOR KIT

## (undated) DEVICE — SKIN PREP TRAY W/CHG: Brand: MEDLINE INDUSTRIES, INC.

## (undated) DEVICE — TOWEL,OR,DSP,ST,BLUE,DLX,XR,4/PK,20PK/CS: Brand: MEDLINE

## (undated) DEVICE — PADDING,UNDERCAST,COTTON, 4"X4YD STERILE: Brand: MEDLINE

## (undated) DEVICE — KIT INSTR DRL GUID 1.6/1.35MM GUIDWIRE DISP FIBERTAK DX

## (undated) DEVICE — STRIP WND CLSR W1 4XL4IN POLYAMIDE MACROPOROUS NONWOVEN H2O

## (undated) DEVICE — GOWN,AURORA,NONREINFORCED,LARGE: Brand: MEDLINE

## (undated) DEVICE — ZIMMER® STERILE DISPOSABLE TOURNIQUET CUFF WITH PROTECTIVE SLEEVE AND PLC, DUAL PORT, SINGLE BLADDER, 34 IN. (86 CM)

## (undated) DEVICE — PAD,ABDOMINAL,5"X9",ST,LF,25/BX: Brand: MEDLINE INDUSTRIES, INC.

## (undated) DEVICE — GLOVE SURG SZ 8 CRM LTX FREE POLYISOPRENE POLYMER BEAD ANTI

## (undated) DEVICE — INSERT CUSHION HEAD PRONEVIEW

## (undated) DEVICE — GLOVE SURG SZ 65 THK91MIL LTX FREE SYN POLYISOPRENE

## (undated) DEVICE — FORCEPS BX L240CM WRK CHN 2.8MM STD CAP W/ NDL MIC MESH

## (undated) DEVICE — BANDAGE COMPR W6INXL12FT SMOOTH FOR LIMB EXSANG ESMARCH

## (undated) DEVICE — STRIP 4X.25IN SUTURE STRIP PLUS ADH ELAS

## (undated) DEVICE — TUBING, SUCTION, 1/4" X 12', STRAIGHT: Brand: MEDLINE

## (undated) DEVICE — GLOVE SURG SZ 85 L12IN FNGR THK79MIL GRN LTX FREE

## (undated) DEVICE — SPLINT ORTH W5XL30IN WHT FBRGLS 1 SIDE FELT PD CNFRM LO

## (undated) DEVICE — GOWN,AURORA,NONRNF,XL,30/CS: Brand: MEDLINE

## (undated) DEVICE — SPONGE LAP W18XL18IN WHT COT 4 PLY FLD STRUNG RADPQ DISP ST

## (undated) DEVICE — PROTECTOR EYE PT SELF ADH NS OPT GRD LF

## (undated) DEVICE — NDL CNTR 40CT FM MAG: Brand: MEDLINE INDUSTRIES, INC.

## (undated) DEVICE — FORCEPS BX L240CM JAW DIA22MM ORNG STD CAP W NDL RAD JAW 4

## (undated) DEVICE — SUTURE ETHLN SZ 3-0 L18IN NONABSORBABLE BLK PS-2 L19MM 3/8 1669H

## (undated) DEVICE — BANDAGE COMPR W6INXL5YD WHT BGE POLY COT M E WRP WV HK AND

## (undated) DEVICE — POSITIONER HD W8XH4XL8.5IN RASPBERRY FOAM SLT

## (undated) DEVICE — Z INACTIVE USE 2660664 SOLUTION IRRIG 3000ML 0.9% SOD CHL USP UROMATIC PLAS CONT